# Patient Record
Sex: FEMALE | Race: WHITE | NOT HISPANIC OR LATINO
[De-identification: names, ages, dates, MRNs, and addresses within clinical notes are randomized per-mention and may not be internally consistent; named-entity substitution may affect disease eponyms.]

---

## 2017-06-02 ENCOUNTER — APPOINTMENT (OUTPATIENT)
Dept: GASTROENTEROLOGY | Facility: CLINIC | Age: 82
End: 2017-06-02

## 2017-06-02 VITALS
DIASTOLIC BLOOD PRESSURE: 70 MMHG | BODY MASS INDEX: 47.29 KG/M2 | HEIGHT: 62 IN | WEIGHT: 257 LBS | TEMPERATURE: 98.2 F | SYSTOLIC BLOOD PRESSURE: 140 MMHG

## 2017-06-02 DIAGNOSIS — Z87.39 PERSONAL HISTORY OF OTHER DISEASES OF THE MUSCULOSKELETAL SYSTEM AND CONNECTIVE TISSUE: ICD-10-CM

## 2017-06-02 DIAGNOSIS — Z86.39 PERSONAL HISTORY OF OTHER ENDOCRINE, NUTRITIONAL AND METABOLIC DISEASE: ICD-10-CM

## 2017-06-02 DIAGNOSIS — Z86.79 PERSONAL HISTORY OF OTHER DISEASES OF THE CIRCULATORY SYSTEM: ICD-10-CM

## 2017-06-02 DIAGNOSIS — M79.89 OTHER SPECIFIED SOFT TISSUE DISORDERS: ICD-10-CM

## 2017-06-02 DIAGNOSIS — Z87.09 PERSONAL HISTORY OF OTHER DISEASES OF THE RESPIRATORY SYSTEM: ICD-10-CM

## 2017-06-02 RX ORDER — ASPIRIN 325 MG
81 TABLET ORAL
Refills: 0 | Status: ACTIVE | COMMUNITY

## 2017-06-02 RX ORDER — CHOLECALCIFEROL (VITAMIN D3) 250 MCG
250 MCG CAPSULE ORAL
Refills: 0 | Status: ACTIVE | COMMUNITY

## 2017-08-02 ENCOUNTER — OUTPATIENT (OUTPATIENT)
Dept: OUTPATIENT SERVICES | Facility: HOSPITAL | Age: 82
LOS: 1 days | Discharge: HOME | End: 2017-08-02
Payer: MEDICARE

## 2017-08-02 ENCOUNTER — APPOINTMENT (OUTPATIENT)
Dept: GASTROENTEROLOGY | Facility: HOSPITAL | Age: 82
End: 2017-08-02
Payer: MEDICARE

## 2017-08-02 ENCOUNTER — RESULT REVIEW (OUTPATIENT)
Age: 82
End: 2017-08-02

## 2017-08-02 DIAGNOSIS — K21.9 GASTRO-ESOPHAGEAL REFLUX DISEASE WITHOUT ESOPHAGITIS: ICD-10-CM

## 2017-08-02 DIAGNOSIS — Z98.89 OTHER SPECIFIED POSTPROCEDURAL STATES: Chronic | ICD-10-CM

## 2017-08-02 PROCEDURE — 88305 TISSUE EXAM BY PATHOLOGIST: CPT | Mod: 26

## 2017-08-02 PROCEDURE — 88312 SPECIAL STAINS GROUP 1: CPT | Mod: 26

## 2017-08-02 PROCEDURE — 43239 EGD BIOPSY SINGLE/MULTIPLE: CPT

## 2017-08-03 LAB — SURGICAL PATHOLOGY STUDY: SIGNIFICANT CHANGE UP

## 2017-08-29 ENCOUNTER — APPOINTMENT (OUTPATIENT)
Dept: GASTROENTEROLOGY | Facility: CLINIC | Age: 82
End: 2017-08-29
Payer: MEDICARE

## 2017-08-29 VITALS
DIASTOLIC BLOOD PRESSURE: 70 MMHG | HEIGHT: 62 IN | BODY MASS INDEX: 47.84 KG/M2 | SYSTOLIC BLOOD PRESSURE: 140 MMHG | WEIGHT: 260 LBS

## 2017-08-29 DIAGNOSIS — M48.02 SPINAL STENOSIS, CERVICAL REGION: ICD-10-CM

## 2017-08-29 DIAGNOSIS — Z09 ENCOUNTER FOR FOLLOW-UP EXAMINATION AFTER COMPLETED TREATMENT FOR CONDITIONS OTHER THAN MALIGNANT NEOPLASM: ICD-10-CM

## 2017-08-29 DIAGNOSIS — Z85.3 PERSONAL HISTORY OF MALIGNANT NEOPLASM OF BREAST: ICD-10-CM

## 2017-08-29 PROCEDURE — 99214 OFFICE O/P EST MOD 30 MIN: CPT

## 2017-09-06 ENCOUNTER — EMERGENCY (EMERGENCY)
Facility: HOSPITAL | Age: 82
LOS: 1 days | Discharge: HOME | End: 2017-09-06
Attending: EMERGENCY MEDICINE | Admitting: EMERGENCY MEDICINE
Payer: MEDICARE

## 2017-09-06 VITALS
OXYGEN SATURATION: 93 % | DIASTOLIC BLOOD PRESSURE: 58 MMHG | SYSTOLIC BLOOD PRESSURE: 128 MMHG | RESPIRATION RATE: 18 BRPM | HEART RATE: 85 BPM | TEMPERATURE: 98 F

## 2017-09-06 VITALS
SYSTOLIC BLOOD PRESSURE: 142 MMHG | RESPIRATION RATE: 18 BRPM | HEART RATE: 81 BPM | OXYGEN SATURATION: 88 % | DIASTOLIC BLOOD PRESSURE: 42 MMHG

## 2017-09-06 DIAGNOSIS — Z98.89 OTHER SPECIFIED POSTPROCEDURAL STATES: Chronic | ICD-10-CM

## 2017-09-06 PROCEDURE — 72170 X-RAY EXAM OF PELVIS: CPT | Mod: 26

## 2017-09-06 PROCEDURE — 99284 EMERGENCY DEPT VISIT MOD MDM: CPT | Mod: GC

## 2017-09-06 NOTE — ED ADULT NURSE NOTE - TEMPLATE LIST FOR HEAD TO TOE ASSESSMENT
Chief complaint:   Chief Complaint   Patient presents with   • Follow-up     1st Degree AVB, Hx of AFL, s/p ablation        Vitals:  Visit Vitals   • /84   • Pulse 95   • Ht 6' 3\" (1.905 m)   • Wt (!) 154.2 kg   • BMI 42.5 kg/m2       HISTORY OF PRESENT ILLNESS     HPI     The patient walked into the exam room today stating he did not feel well and thought his BS was low.  He is scheduled for a ST after his visit today and was told he could not eat after midnight.  The patient is diabetic.  He did not check his BS this am.  His BS in the office today was 58.  The patient was given 2 juices and crackers and his BS came up to 89 and he was feeling better.  I called the Nuclear Medicine department and spoke with Sarah from scheduling.  Informed her the patient ate because his BS was low.  She said the pt did not have to NPO for today's test.  He only needed to be NPO for the second part of the test on Wednesday.  The patient was informed of this and verbalized understanding.      1st degree AVB:  On Metoprolol.  Tolerating medication  AFL:  Denies any palpitations.    CHF:  10/2016 hospitalized with LE edema and leg pain.  Pt has persistent LE edema.  Wears support stockings.  Follows in Lymphedema clinic.  Reports Fatigue and SOB walking 10-15 ft, slightly farther if he uses his walker. Activity limited by breathing and leg pain.  He is following with Dr. Griffin in the HF clinic now.   HTN:  Recently started on Amlodipine by Dr. Griffin for BP control. Reports medication compliance  FLEX:  Pt reports compliance with CPAP.  CP:  Described as \"sharp lightning bolt\"  Scheduled for NST today.     He was hospitalized in November for perianal pain due to a rectal abscess.  He underwent I & D and developed bleeding post procedure requiring transfusion.  He was readmitted a few weeks later for persistent bleeding from the wound requiring another transfusion.  He has a history of Von Willebrands Disease.  He was  discharged to a rehab facility and went home 1/5/17.          Other significant problems:  Patient Active Problem List    Diagnosis Date Noted   • Obesity due to excess calories 03/16/2017     Priority: Low   • Von Willebrand disease 11/21/2016     Priority: Low   • Neck injury 10/25/2016     Priority: Low   • Gout of multiple sites 09/08/2016     Priority: Low   • Lymphedema of both lower extremities 07/25/2016     Priority: Low   • Cough, persistent 04/15/2016     Priority: Low   • CAD (coronary artery disease) 03/24/2016     Priority: Low     s/p NSTEMI 5/2013 4/2013 Echo; mod increased LV wall thickness, EF 65%, no regional wall motion abnormalities   5/2013 Cardiac cath no significant CAD, EF 75%  12/27/2013 Stress Test: EF 60-65%, no ischemia, LVH  11/25/14 Echo - EF 71%, I/IV diastolic dysfunction, moderate LVH  8/17/16 NST - negative for ischemia, EF 75%     • First degree AV block 02/15/2016     Priority: Low     10/1/2015 EKG  ms  2/15/2016 Rhythm strip KY 360ms.  Metoprolol decr 100mg BID to 25mg BID  3/28/2016 KY 280ms improved after BB decr  1/20/2017 KY 208ms     • Paroxysmal atrial flutter, CHADS VASC 2 (IDDM, HTN)  02/09/2016     Priority: Low     1/31/2014 [facility] guided DCCV for new onset AFlutter at [facility]. started Warfarin, BB, Multaq 400 mg BID    3/10/14 Metoprolol decreased to 50 mg bid due to fatigue  7/10/2014 s/p AFL abation  7//25/2014 DVT right IJ, Xarelto and ASA on hold, started Lovenox 4-6 weeks  8/2014 on Pradaxa, lovenox stopped.   9/29/14  Pradaxa dc'd as pt maintained SR       • Chronic diastolic CHF (congestive heart failure) 02/09/2016     Priority: Low     11/25/14 Echo - I/IV diastolic dysfunction, EF 71%  7/25/16 - Echo - EF 60%, severely increased LV wall thickness, I/IV diastolic dysfunction     • Renal cyst 07/08/2015     Priority: Low   • Iron deficiency anemia 08/25/2014     Priority: Low   • DVT (deep venous thrombosis) right IJ 7/25/2014 08/06/2014     Priority:  Low     On Lovenox 4-6 weeks     • Acute right hip pain 08/01/2014     Priority: Low   • Left knee pain 05/10/2014     Priority: Low   • Chronic back pain 04/09/2014     Priority: Low   • Unspecified constipation 04/09/2014     Priority: Low   • Type II or unspecified type diabetes mellitus with neurological manifestations, not stated as uncontrolled(250.60) 08/12/2013     Priority: Low   • Dermatophytosis of nail 08/12/2013     Priority: Low   • Diabetic polyneuropathy 08/12/2013     Priority: Low   • Other and unspecified hyperlipidemia 11/27/2012     Priority: Low   • Obstructive sleep apnea (adult) (pediatric) 11/27/2012     Priority: Low   • Chronic kidney disease, stage III (moderate) 11/27/2012     Priority: Low   • Arthritis 06/21/2012     Priority: Low   • ASHD (arteriosclerotic heart disease) 06/21/2012     Priority: Low   • Benign Essential Hypertension 06/21/2012     Priority: Low     On , Losartan and Cardura  9/26/16 Per pt Dr. Alfredo discontinued the Amlodipine secondary to edema  3/14/2017 Started on Amlodipine 5mg by Dr Griffin     • BPH (benign prostatic hypertrophy) with urinary obstruction 06/21/2012     Priority: Low   • Impotence of organic origin 06/21/2012     Priority: Low   • Monoclonal gammopathy 06/21/2012     Priority: Low       PAST MEDICAL, FAMILY AND SOCIAL HISTORY     Medications:  Current Outpatient Prescriptions   Medication   • minoxidil (LONITEN) 10 MG tablet   • amLODIPine (NORVASC) 5 MG tablet   • tamsulosin (FLOMAX) 0.4 MG Cap   • gabapentin (NEURONTIN) 400 MG capsule   • pantoprazole (PROTONIX) 40 MG tablet   • MITIGARE 0.6 MG capsule   • NOVOLOG FLEXPEN 100 UNIT/ML pen-injector   • insulin glargine (LANTUS) 100 UNIT/ML injectable solution   • exenatide ER (BYDUREON) 2 MG pen-injector   • furosemide (LASIX) 40 MG tablet   • metolazone (ZAROXOLYN) 2.5 MG tablet   • metoPROLOL 75 MG Tab   • lidocaine (LIDODERM) 5 %   • bisacodyl (DULCOLAX) 10 MG suppository   • potassium chloride  (K-DUR, KLOR-CON) 10 MEQ CR tablet   • allopurinol (ZYLOPRIM) 300 MG tablet   • acetaminophen (TYLENOL) 325 MG tablet   • Cholecalciferol (VITAMIN D) 2000 UNITS Cap   • atorvastatin (LIPITOR) 20 MG tablet   • doxazosin (CARDURA) 8 MG tablet   • fluticasone (FLONASE) 50 MCG/ACT nasal spray   • Luliconazole 1 % Cream   • Efinaconazole 10 % Solution   • aspirin 81 MG chewable tablet   • ferrous sulfate 325 (65 FE) MG tablet   • Pyridoxine HCl (VITAMIN B-6) 50 MG tablet   • Meclizine HCl 25 MG tablet   • polyethylene glycol (GLYCOLAX, MIRALAX) packet     No current facility-administered medications for this visit.        Allergies:  ALLERGIES:  No Known Allergies    Past Medical  History/Surgeries:  Past Medical History:   Diagnosis Date   • Anemia    • Arthritis    • Blood clot associated with vein wall inflammation    • BPH (benign prostatic hypertrophy)    • Chronic diastolic CHF (congestive heart failure) 2/9/2016 11/25/14 Echo - I/IV diastolic dysfunction, EF 71% 7/25/16 - Echo - EF 60%, severely increased LV wall thickness, I/IV diastolic dysfunction    • Chronic pain    • CKD (chronic kidney disease)    • Congestive cardiac failure 7/2014   • Coronary artery disease    • Diabetes Mellitus 1994   • Diabetic neuropathy    • Difficulty initiating walking    • DVT (deep venous thrombosis) 7/25/2014    right IJ   • Erectile dysfunction    • Esophageal reflux    • Gout    • Hyperlipidemia    • Hypertension    • MI (myocardial infarction) 2006 and 2009    Pt has normal coronaries per cardiac cath   in 05/2013. NSTEMI 5/2013.   • Obesity    • Onychomycosis    • Otitis media    • Rash    • Sleep apnea     use CPAP   • Von Willebrand disease 1991       Past Surgical History:   Procedure Laterality Date   • CARDIOVERSION  1/31/2014    [facility] for AFlutter, new onset   • COLONOSCOPY DIAGNOSTIC  10/13/14    Affi 5yr recall, 2 polyps tubular adenoma    • CORONARY ANGIOGRAM - CV  5/2/2013    normal coronaries, EF  75%   • ECHO [facility]  1/31/2014    EF 55%, no shunts, no thrombus, marked LA enlargement   • ECHOCARDIOGRAM  1/30/2014    [facility], EF 72%    • EP ABLATION  7/10/2014    AFL ablation   • HAND/FINGER SURGERY UNLISTED      Unspecified Hand/Finger procedure right thumb   • PTCA     • SERVICE TO GASTROENTEROLOGY     • SHOULDER SURG PROC UNLISTED  1999    Unspecified Shoulder Procedure right, rotator cuff,    • US EXTREMITY VENOUS DOPPLER BILATERAL  1/30/2014    [facility], no DVT       Family History:  Family History   Problem Relation Age of Onset   • Diabetes Father    • Stroke Mother    • Cancer Sister    • Cancer Sister        Social History:  Social History   Substance Use Topics   • Smoking status: Former Smoker     Years: 14.00     Types: Pipe     Quit date: 9/11/1980   • Smokeless tobacco: Former User   • Alcohol use 0.0 oz/week     0 Standard drinks or equivalent per week      Comment: occasional beer rare       REVIEW OF SYSTEMS     Review of Systems   Constitutional: Positive for fatigue. Negative for appetite change.        Fatigue on exertion   HENT: Negative for nosebleeds.    Respiratory: Positive for shortness of breath. Negative for cough.    Cardiovascular: Positive for chest pain and leg swelling. Negative for palpitations.        CP described as \"sharp lightning bolt\"    Chronic LE edema, follows Lymphedema clinic   Gastrointestinal: Negative for blood in stool, constipation, diarrhea, nausea and vomiting.   Neurological: Positive for dizziness. Negative for syncope.        Dizziness in am with position change.  No syncope       PHYSICAL EXAM     Physical Exam   Constitutional: He is oriented to person, place, and time. He appears well-developed.   obese   Cardiovascular: Normal rate and regular rhythm.    Presenting Rhythm:  Sinus rhythm with intact conduction, 95 bpm, 1st degree AVB   Pulmonary/Chest: Effort normal.   Musculoskeletal: He exhibits edema.   Neurological: He is alert and  oriented to person, place, and time. He has normal reflexes.   Skin: Skin is warm and dry.   Psychiatric: He has a normal mood and affect. His behavior is normal.   Vitals reviewed.    Sodium (mmol/L)   Date Value   01/20/2017 135     Potassium (mmol/L)   Date Value   01/20/2017 4.1     Chloride (mmol/L)   Date Value   01/20/2017 99     Glucose (mg/dL)   Date Value   01/20/2017 439 (H)     CALCIUM (mg/dL)   Date Value   01/20/2017 8.6     Carbon Dioxide (mmol/L)   Date Value   01/20/2017 30     BUN (mg/dL)   Date Value   01/20/2017 62 (H)     Creatinine (mg/dL)   Date Value   01/20/2017 2.21 (H)         WBC (K/mcL)   Date Value   01/20/2017 7.8     RBC (mil/mcL)   Date Value   01/20/2017 3.76 (L)     HCT (%)   Date Value   01/20/2017 31.4 (L)     HGB (g/dL)   Date Value   01/20/2017 9.7 (L)     PLT (K/mcL)   Date Value   01/20/2017 332   12/27/2013 275       ASSESSMENT/PLAN     On 3/27/17, I Socorro Borja RN scribed the services personally performed by Dr. Neftaly Gama    Paroxysmal atrial flutter, CHADS VASC 2 (IDDM, HTN)   No symptoms suggestive of recurrence.  Off Anticoagulation    First degree AV block  CT interval 240 ms on today's rhythm.     Benign Essential Hypertension  /84 today.  Pt held medications this am for ST today.  Dr. Jhaveri managing.      CAD (coronary artery disease)  C/o chest pain.  Follows with Dr. Griffin.  The patient is scheduled for a NST today.      Chronic diastolic CHF (congestive heart failure)  Persistent LE edema and SOB walking 10-15 ft.  Activity limited by breathing and leg pain.   Wearing compression stockings.  Was recently restarted on Amlodipine for BP control by Dr. Griffin.  Last Echo 7/25/16, EF 60% with I/IV diastolic dysfunction.  Dr. Griffin managing CHF.  Pt also follows in the Lymphedema clinic.       Return in about 6 months (around 9/27/2017), or if symptoms worsen or fail to improve.     The documentation recorded by the scribe accurately and completely  reflects the service(s) I personally performed and the decisions made by me.       Fall

## 2017-09-06 NOTE — ED ADULT NURSE NOTE - OBJECTIVE STATEMENT
Received pt into spot #2. Pt A/o x 3 calm cooperative. No acute distress noted. Pt presents with ecchymosis to bilateral knees with +2 pitting edema to BLE. multiple small bruises noted to bilateral arms. Pt states only takes ASA. Denies pain. Pt eval by Dr. Landis. xrays ordered

## 2017-09-06 NOTE — ED ADULT TRIAGE NOTE - CHIEF COMPLAINT QUOTE
Pt states she was shopping in a wheelchair and the wheelchair stopped and "I fell out of the chair."  3 days ago.  Denies LOC.    Denies hitting head.  c/o LLE pain.  large ecchymotic areas noted to b/l knees.  Denies blood thinner use.   h/o COPD, left breast cancer '89,

## 2017-09-06 NOTE — ED PROVIDER NOTE - PMH
Anxiety disorder    Chronic bronchitis    COPD (Chronic Obstructive Pulmonary Disease)  on home O2 AT NIGHT  Essential Hypertension    Hip Fracture-Left    Hx of Breast Cancer  HAd Rt in 1989  Morbid obesity

## 2017-09-06 NOTE — ED PROVIDER NOTE - EXTREMITY EXAM
left lower extremity findings/No pain with internal/external rotation or axial loading./right lower extremity findings

## 2017-09-06 NOTE — ED PROVIDER NOTE - ATTENDING CONTRIBUTION TO CARE
nelly: pmh includes copd, sleep apnea and HTN. was in wheelchair (related to COPD0 and legs got caught in from of chair and she fell forward landing to both knees. happened 3 days ago. denies head trauma. continues to be ambulatory with a walker. is concerned about bruising and local swelling.  takes 81mg ASA daily.   exam: NAD. sat 91% (pt states baseline upper 80s/90). no neck or head/facial tenderness or bruising.  no chest wall tenderness. abd soft and nontender.   there is brusing to both forerams. with nontender and expected ROM to shoulders, elbows and wrists.  full rom both knees. ranges hips w/o diff. walks with examiner, gait typical according to family.  there is bruising to both knees with local swelling. no posterior calf tenderness or redness.   impression: soft tissue injury.   recc: will obtain pelvic xray, although level of suspicion low.   if no fx, dc to opt f/u. Pt already has a neurology appt tomorrow.

## 2017-09-06 NOTE — ED PROVIDER NOTE - PROGRESS NOTE DETAILS
nelly: pelvic xray: no acute fx noted.   By hx, pt has chronic tingling to lle with no new neuro deficits.   recc: dc to opt f/u

## 2017-09-06 NOTE — ED PROVIDER NOTE - PSH
Joint Fixation (Surgical)- L Hip    S/P Breast Lumpectomy- left    S/P     S/P Insertion of IVC (Inferior Vena Caval) Filter    Status Post Total Knee Replacement-bilateral

## 2017-12-10 ENCOUNTER — INPATIENT (INPATIENT)
Facility: HOSPITAL | Age: 82
LOS: 7 days | Discharge: HOME | End: 2017-12-18
Attending: HOSPITALIST | Admitting: HOSPITALIST
Payer: MEDICARE

## 2017-12-10 VITALS
RESPIRATION RATE: 36 BRPM | OXYGEN SATURATION: 87 % | TEMPERATURE: 98 F | DIASTOLIC BLOOD PRESSURE: 87 MMHG | SYSTOLIC BLOOD PRESSURE: 128 MMHG | HEART RATE: 115 BPM

## 2017-12-10 DIAGNOSIS — Z98.89 OTHER SPECIFIED POSTPROCEDURAL STATES: Chronic | ICD-10-CM

## 2017-12-10 LAB
ALBUMIN SERPL ELPH-MCNC: 4 G/DL — SIGNIFICANT CHANGE UP (ref 3.3–5)
ALP SERPL-CCNC: 108 U/L — SIGNIFICANT CHANGE UP (ref 40–120)
ALT FLD-CCNC: 17 U/L — SIGNIFICANT CHANGE UP (ref 4–33)
AST SERPL-CCNC: 26 U/L — SIGNIFICANT CHANGE UP (ref 4–32)
BASE EXCESS BLDV CALC-SCNC: 5 MMOL/L — SIGNIFICANT CHANGE UP
BASE EXCESS BLDV CALC-SCNC: 8.9 MMOL/L — SIGNIFICANT CHANGE UP
BASOPHILS # BLD AUTO: 0.1 K/UL — SIGNIFICANT CHANGE UP (ref 0–0.2)
BASOPHILS NFR BLD AUTO: 0.9 % — SIGNIFICANT CHANGE UP (ref 0–2)
BILIRUB SERPL-MCNC: 0.4 MG/DL — SIGNIFICANT CHANGE UP (ref 0.2–1.2)
BLOOD GAS VENOUS - CREATININE: 1.17 MG/DL — SIGNIFICANT CHANGE UP (ref 0.5–1.3)
BLOOD GAS VENOUS - CREATININE: 1.3 MG/DL — SIGNIFICANT CHANGE UP (ref 0.5–1.3)
BUN SERPL-MCNC: 22 MG/DL — SIGNIFICANT CHANGE UP (ref 7–23)
CALCIUM SERPL-MCNC: 9.3 MG/DL — SIGNIFICANT CHANGE UP (ref 8.4–10.5)
CHLORIDE BLDV-SCNC: 102 MMOL/L — SIGNIFICANT CHANGE UP (ref 96–108)
CHLORIDE BLDV-SCNC: 104 MMOL/L — SIGNIFICANT CHANGE UP (ref 96–108)
CHLORIDE SERPL-SCNC: 100 MMOL/L — SIGNIFICANT CHANGE UP (ref 98–107)
CK MB BLD-MCNC: 2.8 — HIGH (ref 0–2.5)
CK MB BLD-MCNC: 4.53 NG/ML — SIGNIFICANT CHANGE UP (ref 1–4.7)
CK SERPL-CCNC: 160 U/L — SIGNIFICANT CHANGE UP (ref 25–170)
CO2 SERPL-SCNC: 31 MMOL/L — SIGNIFICANT CHANGE UP (ref 22–31)
CREAT SERPL-MCNC: 1.3 MG/DL — SIGNIFICANT CHANGE UP (ref 0.5–1.3)
EOSINOPHIL # BLD AUTO: 0.29 K/UL — SIGNIFICANT CHANGE UP (ref 0–0.5)
EOSINOPHIL NFR BLD AUTO: 2.6 % — SIGNIFICANT CHANGE UP (ref 0–6)
GAS PNL BLDV: 138 MMOL/L — SIGNIFICANT CHANGE UP (ref 136–146)
GAS PNL BLDV: 141 MMOL/L — SIGNIFICANT CHANGE UP (ref 136–146)
GLUCOSE BLDV-MCNC: 136 — HIGH (ref 70–99)
GLUCOSE BLDV-MCNC: 243 — HIGH (ref 70–99)
GLUCOSE SERPL-MCNC: 124 MG/DL — HIGH (ref 70–99)
HCO3 BLDV-SCNC: 28 MMOL/L — HIGH (ref 20–27)
HCO3 BLDV-SCNC: 31 MMOL/L — HIGH (ref 20–27)
HCT VFR BLD CALC: 40.7 % — SIGNIFICANT CHANGE UP (ref 34.5–45)
HCT VFR BLDV CALC: 38 % — SIGNIFICANT CHANGE UP (ref 34.5–45)
HCT VFR BLDV CALC: 39.7 % — SIGNIFICANT CHANGE UP (ref 34.5–45)
HGB BLD-MCNC: 12.4 G/DL — SIGNIFICANT CHANGE UP (ref 11.5–15.5)
HGB BLDV-MCNC: 12.4 G/DL — SIGNIFICANT CHANGE UP (ref 11.5–15.5)
HGB BLDV-MCNC: 12.9 G/DL — SIGNIFICANT CHANGE UP (ref 11.5–15.5)
IMM GRANULOCYTES # BLD AUTO: 0.05 # — SIGNIFICANT CHANGE UP
IMM GRANULOCYTES NFR BLD AUTO: 0.4 % — SIGNIFICANT CHANGE UP (ref 0–1.5)
LACTATE BLDV-MCNC: 1.8 MMOL/L — SIGNIFICANT CHANGE UP (ref 0.5–2)
LACTATE BLDV-MCNC: 2.5 MMOL/L — HIGH (ref 0.5–2)
LYMPHOCYTES # BLD AUTO: 1.99 K/UL — SIGNIFICANT CHANGE UP (ref 1–3.3)
LYMPHOCYTES # BLD AUTO: 17.6 % — SIGNIFICANT CHANGE UP (ref 13–44)
MCHC RBC-ENTMCNC: 25.1 PG — LOW (ref 27–34)
MCHC RBC-ENTMCNC: 30.5 % — LOW (ref 32–36)
MCV RBC AUTO: 82.2 FL — SIGNIFICANT CHANGE UP (ref 80–100)
MONOCYTES # BLD AUTO: 0.71 K/UL — SIGNIFICANT CHANGE UP (ref 0–0.9)
MONOCYTES NFR BLD AUTO: 6.3 % — SIGNIFICANT CHANGE UP (ref 2–14)
NEUTROPHILS # BLD AUTO: 8.14 K/UL — HIGH (ref 1.8–7.4)
NEUTROPHILS NFR BLD AUTO: 72.2 % — SIGNIFICANT CHANGE UP (ref 43–77)
NRBC # FLD: 0 — SIGNIFICANT CHANGE UP
NT-PROBNP SERPL-SCNC: 429 PG/ML — SIGNIFICANT CHANGE UP
PCO2 BLDV: 46 MMHG — SIGNIFICANT CHANGE UP (ref 41–51)
PCO2 BLDV: 51 MMHG — SIGNIFICANT CHANGE UP (ref 41–51)
PH BLDV: 7.42 PH — SIGNIFICANT CHANGE UP (ref 7.32–7.43)
PH BLDV: 7.43 PH — SIGNIFICANT CHANGE UP (ref 7.32–7.43)
PLATELET # BLD AUTO: 351 K/UL — SIGNIFICANT CHANGE UP (ref 150–400)
PMV BLD: 10.1 FL — SIGNIFICANT CHANGE UP (ref 7–13)
PO2 BLDV: 27 MMHG — LOW (ref 35–40)
PO2 BLDV: 37 MMHG — SIGNIFICANT CHANGE UP (ref 35–40)
POTASSIUM BLDV-SCNC: 3.1 MMOL/L — LOW (ref 3.4–4.5)
POTASSIUM BLDV-SCNC: 3.2 MMOL/L — LOW (ref 3.4–4.5)
POTASSIUM SERPL-MCNC: 3.7 MMOL/L — SIGNIFICANT CHANGE UP (ref 3.5–5.3)
POTASSIUM SERPL-SCNC: 3.7 MMOL/L — SIGNIFICANT CHANGE UP (ref 3.5–5.3)
PROT SERPL-MCNC: 7.3 G/DL — SIGNIFICANT CHANGE UP (ref 6–8.3)
RBC # BLD: 4.95 M/UL — SIGNIFICANT CHANGE UP (ref 3.8–5.2)
RBC # FLD: 16.1 % — HIGH (ref 10.3–14.5)
SAO2 % BLDV: 42.5 % — LOW (ref 60–85)
SAO2 % BLDV: 64.3 % — SIGNIFICANT CHANGE UP (ref 60–85)
SODIUM SERPL-SCNC: 144 MMOL/L — SIGNIFICANT CHANGE UP (ref 135–145)
TROPONIN T SERPL-MCNC: < 0.06 NG/ML — SIGNIFICANT CHANGE UP (ref 0–0.06)
WBC # BLD: 11.28 K/UL — HIGH (ref 3.8–10.5)
WBC # FLD AUTO: 11.28 K/UL — HIGH (ref 3.8–10.5)

## 2017-12-10 PROCEDURE — 71275 CT ANGIOGRAPHY CHEST: CPT | Mod: 26

## 2017-12-10 PROCEDURE — 71010: CPT | Mod: 26

## 2017-12-10 RX ORDER — SODIUM CHLORIDE 9 MG/ML
500 INJECTION INTRAMUSCULAR; INTRAVENOUS; SUBCUTANEOUS ONCE
Qty: 0 | Refills: 0 | Status: COMPLETED | OUTPATIENT
Start: 2017-12-10 | End: 2017-12-10

## 2017-12-10 RX ORDER — IPRATROPIUM/ALBUTEROL SULFATE 18-103MCG
3 AEROSOL WITH ADAPTER (GRAM) INHALATION ONCE
Qty: 0 | Refills: 0 | Status: COMPLETED | OUTPATIENT
Start: 2017-12-10 | End: 2017-12-10

## 2017-12-10 RX ADMIN — Medication 3 MILLILITER(S): at 18:54

## 2017-12-10 RX ADMIN — Medication 60 MILLIGRAM(S): at 18:54

## 2017-12-10 RX ADMIN — SODIUM CHLORIDE 500 MILLILITER(S): 9 INJECTION INTRAMUSCULAR; INTRAVENOUS; SUBCUTANEOUS at 20:36

## 2017-12-10 NOTE — ED PROVIDER NOTE - OBJECTIVE STATEMENT
86F h/o COPD cpap at night, Breast Ca in remission, HTN p/w shortness of breath today not improved with nebulizer. no fevers, chills, cough, unilateral leg swelling.

## 2017-12-10 NOTE — ED PROVIDER NOTE - FAMILY HISTORY
Mother  Still living? Unknown  Family history of congestive heart failure, Age at diagnosis: Age Unknown     Father  Still living? Unknown  Family history of lymphoma, Age at diagnosis: Age Unknown     Sibling  Still living? Unknown  Family history of renal disease, Age at diagnosis: Age Unknown

## 2017-12-10 NOTE — ED PROVIDER NOTE - ATTENDING CONTRIBUTION TO CARE
Amara: 87 yo female with a h/o COPD- no prior ICU admissions, breast cancer in remission with IVC filter c/o acute onset SOB that woke her from sleep this morning. Pt normally on home bipap at night but does not normally require oxygen other than that. No associated chest or abdominal pain. No fevers or chills. + chronic LE edema for which she was starte don a diuretic approx 1 month ago. No worsening LE edema or pain. No recent travel but decreased mobility. Exam: + respiratory distress- purse lipped breathing and tachypnea, scattered wheezes at b/l bases, + tachycardia. b/l Le edema, no calf TTP. 2+ distal pulses x 4 extremities. Plan: cbc, cmp, cardiac enzymes, cxr, CTA, nebs, steroids, reassess

## 2017-12-10 NOTE — ED ADULT TRIAGE NOTE - CHIEF COMPLAINT QUOTE
Pt. c/o dyspnea since this AM progressively worsening this afternoon; denies chest pain. hx of COPD, arrives with increased WOB, tachypneic, and low O2 sat 87% RA. Charge RN aware.

## 2017-12-10 NOTE — ED PROVIDER NOTE - PROGRESS NOTE DETAILS
Melissa: improved with nebs, less tachypneic and less dyspneic Amara: Pt feeling much better, but still having tachypnea, refusing bipap, moving air better, will f/u CTA and continue to reassess Eddi SALAS- pt in no acute distress but was in low 90S and was given anotehr 50 mg solumedrol to complete the dose, started on azithro and ceftriaxone, and given albuterol 1 neb since it was more than 3 hrs last neb, pt improved to % and no acute distress, will admit to hospitalist, spoke to hospitalist

## 2017-12-10 NOTE — ED ADULT NURSE NOTE - OBJECTIVE STATEMENT
Pt received to  5 a&ox4 and ambulatory at baseline c/o dyspnea. Pt states she was at home cooking when she became out of breath. Pt states she took her O2 sat at home and it was in the 80s. Pt is sating at 92 in ED on 3L via NC. Pt took nebulizer at home with no relief. Pt given Solu-medrol and 3 duo nebs upon arrival. pt hx of COPD. 20g IV placed in rt ac, Labs sent. Will continue to monitor.

## 2017-12-11 DIAGNOSIS — Z29.9 ENCOUNTER FOR PROPHYLACTIC MEASURES, UNSPECIFIED: ICD-10-CM

## 2017-12-11 DIAGNOSIS — J44.1 CHRONIC OBSTRUCTIVE PULMONARY DISEASE WITH (ACUTE) EXACERBATION: ICD-10-CM

## 2017-12-11 DIAGNOSIS — I10 ESSENTIAL (PRIMARY) HYPERTENSION: ICD-10-CM

## 2017-12-11 DIAGNOSIS — J18.9 PNEUMONIA, UNSPECIFIED ORGANISM: ICD-10-CM

## 2017-12-11 DIAGNOSIS — G47.33 OBSTRUCTIVE SLEEP APNEA (ADULT) (PEDIATRIC): ICD-10-CM

## 2017-12-11 DIAGNOSIS — Z79.899 OTHER LONG TERM (CURRENT) DRUG THERAPY: ICD-10-CM

## 2017-12-11 LAB
B PERT DNA SPEC QL NAA+PROBE: SIGNIFICANT CHANGE UP
BASOPHILS # BLD AUTO: 0.01 K/UL — SIGNIFICANT CHANGE UP (ref 0–0.2)
BASOPHILS NFR BLD AUTO: 0.1 % — SIGNIFICANT CHANGE UP (ref 0–2)
BUN SERPL-MCNC: 23 MG/DL — SIGNIFICANT CHANGE UP (ref 7–23)
C PNEUM DNA SPEC QL NAA+PROBE: NOT DETECTED — SIGNIFICANT CHANGE UP
CALCIUM SERPL-MCNC: 8.6 MG/DL — SIGNIFICANT CHANGE UP (ref 8.4–10.5)
CHLORIDE SERPL-SCNC: 100 MMOL/L — SIGNIFICANT CHANGE UP (ref 98–107)
CK MB BLD-MCNC: 4.71 NG/ML — HIGH (ref 1–4.7)
CK SERPL-CCNC: 149 U/L — SIGNIFICANT CHANGE UP (ref 25–170)
CO2 SERPL-SCNC: 24 MMOL/L — SIGNIFICANT CHANGE UP (ref 22–31)
CREAT SERPL-MCNC: 1.14 MG/DL — SIGNIFICANT CHANGE UP (ref 0.5–1.3)
EOSINOPHIL # BLD AUTO: 0.01 K/UL — SIGNIFICANT CHANGE UP (ref 0–0.5)
EOSINOPHIL NFR BLD AUTO: 0.1 % — SIGNIFICANT CHANGE UP (ref 0–6)
FLUAV H1 2009 PAND RNA SPEC QL NAA+PROBE: NOT DETECTED — SIGNIFICANT CHANGE UP
FLUAV H1 RNA SPEC QL NAA+PROBE: NOT DETECTED — SIGNIFICANT CHANGE UP
FLUAV H3 RNA SPEC QL NAA+PROBE: NOT DETECTED — SIGNIFICANT CHANGE UP
FLUAV SUBTYP SPEC NAA+PROBE: SIGNIFICANT CHANGE UP
FLUBV RNA SPEC QL NAA+PROBE: NOT DETECTED — SIGNIFICANT CHANGE UP
GLUCOSE SERPL-MCNC: 236 MG/DL — HIGH (ref 70–99)
HADV DNA SPEC QL NAA+PROBE: NOT DETECTED — SIGNIFICANT CHANGE UP
HCOV 229E RNA SPEC QL NAA+PROBE: NOT DETECTED — SIGNIFICANT CHANGE UP
HCOV HKU1 RNA SPEC QL NAA+PROBE: NOT DETECTED — SIGNIFICANT CHANGE UP
HCOV NL63 RNA SPEC QL NAA+PROBE: NOT DETECTED — SIGNIFICANT CHANGE UP
HCOV OC43 RNA SPEC QL NAA+PROBE: NOT DETECTED — SIGNIFICANT CHANGE UP
HCT VFR BLD CALC: 40.7 % — SIGNIFICANT CHANGE UP (ref 34.5–45)
HGB BLD-MCNC: 12.2 G/DL — SIGNIFICANT CHANGE UP (ref 11.5–15.5)
HMPV RNA SPEC QL NAA+PROBE: NOT DETECTED — SIGNIFICANT CHANGE UP
HPIV1 RNA SPEC QL NAA+PROBE: NOT DETECTED — SIGNIFICANT CHANGE UP
HPIV2 RNA SPEC QL NAA+PROBE: NOT DETECTED — SIGNIFICANT CHANGE UP
HPIV3 RNA SPEC QL NAA+PROBE: NOT DETECTED — SIGNIFICANT CHANGE UP
HPIV4 RNA SPEC QL NAA+PROBE: NOT DETECTED — SIGNIFICANT CHANGE UP
IMM GRANULOCYTES # BLD AUTO: 0.04 # — SIGNIFICANT CHANGE UP
IMM GRANULOCYTES NFR BLD AUTO: 0.6 % — SIGNIFICANT CHANGE UP (ref 0–1.5)
LYMPHOCYTES # BLD AUTO: 0.46 K/UL — LOW (ref 1–3.3)
LYMPHOCYTES # BLD AUTO: 6.5 % — LOW (ref 13–44)
M PNEUMO DNA SPEC QL NAA+PROBE: NOT DETECTED — SIGNIFICANT CHANGE UP
MAGNESIUM SERPL-MCNC: 2 MG/DL — SIGNIFICANT CHANGE UP (ref 1.6–2.6)
MCHC RBC-ENTMCNC: 25 PG — LOW (ref 27–34)
MCHC RBC-ENTMCNC: 30 % — LOW (ref 32–36)
MCV RBC AUTO: 83.4 FL — SIGNIFICANT CHANGE UP (ref 80–100)
MONOCYTES # BLD AUTO: 0.03 K/UL — SIGNIFICANT CHANGE UP (ref 0–0.9)
MONOCYTES NFR BLD AUTO: 0.4 % — LOW (ref 2–14)
NEUTROPHILS # BLD AUTO: 6.51 K/UL — SIGNIFICANT CHANGE UP (ref 1.8–7.4)
NEUTROPHILS NFR BLD AUTO: 92.3 % — HIGH (ref 43–77)
NRBC # FLD: 0 — SIGNIFICANT CHANGE UP
PHOSPHATE SERPL-MCNC: 3.3 MG/DL — SIGNIFICANT CHANGE UP (ref 2.5–4.5)
PLATELET # BLD AUTO: 201 K/UL — SIGNIFICANT CHANGE UP (ref 150–400)
PMV BLD: 10.7 FL — SIGNIFICANT CHANGE UP (ref 7–13)
POTASSIUM SERPL-MCNC: 3.7 MMOL/L — SIGNIFICANT CHANGE UP (ref 3.5–5.3)
POTASSIUM SERPL-SCNC: 3.7 MMOL/L — SIGNIFICANT CHANGE UP (ref 3.5–5.3)
RBC # BLD: 4.88 M/UL — SIGNIFICANT CHANGE UP (ref 3.8–5.2)
RBC # FLD: 16.3 % — HIGH (ref 10.3–14.5)
REVIEW TO FOLLOW: YES — SIGNIFICANT CHANGE UP
RSV RNA SPEC QL NAA+PROBE: NOT DETECTED — SIGNIFICANT CHANGE UP
RV+EV RNA SPEC QL NAA+PROBE: NOT DETECTED — SIGNIFICANT CHANGE UP
SODIUM SERPL-SCNC: 143 MMOL/L — SIGNIFICANT CHANGE UP (ref 135–145)
TROPONIN T SERPL-MCNC: < 0.06 NG/ML — SIGNIFICANT CHANGE UP (ref 0–0.06)
WBC # BLD: 7.06 K/UL — SIGNIFICANT CHANGE UP (ref 3.8–10.5)
WBC # FLD AUTO: 7.06 K/UL — SIGNIFICANT CHANGE UP (ref 3.8–10.5)

## 2017-12-11 PROCEDURE — 12345: CPT | Mod: NC

## 2017-12-11 PROCEDURE — 99223 1ST HOSP IP/OBS HIGH 75: CPT | Mod: GC

## 2017-12-11 RX ORDER — GABAPENTIN 400 MG/1
600 CAPSULE ORAL
Qty: 0 | Refills: 0 | Status: DISCONTINUED | OUTPATIENT
Start: 2017-12-11 | End: 2017-12-18

## 2017-12-11 RX ORDER — CEFTRIAXONE 500 MG/1
1 INJECTION, POWDER, FOR SOLUTION INTRAMUSCULAR; INTRAVENOUS EVERY 24 HOURS
Qty: 0 | Refills: 0 | Status: DISCONTINUED | OUTPATIENT
Start: 2017-12-12 | End: 2017-12-17

## 2017-12-11 RX ORDER — AZITHROMYCIN 500 MG/1
500 TABLET, FILM COATED ORAL ONCE
Qty: 0 | Refills: 0 | Status: COMPLETED | OUTPATIENT
Start: 2017-12-11 | End: 2017-12-11

## 2017-12-11 RX ORDER — IPRATROPIUM/ALBUTEROL SULFATE 18-103MCG
3 AEROSOL WITH ADAPTER (GRAM) INHALATION EVERY 6 HOURS
Qty: 0 | Refills: 0 | Status: DISCONTINUED | OUTPATIENT
Start: 2017-12-11 | End: 2017-12-18

## 2017-12-11 RX ORDER — PANTOPRAZOLE SODIUM 20 MG/1
40 TABLET, DELAYED RELEASE ORAL
Qty: 0 | Refills: 0 | Status: DISCONTINUED | OUTPATIENT
Start: 2017-12-11 | End: 2017-12-18

## 2017-12-11 RX ORDER — CLONAZEPAM 1 MG
0.5 TABLET ORAL AT BEDTIME
Qty: 0 | Refills: 0 | Status: DISCONTINUED | OUTPATIENT
Start: 2017-12-11 | End: 2017-12-18

## 2017-12-11 RX ORDER — ALBUTEROL 90 UG/1
2.5 AEROSOL, METERED ORAL ONCE
Qty: 0 | Refills: 0 | Status: COMPLETED | OUTPATIENT
Start: 2017-12-11 | End: 2017-12-11

## 2017-12-11 RX ORDER — BENZOCAINE AND MENTHOL 5; 1 G/100ML; G/100ML
1 LIQUID ORAL ONCE
Qty: 0 | Refills: 0 | Status: COMPLETED | OUTPATIENT
Start: 2017-12-11 | End: 2017-12-11

## 2017-12-11 RX ORDER — CEFTRIAXONE 500 MG/1
1 INJECTION, POWDER, FOR SOLUTION INTRAMUSCULAR; INTRAVENOUS ONCE
Qty: 0 | Refills: 0 | Status: COMPLETED | OUTPATIENT
Start: 2017-12-11 | End: 2017-12-11

## 2017-12-11 RX ORDER — ASPIRIN/CALCIUM CARB/MAGNESIUM 324 MG
81 TABLET ORAL DAILY
Qty: 0 | Refills: 0 | Status: DISCONTINUED | OUTPATIENT
Start: 2017-12-11 | End: 2017-12-18

## 2017-12-11 RX ORDER — CHOLECALCIFEROL (VITAMIN D3) 125 MCG
1000 CAPSULE ORAL DAILY
Qty: 0 | Refills: 0 | Status: DISCONTINUED | OUTPATIENT
Start: 2017-12-11 | End: 2017-12-18

## 2017-12-11 RX ORDER — AMLODIPINE BESYLATE 2.5 MG/1
10 TABLET ORAL DAILY
Qty: 0 | Refills: 0 | Status: DISCONTINUED | OUTPATIENT
Start: 2017-12-11 | End: 2017-12-11

## 2017-12-11 RX ORDER — FUROSEMIDE 40 MG
40 TABLET ORAL DAILY
Qty: 0 | Refills: 0 | Status: DISCONTINUED | OUTPATIENT
Start: 2017-12-11 | End: 2017-12-14

## 2017-12-11 RX ORDER — AZITHROMYCIN 500 MG/1
500 TABLET, FILM COATED ORAL EVERY 24 HOURS
Qty: 0 | Refills: 0 | Status: DISCONTINUED | OUTPATIENT
Start: 2017-12-12 | End: 2017-12-16

## 2017-12-11 RX ORDER — HEPARIN SODIUM 5000 [USP'U]/ML
5000 INJECTION INTRAVENOUS; SUBCUTANEOUS EVERY 8 HOURS
Qty: 0 | Refills: 0 | Status: DISCONTINUED | OUTPATIENT
Start: 2017-12-11 | End: 2017-12-18

## 2017-12-11 RX ADMIN — Medication 3 MILLILITER(S): at 04:16

## 2017-12-11 RX ADMIN — ALBUTEROL 2.5 MILLIGRAM(S): 90 AEROSOL, METERED ORAL at 00:43

## 2017-12-11 RX ADMIN — GABAPENTIN 600 MILLIGRAM(S): 400 CAPSULE ORAL at 18:10

## 2017-12-11 RX ADMIN — CEFTRIAXONE 100 GRAM(S): 500 INJECTION, POWDER, FOR SOLUTION INTRAMUSCULAR; INTRAVENOUS at 00:15

## 2017-12-11 RX ADMIN — Medication 3 MILLILITER(S): at 11:04

## 2017-12-11 RX ADMIN — HEPARIN SODIUM 5000 UNIT(S): 5000 INJECTION INTRAVENOUS; SUBCUTANEOUS at 21:59

## 2017-12-11 RX ADMIN — Medication 1000 UNIT(S): at 18:09

## 2017-12-11 RX ADMIN — HEPARIN SODIUM 5000 UNIT(S): 5000 INJECTION INTRAVENOUS; SUBCUTANEOUS at 07:13

## 2017-12-11 RX ADMIN — Medication 2 DROP(S): at 18:10

## 2017-12-11 RX ADMIN — Medication 60 MILLIGRAM(S): at 00:43

## 2017-12-11 RX ADMIN — Medication 40 MILLIGRAM(S): at 07:13

## 2017-12-11 RX ADMIN — Medication 3 MILLILITER(S): at 16:57

## 2017-12-11 RX ADMIN — AZITHROMYCIN 250 MILLIGRAM(S): 500 TABLET, FILM COATED ORAL at 00:48

## 2017-12-11 RX ADMIN — BENZOCAINE AND MENTHOL 1 LOZENGE: 5; 1 LIQUID ORAL at 21:59

## 2017-12-11 RX ADMIN — PANTOPRAZOLE SODIUM 40 MILLIGRAM(S): 20 TABLET, DELAYED RELEASE ORAL at 07:13

## 2017-12-11 RX ADMIN — Medication 81 MILLIGRAM(S): at 18:09

## 2017-12-11 RX ADMIN — Medication 100 MILLIGRAM(S): at 21:59

## 2017-12-11 RX ADMIN — HEPARIN SODIUM 5000 UNIT(S): 5000 INJECTION INTRAVENOUS; SUBCUTANEOUS at 14:02

## 2017-12-11 NOTE — PROVIDER CONTACT NOTE (OTHER) - SITUATION
Patient refusing to be placed in a room on [address]. states it is too high and she cant breath well. PAtient also refusing any care at this time, (vitals, patient profile...)

## 2017-12-11 NOTE — H&P ADULT - HISTORY OF PRESENT ILLNESS
85yo Female with hx of COPD (on home CPAP), HTN, breast cancer s/p surgery and radiation, basal cell carcinoma s/p resection presenting with one day history of shortness of breath. This morning patient felt like she was having a cold. She has symptoms of an itchy, sore throat associated with "swelling of the top of my mouth." Patient had dry mouth and could not swallow any foods. Later in the afternoon, patient was cooking in a seated position when she has sudden onset shortness of breath. Her children brought her to the emergency room. At baseline, patient has a dry cough with occasional sputum production. Denies increased cough or sputum production. No fevers, chills, nausea, vomiting, rhinorrhea, headaches, chest pain, abdominal pain, diarrhea, dysuria, myalgias. No sick contacts. Of note, patient started developing bilateral lower extremity edema. Her cardiologist started her on furosemide and potassium one month ago.    In the ED, Tmax 98.2 -115 /87 RR 37 SpO2 87 RA. Patient was started on oxygen supplementation with nasal canula. Ceftriaxone and azithromycin was given. Bulmaroonebs, methylprednisolone 60x2, and a 500 bolus. 87yo Female with hx of COPD (on home CPAP), HTN, breast cancer s/p surgery and radiation, basal cell carcinoma s/p resection presenting with one day history of shortness of breath. This morning patient felt like she was having a cold. She has symptoms of an itchy, sore throat associated with "swelling of the top of my mouth." Patient had dry mouth and could not swallow any foods. Later in the afternoon, patient was cooking in a seated position when she has sudden onset shortness of breath. Her children brought her to the emergency room. At baseline, patient has a dry cough with occasional sputum production, currently said that her cough and her sputum production worsened. No fevers, chills, nausea, vomiting, rhinorrhea, headaches, chest pain, abdominal pain, diarrhea, dysuria, myalgias. No sick contacts. Of note, patient started developing bilateral lower extremity edema. Her cardiologist started her on furosemide and potassium one month ago.    In the ED, Tmax 98.2 -115 /87 RR 37 SpO2 87 RA. Patient was started on oxygen supplementation with nasal canula. Ceftriaxone and azithromycin was given. Duonebs, methylprednisolone 60x2, and a 500 bolus.

## 2017-12-11 NOTE — H&P ADULT - FAMILY HISTORY
Family history of lymphoma     Family history of congestive heart failure     Sibling  Still living? Unknown  Family history of renal disease, Age at diagnosis: Age Unknown

## 2017-12-11 NOTE — PROVIDER CONTACT NOTE (OTHER) - ACTION/TREATMENT ORDERED:
Dr Rome aware patient is refusing this floor and any intervention at this time. Patient in by nurses station on 4L NC, pending transfer

## 2017-12-11 NOTE — H&P ADULT - PROBLEM SELECTOR PLAN 5
Medication reconciliation needs to be completed in the morning as patient is not certain of all her home medications

## 2017-12-11 NOTE — H&P ADULT - PROBLEM SELECTOR PLAN 3
- Will switch to lasix 40 IV qd as patient is hypervolemic on exam and chest xray.   - BMP, Mg, Ph qd   - Echocardiogram pending - Will switch to lasix 40 IV qd as patient is hypervolemic on exam and chest xray.   - BMP, Mg, Ph qd   - Echocardiogram pending  - Holding amlodipine for worsening lower extremity edema

## 2017-12-11 NOTE — H&P ADULT - NSHPSOCIALHISTORY_GEN_ALL_CORE
Former smoker  No alcohol, tobacco, drug use   Lives with   No home health aid  Uses a walker, cane, and wheelchair

## 2017-12-11 NOTE — H&P ADULT - NSHPPHYSICALEXAM_GEN_ALL_CORE
PHYSICAL EXAM:  GENERAL: NAD, obese   HEAD:  Atraumatic, Normocephalic  EYES: EOMI, PERRLA, conjunctiva and sclera clear  ENMT: No tonsillar erythema, exudates, or enlargement; Moist mucous membranes, dentures in place   CHEST/LUNG: decreased air movement diffusely, occassional expiratory wheeze  HEART: Regular rate and rhythm; No murmurs, rubs, or gallops  ABDOMEN: Soft, Nontender, Nondistended; Bowel sounds present  EXTREMITIES:  3+ pitting edema bilaterally   SKIN: No rashes or lesions  NERVOUS SYSTEM:  Alert & Oriented X3, Good concentration

## 2017-12-11 NOTE — H&P ADULT - NSHPREVIEWOFSYSTEMS_GEN_ALL_CORE
REVIEW OF SYSTEMS:  CONSTITUTIONAL: No fever, weight loss, or fatigue  EYES: No eye pain, visual disturbances, or discharge  ENMT:   throat pain  RESPIRATORY: cough, shortness of breath  CARDIOVASCULAR: No chest pain. leg swelling  GASTROINTESTINAL: No abdominal or epigastric pain.  No diarrhea or constipation.   GENITOURINARY: No dysuria, frequency, hematuria, or incontinence  NEUROLOGICAL: No headaches  SKIN: No itching, burning, rashes, or lesions   MUSCULOSKELETAL: No myalgias

## 2017-12-11 NOTE — PHYSICAL THERAPY INITIAL EVALUATION ADULT - GENERAL OBSERVATIONS, REHAB EVAL
Received and left sitting up at the edge of the bed with nasal cannula in place and call bell in reach.

## 2017-12-11 NOTE — H&P ADULT - ASSESSMENT
85yo Female with hx of COPD (on home CPAP), HTN, breast cancer s/p surgery and radiation, basal cell carcinoma s/p resection presenting with COPD exacerbation likely secondary to RLL PNA.

## 2017-12-11 NOTE — H&P ADULT - ATTENDING COMMENTS
Patient seen and examined on 12/11/17, case discussed with Dr. Rome, agree with assessment and plan as above

## 2017-12-11 NOTE — PROVIDER CONTACT NOTE (OTHER) - ASSESSMENT
Patient refusing to be placed in a room on [address]. states it is too high and she cant breath well. Patient also refusing any care at this time, (vitals, patient profile, assessment..)  Pending transfer to lower floor

## 2017-12-11 NOTE — H&P ADULT - PROBLEM SELECTOR PLAN 1
most likely secondary to PNA  - Continue treatment with ceftriaxone and azithromycin   - s/p methyloprednisolone, will continue prednisone 40mg qd for four days  - Speech and swallow evaluation, aspiration precautions   - Continue oxygen supplementation with nasal cannula. Patient was recommended to start BiPAP for increased work of breathing but she reports she could not tolerate it and refused BiPAP. GOC discussion at bedside revealed patient would like to be DNR/DNI. most likely secondary to PNA  - Continue treatment with ceftriaxone and azithromycin   - Urine legionella pending  - s/p methylprednisolone, will continue prednisone 40mg qd for four days  - Speech and swallow evaluation, aspiration precautions   - Continue oxygen supplementation with nasal cannula. Patient was recommended to start BiPAP for increased work of breathing but she reports she could not tolerate it and refused BiPAP. GOC discussion at bedside revealed patient would like to be DNR/DNI.

## 2017-12-11 NOTE — PROVIDER CONTACT NOTE (OTHER) - BACKGROUND
patient diagnosed with COPD. hx of chronic bronchitis, anxiety, hypertension, breast cancer, L hip fracture, bl total knee replacements and c section

## 2017-12-12 LAB
BUN SERPL-MCNC: 22 MG/DL — SIGNIFICANT CHANGE UP (ref 7–23)
CALCIUM SERPL-MCNC: 8.4 MG/DL — SIGNIFICANT CHANGE UP (ref 8.4–10.5)
CHLORIDE SERPL-SCNC: 102 MMOL/L — SIGNIFICANT CHANGE UP (ref 98–107)
CO2 SERPL-SCNC: 30 MMOL/L — SIGNIFICANT CHANGE UP (ref 22–31)
CREAT SERPL-MCNC: 1.12 MG/DL — SIGNIFICANT CHANGE UP (ref 0.5–1.3)
GLUCOSE SERPL-MCNC: 137 MG/DL — HIGH (ref 70–99)
HCT VFR BLD CALC: 36.8 % — SIGNIFICANT CHANGE UP (ref 34.5–45)
HGB BLD-MCNC: 11.3 G/DL — LOW (ref 11.5–15.5)
MCHC RBC-ENTMCNC: 24.8 PG — LOW (ref 27–34)
MCHC RBC-ENTMCNC: 30.7 % — LOW (ref 32–36)
MCV RBC AUTO: 80.7 FL — SIGNIFICANT CHANGE UP (ref 80–100)
NRBC # FLD: 0 — SIGNIFICANT CHANGE UP
PLATELET # BLD AUTO: 351 K/UL — SIGNIFICANT CHANGE UP (ref 150–400)
PMV BLD: 10 FL — SIGNIFICANT CHANGE UP (ref 7–13)
POTASSIUM SERPL-MCNC: 3.5 MMOL/L — SIGNIFICANT CHANGE UP (ref 3.5–5.3)
POTASSIUM SERPL-SCNC: 3.5 MMOL/L — SIGNIFICANT CHANGE UP (ref 3.5–5.3)
RBC # BLD: 4.56 M/UL — SIGNIFICANT CHANGE UP (ref 3.8–5.2)
RBC # FLD: 16.3 % — HIGH (ref 10.3–14.5)
SODIUM SERPL-SCNC: 144 MMOL/L — SIGNIFICANT CHANGE UP (ref 135–145)
WBC # BLD: 9.27 K/UL — SIGNIFICANT CHANGE UP (ref 3.8–10.5)
WBC # FLD AUTO: 9.27 K/UL — SIGNIFICANT CHANGE UP (ref 3.8–10.5)

## 2017-12-12 PROCEDURE — 99233 SBSQ HOSP IP/OBS HIGH 50: CPT

## 2017-12-12 RX ORDER — SENNA PLUS 8.6 MG/1
2 TABLET ORAL AT BEDTIME
Qty: 0 | Refills: 0 | Status: DISCONTINUED | OUTPATIENT
Start: 2017-12-12 | End: 2017-12-18

## 2017-12-12 RX ORDER — BENZOCAINE AND MENTHOL 5; 1 G/100ML; G/100ML
1 LIQUID ORAL EVERY 4 HOURS
Qty: 0 | Refills: 0 | Status: DISCONTINUED | OUTPATIENT
Start: 2017-12-12 | End: 2017-12-18

## 2017-12-12 RX ORDER — DOCUSATE SODIUM 100 MG
100 CAPSULE ORAL THREE TIMES A DAY
Qty: 0 | Refills: 0 | Status: DISCONTINUED | OUTPATIENT
Start: 2017-12-12 | End: 2017-12-18

## 2017-12-12 RX ADMIN — PANTOPRAZOLE SODIUM 40 MILLIGRAM(S): 20 TABLET, DELAYED RELEASE ORAL at 05:28

## 2017-12-12 RX ADMIN — GABAPENTIN 600 MILLIGRAM(S): 400 CAPSULE ORAL at 05:28

## 2017-12-12 RX ADMIN — Medication 3 MILLILITER(S): at 00:18

## 2017-12-12 RX ADMIN — Medication 2 DROP(S): at 17:51

## 2017-12-12 RX ADMIN — Medication 3 MILLILITER(S): at 23:01

## 2017-12-12 RX ADMIN — Medication 100 MILLIGRAM(S): at 11:54

## 2017-12-12 RX ADMIN — Medication 40 MILLIGRAM(S): at 05:28

## 2017-12-12 RX ADMIN — Medication 2 DROP(S): at 05:28

## 2017-12-12 RX ADMIN — BENZOCAINE AND MENTHOL 1 LOZENGE: 5; 1 LIQUID ORAL at 22:52

## 2017-12-12 RX ADMIN — Medication 3 MILLILITER(S): at 09:19

## 2017-12-12 RX ADMIN — Medication 3 MILLILITER(S): at 16:03

## 2017-12-12 RX ADMIN — SENNA PLUS 2 TABLET(S): 8.6 TABLET ORAL at 21:17

## 2017-12-12 RX ADMIN — Medication 1000 UNIT(S): at 11:55

## 2017-12-12 RX ADMIN — Medication 100 MILLIGRAM(S): at 21:17

## 2017-12-12 RX ADMIN — HEPARIN SODIUM 5000 UNIT(S): 5000 INJECTION INTRAVENOUS; SUBCUTANEOUS at 21:17

## 2017-12-12 RX ADMIN — Medication 81 MILLIGRAM(S): at 11:57

## 2017-12-12 RX ADMIN — Medication 3 MILLILITER(S): at 04:47

## 2017-12-12 RX ADMIN — HEPARIN SODIUM 5000 UNIT(S): 5000 INJECTION INTRAVENOUS; SUBCUTANEOUS at 05:28

## 2017-12-12 RX ADMIN — GABAPENTIN 600 MILLIGRAM(S): 400 CAPSULE ORAL at 17:51

## 2017-12-12 RX ADMIN — Medication 100 MILLIGRAM(S): at 22:03

## 2017-12-12 RX ADMIN — HEPARIN SODIUM 5000 UNIT(S): 5000 INJECTION INTRAVENOUS; SUBCUTANEOUS at 14:21

## 2017-12-12 RX ADMIN — Medication 100 MILLIGRAM(S): at 05:28

## 2017-12-12 RX ADMIN — CEFTRIAXONE 100 GRAM(S): 500 INJECTION, POWDER, FOR SOLUTION INTRAMUSCULAR; INTRAVENOUS at 02:00

## 2017-12-12 RX ADMIN — AZITHROMYCIN 250 MILLIGRAM(S): 500 TABLET, FILM COATED ORAL at 00:51

## 2017-12-12 NOTE — SWALLOW BEDSIDE ASSESSMENT ADULT - SWALLOW EVAL: DIAGNOSIS
1. Functional oral management of puree and nectar thick liquids marked by adequate collection ,transfer and transport. 2. Mild oral dysphagia for solids marked by prolonged mastication and delayed a-p transport time. Mild lingual stasis noted subsequent to deglutition which reduced with nectar thick liquid wash. 3. Mild oral dysphagia for thin liquids marked by delayed oral transit time with suspected posterior loss. 4.Mild pharyngeal dysphagia for puree, solids and nectar thick liquids marked by reduced laryngeal elevation . No clinical evidence of airway penetration . 5.Moderate-severe pharyngeal dysphagia for thin liquids marked by delayed swallow trigger with reduced laryngeal elevation upon palpation and reduced coordination of breathing/swallowing. Immediate evidence of wet vocal quality subsequent to deglutition suggestive of airway penetration .

## 2017-12-12 NOTE — DIETITIAN INITIAL EVALUATION ADULT. - OTHER INFO
Nutrition consult received for BMI >40 kg/m^2. Pt admitted with Dx Chronic Obstructive Pulmonary Disease with Acute Exacerbation. Pt remains with fair po intakes and denies any nausea, vomiting, diarrhea, constipation. Noted SLP evaluation Pending---Pt states she does not have difficulty swallowing , but gets feeling of food getting stuck due to increased phlegm.

## 2017-12-12 NOTE — SWALLOW BEDSIDE ASSESSMENT ADULT - ASR SWALLOW ASPIRATION MONITOR
oral hygiene/gurgly voice/position upright (90Y)/pneumonia/throat clearing/upper respiratory infection/fever/change of breathing pattern/cough

## 2017-12-12 NOTE — DIETITIAN INITIAL EVALUATION ADULT. - PROBLEM SELECTOR PLAN 1
most likely secondary to PNA  - Continue treatment with ceftriaxone and azithromycin   - Urine legionella pending  - s/p methylprednisolone, will continue prednisone 40mg qd for four days  - Speech and swallow evaluation, aspiration precautions   - Continue oxygen supplementation with nasal cannula. Patient was recommended to start BiPAP for increased work of breathing but she reports she could not tolerate it and refused BiPAP. GOC discussion at bedside revealed patient would like to be DNR/DNI.

## 2017-12-12 NOTE — PROVIDER CONTACT NOTE (OTHER) - SITUATION
Patient is refusing CPAP at this time she states she feels like a hurricane is going through her nose.

## 2017-12-12 NOTE — SWALLOW BEDSIDE ASSESSMENT ADULT - COMMENTS
Patient seen at bedside at which time she was alert and cooperative with O2 via nasal canula. Upon quesitoning, patient reported that she had "choking " episode. As per chart review, patient with RLL pn. Patient seen at bedside at which time she was alert and cooperative with O2 via nasal canula. Upon questioning, patient reported that she has had"choking " episodes. Perceptually, patient demonstrated a  mild dysphonia at baseline.   As per chart review:  85yo Female with hx of COPD (on home CPAP), HTN, breast cancer s/p surgery and radiation, basal cell carcinoma s/p resection presenting with COPD exacerbation likely secondary to RLL PNA

## 2017-12-12 NOTE — DIETITIAN INITIAL EVALUATION ADULT. - PROBLEM SELECTOR PLAN 3
- Will switch to lasix 40 IV qd as patient is hypervolemic on exam and chest xray.   - BMP, Mg, Ph qd   - Echocardiogram pending  - Holding amlodipine for worsening lower extremity edema

## 2017-12-12 NOTE — SWALLOW BEDSIDE ASSESSMENT ADULT - SWALLOW EVAL: RECOMMENDED FEEDING/EATING TECHNIQUES
maintain upright posture during/after eating for 30 mins/oral hygiene/crush medication (when feasible)/no straws/allow for swallow between intakes/position upright (90 degrees)

## 2017-12-13 ENCOUNTER — TRANSCRIPTION ENCOUNTER (OUTPATIENT)
Age: 82
End: 2017-12-13

## 2017-12-13 DIAGNOSIS — E87.6 HYPOKALEMIA: ICD-10-CM

## 2017-12-13 LAB
BUN SERPL-MCNC: 24 MG/DL — HIGH (ref 7–23)
BUN SERPL-MCNC: 28 MG/DL — HIGH (ref 7–23)
CALCIUM SERPL-MCNC: 8.3 MG/DL — LOW (ref 8.4–10.5)
CALCIUM SERPL-MCNC: 9.2 MG/DL — SIGNIFICANT CHANGE UP (ref 8.4–10.5)
CHLORIDE SERPL-SCNC: 98 MMOL/L — SIGNIFICANT CHANGE UP (ref 98–107)
CHLORIDE SERPL-SCNC: 99 MMOL/L — SIGNIFICANT CHANGE UP (ref 98–107)
CO2 SERPL-SCNC: 27 MMOL/L — SIGNIFICANT CHANGE UP (ref 22–31)
CO2 SERPL-SCNC: 29 MMOL/L — SIGNIFICANT CHANGE UP (ref 22–31)
CREAT SERPL-MCNC: 1.18 MG/DL — SIGNIFICANT CHANGE UP (ref 0.5–1.3)
CREAT SERPL-MCNC: 1.23 MG/DL — SIGNIFICANT CHANGE UP (ref 0.5–1.3)
GLUCOSE SERPL-MCNC: 116 MG/DL — HIGH (ref 70–99)
GLUCOSE SERPL-MCNC: 147 MG/DL — HIGH (ref 70–99)
HCT VFR BLD CALC: 36 % — SIGNIFICANT CHANGE UP (ref 34.5–45)
HGB BLD-MCNC: 11.4 G/DL — LOW (ref 11.5–15.5)
L PNEUMO AG UR QL: NEGATIVE — SIGNIFICANT CHANGE UP
MCHC RBC-ENTMCNC: 25.9 PG — LOW (ref 27–34)
MCHC RBC-ENTMCNC: 31.7 % — LOW (ref 32–36)
MCV RBC AUTO: 81.6 FL — SIGNIFICANT CHANGE UP (ref 80–100)
NRBC # FLD: 0 — SIGNIFICANT CHANGE UP
PLATELET # BLD AUTO: 324 K/UL — SIGNIFICANT CHANGE UP (ref 150–400)
PMV BLD: 9.8 FL — SIGNIFICANT CHANGE UP (ref 7–13)
POTASSIUM SERPL-MCNC: 3.1 MMOL/L — LOW (ref 3.5–5.3)
POTASSIUM SERPL-MCNC: 3.9 MMOL/L — SIGNIFICANT CHANGE UP (ref 3.5–5.3)
POTASSIUM SERPL-SCNC: 3.1 MMOL/L — LOW (ref 3.5–5.3)
POTASSIUM SERPL-SCNC: 3.9 MMOL/L — SIGNIFICANT CHANGE UP (ref 3.5–5.3)
RBC # BLD: 4.41 M/UL — SIGNIFICANT CHANGE UP (ref 3.8–5.2)
RBC # FLD: 16 % — HIGH (ref 10.3–14.5)
SODIUM SERPL-SCNC: 141 MMOL/L — SIGNIFICANT CHANGE UP (ref 135–145)
SODIUM SERPL-SCNC: 141 MMOL/L — SIGNIFICANT CHANGE UP (ref 135–145)
WBC # BLD: 9.39 K/UL — SIGNIFICANT CHANGE UP (ref 3.8–10.5)
WBC # FLD AUTO: 9.39 K/UL — SIGNIFICANT CHANGE UP (ref 3.8–10.5)

## 2017-12-13 PROCEDURE — 99233 SBSQ HOSP IP/OBS HIGH 50: CPT

## 2017-12-13 PROCEDURE — 74230 X-RAY XM SWLNG FUNCJ C+: CPT | Mod: 26

## 2017-12-13 RX ORDER — POTASSIUM CHLORIDE 20 MEQ
40 PACKET (EA) ORAL EVERY 4 HOURS
Qty: 0 | Refills: 0 | Status: COMPLETED | OUTPATIENT
Start: 2017-12-13 | End: 2017-12-13

## 2017-12-13 RX ORDER — POTASSIUM CHLORIDE 20 MEQ
10 PACKET (EA) ORAL
Qty: 0 | Refills: 0 | Status: DISCONTINUED | OUTPATIENT
Start: 2017-12-13 | End: 2017-12-13

## 2017-12-13 RX ADMIN — CEFTRIAXONE 100 GRAM(S): 500 INJECTION, POWDER, FOR SOLUTION INTRAMUSCULAR; INTRAVENOUS at 01:52

## 2017-12-13 RX ADMIN — Medication 1000 UNIT(S): at 13:34

## 2017-12-13 RX ADMIN — HEPARIN SODIUM 5000 UNIT(S): 5000 INJECTION INTRAVENOUS; SUBCUTANEOUS at 22:17

## 2017-12-13 RX ADMIN — Medication 40 MILLIGRAM(S): at 06:29

## 2017-12-13 RX ADMIN — Medication 0.5 MILLIGRAM(S): at 22:16

## 2017-12-13 RX ADMIN — Medication 2 DROP(S): at 19:03

## 2017-12-13 RX ADMIN — AZITHROMYCIN 250 MILLIGRAM(S): 500 TABLET, FILM COATED ORAL at 00:30

## 2017-12-13 RX ADMIN — Medication 3 MILLILITER(S): at 04:38

## 2017-12-13 RX ADMIN — HEPARIN SODIUM 5000 UNIT(S): 5000 INJECTION INTRAVENOUS; SUBCUTANEOUS at 13:34

## 2017-12-13 RX ADMIN — Medication 81 MILLIGRAM(S): at 13:34

## 2017-12-13 RX ADMIN — Medication 2 DROP(S): at 06:30

## 2017-12-13 RX ADMIN — Medication 100 MILLIEQUIVALENT(S): at 07:46

## 2017-12-13 RX ADMIN — Medication 100 MILLIGRAM(S): at 06:29

## 2017-12-13 RX ADMIN — Medication 40 MILLIEQUIVALENT(S): at 13:34

## 2017-12-13 RX ADMIN — GABAPENTIN 600 MILLIGRAM(S): 400 CAPSULE ORAL at 06:29

## 2017-12-13 RX ADMIN — HEPARIN SODIUM 5000 UNIT(S): 5000 INJECTION INTRAVENOUS; SUBCUTANEOUS at 06:29

## 2017-12-13 RX ADMIN — Medication 3 MILLILITER(S): at 15:46

## 2017-12-13 RX ADMIN — Medication 3 MILLILITER(S): at 10:13

## 2017-12-13 RX ADMIN — GABAPENTIN 600 MILLIGRAM(S): 400 CAPSULE ORAL at 19:03

## 2017-12-13 RX ADMIN — Medication 40 MILLIEQUIVALENT(S): at 19:03

## 2017-12-13 RX ADMIN — Medication 3 MILLILITER(S): at 21:30

## 2017-12-13 RX ADMIN — PANTOPRAZOLE SODIUM 40 MILLIGRAM(S): 20 TABLET, DELAYED RELEASE ORAL at 06:29

## 2017-12-13 NOTE — DISCHARGE NOTE ADULT - MEDICATION SUMMARY - MEDICATIONS TO STOP TAKING
I will STOP taking the medications listed below when I get home from the hospital:    hydrochlorothiazide-triamterene 25 mg-37.5 mg oral capsule  -- 1 cap(s) by mouth once a day in am    furosemide 40 mg oral tablet  -- 1 tab(s) by mouth once a day    potassium chloride

## 2017-12-13 NOTE — DISCHARGE NOTE ADULT - SECONDARY DIAGNOSIS.
Acute congestive heart failure, unspecified congestive heart failure type Essential Hypertension DAVID on CPAP Pneumonia Rhinorrhea

## 2017-12-13 NOTE — DISCHARGE NOTE ADULT - HOME CARE AGENCY
[facility] at [phone], RN will call and make an appointment for initial appointment.

## 2017-12-13 NOTE — DISCHARGE NOTE ADULT - ADDITIONAL INSTRUCTIONS
Please repeat CT Chest in 3-6 month. Follow up with PCP and Pulm Dr Ferraro as outpatient for further management

## 2017-12-13 NOTE — DISCHARGE NOTE ADULT - HOSPITAL COURSE
87yo Female with hx of COPD (on home CPAP), HTN, breast cancer s/p surgery and radiation, basal cell carcinoma s/p resection presenting with COPD exacerbation likely secondary to RLL PNA  likely secondary to PNA  treatment with ceftriaxone and azithromycin   Urine legionella negative  RVP negative  Patient  s/p methylprednisolone and continued on prednisone 40mg qd for four days  Speech and swallow evaluation was completed  aspiration precautions   Recommendation for  Soft Solids with Nectar Thick Liquids.  Feeding/Swallowing Guidelines: Sit upright, small bites, chew soft solids well, single cup sip of nectar thick liquids.   Aspiration Precautions  Maintain Good Oral Hygiene Care  	  Pulmonary consulted rec Continue oxygen supplementation with nasal cannula. Patient was recommended to start BiPAP for increased work of breathing but she reports she could not tolerate it and refused BiPAP. GOC discussion at bedside revealed patient would like to be DNR/DNI.               R/O Acute congestive heart failure  - Will switch to lasix 40 IV qd as patient is hypervolemic on exam and chest xray.   -Echocardiogram pending  -Amlodipine for worsening lower extremity edema was HELD. -   DVT ppx: Heparin SQ  Diet: DASH  PT consulted and recommended Rehab placement    Patient is medically stable for discharge 86 F PMH COPD (on home CPAP), HTN, Breast CA s/p RT/Sx, BCC s/p resection p/w SOB X1 day. Reports itchy, sore throat associated with "swelling of the top of my mouth." Dry mouth w/ dysphagia. At baseline, patient has a dry cough with occasional sputum production, currently cough and sputum production worsened.  Her cardiologist started her on furosemide and potassium one month ago for bilateral lower extremity edema.    In the ED, Tmax 98.2 -115 /87 RR 37 SpO2 87 RA. Patient was started on oxygen supplementation with nasal canula. S/p Ceftriaxone, azithromycin, duonebs, methylprednisolone 60x2, and a IVNS 500 mL bolus.    Urine legionella negative, RVP negative    Patient was treated for COPD exacerbation 2/2 PNA w/ Ceftriaxone/azithro and prednisone 40 mg PO daily w/ duonebs and O2. Pt declined BiPAP. GOC discussed w/ patient, elected DNR/DNI.    Patient was also treated for CHF exacerbation and placed on Lasix 40 mg IV daily. Amlodipine was also held to improve LE edema. TTE was performed, revealed normal LV function. Lasix was discontinued.    Seen by pulm, Dr. Finnegan, rec CPAP, patient again declined.    Seen by S&S on 12/12, rec dysphagia 3 diet with nectar thick liquids and a MBS. MBS performed 12/13, rec soft solids w/ nectar thick liquids and aspiration precautions.    Seen by palliative care on 12/14, not a hospice candidate.    Seen by PT, rec rehab, however, patient elected to go home instead.    Patient is medically stable for discharge 86 F PMH COPD (on home CPAP), HTN, Breast CA s/p RT/Sx, BCC s/p resection p/w SOB X1 day. Reports itchy, sore throat associated with "swelling of the top of my mouth." Dry mouth w/ dysphagia. At baseline, patient has a dry cough with occasional sputum production, currently cough and sputum production worsened. Her cardiologist started her on furosemide and potassium one month ago for bilateral lower extremity edema.    In the ED, Tmax 98.2 -115 /87 RR 37 SpO2 87 RA. Patient was started on oxygen supplementation with nasal canula. S/p Ceftriaxone, azithromycin, duonebs, methylprednisolone 60x2, and a IVNS 500 mL bolus.    Urine legionella negative, RVP negative    Patient was treated for COPD exacerbation 2/2 PNA w/ Ceftriaxone/ Azithro and prednisone 40 mg PO daily w/ duonebs and O2. Pt declined BiPAP. GOC discussed w/ patient, elected DNR/DNI.    Patient was also treated for CHF exacerbation and placed on Lasix 40 mg IV daily. Amlodipine was also held to improve LE edema. TTE was performed, revealed normal LV function. Lasix was discontinued.    Seen by pulm, Dr. Finnegan, rec CPAP, patient again declined.    Seen by S&S on 12/12, rec dysphagia 3 diet with nectar thick liquids and a MBS. MBS performed 12/13, rec soft solids w/ nectar thick liquids and aspiration precautions.    Seen by palliative care on 12/14, not a hospice candidate.    Seen by PT recs Rehab, however, patient elected to go home instead.    Patient is medically stable for discharge 86 F PMH COPD (on home CPAP), HTN, Breast CA s/p RT/Sx, BCC s/p resection p/w SOB X1 day. Reports itchy, sore throat associated with "swelling of the top of my mouth." Dry mouth w/ dysphagia. At baseline, patient has a dry cough with occasional sputum production, currently cough and sputum production worsened. Her cardiologist started her on furosemide and potassium one month ago for bilateral lower extremity edema.    In the ED, Tmax 98.2 -115 /87 RR 37 SpO2 87 RA. Patient was started on oxygen supplementation with nasal canula. S/p Ceftriaxone, azithromycin, duonebs, methylprednisolone 60x2, and a IVNS 500 mL bolus.    Urine legionella negative, RVP negative    Patient was treated for COPD exacerbation 2/2 PNA treated w/ Ceftriaxone/ Azithro and tapering dose of  prednisone, duonebs and O2.. GOC discussed w/ patient, elected DNR/DNI.    Patient was also treated for CHF exacerbation and placed on Lasix 40 mg IV daily. Amlodipine was also held to improve LE edema. TTE was performed, revealed normal LV function. Lasix was discontinued.    Seen by pulm, Dr. Finnegan, rec CPAP, patient again declined.    Seen by S&S on 12/12, rec dysphagia 3 diet with nectar thick liquids and a MBS. MBS performed 12/13, rec soft solids w/ nectar thick liquids and aspiration precautions.    Seen by palliative care on 12/14, not a hospice candidate.    Seen by PT recs Rehab, however, patient elected to go home instead.    Patient is medically stable for discharge, advised to f/u as outpt.

## 2017-12-13 NOTE — DISCHARGE NOTE ADULT - PATIENT PORTAL LINK FT
“You can access the [facility] Patient Portal, offered by [facility], by registering with the following website: [email or web address]”

## 2017-12-13 NOTE — SWALLOW VFSS/MBS ASSESSMENT ADULT - ADDITIONAL RECOMMENDATIONS
Patient may benefit swallow therapy pending discharge plans (rehab facility vs home care vs [facility] [phone])

## 2017-12-13 NOTE — SWALLOW VFSS/MBS ASSESSMENT ADULT - RECOMMENDED CONSISTENCY
1.) Soft Solids with Nectar Thick Liquids.  2.) Feeding/Swallowing Guidelines: Sit upright, small bites, chew soft solids well, single cup sip of nectar thick liquids.   3.) Aspiration Precautions  4.) Maintain Good Oral Hygiene Care

## 2017-12-13 NOTE — DISCHARGE NOTE ADULT - MEDICATION SUMMARY - MEDICATIONS TO CHANGE
I will SWITCH the dose or number of times a day I take the medications listed below when I get home from the hospital:    amLODIPine 10 mg oral tablet  -- 1 tab(s) by mouth once a day in am

## 2017-12-13 NOTE — DISCHARGE NOTE ADULT - CARE PLAN
Principal Discharge DX:	COPD exacerbation  Secondary Diagnosis:	Acute congestive heart failure, unspecified congestive heart failure type  Secondary Diagnosis:	Essential Hypertension  Instructions for follow-up, activity and diet:	Continue amlodipine 10mg  Secondary Diagnosis:	DAVID on CPAP  Secondary Diagnosis:	Pneumonia Principal Discharge DX:	COPD exacerbation  Goal:	Prevent future exacerbations  Instructions for follow-up, activity and diet:	Follow up with your primary care provider within 1 week of hospital discharge.  Secondary Diagnosis:	Essential Hypertension  Goal:	Prevent complications  Secondary Diagnosis:	DAVID on CPAP  Instructions for follow-up, activity and diet:	Continue amlodipine 10mg Principal Discharge DX:	COPD exacerbation  Goal:	Prevent future exacerbations  Instructions for follow-up, activity and diet:	Follow up with your primary care provider within 1 week of hospital discharge.  Secondary Diagnosis:	Essential Hypertension  Goal:	Prevent complications  Instructions for follow-up, activity and diet:	c/w amlodipine  Secondary Diagnosis:	DAVID on CPAP Principal Discharge DX:	COPD exacerbation  Goal:	Prevent future exacerbations  Instructions for follow-up, activity and diet:	- Chest CT- increased atelectasis along RLL, infection not excluded, trace right pleural effusion, mild enlargement of right hilar and right paraesophageal lymph node compared to 2016 study. Need to repeat CT Chest in 3-6 month  - treated with Ceftriaxone and Azithromycin in the hospital, completed   - Urine legionella -negative, RVP negative  - started Prednisone 40mg with taper. Continue taking Prednisone 20 mg oral daily x 3 days starting 12/19/17 then Prednisone 10 mg oral daily x 3days starting om 12/22/17 then stop  - Cinesophagram: aspiration  - Speech and swallow evaluation--> Aspiration precautions.  - Diet- Dysphagia-soft nectar consistency + DASH +low carb.  - continue oxygen supplementation with nasal cannula at home.   - pt uses own CPAP. GOC discussion with Palliative. Pt DNR /DNI  - Follow up with your primary care provider and Pulm within 1 week of hospital discharge.  Secondary Diagnosis:	Essential Hypertension  Goal:	Prevent complications  Instructions for follow-up, activity and diet:	- Lasix discontinued  - 12/15 Norvasc 5 mg resumed  - Echo -normal LV function  Secondary Diagnosis:	DAVID on CPAP  Instructions for follow-up, activity and diet:	- continue with Oxygen and CPAP at home Principal Discharge DX:	COPD exacerbation  Goal:	Prevent future exacerbations  Instructions for follow-up, activity and diet:	- Chest CT- increased atelectasis along RLL, infection not excluded, trace right pleural effusion, mild enlargement of right hilar and right paraesophageal lymph node compared to 2016 study. Need to repeat CT Chest in 3-6 month  - treated with Ceftriaxone and Azithromycin in the hospital, completed   - Urine legionella -negative, RVP negative  - started Prednisone 40mg with taper. Continue taking Prednisone 20 mg oral daily x 3 days starting 12/19/17 then Prednisone 10 mg oral daily x 3days starting om 12/22/17 then stop  - Cinesophagram: aspiration  - Speech and swallow evaluation--> Aspiration precautions.  - Diet- Dysphagia-soft nectar consistency + DASH +low carb.  - continue oxygen supplementation with nasal cannula at home.   - pt uses own CPAP. GOC discussion with Palliative. Pt DNR /DNI  - Follow up with your primary care provider and Pulm within 1 week of hospital discharge.  Secondary Diagnosis:	Essential Hypertension  Goal:	Prevent complications  Instructions for follow-up, activity and diet:	- Lasix discontinued  - 12/15 Norvasc 5 mg resumed  - Echo -normal LV function  Secondary Diagnosis:	DAVID on CPAP  Instructions for follow-up, activity and diet:	- continue with Oxygen and CPAP at home  Secondary Diagnosis:	Pneumonia  Goal:	completed abx course  Secondary Diagnosis:	Rhinorrhea

## 2017-12-13 NOTE — DISCHARGE NOTE ADULT - CARE PROVIDER_API CALL
Tevin Finnegan), Critical Care Medicine; Internal Medicine; Pulmonary Disease; Sleep Medicine  [address] [address]  Phone: [phone]  Fax: [phone]

## 2017-12-13 NOTE — SWALLOW VFSS/MBS ASSESSMENT ADULT - DIAGNOSTIC IMPRESSIONS
Patient presents with Functional Oral and Mild to Moderate Pharyngeal Stage Dysphagia. The Oral Stage is characterized by adequate oral containment, adequate chewing for solid, adequate bolus manipulation, adequate tongue motion with adequate anterior to posterior transfer of the bolus with adequate oral clearance post swallow. The Pharyngeal Stage is characterized by delayed initiation of the pharyngeal swallow (bolus head is at the pyriforms for Thin Liquids), reduced laryngeal elevation with incomplete laryngeal vestibular closure, mildly reduced tongue base retraction resulting in trace vallecular/base of tongue residue post primary swallow and adequate pharyngeal constriction. There was No Aspiration observed before, during or after the swallow for puree/solids/nectar thick liquids.  There is trace pharyngeal clearance deficits located primarily in the vallecular post primary swallow for puree/solids.  There was Trace Laryngeal Penetration during the swallow for Thin Liquids without complete retrieval leaving residue in the laryngeal vestibule migrating to the level of the anterior vocal cords. Patient is not sensate to the Penetration given no reflexive cough response. Patient is verbally cued to cough to clear the Penetration.  Compensatory strategy of Chin Down posture did not benefit to eliminate the Laryngeal Penetration. Patient presents with Functional Oral and Mild to Moderate Pharyngeal Stage Dysphagia. The Oral Stage is characterized by adequate oral containment, adequate chewing for solid, adequate bolus manipulation, adequate tongue motion with adequate anterior to posterior transfer of the bolus with adequate oral clearance post swallow. The Pharyngeal Stage is characterized by delayed initiation of the pharyngeal swallow (bolus head is at the pyriforms for Thin Liquids), reduced laryngeal elevation with incomplete laryngeal vestibular closure, mildly reduced tongue base retraction resulting in trace vallecular/base of tongue residue post primary swallow and adequate pharyngeal constriction. There is trace pharyngeal clearance deficits located primarily in the vallecular post primary swallow for puree/solids. There was No Aspiration observed before, during or after the swallow for puree/solids/nectar thick liquids.   There was Trace Laryngeal Penetration during the swallow for Thin Liquids without complete retrieval leaving residue in the laryngeal vestibule migrating to the level of the anterior vocal cords given no reflexive cough response revealing inadequate laryngeal sensation. Patient is verbally cued to cough to clear the Penetration.  Compensatory strategy of Chin Down posture did not benefit to eliminate the Laryngeal Penetration.

## 2017-12-13 NOTE — DISCHARGE NOTE ADULT - PLAN OF CARE
Continue amlodipine 10mg Prevent future exacerbations Follow up with your primary care provider within 1 week of hospital discharge. Prevent complications c/w amlodipine - Chest CT- increased atelectasis along RLL, infection not excluded, trace right pleural effusion, mild enlargement of right hilar and right paraesophageal lymph node compared to 2016 study. Need to repeat CT Chest in 3-6 month  - treated with Ceftriaxone and Azithromycin in the hospital, completed   - Urine legionella -negative, RVP negative  - started Prednisone 40mg with taper. Continue taking Prednisone 20 mg oral daily x 3 days starting 12/19/17 then Prednisone 10 mg oral daily x 3days starting om 12/22/17 then stop  - Cinesophagram: aspiration  - Speech and swallow evaluation--> Aspiration precautions.  - Diet- Dysphagia-soft nectar consistency + DASH +low carb.  - continue oxygen supplementation with nasal cannula at home.   - pt uses own CPAP. GOC discussion with Palliative. Pt DNR /DNI  - Follow up with your primary care provider and Pulm within 1 week of hospital discharge. - Lasix discontinued  - 12/15 Norvasc 5 mg resumed  - Echo -normal LV function - continue with Oxygen and CPAP at home completed abx course

## 2017-12-13 NOTE — DISCHARGE NOTE ADULT - MEDICATION SUMMARY - MEDICATIONS TO TAKE
I will START or STAY ON the medications listed below when I get home from the hospital:    calcium  -- 500 milligram(s) by mouth once a day in am  -- Indication: For Need for prophylactic measure    predniSONE 10 mg oral tablet  -- 2 tab(s) by mouth once a day x3 days then 10 mg po qd x 3 days then stop  -- It is very important that you take or use this exactly as directed.  Do not skip doses or discontinue unless directed by your doctor.  Obtain medical advice before taking any non-prescription drugs as some may affect the action of this medication.  Take with food or milk.    -- Indication: For COPD exacerbation    aspirin 81 mg oral tablet  -- 1 tab(s) by mouth once a day in am  -- Indication: For Essential Hypertension    gabapentin 300 mg oral tablet  -- 1 tab(s) by mouth 3 times a day  -- Indication: For Neuropathy    Spiriva 18 mcg inhalation capsule  -- 1 cap(s) inhaled once a day in am  -- Indication: For COPD exacerbation    Symbicort 160 mcg-4.5 mcg/inh inhalation aerosol  -- 2 puff(s) inhaled 2 times a day  -- Indication: For COPD exacerbation    amLODIPine 5 mg oral tablet  -- 1 tab(s) by mouth once a day  -- Indication: For Essential Hypertension    sodium chloride 0.65% nasal spray  -- 1 spray(s) into nose 2 times a day  -- Indication: For Need for prophylactic measure    omeprazole 40 mg oral delayed release capsule  -- 1 cap(s) by mouth once a day in am  -- Indication: For GERD    Vitamin D3 1000 intl units oral tablet  -- 1 tab(s) by mouth once a day in am  -- Indication: For Need for prophylactic measure

## 2017-12-13 NOTE — SWALLOW VFSS/MBS ASSESSMENT ADULT - COMMENTS
85yo Female with hx of COPD (on home CPAP), HTN, breast cancer s/p surgery and radiation, basal cell carcinoma s/p resection presenting with COPD exacerbation likely secondary to RLL PNA.    Patient was seen for a clinical swallow evaluation on 12/12/2017 (See Consult for details).

## 2017-12-13 NOTE — SWALLOW VFSS/MBS ASSESSMENT ADULT - PHARYNGEAL PHASE COMMENTS
adequate initiation of the pharyngeal swallow, adequate laryngeal elevation, mildly reduced tongue base retraction, adequate pharyngeal constriction delayed initiation of the pharyngeal swallow, reduced laryngeal elevation, adequate pharyngeal constriction, adequate pharyngeal constriction adequate initiation of the pharyngeal swallow, adequate laryngeal elevation, adequate tongue base retraction, adequate pharyngeal constriction

## 2017-12-14 LAB
BUN SERPL-MCNC: 25 MG/DL — HIGH (ref 7–23)
CALCIUM SERPL-MCNC: 8.5 MG/DL — SIGNIFICANT CHANGE UP (ref 8.4–10.5)
CHLORIDE SERPL-SCNC: 103 MMOL/L — SIGNIFICANT CHANGE UP (ref 98–107)
CO2 SERPL-SCNC: 26 MMOL/L — SIGNIFICANT CHANGE UP (ref 22–31)
CREAT SERPL-MCNC: 1.04 MG/DL — SIGNIFICANT CHANGE UP (ref 0.5–1.3)
GLUCOSE SERPL-MCNC: 102 MG/DL — HIGH (ref 70–99)
HCT VFR BLD CALC: 38.1 % — SIGNIFICANT CHANGE UP (ref 34.5–45)
HGB BLD-MCNC: 11.6 G/DL — SIGNIFICANT CHANGE UP (ref 11.5–15.5)
MCHC RBC-ENTMCNC: 25.4 PG — LOW (ref 27–34)
MCHC RBC-ENTMCNC: 30.4 % — LOW (ref 32–36)
MCV RBC AUTO: 83.4 FL — SIGNIFICANT CHANGE UP (ref 80–100)
NRBC # FLD: 0 — SIGNIFICANT CHANGE UP
PLATELET # BLD AUTO: 329 K/UL — SIGNIFICANT CHANGE UP (ref 150–400)
PMV BLD: 9.9 FL — SIGNIFICANT CHANGE UP (ref 7–13)
POTASSIUM SERPL-MCNC: 4.4 MMOL/L — SIGNIFICANT CHANGE UP (ref 3.5–5.3)
POTASSIUM SERPL-SCNC: 4.4 MMOL/L — SIGNIFICANT CHANGE UP (ref 3.5–5.3)
RBC # BLD: 4.57 M/UL — SIGNIFICANT CHANGE UP (ref 3.8–5.2)
RBC # FLD: 16 % — HIGH (ref 10.3–14.5)
SODIUM SERPL-SCNC: 142 MMOL/L — SIGNIFICANT CHANGE UP (ref 135–145)
WBC # BLD: 8.98 K/UL — SIGNIFICANT CHANGE UP (ref 3.8–10.5)
WBC # FLD AUTO: 8.98 K/UL — SIGNIFICANT CHANGE UP (ref 3.8–10.5)

## 2017-12-14 PROCEDURE — 93306 TTE W/DOPPLER COMPLETE: CPT | Mod: 26

## 2017-12-14 PROCEDURE — 99223 1ST HOSP IP/OBS HIGH 75: CPT

## 2017-12-14 PROCEDURE — 99233 SBSQ HOSP IP/OBS HIGH 50: CPT

## 2017-12-14 RX ORDER — FUROSEMIDE 40 MG
40 TABLET ORAL DAILY
Qty: 0 | Refills: 0 | Status: DISCONTINUED | OUTPATIENT
Start: 2017-12-14 | End: 2017-12-15

## 2017-12-14 RX ADMIN — HEPARIN SODIUM 5000 UNIT(S): 5000 INJECTION INTRAVENOUS; SUBCUTANEOUS at 23:16

## 2017-12-14 RX ADMIN — Medication 2 DROP(S): at 05:52

## 2017-12-14 RX ADMIN — CEFTRIAXONE 100 GRAM(S): 500 INJECTION, POWDER, FOR SOLUTION INTRAMUSCULAR; INTRAVENOUS at 02:38

## 2017-12-14 RX ADMIN — Medication 3 MILLILITER(S): at 21:12

## 2017-12-14 RX ADMIN — AZITHROMYCIN 250 MILLIGRAM(S): 500 TABLET, FILM COATED ORAL at 01:14

## 2017-12-14 RX ADMIN — Medication 40 MILLIGRAM(S): at 05:53

## 2017-12-14 RX ADMIN — AZITHROMYCIN 250 MILLIGRAM(S): 500 TABLET, FILM COATED ORAL at 23:25

## 2017-12-14 RX ADMIN — Medication 81 MILLIGRAM(S): at 12:12

## 2017-12-14 RX ADMIN — Medication 1000 UNIT(S): at 12:13

## 2017-12-14 RX ADMIN — HEPARIN SODIUM 5000 UNIT(S): 5000 INJECTION INTRAVENOUS; SUBCUTANEOUS at 05:53

## 2017-12-14 RX ADMIN — Medication 3 MILLILITER(S): at 15:48

## 2017-12-14 RX ADMIN — Medication 2 DROP(S): at 17:35

## 2017-12-14 RX ADMIN — Medication 3 MILLILITER(S): at 04:10

## 2017-12-14 RX ADMIN — Medication 3 MILLILITER(S): at 10:01

## 2017-12-14 RX ADMIN — PANTOPRAZOLE SODIUM 40 MILLIGRAM(S): 20 TABLET, DELAYED RELEASE ORAL at 05:55

## 2017-12-14 RX ADMIN — GABAPENTIN 600 MILLIGRAM(S): 400 CAPSULE ORAL at 17:35

## 2017-12-14 RX ADMIN — GABAPENTIN 600 MILLIGRAM(S): 400 CAPSULE ORAL at 05:53

## 2017-12-14 RX ADMIN — Medication 40 MILLIGRAM(S): at 12:11

## 2017-12-14 RX ADMIN — Medication 100 MILLIGRAM(S): at 23:15

## 2017-12-14 NOTE — CONSULT NOTE ADULT - SUBJECTIVE AND OBJECTIVE BOX
HPI:  87yo Female with hx of COPD (on home CPAP), HTN, breast cancer s/p surgery and radiation, basal cell carcinoma s/p resection presenting with one day history of shortness of breath. This morning patient felt like she was having a cold. She has symptoms of an itchy, sore throat associated with "swelling of the top of my mouth." Patient had dry mouth and could not swallow any foods. Later in the afternoon, patient was cooking in a seated position when she has sudden onset shortness of breath. Her children brought her to the emergency room. At baseline, patient has a dry cough with occasional sputum production, currently said that her cough and her sputum production worsened. No fevers, chills, nausea, vomiting, rhinorrhea, headaches, chest pain, abdominal pain, diarrhea, dysuria, myalgias. No sick contacts. Of note, patient started developing bilateral lower extremity edema. Her cardiologist started her on furosemide and potassium one month ago.    In the ED, Tmax 98.2 -115 /87 RR 37 SpO2 87 RA. Patient was started on oxygen supplementation with nasal canula. Ceftriaxone and azithromycin was given. Duonebs, methylprednisolone 60x2, and a 500 bolus. (11 Dec 2017 03:16)      PERTINENT PMH REVIEWED:  [ ] YES [ ] NO           SOCIAL HISTORY:  Significant other/partner:  [ ] YES  [ ] NO            Children:  [ ] YES  [ ] NO                   Bahai/Spirituality:  Subtance hx:  [ ] YES   [ ] NO           Tobacco hx:  [ ] YES  [ ] NO             Alcohol hx: [ ] YES  [ ] NO        Home Opiod hx:  [ ] YES  [ ] NO   Living Situation: [ ] Home  [ ] Long term care  [ ] Rehab    REFERRALS:   [ ] Chaplaincy  [ ] Hospice  [ ] Child Life  [ ] Social Work  [ ] Case management [ ] Holistic Therapy     FAMILY HISTORY:  Family history of congestive heart failure  Family history of lymphoma  Family history of renal disease (Sibling)    [ ] Family history non contributory     BASELINE ADLs (prior to admission):  Independent [ ] moderately [ ] fully   Dependent   [ ] moderately [ ] fully    ADVANCE DIRECTIVES:  [ ] YES [ ] NO   DNR [ ] YES [ ] NO                      MOLST  [ ] YES [ ] NO    Living Will  [ ] YES [ ] NO    Health Care Proxy [ ] YES  [ ] NO      [ ] Surrogate  [ ] HCP  [ ] Guardian:                                                                  Phone#:    Allergies    No Known Allergies    Intolerances        MEDICATIONS  (STANDING):  ALBUTerol/ipratropium for Nebulization 3 milliLiter(s) Nebulizer every 6 hours  artificial tears (preservative free) Ophthalmic Solution 2 Drop(s) Both EYES two times a day  aspirin enteric coated 81 milliGRAM(s) Oral daily  azithromycin  IVPB 500 milliGRAM(s) IV Intermittent every 24 hours  cefTRIAXone   IVPB 1 Gram(s) IV Intermittent every 24 hours  cholecalciferol 1000 Unit(s) Oral daily  docusate sodium 100 milliGRAM(s) Oral three times a day  furosemide    Tablet 40 milliGRAM(s) Oral daily  gabapentin 600 milliGRAM(s) Oral two times a day  heparin  Injectable 5000 Unit(s) SubCutaneous every 8 hours  pantoprazole    Tablet 40 milliGRAM(s) Oral before breakfast  senna 2 Tablet(s) Oral at bedtime    MEDICATIONS  (PRN):  benzocaine 15 mG/menthol 3.6 mG Lozenge 1 Lozenge Oral every 4 hours PRN Sore Throat  clonazePAM Tablet 0.5 milliGRAM(s) Oral at bedtime PRN Anxiety  guaiFENesin    Syrup 100 milliGRAM(s) Oral every 6 hours PRN Cough      PRESENT SYMPTOMS:  Source: [ ] Patient   [ ] Family   [ ] Team     Pain: [ ] YES [ ] NO  OLDCARTS:     Dyspnea: [ ] YES [ ] NO   Anxiety: [ ] YES [ ] NO  Fatigue: [ ] YES [ ] NO   Nausea: [ ] YES [ ] NO  Loss of appetite: [ ] YES [ ] NO   Constipation: [ ] YES [ ] NO     Other Symptoms:  [ ] All other review of systems negative   [ ] Unable to obtain due to poor mentation     Karnofsky Performance Score/Palliative Performance Status Version 2:         %  Protein Calorie Malutrition:  [ ] Mild   [ ] Moderate   [ ] Severe     Vital Signs Last 24 Hrs  T(C): 36.4 (15 Dec 2017 05:44), Max: 36.8 (14 Dec 2017 21:13)  T(F): 97.6 (15 Dec 2017 05:44), Max: 98.2 (14 Dec 2017 21:13)  HR: 89 (15 Dec 2017 07:08) (78 - 101)  BP: 142/53 (15 Dec 2017 05:44) (140/61 - 143/88)  BP(mean): --  RR: 20 (15 Dec 2017 05:44) (20 - 20)  SpO2: 97% (15 Dec 2017 07:08) (94% - 99%)    Physical Exam:    General: [ ] Alert,  A&O x     [ ] lethargic   [ ] Agitated   [ ] Cachexia   HEENT: [ ] Normal   [ ] Dry mouth   [ ] ET Tube    [ ] Trach   Lungs: [ ] Clear [ ] Rhonchi  [ ] Crackles [ ] Wheezing [ ] Tachypnea  [ ] Audible excessive secretions   Cardiovascular:  [ ] Regular rate and rhythm  [ ] Irregular [ ] Tachycardia   [ ] Bradycardia   Abdomen: [ ] Soft  [ ] Distended  [ ]  [ ] +BS  [ ] Non tender [ ] Tender  [ ]PEG   [ ] NGT   Last BM:     Genitourinary: [ ] Normal [ ] Incontinent   [ ] Oliguria/Anuria   [ ] Peterson  Musculoskeletal:  [ ] Normal   [ ] Generalized weakness  [ ] Bedbound   Neurological: [ ] No focal deficits  [ ] Cognitive impairment     Skin: [ ] Normal   [ ] Pressure ulcers     LABS:                        11.6   8.98  )-----------( 329      ( 14 Dec 2017 06:00 )             38.1     12-15    144  |  102  |  28<H>  ----------------------------<  119<H>  3.8   |  30  |  1.05    Ca    8.8      15 Dec 2017 06:00  Phos  3.9     12-15  Mg     2.0     12-15          I&O's Summary      RADIOLOGY & ADDITIONAL STUDIES: HPI:  87yo Female with hx of COPD (on home CPAP), HTN, breast cancer s/p surgery and radiation, basal cell carcinoma s/p resection presenting with one day history of shortness of breath. This morning patient felt like she was having a cold. She has symptoms of an itchy, sore throat associated with "swelling of the top of my mouth." Patient had dry mouth and could not swallow any foods. Later in the afternoon, patient was cooking in a seated position when she has sudden onset shortness of breath. Her children brought her to the emergency room. At baseline, patient has a dry cough with occasional sputum production, currently said that her cough and her sputum production worsened. No fevers, chills, nausea, vomiting, rhinorrhea, headaches, chest pain, abdominal pain, diarrhea, dysuria, myalgias. No sick contacts. Of note, patient started developing bilateral lower extremity edema. Her cardiologist started her on furosemide and potassium one month ago.    In the ED, Tmax 98.2 -115 /87 RR 37 SpO2 87 RA. Patient was started on oxygen supplementation with nasal canula. Ceftriaxone and azithromycin was given. Duonebs, methylprednisolone 60x2, and a 500 bolus. (11 Dec 2017 03:16)      PERTINENT PMH REVIEWED:  [ x] YES [ ] NO           SOCIAL HISTORY:  Significant other/partner:  [x ] YES  [ ] NO            Children:  [x ] YES  [ ] NO                   Advent/Spirituality: Restorationism  Subtance hx:  [ ] YES   [x ] NO           Tobacco hx:  [ ] YES  [ ] NO             Alcohol hx: [ ] YES  [x ] NO        Home Opiod hx:  [ ] YES  [x ] NO   Living Situation: [ x] Home  [ ] Long term care  [ ] Rehab    REFERRALS:   [ x] Chaplaincy  [ ] Hospice  [ ] Child Life  [ ] Social Work  [ ] Case management [ ] Holistic Therapy     FAMILY HISTORY:  Family history of congestive heart failure  Family history of lymphoma  Family history of renal disease (Sibling)    [x ] Family history non contributory     BASELINE ADLs (prior to admission):  Independent [x ] moderately [ ] fully   Dependent   [ ] moderately [ ] fully    ADVANCE DIRECTIVES:  [x ] YES [ ] NO   DNR [x ] YES [ ] NO                      MOLST  [ ] YES [ x] NO    Living Will  [ ] YES [x ] NO    Health Care Proxy [ ] YES  [x ] NO      [x] Surrogate  [ ] HCP  [ ] Guardian:        Mckinley Felix (spouse)                      Phone#: [phone]    Allergies    No Known Allergies    Intolerances        MEDICATIONS  (STANDING):  ALBUTerol/ipratropium for Nebulization 3 milliLiter(s) Nebulizer every 6 hours  artificial tears (preservative free) Ophthalmic Solution 2 Drop(s) Both EYES two times a day  aspirin enteric coated 81 milliGRAM(s) Oral daily  azithromycin  IVPB 500 milliGRAM(s) IV Intermittent every 24 hours  cefTRIAXone   IVPB 1 Gram(s) IV Intermittent every 24 hours  cholecalciferol 1000 Unit(s) Oral daily  docusate sodium 100 milliGRAM(s) Oral three times a day  furosemide    Tablet 40 milliGRAM(s) Oral daily  gabapentin 600 milliGRAM(s) Oral two times a day  heparin  Injectable 5000 Unit(s) SubCutaneous every 8 hours  pantoprazole    Tablet 40 milliGRAM(s) Oral before breakfast  senna 2 Tablet(s) Oral at bedtime    MEDICATIONS  (PRN):  benzocaine 15 mG/menthol 3.6 mG Lozenge 1 Lozenge Oral every 4 hours PRN Sore Throat  clonazePAM Tablet 0.5 milliGRAM(s) Oral at bedtime PRN Anxiety  guaiFENesin    Syrup 100 milliGRAM(s) Oral every 6 hours PRN Cough      PRESENT SYMPTOMS:  Source: [ x] Patient   [ ] Family   [x ] Team     Pain: [ ] YES [x ] NO  OLDCARTS:     Dyspnea: [x ] YES [ ] NO   Anxiety: [x ] YES [ ] NO  Fatigue: [x ] YES [ ] NO   Nausea: [ ] YES [ x] NO  Loss of appetite: [ ] YES [x ] NO   Constipation: [ ] YES [ x] NO     Other Symptoms:  [x ] All other review of systems negative   [ ] Unable to obtain due to poor mentation     Karnofsky Performance Score/Palliative Performance Status Version 2:   50      %  Protein Calorie Malutrition:  [ ] Mild   [ ] Moderate   [ ] Severe     Vital Signs Last 24 Hrs  T(C): 36.4 (15 Dec 2017 05:44), Max: 36.8 (14 Dec 2017 21:13)  T(F): 97.6 (15 Dec 2017 05:44), Max: 98.2 (14 Dec 2017 21:13)  HR: 89 (15 Dec 2017 07:08) (78 - 101)  BP: 142/53 (15 Dec 2017 05:44) (140/61 - 143/88)  BP(mean): --  RR: 20 (15 Dec 2017 05:44) (20 - 20)  SpO2: 97% (15 Dec 2017 07:08) (94% - 99%)    Physical Exam:    General: [x] Alert,  A&O x     [ ] lethargic   [ ] Agitated   [ ] Cachexia   HEENT: [ ] Normal   [ x] Dry mouth   [ ] ET Tube    [ ] Trach   Lungs: [ ] Clear [ ] Rhonchi  [ ] Crackles [x ] Wheezing [ ] Tachypnea  [ ] Audible excessive secretions   Cardiovascular:  [x ] Regular rate and rhythm  [ ] Irregular [ ] Tachycardia   [ ] Bradycardia   Abdomen: [ x] Soft  [ ] Distended  [ ]  [ x] +BS  [x ] Non tender [ ] Tender  [ ]PEG   [ ] NGT   Last BM:     Genitourinary: [x ] Normal [ ] Incontinent   [ ] Oliguria/Anuria   [ ] Peterson  Musculoskeletal:  [ ] Normal   [x ] Generalized weakness  [ ] Bedbound   Neurological: [x ] No focal deficits  [ ] Cognitive impairment     Skin: [x ] Normal   [ ] Pressure ulcers     LABS:                        11.6   8.98  )-----------( 329      ( 14 Dec 2017 06:00 )             38.1     12-15    144  |  102  |  28<H>  ----------------------------<  119<H>  3.8   |  30  |  1.05    Ca    8.8      15 Dec 2017 06:00  Phos  3.9     12-15  Mg     2.0     12-15          I&O's Summary      RADIOLOGY & ADDITIONAL STUDIES:

## 2017-12-14 NOTE — CONSULT NOTE ADULT - PROBLEM SELECTOR RECOMMENDATION 9
Patient with diffuse emphysema on CT.   Currently improved with nebulizers and antibiotics.  Supportive care, O2, and CPAP.  Patient does not meet hospice criteria at this point.

## 2017-12-15 DIAGNOSIS — R53.81 OTHER MALAISE: ICD-10-CM

## 2017-12-15 DIAGNOSIS — Z51.5 ENCOUNTER FOR PALLIATIVE CARE: ICD-10-CM

## 2017-12-15 DIAGNOSIS — R06.02 SHORTNESS OF BREATH: ICD-10-CM

## 2017-12-15 LAB
BUN SERPL-MCNC: 28 MG/DL — HIGH (ref 7–23)
CALCIUM SERPL-MCNC: 8.8 MG/DL — SIGNIFICANT CHANGE UP (ref 8.4–10.5)
CHLORIDE SERPL-SCNC: 102 MMOL/L — SIGNIFICANT CHANGE UP (ref 98–107)
CO2 SERPL-SCNC: 30 MMOL/L — SIGNIFICANT CHANGE UP (ref 22–31)
CREAT SERPL-MCNC: 1.05 MG/DL — SIGNIFICANT CHANGE UP (ref 0.5–1.3)
GLUCOSE SERPL-MCNC: 119 MG/DL — HIGH (ref 70–99)
MAGNESIUM SERPL-MCNC: 2 MG/DL — SIGNIFICANT CHANGE UP (ref 1.6–2.6)
PHOSPHATE SERPL-MCNC: 3.9 MG/DL — SIGNIFICANT CHANGE UP (ref 2.5–4.5)
POTASSIUM SERPL-MCNC: 3.8 MMOL/L — SIGNIFICANT CHANGE UP (ref 3.5–5.3)
POTASSIUM SERPL-SCNC: 3.8 MMOL/L — SIGNIFICANT CHANGE UP (ref 3.5–5.3)
SODIUM SERPL-SCNC: 144 MMOL/L — SIGNIFICANT CHANGE UP (ref 135–145)

## 2017-12-15 PROCEDURE — 99233 SBSQ HOSP IP/OBS HIGH 50: CPT

## 2017-12-15 RX ORDER — AMLODIPINE BESYLATE 2.5 MG/1
5 TABLET ORAL DAILY
Qty: 0 | Refills: 0 | Status: DISCONTINUED | OUTPATIENT
Start: 2017-12-15 | End: 2017-12-18

## 2017-12-15 RX ADMIN — Medication 2 DROP(S): at 05:53

## 2017-12-15 RX ADMIN — Medication 81 MILLIGRAM(S): at 11:52

## 2017-12-15 RX ADMIN — HEPARIN SODIUM 5000 UNIT(S): 5000 INJECTION INTRAVENOUS; SUBCUTANEOUS at 06:01

## 2017-12-15 RX ADMIN — Medication 0.5 MILLIGRAM(S): at 22:48

## 2017-12-15 RX ADMIN — BENZOCAINE AND MENTHOL 1 LOZENGE: 5; 1 LIQUID ORAL at 03:52

## 2017-12-15 RX ADMIN — GABAPENTIN 600 MILLIGRAM(S): 400 CAPSULE ORAL at 17:22

## 2017-12-15 RX ADMIN — GABAPENTIN 600 MILLIGRAM(S): 400 CAPSULE ORAL at 05:53

## 2017-12-15 RX ADMIN — Medication 3 MILLILITER(S): at 22:53

## 2017-12-15 RX ADMIN — Medication 100 MILLIGRAM(S): at 22:38

## 2017-12-15 RX ADMIN — Medication 3 MILLILITER(S): at 04:16

## 2017-12-15 RX ADMIN — PANTOPRAZOLE SODIUM 40 MILLIGRAM(S): 20 TABLET, DELAYED RELEASE ORAL at 05:54

## 2017-12-15 RX ADMIN — Medication 100 MILLIGRAM(S): at 21:49

## 2017-12-15 RX ADMIN — Medication 40 MILLIGRAM(S): at 05:54

## 2017-12-15 RX ADMIN — Medication 40 MILLIGRAM(S): at 05:53

## 2017-12-15 RX ADMIN — Medication 100 MILLIGRAM(S): at 13:51

## 2017-12-15 RX ADMIN — Medication 3 MILLILITER(S): at 11:07

## 2017-12-15 RX ADMIN — SENNA PLUS 2 TABLET(S): 8.6 TABLET ORAL at 21:49

## 2017-12-15 RX ADMIN — HEPARIN SODIUM 5000 UNIT(S): 5000 INJECTION INTRAVENOUS; SUBCUTANEOUS at 21:49

## 2017-12-15 RX ADMIN — Medication 3 MILLILITER(S): at 15:43

## 2017-12-15 RX ADMIN — Medication 1000 UNIT(S): at 11:52

## 2017-12-15 RX ADMIN — HEPARIN SODIUM 5000 UNIT(S): 5000 INJECTION INTRAVENOUS; SUBCUTANEOUS at 13:51

## 2017-12-15 RX ADMIN — Medication 2 DROP(S): at 17:22

## 2017-12-15 RX ADMIN — CEFTRIAXONE 100 GRAM(S): 500 INJECTION, POWDER, FOR SOLUTION INTRAMUSCULAR; INTRAVENOUS at 01:14

## 2017-12-16 PROCEDURE — 99233 SBSQ HOSP IP/OBS HIGH 50: CPT

## 2017-12-16 RX ORDER — SODIUM CHLORIDE 0.65 %
1 AEROSOL, SPRAY (ML) NASAL
Qty: 0 | Refills: 0 | Status: DISCONTINUED | OUTPATIENT
Start: 2017-12-16 | End: 2017-12-18

## 2017-12-16 RX ADMIN — GABAPENTIN 600 MILLIGRAM(S): 400 CAPSULE ORAL at 06:53

## 2017-12-16 RX ADMIN — Medication 30 MILLIGRAM(S): at 07:06

## 2017-12-16 RX ADMIN — Medication 1 SPRAY(S): at 17:40

## 2017-12-16 RX ADMIN — Medication 1000 UNIT(S): at 12:48

## 2017-12-16 RX ADMIN — AZITHROMYCIN 250 MILLIGRAM(S): 500 TABLET, FILM COATED ORAL at 00:02

## 2017-12-16 RX ADMIN — Medication 3 MILLILITER(S): at 10:06

## 2017-12-16 RX ADMIN — HEPARIN SODIUM 5000 UNIT(S): 5000 INJECTION INTRAVENOUS; SUBCUTANEOUS at 06:52

## 2017-12-16 RX ADMIN — GABAPENTIN 600 MILLIGRAM(S): 400 CAPSULE ORAL at 17:39

## 2017-12-16 RX ADMIN — PANTOPRAZOLE SODIUM 40 MILLIGRAM(S): 20 TABLET, DELAYED RELEASE ORAL at 07:06

## 2017-12-16 RX ADMIN — Medication 2 DROP(S): at 17:39

## 2017-12-16 RX ADMIN — Medication 3 MILLILITER(S): at 16:24

## 2017-12-16 RX ADMIN — HEPARIN SODIUM 5000 UNIT(S): 5000 INJECTION INTRAVENOUS; SUBCUTANEOUS at 21:33

## 2017-12-16 RX ADMIN — Medication 2 DROP(S): at 07:07

## 2017-12-16 RX ADMIN — Medication 81 MILLIGRAM(S): at 12:48

## 2017-12-16 RX ADMIN — HEPARIN SODIUM 5000 UNIT(S): 5000 INJECTION INTRAVENOUS; SUBCUTANEOUS at 13:04

## 2017-12-16 RX ADMIN — Medication 3 MILLILITER(S): at 04:21

## 2017-12-16 RX ADMIN — Medication 100 MILLIGRAM(S): at 06:54

## 2017-12-16 RX ADMIN — CEFTRIAXONE 100 GRAM(S): 500 INJECTION, POWDER, FOR SOLUTION INTRAMUSCULAR; INTRAVENOUS at 01:04

## 2017-12-16 RX ADMIN — Medication 3 MILLILITER(S): at 22:44

## 2017-12-16 RX ADMIN — AMLODIPINE BESYLATE 5 MILLIGRAM(S): 2.5 TABLET ORAL at 07:06

## 2017-12-17 DIAGNOSIS — J34.89 OTHER SPECIFIED DISORDERS OF NOSE AND NASAL SINUSES: ICD-10-CM

## 2017-12-17 LAB
BUN SERPL-MCNC: 32 MG/DL — HIGH (ref 7–23)
CALCIUM SERPL-MCNC: 8.7 MG/DL — SIGNIFICANT CHANGE UP (ref 8.4–10.5)
CHLORIDE SERPL-SCNC: 100 MMOL/L — SIGNIFICANT CHANGE UP (ref 98–107)
CO2 SERPL-SCNC: 28 MMOL/L — SIGNIFICANT CHANGE UP (ref 22–31)
CREAT SERPL-MCNC: 1.06 MG/DL — SIGNIFICANT CHANGE UP (ref 0.5–1.3)
GLUCOSE SERPL-MCNC: 133 MG/DL — HIGH (ref 70–99)
HCT VFR BLD CALC: 41 % — SIGNIFICANT CHANGE UP (ref 34.5–45)
HGB BLD-MCNC: 12.4 G/DL — SIGNIFICANT CHANGE UP (ref 11.5–15.5)
MCHC RBC-ENTMCNC: 24.8 PG — LOW (ref 27–34)
MCHC RBC-ENTMCNC: 30.2 % — LOW (ref 32–36)
MCV RBC AUTO: 81.8 FL — SIGNIFICANT CHANGE UP (ref 80–100)
NRBC # FLD: 0 — SIGNIFICANT CHANGE UP
PLATELET # BLD AUTO: 342 K/UL — SIGNIFICANT CHANGE UP (ref 150–400)
PMV BLD: 9.8 FL — SIGNIFICANT CHANGE UP (ref 7–13)
POTASSIUM SERPL-MCNC: 3.5 MMOL/L — SIGNIFICANT CHANGE UP (ref 3.5–5.3)
POTASSIUM SERPL-SCNC: 3.5 MMOL/L — SIGNIFICANT CHANGE UP (ref 3.5–5.3)
RBC # BLD: 5.01 M/UL — SIGNIFICANT CHANGE UP (ref 3.8–5.2)
RBC # FLD: 16 % — HIGH (ref 10.3–14.5)
SODIUM SERPL-SCNC: 142 MMOL/L — SIGNIFICANT CHANGE UP (ref 135–145)
WBC # BLD: 11.89 K/UL — HIGH (ref 3.8–10.5)
WBC # FLD AUTO: 11.89 K/UL — HIGH (ref 3.8–10.5)

## 2017-12-17 PROCEDURE — 99233 SBSQ HOSP IP/OBS HIGH 50: CPT

## 2017-12-17 RX ADMIN — Medication 30 MILLIGRAM(S): at 06:40

## 2017-12-17 RX ADMIN — Medication 3 MILLILITER(S): at 16:16

## 2017-12-17 RX ADMIN — Medication 1 SPRAY(S): at 06:41

## 2017-12-17 RX ADMIN — Medication 100 MILLIGRAM(S): at 21:10

## 2017-12-17 RX ADMIN — PANTOPRAZOLE SODIUM 40 MILLIGRAM(S): 20 TABLET, DELAYED RELEASE ORAL at 06:40

## 2017-12-17 RX ADMIN — CEFTRIAXONE 100 GRAM(S): 500 INJECTION, POWDER, FOR SOLUTION INTRAMUSCULAR; INTRAVENOUS at 01:06

## 2017-12-17 RX ADMIN — GABAPENTIN 600 MILLIGRAM(S): 400 CAPSULE ORAL at 06:40

## 2017-12-17 RX ADMIN — Medication 1000 UNIT(S): at 14:37

## 2017-12-17 RX ADMIN — SENNA PLUS 2 TABLET(S): 8.6 TABLET ORAL at 21:10

## 2017-12-17 RX ADMIN — HEPARIN SODIUM 5000 UNIT(S): 5000 INJECTION INTRAVENOUS; SUBCUTANEOUS at 06:40

## 2017-12-17 RX ADMIN — Medication 2 DROP(S): at 06:41

## 2017-12-17 RX ADMIN — AMLODIPINE BESYLATE 5 MILLIGRAM(S): 2.5 TABLET ORAL at 06:40

## 2017-12-17 RX ADMIN — GABAPENTIN 600 MILLIGRAM(S): 400 CAPSULE ORAL at 17:50

## 2017-12-17 RX ADMIN — HEPARIN SODIUM 5000 UNIT(S): 5000 INJECTION INTRAVENOUS; SUBCUTANEOUS at 14:37

## 2017-12-17 RX ADMIN — Medication 81 MILLIGRAM(S): at 14:36

## 2017-12-17 RX ADMIN — HEPARIN SODIUM 5000 UNIT(S): 5000 INJECTION INTRAVENOUS; SUBCUTANEOUS at 21:10

## 2017-12-17 RX ADMIN — Medication 1 SPRAY(S): at 17:50

## 2017-12-17 RX ADMIN — Medication 3 MILLILITER(S): at 10:11

## 2017-12-17 RX ADMIN — Medication 2 DROP(S): at 17:51

## 2017-12-17 RX ADMIN — Medication 3 MILLILITER(S): at 04:11

## 2017-12-17 NOTE — PROGRESS NOTE ADULT - PROBLEM SELECTOR PLAN 6
DVT ppx: Heparin SQ  Diet: DASH  Dispo: PT consulted rec rehab but wants to go home when ready for d/c

## 2017-12-18 VITALS — HEART RATE: 79 BPM | OXYGEN SATURATION: 94 %

## 2017-12-18 LAB
BUN SERPL-MCNC: 24 MG/DL — HIGH (ref 7–23)
CALCIUM SERPL-MCNC: 8.3 MG/DL — LOW (ref 8.4–10.5)
CHLORIDE SERPL-SCNC: 102 MMOL/L — SIGNIFICANT CHANGE UP (ref 98–107)
CO2 SERPL-SCNC: 27 MMOL/L — SIGNIFICANT CHANGE UP (ref 22–31)
CREAT SERPL-MCNC: 0.96 MG/DL — SIGNIFICANT CHANGE UP (ref 0.5–1.3)
GLUCOSE SERPL-MCNC: 129 MG/DL — HIGH (ref 70–99)
HCT VFR BLD CALC: 37.9 % — SIGNIFICANT CHANGE UP (ref 34.5–45)
HGB BLD-MCNC: 11.9 G/DL — SIGNIFICANT CHANGE UP (ref 11.5–15.5)
MAGNESIUM SERPL-MCNC: 2 MG/DL — SIGNIFICANT CHANGE UP (ref 1.6–2.6)
MCHC RBC-ENTMCNC: 26 PG — LOW (ref 27–34)
MCHC RBC-ENTMCNC: 31.4 % — LOW (ref 32–36)
MCV RBC AUTO: 82.8 FL — SIGNIFICANT CHANGE UP (ref 80–100)
NRBC # FLD: 0 — SIGNIFICANT CHANGE UP
PHOSPHATE SERPL-MCNC: 3.1 MG/DL — SIGNIFICANT CHANGE UP (ref 2.5–4.5)
PLATELET # BLD AUTO: 273 K/UL — SIGNIFICANT CHANGE UP (ref 150–400)
PMV BLD: 9.6 FL — SIGNIFICANT CHANGE UP (ref 7–13)
POTASSIUM SERPL-MCNC: 3.6 MMOL/L — SIGNIFICANT CHANGE UP (ref 3.5–5.3)
POTASSIUM SERPL-SCNC: 3.6 MMOL/L — SIGNIFICANT CHANGE UP (ref 3.5–5.3)
RBC # BLD: 4.58 M/UL — SIGNIFICANT CHANGE UP (ref 3.8–5.2)
RBC # FLD: 15.8 % — HIGH (ref 10.3–14.5)
SODIUM SERPL-SCNC: 141 MMOL/L — SIGNIFICANT CHANGE UP (ref 135–145)
WBC # BLD: 11.21 K/UL — HIGH (ref 3.8–10.5)
WBC # FLD AUTO: 11.21 K/UL — HIGH (ref 3.8–10.5)

## 2017-12-18 PROCEDURE — 99239 HOSP IP/OBS DSCHRG MGMT >30: CPT

## 2017-12-18 RX ORDER — FUROSEMIDE 40 MG
1 TABLET ORAL
Qty: 0 | Refills: 0 | COMMUNITY

## 2017-12-18 RX ORDER — CLONAZEPAM 1 MG
0.5 TABLET ORAL AT BEDTIME
Qty: 0 | Refills: 0 | Status: DISCONTINUED | OUTPATIENT
Start: 2017-12-18 | End: 2017-12-18

## 2017-12-18 RX ORDER — TRIAMTERENE/HYDROCHLOROTHIAZID 75 MG-50MG
1 TABLET ORAL
Qty: 0 | Refills: 0 | COMMUNITY

## 2017-12-18 RX ORDER — POTASSIUM CHLORIDE 20 MEQ
20 PACKET (EA) ORAL
Qty: 0 | Refills: 0 | COMMUNITY

## 2017-12-18 RX ORDER — SODIUM CHLORIDE 0.65 %
1 AEROSOL, SPRAY (ML) NASAL
Qty: 0 | Refills: 0 | COMMUNITY
Start: 2017-12-18

## 2017-12-18 RX ORDER — POTASSIUM CHLORIDE 20 MEQ
0 PACKET (EA) ORAL
Qty: 0 | Refills: 0 | COMMUNITY

## 2017-12-18 RX ORDER — AMLODIPINE BESYLATE 2.5 MG/1
1 TABLET ORAL
Qty: 30 | Refills: 0 | OUTPATIENT
Start: 2017-12-18 | End: 2018-01-16

## 2017-12-18 RX ADMIN — Medication 1 SPRAY(S): at 05:43

## 2017-12-18 RX ADMIN — Medication 81 MILLIGRAM(S): at 13:08

## 2017-12-18 RX ADMIN — Medication 100 MILLIGRAM(S): at 05:43

## 2017-12-18 RX ADMIN — PANTOPRAZOLE SODIUM 40 MILLIGRAM(S): 20 TABLET, DELAYED RELEASE ORAL at 05:43

## 2017-12-18 RX ADMIN — GABAPENTIN 600 MILLIGRAM(S): 400 CAPSULE ORAL at 05:43

## 2017-12-18 RX ADMIN — HEPARIN SODIUM 5000 UNIT(S): 5000 INJECTION INTRAVENOUS; SUBCUTANEOUS at 05:43

## 2017-12-18 RX ADMIN — Medication 3 MILLILITER(S): at 16:14

## 2017-12-18 RX ADMIN — Medication 1000 UNIT(S): at 13:08

## 2017-12-18 RX ADMIN — Medication 100 MILLIGRAM(S): at 00:45

## 2017-12-18 RX ADMIN — Medication 3 MILLILITER(S): at 03:50

## 2017-12-18 RX ADMIN — Medication 3 MILLILITER(S): at 08:58

## 2017-12-18 RX ADMIN — Medication 30 MILLIGRAM(S): at 05:43

## 2017-12-18 RX ADMIN — Medication 2 DROP(S): at 05:43

## 2017-12-18 RX ADMIN — AMLODIPINE BESYLATE 5 MILLIGRAM(S): 2.5 TABLET ORAL at 05:43

## 2017-12-18 RX ADMIN — HEPARIN SODIUM 5000 UNIT(S): 5000 INJECTION INTRAVENOUS; SUBCUTANEOUS at 13:08

## 2017-12-18 NOTE — PROGRESS NOTE ADULT - PROBLEM SELECTOR PROBLEM 4
Essential Hypertension
R/O Acute congestive heart failure, unspecified congestive heart failure type

## 2017-12-18 NOTE — PROGRESS NOTE ADULT - PROBLEM SELECTOR PROBLEM 1
COPD exacerbation

## 2017-12-18 NOTE — PROGRESS NOTE ADULT - PROBLEM SELECTOR PLAN 4
Continue with 5mg norvasc, monitor bp closely
- Cont lasix  - Echocardiogram pending  - Holding amlodipine for worsening lower extremity edema
- Hold as patient normotensive
Continue with 5mg norvasc, monitor bp closely
will start on 5mg norvasc, monitor bp closely
will start on 5mg norvasc, monitor bp closely

## 2017-12-18 NOTE — PROGRESS NOTE ADULT - PROVIDER SPECIALTY LIST ADULT
Hospitalist
Pulmonology
Hospitalist

## 2017-12-18 NOTE — PROGRESS NOTE ADULT - PROBLEM SELECTOR PROBLEM 5
Rhinorrhea
Essential Hypertension
Medication management
Need for prophylactic measure
Rhinorrhea

## 2017-12-18 NOTE — PROGRESS NOTE ADULT - ASSESSMENT
87yo Female with hx of COPD (on home nocturnal CPAP), HTN, breast cancer s/p surgery and radiation, basal cell carcinoma s/p resection presenting with acute hypoxic respiratory failure 2/2 COPD exacerbation likely secondary to RLL PNA.
85yo Female with hx of COPD (on home CPAP), HTN, breast cancer s/p surgery and radiation, basal cell carcinoma s/p resection presenting with COPD exacerbation likely secondary to RLL PNA.
85yo Female with hx of COPD (on home CPAP), HTN, breast cancer s/p surgery and radiation, basal cell carcinoma s/p resection presenting with COPD exacerbation likely secondary to RLL PNA.
85yo Female with hx of COPD (on home nocturnal CPAP), HTN, breast cancer s/p surgery and radiation, basal cell carcinoma s/p resection presenting with acute hypoxic respiratory failure 2/2 COPD exacerbation likely secondary to RLL PNA.
87yo Female with hx of COPD (on home CPAP), HTN, breast cancer s/p surgery and radiation, basal cell carcinoma s/p resection presenting with COPD exacerbation likely secondary to RLL PNA.
87yo Female with hx of COPD (on home nocturnal CPAP), HTN, breast cancer s/p surgery and radiation, basal cell carcinoma s/p resection presenting with acute hypoxic respiratory failure 2/2 COPD exacerbation likely secondary to RLL PNA.
COPD exacerbation  r/o CHF in view of CT findings  DAVID

## 2017-12-18 NOTE — PROGRESS NOTE ADULT - PROBLEM SELECTOR PLAN 3
- Echocardiogram reveals nl lv fn, LE edema improving, d/c lasix
- Cont lasix  - Echocardiogram pending  - Holding amlodipine for worsening lower extremity edema
- Cont lasix  - Echocardiogram pending  - Holding amlodipine for worsening lower extremity edema
- Cont lasix  - Echocardiogram reveals nl lv fn, will change lasix to po  leg edema improving now  - Holding amlodipine for leg edema
- Echocardiogram reveals nl lv fn, LE edema improving, d/c lasix
Replete K

## 2017-12-18 NOTE — PROGRESS NOTE ADULT - PROBLEM SELECTOR PLAN 2
-As above, c/w Serafin, patient has completed full course of abx
-As above, c/w Duonebs, ceftriaxone, prednisone
-As above, c/w Serafin, will dc ceftriaxone today as patient has completed full course of abx
Order CPAP. As opt is on autopap with 02; can order fixed pressure cpap at night 8 cm with 02 and nasal pillow mask
See plan as above

## 2017-12-18 NOTE — PROGRESS NOTE ADULT - PROBLEM SELECTOR PROBLEM 3
R/O Acute congestive heart failure, unspecified congestive heart failure type
Hypokalemia
R/O Acute congestive heart failure, unspecified congestive heart failure type

## 2017-12-18 NOTE — PROGRESS NOTE ADULT - PROBLEM SELECTOR PLAN 1
most likely secondary to PNA although imaging not conclusive  - Completed abx course  - s/p methylprednisolone, will continue prednisone taper  - Aspiration precautions   - Continue oxygen supplementation with nasal cannula PRN, Pt on home O2 can continue O2  -Appreciate pulm f/u cont nebs  d/c planning time spent in coordination 35 mts, d/c today, advised to f/u as outpt
Order steroids and antibiotics  Maintain outpatient inhalers
most likely secondary to PNA although imaging not conclusive  - Continue treatment with ceftriaxone and azithromycin   - Urine legionella negative  - s/p methylprednisolone, will continue prednisone 40mg qd for four days  - Speech and swallow evaluation done, cine done, rec soft solids with nectar thick consistency fluid, aspiration precautions   - Continue oxygen supplementation with nasal cannula. Patient reports that she uses cpap at night, will continue. Pt is DNR/DNI.  Appreciate pulm f/u cont nebs
most likely secondary to PNA although imaging not conclusive  - Continue treatment with ceftriaxone and azithromycin   - Urine legionella negative  - s/p methylprednisolone, will continue prednisone 40mg qd for four days  - Speech and swallow evaluation done, cine done, rec soft solids with nectar thick consistency fluid, aspiration precautions   - Continue oxygen supplementation with nasal cannula. Patient reports that she uses cpap at night, will continue. Pt is DNR/DNI.  Appreciate pulm f/u cont nebs
most likely secondary to PNA although imaging not conclusive  - Continue treatment with ceftriaxone and azithromycin   - Urine legionella negative  - s/p methylprednisolone, will continue prednisone taper  - Speech and swallow evaluation done, cine done, rec soft solids with nectar thick consistency fluid, aspiration precautions   - Continue oxygen supplementation with nasal cannula. Patient reports that she uses cpap at night, will continue. Pt is DNR/DNI.  Appreciate pulm f/u cont nebs
most likely secondary to PNA although imaging not conclusive  - Continue treatment with ceftriaxone and azithromycin   - Urine legionella pending  - s/p methylprednisolone, will continue prednisone 40mg qd for four days  - Speech and swallow evaluation, aspiration precautions   - Continue oxygen supplementation with nasal cannula. Patient reports that she uses cpap at night, will continue. Pt is DNR/DNI.  Appreciate pulm consult, cont nebs
most likely secondary to PNA although imaging not conclusive  - Continue treatment with ceftriaxone and azithromycin   - Urine legionella pending  - s/p methylprednisolone, will continue prednisone 40mg qd for four days  - Speech and swallow evaluation, aspiration precautions   - Continue oxygen supplementation with nasal cannula. Patient reports that she uses cpap at night, will continue. Pt is DNR/DNI.  Appreciate pulm consult, cont nebs
most likely secondary to PNA although imaging not conclusive  - Continue treatment with ceftriaxone for now  - Urine legionella negative  - s/p methylprednisolone, will continue prednisone taper  - Speech and swallow evaluation done, cine done, rec soft solids with nectar thick consistency fluid, aspiration precautions   - Continue oxygen supplementation with nasal cannula. Patient reports that she uses cpap at night, will continue. Pt is DNR/DNI.  Appreciate pulm f/u cont nebs
most likely secondary to PNA although imaging not conclusive  - Currently day 7/7 of abx, will dc ceftriaxone at this time  - s/p methylprednisolone, will continue prednisone taper  - Aspiration precautions   - Continue oxygen supplementation with nasal cannula PRN, RA today improved to 89%  -Appreciate pulm f/u cont nebs

## 2017-12-18 NOTE — PROGRESS NOTE ADULT - SUBJECTIVE AND OBJECTIVE BOX
PULMONARY PROGRESS NOTE    VIDAL LOUIE  MRN-[id number]    Patient is a 86y old  Female who presents with a chief complaint of shortness of breath (11 Dec 2017 03:16)      HPI:  -Appears comfortable  -    ROS:   -c/o nausea, no vomitting    ACTIVE MEDICATION LIST:  MEDICATIONS  (STANDING):  ALBUTerol/ipratropium for Nebulization 3 milliLiter(s) Nebulizer every 6 hours  artificial tears (preservative free) Ophthalmic Solution 2 Drop(s) Both EYES two times a day  aspirin enteric coated 81 milliGRAM(s) Oral daily  azithromycin  IVPB 500 milliGRAM(s) IV Intermittent every 24 hours  cefTRIAXone   IVPB 1 Gram(s) IV Intermittent every 24 hours  cholecalciferol 1000 Unit(s) Oral daily  docusate sodium 100 milliGRAM(s) Oral three times a day  furosemide   Injectable 40 milliGRAM(s) IV Push daily  gabapentin 600 milliGRAM(s) Oral two times a day  heparin  Injectable 5000 Unit(s) SubCutaneous every 8 hours  pantoprazole    Tablet 40 milliGRAM(s) Oral before breakfast  potassium chloride   Powder 40 milliEquivalent(s) Oral every 4 hours  predniSONE   Tablet 40 milliGRAM(s) Oral daily  senna 2 Tablet(s) Oral at bedtime    MEDICATIONS  (PRN):  benzocaine 15 mG/menthol 3.6 mG Lozenge 1 Lozenge Oral every 4 hours PRN Sore Throat  clonazePAM Tablet 0.5 milliGRAM(s) Oral at bedtime PRN Anxiety  guaiFENesin    Syrup 100 milliGRAM(s) Oral every 6 hours PRN Cough      EXAM:  Vital Signs Last 24 Hrs  T(C): 36.6 (13 Dec 2017 06:24), Max: 36.6 (12 Dec 2017 21:08)  T(F): 97.8 (13 Dec 2017 06:24), Max: 97.8 (12 Dec 2017 21:08)  HR: 80 (13 Dec 2017 07:07) (76 - 99)  BP: 118/45 (13 Dec 2017 06:24) (118/45 - 145/61)  BP(mean): --  RR: 20 (13 Dec 2017 06:24) (20 - 30)  SpO2: 96% (13 Dec 2017 07:07) (93% - 100%)    GENERAL: The patient is awake and alert in no apparent distress.     LUNGS: Clear to auscultation without wheezing, rales or rhonchi; respirations unlabored    HEART: Regular rate and rhythm without murmur.    Labs:                        11.4   9.39  )-----------( 324      ( 13 Dec 2017 05:23 )             36.0   12-13    141  |  99  |  24<H>  ----------------------------<  116<H>  3.1<L>   |  29  |  1.23    Ca    8.3<L>      13 Dec 2017 05:25      PROBLEM LIST:  86y Female with HEALTH ISSUES - PROBLEM Dx:  DAVID on CPAP: DAVID on CPAP  Need for prophylactic measure: Need for prophylactic measure  Medication management: Medication management  Essential Hypertension: Essential Hypertension  Pneumonia: Pneumonia  COPD exacerbation: COPD exacerbation            RECS:  O2  Abx  Nebs  Prednisone    Olimpia Adler DO   [phone]
Patient is a 86y old  Female who presents with a chief complaint of shortness of breath (13 Dec 2017 12:05)      SUBJECTIVE / OVERNIGHT EVENTS: No overnight events, sob and cough much better, no other complaints.    MEDICATIONS  (STANDING):  ALBUTerol/ipratropium for Nebulization 3 milliLiter(s) Nebulizer every 6 hours  amLODIPine   Tablet 5 milliGRAM(s) Oral daily  artificial tears (preservative free) Ophthalmic Solution 2 Drop(s) Both EYES two times a day  aspirin enteric coated 81 milliGRAM(s) Oral daily  cholecalciferol 1000 Unit(s) Oral daily  docusate sodium 100 milliGRAM(s) Oral three times a day  gabapentin 600 milliGRAM(s) Oral two times a day  heparin  Injectable 5000 Unit(s) SubCutaneous every 8 hours  pantoprazole    Tablet 40 milliGRAM(s) Oral before breakfast  senna 2 Tablet(s) Oral at bedtime  sodium chloride 0.65% Nasal 1 Spray(s) Both Nostrils two times a day    MEDICATIONS  (PRN):  benzocaine 15 mG/menthol 3.6 mG Lozenge 1 Lozenge Oral every 4 hours PRN Sore Throat  clonazePAM Tablet 0.5 milliGRAM(s) Oral at bedtime PRN Anxiety  guaiFENesin    Syrup 100 milliGRAM(s) Oral every 6 hours PRN Cough      Vital Signs Last 24 Hrs  T(C): 36.7 (18 Dec 2017 05:00), Max: 36.7 (17 Dec 2017 17:45)  T(F): 98.1 (18 Dec 2017 05:00), Max: 98.1 (17 Dec 2017 17:45)  HR: 87 (18 Dec 2017 08:59) (74 - 98)  BP: 132/59 (18 Dec 2017 05:00) (132/59 - 143/76)  BP(mean): --  RR: 20 (18 Dec 2017 08:59) (20 - 20)  SpO2: 95% (18 Dec 2017 08:59) (91% - 96%)  CAPILLARY BLOOD GLUCOSE        I&O's Summary      PHYSICAL EXAM:  GENERAL: NAD, well-developed  CHEST/LUNG: Clear to auscultation bilaterally; No wheeze  HEART: Regular rate and rhythm  ABDOMEN: Soft, Nontender, Nondistended  EXTREMITIES:  trace LE edema  PSYCH: calm  NEUROLOGY: AAOx3  SKIN: No rashes or lesions    LABS:                        11.9   11.21 )-----------( 273      ( 18 Dec 2017 06:00 )             37.9     12-18    141  |  102  |  24<H>  ----------------------------<  129<H>  3.6   |  27  |  0.96    Ca    8.3<L>      18 Dec 2017 06:00  Phos  3.1     12-18  Mg     2.0     12-18                RADIOLOGY & ADDITIONAL TESTS:    Imaging Personally Reviewed:    Consultant(s) Notes Reviewed:      Care Discussed with Consultants/Other Providers:
Pulmonary Consult Note    VIDAL LOUIE  MRN-[id number]    Chief Complaint: Patient is a 86y old  Female who presents with a chief complaint of shortness of breath (13 Dec 2017 12:05)      HPI:  86yFemale  -SOB   Wheeze  -s/p speech swallow    ROS:  -component CHF    All other systems reviewed and negative    ACTIVE MEDICATION LIST:  MEDICATIONS  (STANDING):  ALBUTerol/ipratropium for Nebulization 3 milliLiter(s) Nebulizer every 6 hours  amLODIPine   Tablet 5 milliGRAM(s) Oral daily  artificial tears (preservative free) Ophthalmic Solution 2 Drop(s) Both EYES two times a day  aspirin enteric coated 81 milliGRAM(s) Oral daily  cefTRIAXone   IVPB 1 Gram(s) IV Intermittent every 24 hours  cholecalciferol 1000 Unit(s) Oral daily  docusate sodium 100 milliGRAM(s) Oral three times a day  gabapentin 600 milliGRAM(s) Oral two times a day  heparin  Injectable 5000 Unit(s) SubCutaneous every 8 hours  pantoprazole    Tablet 40 milliGRAM(s) Oral before breakfast  predniSONE   Tablet 30 milliGRAM(s) Oral daily  senna 2 Tablet(s) Oral at bedtime    MEDICATIONS  (PRN):  benzocaine 15 mG/menthol 3.6 mG Lozenge 1 Lozenge Oral every 4 hours PRN Sore Throat  clonazePAM Tablet 0.5 milliGRAM(s) Oral at bedtime PRN Anxiety  guaiFENesin    Syrup 100 milliGRAM(s) Oral every 6 hours PRN Cough      EXAM:  Vital Signs Last 24 Hrs  T(C): 36.9 (15 Dec 2017 21:28), Max: 36.9 (15 Dec 2017 15:22)  T(F): 98.4 (15 Dec 2017 21:28), Max: 98.4 (15 Dec 2017 15:22)  HR: 94 (16 Dec 2017 04:22) (75 - 100)  BP: 114/42 (15 Dec 2017 21:28) (107/87 - 115/45)  BP(mean): --  RR: 18 (15 Dec 2017 21:28) (18 - 20)  SpO2: 98% (16 Dec 2017 04:22) (94% - 99%)    GENERAL: No acute distress  NEURO: Alert and oriented x 3  LUNGS: coarse BS but  no wheeze  benigen abd   no edema  DATA  Leg Ag neg   ECHO nl RV fx       PROBLEM LIST:  86yFemale with HEALTH ISSUES - PROBLEM Dx:  Encounter for palliative care: Encounter for palliative care  Debility: Debility  Shortness of breath: Shortness of breath  Hypokalemia: Hypokalemia  DAVID on CPAP: DAVID on CPAP  Need for prophylactic measure: Need for prophylactic measure  Medication management: Medication management  Essential Hypertension: Essential Hypertension  Pneumonia: Pneumonia  COPD exacerbation: COPD exacerbation            RECS:  -taper steroids  complete antix  nocturnal CPAP  O2 but check RA O2 sats   Neb tx BD ICS  -speech therapy  noted recommendations    Thank you for this consultation, please feel free to call with any questions [phone]  [facility]
PULMONARY PROGRESS NOTE    VIDAL LOUIE  MRN-[id number]    Patient is a 86y old  Female who presents with a chief complaint of shortness of breath (13 Dec 2017 12:05)  Wheezing and cough continue. Feels a bit better. Failed swallow study.    HPI:-  -    ACTIVE MEDICATION LIST:  MEDICATIONS  (STANDING):  ALBUTerol/ipratropium for Nebulization 3 milliLiter(s) Nebulizer every 6 hours  artificial tears (preservative free) Ophthalmic Solution 2 Drop(s) Both EYES two times a day  aspirin enteric coated 81 milliGRAM(s) Oral daily  azithromycin  IVPB 500 milliGRAM(s) IV Intermittent every 24 hours  cefTRIAXone   IVPB 1 Gram(s) IV Intermittent every 24 hours  cholecalciferol 1000 Unit(s) Oral daily  docusate sodium 100 milliGRAM(s) Oral three times a day  furosemide   Injectable 40 milliGRAM(s) IV Push daily  gabapentin 600 milliGRAM(s) Oral two times a day  heparin  Injectable 5000 Unit(s) SubCutaneous every 8 hours  pantoprazole    Tablet 40 milliGRAM(s) Oral before breakfast  predniSONE   Tablet 40 milliGRAM(s) Oral daily  senna 2 Tablet(s) Oral at bedtime    MEDICATIONS  (PRN):  benzocaine 15 mG/menthol 3.6 mG Lozenge 1 Lozenge Oral every 4 hours PRN Sore Throat  clonazePAM Tablet 0.5 milliGRAM(s) Oral at bedtime PRN Anxiety  guaiFENesin    Syrup 100 milliGRAM(s) Oral every 6 hours PRN Cough      EXAM:  Vital Signs Last 24 Hrs  T(C): 36.7 (13 Dec 2017 21:09), Max: 36.7 (13 Dec 2017 21:09)  T(F): 98 (13 Dec 2017 21:09), Max: 98 (13 Dec 2017 21:09)  HR: 82 (14 Dec 2017 05:14) (81 - 94)  BP: 111/34 (14 Dec 2017 05:14) (111/34 - 144/62)  BP(mean): --  RR: 20 (14 Dec 2017 05:14) (20 - 26)  SpO2: 96% (14 Dec 2017 05:14) (95% - 99%)    GENERAL: The patient is awake and alert in no apparent distress.     SKIN: Warm, dry, no rashes    LUNGS: bilateral wheeze.    HEART: Regular rate and rhythm without murmur.    ABDOMEN: +BS, Soft, Nontender    EXTREMITIES: No clubbing, cyanosis, edema      < from: Xray Cinesophagram (12.13.17 @ 09:10) >  FINDINGS:  Aspiration.    IMPRESSION:     Aspiration.  For recommendations and further detail, refer to speech pathology's final   report.    Recommendations:   · Diagnostic Impressions  Patient presents with Functional Oral and Mild to Moderate Pharyngeal Stage Dysphagia. The Oral Stage is characterized by adequate oral containment, adequate chewing for solid, adequate bolus manipulation, adequate tongue motion with adequate anterior to posterior transfer of the bolus with adequate oral clearance post swallow. The Pharyngeal Stage is characterized by delayed initiation of the pharyngeal swallow (bolus head is at the pyriforms for Thin Liquids), reduced laryngeal elevation with incomplete laryngeal vestibular closure, mildly reduced tongue base retraction resulting in trace vallecular/base of tongue residue post primary swallow and adequate pharyngeal constriction. There is trace pharyngeal clearance deficits located primarily in the vallecular post primary swallow for puree/solids. There was No Aspiration observed before, during or after the swallow for puree/solids/nectar thick liquids.   There was Trace Laryngeal Penetration during the swallow for Thin Liquids without complete retrieval leaving residue in the laryngeal vestibule migrating to the level of the anterior vocal cords given no reflexive cough response revealing inadequate laryngeal sensation. Patient is verbally cued to cough to clear the Penetration.  Compensatory strategy of Chin Down posture did not benefit to eliminate the Laryngeal Penetration.          · Recommended Consistencies  1.) Soft Solids with Nectar Thick Liquids.  2.) Feeding/Swallowing Guidelines: Sit upright, small bites, chew soft solids well, single cup sip of nectar thick liquids.   3.) Aspiration Precautions  4.) Maintain Good Oral Hygiene Care    · Aspiration Precautions  yes    · Additional Recommendations  Patient may benefit swallow therapy pending discharge plans (rehab facility vs home care vs [facility] [phone])      < end of copied text >                                11.6   8.98  )-----------( 329      ( 14 Dec 2017 06:00 )             38.1       12-13    141  |  98  |  28<H>  ----------------------------<  147<H>  3.9   |  27  |  1.18    Ca    9.2      13 Dec 2017 19:30                    PROBLEM LIST:  86y Female with HEALTH ISSUES - PROBLEM Dx:  Hypokalemia: Hypokalemia  DAVID on CPAP: DAVID on CPAP  Need for prophylactic measure: Need for prophylactic measure  Medication management: Medication management  Essential Hypertension: Essential Hypertension  Pneumonia: Pneumonia  COPD exacerbation: COPD exacerbation  Aspiration    RECS: Swallow precautions and education  Continue Rx for COPD exac        Tevin Finnegan MD  [phone]
PULMONARY PROGRESS NOTE    VIDAL LOUIE  MRN-[id number]    Patient is a 86y old  Female who presents with a chief complaint of shortness of breath (13 Dec 2017 12:05)      HPI:  -Appears comfortable. NAD  -    ROS:   -    ACTIVE MEDICATION LIST:  MEDICATIONS  (STANDING):  ALBUTerol/ipratropium for Nebulization 3 milliLiter(s) Nebulizer every 6 hours  artificial tears (preservative free) Ophthalmic Solution 2 Drop(s) Both EYES two times a day  aspirin enteric coated 81 milliGRAM(s) Oral daily  azithromycin  IVPB 500 milliGRAM(s) IV Intermittent every 24 hours  cefTRIAXone   IVPB 1 Gram(s) IV Intermittent every 24 hours  cholecalciferol 1000 Unit(s) Oral daily  docusate sodium 100 milliGRAM(s) Oral three times a day  furosemide    Tablet 40 milliGRAM(s) Oral daily  gabapentin 600 milliGRAM(s) Oral two times a day  heparin  Injectable 5000 Unit(s) SubCutaneous every 8 hours  pantoprazole    Tablet 40 milliGRAM(s) Oral before breakfast  senna 2 Tablet(s) Oral at bedtime    MEDICATIONS  (PRN):  benzocaine 15 mG/menthol 3.6 mG Lozenge 1 Lozenge Oral every 4 hours PRN Sore Throat  clonazePAM Tablet 0.5 milliGRAM(s) Oral at bedtime PRN Anxiety  guaiFENesin    Syrup 100 milliGRAM(s) Oral every 6 hours PRN Cough      EXAM:  Vital Signs Last 24 Hrs  T(C): 36.4 (15 Dec 2017 05:44), Max: 36.8 (14 Dec 2017 21:13)  T(F): 97.6 (15 Dec 2017 05:44), Max: 98.2 (14 Dec 2017 21:13)  HR: 89 (15 Dec 2017 07:08) (78 - 101)  BP: 142/53 (15 Dec 2017 05:44) (140/61 - 143/88)  BP(mean): --  RR: 20 (15 Dec 2017 05:44) (20 - 20)  SpO2: 97% (15 Dec 2017 07:08) (94% - 99%)    GENERAL: The patient is awake and alert in no apparent distress.     LUNGS: Clear to auscultation without wheezing, rales or rhonchi; respirations unlabored    HEART: Regular rate and rhythm without murmur.    Labs:                        11.6   8.98  )-----------( 329      ( 14 Dec 2017 06:00 )             38.1   12-15    144  |  102  |  28<H>  ----------------------------<  119<H>  3.8   |  30  |  1.05    Ca    8.8      15 Dec 2017 06:00  Phos  3.9     12-15  Mg     2.0     12-15      PROBLEM LIST:  86y Female with HEALTH ISSUES - PROBLEM Dx:  Hypokalemia: Hypokalemia  DAVID on CPAP: DAVID on CPAP  Need for prophylactic measure: Need for prophylactic measure  Medication management: Medication management  Essential Hypertension: Essential Hypertension  Pneumonia: Pneumonia  COPD exacerbation: COPD exacerbation            RECS:  O2  Nebs  CPAP    Olimpia Adler DO   [phone]
PULMONARY PROGRESS NOTE    VIDAL LOUIE  MRN-[id number]    Patient is a 86y old  Female who presents with a chief complaint of shortness of breath (13 Dec 2017 12:05)    admitted for COPD exacerbation. Found to be aspirating. Doing better.     -    ACTIVE MEDICATION LIST:  MEDICATIONS  (STANDING):  ALBUTerol/ipratropium for Nebulization 3 milliLiter(s) Nebulizer every 6 hours  amLODIPine   Tablet 5 milliGRAM(s) Oral daily  artificial tears (preservative free) Ophthalmic Solution 2 Drop(s) Both EYES two times a day  aspirin enteric coated 81 milliGRAM(s) Oral daily  cholecalciferol 1000 Unit(s) Oral daily  docusate sodium 100 milliGRAM(s) Oral three times a day  gabapentin 600 milliGRAM(s) Oral two times a day  heparin  Injectable 5000 Unit(s) SubCutaneous every 8 hours  pantoprazole    Tablet 40 milliGRAM(s) Oral before breakfast  senna 2 Tablet(s) Oral at bedtime  sodium chloride 0.65% Nasal 1 Spray(s) Both Nostrils two times a day  prednisone on taper    MEDICATIONS  (PRN):  benzocaine 15 mG/menthol 3.6 mG Lozenge 1 Lozenge Oral every 4 hours PRN Sore Throat  clonazePAM Tablet 0.5 milliGRAM(s) Oral at bedtime PRN Anxiety  guaiFENesin    Syrup 100 milliGRAM(s) Oral every 6 hours PRN Cough      EXAM:  Vital Signs Last 24 Hrs  T(C): 36.7 (18 Dec 2017 05:00), Max: 36.7 (17 Dec 2017 17:45)  T(F): 98.1 (18 Dec 2017 05:00), Max: 98.1 (17 Dec 2017 17:45)  HR: 80 (18 Dec 2017 05:00) (60 - 98)  BP: 132/59 (18 Dec 2017 05:00) (132/59 - 154/53)  BP(mean): --  RR: 20 (18 Dec 2017 05:00) (20 - 20)  SpO2: 96% (18 Dec 2017 05:00) (89% - 96%)    GENERAL: The patient is awake and alert in no apparent distress.     SKIN: Warm, dry, no rashes    LUNGS: Clear to auscultation without wheezing, rales or rhonchi; respirations unlabored    HEART: Regular rate and rhythm without murmur.    ABDOMEN: +BS, Soft, Nontender    EXTREMITIES: No clubbing, cyanosis, edema                              11.9   11.21 )-----------( 273      ( 18 Dec 2017 06:00 )             37.9       12-18    141  |  102  |  24<H>  ----------------------------<  129<H>  3.6   |  27  |  0.96    Ca    8.3<L>      18 Dec 2017 06:00  Phos  3.1     12-18  Mg     2.0     12-18              PROBLEM LIST:  86y Female with HEALTH ISSUES - PROBLEM Dx:  Rhinorrhea: Rhinorrhea  Encounter for palliative care: Encounter for palliative care  Debility: Debility  Shortness of breath: Shortness of breath  Hypokalemia: Hypokalemia  DAVID on CPAP: DAVID on CPAP  Need for prophylactic measure: Need for prophylactic measure  Medication management: Medication management  Essential Hypertension: Essential Hypertension  Pneumonia: Pneumonia  COPD exacerbation: COPD exacerbation            RECS: Discharge planning-stable from pulmonary perspective.         Tevin Finnegan MD  [phone]
PULMONARY PROGRESS NOTE    VIDAL LOUIE  MRN-[id number]    Patient is a 86y old  Female who presents with a chief complaint of shortness of breath (11 Dec 2017 03:16)  Known to our office. Hx of COPD, DAVID on CPAP. Admitted for increased SOB; COPD exacerbation. No definitive pneumonia on CXR/CT      Increased SOB, cough . Denies sick contacts  -  -  -    ROS: all negative  -  -    ACTIVE MEDICATION LIST:  MEDICATIONS  (STANDING):  ALBUTerol/ipratropium for Nebulization 3 milliLiter(s) Nebulizer every 6 hours  furosemide   Injectable 40 milliGRAM(s) IV Push daily  heparin  Injectable 5000 Unit(s) SubCutaneous every 8 hours  pantoprazole    Tablet 40 milliGRAM(s) Oral before breakfast    MEDICATIONS  (PRN):      EXAM:  Vital Signs Last 24 Hrs  T(C): 36.3 (11 Dec 2017 07:08), Max: 36.8 (10 Dec 2017 18:14)  T(F): 97.4 (11 Dec 2017 07:08), Max: 98.2 (10 Dec 2017 18:14)  HR: 103 (11 Dec 2017 07:08) (98 - 115)  BP: 95/57 (11 Dec 2017 07:08) (95/57 - 142/50)  BP(mean): --  RR: 29 (11 Dec 2017 07:08) (17 - 36)  SpO2: 94% (11 Dec 2017 07:08) (87% - 98%)    GENERAL: The patient is awake and alert in no apparent distress.     SKIN: Warm, dry, no rashes    LUNGS: Clear to auscultation without wheezing, rales or rhonchi; respirations unlabored    HEART: Regular rate and rhythm without murmur.    ABDOMEN: +BS, Soft, Nontender    EXTREMITIES: No clubbing, cyanosis, edema                              12.2   7.06  )-----------( 201      ( 11 Dec 2017 07:05 )             40.7       12-10    144  |  100  |  22  ----------------------------<  124<H>  3.7   |  31  |  1.30    Ca    9.3      10 Dec 2017 18:50    TPro  7.3  /  Alb  4.0  /  TBili  0.4  /  DBili  x   /  AST  26  /  ALT  17  /  AlkPhos  108  12-10      LIVER FUNCTIONS - ( 10 Dec 2017 18:50 )  Alb: 4.0 g/dL / Pro: 7.3 g/dL / ALK PHOS: 108 u/L / ALT: 17 u/L / AST: 26 u/L / GGT: x           < from: CT Angio Chest w/ IV Cont (12.10.17 @ 22:41) >    IMPRESSION:     No definite pulmonary embolism given limitations described above.    Increased atelectasis along the right lower lobe, infection not excluded.   Trace right pleural effusion. Correlate with symptoms.    Mild enlargement of right hilar and right paraesophageal lymph nodes   compared to 2016. These are indeterminant in nature. Continued follow-up   recommended    < end of copied text >            PROBLEM LIST:  86y Female with HEALTH ISSUES - PROBLEM Dx:  Need for prophylactic measure: Need for prophylactic measure  Medication management: Medication management  Essential Hypertension: Essential Hypertension  Pneumonia: Pneumonia  COPD exacerbation: COPD exacerbation  DAVID            RECS: Order CPAP        Tevin Finnegan MD  [phone]
PULMONARY PROGRESS NOTE    VIDAL LOUIE  MRN-[id number]    Patient is a 86y old  Female who presents with a chief complaint of shortness of breath (11 Dec 2017 03:16)      HPI:  -Appears comfortable. NAD  -    ROS:   -    ACTIVE MEDICATION LIST:  MEDICATIONS  (STANDING):  ALBUTerol/ipratropium for Nebulization 3 milliLiter(s) Nebulizer every 6 hours  artificial tears (preservative free) Ophthalmic Solution 2 Drop(s) Both EYES two times a day  aspirin enteric coated 81 milliGRAM(s) Oral daily  azithromycin  IVPB 500 milliGRAM(s) IV Intermittent every 24 hours  cefTRIAXone   IVPB 1 Gram(s) IV Intermittent every 24 hours  cholecalciferol 1000 Unit(s) Oral daily  furosemide   Injectable 40 milliGRAM(s) IV Push daily  gabapentin 600 milliGRAM(s) Oral two times a day  heparin  Injectable 5000 Unit(s) SubCutaneous every 8 hours  pantoprazole    Tablet 40 milliGRAM(s) Oral before breakfast  predniSONE   Tablet 40 milliGRAM(s) Oral daily    MEDICATIONS  (PRN):  clonazePAM Tablet 0.5 milliGRAM(s) Oral at bedtime PRN Anxiety  guaiFENesin    Syrup 100 milliGRAM(s) Oral every 6 hours PRN Cough      EXAM:  Vital Signs Last 24 Hrs  T(C): 36.5 (12 Dec 2017 05:26), Max: 37 (11 Dec 2017 22:19)  T(F): 97.7 (12 Dec 2017 05:26), Max: 98.6 (11 Dec 2017 22:19)  HR: 96 (12 Dec 2017 07:15) (64 - 102)  BP: 126/72 (12 Dec 2017 05:26) (120/59 - 144/78)  BP(mean): --  RR: 28 (12 Dec 2017 05:26) (28 - 32)  SpO2: 94% (12 Dec 2017 07:15) (92% - 97%)    GENERAL: The patient is awake and alert in no apparent distress.     LUNGS: Clear to auscultation without wheezing, rales or rhonchi; respirations unlabored    HEART: Regular rate and rhythm without murmur.    Labs:                        11.3   9.27  )-----------( 351      ( 12 Dec 2017 06:00 )             36.8     PROBLEM LIST:  86y Female with HEALTH ISSUES - PROBLEM Dx:  DAVID on CPAP: DAVID on CPAP  Need for prophylactic measure: Need for prophylactic measure  Medication management: Medication management  Essential Hypertension: Essential Hypertension  Pneumonia: Pneumonia  COPD exacerbation: COPD exacerbation            RECS:  O2  Nebs  Prednisone taper  Abx  Pulm status is stable    Olimpia Adler DO   [phone]
Patient is a 86y old  Female who presents with a chief complaint of shortness of breath (11 Dec 2017 03:16)      SUBJECTIVE / OVERNIGHT EVENTS: Pt was seen and examined at 1030am, no overnight events, has some coughing was choking on eggs per pt, ( pt is speaking in full sentences not in respiratory distress) sob is better, no chest pain, no other complaints.    MEDICATIONS  (STANDING):  ALBUTerol/ipratropium for Nebulization 3 milliLiter(s) Nebulizer every 6 hours  furosemide   Injectable 40 milliGRAM(s) IV Push daily  heparin  Injectable 5000 Unit(s) SubCutaneous every 8 hours  pantoprazole    Tablet 40 milliGRAM(s) Oral before breakfast    MEDICATIONS  (PRN):      Vital Signs Last 24 Hrs  T(C): 36.6 (11 Dec 2017 10:38), Max: 36.8 (10 Dec 2017 18:14)  T(F): 97.9 (11 Dec 2017 10:38), Max: 98.2 (10 Dec 2017 18:14)  HR: 64 (11 Dec 2017 11:04) (64 - 115)  BP: 139/70 (11 Dec 2017 10:38) (95/57 - 142/50)  BP(mean): --  RR: 30 (11 Dec 2017 10:38) (17 - 36)  SpO2: 95% (11 Dec 2017 11:04) (87% - 98%)  CAPILLARY BLOOD GLUCOSE        I&O's Summary      PHYSICAL EXAM:  GENERAL: NAD, well-developed  CHEST/LUNG: Decreased bs b/l from mid to lower lungs  HEART: Regular rate and rhythm  ABDOMEN: Soft, Nontender, Nondistended  EXTREMITIES:  b/l LE edema 2+   PSYCH: calm  NEUROLOGY: AAOx3  SKIN: No rashes or lesions    LABS:                        12.2   7.06  )-----------( 201      ( 11 Dec 2017 07:05 )             40.7     12-11    143  |  100  |  23  ----------------------------<  236<H>  3.7   |  24  |  1.14    Ca    8.6      11 Dec 2017 07:05  Phos  3.3     12-11  Mg     2.0     12-11    TPro  7.3  /  Alb  4.0  /  TBili  0.4  /  DBili  x   /  AST  26  /  ALT  17  /  AlkPhos  108  12-10      CARDIAC MARKERS ( 11 Dec 2017 02:35 )  x     / < 0.06 ng/mL / 149 u/L / 4.71 ng/mL / x      CARDIAC MARKERS ( 10 Dec 2017 18:50 )  x     / < 0.06 ng/mL / 160 u/L / 4.53 ng/mL / x              RADIOLOGY & ADDITIONAL TESTS:    Imaging Personally Reviewed:    Consultant(s) Notes Reviewed:      Care Discussed with Consultants/Other Providers:
Patient is a 86y old  Female who presents with a chief complaint of shortness of breath (11 Dec 2017 03:16)      SUBJECTIVE / OVERNIGHT EVENTS: Pt was seen and examined at 1115am, overnight events noted, ( pt did not want to use bipap), per her sob and cough is improving, no chest pain, no other complaints.    MEDICATIONS  (STANDING):  ALBUTerol/ipratropium for Nebulization 3 milliLiter(s) Nebulizer every 6 hours  artificial tears (preservative free) Ophthalmic Solution 2 Drop(s) Both EYES two times a day  aspirin enteric coated 81 milliGRAM(s) Oral daily  azithromycin  IVPB 500 milliGRAM(s) IV Intermittent every 24 hours  cefTRIAXone   IVPB 1 Gram(s) IV Intermittent every 24 hours  cholecalciferol 1000 Unit(s) Oral daily  furosemide   Injectable 40 milliGRAM(s) IV Push daily  gabapentin 600 milliGRAM(s) Oral two times a day  heparin  Injectable 5000 Unit(s) SubCutaneous every 8 hours  pantoprazole    Tablet 40 milliGRAM(s) Oral before breakfast  predniSONE   Tablet 40 milliGRAM(s) Oral daily    MEDICATIONS  (PRN):  clonazePAM Tablet 0.5 milliGRAM(s) Oral at bedtime PRN Anxiety  guaiFENesin    Syrup 100 milliGRAM(s) Oral every 6 hours PRN Cough      Vital Signs Last 24 Hrs  T(C): 36.4 (12 Dec 2017 10:03), Max: 37 (11 Dec 2017 22:19)  T(F): 97.6 (12 Dec 2017 10:03), Max: 98.6 (11 Dec 2017 22:19)  HR: 85 (12 Dec 2017 12:45) (64 - 102)  BP: 145/61 (12 Dec 2017 12:45) (120/59 - 145/61)  BP(mean): --  RR: 28 (12 Dec 2017 12:45) (28 - 32)  SpO2: 96% (12 Dec 2017 12:45) (92% - 97%)  CAPILLARY BLOOD GLUCOSE        I&O's Summary    11 Dec 2017 07:01  -  12 Dec 2017 07:00  --------------------------------------------------------  IN: 0 mL / OUT: 2600 mL / NET: -2600 mL    12 Dec 2017 07:01  -  12 Dec 2017 13:08  --------------------------------------------------------  IN: 0 mL / OUT: 1125 mL / NET: -1125 mL        PHYSICAL EXAM:  GENERAL: NAD, well-developed  CHEST/LUNG: Decreased bs bilaterally; No wheezes  HEART: Regular rate and rhythm  ABDOMEN: Soft, Nontender, Nondistended  EXTREMITIES: b/l LE edema improving  PSYCH: calm  NEUROLOGY: AAOx3  SKIN: No rashes or lesions    LABS:                        11.3   9.27  )-----------( 351      ( 12 Dec 2017 06:00 )             36.8     12-12    144  |  102  |  22  ----------------------------<  137<H>  3.5   |  30  |  1.12    Ca    8.4      12 Dec 2017 06:00  Phos  3.3     12-11  Mg     2.0     12-11    TPro  7.3  /  Alb  4.0  /  TBili  0.4  /  DBili  x   /  AST  26  /  ALT  17  /  AlkPhos  108  12-10      CARDIAC MARKERS ( 11 Dec 2017 02:35 )  x     / < 0.06 ng/mL / 149 u/L / 4.71 ng/mL / x      CARDIAC MARKERS ( 10 Dec 2017 18:50 )  x     / < 0.06 ng/mL / 160 u/L / 4.53 ng/mL / x              RADIOLOGY & ADDITIONAL TESTS:    Imaging Personally Reviewed:    Consultant(s) Notes Reviewed:      Care Discussed with Consultants/Other Providers:
Patient is a 86y old  Female who presents with a chief complaint of shortness of breath (13 Dec 2017 12:05)      SUBJECTIVE / OVERNIGHT EVENTS: Patient reports she is feeling better today. Denies any CP, SOB, palpitations. States her rhinorrhea has improved with the nasal spray. Denies cough.    MEDICATIONS  (STANDING):  ALBUTerol/ipratropium for Nebulization 3 milliLiter(s) Nebulizer every 6 hours  amLODIPine   Tablet 5 milliGRAM(s) Oral daily  artificial tears (preservative free) Ophthalmic Solution 2 Drop(s) Both EYES two times a day  aspirin enteric coated 81 milliGRAM(s) Oral daily  cholecalciferol 1000 Unit(s) Oral daily  docusate sodium 100 milliGRAM(s) Oral three times a day  gabapentin 600 milliGRAM(s) Oral two times a day  heparin  Injectable 5000 Unit(s) SubCutaneous every 8 hours  pantoprazole    Tablet 40 milliGRAM(s) Oral before breakfast  predniSONE   Tablet 30 milliGRAM(s) Oral daily  senna 2 Tablet(s) Oral at bedtime  sodium chloride 0.65% Nasal 1 Spray(s) Both Nostrils two times a day    MEDICATIONS  (PRN):  benzocaine 15 mG/menthol 3.6 mG Lozenge 1 Lozenge Oral every 4 hours PRN Sore Throat  clonazePAM Tablet 0.5 milliGRAM(s) Oral at bedtime PRN Anxiety  guaiFENesin    Syrup 100 milliGRAM(s) Oral every 6 hours PRN Cough      Vital Signs Last 24 Hrs  T(C): 36.4 (12-17-17 @ 14:43)  T(F): 97.6 (12-17-17 @ 14:43), Max: 98.6 (12-17-17 @ 06:36)  HR: 98 (12-17-17 @ 14:43) (60 - 98)  BP: 143/76 (12-17-17 @ 14:43)  BP(mean): --  RR: 20 (12-17-17 @ 14:43) (18 - 20)  SpO2: 91% (12-17-17 @ 14:43) (89% - 98%)  Wt(kg): --    CAPILLARY BLOOD GLUCOSE        I&O's Summary      PHYSICAL EXAM:  GENERAL: NAD, well-developed  HEAD:  Atraumatic, Normocephalic  EYES: EOMI, PERRLA, conjunctiva and sclera clear  NECK: Supple, No JVD  CHEST/LUNG: Improved BS, crackle in RLL  HEART: Regular rate and rhythm; No murmurs, rubs, or gallops  ABDOMEN: Soft, Nontender, Nondistended; Bowel sounds present  EXTREMITIES:  2+ Peripheral Pulses, No clubbing, cyanosis, or edema  PSYCH: AAOx3, cooperative  NEUROLOGY: non-focal  SKIN: No rashes or lesions    LABS:                        12.4   11.89 )-----------( 342      ( 17 Dec 2017 06:55 )             41.0     12-17    142  |  100  |  32<H>  ----------------------------<  133<H>  3.5   |  28  |  1.06    Ca    8.7      17 Dec 2017 06:55      RADIOLOGY & ADDITIONAL TESTS:    Imaging Personally Reviewed:    Consultant(s) Notes Reviewed:  Pulmonary    Care Discussed with Consultants/Other Providers:    Assessment and Plan:
Patient is a 86y old  Female who presents with a chief complaint of shortness of breath (13 Dec 2017 12:05)      SUBJECTIVE / OVERNIGHT EVENTS: Patient reports she is not feeling much better. Denies any CP or SOB while on 2L home O2, but states she has been having significant rhinorrhea. Denies headaches, cough. Frustrated she is not feeling better.     MEDICATIONS  (STANDING):  ALBUTerol/ipratropium for Nebulization 3 milliLiter(s) Nebulizer every 6 hours  amLODIPine   Tablet 5 milliGRAM(s) Oral daily  artificial tears (preservative free) Ophthalmic Solution 2 Drop(s) Both EYES two times a day  aspirin enteric coated 81 milliGRAM(s) Oral daily  cefTRIAXone   IVPB 1 Gram(s) IV Intermittent every 24 hours  cholecalciferol 1000 Unit(s) Oral daily  docusate sodium 100 milliGRAM(s) Oral three times a day  gabapentin 600 milliGRAM(s) Oral two times a day  heparin  Injectable 5000 Unit(s) SubCutaneous every 8 hours  pantoprazole    Tablet 40 milliGRAM(s) Oral before breakfast  predniSONE   Tablet 30 milliGRAM(s) Oral daily  senna 2 Tablet(s) Oral at bedtime  sodium chloride 0.65% Nasal 1 Spray(s) Both Nostrils two times a day    MEDICATIONS  (PRN):  benzocaine 15 mG/menthol 3.6 mG Lozenge 1 Lozenge Oral every 4 hours PRN Sore Throat  clonazePAM Tablet 0.5 milliGRAM(s) Oral at bedtime PRN Anxiety  guaiFENesin    Syrup 100 milliGRAM(s) Oral every 6 hours PRN Cough      Vital Signs Last 24 Hrs  T(C): 37 (12-16-17 @ 14:05)  T(F): 98.6 (12-16-17 @ 14:05), Max: 98.7 (12-16-17 @ 06:48)  HR: 86 (12-16-17 @ 16:25) (84 - 100)  BP: 150/68 (12-16-17 @ 14:05)  BP(mean): --  RR: 20 (12-16-17 @ 15:42) (18 - 22)  SpO2: 95% (12-16-17 @ 16:25) (85% - 99%)  Wt(kg): --    12-15 @ 07:01  -  12-16 @ 07:00  --------------------------------------------------------  IN: 300 mL / OUT: 0 mL / NET: 300 mL      CAPILLARY BLOOD GLUCOSE        I&O's Summary    15 Dec 2017 07:01  -  16 Dec 2017 07:00  --------------------------------------------------------  IN: 300 mL / OUT: 0 mL / NET: 300 mL        PHYSICAL EXAM:  GENERAL: NAD, well-developed, on 2L O2 (does not use O2 at home)  HEAD:  Atraumatic, Normocephalic  EYES: EOMI, PERRLA, conjunctiva and sclera clear  NECK: Supple, No JVD  CHEST/LUNG: Decreased breath sounds in all lung fields  HEART: Regular rate and rhythm; No murmurs, rubs, or gallops  ABDOMEN: Soft, Nontender, Nondistended; Bowel sounds present  EXTREMITIES:  2+ Peripheral Pulses, No clubbing, cyanosis, or edema  PSYCH: AAOx3, cooperative  NEUROLOGY: non-focal  SKIN: No rashes or lesions    LABS:      12-15    144  |  102  |  28<H>  ----------------------------<  119<H>  3.8   |  30  |  1.05    Ca    8.8      15 Dec 2017 06:00  Phos  3.9     12-15  Mg     2.0     12-15        RADIOLOGY & ADDITIONAL TESTS:    Imaging Personally Reviewed:    Consultant(s) Notes Reviewed:  Pulmonary    Care Discussed with Consultants/Other Providers:    Assessment and Plan:
Patient is a 86y old  Female who presents with a chief complaint of shortness of breath (13 Dec 2017 12:05)      SUBJECTIVE / OVERNIGHT EVENTS: Pt was seen and examined at 1210pm, no overnight events, has some sob and wheezing, no chest pain, leg swelling is improving, no other complaints.    MEDICATIONS  (STANDING):  ALBUTerol/ipratropium for Nebulization 3 milliLiter(s) Nebulizer every 6 hours  artificial tears (preservative free) Ophthalmic Solution 2 Drop(s) Both EYES two times a day  aspirin enteric coated 81 milliGRAM(s) Oral daily  azithromycin  IVPB 500 milliGRAM(s) IV Intermittent every 24 hours  cefTRIAXone   IVPB 1 Gram(s) IV Intermittent every 24 hours  cholecalciferol 1000 Unit(s) Oral daily  docusate sodium 100 milliGRAM(s) Oral three times a day  furosemide    Tablet 40 milliGRAM(s) Oral daily  gabapentin 600 milliGRAM(s) Oral two times a day  heparin  Injectable 5000 Unit(s) SubCutaneous every 8 hours  pantoprazole    Tablet 40 milliGRAM(s) Oral before breakfast  predniSONE   Tablet 40 milliGRAM(s) Oral daily  senna 2 Tablet(s) Oral at bedtime    MEDICATIONS  (PRN):  benzocaine 15 mG/menthol 3.6 mG Lozenge 1 Lozenge Oral every 4 hours PRN Sore Throat  clonazePAM Tablet 0.5 milliGRAM(s) Oral at bedtime PRN Anxiety  guaiFENesin    Syrup 100 milliGRAM(s) Oral every 6 hours PRN Cough      Vital Signs Last 24 Hrs  T(C): 36.7 (13 Dec 2017 21:09), Max: 36.7 (13 Dec 2017 21:09)  T(F): 98 (13 Dec 2017 21:09), Max: 98 (13 Dec 2017 21:09)  HR: 87 (14 Dec 2017 15:48) (80 - 91)  BP: 140/61 (14 Dec 2017 12:22) (111/34 - 140/80)  BP(mean): --  RR: 20 (14 Dec 2017 12:22) (20 - 22)  SpO2: 97% (14 Dec 2017 15:48) (94% - 99%)  CAPILLARY BLOOD GLUCOSE        I&O's Summary    13 Dec 2017 07:01  -  14 Dec 2017 07:00  --------------------------------------------------------  IN: 0 mL / OUT: 800 mL / NET: -800 mL        PHYSICAL EXAM:  GENERAL: NAD, well-developed  CHEST/LUNG: B/l Rhonchi+  HEART: Regular rate and rhythm  ABDOMEN: Soft, Nontender, Nondistended  EXTREMITIES:  LE edema improving  PSYCH: calm  NEUROLOGY: AAOx3  SKIN: No rashes or lesions    LABS:                        11.6   8.98  )-----------( 329      ( 14 Dec 2017 06:00 )             38.1     12-14    142  |  103  |  25<H>  ----------------------------<  102<H>  4.4   |  26  |  1.04    Ca    8.5      14 Dec 2017 06:00                RADIOLOGY & ADDITIONAL TESTS:    Imaging Personally Reviewed:    Consultant(s) Notes Reviewed:      Care Discussed with Consultants/Other Providers:
Patient is a 86y old  Female who presents with a chief complaint of shortness of breath (13 Dec 2017 12:05)      SUBJECTIVE / OVERNIGHT EVENTS: Pt was seen and examined at 1pm, no overnight events, sob better, no chest pain, leg swelling improving, reports that she does not want to go to rehab, when ready she wants to go home.    MEDICATIONS  (STANDING):  ALBUTerol/ipratropium for Nebulization 3 milliLiter(s) Nebulizer every 6 hours  artificial tears (preservative free) Ophthalmic Solution 2 Drop(s) Both EYES two times a day  aspirin enteric coated 81 milliGRAM(s) Oral daily  azithromycin  IVPB 500 milliGRAM(s) IV Intermittent every 24 hours  cefTRIAXone   IVPB 1 Gram(s) IV Intermittent every 24 hours  cholecalciferol 1000 Unit(s) Oral daily  docusate sodium 100 milliGRAM(s) Oral three times a day  furosemide   Injectable 40 milliGRAM(s) IV Push daily  gabapentin 600 milliGRAM(s) Oral two times a day  heparin  Injectable 5000 Unit(s) SubCutaneous every 8 hours  pantoprazole    Tablet 40 milliGRAM(s) Oral before breakfast  potassium chloride   Powder 40 milliEquivalent(s) Oral every 4 hours  predniSONE   Tablet 40 milliGRAM(s) Oral daily  senna 2 Tablet(s) Oral at bedtime    MEDICATIONS  (PRN):  benzocaine 15 mG/menthol 3.6 mG Lozenge 1 Lozenge Oral every 4 hours PRN Sore Throat  clonazePAM Tablet 0.5 milliGRAM(s) Oral at bedtime PRN Anxiety  guaiFENesin    Syrup 100 milliGRAM(s) Oral every 6 hours PRN Cough      Vital Signs Last 24 Hrs  T(C): 36.2 (13 Dec 2017 11:18), Max: 36.6 (12 Dec 2017 21:08)  T(F): 97.2 (13 Dec 2017 11:18), Max: 97.8 (12 Dec 2017 21:08)  HR: 94 (13 Dec 2017 11:18) (76 - 94)  BP: 144/62 (13 Dec 2017 11:18) (118/45 - 144/66)  BP(mean): --  RR: 26 (13 Dec 2017 11:18) (20 - 28)  SpO2: 95% (13 Dec 2017 11:18) (95% - 100%)  CAPILLARY BLOOD GLUCOSE        I&O's Summary    12 Dec 2017 07:01  -  13 Dec 2017 07:00  --------------------------------------------------------  IN: 0 mL / OUT: 2675 mL / NET: -2675 mL    13 Dec 2017 07:01  -  13 Dec 2017 13:29  --------------------------------------------------------  IN: 0 mL / OUT: 800 mL / NET: -800 mL        PHYSICAL EXAM:  GENERAL: NAD, well-developed  CHEST/LUNG: Decreased bs b/l  No wheeze  HEART: Regular rate and rhythm  ABDOMEN: Soft, Nontender, Nondistended  EXTREMITIES:  b/l LE edema improving  PSYCH: calm  NEUROLOGY: AAOx3  SKIN: No rashes or lesions    LABS:                        11.4   9.39  )-----------( 324      ( 13 Dec 2017 05:23 )             36.0     12-13    141  |  99  |  24<H>  ----------------------------<  116<H>  3.1<L>   |  29  |  1.23    Ca    8.3<L>      13 Dec 2017 05:25                RADIOLOGY & ADDITIONAL TESTS:    Imaging Personally Reviewed:    Consultant(s) Notes Reviewed:      Care Discussed with Consultants/Other Providers:
Patient is a 86y old  Female who presents with a chief complaint of shortness of breath (13 Dec 2017 12:05)      SUBJECTIVE / OVERNIGHT EVENTS: Pt was seen and examined at bedside at 12N, no overnight events, still has cough, sob somewhat better, no chest pain, has some nasal congestion not ready to go home yet, no other complaints.    MEDICATIONS  (STANDING):  ALBUTerol/ipratropium for Nebulization 3 milliLiter(s) Nebulizer every 6 hours  amLODIPine   Tablet 5 milliGRAM(s) Oral daily  artificial tears (preservative free) Ophthalmic Solution 2 Drop(s) Both EYES two times a day  aspirin enteric coated 81 milliGRAM(s) Oral daily  azithromycin  IVPB 500 milliGRAM(s) IV Intermittent every 24 hours  cefTRIAXone   IVPB 1 Gram(s) IV Intermittent every 24 hours  cholecalciferol 1000 Unit(s) Oral daily  docusate sodium 100 milliGRAM(s) Oral three times a day  gabapentin 600 milliGRAM(s) Oral two times a day  heparin  Injectable 5000 Unit(s) SubCutaneous every 8 hours  pantoprazole    Tablet 40 milliGRAM(s) Oral before breakfast  senna 2 Tablet(s) Oral at bedtime    MEDICATIONS  (PRN):  benzocaine 15 mG/menthol 3.6 mG Lozenge 1 Lozenge Oral every 4 hours PRN Sore Throat  clonazePAM Tablet 0.5 milliGRAM(s) Oral at bedtime PRN Anxiety  guaiFENesin    Syrup 100 milliGRAM(s) Oral every 6 hours PRN Cough      Vital Signs Last 24 Hrs  T(C): 36.9 (15 Dec 2017 15:22), Max: 36.9 (15 Dec 2017 15:22)  T(F): 98.4 (15 Dec 2017 15:22), Max: 98.4 (15 Dec 2017 15:22)  HR: 89 (15 Dec 2017 15:46) (75 - 101)  BP: 115/45 (15 Dec 2017 15:22) (107/87 - 143/88)  BP(mean): --  RR: 19 (15 Dec 2017 15:22) (19 - 20)  SpO2: 96% (15 Dec 2017 15:46) (94% - 99%)  CAPILLARY BLOOD GLUCOSE        I&O's Summary      PHYSICAL EXAM:  GENERAL: NAD, well-developed  CHEST/LUNG: Decreased bs b/l, No wheeze  HEART: Regular rate and rhythm  ABDOMEN: Soft, Nontender, Nondistended  EXTREMITIES: LE edema improving  PSYCH: calm  NEUROLOGY: AAOx3  SKIN: No rashes or lesions    LABS:                        11.6   8.98  )-----------( 329      ( 14 Dec 2017 06:00 )             38.1     12-15    144  |  102  |  28<H>  ----------------------------<  119<H>  3.8   |  30  |  1.05    Ca    8.8      15 Dec 2017 06:00  Phos  3.9     12-15  Mg     2.0     12-15                RADIOLOGY & ADDITIONAL TESTS:    Imaging Personally Reviewed:    Consultant(s) Notes Reviewed:      Care Discussed with Consultants/Other Providers:
Pulmonary Consult Note    VIDAL LOUIE  MRN-[id number]    Chief Complaint: Patient is a 86y old  Female who presents with a chief complaint of shortness of breath (13 Dec 2017 12:05)      HPI:  86yFemale  -no overnite complaints   prior day feeling better  -    ROS:  -COPD CHF  DAVID N CPAP    All other systems reviewed   ACTIVE MEDICATION LIST:  MEDICATIONS  (STANDING):  ALBUTerol/ipratropium for Nebulization 3 milliLiter(s) Nebulizer every 6 hours  amLODIPine   Tablet 5 milliGRAM(s) Oral daily  artificial tears (preservative free) Ophthalmic Solution 2 Drop(s) Both EYES two times a day  aspirin enteric coated 81 milliGRAM(s) Oral daily  cefTRIAXone   IVPB 1 Gram(s) IV Intermittent every 24 hours  cholecalciferol 1000 Unit(s) Oral daily  docusate sodium 100 milliGRAM(s) Oral three times a day  gabapentin 600 milliGRAM(s) Oral two times a day  heparin  Injectable 5000 Unit(s) SubCutaneous every 8 hours  pantoprazole    Tablet 40 milliGRAM(s) Oral before breakfast  predniSONE   Tablet 30 milliGRAM(s) Oral daily  senna 2 Tablet(s) Oral at bedtime  sodium chloride 0.65% Nasal 1 Spray(s) Both Nostrils two times a day    MEDICATIONS  (PRN):  benzocaine 15 mG/menthol 3.6 mG Lozenge 1 Lozenge Oral every 4 hours PRN Sore Throat  clonazePAM Tablet 0.5 milliGRAM(s) Oral at bedtime PRN Anxiety  guaiFENesin    Syrup 100 milliGRAM(s) Oral every 6 hours PRN Cough      EXAM:  Vital Signs Last 24 Hrs  T(C): 36.8 (16 Dec 2017 21:08), Max: 37.1 (16 Dec 2017 06:48)  T(F): 98.3 (16 Dec 2017 21:08), Max: 98.7 (16 Dec 2017 06:48)  HR: 80 (17 Dec 2017 04:11) (77 - 96)  BP: 124/64 (16 Dec 2017 21:08) (124/64 - 150/68)  BP(mean): --  RR: 18 (16 Dec 2017 21:08) (18 - 22)  SpO2: 96% (17 Dec 2017 04:09) (85% - 98%)    GENERAL: No acute distress  NEURO: Alert and oriented x 3  LUNGS: Clear to auscultation bilaterally, no rales or wheezes  CV: S1/S2, no murmur  No edema  Basic Metabolic Panel w/Mg &amp; Inorg Phos (12.15.17 @ 06:00)    Calcium, Total Serum: 8.8 mg/dL    Phosphorus Level, Serum: 3.9 mg/dL    Complete Blood Count + Automated Diff (04.03.09 @ 14:28)    Neutrophil Count - CBC: 7.3 K/uL    Lymphocyte Count - CBC: 2.4 K/uL    Monocyte Count - CBC: 0.8 K/uL    Eosinophil Count - CBC: 0.4 K/uL    Basophil Count - CBC: 0.2 K/uL            creatinine levels. mL/min    Sodium, Serum: 144 mmol/L    Potassium, Serum: 3.8 mmol/L    Chloride, Serum: 102 mmol/L    Carbon Dioxide, Serum: 30 mmol/L    Blood Urea Nitrogen, Serum: 28 mg/dL    Creatinine, Serum: 1.05 mg/dL    Glucose, Serum: 119 mg/dL    Magnesium, Serum: 2.0 mg/dL    Complete Blood Count + Automated Diff (04.03.09 @ 14:28)    Neutrophil Count - CBC: 7.3 K/uL    Lymphocyte Count - CBC: 2.4 K/uL    Monocyte Count - CBC: 0.8 K/uL    Eosinophil Count - CBC: 0.4 K/uL    Basophil Count - CBC: 0.2 K/uL  < from: Xray Cinesophagram (12.13.17 @ 09:10) >  IMPRESSION:     Aspiration.  For recommendations and further detail, refer to speech pathology's final   report.          PROBLEM LIST:  86yFemale with HEALTH ISSUES - PROBLEM Dx:  < from: CT Angio Chest w/ IV Cont (12.10.17 @ 22:41) >    IMPRESSION:     No definite pulmonary embolism given limitations described above.    Increased atelectasis along the right lower lobe, infection not excluded.   Trace right pleural effusion. Correlate with symptoms.    Mild enlargement of right hilar and right paraesophageal lymph nodes   compared to 2016. These are indeterminant in nature. Continued follow-up   recommended    < end of copied text >  Encounter for palliative care: Encounter for palliative care  Debility: Debility  Shortness of breath: Shortness of breath  Hypokalemia: Hypokalemia  DAVID on CPAP: DAVID on CPAP  Need for prophylactic measure: Need for prophylactic measure  Medication management: Medication management  Essential Hypertension: Essential Hypertension  Pneumonia: Pneumonia  COPD exacerbation: COPD exacerbation            RECS:  -taper steroids   N CPAP   OOB chair   complete antibx  -    Thank you for this consultation, please feel free to call with any questions [phone]  Clyde Almanzar DO [facility] [place]

## 2017-12-18 NOTE — PROGRESS NOTE ADULT - PROBLEM SELECTOR PLAN 5
Improved
- Hold as patient normotensive
-Continue with nasal saline spray
DVT ppx: Heparin SQ  Diet: DASH  Dispo: PT consulted rec rehab
DVT ppx: Heparin SQ  Diet: DASH  Dispo: PT consulted rec rehab
DVT ppx: Heparin SQ  Diet: DASH  Dispo: PT consulted rec rehab but wants to go home when ready for d/c
DVT ppx: Heparin SQ  Diet: DASH  Dispo: PT consulted rec rehab but wants to go home when ready for d/c
will check with pharmacy about meds

## 2018-02-15 ENCOUNTER — INPATIENT (INPATIENT)
Facility: HOSPITAL | Age: 83
LOS: 2 days | Discharge: HOME HEALTH | End: 2018-02-18
Attending: HOSPITALIST | Admitting: HOSPITALIST
Payer: MEDICARE

## 2018-02-15 VITALS
DIASTOLIC BLOOD PRESSURE: 88 MMHG | TEMPERATURE: 99 F | HEART RATE: 83 BPM | RESPIRATION RATE: 18 BRPM | OXYGEN SATURATION: 93 % | SYSTOLIC BLOOD PRESSURE: 130 MMHG

## 2018-02-15 DIAGNOSIS — R06.02 SHORTNESS OF BREATH: ICD-10-CM

## 2018-02-15 DIAGNOSIS — Z98.89 OTHER SPECIFIED POSTPROCEDURAL STATES: Chronic | ICD-10-CM

## 2018-02-15 LAB
ALBUMIN SERPL ELPH-MCNC: 3.5 G/DL — SIGNIFICANT CHANGE UP (ref 3.3–5)
ALP SERPL-CCNC: 102 U/L — SIGNIFICANT CHANGE UP (ref 40–120)
ALT FLD-CCNC: 14 U/L — SIGNIFICANT CHANGE UP (ref 4–33)
AST SERPL-CCNC: 23 U/L — SIGNIFICANT CHANGE UP (ref 4–32)
BASE EXCESS BLDV CALC-SCNC: 3.3 MMOL/L — SIGNIFICANT CHANGE UP
BASOPHILS # BLD AUTO: 0.16 K/UL — SIGNIFICANT CHANGE UP (ref 0–0.2)
BASOPHILS NFR BLD AUTO: 1.6 % — SIGNIFICANT CHANGE UP (ref 0–2)
BILIRUB SERPL-MCNC: 0.3 MG/DL — SIGNIFICANT CHANGE UP (ref 0.2–1.2)
BLOOD GAS VENOUS - CREATININE: 1.1 MG/DL — SIGNIFICANT CHANGE UP (ref 0.5–1.3)
BUN SERPL-MCNC: 23 MG/DL — SIGNIFICANT CHANGE UP (ref 7–23)
CALCIUM SERPL-MCNC: 8.5 MG/DL — SIGNIFICANT CHANGE UP (ref 8.4–10.5)
CHLORIDE BLDV-SCNC: 104 MMOL/L — SIGNIFICANT CHANGE UP (ref 96–108)
CHLORIDE SERPL-SCNC: 103 MMOL/L — SIGNIFICANT CHANGE UP (ref 98–107)
CO2 SERPL-SCNC: 22 MMOL/L — SIGNIFICANT CHANGE UP (ref 22–31)
CREAT SERPL-MCNC: 1.02 MG/DL — SIGNIFICANT CHANGE UP (ref 0.5–1.3)
EOSINOPHIL # BLD AUTO: 0.45 K/UL — SIGNIFICANT CHANGE UP (ref 0–0.5)
EOSINOPHIL NFR BLD AUTO: 4.5 % — SIGNIFICANT CHANGE UP (ref 0–6)
GAS PNL BLDV: 139 MMOL/L — SIGNIFICANT CHANGE UP (ref 136–146)
GLUCOSE BLDV-MCNC: 261 — HIGH (ref 70–99)
GLUCOSE SERPL-MCNC: 87 MG/DL — SIGNIFICANT CHANGE UP (ref 70–99)
HCO3 BLDV-SCNC: 26 MMOL/L — SIGNIFICANT CHANGE UP (ref 20–27)
HCT VFR BLD CALC: 42.1 % — SIGNIFICANT CHANGE UP (ref 34.5–45)
HCT VFR BLDV CALC: 39.7 % — SIGNIFICANT CHANGE UP (ref 34.5–45)
HGB BLD-MCNC: 12.3 G/DL — SIGNIFICANT CHANGE UP (ref 11.5–15.5)
HGB BLDV-MCNC: 12.9 G/DL — SIGNIFICANT CHANGE UP (ref 11.5–15.5)
IMM GRANULOCYTES # BLD AUTO: 0.05 # — SIGNIFICANT CHANGE UP
IMM GRANULOCYTES NFR BLD AUTO: 0.5 % — SIGNIFICANT CHANGE UP (ref 0–1.5)
LACTATE BLDV-MCNC: 1.7 MMOL/L — SIGNIFICANT CHANGE UP (ref 0.5–2)
LYMPHOCYTES # BLD AUTO: 1.88 K/UL — SIGNIFICANT CHANGE UP (ref 1–3.3)
LYMPHOCYTES # BLD AUTO: 18.9 % — SIGNIFICANT CHANGE UP (ref 13–44)
MCHC RBC-ENTMCNC: 24.1 PG — LOW (ref 27–34)
MCHC RBC-ENTMCNC: 29.2 % — LOW (ref 32–36)
MCV RBC AUTO: 82.4 FL — SIGNIFICANT CHANGE UP (ref 80–100)
MONOCYTES # BLD AUTO: 0.66 K/UL — SIGNIFICANT CHANGE UP (ref 0–0.9)
MONOCYTES NFR BLD AUTO: 6.6 % — SIGNIFICANT CHANGE UP (ref 2–14)
NEUTROPHILS # BLD AUTO: 6.76 K/UL — SIGNIFICANT CHANGE UP (ref 1.8–7.4)
NEUTROPHILS NFR BLD AUTO: 67.9 % — SIGNIFICANT CHANGE UP (ref 43–77)
NRBC # FLD: 0.02 — SIGNIFICANT CHANGE UP
PCO2 BLDV: 45 MMHG — SIGNIFICANT CHANGE UP (ref 41–51)
PH BLDV: 7.41 PH — SIGNIFICANT CHANGE UP (ref 7.32–7.43)
PLATELET # BLD AUTO: 373 K/UL — SIGNIFICANT CHANGE UP (ref 150–400)
PMV BLD: 9.7 FL — SIGNIFICANT CHANGE UP (ref 7–13)
PO2 BLDV: 42 MMHG — HIGH (ref 35–40)
POTASSIUM BLDV-SCNC: 3.6 MMOL/L — SIGNIFICANT CHANGE UP (ref 3.4–4.5)
POTASSIUM SERPL-MCNC: 4.5 MMOL/L — SIGNIFICANT CHANGE UP (ref 3.5–5.3)
POTASSIUM SERPL-SCNC: 4.5 MMOL/L — SIGNIFICANT CHANGE UP (ref 3.5–5.3)
PROT SERPL-MCNC: 7 G/DL — SIGNIFICANT CHANGE UP (ref 6–8.3)
RBC # BLD: 5.11 M/UL — SIGNIFICANT CHANGE UP (ref 3.8–5.2)
RBC # FLD: 17 % — HIGH (ref 10.3–14.5)
SAO2 % BLDV: 71.4 % — SIGNIFICANT CHANGE UP (ref 60–85)
SODIUM SERPL-SCNC: 141 MMOL/L — SIGNIFICANT CHANGE UP (ref 135–145)
WBC # BLD: 9.96 K/UL — SIGNIFICANT CHANGE UP (ref 3.8–10.5)
WBC # FLD AUTO: 9.96 K/UL — SIGNIFICANT CHANGE UP (ref 3.8–10.5)

## 2018-02-15 PROCEDURE — 99223 1ST HOSP IP/OBS HIGH 75: CPT

## 2018-02-15 PROCEDURE — 71045 X-RAY EXAM CHEST 1 VIEW: CPT | Mod: 26

## 2018-02-15 RX ORDER — HEPARIN SODIUM 5000 [USP'U]/ML
5000 INJECTION INTRAVENOUS; SUBCUTANEOUS EVERY 8 HOURS
Qty: 0 | Refills: 0 | Status: DISCONTINUED | OUTPATIENT
Start: 2018-02-15 | End: 2018-02-18

## 2018-02-15 RX ORDER — PANTOPRAZOLE SODIUM 20 MG/1
40 TABLET, DELAYED RELEASE ORAL ONCE
Qty: 0 | Refills: 0 | Status: COMPLETED | OUTPATIENT
Start: 2018-02-15 | End: 2018-02-15

## 2018-02-15 RX ORDER — CALCIUM CARBONATE 500(1250)
3 TABLET ORAL ONCE
Qty: 0 | Refills: 0 | Status: COMPLETED | OUTPATIENT
Start: 2018-02-15 | End: 2018-02-15

## 2018-02-15 RX ORDER — ALBUTEROL 90 UG/1
2.5 AEROSOL, METERED ORAL ONCE
Qty: 0 | Refills: 0 | Status: COMPLETED | OUTPATIENT
Start: 2018-02-15 | End: 2018-02-15

## 2018-02-15 RX ORDER — BUDESONIDE AND FORMOTEROL FUMARATE DIHYDRATE 160; 4.5 UG/1; UG/1
2 AEROSOL RESPIRATORY (INHALATION)
Qty: 0 | Refills: 0 | Status: DISCONTINUED | OUTPATIENT
Start: 2018-02-15 | End: 2018-02-18

## 2018-02-15 RX ORDER — IPRATROPIUM/ALBUTEROL SULFATE 18-103MCG
3 AEROSOL WITH ADAPTER (GRAM) INHALATION EVERY 6 HOURS
Qty: 0 | Refills: 0 | Status: DISCONTINUED | OUTPATIENT
Start: 2018-02-15 | End: 2018-02-18

## 2018-02-15 RX ORDER — IPRATROPIUM/ALBUTEROL SULFATE 18-103MCG
3 AEROSOL WITH ADAPTER (GRAM) INHALATION ONCE
Qty: 0 | Refills: 0 | Status: COMPLETED | OUTPATIENT
Start: 2018-02-15 | End: 2018-02-15

## 2018-02-15 RX ORDER — PANTOPRAZOLE SODIUM 20 MG/1
40 TABLET, DELAYED RELEASE ORAL
Qty: 0 | Refills: 0 | Status: DISCONTINUED | OUTPATIENT
Start: 2018-02-15 | End: 2018-02-18

## 2018-02-15 RX ADMIN — Medication 3 MILLILITER(S): at 17:50

## 2018-02-15 RX ADMIN — Medication 3 MILLILITER(S): at 18:22

## 2018-02-15 RX ADMIN — ALBUTEROL 2.5 MILLIGRAM(S): 90 AEROSOL, METERED ORAL at 21:26

## 2018-02-15 RX ADMIN — Medication 40 MILLIGRAM(S): at 17:50

## 2018-02-15 NOTE — H&P ADULT - PROBLEM SELECTOR PLAN 6
DVT Prophylaxis: on SQ Heparin, PT, PTT, INR    UA, Fasting Lipid, Vit B12, Folate, Iron studies, Ferritin

## 2018-02-15 NOTE — H&P ADULT - PMH
Anxiety disorder    Chronic bronchitis    COPD (Chronic Obstructive Pulmonary Disease)  on home O2 AT NIGHT  Essential Hypertension    Hip Fracture-Left    Hx of Breast Cancer  HAd Rt in 1989  Morbid obesity Anxiety disorder    Chronic bronchitis    COPD (Chronic Obstructive Pulmonary Disease)  on home O2 AT NIGHT  Dysphagia    Essential Hypertension    Hip Fracture-Left    Hx of Breast Cancer  HAd Rt in 1989  Morbid obesity    DAVID on CPAP Anxiety disorder    Chronic bronchitis    COPD (Chronic Obstructive Pulmonary Disease)  on home O2 AT NIGHT  Dysphagia    Essential Hypertension    Hip Fracture-Left    Hx of Breast Cancer  HAd Rt in 1989  Morbid obesity    Neuropathy    DAVID on CPAP

## 2018-02-15 NOTE — ED ADULT NURSE REASSESSMENT NOTE - NS ED NURSE REASSESS COMMENT FT1
pt vitally stable, slightly labored breathing, but pt states she feels comfortable and this is normal for her. pt on cardiac monitor, and supplemental O2 via nasal cannula, will continue to monitor

## 2018-02-15 NOTE — ED ADULT TRIAGE NOTE - CHIEF COMPLAINT QUOTE
pt brought from home by EMS c/o SOB since this morning worsening w/ exertion, Hx COPD, baseline SPO2 88% O2 as needed @ home, presents w/ O2 and some relief, able to complete sentences, denies CP, took rescue meds @ home w/ no relief

## 2018-02-15 NOTE — ED PROVIDER NOTE - OBJECTIVE STATEMENT
86yo female h/o COPD on CPAP at night, h/o breast ca, p/w 1 day worsening SOB, used nebs, symbicort, wihtout improvement, + dry cough for last 2-3 days, no fevers, no chills, no chest pain. no leg swelling.  Pt last admitted in Dec for COPD exacerbation, neg CTA 86yo female h/o COPD on CPAP at night, h/o breast ca, p/w 1 day worsening SOB, used nebs, symbicort, without improvement, no fevers, no chills, no chest pain. no leg swelling.  Pt last admitted in Dec for COPD exacerbation, neg CTA and normal echo in Dec

## 2018-02-15 NOTE — H&P ADULT - HISTORY OF PRESENT ILLNESS
86 y/o female HX of  COPD, EX Smoker, DAVID on  CPAP at night, + Dysphagia, Obesity, on O2 NC 3 Lit,  h/o left breast ca, S/P surgery and RTX  in the past, S/P IVC Filter, No recent travel, no sick contact, FLU Shot up to date as per  pt,  p/w 1 day worsening SOB, used nebs, Symbicort,  without improvement, no fevers, no chills, no chest pain,  no leg swelling now, pt was started PO Lasix 20 mg recently by her cardiologist for legs edema,   Pt last admitted in Dec. 2017  for COPD exacerbation, neg CTA and Unremarkable  Echo in Dec. 2017, pt awake, A+O x 3, + Chronic dry cough , no CP, No sore throat, no rash, no dysuria, no diarrhea, no abdominal pain, NO HA, no Dizziness,  Pt S/P IV Solumedrol 40 mg X 1, Duoneb x 3, CTPA was ordered by ER to R/O PE,     Labs: Lactate 1.7, na 141, K+ 4.5, BUN 23, Creatinine 1.02, Glucose 87, LFT Normal, WBC 9.96, Hgb 12.3, Platelet 373    Vitals: Tem 98.2, , /53, RR 18, 93% on O2 NC 3 Lit., 86 y/o female HX of  COPD, EX Smoker, DAVID on  CPAP at night, + Dysphagia, Obesity, on O2 NC 3 Lit,  h/o left breast ca, S/P surgery and RTX  in the past, S/P IVC Filter, No recent travel, no sick contact, FLU Shot up to date as per  pt,  p/w 1 day worsening SOB, used nebs, Symbicort,  without improvement, no fevers, no chills, no chest pain,  no leg swelling now, pt was started PO Lasix 20 mg recently by her cardiologist for legs edema,   Pt last admitted in Dec. 2017  for COPD exacerbation, neg CTA and Unremarkable  Echo in Dec. 2017, pt awake, A+O x 3, + Chronic dry cough , no CP, No sore throat, no rash, no dysuria, no diarrhea, no abdominal pain, NO HA, no Dizziness,  Pt S/P IV Solumedrol 40 mg X 1, Duoneb x 3, CTPA was ordered by ER to R/O PE, pt uses walker or Cane to ambulate,     Labs: Lactate 1.7, na 141, K+ 4.5, BUN 23, Creatinine 1.02, Glucose 87, LFT Normal, WBC 9.96, Hgb 12.3, Platelet 373    Vitals: Tem 98.2, , /53, RR 18, 93% on O2 NC 3 Lit., 86 y/o female HX of  COPD, EX Smoker, HTN, Neuropathy, DAVID on  CPAP at night, + Dysphagia, Obesity, on O2 NC 3 Lit,  h/o left breast ca, S/P surgery and RTX  in the past, S/P IVC Filter, No recent travel, no sick contact, FLU Shot up to date as per  pt,  p/w 1 day worsening SOB, used nebs, Symbicort,  without improvement, no fevers, no chills, no chest pain,  no leg swelling now, pt was started PO Lasix 20 mg recently by her cardiologist for legs edema,   Pt last admitted in Dec. 2017  for COPD exacerbation, neg CTA and Unremarkable  Echo in Dec. 2017, pt awake, A+O x 3, + Chronic dry cough , no CP, No sore throat, no rash, no dysuria, no diarrhea, no abdominal pain, NO HA, no Dizziness,  Pt S/P IV Solumedrol 40 mg X 1, Duoneb x 3, CTPA was ordered by ER to R/O PE, pt uses walker or Cane to ambulate,     Labs: Lactate 1.7, na 141, K+ 4.5, BUN 23, Creatinine 1.02, Glucose 87, LFT Normal, WBC 9.96, Hgb 12.3, Platelet 373    Vitals: Tem 98.2, , /53, RR 18, 93% on O2 NC 3 Lit.,

## 2018-02-15 NOTE — H&P ADULT - ASSESSMENT
86 y/o female HX of  COPD, EX Smoker, DAVID on  CPAP at night, + Dysphagia, Obesity, on O2 NC 3 Lit,  h/o left breast ca, S/P surgery and RTX  in the past, S/P IVC Filter, No recent travel, no sick contact, FLU Shot up to date as per  pt,  p/w 1 day worsening SOB, used nebs, Symbicort,  without improvement, no fevers, no chills, no chest pain,  no leg swelling now, pt was started PO Lasix 20 mg recently by her cardiologist for legs edema,   Pt last admitted in Dec. 2017  for COPD exacerbation, neg CTA and Unremarkable  Echo in Dec. 2017, pt awake, A+O x 3, + Chronic dry cough , no CP, No sore throat, no rash, no dysuria, no diarrhea, no abdominal pain, NO HA, no Dizziness,  Pt S/P IV Solumedrol 40 mg X 1, Duoneb x 3, CTPA was ordered by ER to R/O PE, 86 y/o female HX of  COPD, EX Smoker, DAVID on  CPAP at night, + Dysphagia, Obesity, on O2 NC 3 Lit,  h/o left breast ca, S/P surgery and RTX  in the past, S/P IVC Filter, No recent travel, no sick contact, FLU Shot up to date as per  pt,  p/w 1 day worsening SOB, used nebs, Symbicort,  without improvement, no fevers, no chills, no chest pain,  no leg swelling now, pt was started PO Lasix 20 mg recently by her cardiologist for legs edema,   Pt last admitted in Dec. 2017  for COPD exacerbation, neg CTA and Unremarkable  Echo in Dec. 2017, pt awake, A+O x 3, + Chronic dry cough , no CP, No sore throat, no rash, no dysuria, no diarrhea, no abdominal pain, NO HA, no Dizziness,  Pt S/P IV Solumedrol 40 mg X 1, Duoneb x 3, CTPA was ordered by ER to R/O PE, pt uses walker or Cane to ambulate, 86 y/o female HX of  COPD, EX Smoker, HTN, Neuropathy, DAVID on  CPAP at night, + Dysphagia, Obesity, on O2 NC 3 Lit,  h/o left breast ca, S/P surgery and RTX  in the past, S/P IVC Filter, No recent travel, no sick contact, FLU Shot up to date as per  pt,  p/w 1 day worsening SOB, used nebs, Symbicort,  without improvement, no fevers, no chills, no chest pain,  no leg swelling now, pt was started PO Lasix 20 mg recently by her cardiologist for legs edema,   Pt last admitted in Dec. 2017  for COPD exacerbation, neg CTA and Unremarkable  Echo in Dec. 2017, pt awake, A+O x 3, + Chronic dry cough , no CP, No sore throat, no rash, no dysuria, no diarrhea, no abdominal pain, NO HA, no Dizziness,  Pt S/P IV Solumedrol 40 mg X 1, Duoneb x 3, CTPA was ordered by ER to R/O PE, pt uses walker or Cane to ambulate,

## 2018-02-15 NOTE — ED PROVIDER NOTE - ATTENDING CONTRIBUTION TO CARE
88yo female h/o COPD on CPAP presents with SOB for 1 day.  States used oxygen and home meds including spiriva/combivent without improvement.  Denies increase in leg swelling (is on lasix for peripheral edema) or chest pain.  Denies fever, cough, sick contacts.    PE: mildly uncomfortable but not in respiratory distress; BS clear no wheezing slightly decreased throughout; s1 s2 no m/r/g abd soft/NT/ND ext: no edema

## 2018-02-15 NOTE — H&P ADULT - PROBLEM SELECTOR PLAN 1
Pulmonary consult in AM, COPD Exacerbation, on Duoneb, Symbicort, PO Prednisone 40 mg QD, PO Protonix,   F/U CBC, CMP,   Fall/aspiration precaution, DAVID on CPAP   PT consult,

## 2018-02-15 NOTE — ED PROVIDER NOTE - PROGRESS NOTE DETAILS
Melissa: improving, still does not feel at baseline, will d/w pt pulmonologist and consider cdu vs admission pulmonologist paged again Melissa: pt walked to bathroom became more symptomatic, hypoxic, dyspneic and tachy, will admit for copd exacerbation Melissa: pt reassessed looks more tachypneic, lungs clear, ordered CTA and signed out to MAR who will follow up

## 2018-02-15 NOTE — ED PROVIDER NOTE - MEDICAL DECISION MAKING DETAILS
88yo female with shortness of breath today, feels like COPD, no chest pain, appears comfortable, not tachypneic or dyspneic, will give nebs, steroids, reassess

## 2018-02-15 NOTE — ED ADULT NURSE NOTE - OBJECTIVE STATEMENT
Patient received into room 22 BIBEMS AA&Ox3 c/o worsening SOB s/p using nebulizer and symbicort. Patient denies chest pain, fever, chills, at this time. VSS on 2L NC. 22g PIV in place to right wrist placed by EMS, labs drawn, medications administered, NAD noted - will continue to monitor.

## 2018-02-16 ENCOUNTER — TRANSCRIPTION ENCOUNTER (OUTPATIENT)
Age: 83
End: 2018-02-16

## 2018-02-16 DIAGNOSIS — J44.9 CHRONIC OBSTRUCTIVE PULMONARY DISEASE, UNSPECIFIED: ICD-10-CM

## 2018-02-16 DIAGNOSIS — Z29.9 ENCOUNTER FOR PROPHYLACTIC MEASURES, UNSPECIFIED: ICD-10-CM

## 2018-02-16 DIAGNOSIS — F41.9 ANXIETY DISORDER, UNSPECIFIED: ICD-10-CM

## 2018-02-16 DIAGNOSIS — R13.10 DYSPHAGIA, UNSPECIFIED: ICD-10-CM

## 2018-02-16 DIAGNOSIS — G62.9 POLYNEUROPATHY, UNSPECIFIED: ICD-10-CM

## 2018-02-16 DIAGNOSIS — I10 ESSENTIAL (PRIMARY) HYPERTENSION: ICD-10-CM

## 2018-02-16 DIAGNOSIS — R06.02 SHORTNESS OF BREATH: ICD-10-CM

## 2018-02-16 LAB
ALBUMIN SERPL ELPH-MCNC: 3.7 G/DL — SIGNIFICANT CHANGE UP (ref 3.3–5)
ALP SERPL-CCNC: 97 U/L — SIGNIFICANT CHANGE UP (ref 40–120)
ALT FLD-CCNC: 14 U/L — SIGNIFICANT CHANGE UP (ref 4–33)
APPEARANCE UR: CLEAR — SIGNIFICANT CHANGE UP
APTT BLD: 34.8 SEC — SIGNIFICANT CHANGE UP (ref 27.5–37.4)
AST SERPL-CCNC: 16 U/L — SIGNIFICANT CHANGE UP (ref 4–32)
B PERT DNA SPEC QL NAA+PROBE: SIGNIFICANT CHANGE UP
BASOPHILS # BLD AUTO: 0.02 K/UL — SIGNIFICANT CHANGE UP (ref 0–0.2)
BASOPHILS NFR BLD AUTO: 0.2 % — SIGNIFICANT CHANGE UP (ref 0–2)
BILIRUB SERPL-MCNC: 0.3 MG/DL — SIGNIFICANT CHANGE UP (ref 0.2–1.2)
BILIRUB UR-MCNC: NEGATIVE — SIGNIFICANT CHANGE UP
BLOOD UR QL VISUAL: NEGATIVE — SIGNIFICANT CHANGE UP
BUN SERPL-MCNC: 26 MG/DL — HIGH (ref 7–23)
C PNEUM DNA SPEC QL NAA+PROBE: NOT DETECTED — SIGNIFICANT CHANGE UP
CALCIUM SERPL-MCNC: 8.6 MG/DL — SIGNIFICANT CHANGE UP (ref 8.4–10.5)
CHLORIDE SERPL-SCNC: 100 MMOL/L — SIGNIFICANT CHANGE UP (ref 98–107)
CK MB BLD-MCNC: SIGNIFICANT CHANGE UP (ref 0–2.5)
CK MB BLD-MCNC: SIGNIFICANT CHANGE UP NG/ML (ref 1–4.7)
CK SERPL-CCNC: 138 U/L — SIGNIFICANT CHANGE UP (ref 25–170)
CK SERPL-CCNC: SIGNIFICANT CHANGE UP U/L (ref 25–170)
CO2 SERPL-SCNC: 27 MMOL/L — SIGNIFICANT CHANGE UP (ref 22–31)
COLOR SPEC: SIGNIFICANT CHANGE UP
CREAT SERPL-MCNC: 1.04 MG/DL — SIGNIFICANT CHANGE UP (ref 0.5–1.3)
EOSINOPHIL # BLD AUTO: 0 K/UL — SIGNIFICANT CHANGE UP (ref 0–0.5)
EOSINOPHIL NFR BLD AUTO: 0 % — SIGNIFICANT CHANGE UP (ref 0–6)
FERRITIN SERPL-MCNC: 26.46 NG/ML — SIGNIFICANT CHANGE UP (ref 15–150)
FLUAV H1 2009 PAND RNA SPEC QL NAA+PROBE: NOT DETECTED — SIGNIFICANT CHANGE UP
FLUAV H1 RNA SPEC QL NAA+PROBE: NOT DETECTED — SIGNIFICANT CHANGE UP
FLUAV H3 RNA SPEC QL NAA+PROBE: NOT DETECTED — SIGNIFICANT CHANGE UP
FLUAV SUBTYP SPEC NAA+PROBE: SIGNIFICANT CHANGE UP
FLUBV RNA SPEC QL NAA+PROBE: NOT DETECTED — SIGNIFICANT CHANGE UP
FOLATE SERPL-MCNC: 18.4 NG/ML — SIGNIFICANT CHANGE UP (ref 4.7–20)
GLUCOSE SERPL-MCNC: 160 MG/DL — HIGH (ref 70–99)
GLUCOSE UR-MCNC: NEGATIVE — SIGNIFICANT CHANGE UP
HADV DNA SPEC QL NAA+PROBE: NOT DETECTED — SIGNIFICANT CHANGE UP
HAV IGM SER-ACNC: NONREACTIVE — SIGNIFICANT CHANGE UP
HBV CORE IGM SER-ACNC: NONREACTIVE — SIGNIFICANT CHANGE UP
HBV SURFACE AG SER-ACNC: NONREACTIVE — SIGNIFICANT CHANGE UP
HCOV 229E RNA SPEC QL NAA+PROBE: NOT DETECTED — SIGNIFICANT CHANGE UP
HCOV HKU1 RNA SPEC QL NAA+PROBE: NOT DETECTED — SIGNIFICANT CHANGE UP
HCOV NL63 RNA SPEC QL NAA+PROBE: NOT DETECTED — SIGNIFICANT CHANGE UP
HCOV OC43 RNA SPEC QL NAA+PROBE: NOT DETECTED — SIGNIFICANT CHANGE UP
HCT VFR BLD CALC: 38.2 % — SIGNIFICANT CHANGE UP (ref 34.5–45)
HCV AB S/CO SERPL IA: 0.19 S/CO — SIGNIFICANT CHANGE UP
HCV AB SERPL-IMP: SIGNIFICANT CHANGE UP
HGB BLD-MCNC: 11.7 G/DL — SIGNIFICANT CHANGE UP (ref 11.5–15.5)
HMPV RNA SPEC QL NAA+PROBE: NOT DETECTED — SIGNIFICANT CHANGE UP
HPIV1 RNA SPEC QL NAA+PROBE: NOT DETECTED — SIGNIFICANT CHANGE UP
HPIV2 RNA SPEC QL NAA+PROBE: NOT DETECTED — SIGNIFICANT CHANGE UP
HPIV3 RNA SPEC QL NAA+PROBE: NOT DETECTED — SIGNIFICANT CHANGE UP
HPIV4 RNA SPEC QL NAA+PROBE: NOT DETECTED — SIGNIFICANT CHANGE UP
IMM GRANULOCYTES # BLD AUTO: 0.04 # — SIGNIFICANT CHANGE UP
IMM GRANULOCYTES NFR BLD AUTO: 0.4 % — SIGNIFICANT CHANGE UP (ref 0–1.5)
INR BLD: 1.07 — SIGNIFICANT CHANGE UP (ref 0.88–1.17)
IRON SATN MFR SERPL: 26 UG/DL — LOW (ref 30–160)
IRON SATN MFR SERPL: 344 UG/DL — SIGNIFICANT CHANGE UP (ref 140–530)
KETONES UR-MCNC: NEGATIVE — SIGNIFICANT CHANGE UP
LEUKOCYTE ESTERASE UR-ACNC: SIGNIFICANT CHANGE UP
LYMPHOCYTES # BLD AUTO: 0.93 K/UL — LOW (ref 1–3.3)
LYMPHOCYTES # BLD AUTO: 10.3 % — LOW (ref 13–44)
M PNEUMO DNA SPEC QL NAA+PROBE: NOT DETECTED — SIGNIFICANT CHANGE UP
MAGNESIUM SERPL-MCNC: 2.1 MG/DL — SIGNIFICANT CHANGE UP (ref 1.6–2.6)
MCHC RBC-ENTMCNC: 24.3 PG — LOW (ref 27–34)
MCHC RBC-ENTMCNC: 30.6 % — LOW (ref 32–36)
MCV RBC AUTO: 79.3 FL — LOW (ref 80–100)
MONOCYTES # BLD AUTO: 0.4 K/UL — SIGNIFICANT CHANGE UP (ref 0–0.9)
MONOCYTES NFR BLD AUTO: 4.4 % — SIGNIFICANT CHANGE UP (ref 2–14)
MUCOUS THREADS # UR AUTO: SIGNIFICANT CHANGE UP
NEUTROPHILS # BLD AUTO: 7.66 K/UL — HIGH (ref 1.8–7.4)
NEUTROPHILS NFR BLD AUTO: 84.7 % — HIGH (ref 43–77)
NITRITE UR-MCNC: NEGATIVE — SIGNIFICANT CHANGE UP
NON-SQ EPI CELLS # UR AUTO: <1 — SIGNIFICANT CHANGE UP
NRBC # FLD: 0 — SIGNIFICANT CHANGE UP
NT-PROBNP SERPL-SCNC: 375.6 PG/ML — SIGNIFICANT CHANGE UP
PH UR: 6.5 — SIGNIFICANT CHANGE UP (ref 4.6–8)
PHOSPHATE SERPL-MCNC: 3.1 MG/DL — SIGNIFICANT CHANGE UP (ref 2.5–4.5)
PLATELET # BLD AUTO: 375 K/UL — SIGNIFICANT CHANGE UP (ref 150–400)
PMV BLD: 9.3 FL — SIGNIFICANT CHANGE UP (ref 7–13)
POTASSIUM SERPL-MCNC: 4.3 MMOL/L — SIGNIFICANT CHANGE UP (ref 3.5–5.3)
POTASSIUM SERPL-SCNC: 4.3 MMOL/L — SIGNIFICANT CHANGE UP (ref 3.5–5.3)
PROT SERPL-MCNC: 6.3 G/DL — SIGNIFICANT CHANGE UP (ref 6–8.3)
PROT UR-MCNC: NEGATIVE MG/DL — SIGNIFICANT CHANGE UP
PROTHROM AB SERPL-ACNC: 11.9 SEC — SIGNIFICANT CHANGE UP (ref 9.8–13.1)
RBC # BLD: 4.82 M/UL — SIGNIFICANT CHANGE UP (ref 3.8–5.2)
RBC # FLD: 16.5 % — HIGH (ref 10.3–14.5)
RSV RNA SPEC QL NAA+PROBE: NOT DETECTED — SIGNIFICANT CHANGE UP
RV+EV RNA SPEC QL NAA+PROBE: NOT DETECTED — SIGNIFICANT CHANGE UP
SODIUM SERPL-SCNC: 140 MMOL/L — SIGNIFICANT CHANGE UP (ref 135–145)
SP GR SPEC: 1.02 — SIGNIFICANT CHANGE UP (ref 1–1.04)
SQUAMOUS # UR AUTO: SIGNIFICANT CHANGE UP
TROPONIN T SERPL-MCNC: < 0.06 NG/ML — SIGNIFICANT CHANGE UP (ref 0–0.06)
TROPONIN T SERPL-MCNC: SIGNIFICANT CHANGE UP NG/ML (ref 0–0.06)
TSH SERPL-MCNC: 0.19 UIU/ML — LOW (ref 0.27–4.2)
UIBC SERPL-MCNC: 318 UG/DL — SIGNIFICANT CHANGE UP (ref 110–370)
UROBILINOGEN FLD QL: NORMAL MG/DL — SIGNIFICANT CHANGE UP
VIT B12 SERPL-MCNC: 848 PG/ML — SIGNIFICANT CHANGE UP (ref 200–900)
WBC # BLD: 9.05 K/UL — SIGNIFICANT CHANGE UP (ref 3.8–10.5)
WBC # FLD AUTO: 9.05 K/UL — SIGNIFICANT CHANGE UP (ref 3.8–10.5)
WBC UR QL: SIGNIFICANT CHANGE UP (ref 0–?)

## 2018-02-16 PROCEDURE — 99233 SBSQ HOSP IP/OBS HIGH 50: CPT

## 2018-02-16 PROCEDURE — 71275 CT ANGIOGRAPHY CHEST: CPT | Mod: 26

## 2018-02-16 RX ORDER — AMLODIPINE BESYLATE 2.5 MG/1
5 TABLET ORAL DAILY
Qty: 0 | Refills: 0 | Status: DISCONTINUED | OUTPATIENT
Start: 2018-02-16 | End: 2018-02-18

## 2018-02-16 RX ORDER — FUROSEMIDE 40 MG
20 TABLET ORAL DAILY
Qty: 0 | Refills: 0 | Status: DISCONTINUED | OUTPATIENT
Start: 2018-02-16 | End: 2018-02-18

## 2018-02-16 RX ORDER — CLONAZEPAM 1 MG
1 TABLET ORAL
Qty: 0 | Refills: 0 | COMMUNITY

## 2018-02-16 RX ORDER — CHOLECALCIFEROL (VITAMIN D3) 125 MCG
1000 CAPSULE ORAL DAILY
Qty: 0 | Refills: 0 | Status: DISCONTINUED | OUTPATIENT
Start: 2018-02-16 | End: 2018-02-18

## 2018-02-16 RX ORDER — TIOTROPIUM BROMIDE 18 UG/1
1 CAPSULE ORAL; RESPIRATORY (INHALATION) DAILY
Qty: 0 | Refills: 0 | Status: DISCONTINUED | OUTPATIENT
Start: 2018-02-16 | End: 2018-02-18

## 2018-02-16 RX ORDER — GABAPENTIN 400 MG/1
300 CAPSULE ORAL THREE TIMES A DAY
Qty: 0 | Refills: 0 | Status: DISCONTINUED | OUTPATIENT
Start: 2018-02-16 | End: 2018-02-18

## 2018-02-16 RX ORDER — ASPIRIN/CALCIUM CARB/MAGNESIUM 324 MG
81 TABLET ORAL DAILY
Qty: 0 | Refills: 0 | Status: DISCONTINUED | OUTPATIENT
Start: 2018-02-16 | End: 2018-02-18

## 2018-02-16 RX ORDER — GABAPENTIN 400 MG/1
1 CAPSULE ORAL
Qty: 0 | Refills: 0 | COMMUNITY

## 2018-02-16 RX ORDER — CLONAZEPAM 1 MG
0.5 TABLET ORAL AT BEDTIME
Qty: 0 | Refills: 0 | Status: DISCONTINUED | OUTPATIENT
Start: 2018-02-16 | End: 2018-02-18

## 2018-02-16 RX ADMIN — GABAPENTIN 300 MILLIGRAM(S): 400 CAPSULE ORAL at 16:02

## 2018-02-16 RX ADMIN — HEPARIN SODIUM 5000 UNIT(S): 5000 INJECTION INTRAVENOUS; SUBCUTANEOUS at 21:47

## 2018-02-16 RX ADMIN — Medication 3 MILLILITER(S): at 15:50

## 2018-02-16 RX ADMIN — Medication 1 TABLET(S): at 12:27

## 2018-02-16 RX ADMIN — Medication 3 MILLILITER(S): at 05:17

## 2018-02-16 RX ADMIN — Medication 1000 UNIT(S): at 12:27

## 2018-02-16 RX ADMIN — GABAPENTIN 300 MILLIGRAM(S): 400 CAPSULE ORAL at 06:32

## 2018-02-16 RX ADMIN — Medication 3 MILLILITER(S): at 23:27

## 2018-02-16 RX ADMIN — PANTOPRAZOLE SODIUM 40 MILLIGRAM(S): 20 TABLET, DELAYED RELEASE ORAL at 00:48

## 2018-02-16 RX ADMIN — TIOTROPIUM BROMIDE 1 CAPSULE(S): 18 CAPSULE ORAL; RESPIRATORY (INHALATION) at 12:40

## 2018-02-16 RX ADMIN — AMLODIPINE BESYLATE 5 MILLIGRAM(S): 2.5 TABLET ORAL at 06:32

## 2018-02-16 RX ADMIN — PANTOPRAZOLE SODIUM 40 MILLIGRAM(S): 20 TABLET, DELAYED RELEASE ORAL at 06:32

## 2018-02-16 RX ADMIN — Medication 3 MILLILITER(S): at 09:51

## 2018-02-16 RX ADMIN — HEPARIN SODIUM 5000 UNIT(S): 5000 INJECTION INTRAVENOUS; SUBCUTANEOUS at 16:02

## 2018-02-16 RX ADMIN — HEPARIN SODIUM 5000 UNIT(S): 5000 INJECTION INTRAVENOUS; SUBCUTANEOUS at 06:32

## 2018-02-16 RX ADMIN — Medication 3 TABLET(S): at 00:58

## 2018-02-16 RX ADMIN — GABAPENTIN 300 MILLIGRAM(S): 400 CAPSULE ORAL at 21:45

## 2018-02-16 RX ADMIN — Medication 81 MILLIGRAM(S): at 12:27

## 2018-02-16 RX ADMIN — Medication 20 MILLIGRAM(S): at 06:32

## 2018-02-16 RX ADMIN — Medication 40 MILLIGRAM(S): at 06:32

## 2018-02-16 RX ADMIN — BUDESONIDE AND FORMOTEROL FUMARATE DIHYDRATE 2 PUFF(S): 160; 4.5 AEROSOL RESPIRATORY (INHALATION) at 21:44

## 2018-02-16 NOTE — DISCHARGE NOTE ADULT - PATIENT PORTAL LINK FT
You can access the [facility] Patient Portal, offered by [facility], by registering with the following website: [email or web address]

## 2018-02-16 NOTE — PROGRESS NOTE ADULT - PROBLEM SELECTOR PLAN 6
DVT Prophylaxis: on SQ Heparin, PT, PTT, INR    UA, Fasting Lipid, Vit B12, Folate, Iron studies, Ferritin DVT Prophylaxis: on SQ Heparin, PT, PTT, INR  n

## 2018-02-16 NOTE — DISCHARGE NOTE ADULT - PLAN OF CARE
improved Continue oxygen as needed, deep breathing exercises and cpap at night. Continue blood pressure medication regimen as directed. Monitor for any visual changes, headaches or dizziness.  Monitor blood pressure regularly.  Follow up with your PCP for further mangement for priya blood pressure. Continue oxygen therapy as needed. Follow up with your pulmonologist within 1 weeks of discharge.    Continue steroids until completed as directed. Continue CPAP at night as you were previously Continue blood pressure medication regimen as directed. Monitor for any visual changes, headaches or dizziness.  Monitor blood pressure regularly.  Follow up with your PCP for further management for high blood pressure. Continue dysphagia 3 with nectar thick liquids Continue oxygen as needed, deep breathing exercises and c-pap at night. symptoms control Continue oxygen therapy as needed. Follow up with your pulmonologist within 1 weeks of discharge.    Complete 2 more days of Prednisone. call to make a follow up lon with PCP/pulm in 1 week BP control aspiration

## 2018-02-16 NOTE — ED ADULT NURSE REASSESSMENT NOTE - NS ED NURSE REASSESS COMMENT FT1
pt returned from CTA- left unit at 330- pt awake, a/ox3, vitally stable in NAD, pt sent up on nasal cannula, half full, speaking in full sentences with no difficulty

## 2018-02-16 NOTE — DISCHARGE NOTE ADULT - HOSPITAL COURSE
88 y/o female HX of  COPD, EX Smoker, HTN, Neuropathy, DAVID on  CPAP at night, + Dysphagia, Obesity, on O2 NC 3 Lit,  h/o left breast ca, S/P surgery and RTX  in the past, S/P IVC Filter, No recent travel, no sick contact, FLU Shot up to date as per  pt,  p/w 1 day worsening SOB, used nebs, Symbicort,  without improvement, no fevers, no chills, no chest pain,  no leg swelling now, pt was started PO Lasix 20 mg recently by her cardiologist for legs edema,   Pt last admitted in Dec. 2017  for COPD exacerbation, neg CTA and Unremarkable  Echo in Dec. 2017, pt awake, A+O x 3, + Chronic dry cough , no CP, No sore throat, no rash, no dysuria, no diarrhea, no abdominal pain, NO HA, no Dizziness,  Pt S/P IV Solumedrol 40 mg X 1, Duoneb x 3, CTPA  negative for PE. RVP  negative. Evaluated by pulm, treated for COPD exacerbation, plan to complete total of 5 days of Steroid.     PT consulted rec home with home PT recommended- CM arranged home care , Pt is optimized for discharge with outpt follow up with PCP/pulm in 1 week

## 2018-02-16 NOTE — PROGRESS NOTE ADULT - SUBJECTIVE AND OBJECTIVE BOX
PULMONARY PROGRESS NOTE    VIDAL LOUIE  MRN-[id number]    Patient is a 87y old  Female who presents with a chief complaint of SOB, (15 Feb 2018 22:50)      HPI:  -Feeling a little better today, still somewhat winded.  Thinks she made herself anxious at home prompting call for ambulance.  no cough/sputum.    ROS:   -no N/V/D    ACTIVE MEDICATION LIST:  MEDICATIONS  (STANDING):  ALBUTerol/ipratropium for Nebulization 3 milliLiter(s) Nebulizer every 6 hours  amLODIPine   Tablet 5 milliGRAM(s) Oral daily  aspirin enteric coated 81 milliGRAM(s) Oral daily  buDESOnide 160 MICROgram(s)/formoterol 4.5 MICROgram(s) Inhaler 2 Puff(s) Inhalation two times a day  calcium carbonate 1250 mG + Vitamin D (OsCal 500 + D) 1 Tablet(s) Oral daily  cholecalciferol 1000 Unit(s) Oral daily  furosemide    Tablet 20 milliGRAM(s) Oral daily  gabapentin 300 milliGRAM(s) Oral three times a day  heparin  Injectable 5000 Unit(s) SubCutaneous every 8 hours  pantoprazole    Tablet 40 milliGRAM(s) Oral before breakfast  predniSONE   Tablet 40 milliGRAM(s) Oral daily  tiotropium 18 MICROgram(s) Capsule 1 Capsule(s) Inhalation daily    MEDICATIONS  (PRN):  clonazePAM Tablet 0.5 milliGRAM(s) Oral at bedtime PRN Anxiety      EXAM:  Vital Signs Last 24 Hrs  T(C): 36.7 (16 Feb 2018 10:37), Max: 37.2 (15 Feb 2018 16:26)  T(F): 98.1 (16 Feb 2018 10:37), Max: 98.9 (15 Feb 2018 16:26)  HR: 96 (16 Feb 2018 11:12) (83 - 111)  BP: 141/60 (16 Feb 2018 10:37) (102/58 - 151/53)  BP(mean): --  RR: 17 (16 Feb 2018 10:37) (17 - 20)  SpO2: 97% (16 Feb 2018 11:12) (90% - 97%)    GENERAL: The patient is awake and alert in no apparent distress.     SKIN: Warm, dry    LUNGS: faint exp wheezes    HEART: S1/S2    ABDOMEN: +BS, Soft, Nontender    EXTREMITIES: No clubbing, cyanosis    LABS/IMGAING: reviewed                        11.7   9.05  )-----------( 375      ( 16 Feb 2018 08:00 )             38.2     02-16    140  |  100  |  26<H>  ----------------------------<  160<H>  4.3   |  27  |  1.04    Ca    8.6      16 Feb 2018 08:00  Phos  3.1     02-16  Mg     2.1     02-16    TPro  6.3  /  Alb  3.7  /  TBili  0.3  /  DBili  x   /  AST  16  /  ALT  14  /  AlkPhos  97  02-16    < from: CT Angio Chest w/ IV Cont (02.16.18 @ 02:54) >    IMPRESSION:     1.  No pulmonary embolism as above.  2.  Emphysema. Bilateral subsegmental atelectasis in the lower lobes.    < end of copied text >      PROBLEM LIST:  87y Female with HEALTH ISSUES - PROBLEM Dx:  COPD exacerbation  Essential Hypertension  Anxiety   Dysphagia  Neuropathy    RECS:  check RVP   agree with prednisone 40mg po daily  symbicort, spiriva, duonebs  supplemental O2  OOB, incentive korey, PT      Adrianne Garcia MD  [phone]

## 2018-02-16 NOTE — PROGRESS NOTE ADULT - PROBLEM SELECTOR PLAN 2
R/O PE, CTPA    Continue Lasix for now, BNP, CPK, JOSE, likely secondary to COPD exacerbation  Pulmonary eval noted,   c/w  prednisone 40mg po daily  symbicort, spiriva, duonebs  supplemental O2  OOB, incentive korey,   PT      Continue Lasix for now, BNP, CPK, JOSE,

## 2018-02-16 NOTE — PROGRESS NOTE ADULT - ATTENDING COMMENTS
Pt was seen & examined by me, Dr. ANDREW Oglesby on 2/15/18. Pt jude noted, recommend home with home PT

## 2018-02-16 NOTE — DISCHARGE NOTE ADULT - HOME CARE AGENCY
[facility] .Nurse to visit the day after discharge. Nurse will call prior to first visit.

## 2018-02-16 NOTE — DISCHARGE NOTE ADULT - PROVIDER TOKENS
FREE:[LAST:[Pulmonologist],PHONE:[(   )    -],FAX:[(   )    -]],FREE:[LAST:[PCP],PHONE:[(   )    -],FAX:[(   )    -]]

## 2018-02-16 NOTE — DISCHARGE NOTE ADULT - CARE PLAN
Principal Discharge DX:	Shortness of breath  Goal:	improved  Assessment and plan of treatment:	Continue oxygen as needed, deep breathing exercises and cpap at night.  Secondary Diagnosis:	COPD (Chronic Obstructive Pulmonary Disease)  Secondary Diagnosis:	Anxiety disorder  Secondary Diagnosis:	Essential Hypertension  Assessment and plan of treatment:	Continue blood pressure medication regimen as directed. Monitor for any visual changes, headaches or dizziness.  Monitor blood pressure regularly.  Follow up with your PCP for further mangement for priya blood pressure.  Secondary Diagnosis:	Dysphagia Principal Discharge DX:	Shortness of breath  Goal:	improved  Assessment and plan of treatment:	Continue oxygen as needed, deep breathing exercises and cpap at night.  Secondary Diagnosis:	COPD (Chronic Obstructive Pulmonary Disease)  Assessment and plan of treatment:	Continue oxygen therapy as needed. Follow up with your pulmonologist within 1 weeks of discharge.    Continue steroids until completed as directed.  Secondary Diagnosis:	DAVID on CPAP  Assessment and plan of treatment:	Continue CPAP at night as you were previously  Secondary Diagnosis:	Essential Hypertension  Assessment and plan of treatment:	Continue blood pressure medication regimen as directed. Monitor for any visual changes, headaches or dizziness.  Monitor blood pressure regularly.  Follow up with your PCP for further management for high blood pressure.  Secondary Diagnosis:	Dysphagia  Assessment and plan of treatment:	Continue dysphagia 3 with nectar thick liquids Principal Discharge DX:	Shortness of breath  Goal:	improved  Assessment and plan of treatment:	Continue oxygen as needed, deep breathing exercises and c-pap at night.  Secondary Diagnosis:	COPD (Chronic Obstructive Pulmonary Disease)  Goal:	symptoms control  Assessment and plan of treatment:	Continue oxygen therapy as needed. Follow up with your pulmonologist within 1 weeks of discharge.    Complete 2 more days of Prednisone. call to make a follow up lon with PCP/pulm in 1 week  Secondary Diagnosis:	DAVID on CPAP  Goal:	symptoms control  Assessment and plan of treatment:	Continue CPAP at night as you were previously  Secondary Diagnosis:	Essential Hypertension  Goal:	BP control  Assessment and plan of treatment:	Continue blood pressure medication regimen as directed. Monitor for any visual changes, headaches or dizziness.  Monitor blood pressure regularly.  Follow up with your PCP for further management for high blood pressure.  Secondary Diagnosis:	Dysphagia  Goal:	aspiration  Assessment and plan of treatment:	Continue dysphagia 3 with nectar thick liquids

## 2018-02-16 NOTE — PROGRESS NOTE ADULT - SUBJECTIVE AND OBJECTIVE BOX
Patient is a 87y old  Female who presents with a chief complaint of SOB, (2018 14:36)      SUBJECTIVE / OVERNIGHT EVENTS:    MEDICATIONS  (STANDING):  ALBUTerol/ipratropium for Nebulization 3 milliLiter(s) Nebulizer every 6 hours  amLODIPine   Tablet 5 milliGRAM(s) Oral daily  aspirin enteric coated 81 milliGRAM(s) Oral daily  buDESOnide 160 MICROgram(s)/formoterol 4.5 MICROgram(s) Inhaler 2 Puff(s) Inhalation two times a day  calcium carbonate 1250 mG + Vitamin D (OsCal 500 + D) 1 Tablet(s) Oral daily  cholecalciferol 1000 Unit(s) Oral daily  furosemide    Tablet 20 milliGRAM(s) Oral daily  gabapentin 300 milliGRAM(s) Oral three times a day  heparin  Injectable 5000 Unit(s) SubCutaneous every 8 hours  pantoprazole    Tablet 40 milliGRAM(s) Oral before breakfast  predniSONE   Tablet 40 milliGRAM(s) Oral daily  tiotropium 18 MICROgram(s) Capsule 1 Capsule(s) Inhalation daily    MEDICATIONS  (PRN):  clonazePAM Tablet 0.5 milliGRAM(s) Oral at bedtime PRN Anxiety      Vital Signs Last 24 Hrs  T(C): 36.7 (2018 14:25), Max: 36.9 (15 Feb 2018 17:53)  T(F): 98 (2018 14:25), Max: 98.4 (15 Feb 2018 17:53)  HR: 94 (2018 15:51) (85 - 111)  BP: 135/70 (2018 14:25) (102/58 - 151/53)  BP(mean): --  RR: 18 (2018 14:25) (17 - 20)  SpO2: 96% (2018 15:51) (90% - 97%)  CAPILLARY BLOOD GLUCOSE        I&O's Summary      PHYSICAL EXAM:  GENERAL: NAD, well-developed  HEAD:  Atraumatic, Normocephalic  EYES: EOMI, PERRLA, conjunctiva and sclera clear  NECK: Supple, No JVD  CHEST/LUNG: Clear to auscultation bilaterally; No wheeze  HEART: Regular rate and rhythm; No murmurs, rubs, or gallops  ABDOMEN: Soft, Nontender, Nondistended; Bowel sounds present  EXTREMITIES:  2+ Peripheral Pulses, No clubbing, cyanosis, or edema  PSYCH: AAOx3  NEUROLOGY: non-focal  SKIN: No rashes or lesions    LABS:                        11.7   9.05  )-----------( 375      ( 2018 08:00 )             38.2     02-16    140  |  100  |  26<H>  ----------------------------<  160<H>  4.3   |  27  |  1.04    Ca    8.6      2018 08:00  Phos  3.1     02-16  Mg     2.1     02-16    TPro  6.3  /  Alb  3.7  /  TBili  0.3  /  DBili  x   /  AST  16  /  ALT  14  /  AlkPhos  97  02-16    PT/INR - ( 2018 08:00 )   PT: 11.9 SEC;   INR: 1.07          PTT - ( 2018 08:00 )  PTT:34.8 SEC  CARDIAC MARKERS ( 2018 08:00 )  x     / < 0.06 ng/mL / 138 u/L / x     / x      CARDIAC MARKERS ( 15 Feb 2018 17:30 )  x     / Test not performed ng/mL / Test not performed u/L / Test not performed ng/mL / x          Urinalysis Basic - ( 2018 13:41 )    Color: PLYEL / Appearance: CLEAR / S.022 / pH: 6.5  Gluc: NEGATIVE / Ketone: NEGATIVE  / Bili: NEGATIVE / Urobili: NORMAL mg/dL   Blood: NEGATIVE / Protein: NEGATIVE mg/dL / Nitrite: NEGATIVE   Leuk Esterase: TRACE / RBC: x / WBC 2-5   Sq Epi: OCC / Non Sq Epi: x / Bacteria: x        RADIOLOGY & ADDITIONAL TESTS:    Imaging Personally Reviewed:    Consultant(s) Notes Reviewed:      Care Discussed with Consultants/Other Providers: Patient is a 87y old  Female who presents with a chief complaint of SOB, (2018 14:36)      SUBJECTIVE / OVERNIGHT EVENTS: patient seen and examined by bedside at 11:05 Am, pt feeling slightly better but still not at baseline , still has Mild SOB      MEDICATIONS  (STANDING):  ALBUTerol/ipratropium for Nebulization 3 milliLiter(s) Nebulizer every 6 hours  amLODIPine   Tablet 5 milliGRAM(s) Oral daily  aspirin enteric coated 81 milliGRAM(s) Oral daily  buDESOnide 160 MICROgram(s)/formoterol 4.5 MICROgram(s) Inhaler 2 Puff(s) Inhalation two times a day  calcium carbonate 1250 mG + Vitamin D (OsCal 500 + D) 1 Tablet(s) Oral daily  cholecalciferol 1000 Unit(s) Oral daily  furosemide    Tablet 20 milliGRAM(s) Oral daily  gabapentin 300 milliGRAM(s) Oral three times a day  heparin  Injectable 5000 Unit(s) SubCutaneous every 8 hours  pantoprazole    Tablet 40 milliGRAM(s) Oral before breakfast  predniSONE   Tablet 40 milliGRAM(s) Oral daily  tiotropium 18 MICROgram(s) Capsule 1 Capsule(s) Inhalation daily    MEDICATIONS  (PRN):  clonazePAM Tablet 0.5 milliGRAM(s) Oral at bedtime PRN Anxiety      Vital Signs Last 24 Hrs  T(C): 36.7 (2018 14:25), Max: 36.9 (15 Feb 2018 17:53)  T(F): 98 (2018 14:25), Max: 98.4 (15 Feb 2018 17:53)  HR: 94 (2018 15:51) (85 - 111)  BP: 135/70 (2018 14:25) (102/58 - 151/53)  BP(mean): --  RR: 18 (2018 14:25) (17 - 20)  SpO2: 96% (2018 15:51) (90% - 97%)  CAPILLARY BLOOD GLUCOSE        I&O's Summary      PHYSICAL EXAM:  GENERAL: NAD, obese ,   HEAD:  Atraumatic, Normocephalic  EYES: EOMI, PERRLA, conjunctiva and sclera clear  NECK: Supple,  CHEST/LUNG: Clear to auscultation bilaterally; No wheeze, using accessory muscles of respiration   HEART: Regular rate and rhythm; No murmurs, rubs, or gallops  ABDOMEN: Soft, Nontender, Nondistended; Bowel sounds present  EXTREMITIES:  2+ Peripheral Pulses, No clubbing, cyanosis, or edema  PSYCH: AAOx3  NEUROLOGY: non-focal  SKIN: No rashes or lesions    LABS:                        11.7   9.05  )-----------( 375      ( 2018 08:00 )             38.2     02-16    140  |  100  |  26<H>  ----------------------------<  160<H>  4.3   |  27  |  1.04    Ca    8.6      2018 08:00  Phos  3.1     02-  Mg     2.1     -16    TPro  6.3  /  Alb  3.7  /  TBili  0.3  /  DBili  x   /  AST  16  /  ALT  14  /  AlkPhos  97  02-16    PT/INR - ( 2018 08:00 )   PT: 11.9 SEC;   INR: 1.07          PTT - ( 2018 08:00 )  PTT:34.8 SEC  CARDIAC MARKERS ( 2018 08:00 )  x     / < 0.06 ng/mL / 138 u/L / x     / x      CARDIAC MARKERS ( 15 Feb 2018 17:30 )  x     / Test not performed ng/mL / Test not performed u/L / Test not performed ng/mL / x          Urinalysis Basic - ( 2018 13:41 )    Color: PLYEL / Appearance: CLEAR / S.022 / pH: 6.5  Gluc: NEGATIVE / Ketone: NEGATIVE  / Bili: NEGATIVE / Urobili: NORMAL mg/dL   Blood: NEGATIVE / Protein: NEGATIVE mg/dL / Nitrite: NEGATIVE   Leuk Esterase: TRACE / RBC: x / WBC 2-5   Sq Epi: OCC / Non Sq Epi: x / Bacteria: x        RADIOLOGY & ADDITIONAL TESTS:< from: CT Angio Chest w/ IV Cont (18 @ 02:54) >    Lungs And Airways: Emphysema. Bilateral subsegmental atelectasis in the   lower lobes.    Pleura: No pneumothorax. No pleural effusions.    Mediastinum: There are lymph nodesin the AP window, paratracheal space,   right hilum, and the cardio phrenic space. The largest lymph node is in   the right hilar space measuring 1.2 cm. These lymph nodes are unchanged.   The visualized portion of the thyroid gland is unremarkable.     Heart and Vasculature: No pulmonary embolism in the main pulmonary   artery, right pulmonary artery, left pulmonary artery, lobar arteries, or   segmental arteries. The subsegmental pulmonary arteries are limited in   evaluation due to motion artifact. The heart is enlarged. No pericardial   effusion.    The main pulmonary artery is normal in caliber. Left-sided aortic arch   and left-sided descending thoracic aorta. No thoracic aortic aneurysm.   Atheromatous disease of the aorta. There arefocal areas of   atherosclerosis seen along the proximal aortic arch.    Upper Abdomen: Partially visualized IVC filter.    Bones And Soft Tissues: Degenerative changes. Old left rib fracture. The   soft tissues are unremarkable.      IMPRESSION:     1.  No pulmonary embolism as above.  2.  Emphysema. Bilateral subsegmental atelectasis in the lower lobes.        < end of copied text >      Imaging Personally Reviewed:    Consultant(s) Notes Reviewed:  pulmonary     Care Discussed with Consultants/Other Providers:

## 2018-02-16 NOTE — PROGRESS NOTE ADULT - ASSESSMENT
86 y/o female HX of  COPD, EX Smoker, HTN, Neuropathy, DAVID on  CPAP at night, + Dysphagia, Obesity, on O2 NC 3 Lit,  h/o left breast ca, S/P surgery and RTX  in the past, S/P IVC Filter, No recent travel, no sick contact, FLU Shot up to date as per  pt,  p/w 1 day worsening SOB, used nebs, Symbicort,  without improvement, no fevers, no chills, no chest pain,  no leg swelling now, pt was started PO Lasix 20 mg recently by her cardiologist for legs edema,   Pt last admitted in Dec. 2017  for COPD exacerbation, neg CTA and Unremarkable  Echo in Dec. 2017, pt awake, A+O x 3, + Chronic dry cough , no CP, No sore throat, no rash, no dysuria, no diarrhea, no abdominal pain, NO HA, no Dizziness,  Pt S/P IV Solumedrol 40 mg X 1, Duoneb x 3, CTPA was ordered by ER to R/O PE, pt uses walker or Cane to ambulate, 86 y/o female HX of  COPD, EX Smoker, HTN, Neuropathy, DAVID on  CPAP at night, + Dysphagia, Obesity, on O2 NC 3 Lit,  h/o left breast ca, S/P surgery and RTX  in the past, S/P IVC Filter, No recent travel, no sick contact, FLU Shot up to date as per  pt,  p/w 1 day worsening SOB, used nebs, Symbicort,  without improvement, no fevers, no chills, no chest pain,  no leg swelling now, pt was started PO Lasix 20 mg recently by her cardiologist for legs edema,   Pt last admitted in Dec. 2017  for COPD exacerbation, neg CTA and Unremarkable  Echo in Dec. 2017, pt awake, A+O x 3, + Chronic dry cough , no CP, No sore throat, no rash, no dysuria, no diarrhea, no abdominal pain, NO HA, no Dizziness,  Pt S/P IV Solumedrol 40 mg X 1, Duoneb x 3, CTPA was ordered by ER to R/O PE, which was negative for PE   , pt uses walker or Cane to ambulate,

## 2018-02-16 NOTE — DISCHARGE NOTE ADULT - MEDICATION SUMMARY - MEDICATIONS TO TAKE
I will START or STAY ON the medications listed below when I get home from the hospital:    calcium  -- 500 milligram(s) by mouth once a day in am  -- Indication: For Preventive measure    predniSONE 20 mg oral tablet  -- 2 tab(s) by mouth once a day, last roge 2/20/18  -- Indication: For COPD (Chronic Obstructive Pulmonary Disease)    aspirin 81 mg oral tablet  -- 1 tab(s) by mouth once a day in am  -- Indication: For Preventive measure    gabapentin 300 mg oral capsule  -- 1 cap(s) by mouth 3 times a day  -- Indication: For Neuropathy    KlonoPIN 0.5 mg oral tablet  -- 1 tab(s) by mouth once a day (at bedtime), As Needed  -- Indication: For Anxiety disorder    Spiriva 18 mcg inhalation capsule  -- 1 cap(s) inhaled once a day in am  -- Indication: For COPD (Chronic Obstructive Pulmonary Disease)    Symbicort 160 mcg-4.5 mcg/inh inhalation aerosol  -- 2 puff(s) inhaled 2 times a day  -- Indication: For COPD (Chronic Obstructive Pulmonary Disease)    amLODIPine 5 mg oral tablet  -- 1 tab(s) by mouth once a day  -- Indication: For Essential Hypertension    Lasix 20 mg oral tablet  -- 1 tab(s) by mouth once a day  -- Indication: For Essential Hypertension    omeprazole 40 mg oral delayed release capsule  -- 1 cap(s) by mouth once a day in am  -- Indication: For Preventive measure    Vitamin D3 1000 intl units oral tablet  -- 1 tab(s) by mouth once a day in am  -- Indication: For Preventive measure

## 2018-02-16 NOTE — PHYSICAL THERAPY INITIAL EVALUATION ADULT - PERTINENT HX OF CURRENT PROBLEM, REHAB EVAL
86 y/o female HX of  COPD, EX Smoker, HTN, Neuropathy, DAVID on  CPAP at night, + Dysphagia, Obesity, on O2 NC 3 Lit,  h/o left breast ca, S/P surgery and RTX  in the past, S/P IVC Filter, No recent travel, no sick contact, FLU Shot up to date as per  pt,  p/w 1 day worsening SOB

## 2018-02-16 NOTE — PROGRESS NOTE ADULT - PROBLEM SELECTOR PLAN 1
Pulmonary consult in AM, COPD Exacerbation, on Duoneb, Symbicort, PO Prednisone 40 mg QD, PO Protonix,   F/U CBC, CMP,   Fall/aspiration precaution, DAVID on CPAP   PT consult, , COPD Exacerbation, on Duoneb, Symbicort, s/p IV Solumedrol 40 mg X 1, on  now PO Prednisone 40 mg QD, PO Protonix,   Pulmonary eval noted, , will check RVP   Fall/aspiration precaution, DAVID on CPAP   PT consult noted, home with home PT recommended  Pt with DAVID , c/w CPAP at night

## 2018-02-16 NOTE — PROGRESS NOTE ADULT - PROBLEM SELECTOR PLAN 7
on Norvasc, UA, TSH, CPK, JOSE, Fasting Lipid, HgbA1c on Norvasc,   UA, TSH, CPK, JOSE noted    Fasting Lipid, HgbA1c in am

## 2018-02-16 NOTE — DISCHARGE NOTE ADULT - SECONDARY DIAGNOSIS.
COPD (Chronic Obstructive Pulmonary Disease) Anxiety disorder Essential Hypertension Dysphagia DAVID on CPAP

## 2018-02-17 LAB
ALBUMIN SERPL ELPH-MCNC: 3.6 G/DL — SIGNIFICANT CHANGE UP (ref 3.3–5)
ALP SERPL-CCNC: 89 U/L — SIGNIFICANT CHANGE UP (ref 40–120)
ALT FLD-CCNC: 14 U/L — SIGNIFICANT CHANGE UP (ref 4–33)
AST SERPL-CCNC: 16 U/L — SIGNIFICANT CHANGE UP (ref 4–32)
BASOPHILS # BLD AUTO: 0.09 K/UL — SIGNIFICANT CHANGE UP (ref 0–0.2)
BASOPHILS NFR BLD AUTO: 0.9 % — SIGNIFICANT CHANGE UP (ref 0–2)
BILIRUB SERPL-MCNC: 0.4 MG/DL — SIGNIFICANT CHANGE UP (ref 0.2–1.2)
BUN SERPL-MCNC: 29 MG/DL — HIGH (ref 7–23)
CALCIUM SERPL-MCNC: 8.9 MG/DL — SIGNIFICANT CHANGE UP (ref 8.4–10.5)
CHLORIDE SERPL-SCNC: 101 MMOL/L — SIGNIFICANT CHANGE UP (ref 98–107)
CHOLEST SERPL-MCNC: 136 MG/DL — SIGNIFICANT CHANGE UP (ref 120–199)
CO2 SERPL-SCNC: 26 MMOL/L — SIGNIFICANT CHANGE UP (ref 22–31)
CREAT SERPL-MCNC: 1.22 MG/DL — SIGNIFICANT CHANGE UP (ref 0.5–1.3)
EOSINOPHIL # BLD AUTO: 0.13 K/UL — SIGNIFICANT CHANGE UP (ref 0–0.5)
EOSINOPHIL NFR BLD AUTO: 1.3 % — SIGNIFICANT CHANGE UP (ref 0–6)
GLUCOSE SERPL-MCNC: 118 MG/DL — HIGH (ref 70–99)
HBA1C BLD-MCNC: 6.4 % — HIGH (ref 4–5.6)
HCT VFR BLD CALC: 39.9 % — SIGNIFICANT CHANGE UP (ref 34.5–45)
HDLC SERPL-MCNC: 42 MG/DL — LOW (ref 45–65)
HGB BLD-MCNC: 12.4 G/DL — SIGNIFICANT CHANGE UP (ref 11.5–15.5)
IMM GRANULOCYTES # BLD AUTO: 0.05 # — SIGNIFICANT CHANGE UP
IMM GRANULOCYTES NFR BLD AUTO: 0.5 % — SIGNIFICANT CHANGE UP (ref 0–1.5)
LIPID PNL WITH DIRECT LDL SERPL: 79 MG/DL — SIGNIFICANT CHANGE UP
LYMPHOCYTES # BLD AUTO: 2.36 K/UL — SIGNIFICANT CHANGE UP (ref 1–3.3)
LYMPHOCYTES # BLD AUTO: 24.1 % — SIGNIFICANT CHANGE UP (ref 13–44)
MAGNESIUM SERPL-MCNC: 2.2 MG/DL — SIGNIFICANT CHANGE UP (ref 1.6–2.6)
MCHC RBC-ENTMCNC: 25.1 PG — LOW (ref 27–34)
MCHC RBC-ENTMCNC: 31.1 % — LOW (ref 32–36)
MCV RBC AUTO: 80.6 FL — SIGNIFICANT CHANGE UP (ref 80–100)
MONOCYTES # BLD AUTO: 0.77 K/UL — SIGNIFICANT CHANGE UP (ref 0–0.9)
MONOCYTES NFR BLD AUTO: 7.9 % — SIGNIFICANT CHANGE UP (ref 2–14)
NEUTROPHILS # BLD AUTO: 6.38 K/UL — SIGNIFICANT CHANGE UP (ref 1.8–7.4)
NEUTROPHILS NFR BLD AUTO: 65.3 % — SIGNIFICANT CHANGE UP (ref 43–77)
NRBC # FLD: 0 — SIGNIFICANT CHANGE UP
PHOSPHATE SERPL-MCNC: 3.4 MG/DL — SIGNIFICANT CHANGE UP (ref 2.5–4.5)
PLATELET # BLD AUTO: 362 K/UL — SIGNIFICANT CHANGE UP (ref 150–400)
PMV BLD: 9.5 FL — SIGNIFICANT CHANGE UP (ref 7–13)
POTASSIUM SERPL-MCNC: 4.2 MMOL/L — SIGNIFICANT CHANGE UP (ref 3.5–5.3)
POTASSIUM SERPL-SCNC: 4.2 MMOL/L — SIGNIFICANT CHANGE UP (ref 3.5–5.3)
PROT SERPL-MCNC: 6.7 G/DL — SIGNIFICANT CHANGE UP (ref 6–8.3)
RBC # BLD: 4.95 M/UL — SIGNIFICANT CHANGE UP (ref 3.8–5.2)
RBC # FLD: 16.9 % — HIGH (ref 10.3–14.5)
SODIUM SERPL-SCNC: 141 MMOL/L — SIGNIFICANT CHANGE UP (ref 135–145)
T4 FREE SERPL-MCNC: 1.57 NG/DL — SIGNIFICANT CHANGE UP (ref 0.9–1.8)
TRIGL SERPL-MCNC: 109 MG/DL — SIGNIFICANT CHANGE UP (ref 10–149)
TSH SERPL-MCNC: 0.2 UIU/ML — LOW (ref 0.27–4.2)
WBC # BLD: 9.78 K/UL — SIGNIFICANT CHANGE UP (ref 3.8–10.5)
WBC # FLD AUTO: 9.78 K/UL — SIGNIFICANT CHANGE UP (ref 3.8–10.5)

## 2018-02-17 PROCEDURE — 99232 SBSQ HOSP IP/OBS MODERATE 35: CPT

## 2018-02-17 RX ORDER — ACETAMINOPHEN 500 MG
650 TABLET ORAL EVERY 6 HOURS
Qty: 0 | Refills: 0 | Status: DISCONTINUED | OUTPATIENT
Start: 2018-02-17 | End: 2018-02-18

## 2018-02-17 RX ADMIN — BUDESONIDE AND FORMOTEROL FUMARATE DIHYDRATE 2 PUFF(S): 160; 4.5 AEROSOL RESPIRATORY (INHALATION) at 08:34

## 2018-02-17 RX ADMIN — TIOTROPIUM BROMIDE 1 CAPSULE(S): 18 CAPSULE ORAL; RESPIRATORY (INHALATION) at 09:39

## 2018-02-17 RX ADMIN — Medication 3 MILLILITER(S): at 10:19

## 2018-02-17 RX ADMIN — HEPARIN SODIUM 5000 UNIT(S): 5000 INJECTION INTRAVENOUS; SUBCUTANEOUS at 21:41

## 2018-02-17 RX ADMIN — GABAPENTIN 300 MILLIGRAM(S): 400 CAPSULE ORAL at 05:49

## 2018-02-17 RX ADMIN — PANTOPRAZOLE SODIUM 40 MILLIGRAM(S): 20 TABLET, DELAYED RELEASE ORAL at 05:49

## 2018-02-17 RX ADMIN — HEPARIN SODIUM 5000 UNIT(S): 5000 INJECTION INTRAVENOUS; SUBCUTANEOUS at 13:33

## 2018-02-17 RX ADMIN — Medication 20 MILLIGRAM(S): at 05:49

## 2018-02-17 RX ADMIN — Medication 1000 UNIT(S): at 12:17

## 2018-02-17 RX ADMIN — HEPARIN SODIUM 5000 UNIT(S): 5000 INJECTION INTRAVENOUS; SUBCUTANEOUS at 05:49

## 2018-02-17 RX ADMIN — AMLODIPINE BESYLATE 5 MILLIGRAM(S): 2.5 TABLET ORAL at 05:49

## 2018-02-17 RX ADMIN — GABAPENTIN 300 MILLIGRAM(S): 400 CAPSULE ORAL at 13:33

## 2018-02-17 RX ADMIN — GABAPENTIN 300 MILLIGRAM(S): 400 CAPSULE ORAL at 21:41

## 2018-02-17 RX ADMIN — Medication 500 MILLIGRAM(S): at 04:34

## 2018-02-17 RX ADMIN — Medication 3 MILLILITER(S): at 22:07

## 2018-02-17 RX ADMIN — Medication 500 MILLIGRAM(S): at 04:04

## 2018-02-17 RX ADMIN — Medication 3 MILLILITER(S): at 16:05

## 2018-02-17 RX ADMIN — BUDESONIDE AND FORMOTEROL FUMARATE DIHYDRATE 2 PUFF(S): 160; 4.5 AEROSOL RESPIRATORY (INHALATION) at 21:41

## 2018-02-17 RX ADMIN — Medication 40 MILLIGRAM(S): at 05:49

## 2018-02-17 RX ADMIN — Medication 81 MILLIGRAM(S): at 12:17

## 2018-02-17 RX ADMIN — Medication 1 TABLET(S): at 12:17

## 2018-02-17 NOTE — PROGRESS NOTE ADULT - SUBJECTIVE AND OBJECTIVE BOX
Patient is a 87y old  Female who presents with a chief complaint of SOB, (2018 14:36)      SUBJECTIVE / OVERNIGHT EVENTS:    MEDICATIONS  (STANDING):  ALBUTerol/ipratropium for Nebulization 3 milliLiter(s) Nebulizer every 6 hours  amLODIPine   Tablet 5 milliGRAM(s) Oral daily  aspirin enteric coated 81 milliGRAM(s) Oral daily  buDESOnide 160 MICROgram(s)/formoterol 4.5 MICROgram(s) Inhaler 2 Puff(s) Inhalation two times a day  calcium carbonate 1250 mG + Vitamin D (OsCal 500 + D) 1 Tablet(s) Oral daily  cholecalciferol 1000 Unit(s) Oral daily  furosemide    Tablet 20 milliGRAM(s) Oral daily  gabapentin 300 milliGRAM(s) Oral three times a day  heparin  Injectable 5000 Unit(s) SubCutaneous every 8 hours  pantoprazole    Tablet 40 milliGRAM(s) Oral before breakfast  predniSONE   Tablet 40 milliGRAM(s) Oral daily  tiotropium 18 MICROgram(s) Capsule 1 Capsule(s) Inhalation daily    MEDICATIONS  (PRN):  acetaminophen   Tablet. 650 milliGRAM(s) Oral every 6 hours PRN mild, moderate and severe pain  clonazePAM Tablet 0.5 milliGRAM(s) Oral at bedtime PRN Anxiety      Vital Signs Last 24 Hrs  T(C): 37.1 (2018 14:29), Max: 37.1 (2018 14:29)  T(F): 98.7 (2018 14:29), Max: 98.7 (2018 14:29)  HR: 85 (2018 16:05) (74 - 99)  BP: 143/52 (2018 14:29) (129/61 - 156/73)  BP(mean): --  RR: 20 (2018 14:29) (18 - 20)  SpO2: 94% (2018 16:00) (92% - 98%)  CAPILLARY BLOOD GLUCOSE        I&O's Summary      PHYSICAL EXAM:  GENERAL: NAD, obese ,   HEAD:  Atraumatic, Normocephalic  EYES: EOMI, PERRLA, conjunctiva and sclera clear  NECK: Supple,  CHEST/LUNG: Clear to auscultation bilaterally; No wheeze, using accessory muscles of respiration   HEART: Regular rate and rhythm; No murmurs, rubs, or gallops  ABDOMEN: Soft, Nontender, Nondistended; Bowel sounds present  EXTREMITIES:  2+ Peripheral Pulses, No clubbing, cyanosis, or edema  PSYCH: AAOx3  NEUROLOGY: non-focal  SKIN: No rashes or lesions      LABS:                        12.4   9.78  )-----------( 362      ( 2018 05:44 )             39.9     02-17    141  |  101  |  29<H>  ----------------------------<  118<H>  4.2   |  26  |  1.22    Ca    8.9      2018 05:44  Phos  3.4     -  Mg     2.2         TPro  6.7  /  Alb  3.6  /  TBili  0.4  /  DBili  x   /  AST  16  /  ALT  14  /  AlkPhos  89  02-17    PT/INR - ( 2018 08:00 )   PT: 11.9 SEC;   INR: 1.07          PTT - ( 2018 08:00 )  PTT:34.8 SEC  CARDIAC MARKERS ( 2018 08:00 )  x     / < 0.06 ng/mL / 138 u/L / x     / x      CARDIAC MARKERS ( 15 Feb 2018 17:30 )  x     / Test not performed ng/mL / Test not performed u/L / Test not performed ng/mL / x          Urinalysis Basic - ( 2018 13:41 )    Color: PLYEL / Appearance: CLEAR / S.022 / pH: 6.5  Gluc: NEGATIVE / Ketone: NEGATIVE  / Bili: NEGATIVE / Urobili: NORMAL mg/dL   Blood: NEGATIVE / Protein: NEGATIVE mg/dL / Nitrite: NEGATIVE   Leuk Esterase: TRACE / RBC: x / WBC 2-5   Sq Epi: OCC / Non Sq Epi: x / Bacteria: x        RADIOLOGY & ADDITIONAL TESTS:    Imaging Personally Reviewed:    Consultant(s) Notes Reviewed:      Care Discussed with Consultants/Other Providers: Patient is a 87y old  Female who presents with a chief complaint of SOB, (2018 14:36)      SUBJECTIVE / OVERNIGHT EVENTS: patient seen and examined by bedside at 12 20 Am, pt reports improved SOB, but still not at baseline , c/o cough       MEDICATIONS  (STANDING):  ALBUTerol/ipratropium for Nebulization 3 milliLiter(s) Nebulizer every 6 hours  amLODIPine   Tablet 5 milliGRAM(s) Oral daily  aspirin enteric coated 81 milliGRAM(s) Oral daily  buDESOnide 160 MICROgram(s)/formoterol 4.5 MICROgram(s) Inhaler 2 Puff(s) Inhalation two times a day  calcium carbonate 1250 mG + Vitamin D (OsCal 500 + D) 1 Tablet(s) Oral daily  cholecalciferol 1000 Unit(s) Oral daily  furosemide    Tablet 20 milliGRAM(s) Oral daily  gabapentin 300 milliGRAM(s) Oral three times a day  heparin  Injectable 5000 Unit(s) SubCutaneous every 8 hours  pantoprazole    Tablet 40 milliGRAM(s) Oral before breakfast  predniSONE   Tablet 40 milliGRAM(s) Oral daily  tiotropium 18 MICROgram(s) Capsule 1 Capsule(s) Inhalation daily    MEDICATIONS  (PRN):  acetaminophen   Tablet. 650 milliGRAM(s) Oral every 6 hours PRN mild, moderate and severe pain  clonazePAM Tablet 0.5 milliGRAM(s) Oral at bedtime PRN Anxiety      Vital Signs Last 24 Hrs  T(C): 37.1 (2018 14:29), Max: 37.1 (2018 14:29)  T(F): 98.7 (2018 14:29), Max: 98.7 (2018 14:29)  HR: 85 (2018 16:05) (74 - 99)  BP: 143/52 (2018 14:29) (129/61 - 156/73)  BP(mean): --  RR: 20 (2018 14:29) (18 - 20)  SpO2: 94% (2018 16:00) (92% - 98%)  CAPILLARY BLOOD GLUCOSE        I&O's Summary      PHYSICAL EXAM:  GENERAL: NAD, obese ,   HEAD:  Atraumatic, Normocephalic  EYES: EOMI, PERRLA, conjunctiva and sclera clear  NECK: Supple,  CHEST/LUNG: Clear to auscultation bilaterally; No wheeze, mild use of accessory muscles of respiration   HEART: Regular rate and rhythm;  ABDOMEN: Soft, Nontender, Nondistended; Bowel sounds present  EXTREMITIES:  2+ Peripheral Pulses, No clubbing, cyanosis, or edema  PSYCH: AAOx3  NEUROLOGY: non-focal  SKIN: No rashes or lesions      LABS:                        12.4   9.78  )-----------( 362      ( 2018 05:44 )             39.9     02-17    141  |  101  |  29<H>  ----------------------------<  118<H>  4.2   |  26  |  1.22    Ca    8.9      2018 05:44  Phos  3.4       Mg     2.2         TPro  6.7  /  Alb  3.6  /  TBili  0.4  /  DBili  x   /  AST  16  /  ALT  14  /  AlkPhos  89  02-17    PT/INR - ( 2018 08:00 )   PT: 11.9 SEC;   INR: 1.07          PTT - ( 2018 08:00 )  PTT:34.8 SEC  CARDIAC MARKERS ( 2018 08:00 )  x     / < 0.06 ng/mL / 138 u/L / x     / x      CARDIAC MARKERS ( 15 Feb 2018 17:30 )  x     / Test not performed ng/mL / Test not performed u/L / Test not performed ng/mL / x          Urinalysis Basic - ( 2018 13:41 )    Color: PLYEL / Appearance: CLEAR / S.022 / pH: 6.5  Gluc: NEGATIVE / Ketone: NEGATIVE  / Bili: NEGATIVE / Urobili: NORMAL mg/dL   Blood: NEGATIVE / Protein: NEGATIVE mg/dL / Nitrite: NEGATIVE   Leuk Esterase: TRACE / RBC: x / WBC 2-5   Sq Epi: OCC / Non Sq Epi: x / Bacteria: x        RADIOLOGY & ADDITIONAL TESTS:    Imaging Personally Reviewed:    Consultant(s) Notes Reviewed:  pulmonary     Care Discussed with Consultants/Other Providers:

## 2018-02-17 NOTE — PROGRESS NOTE ADULT - ASSESSMENT
86 y/o female HX of  COPD, EX Smoker, HTN, Neuropathy, DAVID on  CPAP at night, + Dysphagia, Obesity, on O2 NC 3 Lit,  h/o left breast ca, S/P surgery and RTX  in the past, S/P IVC Filter, No recent travel, no sick contact, FLU Shot up to date as per  pt,  p/w 1 day worsening SOB, used nebs, Symbicort,  without improvement, no fevers, no chills, no chest pain,  no leg swelling now, pt was started PO Lasix 20 mg recently by her cardiologist for legs edema,   Pt last admitted in Dec. 2017  for COPD exacerbation, neg CTA and Unremarkable  Echo in Dec. 2017, pt awake, A+O x 3, + Chronic dry cough , no CP, No sore throat, no rash, no dysuria, no diarrhea, no abdominal pain, NO HA, no Dizziness,  Pt S/P IV Solumedrol 40 mg X 1, Duoneb x 3, CTPA was ordered by ER to R/O PE, which was negative for PE   , pt uses walker or Cane to ambulate,

## 2018-02-17 NOTE — PROGRESS NOTE ADULT - ATTENDING COMMENTS
Pt jude noted, recommend home with home PT Pt eval noted, recommend home with home PT  DC planning in am if stable

## 2018-02-17 NOTE — PROGRESS NOTE ADULT - PROBLEM SELECTOR PLAN 5
on Klonopin PRN,   TSH, noted, low  will repeat with FT4 on Klonopin PRN,   TSH, noted, low  Subclinical hypothyroidism, TSH low, Ft4 wnl, will recommend repeating TFTs in 6 weeks

## 2018-02-17 NOTE — PROGRESS NOTE ADULT - PROBLEM SELECTOR PLAN 2
likely secondary to COPD exacerbation  Pulmonary eval noted,   c/w  prednisone 40mg po daily  symbicort, spiriva, duonebs  supplemental O2  OOB, incentive korey,   PT      Continue Lasix for now, BNP, CPK, JOSE,

## 2018-02-17 NOTE — PROGRESS NOTE ADULT - SUBJECTIVE AND OBJECTIVE BOX
PULMONARY PROGRESS NOTE    VIDAL LOUIE  MRN-[id number]    Patient is a 87y old  Female who presents with a chief complaint of SOB, (15 Feb 2018 22:50)      HPI:  -breathing feels comfortable, dry cough still, no fever/chills.    ROS:   -no N/V/D    MEDICATIONS  (STANDING):  ALBUTerol/ipratropium for Nebulization 3 milliLiter(s) Nebulizer every 6 hours  amLODIPine   Tablet 5 milliGRAM(s) Oral daily  aspirin enteric coated 81 milliGRAM(s) Oral daily  buDESOnide 160 MICROgram(s)/formoterol 4.5 MICROgram(s) Inhaler 2 Puff(s) Inhalation two times a day  calcium carbonate 1250 mG + Vitamin D (OsCal 500 + D) 1 Tablet(s) Oral daily  cholecalciferol 1000 Unit(s) Oral daily  furosemide    Tablet 20 milliGRAM(s) Oral daily  gabapentin 300 milliGRAM(s) Oral three times a day  heparin  Injectable 5000 Unit(s) SubCutaneous every 8 hours  pantoprazole    Tablet 40 milliGRAM(s) Oral before breakfast  predniSONE   Tablet 40 milliGRAM(s) Oral daily  tiotropium 18 MICROgram(s) Capsule 1 Capsule(s) Inhalation daily    MEDICATIONS  (PRN):  acetaminophen   Tablet. 650 milliGRAM(s) Oral every 6 hours PRN mild, moderate and severe pain  clonazePAM Tablet 0.5 milliGRAM(s) Oral at bedtime PRN Anxiety    EXAM:  Vital Signs Last 24 Hrs  T(C): 36.5 (17 Feb 2018 05:29), Max: 36.8 (16 Feb 2018 18:35)  T(F): 97.7 (17 Feb 2018 05:29), Max: 98.2 (16 Feb 2018 18:35)  HR: 87 (17 Feb 2018 11:13) (74 - 98)  BP: 129/61 (17 Feb 2018 05:29) (129/61 - 156/73)  BP(mean): --  RR: 20 (17 Feb 2018 05:29) (18 - 20)  SpO2: 94% (17 Feb 2018 11:13) (92% - 98%)    GENERAL: The patient is awake and alert in no apparent distress.     SKIN: Warm, dry    LUNGS: clear bilat    HEART: S1/S2    ABDOMEN: +BS, Soft, Nontender        LABS/IMGAING: reviewed                                   12.4   9.78  )-----------( 362      ( 17 Feb 2018 05:44 )             39.9     02-17    141  |  101  |  29<H>  ----------------------------<  118<H>  4.2   |  26  |  1.22    Ca    8.9      17 Feb 2018 05:44  Phos  3.4     02-17  Mg     2.2     02-17    TPro  6.7  /  Alb  3.6  /  TBili  0.4  /  DBili  x   /  AST  16  /  ALT  14  /  AlkPhos  89  02-17  16    < from: CT Angio Chest w/ IV Cont (02.16.18 @ 02:54) >    IMPRESSION:     1.  No pulmonary embolism as above.  2.  Emphysema. Bilateral subsegmental atelectasis in the lower lobes.    < end of copied text >      PROBLEM LIST:  87y Female with HEALTH ISSUES - PROBLEM Dx:  COPD exacerbation  Essential Hypertension  Anxiety   Dysphagia  Neuropathy    RECS:  agree with prednisone 40mg po daily x5 days  symbicort, spiriva, duonebs  supplemental O2  OOB, incentive korey, PT    Adrianne Garcia MD  [phone]

## 2018-02-17 NOTE — PROGRESS NOTE ADULT - PROBLEM SELECTOR PLAN 1
, COPD Exacerbation, on Duoneb, Symbicort, s/p IV Solumedrol 40 mg X 1, on  now PO Prednisone 40 mg QD, PO Protonix,   Pulmonary eval noted, , will check RVP   Fall/aspiration precaution, DAVID on CPAP   PT consult noted, home with home PT recommended  Pt with DAVID , c/w CPAP at night , COPD Exacerbation, on Duoneb, Symbicort, s/p IV Solumedrol 40 mg X 1, on  now PO Prednisone 40 mg QD, PO Protonix,   Pulmonary eval noted, ,  RVP  negative  Fall/aspiration precaution, DAVID on CPAP   PT consult noted, home with home PT recommended  Pt with DAVID , c/w CPAP at night

## 2018-02-17 NOTE — PROGRESS NOTE ADULT - PROBLEM SELECTOR PLAN 7
on Norvasc,   UA, TSH, CPK, JOSE noted    Fasting Lipid, HgbA1c in am on Norvasc,   UA, TSH, CPK, JOSE noted    Fasting Lipid, HgbA1c  noted

## 2018-02-18 VITALS
HEART RATE: 98 BPM | RESPIRATION RATE: 18 BRPM | OXYGEN SATURATION: 96 % | DIASTOLIC BLOOD PRESSURE: 55 MMHG | TEMPERATURE: 98 F | SYSTOLIC BLOOD PRESSURE: 121 MMHG

## 2018-02-18 LAB
ALBUMIN SERPL ELPH-MCNC: 3.7 G/DL — SIGNIFICANT CHANGE UP (ref 3.3–5)
ALP SERPL-CCNC: 96 U/L — SIGNIFICANT CHANGE UP (ref 40–120)
ALT FLD-CCNC: 14 U/L — SIGNIFICANT CHANGE UP (ref 4–33)
AST SERPL-CCNC: 15 U/L — SIGNIFICANT CHANGE UP (ref 4–32)
BASOPHILS # BLD AUTO: 0.14 K/UL — SIGNIFICANT CHANGE UP (ref 0–0.2)
BASOPHILS NFR BLD AUTO: 1.3 % — SIGNIFICANT CHANGE UP (ref 0–2)
BILIRUB SERPL-MCNC: 0.4 MG/DL — SIGNIFICANT CHANGE UP (ref 0.2–1.2)
BUN SERPL-MCNC: 40 MG/DL — HIGH (ref 7–23)
CALCIUM SERPL-MCNC: 8.8 MG/DL — SIGNIFICANT CHANGE UP (ref 8.4–10.5)
CHLORIDE SERPL-SCNC: 96 MMOL/L — LOW (ref 98–107)
CO2 SERPL-SCNC: 25 MMOL/L — SIGNIFICANT CHANGE UP (ref 22–31)
CREAT SERPL-MCNC: 1.24 MG/DL — SIGNIFICANT CHANGE UP (ref 0.5–1.3)
EOSINOPHIL # BLD AUTO: 0.15 K/UL — SIGNIFICANT CHANGE UP (ref 0–0.5)
EOSINOPHIL NFR BLD AUTO: 1.3 % — SIGNIFICANT CHANGE UP (ref 0–6)
GLUCOSE SERPL-MCNC: 123 MG/DL — HIGH (ref 70–99)
HCT VFR BLD CALC: 41.6 % — SIGNIFICANT CHANGE UP (ref 34.5–45)
HGB BLD-MCNC: 12.5 G/DL — SIGNIFICANT CHANGE UP (ref 11.5–15.5)
IMM GRANULOCYTES # BLD AUTO: 0.04 # — SIGNIFICANT CHANGE UP
IMM GRANULOCYTES NFR BLD AUTO: 0.4 % — SIGNIFICANT CHANGE UP (ref 0–1.5)
LYMPHOCYTES # BLD AUTO: 28.3 % — SIGNIFICANT CHANGE UP (ref 13–44)
LYMPHOCYTES # BLD AUTO: 3.15 K/UL — SIGNIFICANT CHANGE UP (ref 1–3.3)
MAGNESIUM SERPL-MCNC: 2.1 MG/DL — SIGNIFICANT CHANGE UP (ref 1.6–2.6)
MCHC RBC-ENTMCNC: 24.3 PG — LOW (ref 27–34)
MCHC RBC-ENTMCNC: 30 % — LOW (ref 32–36)
MCV RBC AUTO: 80.9 FL — SIGNIFICANT CHANGE UP (ref 80–100)
MONOCYTES # BLD AUTO: 0.82 K/UL — SIGNIFICANT CHANGE UP (ref 0–0.9)
MONOCYTES NFR BLD AUTO: 7.4 % — SIGNIFICANT CHANGE UP (ref 2–14)
NEUTROPHILS # BLD AUTO: 6.84 K/UL — SIGNIFICANT CHANGE UP (ref 1.8–7.4)
NEUTROPHILS NFR BLD AUTO: 61.3 % — SIGNIFICANT CHANGE UP (ref 43–77)
NRBC # FLD: 0 — SIGNIFICANT CHANGE UP
PHOSPHATE SERPL-MCNC: 3.2 MG/DL — SIGNIFICANT CHANGE UP (ref 2.5–4.5)
PLATELET # BLD AUTO: 362 K/UL — SIGNIFICANT CHANGE UP (ref 150–400)
PMV BLD: 9.3 FL — SIGNIFICANT CHANGE UP (ref 7–13)
POTASSIUM SERPL-MCNC: 3.6 MMOL/L — SIGNIFICANT CHANGE UP (ref 3.5–5.3)
POTASSIUM SERPL-SCNC: 3.6 MMOL/L — SIGNIFICANT CHANGE UP (ref 3.5–5.3)
PROT SERPL-MCNC: 7 G/DL — SIGNIFICANT CHANGE UP (ref 6–8.3)
RBC # BLD: 5.14 M/UL — SIGNIFICANT CHANGE UP (ref 3.8–5.2)
RBC # FLD: 16.9 % — HIGH (ref 10.3–14.5)
SODIUM SERPL-SCNC: 137 MMOL/L — SIGNIFICANT CHANGE UP (ref 135–145)
WBC # BLD: 11.14 K/UL — HIGH (ref 3.8–10.5)
WBC # FLD AUTO: 11.14 K/UL — HIGH (ref 3.8–10.5)

## 2018-02-18 PROCEDURE — 99239 HOSP IP/OBS DSCHRG MGMT >30: CPT

## 2018-02-18 RX ORDER — GABAPENTIN 400 MG/1
4 CAPSULE ORAL
Qty: 0 | Refills: 0 | DISCHARGE
Start: 2018-02-18

## 2018-02-18 RX ORDER — GABAPENTIN 400 MG/1
1 CAPSULE ORAL
Qty: 0 | Refills: 0 | COMMUNITY
Start: 2018-02-18

## 2018-02-18 RX ORDER — GABAPENTIN 400 MG/1
1 CAPSULE ORAL
Qty: 0 | Refills: 0 | DISCHARGE
Start: 2018-02-18

## 2018-02-18 RX ADMIN — AMLODIPINE BESYLATE 5 MILLIGRAM(S): 2.5 TABLET ORAL at 05:25

## 2018-02-18 RX ADMIN — PANTOPRAZOLE SODIUM 40 MILLIGRAM(S): 20 TABLET, DELAYED RELEASE ORAL at 05:26

## 2018-02-18 RX ADMIN — GABAPENTIN 300 MILLIGRAM(S): 400 CAPSULE ORAL at 05:26

## 2018-02-18 RX ADMIN — BUDESONIDE AND FORMOTEROL FUMARATE DIHYDRATE 2 PUFF(S): 160; 4.5 AEROSOL RESPIRATORY (INHALATION) at 09:39

## 2018-02-18 RX ADMIN — Medication 3 MILLILITER(S): at 04:41

## 2018-02-18 RX ADMIN — TIOTROPIUM BROMIDE 1 CAPSULE(S): 18 CAPSULE ORAL; RESPIRATORY (INHALATION) at 09:39

## 2018-02-18 RX ADMIN — Medication 40 MILLIGRAM(S): at 05:26

## 2018-02-18 RX ADMIN — Medication 3 MILLILITER(S): at 10:10

## 2018-02-18 RX ADMIN — Medication 81 MILLIGRAM(S): at 12:50

## 2018-02-18 RX ADMIN — Medication 1000 UNIT(S): at 12:50

## 2018-02-18 RX ADMIN — Medication 500 MILLIGRAM(S): at 01:09

## 2018-02-18 RX ADMIN — Medication 500 MILLIGRAM(S): at 00:39

## 2018-02-18 RX ADMIN — Medication 20 MILLIGRAM(S): at 05:26

## 2018-02-18 RX ADMIN — Medication 1 TABLET(S): at 12:50

## 2018-02-18 RX ADMIN — HEPARIN SODIUM 5000 UNIT(S): 5000 INJECTION INTRAVENOUS; SUBCUTANEOUS at 05:25

## 2018-02-18 NOTE — PROGRESS NOTE ADULT - PROBLEM SELECTOR PLAN 1
, COPD Exacerbation, on Duoneb, Symbicort, s/p IV Solumedrol 40 mg X 1, on  now PO Prednisone 40 mg QD, PO Protonix,   Pulmonary f/u  noted, ,  RVP  negative  Fall/aspiration precaution, DAVID on CPAP   PT consult noted, home with home PT recommended  Pt with DAVID , c/w CPAP at night

## 2018-02-18 NOTE — PROGRESS NOTE ADULT - SUBJECTIVE AND OBJECTIVE BOX
Patient is a 87y old  Female who presents with a chief complaint of SOB, (16 Feb 2018 14:36)      SUBJECTIVE / OVERNIGHT EVENTS: patient seen and examined by bedside at 11 Am, feeling better, breathing at baseline       MEDICATIONS  (STANDING):  ALBUTerol/ipratropium for Nebulization 3 milliLiter(s) Nebulizer every 6 hours  amLODIPine   Tablet 5 milliGRAM(s) Oral daily  aspirin enteric coated 81 milliGRAM(s) Oral daily  buDESOnide 160 MICROgram(s)/formoterol 4.5 MICROgram(s) Inhaler 2 Puff(s) Inhalation two times a day  calcium carbonate 1250 mG + Vitamin D (OsCal 500 + D) 1 Tablet(s) Oral daily  cholecalciferol 1000 Unit(s) Oral daily  furosemide    Tablet 20 milliGRAM(s) Oral daily  gabapentin 300 milliGRAM(s) Oral three times a day  heparin  Injectable 5000 Unit(s) SubCutaneous every 8 hours  pantoprazole    Tablet 40 milliGRAM(s) Oral before breakfast  predniSONE   Tablet 40 milliGRAM(s) Oral daily  tiotropium 18 MICROgram(s) Capsule 1 Capsule(s) Inhalation daily    MEDICATIONS  (PRN):  acetaminophen   Tablet. 650 milliGRAM(s) Oral every 6 hours PRN mild, moderate and severe pain  clonazePAM Tablet 0.5 milliGRAM(s) Oral at bedtime PRN Anxiety      Vital Signs Last 24 Hrs  T(C): 36.7 (18 Feb 2018 14:20), Max: 36.7 (18 Feb 2018 14:20)  T(F): 98.1 (18 Feb 2018 14:20), Max: 98.1 (18 Feb 2018 14:20)  HR: 98 (18 Feb 2018 14:20) (64 - 98)  BP: 121/55 (18 Feb 2018 14:20) (121/55 - 144/73)  BP(mean): --  RR: 18 (18 Feb 2018 14:20) (18 - 20)  SpO2: 96% (18 Feb 2018 14:20) (92% - 98%)  CAPILLARY BLOOD GLUCOSE        I&O's Summary    PHYSICAL EXAM:  GENERAL: NAD, obese ,   HEAD:  Atraumatic, Normocephalic  EYES: EOMI, PERRLA, conjunctiva and sclera clear  NECK: Supple,  CHEST/LUNG: Clear to auscultation bilaterally; No wheeze,   HEART: Regular rate and rhythm;  ABDOMEN: Soft, Nontender, Nondistended; Bowel sounds present  EXTREMITIES:  2+ Peripheral Pulses, No clubbing, cyanosis, or edema  PSYCH: AAOx3  NEUROLOGY: non-focal  SKIN: No rashes or lesions      LABS:                        12.5   11.14 )-----------( 362      ( 18 Feb 2018 06:42 )             41.6     02-18    137  |  96<L>  |  40<H>  ----------------------------<  123<H>  3.6   |  25  |  1.24    Ca    8.8      18 Feb 2018 06:42  Phos  3.2     02-18  Mg     2.1     02-18    TPro  7.0  /  Alb  3.7  /  TBili  0.4  /  DBili  x   /  AST  15  /  ALT  14  /  AlkPhos  96  02-18              RADIOLOGY & ADDITIONAL TESTS:    Imaging Personally Reviewed:    Consultant(s) Notes Reviewed:  pulmonary     Care Discussed with Consultants/Other Providers:

## 2018-02-18 NOTE — PROGRESS NOTE ADULT - PROBLEM SELECTOR PLAN 5
on Klonopin PRN,   TSH, noted, low  Subclinical hyperthyroidism, TSH low, Ft4 wnl, will recommend repeating TFTs in 6 weeks

## 2018-02-18 NOTE — PROGRESS NOTE ADULT - ASSESSMENT
88 y/o female HX of  COPD, EX Smoker, HTN, Neuropathy, DAVID on  CPAP at night, + Dysphagia, Obesity, on O2 NC 3 Lit,  h/o left breast ca, S/P surgery and RTX  in the past, S/P IVC Filter, No recent travel, no sick contact, FLU Shot up to date as per  pt,  p/w 1 day worsening SOB, used nebs, Symbicort,  without improvement, no fevers, no chills, no chest pain,  no leg swelling now, pt was started PO Lasix 20 mg recently by her cardiologist for legs edema,   Pt last admitted in Dec. 2017  for COPD exacerbation, neg CTA and Unremarkable  Echo in Dec. 2017, pt awake, A+O x 3, + Chronic dry cough , no CP, No sore throat, no rash, no dysuria, no diarrhea, no abdominal pain, NO HA, no Dizziness,  Pt S/P IV Solumedrol 40 mg X 1, Duoneb x 3, CTPA was ordered by ER to R/O PE, which was negative for PE   , pt uses walker or Cane to ambulate,

## 2018-02-18 NOTE — PROGRESS NOTE ADULT - SUBJECTIVE AND OBJECTIVE BOX
PULMONARY PROGRESS NOTE    VIDAL LOUIE  MRN-[id number]    Patient is a 87y old  Female who presents with a chief complaint of SOB, (16 Feb 2018 14:36)  Admitted for COPD exac; feels ready for disc  -  -    ACTIVE MEDICATION LIST:  MEDICATIONS  (STANDING):  ALBUTerol/ipratropium for Nebulization 3 milliLiter(s) Nebulizer every 6 hours  amLODIPine   Tablet 5 milliGRAM(s) Oral daily  aspirin enteric coated 81 milliGRAM(s) Oral daily  buDESOnide 160 MICROgram(s)/formoterol 4.5 MICROgram(s) Inhaler 2 Puff(s) Inhalation two times a day  calcium carbonate 1250 mG + Vitamin D (OsCal 500 + D) 1 Tablet(s) Oral daily  cholecalciferol 1000 Unit(s) Oral daily  furosemide    Tablet 20 milliGRAM(s) Oral daily  gabapentin 300 milliGRAM(s) Oral three times a day  heparin  Injectable 5000 Unit(s) SubCutaneous every 8 hours  pantoprazole    Tablet 40 milliGRAM(s) Oral before breakfast  predniSONE   Tablet 40 milliGRAM(s) Oral daily  tiotropium 18 MICROgram(s) Capsule 1 Capsule(s) Inhalation daily    MEDICATIONS  (PRN):  acetaminophen   Tablet. 650 milliGRAM(s) Oral every 6 hours PRN mild, moderate and severe pain  clonazePAM Tablet 0.5 milliGRAM(s) Oral at bedtime PRN Anxiety      EXAM:  Vital Signs Last 24 Hrs  T(C): 36.4 (18 Feb 2018 05:24), Max: 37.1 (17 Feb 2018 14:29)  T(F): 97.6 (18 Feb 2018 05:24), Max: 98.7 (17 Feb 2018 14:29)  HR: 67 (18 Feb 2018 10:10) (65 - 99)  BP: 142/65 (18 Feb 2018 05:24) (142/65 - 144/73)  BP(mean): --  RR: 20 (18 Feb 2018 05:24) (19 - 20)  SpO2: 97% (18 Feb 2018 10:10) (92% - 98%)    GENERAL: The patient is awake and alert in no apparent distress.     SKIN: Warm, dry, no rashes    LUNGS: Clear to auscultation without wheezing, rales or rhonchi; respirations unlabored    HEART: Regular rate and rhythm without murmur.    ABDOMEN: +BS, Soft, Nontender    EXTREMITIES: No clubbing, cyanosis, edema                              12.5   11.14 )-----------( 362      ( 18 Feb 2018 06:42 )             41.6       02-18    137  |  96<L>  |  40<H>  ----------------------------<  123<H>  3.6   |  25  |  1.24    Ca    8.8      18 Feb 2018 06:42  Phos  3.2     02-18  Mg     2.1     02-18    TPro  7.0  /  Alb  3.7  /  TBili  0.4  /  DBili  x   /  AST  15  /  ALT  14  /  AlkPhos  96  02-18      LIVER FUNCTIONS - ( 18 Feb 2018 06:42 )  Alb: 3.7 g/dL / Pro: 7.0 g/dL / ALK PHOS: 96 u/L / ALT: 14 u/L / AST: 15 u/L / GGT: x                     PROBLEM LIST:  87y Female with HEALTH ISSUES - PROBLEM Dx:  Essential Hypertension: Essential Hypertension  Preventive measure: Preventive measure  Anxiety disorder: Anxiety disorder  Dysphagia: Dysphagia  Neuropathy: Neuropathy  Shortness of breath: Shortness of breath  COPD (Chronic Obstructive Pulmonary Disease): COPD (Chronic Obstructive Pulmonary Disease)            RECS: Discharge; follow up with Dr Sudeep Finnegan MD  [phone]

## 2018-02-18 NOTE — PROGRESS NOTE ADULT - PROBLEM SELECTOR PROBLEM 1
COPD (Chronic Obstructive Pulmonary Disease)

## 2018-02-18 NOTE — PROGRESS NOTE ADULT - PROBLEM SELECTOR PLAN 2
likely secondary to COPD exacerbation  Pulmonary eval noted,   c/w  prednisone 40mg po daily for 5 days   symbicort, spiriva, duonebs  supplemental O2  OOB, incentive korey,   PT  Continue Lasix for now, BNP, CPK, JOSE,

## 2018-02-18 NOTE — PROGRESS NOTE ADULT - ATTENDING COMMENTS
Pt jude noted, recommend home with home PT  DC home today  Patient hemodynamically stable for discharge home

## 2018-02-28 ENCOUNTER — INPATIENT (INPATIENT)
Facility: HOSPITAL | Age: 83
LOS: 8 days | Discharge: HOME HEALTH | End: 2018-03-09
Attending: HOSPITALIST | Admitting: HOSPITALIST
Payer: MEDICARE

## 2018-02-28 VITALS
OXYGEN SATURATION: 92 % | TEMPERATURE: 97 F | RESPIRATION RATE: 22 BRPM | SYSTOLIC BLOOD PRESSURE: 136 MMHG | DIASTOLIC BLOOD PRESSURE: 75 MMHG | HEART RATE: 95 BPM

## 2018-02-28 DIAGNOSIS — Z98.89 OTHER SPECIFIED POSTPROCEDURAL STATES: Chronic | ICD-10-CM

## 2018-02-28 DIAGNOSIS — J44.1 CHRONIC OBSTRUCTIVE PULMONARY DISEASE WITH (ACUTE) EXACERBATION: ICD-10-CM

## 2018-02-28 LAB
ALBUMIN SERPL ELPH-MCNC: 3.8 G/DL — SIGNIFICANT CHANGE UP (ref 3.3–5)
ALP SERPL-CCNC: 95 U/L — SIGNIFICANT CHANGE UP (ref 40–120)
ALT FLD-CCNC: 24 U/L — SIGNIFICANT CHANGE UP (ref 4–33)
AST SERPL-CCNC: 25 U/L — SIGNIFICANT CHANGE UP (ref 4–32)
BILIRUB SERPL-MCNC: 0.3 MG/DL — SIGNIFICANT CHANGE UP (ref 0.2–1.2)
BUN SERPL-MCNC: 49 MG/DL — HIGH (ref 7–23)
CALCIUM SERPL-MCNC: 9 MG/DL — SIGNIFICANT CHANGE UP (ref 8.4–10.5)
CHLORIDE SERPL-SCNC: 94 MMOL/L — LOW (ref 98–107)
CO2 SERPL-SCNC: 27 MMOL/L — SIGNIFICANT CHANGE UP (ref 22–31)
CREAT SERPL-MCNC: 1.34 MG/DL — HIGH (ref 0.5–1.3)
GLUCOSE SERPL-MCNC: 223 MG/DL — HIGH (ref 70–99)
HCT VFR BLD CALC: 45 % — SIGNIFICANT CHANGE UP (ref 34.5–45)
HGB BLD-MCNC: 13.4 G/DL — SIGNIFICANT CHANGE UP (ref 11.5–15.5)
MCHC RBC-ENTMCNC: 23.5 PG — LOW (ref 27–34)
MCHC RBC-ENTMCNC: 29.8 % — LOW (ref 32–36)
MCV RBC AUTO: 78.9 FL — LOW (ref 80–100)
NRBC # FLD: 0 — SIGNIFICANT CHANGE UP
NT-PROBNP SERPL-SCNC: 408.3 PG/ML — SIGNIFICANT CHANGE UP
PLATELET # BLD AUTO: 458 K/UL — HIGH (ref 150–400)
PMV BLD: 9.5 FL — SIGNIFICANT CHANGE UP (ref 7–13)
POTASSIUM SERPL-MCNC: 3.5 MMOL/L — SIGNIFICANT CHANGE UP (ref 3.5–5.3)
POTASSIUM SERPL-SCNC: 3.5 MMOL/L — SIGNIFICANT CHANGE UP (ref 3.5–5.3)
PROT SERPL-MCNC: 7.5 G/DL — SIGNIFICANT CHANGE UP (ref 6–8.3)
RBC # BLD: 5.7 M/UL — HIGH (ref 3.8–5.2)
RBC # FLD: 17.2 % — HIGH (ref 10.3–14.5)
SODIUM SERPL-SCNC: 139 MMOL/L — SIGNIFICANT CHANGE UP (ref 135–145)
WBC # BLD: 10.35 K/UL — SIGNIFICANT CHANGE UP (ref 3.8–10.5)
WBC # FLD AUTO: 10.35 K/UL — SIGNIFICANT CHANGE UP (ref 3.8–10.5)

## 2018-02-28 PROCEDURE — 71046 X-RAY EXAM CHEST 2 VIEWS: CPT | Mod: 26

## 2018-02-28 RX ORDER — IPRATROPIUM/ALBUTEROL SULFATE 18-103MCG
3 AEROSOL WITH ADAPTER (GRAM) INHALATION ONCE
Qty: 0 | Refills: 0 | Status: COMPLETED | OUTPATIENT
Start: 2018-02-28 | End: 2018-02-28

## 2018-02-28 RX ADMIN — Medication 3 MILLILITER(S): at 19:22

## 2018-02-28 NOTE — ED ADULT NURSE NOTE - PMH
Anxiety disorder    Chronic bronchitis    COPD (Chronic Obstructive Pulmonary Disease)  on home O2 AT NIGHT  Dysphagia    Essential Hypertension    Hip Fracture-Left    Hx of Breast Cancer  HAd Rt in 1989  Morbid obesity    Neuropathy    DAVID on CPAP

## 2018-02-28 NOTE — ED ADULT NURSE NOTE - CHIEF COMPLAINT QUOTE
pt states that she was discharged from [facility] and came straight to ED for c/o SOB states that she was admitted for COPD exacerbation and does not feel better.  PMH COPD DAVID, HTN breast CA with left mastectomy

## 2018-02-28 NOTE — ED ADULT TRIAGE NOTE - CHIEF COMPLAINT QUOTE
pt states that she was discharged from [facility] and came straight to ED for c/o SOB states that she was admitted for COPD exacerbation and does not feel better.  PMH COPD DAVID, HTN breast CA with left mastectomy

## 2018-02-28 NOTE — ED PROVIDER NOTE - PROGRESS NOTE DETAILS
Spoke with Dr Olimpia Adler pulgricelda covering Dr Sheets, admit to hospitalist spoke to Dr Elizondo

## 2018-02-28 NOTE — ED PROVIDER NOTE - OBJECTIVE STATEMENT
87F hx of DAVID on CPAP, COPD on home O2 prn, HTN p/w shortness of breath for 4d. Pt had acute exacerbation 4d prior and was taken by EMS to [facility]. Pt states they d/c'd her but did not feel safe to be home still feel too sob.  and presented to ED. Pt states this episode is similar to prior COPD exacerbations w/ associated lower extremity edema.  Reports normal bowel / bladder function. Denies any chills, n/v/d, CP, abd pain. Denies current cough.

## 2018-02-28 NOTE — ED PROVIDER NOTE - MEDICAL DECISION MAKING DETAILS
87F hx DAVID on CPAP, COPD p/w SoB likely COPD exacerbation in mild distress without evidence of acute respiratory failure. Already received steroids and antibiotics at [facility].  Will treat with Duoneb and check CMP, CBC EKG CXR and reassess pt. Will likely need admission. Known to Dr Sheets (Pulm)

## 2018-02-28 NOTE — ED ADULT NURSE NOTE - OBJECTIVE STATEMENT
Pt coming into ED for possible COPD exacerbation pt has audible wheezing provided duoneb treatment. Pt breathing even and mildly labored. Pt denies cp/discomfort, denies headache/dizziness. Abdomen soft, non-tender, non-distended. Skin is cool dry and intact. IVL placed 20g to Rt AC. Labs sent. Will continue to monitor.

## 2018-03-01 DIAGNOSIS — D47.3 ESSENTIAL (HEMORRHAGIC) THROMBOCYTHEMIA: ICD-10-CM

## 2018-03-01 DIAGNOSIS — Z29.9 ENCOUNTER FOR PROPHYLACTIC MEASURES, UNSPECIFIED: ICD-10-CM

## 2018-03-01 DIAGNOSIS — N18.3 CHRONIC KIDNEY DISEASE, STAGE 3 (MODERATE): ICD-10-CM

## 2018-03-01 DIAGNOSIS — B37.0 CANDIDAL STOMATITIS: ICD-10-CM

## 2018-03-01 DIAGNOSIS — G47.33 OBSTRUCTIVE SLEEP APNEA (ADULT) (PEDIATRIC): ICD-10-CM

## 2018-03-01 DIAGNOSIS — I10 ESSENTIAL (PRIMARY) HYPERTENSION: ICD-10-CM

## 2018-03-01 DIAGNOSIS — J44.1 CHRONIC OBSTRUCTIVE PULMONARY DISEASE WITH (ACUTE) EXACERBATION: ICD-10-CM

## 2018-03-01 DIAGNOSIS — G62.9 POLYNEUROPATHY, UNSPECIFIED: ICD-10-CM

## 2018-03-01 DIAGNOSIS — F41.9 ANXIETY DISORDER, UNSPECIFIED: ICD-10-CM

## 2018-03-01 DIAGNOSIS — R13.10 DYSPHAGIA, UNSPECIFIED: ICD-10-CM

## 2018-03-01 LAB
APPEARANCE UR: CLEAR — SIGNIFICANT CHANGE UP
BASE EXCESS BLDA CALC-SCNC: 7 MMOL/L — SIGNIFICANT CHANGE UP
BILIRUB UR-MCNC: NEGATIVE — SIGNIFICANT CHANGE UP
BLOOD UR QL VISUAL: NEGATIVE — SIGNIFICANT CHANGE UP
BUN SERPL-MCNC: 50 MG/DL — HIGH (ref 7–23)
CALCIUM SERPL-MCNC: 8.6 MG/DL — SIGNIFICANT CHANGE UP (ref 8.4–10.5)
CHLORIDE BLDA-SCNC: 99 MMOL/L — SIGNIFICANT CHANGE UP (ref 96–108)
CHLORIDE SERPL-SCNC: 99 MMOL/L — SIGNIFICANT CHANGE UP (ref 98–107)
CO2 SERPL-SCNC: 29 MMOL/L — SIGNIFICANT CHANGE UP (ref 22–31)
COLOR SPEC: YELLOW — SIGNIFICANT CHANGE UP
CREAT ?TM UR-MCNC: 79.99 MG/DL — SIGNIFICANT CHANGE UP
CREAT SERPL-MCNC: 1.18 MG/DL — SIGNIFICANT CHANGE UP (ref 0.5–1.3)
GLUCOSE BLDA-MCNC: 230 MG/DL — HIGH (ref 70–99)
GLUCOSE SERPL-MCNC: 149 MG/DL — HIGH (ref 70–99)
GLUCOSE UR-MCNC: NEGATIVE — SIGNIFICANT CHANGE UP
HCO3 BLDA-SCNC: 30 MMOL/L — HIGH (ref 22–26)
HCT VFR BLD CALC: 43.9 % — SIGNIFICANT CHANGE UP (ref 34.5–45)
HCT VFR BLDA CALC: 42.8 % — SIGNIFICANT CHANGE UP (ref 34.5–46.5)
HGB BLD-MCNC: 12.9 G/DL — SIGNIFICANT CHANGE UP (ref 11.5–15.5)
HGB BLDA-MCNC: 13.9 G/DL — SIGNIFICANT CHANGE UP (ref 11.5–15.5)
KETONES UR-MCNC: NEGATIVE — SIGNIFICANT CHANGE UP
LACTATE BLDA-SCNC: 3.7 MMOL/L — HIGH (ref 0.5–2)
LEUKOCYTE ESTERASE UR-ACNC: NEGATIVE — SIGNIFICANT CHANGE UP
MCHC RBC-ENTMCNC: 24 PG — LOW (ref 27–34)
MCHC RBC-ENTMCNC: 29.4 % — LOW (ref 32–36)
MCV RBC AUTO: 81.8 FL — SIGNIFICANT CHANGE UP (ref 80–100)
MUCOUS THREADS # UR AUTO: SIGNIFICANT CHANGE UP
NITRITE UR-MCNC: NEGATIVE — SIGNIFICANT CHANGE UP
NRBC # FLD: 0 — SIGNIFICANT CHANGE UP
PCO2 BLDA: 43 MMHG — SIGNIFICANT CHANGE UP (ref 32–48)
PH BLDA: 7.47 PH — HIGH (ref 7.35–7.45)
PH UR: 6 — SIGNIFICANT CHANGE UP (ref 4.6–8)
PLATELET # BLD AUTO: 349 K/UL — SIGNIFICANT CHANGE UP (ref 150–400)
PMV BLD: 9.5 FL — SIGNIFICANT CHANGE UP (ref 7–13)
PO2 BLDA: 69 MMHG — LOW (ref 83–108)
POTASSIUM BLDA-SCNC: 3.5 MMOL/L — SIGNIFICANT CHANGE UP (ref 3.4–4.5)
POTASSIUM SERPL-MCNC: 3.6 MMOL/L — SIGNIFICANT CHANGE UP (ref 3.5–5.3)
POTASSIUM SERPL-SCNC: 3.6 MMOL/L — SIGNIFICANT CHANGE UP (ref 3.5–5.3)
PROT UR-MCNC: 20 MG/DL — SIGNIFICANT CHANGE UP
RBC # BLD: 5.37 M/UL — HIGH (ref 3.8–5.2)
RBC # FLD: 17 % — HIGH (ref 10.3–14.5)
RBC CASTS # UR COMP ASSIST: SIGNIFICANT CHANGE UP (ref 0–?)
SAO2 % BLDA: 92.8 % — LOW (ref 95–99)
SODIUM BLDA-SCNC: 140 MMOL/L — SIGNIFICANT CHANGE UP (ref 136–146)
SODIUM SERPL-SCNC: 142 MMOL/L — SIGNIFICANT CHANGE UP (ref 135–145)
SP GR SPEC: 1.02 — SIGNIFICANT CHANGE UP (ref 1–1.04)
SQUAMOUS # UR AUTO: SIGNIFICANT CHANGE UP
UROBILINOGEN FLD QL: NORMAL MG/DL — SIGNIFICANT CHANGE UP
UUN UR-MCNC: 1073 MG/DL — SIGNIFICANT CHANGE UP
WBC # BLD: 11.26 K/UL — HIGH (ref 3.8–10.5)
WBC # FLD AUTO: 11.26 K/UL — HIGH (ref 3.8–10.5)
WBC UR QL: SIGNIFICANT CHANGE UP (ref 0–?)

## 2018-03-01 PROCEDURE — 12345: CPT | Mod: NC

## 2018-03-01 PROCEDURE — 99223 1ST HOSP IP/OBS HIGH 75: CPT

## 2018-03-01 RX ORDER — DOCUSATE SODIUM 100 MG
100 CAPSULE ORAL THREE TIMES A DAY
Qty: 0 | Refills: 0 | Status: DISCONTINUED | OUTPATIENT
Start: 2018-03-01 | End: 2018-03-09

## 2018-03-01 RX ORDER — IPRATROPIUM/ALBUTEROL SULFATE 18-103MCG
3 AEROSOL WITH ADAPTER (GRAM) INHALATION EVERY 6 HOURS
Qty: 0 | Refills: 0 | Status: DISCONTINUED | OUTPATIENT
Start: 2018-03-01 | End: 2018-03-09

## 2018-03-01 RX ORDER — CHOLECALCIFEROL (VITAMIN D3) 125 MCG
1000 CAPSULE ORAL DAILY
Qty: 0 | Refills: 0 | Status: DISCONTINUED | OUTPATIENT
Start: 2018-03-01 | End: 2018-03-09

## 2018-03-01 RX ORDER — AMLODIPINE BESYLATE 2.5 MG/1
5 TABLET ORAL DAILY
Qty: 0 | Refills: 0 | Status: DISCONTINUED | OUTPATIENT
Start: 2018-03-01 | End: 2018-03-09

## 2018-03-01 RX ORDER — ACETAMINOPHEN 500 MG
650 TABLET ORAL EVERY 6 HOURS
Qty: 0 | Refills: 0 | Status: DISCONTINUED | OUTPATIENT
Start: 2018-03-01 | End: 2018-03-09

## 2018-03-01 RX ORDER — TIOTROPIUM BROMIDE 18 UG/1
1 CAPSULE ORAL; RESPIRATORY (INHALATION) DAILY
Qty: 0 | Refills: 0 | Status: DISCONTINUED | OUTPATIENT
Start: 2018-03-01 | End: 2018-03-09

## 2018-03-01 RX ORDER — GABAPENTIN 400 MG/1
300 CAPSULE ORAL THREE TIMES A DAY
Qty: 0 | Refills: 0 | Status: DISCONTINUED | OUTPATIENT
Start: 2018-03-01 | End: 2018-03-09

## 2018-03-01 RX ORDER — SENNA PLUS 8.6 MG/1
2 TABLET ORAL AT BEDTIME
Qty: 0 | Refills: 0 | Status: DISCONTINUED | OUTPATIENT
Start: 2018-03-01 | End: 2018-03-09

## 2018-03-01 RX ORDER — ASPIRIN/CALCIUM CARB/MAGNESIUM 324 MG
81 TABLET ORAL DAILY
Qty: 0 | Refills: 0 | Status: DISCONTINUED | OUTPATIENT
Start: 2018-03-01 | End: 2018-03-09

## 2018-03-01 RX ORDER — FUROSEMIDE 40 MG
20 TABLET ORAL DAILY
Qty: 0 | Refills: 0 | Status: DISCONTINUED | OUTPATIENT
Start: 2018-03-01 | End: 2018-03-09

## 2018-03-01 RX ORDER — HEPARIN SODIUM 5000 [USP'U]/ML
5000 INJECTION INTRAVENOUS; SUBCUTANEOUS EVERY 8 HOURS
Qty: 0 | Refills: 0 | Status: DISCONTINUED | OUTPATIENT
Start: 2018-03-01 | End: 2018-03-09

## 2018-03-01 RX ORDER — MINERAL OIL
133 OIL (ML) MISCELLANEOUS ONCE
Qty: 0 | Refills: 0 | Status: COMPLETED | OUTPATIENT
Start: 2018-03-01 | End: 2018-03-01

## 2018-03-01 RX ORDER — CLONAZEPAM 1 MG
0.5 TABLET ORAL AT BEDTIME
Qty: 0 | Refills: 0 | Status: DISCONTINUED | OUTPATIENT
Start: 2018-03-01 | End: 2018-03-07

## 2018-03-01 RX ORDER — NYSTATIN 500MM UNIT
500000 POWDER (EA) MISCELLANEOUS
Qty: 0 | Refills: 0 | Status: DISCONTINUED | OUTPATIENT
Start: 2018-03-01 | End: 2018-03-09

## 2018-03-01 RX ORDER — BUDESONIDE AND FORMOTEROL FUMARATE DIHYDRATE 160; 4.5 UG/1; UG/1
2 AEROSOL RESPIRATORY (INHALATION)
Qty: 0 | Refills: 0 | Status: DISCONTINUED | OUTPATIENT
Start: 2018-03-01 | End: 2018-03-09

## 2018-03-01 RX ADMIN — Medication 133 MILLILITER(S): at 19:39

## 2018-03-01 RX ADMIN — HEPARIN SODIUM 5000 UNIT(S): 5000 INJECTION INTRAVENOUS; SUBCUTANEOUS at 14:11

## 2018-03-01 RX ADMIN — BUDESONIDE AND FORMOTEROL FUMARATE DIHYDRATE 2 PUFF(S): 160; 4.5 AEROSOL RESPIRATORY (INHALATION) at 12:57

## 2018-03-01 RX ADMIN — GABAPENTIN 300 MILLIGRAM(S): 400 CAPSULE ORAL at 14:13

## 2018-03-01 RX ADMIN — TIOTROPIUM BROMIDE 1 CAPSULE(S): 18 CAPSULE ORAL; RESPIRATORY (INHALATION) at 14:00

## 2018-03-01 RX ADMIN — Medication 20 MILLIGRAM(S): at 07:14

## 2018-03-01 RX ADMIN — GABAPENTIN 300 MILLIGRAM(S): 400 CAPSULE ORAL at 07:14

## 2018-03-01 RX ADMIN — HEPARIN SODIUM 5000 UNIT(S): 5000 INJECTION INTRAVENOUS; SUBCUTANEOUS at 21:16

## 2018-03-01 RX ADMIN — Medication 0.5 MILLIGRAM(S): at 21:15

## 2018-03-01 RX ADMIN — Medication 500000 UNIT(S): at 12:57

## 2018-03-01 RX ADMIN — Medication 20 MILLIGRAM(S): at 15:00

## 2018-03-01 RX ADMIN — Medication 100 MILLIGRAM(S): at 14:11

## 2018-03-01 RX ADMIN — Medication 20 MILLIGRAM(S): at 21:16

## 2018-03-01 RX ADMIN — Medication 100 MILLIGRAM(S): at 07:14

## 2018-03-01 RX ADMIN — Medication 1000 UNIT(S): at 12:56

## 2018-03-01 RX ADMIN — Medication 650 MILLIGRAM(S): at 19:15

## 2018-03-01 RX ADMIN — AMLODIPINE BESYLATE 5 MILLIGRAM(S): 2.5 TABLET ORAL at 07:14

## 2018-03-01 RX ADMIN — Medication 500000 UNIT(S): at 23:04

## 2018-03-01 RX ADMIN — Medication 500000 UNIT(S): at 18:02

## 2018-03-01 RX ADMIN — HEPARIN SODIUM 5000 UNIT(S): 5000 INJECTION INTRAVENOUS; SUBCUTANEOUS at 07:14

## 2018-03-01 RX ADMIN — Medication 81 MILLIGRAM(S): at 12:56

## 2018-03-01 RX ADMIN — GABAPENTIN 300 MILLIGRAM(S): 400 CAPSULE ORAL at 21:15

## 2018-03-01 RX ADMIN — BUDESONIDE AND FORMOTEROL FUMARATE DIHYDRATE 2 PUFF(S): 160; 4.5 AEROSOL RESPIRATORY (INHALATION) at 21:16

## 2018-03-01 RX ADMIN — Medication 3 MILLILITER(S): at 10:12

## 2018-03-01 RX ADMIN — Medication 100 MILLIGRAM(S): at 21:15

## 2018-03-01 RX ADMIN — Medication 500000 UNIT(S): at 07:14

## 2018-03-01 RX ADMIN — Medication 3 MILLILITER(S): at 21:16

## 2018-03-01 RX ADMIN — Medication 650 MILLIGRAM(S): at 18:21

## 2018-03-01 RX ADMIN — Medication 3 MILLILITER(S): at 16:20

## 2018-03-01 RX ADMIN — Medication 3 MILLILITER(S): at 00:05

## 2018-03-01 NOTE — PROGRESS NOTE ADULT - SUBJECTIVE AND OBJECTIVE BOX
Patient is a 87y old  Female who presents with a chief complaint of shortness of breath (01 Mar 2018 03:47)      SUBJECTIVE / OVERNIGHT EVENTS: No acute events overnight. Still reports some SOB today but improved from prior. Able to walk with rolling walker with seat few steps without SOB at baseline. No chest pain, N/V/D. Has a chronic cough for years which has not changed from previous.     MEDICATIONS  (STANDING):  ALBUTerol/ipratropium for Nebulization 3 milliLiter(s) Nebulizer every 6 hours  amLODIPine   Tablet 5 milliGRAM(s) Oral daily  aspirin enteric coated 81 milliGRAM(s) Oral daily  buDESOnide 160 MICROgram(s)/formoterol 4.5 MICROgram(s) Inhaler 2 Puff(s) Inhalation two times a day  cholecalciferol 1000 Unit(s) Oral daily  docusate sodium 100 milliGRAM(s) Oral three times a day  furosemide    Tablet 20 milliGRAM(s) Oral daily  gabapentin 300 milliGRAM(s) Oral three times a day  heparin  Injectable 5000 Unit(s) SubCutaneous every 8 hours  nystatin    Suspension 477762 Unit(s) Oral four times a day  tiotropium 18 MICROgram(s) Capsule 1 Capsule(s) Inhalation daily    MEDICATIONS  (PRN):  acetaminophen   Tablet. 650 milliGRAM(s) Oral every 6 hours PRN Mild, Moderate or Severe pain  clonazePAM Tablet 0.5 milliGRAM(s) Oral at bedtime PRN anxiety  senna 2 Tablet(s) Oral at bedtime PRN Constipation      T(C): 36.7 (18 @ 07:07), Max: 37.4 (18 @ 19:53)  HR: 84 (18 @ 10:12) (77 - 95)  BP: 151/54 (18 @ 07:07) (130/70 - 151/54)  RR: 18 (18 @ 07:07) (16 - 22)  SpO2: 93% (18 @ 10:12) (92% - 98%)  CAPILLARY BLOOD GLUCOSE        I&O's Summary      PHYSICAL EXAM:  GENERAL: obese female seated on commode next to bed on 3 liters NC  EYES: sclera clear b/l  CHEST/LUNG: slightly decreased breath sounds at bases bilaterally; No wheezing or crackles appreciated, noted dry but congested sounding cough  HEART: s1/s2; No murmurs, rubs, or gallops  ABDOMEN: Soft, Nontender, Nondistended; Bowel sounds present  EXTREMITIES:  2+ Peripheral Pulses, No clubbing, cyanosis, or edema  NEUROLOGY: awake, alert, responds to Qs appropriately, no gross focal deficits    LABS:                        12.9   11.26 )-----------( 349      ( 01 Mar 2018 06:45 )             43.9     03-    142  |  99  |  50<H>  ----------------------------<  149<H>  3.6   |  29  |  1.18    Ca    8.6      01 Mar 2018 06:45    TPro  7.5  /  Alb  3.8  /  TBili  0.3  /  DBili  x   /  AST  25  /  ALT  24  /  AlkPhos  95  02-28          Urinalysis Basic - ( 01 Mar 2018 07:30 )    Color: YELLOW / Appearance: CLEAR / S.019 / pH: 6.0  Gluc: NEGATIVE / Ketone: NEGATIVE  / Bili: NEGATIVE / Urobili: NORMAL mg/dL   Blood: NEGATIVE / Protein: 20 mg/dL / Nitrite: NEGATIVE   Leuk Esterase: NEGATIVE / RBC: 0-2 / WBC 2-5   Sq Epi: OCC / Non Sq Epi: x / Bacteria: x        RADIOLOGY & ADDITIONAL TESTS:

## 2018-03-01 NOTE — H&P ADULT - PROBLEM SELECTOR PLAN 7
slowly increasing BUN/Cr, however overall renal function stable  renally dose meds, avoid nephrotoxins, trend Cr  (pt reports she was on furosemide 40mg at [place], will decrease back to home dose of 20mg)  check UA, check urine urea, Cr

## 2018-03-01 NOTE — H&P ADULT - PROBLEM SELECTOR PLAN 1
ATC nebs for now  c/w tiotropium and budesonide/formoterol   pulm consult placed by ED, f/u recs  hold off on further steroids for now, no wheezing on exam  No S/Sx of infection, no need for abx  f/u official read of CXR  CPAP QHS, O2 PRN  no URI sx other than sore throat (which I suspect is related to thrush), will not repeat RVP at this time  may be beneficial to obtain information regarding recent medical treatment at [place]

## 2018-03-01 NOTE — H&P ADULT - NSHPREVIEWOFSYSTEMS_GEN_ALL_CORE
REVIEW OF SYSTEMS:    CONSTITUTIONAL: No weakness, fevers or chills  EYES/ENT: No visual changes;  (+) dysphagia  NECK: No pain or stiffness  RESPIRATORY:  see HPI  CARDIOVASCULAR: No chest pain or palpitations; Chronic lower extremity edema  GASTROINTESTINAL: No abdominal or epigastric pain. No nausea, vomiting; No diarrhea or constipation.   GENITOURINARY: No dysuria, frequency or hematuria  NEUROLOGICAL: No numbness or weakness; ambulates with cane or walker, no recent falls  SKIN: No itching, burning, rashes, or lesions   All other review of systems is negative unless indicated above.

## 2018-03-01 NOTE — H&P ADULT - NSHPPHYSICALEXAM_GEN_ALL_CORE
PHYSICAL EXAM:  GENERAL: NAD, well-developed, well-nourished  HEAD:  Atraumatic, Normocephalic  EYES: EOMI, PERRLA, conjunctiva and sclera clear  ENT: dry mucous membranes, posterior pharyngeal erythema with small white papules noted  NECK: Supple, No JVD  CHEST/LUNG: CPAP in place, Occasional dry crackles auscultated on expiration, otherwise clear lungs  HEART: Regular rate and rhythm; No murmurs, rubs, or gallops, (+)S1, S2  ABDOMEN: Obese abdomen, Soft, Nontender, Nondistended; Normal Bowel sounds   EXTREMITIES:  2+ Peripheral Pulses, No clubbing, cyanosis; 3+ pitting edema b/l LE  PSYCH: normal mood and anxious affect  NEUROLOGY: A&O, no focal neurological deficits  SKIN: No rashes or lesions

## 2018-03-01 NOTE — CHART NOTE - NSCHARTNOTEFT_GEN_A_CORE
spoke with medicine attending Dr. Garcia - as discussed, follow up with pulmonary consult (called); patient is known to Dr. Sheets's group.

## 2018-03-01 NOTE — PROGRESS NOTE ADULT - SUBJECTIVE AND OBJECTIVE BOX
PULMONARY PROGRESS NOTE    VIDAL LOUIE  MRN-[id number]    Patient is a 87y old  Female who presents with a chief complaint of shortness of breath (01 Mar 2018 03:47)      HPI:  -Patient was in [facility] last week, discharged, felt well until sunday when she called the ambulance and was taken to [facility].  They told her she couldn't stay because insurance wouldn't pay so was discharged home.  when she got home was still very sob so came back to [facility].  complains of dry cough, worsening lower extremity swelling.  no fever/chills.  Has been using her CPAP at home as ordered.  finished prednisone from previous admission.    ROS:   -no N/V/D    ACTIVE MEDICATION LIST:  MEDICATIONS  (STANDING):  ALBUTerol/ipratropium for Nebulization 3 milliLiter(s) Nebulizer every 6 hours  amLODIPine   Tablet 5 milliGRAM(s) Oral daily  aspirin enteric coated 81 milliGRAM(s) Oral daily  buDESOnide 160 MICROgram(s)/formoterol 4.5 MICROgram(s) Inhaler 2 Puff(s) Inhalation two times a day  cholecalciferol 1000 Unit(s) Oral daily  docusate sodium 100 milliGRAM(s) Oral three times a day  furosemide    Tablet 20 milliGRAM(s) Oral daily  gabapentin 300 milliGRAM(s) Oral three times a day  heparin  Injectable 5000 Unit(s) SubCutaneous every 8 hours  nystatin    Suspension 007652 Unit(s) Oral four times a day  tiotropium 18 MICROgram(s) Capsule 1 Capsule(s) Inhalation daily    MEDICATIONS  (PRN):  acetaminophen   Tablet. 650 milliGRAM(s) Oral every 6 hours PRN Mild, Moderate or Severe pain  clonazePAM Tablet 0.5 milliGRAM(s) Oral at bedtime PRN anxiety  senna 2 Tablet(s) Oral at bedtime PRN Constipation      EXAM:  Vital Signs Last 24 Hrs  T(C): 36.7 (01 Mar 2018 07:07), Max: 37.4 (28 Feb 2018 19:53)  T(F): 98 (01 Mar 2018 07:07), Max: 99.3 (28 Feb 2018 19:53)  HR: 84 (01 Mar 2018 10:12) (77 - 95)  BP: 151/54 (01 Mar 2018 07:07) (130/70 - 151/54)  BP(mean): --  RR: 18 (01 Mar 2018 07:07) (16 - 22)  SpO2: 93% (01 Mar 2018 10:12) (92% - 98%)    GENERAL: The patient is awake and alert in no apparent distress.     SKIN: Warm, dry    LUNGS: faint exp squeeks/wheezes    HEART: distant    ABDOMEN: +BS, Soft, Nontender    EXTREMITIES: No clubbing, +++ edema    LABS/IMGAING: reviewed                        12.9   11.26 )-----------( 349      ( 01 Mar 2018 06:45 )             43.9     03-01    142  |  99  |  50<H>  ----------------------------<  149<H>  3.6   |  29  |  1.18    Ca    8.6      01 Mar 2018 06:45    TPro  7.5  /  Alb  3.8  /  TBili  0.3  /  DBili  x   /  AST  25  /  ALT  24  /  AlkPhos  95  02-28    < from: Xray Chest 2 Views PA/Lat (02.28.18 @ 19:40) >  IMPRESSION:   Clear lungs.    < end of copied text >      < from: CT Angio Chest w/ IV Cont (02.16.18 @ 02:54) >  IMPRESSION:     1.  No pulmonary embolism as above.  2.  Emphysema. Bilateral subsegmental atelectasis in the lower lobes.    < end of copied text >  PROBLEM LIST:  87y Female with HEALTH ISSUES - PROBLEM Dx:  COPD exacerbation  DAVID/OHV  CKD (chronic kidney disease) stage 3, GFR 30-59 ml/min  HTN  DAVID on CPAP  Anxiety disorder    RECS:  -COPD: add solumedrol 20mg IV Q8, check ABG, continue symbicort, spiriva, duonebs  -DAVID/OHV: continue bipap during sleep, will find out patients settings from home vent.  -Diuresis  -supplemental O2  -OOB, Incentive spirometry, PT eval--rehab? pulm rehab?    Adrianne Garcia MD  [phone] PULMONARY PROGRESS NOTE    VIDAL LOUIE  MRN-[id number]    Patient is a 87y old  Female who presents with a chief complaint of shortness of breath (01 Mar 2018 03:47)      HPI:  -Patient was in [facility] last week, discharged, felt well until sunday when she called the ambulance and was taken to [facility].  They told her she couldn't stay because insurance wouldn't pay so was discharged home.  when she got home was still very sob so came back to [facility].  complains of dry cough, worsening lower extremity swelling.  no fever/chills.  Has been using her CPAP at home as ordered.  finished prednisone from previous admission.    ROS:   -no N/V/D    ACTIVE MEDICATION LIST:  MEDICATIONS  (STANDING):  ALBUTerol/ipratropium for Nebulization 3 milliLiter(s) Nebulizer every 6 hours  amLODIPine   Tablet 5 milliGRAM(s) Oral daily  aspirin enteric coated 81 milliGRAM(s) Oral daily  buDESOnide 160 MICROgram(s)/formoterol 4.5 MICROgram(s) Inhaler 2 Puff(s) Inhalation two times a day  cholecalciferol 1000 Unit(s) Oral daily  docusate sodium 100 milliGRAM(s) Oral three times a day  furosemide    Tablet 20 milliGRAM(s) Oral daily  gabapentin 300 milliGRAM(s) Oral three times a day  heparin  Injectable 5000 Unit(s) SubCutaneous every 8 hours  nystatin    Suspension 864310 Unit(s) Oral four times a day  tiotropium 18 MICROgram(s) Capsule 1 Capsule(s) Inhalation daily    MEDICATIONS  (PRN):  acetaminophen   Tablet. 650 milliGRAM(s) Oral every 6 hours PRN Mild, Moderate or Severe pain  clonazePAM Tablet 0.5 milliGRAM(s) Oral at bedtime PRN anxiety  senna 2 Tablet(s) Oral at bedtime PRN Constipation      EXAM:  Vital Signs Last 24 Hrs  T(C): 36.7 (01 Mar 2018 07:07), Max: 37.4 (28 Feb 2018 19:53)  T(F): 98 (01 Mar 2018 07:07), Max: 99.3 (28 Feb 2018 19:53)  HR: 84 (01 Mar 2018 10:12) (77 - 95)  BP: 151/54 (01 Mar 2018 07:07) (130/70 - 151/54)  BP(mean): --  RR: 18 (01 Mar 2018 07:07) (16 - 22)  SpO2: 93% (01 Mar 2018 10:12) (92% - 98%)    GENERAL: The patient is awake and alert in no apparent distress.     SKIN: Warm, dry    LUNGS: faint exp squeeks/wheezes    HEART: distant    ABDOMEN: +BS, Soft, Nontender    EXTREMITIES: No clubbing, +++ edema    LABS/IMGAING: reviewed                        12.9   11.26 )-----------( 349      ( 01 Mar 2018 06:45 )             43.9     03-01    142  |  99  |  50<H>  ----------------------------<  149<H>  3.6   |  29  |  1.18    Ca    8.6      01 Mar 2018 06:45    TPro  7.5  /  Alb  3.8  /  TBili  0.3  /  DBili  x   /  AST  25  /  ALT  24  /  AlkPhos  95  02-28    < from: Xray Chest 2 Views PA/Lat (02.28.18 @ 19:40) >  IMPRESSION:   Clear lungs.    < end of copied text >      < from: CT Angio Chest w/ IV Cont (02.16.18 @ 02:54) >  IMPRESSION:     1.  No pulmonary embolism as above.  2.  Emphysema. Bilateral subsegmental atelectasis in the lower lobes.    < end of copied text >  PROBLEM LIST:  87y Female with HEALTH ISSUES - PROBLEM Dx:  COPD exacerbation  DAVID/OHV  CKD (chronic kidney disease) stage 3, GFR 30-59 ml/min  HTN  DAVID on CPAP  Anxiety disorder    RECS:  -COPD: add solumedrol 20mg IV Q8, check ABG, continue symbicort, spiriva, duonebs  -DAVID/OHV: continue CPAP during sleep, would increase setting to 10 for now,  will find out patients settings from home vent.  If retaining CO2 on ABG change to bipap 15/10.  -Diuresis  -supplemental O2  -OOB, Incentive spirometry, PT eval--rehab? pulm rehab?    Adrianne Garcia MD  [phone]

## 2018-03-01 NOTE — H&P ADULT - HISTORY OF PRESENT ILLNESS
87-year-old female former smoker with COPD on 3L NC, HTN, neuropathy, DAVID on CPAP QHS, dysphagia, obesity, s/p IVC filter, presenting from home with     Patient recently hospitalized earlier this month (discharged on 2/18/18) for COPD exacerbation     In the ED VS:  99.3  86-95  130-148/59-75  16-22  92-94%, received albuterol/ipratropium nebs x2 87-year-old female former smoker with COPD on 3L NC, HTN, neuropathy, DAVID on CPAP QHS, dysphagia, obesity, s/p IVC filter, presenting from [facility] with shortness of breath.  Patient was recently hospitalized earlier this month (discharged on 2/18/18) for COPD exacerbation, and after discharge went to [facility] 4 days ago for return of symptoms.  Reports chronic dry cough, no hemoptysis, no aspiration events, on nectar liquids at home.  Patient reports that [facility] had told her that her insurance would no longer cover her stay and that she needed to be discharged or pay out of pocket for further in-patient days.  Patient states instead of going home, she came straight to [facility] for further care.  She has not seen her pulmonologist since discharge from [facility].  She denies fevers, chills, chest pain, palpitations, nausea, vomiting, abdominal pain, diarrhea, dysuria, hematuria, weakness. Denies sick contacts, rhinorrhea, sinus pressure, endorses some throat pain that started earlier on day of presentation.     In the ED VS:  99.3  86-95  130-148/59-75  16-22  92-94%, received albuterol/ipratropium nebs x2

## 2018-03-01 NOTE — H&P ADULT - NSHPLABSRESULTS_GEN_ALL_CORE
02-28    139  |  94<L>  |  49<H>  ----------------------------<  223<H>  3.5   |  27  |  1.34<H>    Ca    9.0      28 Feb 2018 19:15    TPro  7.5  /  Alb  3.8  /  TBili  0.3  /  DBili  x   /  AST  25  /  ALT  24  /  AlkPhos  95  02-28                        13.4   10.35 )-----------( 458      ( 28 Feb 2018 19:15 )             45.0     LIVER FUNCTIONS - ( 28 Feb 2018 19:15 )  Alb: 3.8 g/dL / Pro: 7.5 g/dL / ALK PHOS: 95 u/L / ALT: 24 u/L / AST: 25 u/L / GGT: x           CXR personally reviewed - clear lungs, hilar fullness bilaterally 02-28    139  |  94<L>  |  49<H>  ----------------------------<  223<H>  3.5   |  27  |  1.34<H>    Ca    9.0      28 Feb 2018 19:15    TPro  7.5  /  Alb  3.8  /  TBili  0.3  /  DBili  x   /  AST  25  /  ALT  24  /  AlkPhos  95  02-28                        13.4   10.35 )-----------( 458      ( 28 Feb 2018 19:15 )             45.0     LIVER FUNCTIONS - ( 28 Feb 2018 19:15 )  Alb: 3.8 g/dL / Pro: 7.5 g/dL / ALK PHOS: 95 u/L / ALT: 24 u/L / AST: 25 u/L / GGT: x           CXR personally reviewed - clear lungs, hilar fullness bilaterally    EKG personally reviewed - 98bpm NSR, QTc 472ms, flat T in V2-V3, TWI in V1, unchanged EKG

## 2018-03-01 NOTE — H&P ADULT - NSHPSOCIALHISTORY_GEN_ALL_CORE
Former smoker  Denies alcohol or illicit drug use   Former smoker  Denies alcohol or illicit drug use  , lives with

## 2018-03-01 NOTE — H&P ADULT - ASSESSMENT
87-year-old female former smoker with COPD on 3L NC, HTN, neuropathy, DAVID on CPAP QHS, dysphagia, obesity, s/p IVC filter, presenting from [facility] with shortness of breath, concern for persisting COPD exacerbation

## 2018-03-02 DIAGNOSIS — K59.01 SLOW TRANSIT CONSTIPATION: ICD-10-CM

## 2018-03-02 DIAGNOSIS — N17.9 ACUTE KIDNEY FAILURE, UNSPECIFIED: ICD-10-CM

## 2018-03-02 LAB
ALBUMIN SERPL ELPH-MCNC: 3.4 G/DL — SIGNIFICANT CHANGE UP (ref 3.3–5)
ALP SERPL-CCNC: 82 U/L — SIGNIFICANT CHANGE UP (ref 40–120)
ALT FLD-CCNC: 23 U/L — SIGNIFICANT CHANGE UP (ref 4–33)
AST SERPL-CCNC: 22 U/L — SIGNIFICANT CHANGE UP (ref 4–32)
B PERT DNA SPEC QL NAA+PROBE: SIGNIFICANT CHANGE UP
BASOPHILS # BLD AUTO: 0.02 K/UL — SIGNIFICANT CHANGE UP (ref 0–0.2)
BASOPHILS NFR BLD AUTO: 0.2 % — SIGNIFICANT CHANGE UP (ref 0–2)
BILIRUB SERPL-MCNC: 0.5 MG/DL — SIGNIFICANT CHANGE UP (ref 0.2–1.2)
BUN SERPL-MCNC: 40 MG/DL — HIGH (ref 7–23)
C PNEUM DNA SPEC QL NAA+PROBE: NOT DETECTED — SIGNIFICANT CHANGE UP
CALCIUM SERPL-MCNC: 8.5 MG/DL — SIGNIFICANT CHANGE UP (ref 8.4–10.5)
CHLORIDE SERPL-SCNC: 97 MMOL/L — LOW (ref 98–107)
CO2 SERPL-SCNC: 29 MMOL/L — SIGNIFICANT CHANGE UP (ref 22–31)
CREAT SERPL-MCNC: 1.03 MG/DL — SIGNIFICANT CHANGE UP (ref 0.5–1.3)
EOSINOPHIL # BLD AUTO: 0 K/UL — SIGNIFICANT CHANGE UP (ref 0–0.5)
EOSINOPHIL NFR BLD AUTO: 0 % — SIGNIFICANT CHANGE UP (ref 0–6)
FLUAV H1 2009 PAND RNA SPEC QL NAA+PROBE: NOT DETECTED — SIGNIFICANT CHANGE UP
FLUAV H1 RNA SPEC QL NAA+PROBE: NOT DETECTED — SIGNIFICANT CHANGE UP
FLUAV H3 RNA SPEC QL NAA+PROBE: NOT DETECTED — SIGNIFICANT CHANGE UP
FLUAV SUBTYP SPEC NAA+PROBE: SIGNIFICANT CHANGE UP
FLUBV RNA SPEC QL NAA+PROBE: NOT DETECTED — SIGNIFICANT CHANGE UP
GLUCOSE SERPL-MCNC: 218 MG/DL — HIGH (ref 70–99)
HADV DNA SPEC QL NAA+PROBE: NOT DETECTED — SIGNIFICANT CHANGE UP
HCOV 229E RNA SPEC QL NAA+PROBE: NOT DETECTED — SIGNIFICANT CHANGE UP
HCOV HKU1 RNA SPEC QL NAA+PROBE: NOT DETECTED — SIGNIFICANT CHANGE UP
HCOV NL63 RNA SPEC QL NAA+PROBE: NOT DETECTED — SIGNIFICANT CHANGE UP
HCOV OC43 RNA SPEC QL NAA+PROBE: NOT DETECTED — SIGNIFICANT CHANGE UP
HCT VFR BLD CALC: 43.4 % — SIGNIFICANT CHANGE UP (ref 34.5–45)
HGB BLD-MCNC: 13.3 G/DL — SIGNIFICANT CHANGE UP (ref 11.5–15.5)
HMPV RNA SPEC QL NAA+PROBE: NOT DETECTED — SIGNIFICANT CHANGE UP
HPIV1 RNA SPEC QL NAA+PROBE: NOT DETECTED — SIGNIFICANT CHANGE UP
HPIV2 RNA SPEC QL NAA+PROBE: NOT DETECTED — SIGNIFICANT CHANGE UP
HPIV3 RNA SPEC QL NAA+PROBE: NOT DETECTED — SIGNIFICANT CHANGE UP
HPIV4 RNA SPEC QL NAA+PROBE: NOT DETECTED — SIGNIFICANT CHANGE UP
IMM GRANULOCYTES # BLD AUTO: 0.07 # — SIGNIFICANT CHANGE UP
IMM GRANULOCYTES NFR BLD AUTO: 0.8 % — SIGNIFICANT CHANGE UP (ref 0–1.5)
LYMPHOCYTES # BLD AUTO: 1.19 K/UL — SIGNIFICANT CHANGE UP (ref 1–3.3)
LYMPHOCYTES # BLD AUTO: 13.9 % — SIGNIFICANT CHANGE UP (ref 13–44)
M PNEUMO DNA SPEC QL NAA+PROBE: NOT DETECTED — SIGNIFICANT CHANGE UP
MCHC RBC-ENTMCNC: 24.1 PG — LOW (ref 27–34)
MCHC RBC-ENTMCNC: 30.6 % — LOW (ref 32–36)
MCV RBC AUTO: 78.6 FL — LOW (ref 80–100)
MONOCYTES # BLD AUTO: 0.27 K/UL — SIGNIFICANT CHANGE UP (ref 0–0.9)
MONOCYTES NFR BLD AUTO: 3.2 % — SIGNIFICANT CHANGE UP (ref 2–14)
NEUTROPHILS # BLD AUTO: 6.99 K/UL — SIGNIFICANT CHANGE UP (ref 1.8–7.4)
NEUTROPHILS NFR BLD AUTO: 81.9 % — HIGH (ref 43–77)
NRBC # FLD: 0 — SIGNIFICANT CHANGE UP
PLATELET # BLD AUTO: 336 K/UL — SIGNIFICANT CHANGE UP (ref 150–400)
PMV BLD: 9.3 FL — SIGNIFICANT CHANGE UP (ref 7–13)
POTASSIUM SERPL-MCNC: 4 MMOL/L — SIGNIFICANT CHANGE UP (ref 3.5–5.3)
POTASSIUM SERPL-SCNC: 4 MMOL/L — SIGNIFICANT CHANGE UP (ref 3.5–5.3)
PROT SERPL-MCNC: 6.7 G/DL — SIGNIFICANT CHANGE UP (ref 6–8.3)
RBC # BLD: 5.52 M/UL — HIGH (ref 3.8–5.2)
RBC # FLD: 16.7 % — HIGH (ref 10.3–14.5)
RSV RNA SPEC QL NAA+PROBE: POSITIVE — HIGH
RV+EV RNA SPEC QL NAA+PROBE: NOT DETECTED — SIGNIFICANT CHANGE UP
SODIUM SERPL-SCNC: 140 MMOL/L — SIGNIFICANT CHANGE UP (ref 135–145)
WBC # BLD: 8.54 K/UL — SIGNIFICANT CHANGE UP (ref 3.8–10.5)
WBC # FLD AUTO: 8.54 K/UL — SIGNIFICANT CHANGE UP (ref 3.8–10.5)

## 2018-03-02 PROCEDURE — 99233 SBSQ HOSP IP/OBS HIGH 50: CPT

## 2018-03-02 RX ORDER — FLUTICASONE PROPIONATE 50 MCG
1 SPRAY, SUSPENSION NASAL
Qty: 0 | Refills: 0 | Status: COMPLETED | OUTPATIENT
Start: 2018-03-02 | End: 2018-03-05

## 2018-03-02 RX ORDER — POLYETHYLENE GLYCOL 3350 17 G/17G
17 POWDER, FOR SOLUTION ORAL ONCE
Qty: 0 | Refills: 0 | Status: COMPLETED | OUTPATIENT
Start: 2018-03-02 | End: 2018-03-02

## 2018-03-02 RX ORDER — LACTULOSE 10 G/15ML
15 SOLUTION ORAL ONCE
Qty: 0 | Refills: 0 | Status: COMPLETED | OUTPATIENT
Start: 2018-03-02 | End: 2018-03-03

## 2018-03-02 RX ADMIN — BUDESONIDE AND FORMOTEROL FUMARATE DIHYDRATE 2 PUFF(S): 160; 4.5 AEROSOL RESPIRATORY (INHALATION) at 21:30

## 2018-03-02 RX ADMIN — Medication 81 MILLIGRAM(S): at 13:09

## 2018-03-02 RX ADMIN — Medication 1000 UNIT(S): at 13:08

## 2018-03-02 RX ADMIN — POLYETHYLENE GLYCOL 3350 17 GRAM(S): 17 POWDER, FOR SOLUTION ORAL at 13:06

## 2018-03-02 RX ADMIN — GABAPENTIN 300 MILLIGRAM(S): 400 CAPSULE ORAL at 13:09

## 2018-03-02 RX ADMIN — GABAPENTIN 300 MILLIGRAM(S): 400 CAPSULE ORAL at 23:56

## 2018-03-02 RX ADMIN — GABAPENTIN 300 MILLIGRAM(S): 400 CAPSULE ORAL at 06:29

## 2018-03-02 RX ADMIN — HEPARIN SODIUM 5000 UNIT(S): 5000 INJECTION INTRAVENOUS; SUBCUTANEOUS at 06:29

## 2018-03-02 RX ADMIN — Medication 500000 UNIT(S): at 17:18

## 2018-03-02 RX ADMIN — Medication 3 MILLILITER(S): at 22:56

## 2018-03-02 RX ADMIN — Medication 500000 UNIT(S): at 06:29

## 2018-03-02 RX ADMIN — Medication 100 MILLIGRAM(S): at 23:56

## 2018-03-02 RX ADMIN — Medication 20 MILLIGRAM(S): at 23:55

## 2018-03-02 RX ADMIN — Medication 3 MILLILITER(S): at 10:04

## 2018-03-02 RX ADMIN — Medication 20 MILLIGRAM(S): at 13:06

## 2018-03-02 RX ADMIN — Medication 3 MILLILITER(S): at 16:40

## 2018-03-02 RX ADMIN — HEPARIN SODIUM 5000 UNIT(S): 5000 INJECTION INTRAVENOUS; SUBCUTANEOUS at 13:09

## 2018-03-02 RX ADMIN — Medication 1 SPRAY(S): at 17:18

## 2018-03-02 RX ADMIN — AMLODIPINE BESYLATE 5 MILLIGRAM(S): 2.5 TABLET ORAL at 06:29

## 2018-03-02 RX ADMIN — BUDESONIDE AND FORMOTEROL FUMARATE DIHYDRATE 2 PUFF(S): 160; 4.5 AEROSOL RESPIRATORY (INHALATION) at 10:03

## 2018-03-02 RX ADMIN — TIOTROPIUM BROMIDE 1 CAPSULE(S): 18 CAPSULE ORAL; RESPIRATORY (INHALATION) at 10:03

## 2018-03-02 RX ADMIN — Medication 20 MILLIGRAM(S): at 06:29

## 2018-03-02 RX ADMIN — Medication 100 MILLIGRAM(S): at 13:08

## 2018-03-02 RX ADMIN — Medication 3 MILLILITER(S): at 04:53

## 2018-03-02 RX ADMIN — HEPARIN SODIUM 5000 UNIT(S): 5000 INJECTION INTRAVENOUS; SUBCUTANEOUS at 23:56

## 2018-03-02 RX ADMIN — Medication 100 MILLIGRAM(S): at 06:29

## 2018-03-02 NOTE — PHYSICAL THERAPY INITIAL EVALUATION ADULT - PERTINENT HX OF CURRENT PROBLEM, REHAB EVAL
87-year-old female former smoker with COPD on 3L NC, HTN, neuropathy, DAVID on CPAP QHS, dysphagia, obesity, s/p IVC filter, presenting from [facility] with shortness of breath, concern for persisting COPD exacerbation

## 2018-03-02 NOTE — PROGRESS NOTE ADULT - SUBJECTIVE AND OBJECTIVE BOX
Patient is a 87y old  Female who presents with a chief complaint of shortness of breath (01 Mar 2018 03:47)      SUBJECTIVE / OVERNIGHT EVENTS: No acute events overnight. Reports breathing is the same and that she now has runny and stuffy nose. No chest pain, N/V/D. Still constipated.    MEDICATIONS  (STANDING):  ALBUTerol/ipratropium for Nebulization 3 milliLiter(s) Nebulizer every 6 hours  amLODIPine   Tablet 5 milliGRAM(s) Oral daily  aspirin enteric coated 81 milliGRAM(s) Oral daily  buDESOnide 160 MICROgram(s)/formoterol 4.5 MICROgram(s) Inhaler 2 Puff(s) Inhalation two times a day  cholecalciferol 1000 Unit(s) Oral daily  docusate sodium 100 milliGRAM(s) Oral three times a day  fluticasone propionate 50 MICROgram(s)/spray Nasal Spray 1 Spray(s) Both Nostrils two times a day  furosemide    Tablet 20 milliGRAM(s) Oral daily  gabapentin 300 milliGRAM(s) Oral three times a day  heparin  Injectable 5000 Unit(s) SubCutaneous every 8 hours  methylPREDNISolone sodium succinate Injectable 20 milliGRAM(s) IV Push every 8 hours  nystatin    Suspension 217594 Unit(s) Oral four times a day  polyethylene glycol 3350 17 Gram(s) Oral once  tiotropium 18 MICROgram(s) Capsule 1 Capsule(s) Inhalation daily    MEDICATIONS  (PRN):  acetaminophen   Tablet. 650 milliGRAM(s) Oral every 6 hours PRN Mild, Moderate or Severe pain  clonazePAM Tablet 0.5 milliGRAM(s) Oral at bedtime PRN anxiety  senna 2 Tablet(s) Oral at bedtime PRN Constipation      T(C): 36.6 (18 @ 05:37), Max: 36.6 (18 @ 20:52)  HR: 81 (18 @ 10:03) (72 - 89)  BP: 154/68 (18 @ 05:37) (130/68 - 154/68)  RR: 18 (18 @ 05:37) (16 - 18)  SpO2: 94% (18 @ 10:03) (91% - 98%)  CAPILLARY BLOOD GLUCOSE    I&O's Summary    PHYSICAL EXAM:  GENERAL: obese female seated up in bed on NC  EYES: sclera clear b/l  CHEST/LUNG: mild expiratory wheezing bilaterally with some rhonchi; congested sounding cough  HEART: s1/s2; No murmurs, rubs, or gallops  ABDOMEN: Soft, Nontender, Nondistended; Bowel sounds present  EXTREMITIES:  2+ Peripheral Pulses, No clubbing, cyanosis, or edema  NEUROLOGY: awake, alert, responds to Qs appropriately, no gross focal deficits    LABS:                        13.3   8.54  )-----------( 336      ( 02 Mar 2018 06:30 )             43.4     03-02    140  |  97<L>  |  40<H>  ----------------------------<  218<H>  4.0   |  29  |  1.03    Ca    8.5      02 Mar 2018 06:30    TPro  6.7  /  Alb  3.4  /  TBili  0.5  /  DBili  x   /  AST  22  /  ALT  23  /  AlkPhos  82  03-02          Urinalysis Basic - ( 01 Mar 2018 07:30 )    Color: YELLOW / Appearance: CLEAR / S.019 / pH: 6.0  Gluc: NEGATIVE / Ketone: NEGATIVE  / Bili: NEGATIVE / Urobili: NORMAL mg/dL   Blood: NEGATIVE / Protein: 20 mg/dL / Nitrite: NEGATIVE   Leuk Esterase: NEGATIVE / RBC: 0-2 / WBC 2-5   Sq Epi: OCC / Non Sq Epi: x / Bacteria: x        RADIOLOGY & ADDITIONAL TESTS:

## 2018-03-02 NOTE — PHYSICAL THERAPY INITIAL EVALUATION ADULT - GENERAL OBSERVATIONS, REHAB EVAL
Pt seen sitting on side of bed, c/o not feeling well, c/o constipation. Appears anxious Pt seen sitting on side of bed, c/o not feeling well, c/o constipation. Appears anxious. LE edema noted

## 2018-03-02 NOTE — PROGRESS NOTE ADULT - SUBJECTIVE AND OBJECTIVE BOX
PULMONARY PROGRESS NOTE    VIDAL LOUIE  MRN-[id number]    Patient is a 87y old  Female who presents with a chief complaint of shortness of breath (01 Mar 2018 03:47)      HPI:  -distressed that she is constipated, making breathing more difficult.   Used cpap last night.      ROS:   -no N/V/D    MEDICATIONS  (STANDING):  ALBUTerol/ipratropium for Nebulization 3 milliLiter(s) Nebulizer every 6 hours  amLODIPine   Tablet 5 milliGRAM(s) Oral daily  aspirin enteric coated 81 milliGRAM(s) Oral daily  buDESOnide 160 MICROgram(s)/formoterol 4.5 MICROgram(s) Inhaler 2 Puff(s) Inhalation two times a day  cholecalciferol 1000 Unit(s) Oral daily  docusate sodium 100 milliGRAM(s) Oral three times a day  fluticasone propionate 50 MICROgram(s)/spray Nasal Spray 1 Spray(s) Both Nostrils two times a day  furosemide    Tablet 20 milliGRAM(s) Oral daily  gabapentin 300 milliGRAM(s) Oral three times a day  heparin  Injectable 5000 Unit(s) SubCutaneous every 8 hours  methylPREDNISolone sodium succinate Injectable 20 milliGRAM(s) IV Push every 8 hours  nystatin    Suspension 677566 Unit(s) Oral four times a day  polyethylene glycol 3350 17 Gram(s) Oral once  tiotropium 18 MICROgram(s) Capsule 1 Capsule(s) Inhalation daily    MEDICATIONS  (PRN):  acetaminophen   Tablet. 650 milliGRAM(s) Oral every 6 hours PRN Mild, Moderate or Severe pain  clonazePAM Tablet 0.5 milliGRAM(s) Oral at bedtime PRN anxiety  senna 2 Tablet(s) Oral at bedtime PRN Constipation        EXAM:  Vital Signs Last 24 Hrs  T(C): 36.6 (02 Mar 2018 05:37), Max: 36.6 (01 Mar 2018 20:52)  T(F): 97.8 (02 Mar 2018 05:37), Max: 97.8 (01 Mar 2018 20:52)  HR: 81 (02 Mar 2018 10:03) (72 - 89)  BP: 154/68 (02 Mar 2018 05:37) (130/68 - 154/68)  BP(mean): --  RR: 18 (02 Mar 2018 05:37) (16 - 18)  SpO2: 94% (02 Mar 2018 10:03) (91% - 98%)    GENERAL: The patient is awake and alert, upset    SKIN: Warm, dry    LUNGS: clear     HEART: distant    ABDOMEN: +BS, Soft, Nontender    EXTREMITIES: No clubbing, +++ edema    LABS/IMGAING: reviewed                        13.3   8.54  )-----------( 336      ( 02 Mar 2018 06:30 )             43.4   03-02    140  |  97<L>  |  40<H>  ----------------------------<  218<H>  4.0   |  29  |  1.03    Ca    8.5      02 Mar 2018 06:30    TPro  6.7  /  Alb  3.4  /  TBili  0.5  /  DBili  x   /  AST  22  /  ALT  23  /  AlkPhos  82  03-02    ABG - ( 01 Mar 2018 13:45 )  pH: 7.47  /  pCO2: 43    /  pO2: 69    / HCO3: 30    / Base Excess: 7.0   /  SaO2: 92.8                < from: Xray Chest 2 Views PA/Lat (02.28.18 @ 19:40) >  IMPRESSION:   Clear lungs.    < end of copied text >      < from: CT Angio Chest w/ IV Cont (02.16.18 @ 02:54) >  IMPRESSION:     1.  No pulmonary embolism as above.  2.  Emphysema. Bilateral subsegmental atelectasis in the lower lobes.    < end of copied text >    PROBLEM LIST:  87y Female with HEALTH ISSUES - PROBLEM Dx:  COPD exacerbation  DAVID/OHV  CKD (chronic kidney disease) stage 3, GFR 30-59 ml/min  HTN  DAVID on CPAP  Anxiety disorder    RECS:  -COPD: continue solumedrol 20mg IV Q8, can change to prednisone 40mg daily tomorrow if continues to do ok  -continue symbicort, spiriva, duonebs  -DAVID/OHV: continue CPAP during sleep, [id number], patient on auto cpap at home  -Diuresis  -supplemental O2  -OOB, Incentive spirometry, PT eval--rehab? pulm rehab?  -bowel regimen    Adrianne Garcia MD  [phone]

## 2018-03-03 LAB
ALBUMIN SERPL ELPH-MCNC: 3.5 G/DL — SIGNIFICANT CHANGE UP (ref 3.3–5)
ALP SERPL-CCNC: 76 U/L — SIGNIFICANT CHANGE UP (ref 40–120)
ALT FLD-CCNC: 21 U/L — SIGNIFICANT CHANGE UP (ref 4–33)
AST SERPL-CCNC: 18 U/L — SIGNIFICANT CHANGE UP (ref 4–32)
BASOPHILS # BLD AUTO: 0.02 K/UL — SIGNIFICANT CHANGE UP (ref 0–0.2)
BASOPHILS NFR BLD AUTO: 0.2 % — SIGNIFICANT CHANGE UP (ref 0–2)
BILIRUB SERPL-MCNC: 0.6 MG/DL — SIGNIFICANT CHANGE UP (ref 0.2–1.2)
BUN SERPL-MCNC: 37 MG/DL — HIGH (ref 7–23)
CALCIUM SERPL-MCNC: 8.4 MG/DL — SIGNIFICANT CHANGE UP (ref 8.4–10.5)
CHLORIDE SERPL-SCNC: 98 MMOL/L — SIGNIFICANT CHANGE UP (ref 98–107)
CO2 SERPL-SCNC: 31 MMOL/L — SIGNIFICANT CHANGE UP (ref 22–31)
CREAT SERPL-MCNC: 0.96 MG/DL — SIGNIFICANT CHANGE UP (ref 0.5–1.3)
EOSINOPHIL # BLD AUTO: 0 K/UL — SIGNIFICANT CHANGE UP (ref 0–0.5)
EOSINOPHIL NFR BLD AUTO: 0 % — SIGNIFICANT CHANGE UP (ref 0–6)
GLUCOSE SERPL-MCNC: 185 MG/DL — HIGH (ref 70–99)
HCT VFR BLD CALC: 42.1 % — SIGNIFICANT CHANGE UP (ref 34.5–45)
HGB BLD-MCNC: 12.8 G/DL — SIGNIFICANT CHANGE UP (ref 11.5–15.5)
IMM GRANULOCYTES # BLD AUTO: 0.09 # — SIGNIFICANT CHANGE UP
IMM GRANULOCYTES NFR BLD AUTO: 0.8 % — SIGNIFICANT CHANGE UP (ref 0–1.5)
LYMPHOCYTES # BLD AUTO: 1.89 K/UL — SIGNIFICANT CHANGE UP (ref 1–3.3)
LYMPHOCYTES # BLD AUTO: 17.7 % — SIGNIFICANT CHANGE UP (ref 13–44)
MCHC RBC-ENTMCNC: 24 PG — LOW (ref 27–34)
MCHC RBC-ENTMCNC: 30.4 % — LOW (ref 32–36)
MCV RBC AUTO: 78.8 FL — LOW (ref 80–100)
MONOCYTES # BLD AUTO: 0.5 K/UL — SIGNIFICANT CHANGE UP (ref 0–0.9)
MONOCYTES NFR BLD AUTO: 4.7 % — SIGNIFICANT CHANGE UP (ref 2–14)
NEUTROPHILS # BLD AUTO: 8.19 K/UL — HIGH (ref 1.8–7.4)
NEUTROPHILS NFR BLD AUTO: 76.6 % — SIGNIFICANT CHANGE UP (ref 43–77)
NRBC # FLD: 0 — SIGNIFICANT CHANGE UP
PLATELET # BLD AUTO: 369 K/UL — SIGNIFICANT CHANGE UP (ref 150–400)
PMV BLD: 9.4 FL — SIGNIFICANT CHANGE UP (ref 7–13)
POTASSIUM SERPL-MCNC: 3.9 MMOL/L — SIGNIFICANT CHANGE UP (ref 3.5–5.3)
POTASSIUM SERPL-SCNC: 3.9 MMOL/L — SIGNIFICANT CHANGE UP (ref 3.5–5.3)
PROT SERPL-MCNC: 6.5 G/DL — SIGNIFICANT CHANGE UP (ref 6–8.3)
RBC # BLD: 5.34 M/UL — HIGH (ref 3.8–5.2)
RBC # FLD: 16.7 % — HIGH (ref 10.3–14.5)
SODIUM SERPL-SCNC: 141 MMOL/L — SIGNIFICANT CHANGE UP (ref 135–145)
WBC # BLD: 10.69 K/UL — HIGH (ref 3.8–10.5)
WBC # FLD AUTO: 10.69 K/UL — HIGH (ref 3.8–10.5)

## 2018-03-03 PROCEDURE — 99233 SBSQ HOSP IP/OBS HIGH 50: CPT

## 2018-03-03 RX ORDER — POLYETHYLENE GLYCOL 3350 17 G/17G
17 POWDER, FOR SOLUTION ORAL DAILY
Qty: 0 | Refills: 0 | Status: DISCONTINUED | OUTPATIENT
Start: 2018-03-03 | End: 2018-03-09

## 2018-03-03 RX ADMIN — Medication 500000 UNIT(S): at 06:26

## 2018-03-03 RX ADMIN — HEPARIN SODIUM 5000 UNIT(S): 5000 INJECTION INTRAVENOUS; SUBCUTANEOUS at 06:26

## 2018-03-03 RX ADMIN — BUDESONIDE AND FORMOTEROL FUMARATE DIHYDRATE 2 PUFF(S): 160; 4.5 AEROSOL RESPIRATORY (INHALATION) at 10:59

## 2018-03-03 RX ADMIN — Medication 20 MILLIGRAM(S): at 06:26

## 2018-03-03 RX ADMIN — Medication 20 MILLIGRAM(S): at 21:50

## 2018-03-03 RX ADMIN — GABAPENTIN 300 MILLIGRAM(S): 400 CAPSULE ORAL at 13:05

## 2018-03-03 RX ADMIN — Medication 3 MILLILITER(S): at 04:12

## 2018-03-03 RX ADMIN — Medication 100 MILLIGRAM(S): at 06:26

## 2018-03-03 RX ADMIN — Medication 3 MILLILITER(S): at 16:18

## 2018-03-03 RX ADMIN — POLYETHYLENE GLYCOL 3350 17 GRAM(S): 17 POWDER, FOR SOLUTION ORAL at 13:05

## 2018-03-03 RX ADMIN — HEPARIN SODIUM 5000 UNIT(S): 5000 INJECTION INTRAVENOUS; SUBCUTANEOUS at 21:49

## 2018-03-03 RX ADMIN — GABAPENTIN 300 MILLIGRAM(S): 400 CAPSULE ORAL at 06:26

## 2018-03-03 RX ADMIN — TIOTROPIUM BROMIDE 1 CAPSULE(S): 18 CAPSULE ORAL; RESPIRATORY (INHALATION) at 10:59

## 2018-03-03 RX ADMIN — Medication 3 MILLILITER(S): at 09:45

## 2018-03-03 RX ADMIN — Medication 500000 UNIT(S): at 18:10

## 2018-03-03 RX ADMIN — Medication 20 MILLIGRAM(S): at 13:05

## 2018-03-03 RX ADMIN — HEPARIN SODIUM 5000 UNIT(S): 5000 INJECTION INTRAVENOUS; SUBCUTANEOUS at 13:05

## 2018-03-03 RX ADMIN — Medication 100 MILLIGRAM(S): at 21:49

## 2018-03-03 RX ADMIN — Medication 81 MILLIGRAM(S): at 13:05

## 2018-03-03 RX ADMIN — Medication 1 SPRAY(S): at 06:26

## 2018-03-03 RX ADMIN — GABAPENTIN 300 MILLIGRAM(S): 400 CAPSULE ORAL at 21:50

## 2018-03-03 RX ADMIN — Medication 500000 UNIT(S): at 13:04

## 2018-03-03 RX ADMIN — Medication 100 MILLIGRAM(S): at 13:05

## 2018-03-03 RX ADMIN — Medication 1000 UNIT(S): at 13:05

## 2018-03-03 RX ADMIN — AMLODIPINE BESYLATE 5 MILLIGRAM(S): 2.5 TABLET ORAL at 06:26

## 2018-03-03 RX ADMIN — BUDESONIDE AND FORMOTEROL FUMARATE DIHYDRATE 2 PUFF(S): 160; 4.5 AEROSOL RESPIRATORY (INHALATION) at 21:49

## 2018-03-03 RX ADMIN — Medication 3 MILLILITER(S): at 21:34

## 2018-03-03 RX ADMIN — Medication 1 SPRAY(S): at 18:10

## 2018-03-03 RX ADMIN — Medication 500000 UNIT(S): at 00:01

## 2018-03-03 NOTE — PROGRESS NOTE ADULT - SUBJECTIVE AND OBJECTIVE BOX
Patient is a 87y old  Female who presents with a chief complaint of shortness of breath (01 Mar 2018 03:47)        SUBJECTIVE / OVERNIGHT EVENTS: Denies SOB, but still requiring oxygen intermittently throughout the day.       MEDICATIONS  (STANDING):  ALBUTerol/ipratropium for Nebulization 3 milliLiter(s) Nebulizer every 6 hours  amLODIPine   Tablet 5 milliGRAM(s) Oral daily  aspirin enteric coated 81 milliGRAM(s) Oral daily  buDESOnide 160 MICROgram(s)/formoterol 4.5 MICROgram(s) Inhaler 2 Puff(s) Inhalation two times a day  cholecalciferol 1000 Unit(s) Oral daily  docusate sodium 100 milliGRAM(s) Oral three times a day  fluticasone propionate 50 MICROgram(s)/spray Nasal Spray 1 Spray(s) Both Nostrils two times a day  furosemide    Tablet 20 milliGRAM(s) Oral daily  gabapentin 300 milliGRAM(s) Oral three times a day  heparin  Injectable 5000 Unit(s) SubCutaneous every 8 hours  methylPREDNISolone sodium succinate Injectable 20 milliGRAM(s) IV Push every 8 hours  nystatin    Suspension 965766 Unit(s) Oral four times a day  polyethylene glycol 3350 17 Gram(s) Oral daily  tiotropium 18 MICROgram(s) Capsule 1 Capsule(s) Inhalation daily    MEDICATIONS  (PRN):  acetaminophen   Tablet. 650 milliGRAM(s) Oral every 6 hours PRN Mild, Moderate or Severe pain  clonazePAM Tablet 0.5 milliGRAM(s) Oral at bedtime PRN anxiety  senna 2 Tablet(s) Oral at bedtime PRN Constipation      Vital Signs Last 24 Hrs  T(C): 36.5 (03 Mar 2018 06:23), Max: 36.7 (02 Mar 2018 21:19)  T(F): 97.7 (03 Mar 2018 06:23), Max: 98 (02 Mar 2018 21:19)  HR: 88 (03 Mar 2018 09:45) (70 - 99)  BP: 118/62 (03 Mar 2018 06:23) (118/62 - 139/76)  BP(mean): --  RR: 17 (03 Mar 2018 06:23) (17 - 19)  SpO2: 97% (03 Mar 2018 09:45) (92% - 97%)  CAPILLARY BLOOD GLUCOSE        I&O's Summary        PHYSICAL EXAM  GENERAL: NAD, well-developed  HEAD:  Atraumatic, Normocephalic  EYES: EOMI, PERRLA, conjunctiva and sclera clear  NECK: Supple, No JVD  CHEST/LUNG: Clear to auscultation bilaterally; No wheeze  HEART: Regular rate and rhythm; No murmurs, rubs, or gallops  ABDOMEN: Soft, Nontender, Nondistended; Bowel sounds present  EXTREMITIES:  2+ Peripheral Pulses, No clubbing, cyanosis, or edema  PSYCH: AAOx3  SKIN: No rashes or lesions    LABS:                        12.8   10.69 )-----------( 369      ( 03 Mar 2018 06:55 )             42.1     03-03    141  |  98  |  37<H>  ----------------------------<  185<H>  3.9   |  31  |  0.96    Ca    8.4      03 Mar 2018 06:55    TPro  6.5  /  Alb  3.5  /  TBili  0.6  /  DBili  x   /  AST  18  /  ALT  21  /  AlkPhos  76  03-03                RADIOLOGY & ADDITIONAL TESTS:    Imaging Personally Reviewed:  Consultant(s) Notes Reviewed:    Care Discussed with Consultants/Other Providers:

## 2018-03-03 NOTE — PROGRESS NOTE ADULT - SUBJECTIVE AND OBJECTIVE BOX
Pulmonary Consult Note    VIDAL LOUIE  MRN-[id number]    Chief Complaint: Patient is a 87y old  Female who presents with a chief complaint of shortness of breath (01 Mar 2018 03:47)      HPI:  87yFemale  -COPD  excerbation  RSV  wheeze  -    ROS:  -constpation    All other systems reviewed and negative    ACTIVE MEDICATION LIST:  MEDICATIONS  (STANDING):  ALBUTerol/ipratropium for Nebulization 3 milliLiter(s) Nebulizer every 6 hours  amLODIPine   Tablet 5 milliGRAM(s) Oral daily  aspirin enteric coated 81 milliGRAM(s) Oral daily  buDESOnide 160 MICROgram(s)/formoterol 4.5 MICROgram(s) Inhaler 2 Puff(s) Inhalation two times a day  cholecalciferol 1000 Unit(s) Oral daily  docusate sodium 100 milliGRAM(s) Oral three times a day  fluticasone propionate 50 MICROgram(s)/spray Nasal Spray 1 Spray(s) Both Nostrils two times a day  furosemide    Tablet 20 milliGRAM(s) Oral daily  gabapentin 300 milliGRAM(s) Oral three times a day  heparin  Injectable 5000 Unit(s) SubCutaneous every 8 hours  methylPREDNISolone sodium succinate Injectable 20 milliGRAM(s) IV Push every 8 hours  nystatin    Suspension 589800 Unit(s) Oral four times a day  tiotropium 18 MICROgram(s) Capsule 1 Capsule(s) Inhalation daily    MEDICATIONS  (PRN):  acetaminophen   Tablet. 650 milliGRAM(s) Oral every 6 hours PRN Mild, Moderate or Severe pain  clonazePAM Tablet 0.5 milliGRAM(s) Oral at bedtime PRN anxiety  senna 2 Tablet(s) Oral at bedtime PRN Constipation      EXAM:  Vital Signs Last 24 Hrs  T(C): 36.7 (02 Mar 2018 21:19), Max: 36.7 (02 Mar 2018 21:19)  T(F): 98 (02 Mar 2018 21:19), Max: 98 (02 Mar 2018 21:19)  HR: 87 (03 Mar 2018 04:12) (70 - 99)  BP: 136/78 (02 Mar 2018 21:19) (134/74 - 139/76)  BP(mean): --  RR: 17 (02 Mar 2018 21:19) (17 - 19)  SpO2: 97% (03 Mar 2018 04:12) (92% - 97%)    GENERAL: No acute distress  NEURO: Alert and oriented x 3  LUNGS: r wheezes fine at end expiration  CV: S1/S2, no murmur  ABD soft benign with BS   neg edema   Complete Blood Count + Automated Diff (04.03.09 @ 14:28)    Neutrophil Count - CBC: 7.3 K/uL    Lymphocyte Count - CBC: 2.4 K/uL    Monocyte Count - CBC: 0.8 K/uL    Eosinophil Count - CBC: 0.4 K/uL    Basophil Count - CBC: 0.2 K/uL  < from: Xray Chest 2 Views PA/Lat (02.28.18 @ 19:40) >  FINDINGS:   Cardiomegaly.  The lungs are clear. There are no pleural effusions or pneumothorax.  Spinal degenerative changes.        IMPRESSION:   Clear lungs.    < end of copied text >      PROBLEM LIST:  87yFemale with HEALTH ISSUES - PROBLEM Dx:  < from: CT Angio Chest w/ IV Cont (02.16.18 @ 02:54) >    IMPRESSION:     1.  No pulmonary embolism as above.  2.  Emphysema. Bilateral subsegmental atelectasis in the lower lobes.    < end of copied text >  Slow transit constipation: Slow transit constipation  Acute kidney injury superimposed on CKD: Acute kidney injury superimposed on CKD  Need for prophylactic measure: Need for prophylactic measure  Thrush: Thrush  Thrombocytosis: Thrombocytosis  CKD (chronic kidney disease) stage 3, GFR 30-59 ml/min: CKD (chronic kidney disease) stage 3, GFR 30-59 ml/min  Essential Hypertension: Essential Hypertension  DAVID on CPAP: DAVID on CPAP  Anxiety disorder: Anxiety disorder  Dysphagia: Dysphagia  Neuropathy: Neuropathy  COPD exacerbation: COPD exacerbation            RECS:  -IV steroids although still with wheeze can transition prednisone 40  daily next 24 hrs  trail miralax pm and cont stool softners   watch abd exam  hydration   OOB chair  O2 supplement  consider protocol zithromax 250 mg daily x 1 year for recurrent COPD excerbations   NEB tx as ordered  cont DVT protection  CPAP nocturnal  -    Thank you for this consultation, please feel free to call with any questions [phone]  Clyde Almanzar DO [facility]

## 2018-03-04 LAB
ALBUMIN SERPL ELPH-MCNC: 3.7 G/DL — SIGNIFICANT CHANGE UP (ref 3.3–5)
ALP SERPL-CCNC: 77 U/L — SIGNIFICANT CHANGE UP (ref 40–120)
ALT FLD-CCNC: 21 U/L — SIGNIFICANT CHANGE UP (ref 4–33)
AST SERPL-CCNC: 12 U/L — SIGNIFICANT CHANGE UP (ref 4–32)
BASOPHILS # BLD AUTO: 0.02 K/UL — SIGNIFICANT CHANGE UP (ref 0–0.2)
BASOPHILS NFR BLD AUTO: 0.1 % — SIGNIFICANT CHANGE UP (ref 0–2)
BILIRUB SERPL-MCNC: 0.7 MG/DL — SIGNIFICANT CHANGE UP (ref 0.2–1.2)
BUN SERPL-MCNC: 33 MG/DL — HIGH (ref 7–23)
CALCIUM SERPL-MCNC: 8.7 MG/DL — SIGNIFICANT CHANGE UP (ref 8.4–10.5)
CHLORIDE SERPL-SCNC: 98 MMOL/L — SIGNIFICANT CHANGE UP (ref 98–107)
CO2 SERPL-SCNC: 32 MMOL/L — HIGH (ref 22–31)
CREAT SERPL-MCNC: 1.02 MG/DL — SIGNIFICANT CHANGE UP (ref 0.5–1.3)
EOSINOPHIL # BLD AUTO: 0.01 K/UL — SIGNIFICANT CHANGE UP (ref 0–0.5)
EOSINOPHIL NFR BLD AUTO: 0.1 % — SIGNIFICANT CHANGE UP (ref 0–6)
GLUCOSE SERPL-MCNC: 202 MG/DL — HIGH (ref 70–99)
HCT VFR BLD CALC: 43.5 % — SIGNIFICANT CHANGE UP (ref 34.5–45)
HGB BLD-MCNC: 13 G/DL — SIGNIFICANT CHANGE UP (ref 11.5–15.5)
IMM GRANULOCYTES # BLD AUTO: 0.1 # — SIGNIFICANT CHANGE UP
IMM GRANULOCYTES NFR BLD AUTO: 0.7 % — SIGNIFICANT CHANGE UP (ref 0–1.5)
LYMPHOCYTES # BLD AUTO: 1.17 K/UL — SIGNIFICANT CHANGE UP (ref 1–3.3)
LYMPHOCYTES # BLD AUTO: 7.9 % — LOW (ref 13–44)
MCHC RBC-ENTMCNC: 23.8 PG — LOW (ref 27–34)
MCHC RBC-ENTMCNC: 29.9 % — LOW (ref 32–36)
MCV RBC AUTO: 79.7 FL — LOW (ref 80–100)
MONOCYTES # BLD AUTO: 0.44 K/UL — SIGNIFICANT CHANGE UP (ref 0–0.9)
MONOCYTES NFR BLD AUTO: 3 % — SIGNIFICANT CHANGE UP (ref 2–14)
NEUTROPHILS # BLD AUTO: 13.01 K/UL — HIGH (ref 1.8–7.4)
NEUTROPHILS NFR BLD AUTO: 88.2 % — HIGH (ref 43–77)
NRBC # FLD: 0 — SIGNIFICANT CHANGE UP
PLATELET # BLD AUTO: 347 K/UL — SIGNIFICANT CHANGE UP (ref 150–400)
PMV BLD: 9.6 FL — SIGNIFICANT CHANGE UP (ref 7–13)
POTASSIUM SERPL-MCNC: 4.1 MMOL/L — SIGNIFICANT CHANGE UP (ref 3.5–5.3)
POTASSIUM SERPL-SCNC: 4.1 MMOL/L — SIGNIFICANT CHANGE UP (ref 3.5–5.3)
PROT SERPL-MCNC: 6.8 G/DL — SIGNIFICANT CHANGE UP (ref 6–8.3)
RBC # BLD: 5.46 M/UL — HIGH (ref 3.8–5.2)
RBC # FLD: 16.8 % — HIGH (ref 10.3–14.5)
SODIUM SERPL-SCNC: 142 MMOL/L — SIGNIFICANT CHANGE UP (ref 135–145)
WBC # BLD: 14.75 K/UL — HIGH (ref 3.8–10.5)
WBC # FLD AUTO: 14.75 K/UL — HIGH (ref 3.8–10.5)

## 2018-03-04 PROCEDURE — 99233 SBSQ HOSP IP/OBS HIGH 50: CPT

## 2018-03-04 RX ORDER — LACTULOSE 10 G/15ML
20 SOLUTION ORAL THREE TIMES A DAY
Qty: 0 | Refills: 0 | Status: DISCONTINUED | OUTPATIENT
Start: 2018-03-04 | End: 2018-03-05

## 2018-03-04 RX ADMIN — BUDESONIDE AND FORMOTEROL FUMARATE DIHYDRATE 2 PUFF(S): 160; 4.5 AEROSOL RESPIRATORY (INHALATION) at 09:42

## 2018-03-04 RX ADMIN — Medication 100 MILLIGRAM(S): at 22:16

## 2018-03-04 RX ADMIN — Medication 3 MILLILITER(S): at 21:19

## 2018-03-04 RX ADMIN — BUDESONIDE AND FORMOTEROL FUMARATE DIHYDRATE 2 PUFF(S): 160; 4.5 AEROSOL RESPIRATORY (INHALATION) at 22:16

## 2018-03-04 RX ADMIN — Medication 40 MILLIGRAM(S): at 05:15

## 2018-03-04 RX ADMIN — GABAPENTIN 300 MILLIGRAM(S): 400 CAPSULE ORAL at 22:16

## 2018-03-04 RX ADMIN — Medication 500000 UNIT(S): at 18:13

## 2018-03-04 RX ADMIN — GABAPENTIN 300 MILLIGRAM(S): 400 CAPSULE ORAL at 05:15

## 2018-03-04 RX ADMIN — Medication 1000 UNIT(S): at 13:44

## 2018-03-04 RX ADMIN — AMLODIPINE BESYLATE 5 MILLIGRAM(S): 2.5 TABLET ORAL at 05:15

## 2018-03-04 RX ADMIN — HEPARIN SODIUM 5000 UNIT(S): 5000 INJECTION INTRAVENOUS; SUBCUTANEOUS at 13:44

## 2018-03-04 RX ADMIN — Medication 500000 UNIT(S): at 01:17

## 2018-03-04 RX ADMIN — Medication 1 SPRAY(S): at 05:16

## 2018-03-04 RX ADMIN — Medication 500000 UNIT(S): at 13:44

## 2018-03-04 RX ADMIN — Medication 100 MILLIGRAM(S): at 13:44

## 2018-03-04 RX ADMIN — Medication 20 MILLIGRAM(S): at 05:15

## 2018-03-04 RX ADMIN — Medication 100 MILLIGRAM(S): at 05:16

## 2018-03-04 RX ADMIN — Medication 3 MILLILITER(S): at 03:45

## 2018-03-04 RX ADMIN — Medication 3 MILLILITER(S): at 17:19

## 2018-03-04 RX ADMIN — HEPARIN SODIUM 5000 UNIT(S): 5000 INJECTION INTRAVENOUS; SUBCUTANEOUS at 05:15

## 2018-03-04 RX ADMIN — TIOTROPIUM BROMIDE 1 CAPSULE(S): 18 CAPSULE ORAL; RESPIRATORY (INHALATION) at 09:42

## 2018-03-04 RX ADMIN — Medication 3 MILLILITER(S): at 12:10

## 2018-03-04 RX ADMIN — GABAPENTIN 300 MILLIGRAM(S): 400 CAPSULE ORAL at 13:44

## 2018-03-04 RX ADMIN — Medication 500000 UNIT(S): at 05:16

## 2018-03-04 RX ADMIN — Medication 500000 UNIT(S): at 23:46

## 2018-03-04 RX ADMIN — Medication 1 SPRAY(S): at 18:12

## 2018-03-04 RX ADMIN — LACTULOSE 20 GRAM(S): 10 SOLUTION ORAL at 09:48

## 2018-03-04 RX ADMIN — Medication 81 MILLIGRAM(S): at 13:44

## 2018-03-04 RX ADMIN — HEPARIN SODIUM 5000 UNIT(S): 5000 INJECTION INTRAVENOUS; SUBCUTANEOUS at 22:16

## 2018-03-04 NOTE — PROGRESS NOTE ADULT - SUBJECTIVE AND OBJECTIVE BOX
Patient is a 87y old  Female who presents with a chief complaint of shortness of breath (01 Mar 2018 03:47)        SUBJECTIVE / OVERNIGHT EVENTS: Still feeling "winded" when getting up to bedside commode. Also noting continued constipation.      MEDICATIONS  (STANDING):  ALBUTerol/ipratropium for Nebulization 3 milliLiter(s) Nebulizer every 6 hours  amLODIPine   Tablet 5 milliGRAM(s) Oral daily  aspirin enteric coated 81 milliGRAM(s) Oral daily  buDESOnide 160 MICROgram(s)/formoterol 4.5 MICROgram(s) Inhaler 2 Puff(s) Inhalation two times a day  cholecalciferol 1000 Unit(s) Oral daily  docusate sodium 100 milliGRAM(s) Oral three times a day  fluticasone propionate 50 MICROgram(s)/spray Nasal Spray 1 Spray(s) Both Nostrils two times a day  furosemide    Tablet 20 milliGRAM(s) Oral daily  gabapentin 300 milliGRAM(s) Oral three times a day  heparin  Injectable 5000 Unit(s) SubCutaneous every 8 hours  nystatin    Suspension 941161 Unit(s) Oral four times a day  polyethylene glycol 3350 17 Gram(s) Oral daily  predniSONE   Tablet 40 milliGRAM(s) Oral daily  tiotropium 18 MICROgram(s) Capsule 1 Capsule(s) Inhalation daily    MEDICATIONS  (PRN):  acetaminophen   Tablet. 650 milliGRAM(s) Oral every 6 hours PRN Mild, Moderate or Severe pain  clonazePAM Tablet 0.5 milliGRAM(s) Oral at bedtime PRN anxiety  lactulose Syrup 20 Gram(s) Oral three times a day PRN constipation  senna 2 Tablet(s) Oral at bedtime PRN Constipation      Vital Signs Last 24 Hrs  T(C): 36.8 (04 Mar 2018 05:13), Max: 36.9 (03 Mar 2018 22:27)  T(F): 98.3 (04 Mar 2018 05:13), Max: 98.4 (03 Mar 2018 22:27)  HR: 79 (04 Mar 2018 10:59) (73 - 86)  BP: 130/56 (04 Mar 2018 05:13) (125/65 - 157/94)  BP(mean): --  RR: 19 (04 Mar 2018 05:13) (17 - 19)  SpO2: 95% (04 Mar 2018 10:59) (92% - 98%)  CAPILLARY BLOOD GLUCOSE        I&O's Summary        PHYSICAL EXAM  GENERAL: NAD, well-developed  HEAD:  Atraumatic, Normocephalic  EYES: EOMI, PERRLA, conjunctiva and sclera clear  NECK: Supple, No JVD  CHEST/LUNG: Clear to auscultation bilaterally; No wheeze  HEART: Regular rate and rhythm; No murmurs, rubs, or gallops  ABDOMEN: Soft, Nontender, Nondistended; Bowel sounds present  EXTREMITIES:  2+ Peripheral Pulses, No clubbing, cyanosis, or edema  PSYCH: AAOx3  SKIN: No rashes or lesions    LABS:                        13.0   14.75 )-----------( 347      ( 04 Mar 2018 06:55 )             43.5     03-04    142  |  98  |  33<H>  ----------------------------<  202<H>  4.1   |  32<H>  |  1.02    Ca    8.7      04 Mar 2018 06:55    TPro  6.8  /  Alb  3.7  /  TBili  0.7  /  DBili  x   /  AST  12  /  ALT  21  /  AlkPhos  77  03-04                RADIOLOGY & ADDITIONAL TESTS:    Imaging Personally Reviewed:  Consultant(s) Notes Reviewed:    Care Discussed with Consultants/Other Providers:

## 2018-03-04 NOTE — PROGRESS NOTE ADULT - SUBJECTIVE AND OBJECTIVE BOX
PULMONARY PROGRESS NOTE    VIDAL LOUIE  MRN-[id number]    Patient is a 87y old  Female who presents with a chief complaint of shortness of breath (01 Mar 2018 03:47)      HPI:  -COPD excerbation   s/p RSV  DAVID -CPAP   hx IVC filter  less SOB   no purlent sputum  -    ROS:   -    ACTIVE MEDICATION LIST:  MEDICATIONS  (STANDING):  ALBUTerol/ipratropium for Nebulization 3 milliLiter(s) Nebulizer every 6 hours  amLODIPine   Tablet 5 milliGRAM(s) Oral daily  aspirin enteric coated 81 milliGRAM(s) Oral daily  buDESOnide 160 MICROgram(s)/formoterol 4.5 MICROgram(s) Inhaler 2 Puff(s) Inhalation two times a day  cholecalciferol 1000 Unit(s) Oral daily  docusate sodium 100 milliGRAM(s) Oral three times a day  fluticasone propionate 50 MICROgram(s)/spray Nasal Spray 1 Spray(s) Both Nostrils two times a day  furosemide    Tablet 20 milliGRAM(s) Oral daily  gabapentin 300 milliGRAM(s) Oral three times a day  heparin  Injectable 5000 Unit(s) SubCutaneous every 8 hours  nystatin    Suspension 742633 Unit(s) Oral four times a day  polyethylene glycol 3350 17 Gram(s) Oral daily  predniSONE   Tablet 40 milliGRAM(s) Oral daily  tiotropium 18 MICROgram(s) Capsule 1 Capsule(s) Inhalation daily    MEDICATIONS  (PRN):  acetaminophen   Tablet. 650 milliGRAM(s) Oral every 6 hours PRN Mild, Moderate or Severe pain  clonazePAM Tablet 0.5 milliGRAM(s) Oral at bedtime PRN anxiety  senna 2 Tablet(s) Oral at bedtime PRN Constipation      EXAM:  Vital Signs Last 24 Hrs  T(C): 36.8 (04 Mar 2018 05:13), Max: 36.9 (03 Mar 2018 22:27)  T(F): 98.3 (04 Mar 2018 05:13), Max: 98.4 (03 Mar 2018 22:27)  HR: 79 (04 Mar 2018 05:13) (73 - 90)  BP: 130/56 (04 Mar 2018 05:13) (118/62 - 157/94)  BP(mean): --  RR: 19 (04 Mar 2018 05:13) (17 - 19)  SpO2: 92% (04 Mar 2018 05:13) (92% - 98%)    GENERAL: The patient is awake and alert in no apparent distress.     LUNGS: interval  improvementt of fine wheeze    HEART: Regular rate and rhythm without murmur.  ABD non tender                          12.8   10.69 )-----------( 369      ( 03 Mar 2018 06:55 )             42.1   03-03    141  |  98  |  37<H>  ----------------------------<  185<H>  3.9   |  31  |  0.96    Ca    8.4      03 Mar 2018 06:55    TPro  6.5  /  Alb  3.5  /  TBili  0.6  /  DBili  x   /  AST  18  /  ALT  21  /  AlkPhos  76  03-03        PROBLEM LIST:  87y Female with HEALTH ISSUES - PROBLEM Dx:  Slow transit constipation: Slow transit constipation  Acute kidney injury superimposed on CKD: Acute kidney injury superimposed on CKD  Need for prophylactic measure: Need for prophylactic measure  Thrush: Thrush  Thrombocytosis: Thrombocytosis  CKD (chronic kidney disease) stage 3, GFR 30-59 ml/min: CKD (chronic kidney disease) stage 3, GFR 30-59 ml/min  Essential Hypertension: Essential Hypertension  DAVID on CPAP: DAVID on CPAP  Anxiety disorder: Anxiety disorder  Dysphagia: Dysphagia  Neuropathy: Neuropathy  COPD exacerbation: COPD exacerbation            RECS:  prednisone 40 mg and will taper next 24 hrs if stable   O2 inc ambulation and check O2 sats   Symbicort 160/4.5 mcg BID  with spiriva  DVT protection SC Heparin noted    Clyde Almanzar DO [facility]  [phone]

## 2018-03-05 DIAGNOSIS — N17.9 ACUTE KIDNEY FAILURE, UNSPECIFIED: ICD-10-CM

## 2018-03-05 DIAGNOSIS — K62.89 OTHER SPECIFIED DISEASES OF ANUS AND RECTUM: ICD-10-CM

## 2018-03-05 DIAGNOSIS — B37.0 CANDIDAL STOMATITIS: ICD-10-CM

## 2018-03-05 LAB
ALBUMIN SERPL ELPH-MCNC: 3.5 G/DL — SIGNIFICANT CHANGE UP (ref 3.3–5)
ALP SERPL-CCNC: 73 U/L — SIGNIFICANT CHANGE UP (ref 40–120)
ALT FLD-CCNC: 20 U/L — SIGNIFICANT CHANGE UP (ref 4–33)
APPEARANCE UR: CLEAR — SIGNIFICANT CHANGE UP
AST SERPL-CCNC: 16 U/L — SIGNIFICANT CHANGE UP (ref 4–32)
BASOPHILS # BLD AUTO: 0.02 K/UL — SIGNIFICANT CHANGE UP (ref 0–0.2)
BASOPHILS NFR BLD AUTO: 0.1 % — SIGNIFICANT CHANGE UP (ref 0–2)
BILIRUB SERPL-MCNC: 1 MG/DL — SIGNIFICANT CHANGE UP (ref 0.2–1.2)
BILIRUB UR-MCNC: NEGATIVE — SIGNIFICANT CHANGE UP
BLOOD UR QL VISUAL: NEGATIVE — SIGNIFICANT CHANGE UP
BUN SERPL-MCNC: 36 MG/DL — HIGH (ref 7–23)
CALCIUM SERPL-MCNC: 8.7 MG/DL — SIGNIFICANT CHANGE UP (ref 8.4–10.5)
CHLORIDE SERPL-SCNC: 93 MMOL/L — LOW (ref 98–107)
CO2 SERPL-SCNC: 30 MMOL/L — SIGNIFICANT CHANGE UP (ref 22–31)
COLOR SPEC: YELLOW — SIGNIFICANT CHANGE UP
CREAT SERPL-MCNC: 1.1 MG/DL — SIGNIFICANT CHANGE UP (ref 0.5–1.3)
EOSINOPHIL # BLD AUTO: 0.08 K/UL — SIGNIFICANT CHANGE UP (ref 0–0.5)
EOSINOPHIL NFR BLD AUTO: 0.5 % — SIGNIFICANT CHANGE UP (ref 0–6)
GLUCOSE SERPL-MCNC: 153 MG/DL — HIGH (ref 70–99)
GLUCOSE UR-MCNC: 300 — HIGH
HCT VFR BLD CALC: 44.2 % — SIGNIFICANT CHANGE UP (ref 34.5–45)
HGB BLD-MCNC: 13.9 G/DL — SIGNIFICANT CHANGE UP (ref 11.5–15.5)
HYALINE CASTS # UR AUTO: SIGNIFICANT CHANGE UP (ref 0–?)
IMM GRANULOCYTES # BLD AUTO: 0.17 # — SIGNIFICANT CHANGE UP
IMM GRANULOCYTES NFR BLD AUTO: 1.1 % — SIGNIFICANT CHANGE UP (ref 0–1.5)
KETONES UR-MCNC: NEGATIVE — SIGNIFICANT CHANGE UP
LEUKOCYTE ESTERASE UR-ACNC: HIGH
LYMPHOCYTES # BLD AUTO: 16 % — SIGNIFICANT CHANGE UP (ref 13–44)
LYMPHOCYTES # BLD AUTO: 2.51 K/UL — SIGNIFICANT CHANGE UP (ref 1–3.3)
MCHC RBC-ENTMCNC: 25.3 PG — LOW (ref 27–34)
MCHC RBC-ENTMCNC: 31.4 % — LOW (ref 32–36)
MCV RBC AUTO: 80.5 FL — SIGNIFICANT CHANGE UP (ref 80–100)
MONOCYTES # BLD AUTO: 0.88 K/UL — SIGNIFICANT CHANGE UP (ref 0–0.9)
MONOCYTES NFR BLD AUTO: 5.6 % — SIGNIFICANT CHANGE UP (ref 2–14)
MUCOUS THREADS # UR AUTO: SIGNIFICANT CHANGE UP
NEUTROPHILS # BLD AUTO: 11.99 K/UL — HIGH (ref 1.8–7.4)
NEUTROPHILS NFR BLD AUTO: 76.7 % — SIGNIFICANT CHANGE UP (ref 43–77)
NITRITE UR-MCNC: NEGATIVE — SIGNIFICANT CHANGE UP
NRBC # FLD: 0 — SIGNIFICANT CHANGE UP
PH UR: 6.5 — SIGNIFICANT CHANGE UP (ref 4.6–8)
PLATELET # BLD AUTO: 265 K/UL — SIGNIFICANT CHANGE UP (ref 150–400)
PMV BLD: 9.7 FL — SIGNIFICANT CHANGE UP (ref 7–13)
POTASSIUM SERPL-MCNC: 3.8 MMOL/L — SIGNIFICANT CHANGE UP (ref 3.5–5.3)
POTASSIUM SERPL-SCNC: 3.8 MMOL/L — SIGNIFICANT CHANGE UP (ref 3.5–5.3)
PROT SERPL-MCNC: 6.6 G/DL — SIGNIFICANT CHANGE UP (ref 6–8.3)
PROT UR-MCNC: 10 MG/DL — SIGNIFICANT CHANGE UP
RBC # BLD: 5.49 M/UL — HIGH (ref 3.8–5.2)
RBC # FLD: 17.3 % — HIGH (ref 10.3–14.5)
RBC CASTS # UR COMP ASSIST: SIGNIFICANT CHANGE UP (ref 0–?)
SODIUM SERPL-SCNC: 139 MMOL/L — SIGNIFICANT CHANGE UP (ref 135–145)
SP GR SPEC: 1.01 — SIGNIFICANT CHANGE UP (ref 1–1.04)
SQUAMOUS # UR AUTO: SIGNIFICANT CHANGE UP
UROBILINOGEN FLD QL: NORMAL MG/DL — SIGNIFICANT CHANGE UP
WBC # BLD: 15.65 K/UL — HIGH (ref 3.8–10.5)
WBC # FLD AUTO: 15.65 K/UL — HIGH (ref 3.8–10.5)
WBC UR QL: SIGNIFICANT CHANGE UP (ref 0–?)

## 2018-03-05 PROCEDURE — 74018 RADEX ABDOMEN 1 VIEW: CPT | Mod: 26

## 2018-03-05 PROCEDURE — 99233 SBSQ HOSP IP/OBS HIGH 50: CPT

## 2018-03-05 RX ORDER — LACTULOSE 10 G/15ML
20 SOLUTION ORAL
Qty: 0 | Refills: 0 | Status: DISCONTINUED | OUTPATIENT
Start: 2018-03-05 | End: 2018-03-09

## 2018-03-05 RX ADMIN — BUDESONIDE AND FORMOTEROL FUMARATE DIHYDRATE 2 PUFF(S): 160; 4.5 AEROSOL RESPIRATORY (INHALATION) at 11:05

## 2018-03-05 RX ADMIN — Medication 500000 UNIT(S): at 06:08

## 2018-03-05 RX ADMIN — Medication 500000 UNIT(S): at 13:26

## 2018-03-05 RX ADMIN — HEPARIN SODIUM 5000 UNIT(S): 5000 INJECTION INTRAVENOUS; SUBCUTANEOUS at 13:28

## 2018-03-05 RX ADMIN — BUDESONIDE AND FORMOTEROL FUMARATE DIHYDRATE 2 PUFF(S): 160; 4.5 AEROSOL RESPIRATORY (INHALATION) at 22:11

## 2018-03-05 RX ADMIN — HEPARIN SODIUM 5000 UNIT(S): 5000 INJECTION INTRAVENOUS; SUBCUTANEOUS at 22:11

## 2018-03-05 RX ADMIN — Medication 100 MILLIGRAM(S): at 22:11

## 2018-03-05 RX ADMIN — Medication 100 MILLIGRAM(S): at 13:27

## 2018-03-05 RX ADMIN — HEPARIN SODIUM 5000 UNIT(S): 5000 INJECTION INTRAVENOUS; SUBCUTANEOUS at 06:08

## 2018-03-05 RX ADMIN — AMLODIPINE BESYLATE 5 MILLIGRAM(S): 2.5 TABLET ORAL at 06:07

## 2018-03-05 RX ADMIN — Medication 1 SPRAY(S): at 06:08

## 2018-03-05 RX ADMIN — Medication 3 MILLILITER(S): at 03:53

## 2018-03-05 RX ADMIN — GABAPENTIN 300 MILLIGRAM(S): 400 CAPSULE ORAL at 06:07

## 2018-03-05 RX ADMIN — TIOTROPIUM BROMIDE 1 CAPSULE(S): 18 CAPSULE ORAL; RESPIRATORY (INHALATION) at 11:05

## 2018-03-05 RX ADMIN — Medication 40 MILLIGRAM(S): at 06:08

## 2018-03-05 RX ADMIN — Medication 1000 UNIT(S): at 13:26

## 2018-03-05 RX ADMIN — GABAPENTIN 300 MILLIGRAM(S): 400 CAPSULE ORAL at 13:28

## 2018-03-05 RX ADMIN — POLYETHYLENE GLYCOL 3350 17 GRAM(S): 17 POWDER, FOR SOLUTION ORAL at 13:27

## 2018-03-05 RX ADMIN — GABAPENTIN 300 MILLIGRAM(S): 400 CAPSULE ORAL at 22:11

## 2018-03-05 RX ADMIN — Medication 500000 UNIT(S): at 17:00

## 2018-03-05 RX ADMIN — Medication 3 MILLILITER(S): at 22:01

## 2018-03-05 RX ADMIN — Medication 3 MILLILITER(S): at 09:03

## 2018-03-05 RX ADMIN — Medication 81 MILLIGRAM(S): at 13:26

## 2018-03-05 RX ADMIN — Medication 20 MILLIGRAM(S): at 06:07

## 2018-03-05 RX ADMIN — Medication 100 MILLIGRAM(S): at 06:07

## 2018-03-05 NOTE — PROGRESS NOTE ADULT - PROBLEM SELECTOR PLAN 10
HSQ for DVT ppx  PT eval
HSQ for DVT ppx  PT eval
HSQ for DVT ppx
HSQ for DVT ppx  PT eval
HSQ for DVT ppx  PT eval: anticipate home with home PT

## 2018-03-05 NOTE — CHART NOTE - NSCHARTNOTEFT_GEN_A_CORE
Notified by RN that pt c/o abd pain. Pt seen and examined at bedside. Pt states that she has diffuse abd pain that moves to the back. Pt denies nausea, vomiting, chest pain, SOB, HA, dizziness. Pt admits to belching but denies flatulence. On exam, obese woman, abd tender to palpation diffusely, bowel sounds present x 4 quadrants, soft, mildly distended.

## 2018-03-05 NOTE — PROGRESS NOTE ADULT - SUBJECTIVE AND OBJECTIVE BOX
Patient is a 87y old  Female who presents with a chief complaint of shortness of breath (01 Mar 2018 03:47)      SUBJECTIVE / OVERNIGHT EVENTS: Pt's SOB improving, but still very dyspneic with exertion.  Pt's main complaint today is severe rectal pain, associated with constipation.  Last solid BM was over 1 week ago.  Abd pain that pt had this morning has now resolved.        MEDICATIONS  (STANDING):  ALBUTerol/ipratropium for Nebulization 3 milliLiter(s) Nebulizer every 6 hours  amLODIPine   Tablet 5 milliGRAM(s) Oral daily  aspirin enteric coated 81 milliGRAM(s) Oral daily  buDESOnide 160 MICROgram(s)/formoterol 4.5 MICROgram(s) Inhaler 2 Puff(s) Inhalation two times a day  cholecalciferol 1000 Unit(s) Oral daily  docusate sodium 100 milliGRAM(s) Oral three times a day  furosemide    Tablet 20 milliGRAM(s) Oral daily  gabapentin 300 milliGRAM(s) Oral three times a day  heparin  Injectable 5000 Unit(s) SubCutaneous every 8 hours  nystatin    Suspension 737950 Unit(s) Oral four times a day  polyethylene glycol 3350 17 Gram(s) Oral daily  predniSONE   Tablet 40 milliGRAM(s) Oral daily  sorbitol 70%/mineral oil/magnesium hydroxide/glycerin Enema 120 milliLiter(s) Rectal once  tiotropium 18 MICROgram(s) Capsule 1 Capsule(s) Inhalation daily    MEDICATIONS  (PRN):  acetaminophen   Tablet. 650 milliGRAM(s) Oral every 6 hours PRN Mild, Moderate or Severe pain  clonazePAM Tablet 0.5 milliGRAM(s) Oral at bedtime PRN anxiety  lactulose Syrup 20 Gram(s) Oral two times a day PRN constipation  senna 2 Tablet(s) Oral at bedtime PRN Constipation      Vital Signs Last 24 Hrs  T(C): 36.8 (05 Mar 2018 09:50), Max: 36.9 (05 Mar 2018 04:47)  T(F): 98.2 (05 Mar 2018 09:50), Max: 98.5 (05 Mar 2018 04:47)  HR: 86 (05 Mar 2018 09:50) (82 - 95)  BP: 140/72 (05 Mar 2018 09:50) (128/69 - 143/82)  BP(mean): --  RR: 18 (05 Mar 2018 09:50) (18 - 19)  SpO2: 92% (05 Mar 2018 09:50) (92% - 96%)  CAPILLARY BLOOD GLUCOSE        PHYSICAL EXAM  GENERAL: NAD, well-developed, morbidly obese  HEAD:  Atraumatic, Normocephalic  EYES: EOMI, PERRLA, conjunctiva and sclera clear  NECK: Supple, No JVD  CHEST/LUNG: Clear to auscultation bilaterally; No wheezes, decreased breath suonds in b/l bases  HEART: Regular rate and rhythm; No murmurs, rubs, or gallops  ABDOMEN: Soft, Nontender, Nondistended; Bowel sounds present  PSYCH: AAOx3      LABS:                        13.9   15.65 )-----------( 265      ( 05 Mar 2018 05:46 )             44.2     03-05    139  |  93<L>  |  36<H>  ----------------------------<  153<H>  3.8   |  30  |  1.10    Ca    8.7      05 Mar 2018 05:46    TPro  6.6  /  Alb  3.5  /  TBili  1.0  /  DBili  x   /  AST  16  /  ALT  20  /  AlkPhos  73  03-05        RADIOLOGY & ADDITIONAL TESTS:    Imaging Personally Reviewed:  < from: Xray Abdomen 1 View PORTABLE -Urgent (03.05.18 @ 06:18) >  IMPRESSION:   Nonobstructive bowel gas pattern without evidence of free air.    < end of copied text >    < from: Xray Chest 2 Views PA/Lat (02.28.18 @ 19:40) >  IMPRESSION:   Clear lungs.    < end of copied text >    Consultant(s) Notes Reviewed:  Pulm

## 2018-03-06 LAB
BUN SERPL-MCNC: 36 MG/DL — HIGH (ref 7–23)
CALCIUM SERPL-MCNC: 8.4 MG/DL — SIGNIFICANT CHANGE UP (ref 8.4–10.5)
CHLORIDE SERPL-SCNC: 95 MMOL/L — LOW (ref 98–107)
CO2 SERPL-SCNC: 30 MMOL/L — SIGNIFICANT CHANGE UP (ref 22–31)
CREAT SERPL-MCNC: 1.04 MG/DL — SIGNIFICANT CHANGE UP (ref 0.5–1.3)
GLUCOSE SERPL-MCNC: 202 MG/DL — HIGH (ref 70–99)
HCT VFR BLD CALC: 43.7 % — SIGNIFICANT CHANGE UP (ref 34.5–45)
HGB BLD-MCNC: 12.8 G/DL — SIGNIFICANT CHANGE UP (ref 11.5–15.5)
MAGNESIUM SERPL-MCNC: 2 MG/DL — SIGNIFICANT CHANGE UP (ref 1.6–2.6)
MCHC RBC-ENTMCNC: 23.4 PG — LOW (ref 27–34)
MCHC RBC-ENTMCNC: 29.3 % — LOW (ref 32–36)
MCV RBC AUTO: 79.7 FL — LOW (ref 80–100)
NRBC # FLD: 0 — SIGNIFICANT CHANGE UP
PHOSPHATE SERPL-MCNC: 2.9 MG/DL — SIGNIFICANT CHANGE UP (ref 2.5–4.5)
PLATELET # BLD AUTO: 259 K/UL — SIGNIFICANT CHANGE UP (ref 150–400)
PMV BLD: 9.8 FL — SIGNIFICANT CHANGE UP (ref 7–13)
POTASSIUM SERPL-MCNC: 3.5 MMOL/L — SIGNIFICANT CHANGE UP (ref 3.5–5.3)
POTASSIUM SERPL-SCNC: 3.5 MMOL/L — SIGNIFICANT CHANGE UP (ref 3.5–5.3)
RBC # BLD: 5.48 M/UL — HIGH (ref 3.8–5.2)
RBC # FLD: 17 % — HIGH (ref 10.3–14.5)
SODIUM SERPL-SCNC: 139 MMOL/L — SIGNIFICANT CHANGE UP (ref 135–145)
WBC # BLD: 16.13 K/UL — HIGH (ref 3.8–10.5)
WBC # FLD AUTO: 16.13 K/UL — HIGH (ref 3.8–10.5)

## 2018-03-06 PROCEDURE — 99233 SBSQ HOSP IP/OBS HIGH 50: CPT

## 2018-03-06 RX ORDER — CALCIUM CARBONATE 500(1250)
1 TABLET ORAL THREE TIMES A DAY
Qty: 0 | Refills: 0 | Status: DISCONTINUED | OUTPATIENT
Start: 2018-03-06 | End: 2018-03-07

## 2018-03-06 RX ADMIN — Medication 40 MILLIGRAM(S): at 05:04

## 2018-03-06 RX ADMIN — Medication 650 MILLIGRAM(S): at 06:11

## 2018-03-06 RX ADMIN — Medication 1000 UNIT(S): at 13:48

## 2018-03-06 RX ADMIN — Medication 500000 UNIT(S): at 13:48

## 2018-03-06 RX ADMIN — Medication 100 MILLIGRAM(S): at 05:10

## 2018-03-06 RX ADMIN — GABAPENTIN 300 MILLIGRAM(S): 400 CAPSULE ORAL at 05:10

## 2018-03-06 RX ADMIN — HEPARIN SODIUM 5000 UNIT(S): 5000 INJECTION INTRAVENOUS; SUBCUTANEOUS at 05:04

## 2018-03-06 RX ADMIN — Medication 500000 UNIT(S): at 01:00

## 2018-03-06 RX ADMIN — Medication 1 ENEMA: at 14:32

## 2018-03-06 RX ADMIN — Medication 3 MILLILITER(S): at 11:08

## 2018-03-06 RX ADMIN — TIOTROPIUM BROMIDE 1 CAPSULE(S): 18 CAPSULE ORAL; RESPIRATORY (INHALATION) at 10:03

## 2018-03-06 RX ADMIN — AMLODIPINE BESYLATE 5 MILLIGRAM(S): 2.5 TABLET ORAL at 05:04

## 2018-03-06 RX ADMIN — Medication 500000 UNIT(S): at 05:04

## 2018-03-06 RX ADMIN — Medication 3 MILLILITER(S): at 17:27

## 2018-03-06 RX ADMIN — GABAPENTIN 300 MILLIGRAM(S): 400 CAPSULE ORAL at 13:48

## 2018-03-06 RX ADMIN — Medication 81 MILLIGRAM(S): at 13:48

## 2018-03-06 RX ADMIN — Medication 100 MILLIGRAM(S): at 13:48

## 2018-03-06 RX ADMIN — Medication 1 TABLET(S): at 22:26

## 2018-03-06 RX ADMIN — Medication 650 MILLIGRAM(S): at 05:04

## 2018-03-06 RX ADMIN — BUDESONIDE AND FORMOTEROL FUMARATE DIHYDRATE 2 PUFF(S): 160; 4.5 AEROSOL RESPIRATORY (INHALATION) at 10:03

## 2018-03-06 RX ADMIN — Medication 3 MILLILITER(S): at 04:04

## 2018-03-06 RX ADMIN — HEPARIN SODIUM 5000 UNIT(S): 5000 INJECTION INTRAVENOUS; SUBCUTANEOUS at 21:05

## 2018-03-06 RX ADMIN — Medication 100 MILLIGRAM(S): at 21:05

## 2018-03-06 RX ADMIN — POLYETHYLENE GLYCOL 3350 17 GRAM(S): 17 POWDER, FOR SOLUTION ORAL at 13:48

## 2018-03-06 RX ADMIN — Medication 500000 UNIT(S): at 17:11

## 2018-03-06 RX ADMIN — BUDESONIDE AND FORMOTEROL FUMARATE DIHYDRATE 2 PUFF(S): 160; 4.5 AEROSOL RESPIRATORY (INHALATION) at 20:20

## 2018-03-06 RX ADMIN — Medication 20 MILLIGRAM(S): at 05:04

## 2018-03-06 RX ADMIN — GABAPENTIN 300 MILLIGRAM(S): 400 CAPSULE ORAL at 21:04

## 2018-03-06 RX ADMIN — HEPARIN SODIUM 5000 UNIT(S): 5000 INJECTION INTRAVENOUS; SUBCUTANEOUS at 13:48

## 2018-03-06 NOTE — PROGRESS NOTE ADULT - SUBJECTIVE AND OBJECTIVE BOX
Patient is a 87y old  Female who presents with a chief complaint of shortness of breath (01 Mar 2018 03:47)      SUBJECTIVE / OVERNIGHT EVENTS: Reports no relief after SMOG enema, still with overflow diarrhea and severe rectal pain.  Has intermittent lower abdominal pain that suddenly comes on.  Denies dysuria, no urinary frequency.       MEDICATIONS  (STANDING):  ALBUTerol/ipratropium for Nebulization 3 milliLiter(s) Nebulizer every 6 hours  amLODIPine   Tablet 5 milliGRAM(s) Oral daily  aspirin enteric coated 81 milliGRAM(s) Oral daily  buDESOnide 160 MICROgram(s)/formoterol 4.5 MICROgram(s) Inhaler 2 Puff(s) Inhalation two times a day  cholecalciferol 1000 Unit(s) Oral daily  docusate sodium 100 milliGRAM(s) Oral three times a day  furosemide    Tablet 20 milliGRAM(s) Oral daily  gabapentin 300 milliGRAM(s) Oral three times a day  heparin  Injectable 5000 Unit(s) SubCutaneous every 8 hours  nystatin    Suspension 190101 Unit(s) Oral four times a day  polyethylene glycol 3350 17 Gram(s) Oral daily  predniSONE   Tablet 40 milliGRAM(s) Oral daily  tiotropium 18 MICROgram(s) Capsule 1 Capsule(s) Inhalation daily    MEDICATIONS  (PRN):  acetaminophen   Tablet. 650 milliGRAM(s) Oral every 6 hours PRN Mild, Moderate or Severe pain  clonazePAM Tablet 0.5 milliGRAM(s) Oral at bedtime PRN anxiety  lactulose Syrup 20 Gram(s) Oral two times a day PRN constipation  senna 2 Tablet(s) Oral at bedtime PRN Constipation      Vital Signs Last 24 Hrs  T(C): 36.6 (06 Mar 2018 14:33), Max: 36.8 (05 Mar 2018 21:21)  T(F): 97.8 (06 Mar 2018 14:33), Max: 98.2 (05 Mar 2018 21:21)  HR: 86 (06 Mar 2018 14:33) (78 - 93)  BP: 126/87 (06 Mar 2018 14:33) (126/87 - 135/64)  BP(mean): --  RR: 18 (06 Mar 2018 14:33) (18 - 20)  SpO2: 93% (06 Mar 2018 14:33) (93% - 100%)  CAPILLARY BLOOD GLUCOSE      PHYSICAL EXAM  GENERAL: NAD, well-developed, morbidly obese  HEAD:  Atraumatic, Normocephalic  EYES: EOMI, PERRLA, conjunctiva and sclera clear  NECK: Supple, No JVD  CHEST/LUNG: Decreased breath sounds in b/l bases, no wheezes  HEART: Regular rate and rhythm; No murmurs, rubs, or gallops  ABDOMEN: Soft, +TTP in b/l lower quadrants, rectal exam: external hemorrhoids.  Pt fully impacted with stool   EXTREMITIES:  2+ Peripheral Pulses, No clubbing, cyanosis, or edema  PSYCH: AAOx3  SKIN: No rashes or lesions    LABS:                        12.8   16.13 )-----------( 259      ( 06 Mar 2018 05:25 )             43.7     03-06    139  |  95<L>  |  36<H>  ----------------------------<  202<H>  3.5   |  30  |  1.04    Ca    8.4      06 Mar 2018 05:25  Phos  2.9     03-06  Mg     2.0     03-06    TPro  6.6  /  Alb  3.5  /  TBili  1.0  /  DBili  x   /  AST  16  /  ALT  20  /  AlkPhos  73  03-05          Urinalysis Basic - ( 05 Mar 2018 14:58 )    Color: YELLOW / Appearance: CLEAR / S.015 / pH: 6.5  Gluc: 300 / Ketone: NEGATIVE  / Bili: NEGATIVE / Urobili: NORMAL mg/dL   Blood: NEGATIVE / Protein: 10 mg/dL / Nitrite: NEGATIVE   Leuk Esterase: LARGE / RBC: 2-5 / WBC 2-5   Sq Epi: MODERATE / Non Sq Epi: x / Bacteria: x        Consultant(s) Notes Reviewed:  Pulm

## 2018-03-06 NOTE — PROGRESS NOTE ADULT - SUBJECTIVE AND OBJECTIVE BOX
PULMONARY PROGRESS NOTE    VIDAL LOUIE  MRN-[id number]    Patient is a 87y old  Female who presents with a chief complaint of shortness of breath (01 Mar 2018 03:47)      HPI:  -reports still not having had a satisfying BM, breathing is "fine", occasional cough.    ROS:   -no N/V/D    MEDICATIONS  (STANDING):  ALBUTerol/ipratropium for Nebulization 3 milliLiter(s) Nebulizer every 6 hours  amLODIPine   Tablet 5 milliGRAM(s) Oral daily  aspirin enteric coated 81 milliGRAM(s) Oral daily  buDESOnide 160 MICROgram(s)/formoterol 4.5 MICROgram(s) Inhaler 2 Puff(s) Inhalation two times a day  cholecalciferol 1000 Unit(s) Oral daily  docusate sodium 100 milliGRAM(s) Oral three times a day  furosemide    Tablet 20 milliGRAM(s) Oral daily  gabapentin 300 milliGRAM(s) Oral three times a day  heparin  Injectable 5000 Unit(s) SubCutaneous every 8 hours  nystatin    Suspension 348364 Unit(s) Oral four times a day  polyethylene glycol 3350 17 Gram(s) Oral daily  predniSONE   Tablet 40 milliGRAM(s) Oral daily  tiotropium 18 MICROgram(s) Capsule 1 Capsule(s) Inhalation daily    MEDICATIONS  (PRN):  acetaminophen   Tablet. 650 milliGRAM(s) Oral every 6 hours PRN Mild, Moderate or Severe pain  clonazePAM Tablet 0.5 milliGRAM(s) Oral at bedtime PRN anxiety  lactulose Syrup 20 Gram(s) Oral two times a day PRN constipation  senna 2 Tablet(s) Oral at bedtime PRN Constipation          EXAM:  Vital Signs Last 24 Hrs  T(C): 36.5 (06 Mar 2018 05:02), Max: 36.8 (05 Mar 2018 14:46)  T(F): 97.7 (06 Mar 2018 05:02), Max: 98.3 (05 Mar 2018 14:46)  HR: 90 (06 Mar 2018 07:57) (78 - 93)  BP: 135/64 (06 Mar 2018 05:02) (123/53 - 135/64)  BP(mean): --  RR: 18 (06 Mar 2018 05:02) (18 - 20)  SpO2: 97% (06 Mar 2018 07:57) (96% - 100%)    GENERAL: The patient is awake and alert    SKIN: Warm, dry    LUNGS: decreased bs    HEART: distant    ABDOMEN: +BS, Soft, Nontender    EXTREMITIES: No clubbing, +++ edema    LABS/IMGAING: reviewed                                   12.8   16.13 )-----------( 259      ( 06 Mar 2018 05:25 )             43.7   03-06    139  |  95<L>  |  36<H>  ----------------------------<  202<H>  3.5   |  30  |  1.04    Ca    8.4      06 Mar 2018 05:25  Phos  2.9     03-06  Mg     2.0     03-06    TPro  6.6  /  Alb  3.5  /  TBili  1.0  /  DBili  x   /  AST  16  /  ALT  20  /  AlkPhos  73  03-05    < from: Xray Chest 2 Views PA/Lat (02.28.18 @ 19:40) >  IMPRESSION:   Clear lungs.    < end of copied text >          < from: Xray Chest 2 Views PA/Lat (02.28.18 @ 19:40) >  IMPRESSION:   Clear lungs.    < end of copied text >      < from: CT Angio Chest w/ IV Cont (02.16.18 @ 02:54) >  IMPRESSION:     1.  No pulmonary embolism as above.  2.  Emphysema. Bilateral subsegmental atelectasis in the lower lobes.    < end of copied text >    PROBLEM LIST:  87y Female with HEALTH ISSUES - PROBLEM Dx:  COPD exacerbation  DAVID/OHV  CKD (chronic kidney disease) stage 3, GFR 30-59 ml/min  HTN  DAVID on CPAP  Anxiety disorder    RECS:  -COPD: would decrease prednisone to 30mg PO daily tomorrow.  -continue symbicort, spiriva, duonebs  -DAVID/OHV: continue CPAP during sleep, [id number], patient on auto cpap at home  -supplemental O2  -OOB, Incentive spirometry, PT eval--rehab? pulm rehab?  -bowel regimen    Adrianne Garcia MD  [phone]

## 2018-03-07 ENCOUNTER — TRANSCRIPTION ENCOUNTER (OUTPATIENT)
Age: 83
End: 2018-03-07

## 2018-03-07 LAB
BUN SERPL-MCNC: 32 MG/DL — HIGH (ref 7–23)
CALCIUM SERPL-MCNC: 8.8 MG/DL — SIGNIFICANT CHANGE UP (ref 8.4–10.5)
CHLORIDE SERPL-SCNC: 95 MMOL/L — LOW (ref 98–107)
CO2 SERPL-SCNC: 28 MMOL/L — SIGNIFICANT CHANGE UP (ref 22–31)
CREAT SERPL-MCNC: 0.97 MG/DL — SIGNIFICANT CHANGE UP (ref 0.5–1.3)
GLUCOSE SERPL-MCNC: 189 MG/DL — HIGH (ref 70–99)
HCT VFR BLD CALC: 41.9 % — SIGNIFICANT CHANGE UP (ref 34.5–45)
HGB BLD-MCNC: 13.1 G/DL — SIGNIFICANT CHANGE UP (ref 11.5–15.5)
MCHC RBC-ENTMCNC: 25 PG — LOW (ref 27–34)
MCHC RBC-ENTMCNC: 31.3 % — LOW (ref 32–36)
MCV RBC AUTO: 80.1 FL — SIGNIFICANT CHANGE UP (ref 80–100)
NRBC # FLD: 0 — SIGNIFICANT CHANGE UP
PLATELET # BLD AUTO: 243 K/UL — SIGNIFICANT CHANGE UP (ref 150–400)
PMV BLD: 9.7 FL — SIGNIFICANT CHANGE UP (ref 7–13)
POTASSIUM SERPL-MCNC: 3.6 MMOL/L — SIGNIFICANT CHANGE UP (ref 3.5–5.3)
POTASSIUM SERPL-SCNC: 3.6 MMOL/L — SIGNIFICANT CHANGE UP (ref 3.5–5.3)
RBC # BLD: 5.23 M/UL — HIGH (ref 3.8–5.2)
RBC # FLD: 17.2 % — HIGH (ref 10.3–14.5)
SODIUM SERPL-SCNC: 140 MMOL/L — SIGNIFICANT CHANGE UP (ref 135–145)
WBC # BLD: 15 K/UL — HIGH (ref 3.8–10.5)
WBC # FLD AUTO: 15 K/UL — HIGH (ref 3.8–10.5)

## 2018-03-07 PROCEDURE — 99233 SBSQ HOSP IP/OBS HIGH 50: CPT

## 2018-03-07 RX ORDER — SOD SULF/SODIUM/NAHCO3/KCL/PEG
2000 SOLUTION, RECONSTITUTED, ORAL ORAL ONCE
Qty: 0 | Refills: 0 | Status: COMPLETED | OUTPATIENT
Start: 2018-03-07 | End: 2018-03-07

## 2018-03-07 RX ORDER — LIDOCAINE 4 G/100G
1 CREAM TOPICAL
Qty: 0 | Refills: 0 | Status: DISCONTINUED | OUTPATIENT
Start: 2018-03-07 | End: 2018-03-09

## 2018-03-07 RX ORDER — PANTOPRAZOLE SODIUM 20 MG/1
40 TABLET, DELAYED RELEASE ORAL DAILY
Qty: 0 | Refills: 0 | Status: DISCONTINUED | OUTPATIENT
Start: 2018-03-07 | End: 2018-03-09

## 2018-03-07 RX ORDER — CLONAZEPAM 1 MG
0.5 TABLET ORAL AT BEDTIME
Qty: 0 | Refills: 0 | Status: DISCONTINUED | OUTPATIENT
Start: 2018-03-07 | End: 2018-03-09

## 2018-03-07 RX ADMIN — HEPARIN SODIUM 5000 UNIT(S): 5000 INJECTION INTRAVENOUS; SUBCUTANEOUS at 21:41

## 2018-03-07 RX ADMIN — AMLODIPINE BESYLATE 5 MILLIGRAM(S): 2.5 TABLET ORAL at 06:27

## 2018-03-07 RX ADMIN — Medication 81 MILLIGRAM(S): at 11:24

## 2018-03-07 RX ADMIN — Medication 500000 UNIT(S): at 11:24

## 2018-03-07 RX ADMIN — Medication 650 MILLIGRAM(S): at 21:40

## 2018-03-07 RX ADMIN — Medication 500000 UNIT(S): at 00:37

## 2018-03-07 RX ADMIN — HEPARIN SODIUM 5000 UNIT(S): 5000 INJECTION INTRAVENOUS; SUBCUTANEOUS at 06:27

## 2018-03-07 RX ADMIN — GABAPENTIN 300 MILLIGRAM(S): 400 CAPSULE ORAL at 21:40

## 2018-03-07 RX ADMIN — TIOTROPIUM BROMIDE 1 CAPSULE(S): 18 CAPSULE ORAL; RESPIRATORY (INHALATION) at 11:23

## 2018-03-07 RX ADMIN — GABAPENTIN 300 MILLIGRAM(S): 400 CAPSULE ORAL at 06:27

## 2018-03-07 RX ADMIN — Medication 100 MILLIGRAM(S): at 06:27

## 2018-03-07 RX ADMIN — Medication 500000 UNIT(S): at 17:54

## 2018-03-07 RX ADMIN — Medication 100 MILLIGRAM(S): at 15:20

## 2018-03-07 RX ADMIN — PANTOPRAZOLE SODIUM 40 MILLIGRAM(S): 20 TABLET, DELAYED RELEASE ORAL at 11:24

## 2018-03-07 RX ADMIN — LIDOCAINE 1 APPLICATION(S): 4 CREAM TOPICAL at 17:54

## 2018-03-07 RX ADMIN — Medication 3 MILLILITER(S): at 04:14

## 2018-03-07 RX ADMIN — Medication 3 MILLILITER(S): at 10:36

## 2018-03-07 RX ADMIN — Medication 1000 UNIT(S): at 11:24

## 2018-03-07 RX ADMIN — HEPARIN SODIUM 5000 UNIT(S): 5000 INJECTION INTRAVENOUS; SUBCUTANEOUS at 15:19

## 2018-03-07 RX ADMIN — Medication 30 MILLIGRAM(S): at 07:00

## 2018-03-07 RX ADMIN — BUDESONIDE AND FORMOTEROL FUMARATE DIHYDRATE 2 PUFF(S): 160; 4.5 AEROSOL RESPIRATORY (INHALATION) at 21:40

## 2018-03-07 RX ADMIN — Medication 500000 UNIT(S): at 06:27

## 2018-03-07 RX ADMIN — Medication 650 MILLIGRAM(S): at 22:40

## 2018-03-07 RX ADMIN — Medication 3 MILLILITER(S): at 00:32

## 2018-03-07 RX ADMIN — Medication 20 MILLIGRAM(S): at 06:27

## 2018-03-07 RX ADMIN — Medication 500000 UNIT(S): at 23:22

## 2018-03-07 RX ADMIN — Medication 2000 MILLILITER(S): at 15:18

## 2018-03-07 RX ADMIN — Medication 3 MILLILITER(S): at 15:50

## 2018-03-07 RX ADMIN — GABAPENTIN 300 MILLIGRAM(S): 400 CAPSULE ORAL at 15:20

## 2018-03-07 RX ADMIN — POLYETHYLENE GLYCOL 3350 17 GRAM(S): 17 POWDER, FOR SOLUTION ORAL at 11:24

## 2018-03-07 NOTE — DISCHARGE NOTE ADULT - CARE PROVIDER_API CALL
pcp,   follow up with your PCP  Phone: (   )    -  Fax: (   )    - pcp,   follow up with your PCP  Phone: (   )    -  Fax: (   )    -    Mike Sheets), Internal Medicine; Pulmonary Disease  [address] [address]  Phone: [phone]  Fax: [phone]

## 2018-03-07 NOTE — DISCHARGE NOTE ADULT - SECONDARY DIAGNOSIS.
Neuropathy CKD (chronic kidney disease) stage 3, GFR 30-59 ml/min Acute kidney injury superimposed on CKD Dysphagia Essential Hypertension Anxiety disorder

## 2018-03-07 NOTE — PROGRESS NOTE ADULT - SUBJECTIVE AND OBJECTIVE BOX
PULMONARY PROGRESS NOTE    VIDAL LOUIE  MRN-[id number]    Patient is a 87y old  Female who presents with a chief complaint of shortness of breath (01 Mar 2018 03:47)      HPI:  -reports still not having had a satisfying BM, breathing is ok, using cpap when sleeping.  has many body aches    ROS:   -no N/V    MEDICATIONS  (STANDING):  ALBUTerol/ipratropium for Nebulization 3 milliLiter(s) Nebulizer every 6 hours  amLODIPine   Tablet 5 milliGRAM(s) Oral daily  aspirin enteric coated 81 milliGRAM(s) Oral daily  buDESOnide 160 MICROgram(s)/formoterol 4.5 MICROgram(s) Inhaler 2 Puff(s) Inhalation two times a day  cholecalciferol 1000 Unit(s) Oral daily  docusate sodium 100 milliGRAM(s) Oral three times a day  furosemide    Tablet 20 milliGRAM(s) Oral daily  gabapentin 300 milliGRAM(s) Oral three times a day  heparin  Injectable 5000 Unit(s) SubCutaneous every 8 hours  nystatin    Suspension 235596 Unit(s) Oral four times a day  polyethylene glycol 3350 17 Gram(s) Oral daily  predniSONE   Tablet 30 milliGRAM(s) Oral daily  tiotropium 18 MICROgram(s) Capsule 1 Capsule(s) Inhalation daily    MEDICATIONS  (PRN):  acetaminophen   Tablet. 650 milliGRAM(s) Oral every 6 hours PRN Mild, Moderate or Severe pain  calcium carbonate 500 mG (Tums) Chewable 1 Tablet(s) Chew three times a day PRN Indigestion  clonazePAM Tablet 0.5 milliGRAM(s) Oral at bedtime PRN anxiety  lactulose Syrup 20 Gram(s) Oral two times a day PRN constipation  senna 2 Tablet(s) Oral at bedtime PRN Constipation        EXAM:  Vital Signs Last 24 Hrs  T(C): 36.8 (07 Mar 2018 06:26), Max: 37 (06 Mar 2018 21:19)  T(F): 98.3 (07 Mar 2018 06:26), Max: 98.6 (06 Mar 2018 21:19)  HR: 90 (07 Mar 2018 08:06) (82 - 95)  BP: 132/63 (07 Mar 2018 06:26) (122/97 - 132/63)  BP(mean): --  RR: 17 (07 Mar 2018 06:26) (17 - 18)  SpO2: 94% (07 Mar 2018 08:06) (93% - 99%)    GENERAL: The patient is awake and alert    SKIN: Warm, dry    LUNGS: decreased bs    HEART: distant    ABDOMEN: +BS, Soft, Nontender        LABS/IMGAING: reviewed                                   13.1   15.00 )-----------( 243      ( 07 Mar 2018 06:10 )             41.9   03-07    140  |  95<L>  |  32<H>  ----------------------------<  189<H>  3.6   |  28  |  0.97    Ca    8.8      07 Mar 2018 06:10  Phos  2.9     03-06  Mg     2.0     03-06      < from: Xray Chest 2 Views PA/Lat (02.28.18 @ 19:40) >  IMPRESSION:   Clear lungs.    < end of copied text >          < from: Xray Chest 2 Views PA/Lat (02.28.18 @ 19:40) >  IMPRESSION:   Clear lungs.    < end of copied text >      < from: CT Angio Chest w/ IV Cont (02.16.18 @ 02:54) >  IMPRESSION:     1.  No pulmonary embolism as above.  2.  Emphysema. Bilateral subsegmental atelectasis in the lower lobes.    < end of copied text >    PROBLEM LIST:  87y Female with HEALTH ISSUES - PROBLEM Dx:  COPD exacerbation  DAVID/OHV  CKD (chronic kidney disease) stage 3, GFR 30-59 ml/min  HTN  DAVID on CPAP  Anxiety disorder    RECS:  -COPD: would decrease prednisone to 20mg PO daily tomorrow x 3 more days  -add PPI for gi upset in the setting of having been on high dose steroids  -continue symbicort, spiriva, duonebs  -DAVID/OHV: continue CPAP during sleep, [id number], patient on auto cpap at home  -supplemental O2  -OOB, Incentive spirometry, PT eval--rehab? pulm rehab?  -bowel regimen    Adrianne Garcia MD  [phone]

## 2018-03-07 NOTE — PROGRESS NOTE ADULT - SUBJECTIVE AND OBJECTIVE BOX
Patient is a 87y old  Female who presents with a chief complaint of shortness of breath (01 Mar 2018 03:47)      SUBJECTIVE / OVERNIGHT EVENTS: Pt still with no BM despite repeat enema, laxatives and stool softeners.  Pt's pain in rectum is so severe that it's limiting her mobility.        MEDICATIONS  (STANDING):  ALBUTerol/ipratropium for Nebulization 3 milliLiter(s) Nebulizer every 6 hours  amLODIPine   Tablet 5 milliGRAM(s) Oral daily  aspirin enteric coated 81 milliGRAM(s) Oral daily  buDESOnide 160 MICROgram(s)/formoterol 4.5 MICROgram(s) Inhaler 2 Puff(s) Inhalation two times a day  cholecalciferol 1000 Unit(s) Oral daily  docusate sodium 100 milliGRAM(s) Oral three times a day  furosemide    Tablet 20 milliGRAM(s) Oral daily  gabapentin 300 milliGRAM(s) Oral three times a day  heparin  Injectable 5000 Unit(s) SubCutaneous every 8 hours  nystatin    Suspension 951174 Unit(s) Oral four times a day  pantoprazole    Tablet 40 milliGRAM(s) Oral daily  polyethylene glycol 3350 17 Gram(s) Oral daily  polyethylene glycol/electrolyte Solution 2000 milliLiter(s) Oral once  tiotropium 18 MICROgram(s) Capsule 1 Capsule(s) Inhalation daily    MEDICATIONS  (PRN):  acetaminophen   Tablet. 650 milliGRAM(s) Oral every 6 hours PRN Mild, Moderate or Severe pain  clonazePAM Tablet 0.5 milliGRAM(s) Oral at bedtime PRN anxiety  lactulose Syrup 20 Gram(s) Oral two times a day PRN constipation  senna 2 Tablet(s) Oral at bedtime PRN Constipation      Vital Signs Last 24 Hrs  T(C): 36.8 (07 Mar 2018 06:26), Max: 37 (06 Mar 2018 21:19)  T(F): 98.3 (07 Mar 2018 06:26), Max: 98.6 (06 Mar 2018 21:19)  HR: 90 (07 Mar 2018 10:36) (86 - 95)  BP: 132/63 (07 Mar 2018 06:26) (122/97 - 132/63)  BP(mean): --  RR: 18 (07 Mar 2018 10:35) (17 - 18)  SpO2: 94% (07 Mar 2018 10:36) (93% - 99%)  CAPILLARY BLOOD GLUCOSE      PHYSICAL EXAM  GENERAL: NAD, well-developed, morbidly obese   HEAD:  Atraumatic, Normocephalic  EYES: EOMI, PERRLA, conjunctiva and sclera clear  NECK: Supple, No JVD  CHEST/LUNG: Decreased breath sounds b/l bases,  No wheeze  HEART: Regular rate and rhythm; No murmurs, rubs, or gallops  ABDOMEN: Soft, Nontender, Nondistended; Bowel sounds present  EXTREMITIES:  2+ Peripheral Pulses, No clubbing, cyanosis, or edema  PSYCH: AAOx3  SKIN: No rashes or lesions    LABS:                        13.1   15.00 )-----------( 243      ( 07 Mar 2018 06:10 )             41.9     03-07    140  |  95<L>  |  32<H>  ----------------------------<  189<H>  3.6   |  28  |  0.97    Ca    8.8      07 Mar 2018 06:10  Phos  2.9     03-06  Mg     2.0     03-06            Urinalysis Basic - ( 05 Mar 2018 14:58 )    Color: YELLOW / Appearance: CLEAR / S.015 / pH: 6.5  Gluc: 300 / Ketone: NEGATIVE  / Bili: NEGATIVE / Urobili: NORMAL mg/dL   Blood: NEGATIVE / Protein: 10 mg/dL / Nitrite: NEGATIVE   Leuk Esterase: LARGE / RBC: 2-5 / WBC 2-5   Sq Epi: MODERATE / Non Sq Epi: x / Bacteria: x        Consultant(s) Notes Reviewed:  Pulm

## 2018-03-07 NOTE — DISCHARGE NOTE ADULT - MEDICATION SUMMARY - MEDICATIONS TO CHANGE
I will SWITCH the dose or number of times a day I take the medications listed below when I get home from the hospital:    predniSONE 20 mg oral tablet  -- 2 tab(s) by mouth once a day, last roge 2/20/18

## 2018-03-07 NOTE — DISCHARGE NOTE ADULT - CARE PLAN
Principal Discharge DX:	COPD exacerbation  Goal:	to improve a patient's functional status and quality of life by preserving optimal lung function, improving symptoms, and preventing the recurrence of exacerbations  Assessment and plan of treatment:	Continue respiratory inhalers - spiriva 18mg inhalation 1 cap inhailed daily in AM  Continue symbicort 160mcg/4.5 mcg/inh aerosol two puffs two times a day  Secondary Diagnosis:	CKD (chronic kidney disease) stage 3, GFR 30-59 ml/min  Goal:	to lower blood pressure, reduce the risk of CVD, and slow progression of CKD.  Assessment and plan of treatment:	Furesomide (lasix) 20mg oral daily  Secondary Diagnosis:	Acute kidney injury superimposed on CKD  Goal:	Maintenance of volume homeostasis and correction of biochemical abnormalities remain the primary goals of ISRAEL treatment  Assessment and plan of treatment:	Continue with lasix as above  Secondary Diagnosis:	Dysphagia  Goal:	to maintain adequate nutritional intake for the patient and to maximize airway protection. ...  Assessment and plan of treatment:	Continue with diet dysphagia 3 soft nectar consistency fluid. DASH/TLC (sodium and cholesterol restricted).  Secondary Diagnosis:	Essential Hypertension  Goal:	The goals of antihypertensive therapy in CKD are to lower blood pressure, reduce the risk of CVD, and slow progression of CKD.  Assessment and plan of treatment:	Continue with amlodopine 5mg oral daily  Secondary Diagnosis:	Anxiety disorder  Goal:	To decrease anxiety and depression  Assessment and plan of treatment:	Continue klonopin 0.5 mg at bedtime for anxiety as needed  Secondary Diagnosis:	Neuropathy  Goal:	restoring function and improving pain control.  Assessment and plan of treatment:	Continue gabapentin 300mg three times a day Principal Discharge DX:	COPD exacerbation  Goal:	to improve a patient's functional status and quality of life by preserving optimal lung function, improving symptoms, and preventing the recurrence of exacerbations  Assessment and plan of treatment:	Continue respiratory inhalers - spiriva 18mg inhalation 1 cap inhailed daily in AM  Continue symbicort 160mcg/4.5 mcg/inh aerosol two puffs two times a day  Secondary Diagnosis:	CKD (chronic kidney disease) stage 3, GFR 30-59 ml/min  Goal:	to lower blood pressure, reduce the risk of CVD, and slow progression of CKD.  Assessment and plan of treatment:	Furesomide (lasix) 20mg oral daily  Secondary Diagnosis:	Acute kidney injury superimposed on CKD  Goal:	Maintenance of volume homeostasis and correction of biochemical abnormalities remain the primary goals of ISRAEL treatment  Assessment and plan of treatment:	Continue with lasix as above  Secondary Diagnosis:	Dysphagia  Goal:	to maintain adequate nutritional intake for the patient and to maximize airway protection. ...  Assessment and plan of treatment:	Continue with diet dysphagia 3 soft nectar consistency fluid. DASH/TLC (sodium and cholesterol restricted).  Secondary Diagnosis:	Essential Hypertension  Goal:	The goals of antihypertensive therapy in CKD are to lower blood pressure, reduce the risk of CVD, and slow progression of CKD.  Assessment and plan of treatment:	Continue with amlodopine 5mg oral daily and lasix  Secondary Diagnosis:	Anxiety disorder  Goal:	To decrease anxiety and depression  Assessment and plan of treatment:	Continue klonopin 0.5 mg at bedtime for anxiety as needed  Secondary Diagnosis:	Neuropathy  Goal:	restoring function and improving pain control.  Assessment and plan of treatment:	Continue gabapentin 300mg three times a day Principal Discharge DX:	COPD exacerbation  Goal:	to improve a patient's functional status and quality of life by preserving optimal lung function, improving symptoms, and preventing the recurrence of exacerbations  Assessment and plan of treatment:	Continue respiratory inhalers - spiriva 18mg inhalation 1 cap inhailed daily in AM  Continue symbicort 160mcg/4.5 mcg/inh aerosol two puffs two times a day  Continue medications. Follow up with your PCP for further evaluation and management. Please call to make an appointment within 1-2 weeks of discharge.  Secondary Diagnosis:	CKD (chronic kidney disease) stage 3, GFR 30-59 ml/min  Goal:	to lower blood pressure, reduce the risk of CVD, and slow progression of CKD.  Assessment and plan of treatment:	Furosemide (lasix) 20mg oral daily. Continue medications. Follow up with your PCP for further evaluation and management. Please call to make an appointment within 1-2 weeks of discharge.  Secondary Diagnosis:	Acute kidney injury superimposed on CKD  Goal:	Maintenance of volume homeostasis and correction of biochemical abnormalities remain the primary goals of ISRAEL treatment  Assessment and plan of treatment:	Continue with lasix as above  Secondary Diagnosis:	Dysphagia  Goal:	to maintain adequate nutritional intake for the patient and to maximize airway protection. ...  Assessment and plan of treatment:	Continue with diet dysphagia 3 soft nectar consistency fluid. DASH/TLC (sodium and cholesterol restricted).  Secondary Diagnosis:	Essential Hypertension  Goal:	The goals of antihypertensive therapy in CKD are to lower blood pressure, reduce the risk of CVD, and slow progression of CKD.  Assessment and plan of treatment:	Continue with amlodopine 5mg oral daily and lasix  Secondary Diagnosis:	Anxiety disorder  Goal:	To decrease anxiety and depression  Assessment and plan of treatment:	Continue klonopin 0.5 mg at bedtime for anxiety as needed  Secondary Diagnosis:	Neuropathy  Goal:	restoring function and improving pain control.  Assessment and plan of treatment:	Continue gabapentin 300mg three times a day Principal Discharge DX:	COPD exacerbation  Goal:	to improve a patient's functional status and quality of life by preserving optimal lung function, improving symptoms, and preventing the recurrence of exacerbations  Assessment and plan of treatment:	Continue respiratory inhalers - spiriva 18mg inhalation 1 cap inhailed daily in AM  Continue symbicort 160mcg/4.5 mcg/inh aerosol two puffs two times a day  Continue prednisone taper as prescribed . Follow up with your PCP for further evaluation and management. Please call to make an appointment within 1-2 weeks of discharge.  Secondary Diagnosis:	CKD (chronic kidney disease) stage 3, GFR 30-59 ml/min  Goal:	to lower blood pressure, reduce the risk of CVD, and slow progression of CKD.  Assessment and plan of treatment:	Furosemide (lasix) 20mg oral daily. Continue medications. Follow up with your PCP for further evaluation and management. Please call to make an appointment within 1-2 weeks of discharge.  Secondary Diagnosis:	Acute kidney injury superimposed on CKD  Goal:	Maintenance of volume homeostasis and correction of biochemical abnormalities remain the primary goals of ISRAEL treatment  Assessment and plan of treatment:	Continue with lasix as above  Resolved: Creatinine within normal limits  Secondary Diagnosis:	Dysphagia  Goal:	to maintain adequate nutritional intake for the patient and to maximize airway protection. ...  Assessment and plan of treatment:	Please follow up with your ENT.  You are on a regular diet here in the hospital with no signs of aspiration  Secondary Diagnosis:	Essential Hypertension  Goal:	The goals of antihypertensive therapy in CKD are to lower blood pressure, reduce the risk of CVD, and slow progression of CKD.  Assessment and plan of treatment:	Continue with amlodopine 5mg oral daily and lasix  Secondary Diagnosis:	Anxiety disorder  Goal:	To decrease anxiety and depression  Assessment and plan of treatment:	Continue klonopin 0.5 mg at bedtime for anxiety as needed  Secondary Diagnosis:	Neuropathy  Goal:	restoring function and improving pain control.  Assessment and plan of treatment:	Continue gabapentin 300mg three times a day

## 2018-03-07 NOTE — DISCHARGE NOTE ADULT - PLAN OF CARE
restoring function and improving pain control. Continue gabapentin 300mg three times a day to improve a patient's functional status and quality of life by preserving optimal lung function, improving symptoms, and preventing the recurrence of exacerbations Continue respiratory inhalers - spiriva 18mg inhalation 1 cap inhailed daily in AM  Continue symbicort 160mcg/4.5 mcg/inh aerosol two puffs two times a day to lower blood pressure, reduce the risk of CVD, and slow progression of CKD. Furesomide (lasix) 20mg oral daily Maintenance of volume homeostasis and correction of biochemical abnormalities remain the primary goals of ISRAEL treatment Continue with lasix as above to maintain adequate nutritional intake for the patient and to maximize airway protection. ... Continue with diet dysphagia 3 soft nectar consistency fluid. DASH/TLC (sodium and cholesterol restricted). The goals of antihypertensive therapy in CKD are to lower blood pressure, reduce the risk of CVD, and slow progression of CKD. Continue with amlodopine 5mg oral daily To decrease anxiety and depression Continue klonopin 0.5 mg at bedtime for anxiety as needed Continue with amlodopine 5mg oral daily and lasix Continue respiratory inhalers - spiriva 18mg inhalation 1 cap inhailed daily in AM  Continue symbicort 160mcg/4.5 mcg/inh aerosol two puffs two times a day  Continue medications. Follow up with your PCP for further evaluation and management. Please call to make an appointment within 1-2 weeks of discharge. Furosemide (lasix) 20mg oral daily. Continue medications. Follow up with your PCP for further evaluation and management. Please call to make an appointment within 1-2 weeks of discharge. Continue respiratory inhalers - spiriva 18mg inhalation 1 cap inhailed daily in AM  Continue symbicort 160mcg/4.5 mcg/inh aerosol two puffs two times a day  Continue prednisone taper as prescribed . Follow up with your PCP for further evaluation and management. Please call to make an appointment within 1-2 weeks of discharge. Continue with lasix as above  Resolved: Creatinine within normal limits Please follow up with your ENT.  You are on a regular diet here in the hospital with no signs of aspiration

## 2018-03-07 NOTE — DISCHARGE NOTE ADULT - PROVIDER TOKENS
FREE:[LAST:[pcp],PHONE:[(   )    -],FAX:[(   )    -],ADDRESS:[follow up with your PCP]] FREE:[LAST:[pcp],PHONE:[(   )    -],FAX:[(   )    -],ADDRESS:[follow up with your PCP]],TOKEN:'[id number]'

## 2018-03-07 NOTE — DISCHARGE NOTE ADULT - MEDICATION SUMMARY - MEDICATIONS TO TAKE
I will START or STAY ON the medications listed below when I get home from the hospital:    calcium  -- 500 milligram(s) by mouth once a day in am  -- Indication: For Supplement     predniSONE 20 mg oral tablet  -- 1 tab(s) by mouth once a day  -- Indication: For COPD exacerbation    acetaminophen 325 mg oral tablet  -- 2 tab(s) by mouth every 6 hours, As needed, Mild, Moderate or Severe pain  -- Indication: For pain    aspirin 81 mg oral tablet  -- 1 tab(s) by mouth once a day in am  -- Indication: For prophylaxis    KlonoPIN 0.5 mg oral tablet  -- 1 tab(s) by mouth once a day (at bedtime), As Needed  -- Indication: For Anxiety disorder    gabapentin 300 mg oral capsule  -- 1 cap(s) by mouth 3 times a day  -- Indication: For pain    Spiriva 18 mcg inhalation capsule  -- 1 cap(s) inhaled once a day in am  -- Indication: For COPD exacerbation    Symbicort 160 mcg-4.5 mcg/inh inhalation aerosol  -- 2 puff(s) inhaled 2 times a day  -- Indication: For COPD exacerbation    albuterol 90 mcg/inh inhalation powder  -- 2 puff(s) inhaled every 6 hours, As Needed   -- For inhalation only.  It is very important that you take or use this exactly as directed.  Do not skip doses or discontinue unless directed by your doctor.  Obtain medical advice before taking any non-prescription drugs as some may affect the action of this medication.    -- Indication: For Chronic obstructive pulmonary disease with acute exacerbation    amLODIPine 5 mg oral tablet  -- 1 tab(s) by mouth once a day  -- Indication: For htn    lidocaine 2% topical gel with applicator  -- 1 application on skin 2 times a day  -- Indication: For pain    Lasix 20 mg oral tablet  -- 1 tab(s) by mouth once a day  -- Indication: For Essential Hypertension    senna oral tablet  -- 2 tab(s) by mouth once a day (at bedtime), As needed, Constipation  -- Indication: For bowel regimen     docusate sodium 100 mg oral capsule  -- 1 cap(s) by mouth 3 times a day  -- Indication: For bowel regimen     polyethylene glycol 3350 oral powder for reconstitution  -- 17 gram(s) by mouth once a day  -- Indication: For bowel regimen     sodium chloride 0.65% nasal spray  -- 1 spray(s) into nose 3 times a day, As Needed   -- Indication: For Nasal congestion     omeprazole 40 mg oral delayed release capsule  -- 1 cap(s) by mouth once a day in am  -- Indication: For gerd    Vitamin D3 1000 intl units oral tablet  -- 1 tab(s) by mouth once a day in am  -- Indication: For Supplement

## 2018-03-07 NOTE — DISCHARGE NOTE ADULT - PATIENT PORTAL LINK FT
You can access the [facility], offered by [facility], by registering with the following website: [email or web address]

## 2018-03-07 NOTE — DISCHARGE NOTE ADULT - HOSPITAL COURSE
87-year-old female former smoker with COPD on 3L NC, HTN, neuropathy, obstructive sleep apnea on CPAP at bedtime, dysphagia, obesity, status post IVC filter, presented from [facility] with shortness of breath, concern for persisting COPD exacerbation    COPD exacerbation  - in setting of acute RSV   - Pt with chronic hypoxemia requiring home oxygen secondary to longstanding COPD   - ATC nebs for now, c/w tiotropium and budesonide/formoterol   - Pulmonary team consulted   - started Prednisone 40 mg titrated down to 30mg daily, PPI  - decreased Prednisone to 20mg PO daily on3/8 x 3 more days  - continued incentive spirometry, chest PT  - continued Symbicort, Spiriva, duonebs  - continued CPAP during sleep, [id number], patient on auto cpap at home  - Recent CT angio chest from 2/16/18 with emphysematous changes and b/l subsegmental atelectasis, no PE.     Rectal pain  - Suspect that this is related to slow transit constipation  - Pt treated with Senna/Colace/Miralax  - Started on PRN Lactulose today  3/5: Abd pain, f/u abd xray:Nonobstructive bowel gas pattern without evidence of free a  added bowel regimen   - Will give SMOG enema x1 today, if no relief, will consider manual disimpaction tomorrow.   -3/6 status post disimpaction: status post fleet enema     Acute kidney injury superimposed on chronic kidney disease  - ISRAEL on CKD stage 3  - Creatinine improved  - renally dose meds, avoided nephrotoxins, trended Cr  - continued lasix 20mg.     Dysphagia  - continued with a dysphagia diet, aspiration precautions.     Oral thrush  - Thrush likely related to recent steroid use  nystatin swish and spit.     DAVID on CPAP  -continued with CPAP QHS.    Essential Hypertension  -c/w amlodipine, furosemide with hold parameters.     Neuropathy  -treated with gabapentin.     Anxiety disorder  -treated with clonazepam PRN.     DVT ppx  -Heparin Subcutaneous given    PT eval: anticipate home with home PT. 87-year-old female former smoker with COPD on 3L NC, HTN, neuropathy, obstructive sleep apnea on CPAP at bedtime, dysphagia, obesity, status post IVC filter, presented from [facility] with shortness of breath, concern for persisting COPD exacerbation    COPD exacerbation  - in setting of acute RSV   - Pt with chronic hypoxemia requiring home oxygen secondary to longstanding COPD   - ATC nebs for now, c/w tiotropium and budesonide/formoterol   - Pulmonary team consulted   - PPI  - decreased Prednisone to 20mg PO daily on3/8 x 3 more days  - continued incentive spirometry, chest PT  - continued Symbicort, Spiriva, duonebs  - continued CPAP during sleep, [id number], patient on auto cpap at home  - Recent CT angio chest from 2/16/18 with emphysematous changes and b/l subsegmental atelectasis, no PE.     Rectal pain  - Suspect that this is related to slow transit constipation  - Pt treated with Senna/Colace/Miralax  - Started on PRN Lactulose today  3/5: Abd pain, f/u abd xray:Nonobstructive bowel gas pattern without evidence of free a  added bowel regimen   - Will give SMOG enema x1 today, if no relief, will consider manual disimpaction tomorrow.   -3/6 status post disimpaction: status post fleet enema and moviprep  -continue with bowel regimen     Acute kidney injury superimposed on chronic kidney disease  - ISRAEL on CKD stage 3  - Creatinine improved  - renally dose meds, avoided nephrotoxins, trended Cr  - continued lasix 20mg daily     Dysphagia  - pt tolerated regular diet with no signs of aspiration  -continue with regular diet     Oral thrush  - Thrush likely related to recent steroid use  nystatin swish and spit.   -no need to continue upon discharged  -resolved   DAVID on CPAP  -continued with CPAP QHS.    Essential Hypertension  -c/w amlodipine, furosemide with hold parameters.     Neuropathy  -treated with gabapentin.     Anxiety disorder  -treated with clonazepam PRN.     DVT ppx  -Heparin Subcutaneous given    PT eval: anticipate home with home PT with home oxygen 87-year-old female former smoker with COPD on 3L NC, HTN, neuropathy, obstructive sleep apnea on CPAP at bedtime, dysphagia, obesity, status post IVC filter, presented from [facility] with shortness of breath, concern for persisting COPD exacerbation. The patient was treated wtih prolonged course of PO prednisone, under the care of her outpt pulmonary group (Dr. Sheets).  Pt's COPD exacerbation ultimately resolved and she was also found to be RSV+.   Pt's course was c/b severe lower abd pain and rectal pain and she was found to be severely impacted with stool; despite daily use of stool softeners and Laxatives, she had no relief.  Manual disimpaction was performed with minimal relief and ultimately pt took Moviprep with success.  Pt was medically stable for d/c home with home PT and home oxygen.

## 2018-03-08 LAB
BACTERIA UR CULT: SIGNIFICANT CHANGE UP
SPECIMEN SOURCE: SIGNIFICANT CHANGE UP

## 2018-03-08 PROCEDURE — 99233 SBSQ HOSP IP/OBS HIGH 50: CPT

## 2018-03-08 RX ADMIN — Medication 3 MILLILITER(S): at 03:23

## 2018-03-08 RX ADMIN — Medication 100 MILLIGRAM(S): at 21:43

## 2018-03-08 RX ADMIN — BUDESONIDE AND FORMOTEROL FUMARATE DIHYDRATE 2 PUFF(S): 160; 4.5 AEROSOL RESPIRATORY (INHALATION) at 11:15

## 2018-03-08 RX ADMIN — GABAPENTIN 300 MILLIGRAM(S): 400 CAPSULE ORAL at 06:19

## 2018-03-08 RX ADMIN — Medication 20 MILLIGRAM(S): at 06:19

## 2018-03-08 RX ADMIN — Medication 100 MILLIGRAM(S): at 13:10

## 2018-03-08 RX ADMIN — AMLODIPINE BESYLATE 5 MILLIGRAM(S): 2.5 TABLET ORAL at 06:19

## 2018-03-08 RX ADMIN — Medication 100 MILLIGRAM(S): at 06:19

## 2018-03-08 RX ADMIN — TIOTROPIUM BROMIDE 1 CAPSULE(S): 18 CAPSULE ORAL; RESPIRATORY (INHALATION) at 11:15

## 2018-03-08 RX ADMIN — Medication 500000 UNIT(S): at 13:10

## 2018-03-08 RX ADMIN — HEPARIN SODIUM 5000 UNIT(S): 5000 INJECTION INTRAVENOUS; SUBCUTANEOUS at 13:10

## 2018-03-08 RX ADMIN — BUDESONIDE AND FORMOTEROL FUMARATE DIHYDRATE 2 PUFF(S): 160; 4.5 AEROSOL RESPIRATORY (INHALATION) at 21:47

## 2018-03-08 RX ADMIN — HEPARIN SODIUM 5000 UNIT(S): 5000 INJECTION INTRAVENOUS; SUBCUTANEOUS at 21:42

## 2018-03-08 RX ADMIN — Medication 20 MILLIGRAM(S): at 06:24

## 2018-03-08 RX ADMIN — Medication 500000 UNIT(S): at 06:19

## 2018-03-08 RX ADMIN — GABAPENTIN 300 MILLIGRAM(S): 400 CAPSULE ORAL at 21:43

## 2018-03-08 RX ADMIN — Medication 81 MILLIGRAM(S): at 13:10

## 2018-03-08 RX ADMIN — LIDOCAINE 1 APPLICATION(S): 4 CREAM TOPICAL at 06:20

## 2018-03-08 RX ADMIN — GABAPENTIN 300 MILLIGRAM(S): 400 CAPSULE ORAL at 13:10

## 2018-03-08 RX ADMIN — Medication 1000 UNIT(S): at 13:11

## 2018-03-08 RX ADMIN — Medication 3 MILLILITER(S): at 10:14

## 2018-03-08 RX ADMIN — Medication 500000 UNIT(S): at 17:26

## 2018-03-08 RX ADMIN — HEPARIN SODIUM 5000 UNIT(S): 5000 INJECTION INTRAVENOUS; SUBCUTANEOUS at 06:19

## 2018-03-08 RX ADMIN — Medication 3 MILLILITER(S): at 22:08

## 2018-03-08 NOTE — DIETITIAN INITIAL EVALUATION ADULT. - PROBLEM SELECTOR PLAN 1
ATC nebs for now  c/w tiotropium and budesonide/formoterol   pulm consult placed by ED, f/u recs  hold off on further steroids for now, no wheezing on exam  No S/Sx of infection, no need for abx  f/u official read of CXR  CPAP QHS, O2 PRN  no URI sx other than sore throat (which I suspect is related to thrush), will not repeat RVP at this time  may be beneficial to obtain information regarding recent medical treatment at [place]

## 2018-03-08 NOTE — DIETITIAN INITIAL EVALUATION ADULT. - ENERGY NEEDS
Pt's stated height: ~64" not consistent with documented height (~59" - 3/1/18); Pt's stated body weight: ~249# not consistent with documented body weight: 191.1# (3/1). ??Question about accuracy of recorded/documented heights/weights??

## 2018-03-08 NOTE — PROGRESS NOTE ADULT - SUBJECTIVE AND OBJECTIVE BOX
Patient is a 87y old  Female who presents with a chief complaint of shortness of breath (07 Mar 2018 21:08)      SUBJECTIVE / OVERNIGHT EVENTS: Pt feels much better, took moviprep yesterday and had multiple BMs.  Rectal pain and abd pain improved.  Breathing comfortably.       MEDICATIONS  (STANDING):  ALBUTerol/ipratropium for Nebulization 3 milliLiter(s) Nebulizer every 6 hours  amLODIPine   Tablet 5 milliGRAM(s) Oral daily  aspirin enteric coated 81 milliGRAM(s) Oral daily  buDESOnide 160 MICROgram(s)/formoterol 4.5 MICROgram(s) Inhaler 2 Puff(s) Inhalation two times a day  cholecalciferol 1000 Unit(s) Oral daily  docusate sodium 100 milliGRAM(s) Oral three times a day  furosemide    Tablet 20 milliGRAM(s) Oral daily  gabapentin 300 milliGRAM(s) Oral three times a day  heparin  Injectable 5000 Unit(s) SubCutaneous every 8 hours  lidocaine 2% Gel 1 Application(s) Topical two times a day  nystatin    Suspension 310982 Unit(s) Oral four times a day  pantoprazole    Tablet 40 milliGRAM(s) Oral daily  polyethylene glycol 3350 17 Gram(s) Oral daily  predniSONE   Tablet 20 milliGRAM(s) Oral daily  tiotropium 18 MICROgram(s) Capsule 1 Capsule(s) Inhalation daily    MEDICATIONS  (PRN):  acetaminophen   Tablet. 650 milliGRAM(s) Oral every 6 hours PRN Mild, Moderate or Severe pain  clonazePAM Tablet 0.5 milliGRAM(s) Oral at bedtime PRN anxiety  lactulose Syrup 20 Gram(s) Oral two times a day PRN constipation  senna 2 Tablet(s) Oral at bedtime PRN Constipation      Vital Signs Last 24 Hrs  T(C): 36.6 (08 Mar 2018 14:30), Max: 36.8 (07 Mar 2018 21:17)  T(F): 97.9 (08 Mar 2018 14:30), Max: 98.3 (07 Mar 2018 21:17)  HR: 95 (08 Mar 2018 14:30) (72 - 95)  BP: 129/55 (08 Mar 2018 14:30) (129/55 - 146/87)  BP(mean): --  RR: 17 (08 Mar 2018 14:30) (17 - 18)  SpO2: 95% (08 Mar 2018 14:30) (94% - 95%)  CAPILLARY BLOOD GLUCOSE      PHYSICAL EXAM  GENERAL: NAD, well-developed, morbidly obese   HEAD:  Atraumatic, Normocephalic  EYES: EOMI, PERRLA, conjunctiva and sclera clear  NECK: Supple, No JVD  CHEST/LUNG: Decreased breath sounds in bases   HEART: Regular rate and rhythm; No murmurs, rubs, or gallops  ABDOMEN: Soft, Nontender, Nondistended; Bowel sounds present  EXTREMITIES:  2+ Peripheral Pulses, No clubbing, cyanosis, or edema  PSYCH: AAOx3  SKIN: No rashes or lesions    LABS:                        13.1   15.00 )-----------( 243      ( 07 Mar 2018 06:10 )             41.9     03-07    140  |  95<L>  |  32<H>  ----------------------------<  189<H>  3.6   |  28  |  0.97    Ca    8.8      07 Mar 2018 06:10        Consultant(s) Notes Reviewed:  Pulm

## 2018-03-08 NOTE — DIETITIAN INITIAL EVALUATION ADULT. - OTHER INFO
Pt seen for Length of stay. Pt 86 yo female appears alert, oriented. Per Pt her appetite usually fine but does not like the hospital food reported. Food preferences discussed with Pt. No chew/swallow problem voiced; no nausea/vomiting/diarrhea reported @ present. Per Pt her body weight: ~249#; Pt also stated she lost weight, but unable to quantify. Better food choices discussed. RDN remains available, Pt made aware. Pt seen for Length of stay. Pt 86 yo female appears alert, oriented. Per Pt her appetite usually fine but does not like the hospital food reported. Food preferences discussed with Pt. Of note Pt on Dysphagia 3 Soft-Nectar Consistency Fluid. No chew/swallow problem voiced; no nausea/vomiting/diarrhea reported @ present. Per Pt her body weight: ~249#; Pt also stated she lost weight, but unable to quantify. Better food choices discussed. RDN remains available, Pt made aware.

## 2018-03-08 NOTE — DIETITIAN INITIAL EVALUATION ADULT. - PROBLEM SELECTOR PLAN 7
slowly increasing BUN/Cr, however overall renal function stable  renally dose meds, avoid nephrotoxins, trend Cr  (pt reports she was on furosemide 40mg at [place], will decrease back to home dose of 20mg)  check UA, check urine urea, Cr

## 2018-03-08 NOTE — DIETITIAN INITIAL EVALUATION ADULT. - NS AS NUTRI INTERV MEALS SNACK
Other (specify)/Diets modified for specific foods and ingredients/1. Suggest: Continue current PO diet order: Dysphagia 3 Soft Nectar Consistency Fluid; DASH/TLC (cholesterol and sodium restricted);           2. Encourage & assist Pt with meals; Monitor PO diet tolerance; Honor food preferences;               3. Monitor labs, weights, hydration status;

## 2018-03-09 VITALS — OXYGEN SATURATION: 97 %

## 2018-03-09 PROCEDURE — 99239 HOSP IP/OBS DSCHRG MGMT >30: CPT

## 2018-03-09 RX ORDER — DOCUSATE SODIUM 100 MG
1 CAPSULE ORAL
Qty: 90 | Refills: 0 | OUTPATIENT
Start: 2018-03-09 | End: 2018-04-07

## 2018-03-09 RX ORDER — SODIUM CHLORIDE 0.65 %
1 AEROSOL, SPRAY (ML) NASAL
Qty: 1 | Refills: 0 | OUTPATIENT
Start: 2018-03-09 | End: 2018-04-07

## 2018-03-09 RX ORDER — ACETAMINOPHEN 500 MG
2 TABLET ORAL
Qty: 0 | Refills: 0 | COMMUNITY
Start: 2018-03-09

## 2018-03-09 RX ORDER — ALBUTEROL 90 UG/1
2 AEROSOL, METERED ORAL
Qty: 30 | Refills: 0
Start: 2018-03-09 | End: 2018-04-07

## 2018-03-09 RX ORDER — LIDOCAINE 4 G/100G
1 CREAM TOPICAL
Qty: 1 | Refills: 0 | OUTPATIENT
Start: 2018-03-09 | End: 2018-04-07

## 2018-03-09 RX ORDER — SODIUM CHLORIDE 0.65 %
1 AEROSOL, SPRAY (ML) NASAL THREE TIMES A DAY
Qty: 0 | Refills: 0 | Status: DISCONTINUED | OUTPATIENT
Start: 2018-03-09 | End: 2018-03-09

## 2018-03-09 RX ORDER — SENNA PLUS 8.6 MG/1
2 TABLET ORAL
Qty: 60 | Refills: 0 | OUTPATIENT
Start: 2018-03-09 | End: 2018-04-07

## 2018-03-09 RX ORDER — POLYETHYLENE GLYCOL 3350 17 G/17G
17 POWDER, FOR SOLUTION ORAL
Qty: 0 | Refills: 0 | COMMUNITY
Start: 2018-03-09

## 2018-03-09 RX ADMIN — TIOTROPIUM BROMIDE 1 CAPSULE(S): 18 CAPSULE ORAL; RESPIRATORY (INHALATION) at 11:37

## 2018-03-09 RX ADMIN — Medication 100 MILLIGRAM(S): at 06:20

## 2018-03-09 RX ADMIN — Medication 1000 UNIT(S): at 11:38

## 2018-03-09 RX ADMIN — AMLODIPINE BESYLATE 5 MILLIGRAM(S): 2.5 TABLET ORAL at 06:19

## 2018-03-09 RX ADMIN — POLYETHYLENE GLYCOL 3350 17 GRAM(S): 17 POWDER, FOR SOLUTION ORAL at 11:38

## 2018-03-09 RX ADMIN — PANTOPRAZOLE SODIUM 40 MILLIGRAM(S): 20 TABLET, DELAYED RELEASE ORAL at 11:37

## 2018-03-09 RX ADMIN — Medication 3 MILLILITER(S): at 15:07

## 2018-03-09 RX ADMIN — Medication 500000 UNIT(S): at 00:23

## 2018-03-09 RX ADMIN — BUDESONIDE AND FORMOTEROL FUMARATE DIHYDRATE 2 PUFF(S): 160; 4.5 AEROSOL RESPIRATORY (INHALATION) at 11:36

## 2018-03-09 RX ADMIN — HEPARIN SODIUM 5000 UNIT(S): 5000 INJECTION INTRAVENOUS; SUBCUTANEOUS at 06:20

## 2018-03-09 RX ADMIN — Medication 500000 UNIT(S): at 11:38

## 2018-03-09 RX ADMIN — Medication 81 MILLIGRAM(S): at 11:38

## 2018-03-09 RX ADMIN — Medication 20 MILLIGRAM(S): at 06:20

## 2018-03-09 RX ADMIN — GABAPENTIN 300 MILLIGRAM(S): 400 CAPSULE ORAL at 06:19

## 2018-03-09 RX ADMIN — LIDOCAINE 1 APPLICATION(S): 4 CREAM TOPICAL at 06:21

## 2018-03-09 RX ADMIN — Medication 500000 UNIT(S): at 06:20

## 2018-03-09 RX ADMIN — Medication 20 MILLIGRAM(S): at 06:19

## 2018-03-09 RX ADMIN — Medication 3 MILLILITER(S): at 04:03

## 2018-03-09 RX ADMIN — Medication 1 SPRAY(S): at 06:21

## 2018-03-09 RX ADMIN — Medication 3 MILLILITER(S): at 09:33

## 2018-03-09 NOTE — PROGRESS NOTE ADULT - PROBLEM SELECTOR PLAN 6
c/w amlodipine, furosemide with hold parameters
c/w CPAP QHS
c/w amlodipine, furosemide with hold parameters

## 2018-03-09 NOTE — PROGRESS NOTE ADULT - PROBLEM SELECTOR PROBLEM 8
Anxiety disorder
Anxiety disorder
Neuropathy
Neuropathy
Anxiety disorder
Anxiety disorder
Neuropathy
Neuropathy
Thrombocytosis

## 2018-03-09 NOTE — PROGRESS NOTE ADULT - PROBLEM SELECTOR PLAN 9
HSQ for DVT ppx  PT eval: anticipate home with home PT by Friday
HSQ for DVT ppx  PT eval: anticipate home with home PT by Friday
c/w clonazepam PRN
c/w clonazepam PRN
HSQ for DVT ppx
HSQ for DVT ppx  PT eval: anticipate home with home PT by Thursday/Friday
c/w clonazepam PRN
c/w clonazepam PRN
likely related to recent steroid use  nystatin swish and spit

## 2018-03-09 NOTE — PROGRESS NOTE ADULT - PROBLEM SELECTOR PLAN 4
likely related to recent steroid use  nystatin swish and spit
c/w clonazepam PRN
c/w dysphagia diet, aspiration precautions
likely related to recent steroid use  nystatin swish and spit

## 2018-03-09 NOTE — PROGRESS NOTE ADULT - PROBLEM SELECTOR PROBLEM 2
Acute kidney injury superimposed on CKD
Acute kidney injury superimposed on CKD
COPD exacerbation
COPD exacerbation
Neuropathy
Rectal pain
COPD exacerbation
COPD exacerbation
Acute kidney injury superimposed on CKD

## 2018-03-09 NOTE — PROGRESS NOTE ADULT - PROBLEM SELECTOR PROBLEM 1
COPD exacerbation
Rectal pain
COPD exacerbation
COPD exacerbation

## 2018-03-09 NOTE — PROGRESS NOTE ADULT - PROBLEM SELECTOR PROBLEM 6
Essential Hypertension
DAVID on CPAP
Essential Hypertension

## 2018-03-09 NOTE — PROGRESS NOTE ADULT - PROBLEM SELECTOR PLAN 1
- COPD exacerbation in setting of acute RSV   - Pt with chronic hypoxemia requiring home oxygen 2/2 longstanding COPD   - ATC nebs for now, c/w tiotropium and budesonide/formoterol   - Pulm recs appreciated. Transitioned to Prednisone 40mg daily, no wheezing heard on exam today. Leukocytosis likely 2/2 steroid use.   - continue incentive spirometry, chest PT  - CPAP QHS, O2 PRN (has home O2 3L which pt wears mainly at night)  - Recent CT angio chest from 2/16/18 with emphysematous changes and b/l subsegmental atelectasis, no PE
- Suspect that this is related to slow transit constipation and stool impaction; I performed rectal exam with disimpaction.  Pt could not tolerate full disimpaction 2/2 pain.  Fleet enema x1 today  - Pt on Senna/Colace/Miralax  - Started on PRN Lactulose
- s/p Moviprep, with multiple BMs and resolution of rectal/abd pain.  - Pt on Senna/Colace/Miralax  - PRN Lactulose  - Topical lidocaine jelly for perirectal pain
suspect a component of anxiety as well, continue klonopin prn  ATC nebs for now, c/w tiotropium and budesonide/formoterol   Pulm recs appreciated. Continue solumedrol q8  has chronic bronchitis, continue incentive spirometry, chest PT  CPAP QHS, O2 PRN (has home O2 which pt wears mainly at night)  Recent CT angio chest from 2/16/18 with emphysematous changes and b/l subsegmental atelectasis, no PE  check RVP, add flonase
suspect a component of anxiety as well, continue klonopin prn  ATC nebs for now, c/w tiotropium and budesonide/formoterol   Sees Dr. Sheets from pulm. Pulm recs appreciated. Obtain ABG today  CXR personally reviewed: clear lungs, no changes to chronic cough, has chronic bronchitis, incentive spirometry, chest PT  CPAP QHS, O2 PRN (has home O2 which pt wears mainly at night)  Recent CT angio chest from 2/16/18 with emphysematous changes and b/l subsegmental atelectasis, no PE
- s/p Moviprep, with multiple BMs and resolution of rectal/abd pain.  - Pt on Senna/Colace/Miralax  - PRN Lactulose  - Topical lidocaine jelly for perirectal pain
- Suspect that this is related to slow transit constipation and stool impaction; I performed rectal exam with disimpaction.  Pt could not tolerate full disimpaction 2/2 pain.  Failed multiple enemas (fleet x1, SMOG x1).  Will give Moviprep today  - Pt on Senna/Colace/Miralax  - Started on PRN Lactulose  - Topical lidocaine jelly for perirectal pain
suspect a component of anxiety as well, continue klonopin prn  ATC nebs for now, c/w tiotropium and budesonide/formoterol   Pulm recs appreciated. Continue solumedrol q8  has chronic bronchitis, continue incentive spirometry, chest PT  CPAP QHS, O2 PRN (has home O2 which pt wears mainly at night)  Recent CT angio chest from 2/16/18 with emphysematous changes and b/l subsegmental atelectasis, no PE  check RVP, add flonase
suspect a component of anxiety as well, continue klonopin prn  ATC nebs for now, c/w tiotropium and budesonide/formoterol   Pulm recs appreciated. Continue solumedrol q8  has chronic bronchitis, continue incentive spirometry, chest PT  CPAP QHS, O2 PRN (has home O2 which pt wears mainly at night)  Recent CT angio chest from 2/16/18 with emphysematous changes and b/l subsegmental atelectasis, no PE  check RVP, add flonase

## 2018-03-09 NOTE — PROGRESS NOTE ADULT - ASSESSMENT
87-year-old female former smoker with COPD on 3L NC, HTN, neuropathy, DAVID on CPAP QHS, dysphagia, obesity, s/p IVC filter, presenting from [facility] with shortness of breath, concern for persisting COPD exacerbation
87-year-old female former smoker with COPD on 3L NC, HTN, neuropathy, DAVID on CPAP QHS, dysphagia, obesity, s/p IVC filter, presenting from [facility] with shortness of breath, concern for persisting COPD exacerbation
87-year-old female former smoker with COPD on 3L NC, HTN, neuropathy, DAVID on CPAP QHS, dysphagia, obesity, s/p IVC filter, presenting from [facility] with shortness of breath, concern for persisting COPD exacerbation
87-year-old female former smoker with COPD on 3L NC, HTN, neuropathy, DAVID on CPAP QHS, dysphagia, obesity, s/p IVC filter, presenting from [facility] with shortness of breath, concern for persisting COPD exacerbation
87-year-old female former smoker with COPD on 3L NC, HTN, neuropathy, DAVID on CPAP QHS, dysphagia, obesity, s/p IVC filter, presenting from [facility] with shortness of breath, concern for persisting COPD exacerbation, now resolved.  Course now c/b fecal impaction and rectal pain now resolved.
87-year-old female former smoker with COPD on 3L NC, HTN, neuropathy, DAVID on CPAP QHS, dysphagia, obesity, s/p IVC filter, presenting from [facility] with shortness of breath, concern for persisting COPD exacerbation, now resolved.  Course now c/b fecal impaction and rectal pain now resolved.
87-year-old female former smoker with COPD on 3L NC, HTN, neuropathy, DAVID on CPAP QHS, dysphagia, obesity, s/p IVC filter, presenting from [facility] with shortness of breath, concern for persisting COPD exacerbation, now resolved.  Course now c/b fecal impaction and rectal pain.
87-year-old female former smoker with COPD on 3L NC, HTN, neuropathy, DAVID on CPAP QHS, dysphagia, obesity, s/p IVC filter, presenting from [facility] with shortness of breath, concern for persisting COPD exacerbation
87-year-old female former smoker with COPD on 3L NC, HTN, neuropathy, DAVID on CPAP QHS, dysphagia, obesity, s/p IVC filter, presenting from [facility] with shortness of breath, concern for persisting COPD exacerbation

## 2018-03-09 NOTE — PROGRESS NOTE ADULT - PROBLEM SELECTOR PROBLEM 7
Neuropathy
Neuropathy
Slow transit constipation
Slow transit constipation
CKD (chronic kidney disease) stage 3, GFR 30-59 ml/min
Essential Hypertension
Neuropathy
Neuropathy
Slow transit constipation

## 2018-03-09 NOTE — PROGRESS NOTE ADULT - PROBLEM SELECTOR PROBLEM 9
Need for prophylactic measure
Need for prophylactic measure
Anxiety disorder
Need for prophylactic measure
Need for prophylactic measure
Thrush

## 2018-03-09 NOTE — PROGRESS NOTE ADULT - SUBJECTIVE AND OBJECTIVE BOX
Patient is a 87y old  Female who presents with a chief complaint of shortness of breath (07 Mar 2018 21:08)      SUBJECTIVE / OVERNIGHT EVENTS: Pt's main complaint today is that her phone  is missing.  She denies any other complaints.        MEDICATIONS  (STANDING):  ALBUTerol/ipratropium for Nebulization 3 milliLiter(s) Nebulizer every 6 hours  amLODIPine   Tablet 5 milliGRAM(s) Oral daily  aspirin enteric coated 81 milliGRAM(s) Oral daily  buDESOnide 160 MICROgram(s)/formoterol 4.5 MICROgram(s) Inhaler 2 Puff(s) Inhalation two times a day  cholecalciferol 1000 Unit(s) Oral daily  docusate sodium 100 milliGRAM(s) Oral three times a day  furosemide    Tablet 20 milliGRAM(s) Oral daily  gabapentin 300 milliGRAM(s) Oral three times a day  heparin  Injectable 5000 Unit(s) SubCutaneous every 8 hours  lidocaine 2% Gel 1 Application(s) Topical two times a day  nystatin    Suspension 485225 Unit(s) Oral four times a day  pantoprazole    Tablet 40 milliGRAM(s) Oral daily  polyethylene glycol 3350 17 Gram(s) Oral daily  predniSONE   Tablet 20 milliGRAM(s) Oral daily  tiotropium 18 MICROgram(s) Capsule 1 Capsule(s) Inhalation daily    MEDICATIONS  (PRN):  acetaminophen   Tablet. 650 milliGRAM(s) Oral every 6 hours PRN Mild, Moderate or Severe pain  clonazePAM Tablet 0.5 milliGRAM(s) Oral at bedtime PRN anxiety  lactulose Syrup 20 Gram(s) Oral two times a day PRN constipation  senna 2 Tablet(s) Oral at bedtime PRN Constipation  sodium chloride 0.65% Nasal 1 Spray(s) Both Nostrils three times a day PRN Nasal Congestion      Vital Signs Last 24 Hrs  T(C): 37.1 (09 Mar 2018 12:35), Max: 37.1 (09 Mar 2018 12:35)  T(F): 98.7 (09 Mar 2018 12:35), Max: 98.7 (09 Mar 2018 12:35)  HR: 90 (09 Mar 2018 12:35) (65 - 95)  BP: 140/57 (09 Mar 2018 12:35) (108/53 - 140/57)  BP(mean): --  RR: 17 (09 Mar 2018 12:35) (17 - 17)  SpO2: 95% (09 Mar 2018 12:35) (94% - 99%)  CAPILLARY BLOOD GLUCOSE      PHYSICAL EXAM  GENERAL: NAD, well-developed, morbidly obese  HEAD:  Atraumatic, Normocephalic  EYES: EOMI, PERRLA, conjunctiva and sclera clear  NECK: Supple, No JVD  CHEST/LUNG: Decreased breath sounds in bases, no wheezes  HEART: Regular rate and rhythm; No murmurs, rubs, or gallops  ABDOMEN: Soft, Nontender, Nondistended; Bowel sounds present  EXTREMITIES:  2+ Peripheral Pulses, No clubbing, cyanosis, or edema  PSYCH: AAOx3  SKIN: No rashes or lesions          Consultant(s) Notes Reviewed:   Pulm

## 2018-03-09 NOTE — PROGRESS NOTE ADULT - SUBJECTIVE AND OBJECTIVE BOX
PULMONARY PROGRESS NOTE    VIDAL LOUIE  MRN-[id number]    Patient is a 87y old  Female who presents with a chief complaint of shortness of breath (01 Mar 2018 03:47)      HPI:  had a BM, breathing is "ok".  upset that some personal items were lost in the hospital.      ROS:   -no N/V    MEDICATIONS  (STANDING):  ALBUTerol/ipratropium for Nebulization 3 milliLiter(s) Nebulizer every 6 hours  amLODIPine   Tablet 5 milliGRAM(s) Oral daily  aspirin enteric coated 81 milliGRAM(s) Oral daily  buDESOnide 160 MICROgram(s)/formoterol 4.5 MICROgram(s) Inhaler 2 Puff(s) Inhalation two times a day  cholecalciferol 1000 Unit(s) Oral daily  docusate sodium 100 milliGRAM(s) Oral three times a day  furosemide    Tablet 20 milliGRAM(s) Oral daily  gabapentin 300 milliGRAM(s) Oral three times a day  heparin  Injectable 5000 Unit(s) SubCutaneous every 8 hours  lidocaine 2% Gel 1 Application(s) Topical two times a day  nystatin    Suspension 059734 Unit(s) Oral four times a day  pantoprazole    Tablet 40 milliGRAM(s) Oral daily  polyethylene glycol 3350 17 Gram(s) Oral daily  predniSONE   Tablet 20 milliGRAM(s) Oral daily  tiotropium 18 MICROgram(s) Capsule 1 Capsule(s) Inhalation daily    MEDICATIONS  (PRN):  acetaminophen   Tablet. 650 milliGRAM(s) Oral every 6 hours PRN Mild, Moderate or Severe pain  clonazePAM Tablet 0.5 milliGRAM(s) Oral at bedtime PRN anxiety  lactulose Syrup 20 Gram(s) Oral two times a day PRN constipation  senna 2 Tablet(s) Oral at bedtime PRN Constipation  sodium chloride 0.65% Nasal 1 Spray(s) Both Nostrils three times a day PRN Nasal Congestion          EXAM:  Vital Signs Last 24 Hrs  T(C): 37.1 (09 Mar 2018 12:35), Max: 37.1 (09 Mar 2018 12:35)  T(F): 98.7 (09 Mar 2018 12:35), Max: 98.7 (09 Mar 2018 12:35)  HR: 90 (09 Mar 2018 12:35) (65 - 95)  BP: 140/57 (09 Mar 2018 12:35) (108/53 - 140/57)  BP(mean): --  RR: 17 (09 Mar 2018 12:35) (17 - 17)  SpO2: 95% (09 Mar 2018 12:35) (94% - 99%)    GENERAL: The patient is awake and alert    SKIN: Warm, dry    LUNGS: scattered exp. squeeks    HEART: distant    ABDOMEN: +BS, Soft, Nontender        LABS/IMGAING: reviewed    < from: Xray Chest 2 Views PA/Lat (02.28.18 @ 19:40) >  IMPRESSION:   Clear lungs.    < end of copied text >          < from: Xray Chest 2 Views PA/Lat (02.28.18 @ 19:40) >  IMPRESSION:   Clear lungs.    < end of copied text >      < from: CT Angio Chest w/ IV Cont (02.16.18 @ 02:54) >  IMPRESSION:     1.  No pulmonary embolism as above.  2.  Emphysema. Bilateral subsegmental atelectasis in the lower lobes.    < end of copied text >    PROBLEM LIST:  87y Female with HEALTH ISSUES - PROBLEM Dx:  COPD exacerbation  DAVID/OHV  CKD (chronic kidney disease) stage 3, GFR 30-59 ml/min  HTN  DAVID on CPAP  Anxiety disorder    RECS:  -COPD: prednisone 20mg PO daily x 3 days total  - PPI for gi upset in the setting of having been on high dose steroids  -continue symbicort, spiriva, duonebs  -DAVID/OHV: continue CPAP during sleep, [id number], patient on auto cpap at home  -supplemental O2  -OOB, Incentive spirometry, PT eval--rehab? pulm rehab?  -bowel regimen  -Patient instructed to follow up with  next week.    Adrianne Garcia MD  [phone]

## 2018-03-09 NOTE — PROGRESS NOTE ADULT - PROBLEM SELECTOR PLAN 3
c/w dysphagia diet, aspiration precautions
- ISRAEL on CKD stage 3  - Creatinine improved  - renally dose meds, avoid nephrotoxins, trend Cr  - continue lasix 20mg
- ISRAEL on CKD stage 3  - Creatinine improved  - renally dose meds, avoid nephrotoxins, trend Cr  - continue lasix 20mg
- ISRAEL on CKD stage 3.  Pt's baseline Cr ~1.0   - Creatinine now improved  - renally dose meds, avoid nephrotoxins, trend Cr  - continue lasix 20mg
c/w dysphagia diet, aspiration precautions
c/w dysphagia diet, aspiration precautions
- ISRAEL on CKD stage 3.  Pt's baseline Cr ~1.0   - Creatinine now improved  - renally dose meds, avoid nephrotoxins, trend Cr  - continue lasix 20mg
- ISRAEL on CKD stage 3.  Pt's baseline Cr ~1.0   - Creatinine now improved  - renally dose meds, avoid nephrotoxins, trend Cr  - continue lasix 20mg
c/w dysphagia diet, aspiration precautions

## 2018-03-09 NOTE — PROGRESS NOTE ADULT - ATTENDING COMMENTS
Dispo: pending PT, respiratory improvement
Medically stable for d/c home today.  34 minutes were spent coordinating discharge.

## 2018-03-09 NOTE — PROGRESS NOTE ADULT - PROBLEM SELECTOR PLAN 5
c/w CPAP QHS
likely related to recent steroid use  nystatin swish and spit

## 2018-03-09 NOTE — PROGRESS NOTE ADULT - PROBLEM SELECTOR PLAN 2
Creatinine improved  renally dose meds, avoid nephrotoxins, trend Cr  continue lasix 20mg
- COPD exacerbation in setting of acute RSV, clinically improved  - Pt with chronic hypoxemia requiring home oxygen 2/2 longstanding COPD   - ATC nebs for now, c/w tiotropium and budesonide/formoterol   - Pulm recs appreciated. Transitioned to Prednisone 40mg daily, will decrease to 30mg tomorrow. No wheezing heard on exam today. Leukocytosis likely 2/2 steroid use.   - continue incentive spirometry, chest PT  - CPAP QHS, O2 PRN (has home O2 3L which pt wears mainly at night)  - Recent CT angio chest from 2/16/18 with emphysematous changes and b/l subsegmental atelectasis, no PE
- COPD exacerbation in setting of acute RSV, resolved  - Pt with chronic hypoxemia requiring home oxygen 2/2 longstanding COPD   - c/w tiotropium and budesonide/formoterol   - Pulm recs appreciated. Transitioned to Prednisone 20mg today (2 more days). No wheezing heard on exam today. Leukocytosis likely 2/2 steroid use.   - continue incentive spirometry, chest PT  - CPAP QHS, O2 PRN (has home O2 3L which pt wears mainly at night)  - Recent CT angio chest from 2/16/18 with emphysematous changes and b/l subsegmental atelectasis, no PE
- Suspect that this is related to slow transit constipation  - Pt on Senna/Colace/Miralax  - Started on PRN Lactulose today  - Will give SMOG enema x1 today, if no relief, will consider manual disimpaction tomorrow
Creatinine improved  renally dose meds, avoid nephrotoxins, trend Cr  continue lasix 20mg
c/w gabapentin
- COPD exacerbation in setting of acute RSV, clinically improved  - Pt with chronic hypoxemia requiring home oxygen 2/2 longstanding COPD   - ATC nebs for now, c/w tiotropium and budesonide/formoterol   - Pulm recs appreciated. Transitioned to Prednisone 20mg today. No wheezing heard on exam today. Leukocytosis likely 2/2 steroid use.   - continue incentive spirometry, chest PT  - CPAP QHS, O2 PRN (has home O2 3L which pt wears mainly at night)  - Recent CT angio chest from 2/16/18 with emphysematous changes and b/l subsegmental atelectasis, no PE
- COPD exacerbation in setting of acute RSV, clinically improved  - Pt with chronic hypoxemia requiring home oxygen 2/2 longstanding COPD   - ATC nebs for now, c/w tiotropium and budesonide/formoterol   - Pulm recs appreciated. Transitioned to Prednisone 30mg today. No wheezing heard on exam today. Leukocytosis likely 2/2 steroid use.   - continue incentive spirometry, chest PT  - CPAP QHS, O2 PRN (has home O2 3L which pt wears mainly at night)  - Recent CT angio chest from 2/16/18 with emphysematous changes and b/l subsegmental atelectasis, no PE
Creatinine improved  renally dose meds, avoid nephrotoxins, trend Cr  continue lasix 20mg

## 2018-03-09 NOTE — PROGRESS NOTE ADULT - PROVIDER SPECIALTY LIST ADULT
Hospitalist
Pulmonology
Hospitalist

## 2018-03-09 NOTE — PROGRESS NOTE ADULT - PROBLEM SELECTOR PLAN 8
c/w clonazepam PRN
c/w clonazepam PRN
c/w gabapentin
c/w gabapentin
c/w clonazepam PRN
c/w clonazepam PRN
c/w gabapentin
c/w gabapentin
likely reactive in setting of COPD exacerbation, improved

## 2018-03-09 NOTE — PROGRESS NOTE ADULT - PROBLEM SELECTOR PLAN 7
c/w gabapentin
c/w gabapentin
continue colace, senna  add Miralax, can add lactulose or try enema PRN
continue colace, senna  add Miralax, can add lactulose or try enema PRN
Creatinine improved today  renally dose meds, avoid nephrotoxins, trend Cr  continue lasix 20mg
c/w amlodipine, furosemide with hold parameters
c/w gabapentin
c/w gabapentin
continue colace, senna  add Miralax, can add lactulose or try enema PRN

## 2018-03-09 NOTE — PROGRESS NOTE ADULT - PROBLEM SELECTOR PROBLEM 3
Dysphagia
Acute kidney injury superimposed on chronic kidney disease
Dysphagia
Dysphagia
Acute kidney injury superimposed on chronic kidney disease
Acute kidney injury superimposed on chronic kidney disease
Dysphagia

## 2018-03-30 ENCOUNTER — INPATIENT (INPATIENT)
Facility: HOSPITAL | Age: 83
LOS: 4 days | Discharge: HOME HEALTH | End: 2018-04-04
Attending: HOSPITALIST | Admitting: HOSPITALIST
Payer: MEDICARE

## 2018-03-30 VITALS
DIASTOLIC BLOOD PRESSURE: 73 MMHG | RESPIRATION RATE: 18 BRPM | OXYGEN SATURATION: 96 % | HEART RATE: 105 BPM | TEMPERATURE: 98 F | SYSTOLIC BLOOD PRESSURE: 126 MMHG

## 2018-03-30 DIAGNOSIS — J18.9 PNEUMONIA, UNSPECIFIED ORGANISM: ICD-10-CM

## 2018-03-30 DIAGNOSIS — F41.9 ANXIETY DISORDER, UNSPECIFIED: ICD-10-CM

## 2018-03-30 DIAGNOSIS — J44.1 CHRONIC OBSTRUCTIVE PULMONARY DISEASE WITH (ACUTE) EXACERBATION: ICD-10-CM

## 2018-03-30 DIAGNOSIS — Z29.9 ENCOUNTER FOR PROPHYLACTIC MEASURES, UNSPECIFIED: ICD-10-CM

## 2018-03-30 DIAGNOSIS — G47.33 OBSTRUCTIVE SLEEP APNEA (ADULT) (PEDIATRIC): ICD-10-CM

## 2018-03-30 DIAGNOSIS — R73.9 HYPERGLYCEMIA, UNSPECIFIED: ICD-10-CM

## 2018-03-30 DIAGNOSIS — Z85.3 PERSONAL HISTORY OF MALIGNANT NEOPLASM OF BREAST: ICD-10-CM

## 2018-03-30 DIAGNOSIS — I10 ESSENTIAL (PRIMARY) HYPERTENSION: ICD-10-CM

## 2018-03-30 DIAGNOSIS — Z98.89 OTHER SPECIFIED POSTPROCEDURAL STATES: Chronic | ICD-10-CM

## 2018-03-30 DIAGNOSIS — I27.20 PULMONARY HYPERTENSION, UNSPECIFIED: ICD-10-CM

## 2018-03-30 DIAGNOSIS — R00.0 TACHYCARDIA, UNSPECIFIED: ICD-10-CM

## 2018-03-30 LAB
ALBUMIN SERPL ELPH-MCNC: 3.8 G/DL — SIGNIFICANT CHANGE UP (ref 3.3–5)
ALP SERPL-CCNC: 94 U/L — SIGNIFICANT CHANGE UP (ref 40–120)
ALT FLD-CCNC: 16 U/L — SIGNIFICANT CHANGE UP (ref 4–33)
AST SERPL-CCNC: 15 U/L — SIGNIFICANT CHANGE UP (ref 4–32)
B PERT DNA SPEC QL NAA+PROBE: SIGNIFICANT CHANGE UP
BASE EXCESS BLDV CALC-SCNC: 4.2 MMOL/L — SIGNIFICANT CHANGE UP
BASOPHILS # BLD AUTO: 0.07 K/UL — SIGNIFICANT CHANGE UP (ref 0–0.2)
BASOPHILS NFR BLD AUTO: 0.8 % — SIGNIFICANT CHANGE UP (ref 0–2)
BILIRUB SERPL-MCNC: 0.8 MG/DL — SIGNIFICANT CHANGE UP (ref 0.2–1.2)
BLOOD GAS VENOUS - CREATININE: 0.99 MG/DL — SIGNIFICANT CHANGE UP (ref 0.5–1.3)
BUN SERPL-MCNC: 20 MG/DL — SIGNIFICANT CHANGE UP (ref 7–23)
C PNEUM DNA SPEC QL NAA+PROBE: NOT DETECTED — SIGNIFICANT CHANGE UP
CALCIUM SERPL-MCNC: 8.9 MG/DL — SIGNIFICANT CHANGE UP (ref 8.4–10.5)
CHLORIDE BLDV-SCNC: 102 MMOL/L — SIGNIFICANT CHANGE UP (ref 96–108)
CHLORIDE SERPL-SCNC: 99 MMOL/L — SIGNIFICANT CHANGE UP (ref 98–107)
CK MB BLD-MCNC: 1.72 NG/ML — SIGNIFICANT CHANGE UP (ref 1–4.7)
CK MB BLD-MCNC: SIGNIFICANT CHANGE UP (ref 0–2.5)
CK SERPL-CCNC: 50 U/L — SIGNIFICANT CHANGE UP (ref 25–170)
CO2 SERPL-SCNC: 25 MMOL/L — SIGNIFICANT CHANGE UP (ref 22–31)
CREAT SERPL-MCNC: 1 MG/DL — SIGNIFICANT CHANGE UP (ref 0.5–1.3)
EOSINOPHIL # BLD AUTO: 0.15 K/UL — SIGNIFICANT CHANGE UP (ref 0–0.5)
EOSINOPHIL NFR BLD AUTO: 1.7 % — SIGNIFICANT CHANGE UP (ref 0–6)
FLUAV H1 2009 PAND RNA SPEC QL NAA+PROBE: NOT DETECTED — SIGNIFICANT CHANGE UP
FLUAV H1 RNA SPEC QL NAA+PROBE: NOT DETECTED — SIGNIFICANT CHANGE UP
FLUAV H3 RNA SPEC QL NAA+PROBE: NOT DETECTED — SIGNIFICANT CHANGE UP
FLUAV SUBTYP SPEC NAA+PROBE: SIGNIFICANT CHANGE UP
FLUBV RNA SPEC QL NAA+PROBE: NOT DETECTED — SIGNIFICANT CHANGE UP
GAS PNL BLDV: 138 MMOL/L — SIGNIFICANT CHANGE UP (ref 136–146)
GLUCOSE BLDV-MCNC: 213 — HIGH (ref 70–99)
GLUCOSE SERPL-MCNC: 203 MG/DL — HIGH (ref 70–99)
HADV DNA SPEC QL NAA+PROBE: NOT DETECTED — SIGNIFICANT CHANGE UP
HCO3 BLDV-SCNC: 28 MMOL/L — HIGH (ref 20–27)
HCOV 229E RNA SPEC QL NAA+PROBE: NOT DETECTED — SIGNIFICANT CHANGE UP
HCOV HKU1 RNA SPEC QL NAA+PROBE: NOT DETECTED — SIGNIFICANT CHANGE UP
HCOV NL63 RNA SPEC QL NAA+PROBE: NOT DETECTED — SIGNIFICANT CHANGE UP
HCOV OC43 RNA SPEC QL NAA+PROBE: NOT DETECTED — SIGNIFICANT CHANGE UP
HCT VFR BLD CALC: 41.1 % — SIGNIFICANT CHANGE UP (ref 34.5–45)
HCT VFR BLDV CALC: 40 % — SIGNIFICANT CHANGE UP (ref 34.5–45)
HGB BLD-MCNC: 12.7 G/DL — SIGNIFICANT CHANGE UP (ref 11.5–15.5)
HGB BLDV-MCNC: 13 G/DL — SIGNIFICANT CHANGE UP (ref 11.5–15.5)
HMPV RNA SPEC QL NAA+PROBE: NOT DETECTED — SIGNIFICANT CHANGE UP
HPIV1 RNA SPEC QL NAA+PROBE: NOT DETECTED — SIGNIFICANT CHANGE UP
HPIV2 RNA SPEC QL NAA+PROBE: NOT DETECTED — SIGNIFICANT CHANGE UP
HPIV3 RNA SPEC QL NAA+PROBE: NOT DETECTED — SIGNIFICANT CHANGE UP
HPIV4 RNA SPEC QL NAA+PROBE: NOT DETECTED — SIGNIFICANT CHANGE UP
IMM GRANULOCYTES # BLD AUTO: 0.09 # — SIGNIFICANT CHANGE UP
IMM GRANULOCYTES NFR BLD AUTO: 1 % — SIGNIFICANT CHANGE UP (ref 0–1.5)
LACTATE BLDV-MCNC: 2.1 MMOL/L — HIGH (ref 0.5–2)
LYMPHOCYTES # BLD AUTO: 1.63 K/UL — SIGNIFICANT CHANGE UP (ref 1–3.3)
LYMPHOCYTES # BLD AUTO: 18.5 % — SIGNIFICANT CHANGE UP (ref 13–44)
M PNEUMO DNA SPEC QL NAA+PROBE: NOT DETECTED — SIGNIFICANT CHANGE UP
MCHC RBC-ENTMCNC: 25.7 PG — LOW (ref 27–34)
MCHC RBC-ENTMCNC: 30.9 % — LOW (ref 32–36)
MCV RBC AUTO: 83 FL — SIGNIFICANT CHANGE UP (ref 80–100)
MONOCYTES # BLD AUTO: 0.38 K/UL — SIGNIFICANT CHANGE UP (ref 0–0.9)
MONOCYTES NFR BLD AUTO: 4.3 % — SIGNIFICANT CHANGE UP (ref 2–14)
NEUTROPHILS # BLD AUTO: 6.47 K/UL — SIGNIFICANT CHANGE UP (ref 1.8–7.4)
NEUTROPHILS NFR BLD AUTO: 73.7 % — SIGNIFICANT CHANGE UP (ref 43–77)
NRBC # FLD: 0 — SIGNIFICANT CHANGE UP
NT-PROBNP SERPL-SCNC: 484.3 PG/ML — SIGNIFICANT CHANGE UP
PCO2 BLDV: 45 MMHG — SIGNIFICANT CHANGE UP (ref 41–51)
PH BLDV: 7.42 PH — SIGNIFICANT CHANGE UP (ref 7.32–7.43)
PLATELET # BLD AUTO: 237 K/UL — SIGNIFICANT CHANGE UP (ref 150–400)
PMV BLD: 9.6 FL — SIGNIFICANT CHANGE UP (ref 7–13)
PO2 BLDV: 84 MMHG — HIGH (ref 35–40)
POTASSIUM BLDV-SCNC: 3 MMOL/L — LOW (ref 3.4–4.5)
POTASSIUM SERPL-MCNC: 3.4 MMOL/L — LOW (ref 3.5–5.3)
POTASSIUM SERPL-SCNC: 3.4 MMOL/L — LOW (ref 3.5–5.3)
PROT SERPL-MCNC: 6.7 G/DL — SIGNIFICANT CHANGE UP (ref 6–8.3)
RBC # BLD: 4.95 M/UL — SIGNIFICANT CHANGE UP (ref 3.8–5.2)
RBC # FLD: 19.9 % — HIGH (ref 10.3–14.5)
RSV RNA SPEC QL NAA+PROBE: NOT DETECTED — SIGNIFICANT CHANGE UP
RV+EV RNA SPEC QL NAA+PROBE: NOT DETECTED — SIGNIFICANT CHANGE UP
SAO2 % BLDV: 95.5 % — HIGH (ref 60–85)
SODIUM SERPL-SCNC: 141 MMOL/L — SIGNIFICANT CHANGE UP (ref 135–145)
TROPONIN T SERPL-MCNC: < 0.06 NG/ML — SIGNIFICANT CHANGE UP (ref 0–0.06)
WBC # BLD: 8.79 K/UL — SIGNIFICANT CHANGE UP (ref 3.8–10.5)
WBC # FLD AUTO: 8.79 K/UL — SIGNIFICANT CHANGE UP (ref 3.8–10.5)

## 2018-03-30 PROCEDURE — 99223 1ST HOSP IP/OBS HIGH 75: CPT | Mod: GC

## 2018-03-30 PROCEDURE — 71045 X-RAY EXAM CHEST 1 VIEW: CPT | Mod: 26

## 2018-03-30 RX ORDER — GLUCAGON INJECTION, SOLUTION 0.5 MG/.1ML
1 INJECTION, SOLUTION SUBCUTANEOUS ONCE
Qty: 0 | Refills: 0 | Status: DISCONTINUED | OUTPATIENT
Start: 2018-03-30 | End: 2018-04-04

## 2018-03-30 RX ORDER — DEXTROSE 50 % IN WATER 50 %
25 SYRINGE (ML) INTRAVENOUS ONCE
Qty: 0 | Refills: 0 | Status: DISCONTINUED | OUTPATIENT
Start: 2018-03-30 | End: 2018-04-04

## 2018-03-30 RX ORDER — GABAPENTIN 400 MG/1
300 CAPSULE ORAL THREE TIMES A DAY
Qty: 0 | Refills: 0 | Status: DISCONTINUED | OUTPATIENT
Start: 2018-03-30 | End: 2018-04-04

## 2018-03-30 RX ORDER — IPRATROPIUM/ALBUTEROL SULFATE 18-103MCG
3 AEROSOL WITH ADAPTER (GRAM) INHALATION EVERY 6 HOURS
Qty: 0 | Refills: 0 | Status: DISCONTINUED | OUTPATIENT
Start: 2018-03-30 | End: 2018-04-04

## 2018-03-30 RX ORDER — TIOTROPIUM BROMIDE 18 UG/1
1 CAPSULE ORAL; RESPIRATORY (INHALATION) DAILY
Qty: 0 | Refills: 0 | Status: DISCONTINUED | OUTPATIENT
Start: 2018-03-30 | End: 2018-04-04

## 2018-03-30 RX ORDER — CHOLECALCIFEROL (VITAMIN D3) 125 MCG
1000 CAPSULE ORAL DAILY
Qty: 0 | Refills: 0 | Status: DISCONTINUED | OUTPATIENT
Start: 2018-03-30 | End: 2018-04-04

## 2018-03-30 RX ORDER — VANCOMYCIN HCL 1 G
1000 VIAL (EA) INTRAVENOUS ONCE
Qty: 0 | Refills: 0 | Status: COMPLETED | OUTPATIENT
Start: 2018-03-30 | End: 2018-03-30

## 2018-03-30 RX ORDER — DEXTROSE 50 % IN WATER 50 %
12.5 SYRINGE (ML) INTRAVENOUS ONCE
Qty: 0 | Refills: 0 | Status: DISCONTINUED | OUTPATIENT
Start: 2018-03-30 | End: 2018-04-04

## 2018-03-30 RX ORDER — HEPARIN SODIUM 5000 [USP'U]/ML
5000 INJECTION INTRAVENOUS; SUBCUTANEOUS EVERY 8 HOURS
Qty: 0 | Refills: 0 | Status: DISCONTINUED | OUTPATIENT
Start: 2018-03-30 | End: 2018-04-04

## 2018-03-30 RX ORDER — CEFEPIME 1 G/1
1000 INJECTION, POWDER, FOR SOLUTION INTRAMUSCULAR; INTRAVENOUS ONCE
Qty: 0 | Refills: 0 | Status: COMPLETED | OUTPATIENT
Start: 2018-03-30 | End: 2018-03-30

## 2018-03-30 RX ORDER — INSULIN LISPRO 100/ML
VIAL (ML) SUBCUTANEOUS
Qty: 0 | Refills: 0 | Status: DISCONTINUED | OUTPATIENT
Start: 2018-03-30 | End: 2018-04-04

## 2018-03-30 RX ORDER — POTASSIUM CHLORIDE 20 MEQ
40 PACKET (EA) ORAL ONCE
Qty: 0 | Refills: 0 | Status: COMPLETED | OUTPATIENT
Start: 2018-03-30 | End: 2018-03-30

## 2018-03-30 RX ORDER — CIPROFLOXACIN LACTATE 400MG/40ML
400 VIAL (ML) INTRAVENOUS ONCE
Qty: 0 | Refills: 0 | Status: DISCONTINUED | OUTPATIENT
Start: 2018-03-30 | End: 2018-03-30

## 2018-03-30 RX ORDER — INSULIN LISPRO 100/ML
VIAL (ML) SUBCUTANEOUS AT BEDTIME
Qty: 0 | Refills: 0 | Status: DISCONTINUED | OUTPATIENT
Start: 2018-03-30 | End: 2018-04-04

## 2018-03-30 RX ORDER — PANTOPRAZOLE SODIUM 20 MG/1
40 TABLET, DELAYED RELEASE ORAL
Qty: 0 | Refills: 0 | Status: DISCONTINUED | OUTPATIENT
Start: 2018-03-30 | End: 2018-04-04

## 2018-03-30 RX ORDER — SODIUM CHLORIDE 9 MG/ML
1000 INJECTION, SOLUTION INTRAVENOUS
Qty: 0 | Refills: 0 | Status: DISCONTINUED | OUTPATIENT
Start: 2018-03-30 | End: 2018-04-04

## 2018-03-30 RX ORDER — IPRATROPIUM/ALBUTEROL SULFATE 18-103MCG
3 AEROSOL WITH ADAPTER (GRAM) INHALATION ONCE
Qty: 0 | Refills: 0 | Status: COMPLETED | OUTPATIENT
Start: 2018-03-30 | End: 2018-03-30

## 2018-03-30 RX ORDER — ALBUTEROL 90 UG/1
2 AEROSOL, METERED ORAL EVERY 6 HOURS
Qty: 0 | Refills: 0 | Status: DISCONTINUED | OUTPATIENT
Start: 2018-03-30 | End: 2018-04-04

## 2018-03-30 RX ORDER — DEXTROSE 50 % IN WATER 50 %
1 SYRINGE (ML) INTRAVENOUS ONCE
Qty: 0 | Refills: 0 | Status: DISCONTINUED | OUTPATIENT
Start: 2018-03-30 | End: 2018-04-04

## 2018-03-30 RX ORDER — ASPIRIN/CALCIUM CARB/MAGNESIUM 324 MG
81 TABLET ORAL DAILY
Qty: 0 | Refills: 0 | Status: DISCONTINUED | OUTPATIENT
Start: 2018-03-30 | End: 2018-04-04

## 2018-03-30 RX ORDER — BUDESONIDE AND FORMOTEROL FUMARATE DIHYDRATE 160; 4.5 UG/1; UG/1
2 AEROSOL RESPIRATORY (INHALATION)
Qty: 0 | Refills: 0 | Status: DISCONTINUED | OUTPATIENT
Start: 2018-03-30 | End: 2018-04-04

## 2018-03-30 RX ORDER — SODIUM CHLORIDE 9 MG/ML
1000 INJECTION INTRAMUSCULAR; INTRAVENOUS; SUBCUTANEOUS ONCE
Qty: 0 | Refills: 0 | Status: COMPLETED | OUTPATIENT
Start: 2018-03-30 | End: 2018-03-30

## 2018-03-30 RX ADMIN — Medication 40 MILLIEQUIVALENT(S): at 20:13

## 2018-03-30 RX ADMIN — CEFEPIME 100 MILLIGRAM(S): 1 INJECTION, POWDER, FOR SOLUTION INTRAMUSCULAR; INTRAVENOUS at 20:42

## 2018-03-30 RX ADMIN — Medication 40 MILLIGRAM(S): at 21:03

## 2018-03-30 RX ADMIN — Medication 3 MILLILITER(S): at 20:13

## 2018-03-30 RX ADMIN — Medication 250 MILLIGRAM(S): at 21:34

## 2018-03-30 RX ADMIN — Medication 3 MILLILITER(S): at 21:34

## 2018-03-30 RX ADMIN — Medication 3: at 23:39

## 2018-03-30 RX ADMIN — SODIUM CHLORIDE 250 MILLILITER(S): 9 INJECTION INTRAMUSCULAR; INTRAVENOUS; SUBCUTANEOUS at 21:03

## 2018-03-30 NOTE — ED PROVIDER NOTE - CONSTITUTIONAL, MLM
normal... Well appearing, well nourished, awake, alert, oriented to person, place, time/situation and in no apparent distress. mild tachypnea

## 2018-03-30 NOTE — H&P ADULT - PROBLEM SELECTOR PLAN 10
S/P L lumpectomy and radiation in 1989, has annual mammogram without recurrence. Heparin sq dvt ppx -Heparin sq dvt ppx  -Restart bowel regimen to prevent stool impaction as it occurred during prior admission (see HPI above)

## 2018-03-30 NOTE — ED PROVIDER NOTE - OBJECTIVE STATEMENT
87F h.o COPD on home 02 3L, also DAVID on CPAP at night, HTN, anxiety, neuropathy, L lumpectomy, c/o cough and increased SOB this afternoon. Tried extra albuterol and 02 but did not get better so called EMS. They stated they found her tachypneic say 94 on 4L, bilat wheezes and gave her 2x duo nebs and solumedrol 125mg IV with some improvement. States she still has some dyspnea and cough. Denies fever, chills, sputum, chest pain, leg pain or swelling or other acute symptoms.  PCP/ outpatient pulmonologist: Dr Mike Sheets 87F h.o COPD on home 02 3L, also DAVID on CPAP at night, HTN, anxiety, neuropathy, L lumpectomy, c/o cough and increased SOB this afternoon. Tried extra albuterol and 02 but did not get better so called EMS. They stated they found her tachypneic sat 94% on 4L, bilat wheezes and gave her 2x duo nebs and solumedrol 125mg IV with some improvement. States she still has some dyspnea and cough. Denies fever, chills, sputum, chest pain, leg pain or swelling or other acute symptoms.  PCP/ outpatient pulmonologist: Dr Mike Sehets

## 2018-03-30 NOTE — H&P ADULT - PROBLEM SELECTOR PLAN 1
Possibly in setting of pneumonia. Pt tachypneic with increased O2 demand but no fever or leukocytosis. Still tachypneic with increased O2 demand, improvement with nebs, not toxic appearing. The exacerbation may potentially be in setting of pneumonia but the RLL haziness on CXR is only slightly increased from last admission 2/2018 for COPD exacerbation from RSV, not treated with antibiotics, after which she improved to baseline with steroid taper.  - S/P vancomycin and cefepime in ED, with continue with levofloxacin 750 mg IV daily unless decompensates   - Prednisone 40 mg daily for 5 days and reassess   - Duonebs q6h Still tachypneic with increased O2 demand, improvement with nebs, not toxic appearing. The exacerbation may potentially be in setting of pneumonia with chronic RLL haziness on CXR slightly increased from last admission 2/2018 for COPD exacerbation from RSV, not treated with antibiotics. Chronic haziness may be masking pneumonia as well.   - S/P vancomycin and cefepime in ED, with continue with levofloxacin 750 mg IV q48h based on Cr clearance  - Prednisone 40 mg daily for 5 days and reassess   - Duonebs q6h  - Check set of cardiac enzymes given 0.5 mm ST depressions in V4-V6 Still tachypneic with increased O2 demand, improvement with nebs, not toxic appearing. The exacerbation may potentially be in setting of pneumonia with chronic RLL haziness on CXR slightly increased from last admission 2/2018 for COPD exacerbation from RSV, not treated with antibiotics. Chronic haziness may be masking pneumonia as well.   - S/P vancomycin and cefepime in ED, with continue with levofloxacin 750 mg IV q48h based on Cr clearance  - Prednisone 40 mg daily for 5 days and reassess   - Duonebs q6h  - Check set of cardiac enzymes given 0.5 mm ST depressions in V4-V6  - Home symbicort, spiriva, albuterol Still tachypneic with increased O2 demand, improvement with nebs, not toxic appearing. The exacerbation may potentially be in setting of pneumonia with chronic RLL haziness on CXR slightly increased from last admission 2/2018 for COPD exacerbation from RSV, not treated with antibiotics. Chronic haziness may be masking pneumonia as well.   - S/P vancomycin and cefepime in ED, with continue with levofloxacin 750 mg IV q48h based on Cr clearance  - Prednisone 40 mg daily for 5 days and reassess   - Duonebs q6h  - Consult pt's private pulmonologist in the AM  - Check set of cardiac enzymes given 0.5 mm ST depressions in V4-V6  - Home symbicort, spiriva, albuterol Still tachypneic with increased O2 demand, improvement with nebs, not toxic appearing. The exacerbation may potentially be in setting of pneumonia with chronic RLL haziness on CXR slightly increased from last admission 2/2018 for COPD exacerbation from RSV, not treated with antibiotics. Chronic haziness may be masking pneumonia as well. CTA chest with no PE or concerning lung lesions 2/2018.  - S/P vancomycin and cefepime in ED, with continue with levofloxacin 750 mg IV q48h based on Cr clearance  - Prednisone 40 mg daily for 5 days and reassess   - Duonebs q6h  - Consult pt's private pulmonologist in the AM  - Check set of cardiac enzymes given 0.5 mm ST depressions in V4-V6  - Home symbicort, spiriva, albuterol Still tachypneic with now on 4L just above home 3L, improvement with nebs, not toxic appearing. The exacerbation may potentially be in setting of pneumonia with chronic RLL haziness on CXR slightly increased from last admission 2/2018 for COPD exacerbation from RSV, not treated with antibiotics. Chronic haziness may be masking pneumonia as well. CTA chest with no PE or concerning lung lesions 2/2018.  - S/P vancomycin and cefepime in ED, with continue with levofloxacin 750 mg IV q48h based on Cr clearance  - Prednisone 40 mg daily for 5 days and reassess   - Duonebs q6h  - Consult pt's private pulmonologist in the AM  - Check set of cardiac enzymes given 0.5 mm ST depressions in V4-V6  - Home symbicort, spiriva, albuterol Still tachypneic with now on 4L ATC  compared to home 3L at night at home, improvement with nebs, not toxic appearing. The exacerbation may potentially be in setting of pneumonia - RLL haziness on CXR slightly increased from last admission 2/2018 for COPD exacerbation from RSV, not treated with antibiotics. Chronic haziness in the RLL area may be masking pneumonia as well..  CTA chest with no PE or concerning lung lesions 2/2018.  - S/P vancomycin and cefepime in ED  - Start levofloxacin 750 mg IV q48h based on Cr clearance (HCAP coverage)  - Prednisone 40 mg daily for 5 days and reassess   - Duonebs q6h  - Consult pt's private pulmonologist in the AM (Dr. Sheets Mkie)  - Screening set of cardiac enzymes and ProBNP - negative  - Home symbicort, spiriva, albuterol

## 2018-03-30 NOTE — H&P ADULT - PROBLEM SELECTOR PLAN 6
Continue amlodipine 5 mg daily Holding clonazepam 0.5mg which she takes PRN for anxiety at bedtime, restart as 0.25 mg q12h PRN if necessary Chronic, per patient. Possibly in setting of albuterol use 4X daily and current nebulizers. Per pt had IVC filter placed in 2009 in [facility] in the setting of a femur fracture but was never placed on anticoagulation. CTA chest 2/2018 no PE. No LE tenderness or erythema. Based on TTE in 2015 with RV systolic pressure of 50 mmhg, indicating moderate pulmonary HTN. Also with mild pedal edema, no history of HF. Normal EF;  - Holding lasix 20 mg for 24-48h in setting of pneumonia and COPD, restart as needed  - No further IV fluids for now

## 2018-03-30 NOTE — H&P ADULT - ASSESSMENT
87F with COPD on 3L O2, DAVID on CPAP, moderate pulmonary HTN on TTE in 2015, anxiety, HTN, breast Ca s/p lumpectomy and radiation (1989), recent admissions 12/2017 and 3/1-3/8/17 for COPD exacerbation presents with progressive dyspnea over the 3 last days found to be tachypenic and hypoxic with worsened RLL haziness, admitted for COPD exacerbation possibly due to pneumonia 87F with COPD on 3L O2, DAVID on CPAP, moderate pulmonary HTN on TTE in 2015, anxiety, HTN, breast Ca s/p lumpectomy and radiation (1989), recent admissions 12/2017 and 3/1-3/8/17 for COPD exacerbation presents with progressive dyspnea over the 3 last days found to be tachypenic and hypoxic with slightly worsened RLL haziness, admitted for COPD exacerbation with possible pneumonia. 88yo Female, ambulatory with a waker, COPD on 3L O2 at night, DAVID on CPAP, moderate pulmonary HTN on TTE in 2015, anxiety, HTN, breast Ca s/p lumpectomy and radiation (1989), recent admissions 12/2017 and 3/1-3/8/17 for COPD exacerbation p/w progressive dyspnea over the 3 last days found to be tachypenic and hypoxemic with slightly worsened RLL haziness, admitted for COPD exacerbation with a possible RLL pneumonia c/b acute hypoxia with elevated lactate;

## 2018-03-30 NOTE — H&P ADULT - NSHPPHYSICALEXAM_GEN_ALL_CORE
VS: T 98.1, , /63, RR 22, 94% on 6L   General: NAD, obese, tachypneic but speaking full sentences   Skin: Warm and dry  Neuro: AAOx3, CN III-XII intact, strength intact 5/5 in all extremities  HEENT: PERRL, EOMI, no oral lesions  Neck: Full range of motion, no JVD  Lungs: Clear to ascultation bilaterally, no wheezes, rales, or rhonchi, tachypenic but no accessory muscle use  Heart: Tachycardic, no murmurs  Abdomen: Normoactive bowl sounds, soft, nontender, nondistended  Extremities: 1+ pedal edema at the ankles b/l, no tenderness or erythema   Vascular: 2+ radial and pedal pulses  Lymph: no cervical lymphadenopathy  Psych: appears mildly anxious

## 2018-03-30 NOTE — ED PROVIDER NOTE - MEDICAL DECISION MAKING DETAILS
COPD excasserbation, Plan: CXR labs nebs steroids antibiotics EKG d/w Pulm likely admission COPD excasserbation, Plan: CXR labs nebs steroids antibiotics EKG  likely admission based on severity of presentation

## 2018-03-30 NOTE — ED ADULT NURSE REASSESSMENT NOTE - CONDITION
rec'd pt in tr c... o2 incraed to 6l via nc.  o2 sat 93%...adm md aware. pt reports feeling better.  no cp. pt aa&ox4. walk with walker at home.  left leg chronically more swollen..hx of left ghip fx. report given to katie. for tfr to [facility]

## 2018-03-30 NOTE — H&P ADULT - PROBLEM SELECTOR PLAN 7
S/P L lumpectomy and radiation in 1989, has annual mammogram without recurrence. Continue amlodipine 5 mg daily Holding amlodipine 5 mg daily, restart in AM if BP stable Based on TTE in 2015 with RV systolic pressure of 50 mmhg, indicating moderate pulmonary pressures. Not confirmed on RHC. Also with mild pedal edema, no history of HF.  - Holding lasix 20 mg for 24-48h in setting of pneumonia then restart  - No further fluids for now, tachycardia likely due to dyspnea Holding clonazepam 0.5mg which she takes PRN for anxiety at bedtime, restart as 0.25 mg q12h PRN if necessary;  Concerned for delirium given steroids regimen and infection;

## 2018-03-30 NOTE — ED PROVIDER NOTE - PROGRESS NOTE DETAILS
Patient is in [facility] program: Clinical Call Center [phone] notified Still some dyspnea. Sat 93% on 5L, RR 22. Patient comfortable. Of note CXR shows RLL infiltrate. BC x 2 ordered and patient started on HCAP antibiotics as she was recently admitted  earlier this month. Dr Sheets's group admits to hospitalist- notified.

## 2018-03-30 NOTE — H&P ADULT - NSHPSOCIALHISTORY_GEN_ALL_CORE
Former smoker, 45 py history (0.5-1 ppd x 60 years), quit 27 years ago  No alcohol or illicit drugs  Lives with , who is 89 and functional

## 2018-03-30 NOTE — ED ADULT NURSE NOTE - OBJECTIVE STATEMENT
Patient received into [facility] AA&Ox3 BIBEMS for worsening SOB since this afternoon - found to be tachypneic on 4L with an oxygen saturation of 94%. Patient was given 2 duo neb treatments and 125 methylprednisolone IV with some improvement noted. Patient presents to ED with dyspnea and (+) wheezes - pt. on home O2. VSS on 4L NC. Patient denies chest pain, N/V, fever, chills, dizziness at this time. 20g PIV in place to right AC placed by EMS, labs drawn via butterfly needle. Bed in lowest position, call bell within reach, family at bedside, side rail up - will continue to monitor.

## 2018-03-30 NOTE — H&P ADULT - PROBLEM SELECTOR PLAN 4
Based on TTE in 2015 with RV systolic pressure of 50 mmhg, indicating moderate pulmonary pressures. Not confirmed on RHC. Also with mild pedal edema, no history of HF.  - Continue furosemide 20 mg daily Glucose in 200s, likely has T2DM with HgbA1c just under threshold of 6.4 in 2/2018. Risk for steroid induced hyperglycemia.   - FS with meals and at bedtime  - ISS  - HgbA1c in AM Glucose in 300s, r/o Type 2 DM, HgbA1c just under threshold of 6.4 in 2/2018. Risk for steroid induced hyperglycemia.   - FS with meals and at bedtime  - ISS  - HgbA1c in AM

## 2018-03-30 NOTE — H&P ADULT - HISTORY OF PRESENT ILLNESS
87F with COPD on 3L O2, DAVID on CPAP, moderate pulmonary HTN on TTE in 2015, anxiety, HTN, breast Ca s/p lumpectomy and radiation (1989), recent admissions 12/2017 and 3/1-3/8/17 for COPD exacerbation presents with progressive dyspnea over the 3 last days. Pt reports completing a steroid taper 2 week ago and was at her baseline until 3 days ago when she behan experiencing worsening dyspnea on exertion which progressed to dyspnea at rest. At baseline the maximum distance she is able to walk on her home O2 is from her car just outside the house up 8 stairs to her bedroom, and by this point she is short of breath. Pt uses her oxygen at all times. Takes symicort, spirva, uses albuterol inhaler 4x daily, which didn't help today. Pt also has worsening dry cough. No other URI symptoms. She had chills 3 days ago but no fever. No orthopnea but takes lasix 20 mg daily for pedal edema, TTE 12/2017 normal LV and RV function without diastolic dysfunction but pulmonary HTN seen on TTE in 2015. No recent syncope or falls.    In the ED, pt was afebrile, normotensive, tachycardic to 110s, tachypneic to the 20s, requiring 6L O2 to saturate at 93-94%. No leukocytosis, Cr at baseline around 1, pH 7.42 and PCO2 45 on VBG. 86yo Female, ambulatory with a waker, COPD on 3L O2 at night, DAVID on CPAP, moderate pulmonary HTN on TTE in 2015, anxiety, HTN, breast Ca s/p lumpectomy and radiation (1989), recent admissions 12/2017 and 3/1-3/8/17 for COPD exacerbation presents with progressive dyspnea over the 3 last days. Pt reports completing a steroid taper 2 week ago and was at her baseline until 3 days ago when she behan experiencing worsening dyspnea on exertion which progressed to dyspnea at rest. At baseline the maximum distance she is able to walk on her home O2 is from her car just outside the house up 8 stairs to her bedroom, and by this point she is short of breath. Pt uses her oxygen at all times. Takes symicort, spirva, uses albuterol inhaler 4x daily, which didn't help today. Pt also has worsening dry cough. No other URI symptoms. She had chills 3 days ago but no fever. No orthopnea but takes lasix 20 mg daily for pedal edema, TTE 12/2017 normal LV and RV function without diastolic dysfunction but pulmonary HTN seen on TTE in 2015. No recent syncope or falls.    In the ED, pt was afebrile, normotensive, tachycardic to 110s, tachypneic to the 20s, requiring 6L O2 to saturate at 93-94%. No leukocytosis, Cr at baseline around 1, pH 7.42 and PCO2 45 on VBG. 88yo Female, ambulatory with a waker, COPD on 3L O2 at night, DAVID on CPAP, moderate pulmonary HTN on TTE in 2015, anxiety, HTN, breast Ca s/p lumpectomy and radiation (1989), recent admissions 12/2017 and 3/1-3/8/17 for COPD exacerbation presents with progressive dyspnea over the 3 last days. Pt reports completing a steroid taper 2 week ago and was at her baseline until 3 days ago when she behan experiencing worsening dyspnea on exertion which progressed to dyspnea at rest. At baseline the maximum distance she is able to walk on her home O2 is from her car just outside the house up 8 stairs to her bedroom, and by this point she is short of breath. Pt uses her oxygen at all times. Takes symicort, spirva, uses albuterol inhaler 4x daily, which didn't help today. Pt also has worsening dry cough. No other URI symptoms. She had chills 3 days ago but no fever. No orthopnea but takes lasix 20 mg daily for pedal edema, TTE 12/2017 normal LV and RV function without diastolic dysfunction but pulmonary HTN seen on TTE in 2015. No recent syncope or falls.  Of note, prior hospitalization was c/b impacted stool, manual disimpaction was needed and Moviprep administration;     In the ED, pt was afebrile, normotensive, tachycardic to 110s, tachypneic to the 20s, requiring 6L O2 to saturate at 93-94%. No leukocytosis, Cr at baseline around 1, pH 7.42 and PCO2 45 on VBG.

## 2018-03-30 NOTE — H&P ADULT - PROBLEM SELECTOR PLAN 2
See plan for COPD Exacerbation See plan for COPD Exacerbation. Chronic RLL haziness slightly increased but no fever or leukocytosis, tachypneic but nontoxic appearing.   - Levofloxacin 750 mg stat and q48 based on Cr clearance  - S/p 1L, hold off on further fluids for now given history of pulmonary HTN  - Vitals q4h  - Aspiration precautions See plan for COPD Exacerbation. Chronic RLL haziness slightly increased but no fever or leukocytosis, tachypneic but nontoxic appearing.   - Levofloxacin 750 mg stat and q48 based on Cr clearance  - S/p 1L, hold off on further fluids for now given history of pulmonary HTN  - Vitals q4h  - CTA chest with no PE or concerning lung lesions 2/2018  - Aspiration precautions See plan for COPD Exacerbation. RLL haziness seen on prior CXR 2/28/18 now somewhat increased in size, no fever or leukocytosis, tachypneic but nontoxic appearing.   - Levofloxacin 750 mg stat and q48 based on Cr clearance - HCAP coverage   - S/p 1L, hold off on further fluids for now given history of pulmonary HTN  - Vitals q4h  - Aspiration precautions  - Hold Lasix for now

## 2018-03-30 NOTE — H&P ADULT - PROBLEM SELECTOR PLAN 9
Heparin sc Holding amlodipine 5 mg daily, restart in AM if BP stable S/P L lumpectomy and radiation in 1989, has annual mammogram without recurrence.

## 2018-03-30 NOTE — ED PROVIDER NOTE - MUSCULOSKELETAL, MLM
Spine appears normal, range of motion is not limited, no muscle or joint tenderness No leg swelling or tenderness

## 2018-03-30 NOTE — ED ADULT TRIAGE NOTE - CHIEF COMPLAINT QUOTE
Patient was found at home in her wheelchair c/o shortness of breath. Has COPD. O2 6L at home. She stated that she took Symbicort with no relief sinc e 3pm. 20 gauge in right AC. 2 combi treatments and Solumedrol 125mg given.

## 2018-03-30 NOTE — H&P ADULT - PROBLEM SELECTOR PLAN 8
Heparin sc S/P L lumpectomy and radiation in 1989, has annual mammogram without recurrence. Holding clonazepam 0.5mg which she takes PRN for anxiety at bedtime, restart as 0.25 mg q12h PRN if necessary Holding Amlodipine for now

## 2018-03-30 NOTE — H&P ADULT - NSHPREVIEWOFSYSTEMS_GEN_ALL_CORE
CONSTITUTIONAL: +Chills, +malaise, no fever   NEUROLOGICAL: +Chronic tingling in UE, no weakness  SKIN: No rash or pruritus  EYES: No blurred vision or diplopia  ENT: No nasal congestion, rhinorrhea, sore throat, or dysphagia   NECK: No neck pain or stiffness  RESPIRATORY: +Dyspnea, +cough  CARDIOVASCULAR: No chest pain or palpitations  GASTROINTESTINAL: No abdominal pain, nausea, vomiting, diarrhea, melena, or hematochezia   GENITOURINARY: No dysuria, hematuria, flank pain, or incontinence   MUSCULOSKELATAL: No joint or back pain   HEMATOLOGIC: No easy bleeding or bruising   All other review of systems is negative unless indicated above

## 2018-03-30 NOTE — H&P ADULT - NSHPLABSRESULTS_GEN_ALL_CORE
No leukocytosis, Cr at baseline around 1, pH 7.42 and PCO2 45 on VBG.     CXR: Hazy opacity in RLL, slightly worse than last CXR 2/2018    EKG: sinus tachycardia, 0.5 mm ST depressions in V4-V6 No leukocytosis, Cr at baseline around 1, pH 7.42 and PCO2 45 on VBG.     CXR: Hazy opacity in RLL, slightly increasing in size compared to CXR from 2/28/2018 - my reading     EKG: sinus tachycardia, 112bpm, qtc 466, no acute Tw or ST changes, occasional PACs - my reading

## 2018-03-31 LAB
ALBUMIN SERPL ELPH-MCNC: 3.3 G/DL — SIGNIFICANT CHANGE UP (ref 3.3–5)
ALP SERPL-CCNC: 78 U/L — SIGNIFICANT CHANGE UP (ref 40–120)
ALT FLD-CCNC: 14 U/L — SIGNIFICANT CHANGE UP (ref 4–33)
APTT BLD: 31.9 SEC — SIGNIFICANT CHANGE UP (ref 27.5–37.4)
AST SERPL-CCNC: 12 U/L — SIGNIFICANT CHANGE UP (ref 4–32)
BASOPHILS # BLD AUTO: 0.01 K/UL — SIGNIFICANT CHANGE UP (ref 0–0.2)
BASOPHILS NFR BLD AUTO: 0.2 % — SIGNIFICANT CHANGE UP (ref 0–2)
BILIRUB SERPL-MCNC: 0.5 MG/DL — SIGNIFICANT CHANGE UP (ref 0.2–1.2)
BUN SERPL-MCNC: 23 MG/DL — SIGNIFICANT CHANGE UP (ref 7–23)
CALCIUM SERPL-MCNC: 8.4 MG/DL — SIGNIFICANT CHANGE UP (ref 8.4–10.5)
CHLORIDE SERPL-SCNC: 100 MMOL/L — SIGNIFICANT CHANGE UP (ref 98–107)
CO2 SERPL-SCNC: 26 MMOL/L — SIGNIFICANT CHANGE UP (ref 22–31)
CREAT SERPL-MCNC: 0.9 MG/DL — SIGNIFICANT CHANGE UP (ref 0.5–1.3)
EOSINOPHIL # BLD AUTO: 0 K/UL — SIGNIFICANT CHANGE UP (ref 0–0.5)
EOSINOPHIL NFR BLD AUTO: 0 % — SIGNIFICANT CHANGE UP (ref 0–6)
GLUCOSE SERPL-MCNC: 318 MG/DL — HIGH (ref 70–99)
HCT VFR BLD CALC: 37.1 % — SIGNIFICANT CHANGE UP (ref 34.5–45)
HGB BLD-MCNC: 11.5 G/DL — SIGNIFICANT CHANGE UP (ref 11.5–15.5)
IMM GRANULOCYTES # BLD AUTO: 0.04 # — SIGNIFICANT CHANGE UP
IMM GRANULOCYTES NFR BLD AUTO: 0.9 % — SIGNIFICANT CHANGE UP (ref 0–1.5)
INR BLD: 1.11 — SIGNIFICANT CHANGE UP (ref 0.88–1.17)
LYMPHOCYTES # BLD AUTO: 0.5 K/UL — LOW (ref 1–3.3)
LYMPHOCYTES # BLD AUTO: 11.2 % — LOW (ref 13–44)
MAGNESIUM SERPL-MCNC: 1.8 MG/DL — SIGNIFICANT CHANGE UP (ref 1.6–2.6)
MCHC RBC-ENTMCNC: 25.6 PG — LOW (ref 27–34)
MCHC RBC-ENTMCNC: 31 % — LOW (ref 32–36)
MCV RBC AUTO: 82.4 FL — SIGNIFICANT CHANGE UP (ref 80–100)
MONOCYTES # BLD AUTO: 0.16 K/UL — SIGNIFICANT CHANGE UP (ref 0–0.9)
MONOCYTES NFR BLD AUTO: 3.6 % — SIGNIFICANT CHANGE UP (ref 2–14)
NEUTROPHILS # BLD AUTO: 3.75 K/UL — SIGNIFICANT CHANGE UP (ref 1.8–7.4)
NEUTROPHILS NFR BLD AUTO: 84.1 % — HIGH (ref 43–77)
NRBC # FLD: 0 — SIGNIFICANT CHANGE UP
PHOSPHATE SERPL-MCNC: 2.3 MG/DL — LOW (ref 2.5–4.5)
PLATELET # BLD AUTO: 225 K/UL — SIGNIFICANT CHANGE UP (ref 150–400)
PMV BLD: 9.6 FL — SIGNIFICANT CHANGE UP (ref 7–13)
POTASSIUM SERPL-MCNC: 3.6 MMOL/L — SIGNIFICANT CHANGE UP (ref 3.5–5.3)
POTASSIUM SERPL-SCNC: 3.6 MMOL/L — SIGNIFICANT CHANGE UP (ref 3.5–5.3)
PROT SERPL-MCNC: 6.6 G/DL — SIGNIFICANT CHANGE UP (ref 6–8.3)
PROTHROM AB SERPL-ACNC: 12.4 SEC — SIGNIFICANT CHANGE UP (ref 9.8–13.1)
RBC # BLD: 4.5 M/UL — SIGNIFICANT CHANGE UP (ref 3.8–5.2)
RBC # FLD: 19.6 % — HIGH (ref 10.3–14.5)
SODIUM SERPL-SCNC: 139 MMOL/L — SIGNIFICANT CHANGE UP (ref 135–145)
SPECIMEN SOURCE: SIGNIFICANT CHANGE UP
SPECIMEN SOURCE: SIGNIFICANT CHANGE UP
WBC # BLD: 4.46 K/UL — SIGNIFICANT CHANGE UP (ref 3.8–10.5)
WBC # FLD AUTO: 4.46 K/UL — SIGNIFICANT CHANGE UP (ref 3.8–10.5)

## 2018-03-31 PROCEDURE — 99233 SBSQ HOSP IP/OBS HIGH 50: CPT

## 2018-03-31 RX ORDER — INSULIN GLARGINE 100 [IU]/ML
10 INJECTION, SOLUTION SUBCUTANEOUS AT BEDTIME
Qty: 0 | Refills: 0 | Status: DISCONTINUED | OUTPATIENT
Start: 2018-03-31 | End: 2018-04-04

## 2018-03-31 RX ORDER — INSULIN LISPRO 100/ML
3 VIAL (ML) SUBCUTANEOUS
Qty: 0 | Refills: 0 | Status: DISCONTINUED | OUTPATIENT
Start: 2018-03-31 | End: 2018-04-04

## 2018-03-31 RX ORDER — INSULIN LISPRO 100/ML
6 VIAL (ML) SUBCUTANEOUS ONCE
Qty: 0 | Refills: 0 | Status: COMPLETED | OUTPATIENT
Start: 2018-03-31 | End: 2018-03-31

## 2018-03-31 RX ORDER — POLYETHYLENE GLYCOL 3350 17 G/17G
17 POWDER, FOR SOLUTION ORAL DAILY
Qty: 0 | Refills: 0 | Status: DISCONTINUED | OUTPATIENT
Start: 2018-03-31 | End: 2018-04-04

## 2018-03-31 RX ORDER — DOCUSATE SODIUM 100 MG
100 CAPSULE ORAL THREE TIMES A DAY
Qty: 0 | Refills: 0 | Status: DISCONTINUED | OUTPATIENT
Start: 2018-03-31 | End: 2018-04-04

## 2018-03-31 RX ORDER — INFLUENZA VIRUS VACCINE 15; 15; 15; 15 UG/.5ML; UG/.5ML; UG/.5ML; UG/.5ML
0.5 SUSPENSION INTRAMUSCULAR ONCE
Qty: 0 | Refills: 0 | Status: DISCONTINUED | OUTPATIENT
Start: 2018-03-31 | End: 2018-04-04

## 2018-03-31 RX ORDER — SENNA PLUS 8.6 MG/1
2 TABLET ORAL AT BEDTIME
Qty: 0 | Refills: 0 | Status: DISCONTINUED | OUTPATIENT
Start: 2018-03-31 | End: 2018-04-04

## 2018-03-31 RX ADMIN — BUDESONIDE AND FORMOTEROL FUMARATE DIHYDRATE 2 PUFF(S): 160; 4.5 AEROSOL RESPIRATORY (INHALATION) at 09:54

## 2018-03-31 RX ADMIN — GABAPENTIN 300 MILLIGRAM(S): 400 CAPSULE ORAL at 22:32

## 2018-03-31 RX ADMIN — HEPARIN SODIUM 5000 UNIT(S): 5000 INJECTION INTRAVENOUS; SUBCUTANEOUS at 22:32

## 2018-03-31 RX ADMIN — Medication 3 MILLILITER(S): at 15:10

## 2018-03-31 RX ADMIN — HEPARIN SODIUM 5000 UNIT(S): 5000 INJECTION INTRAVENOUS; SUBCUTANEOUS at 14:27

## 2018-03-31 RX ADMIN — Medication 3 UNIT(S): at 17:23

## 2018-03-31 RX ADMIN — TIOTROPIUM BROMIDE 1 CAPSULE(S): 18 CAPSULE ORAL; RESPIRATORY (INHALATION) at 09:54

## 2018-03-31 RX ADMIN — Medication 6: at 08:25

## 2018-03-31 RX ADMIN — Medication 3 MILLILITER(S): at 03:42

## 2018-03-31 RX ADMIN — Medication 6: at 17:24

## 2018-03-31 RX ADMIN — GABAPENTIN 300 MILLIGRAM(S): 400 CAPSULE ORAL at 05:41

## 2018-03-31 RX ADMIN — Medication 1000 UNIT(S): at 12:04

## 2018-03-31 RX ADMIN — Medication 3 MILLILITER(S): at 09:51

## 2018-03-31 RX ADMIN — Medication 100 MILLIGRAM(S): at 05:41

## 2018-03-31 RX ADMIN — Medication 8: at 12:04

## 2018-03-31 RX ADMIN — BUDESONIDE AND FORMOTEROL FUMARATE DIHYDRATE 2 PUFF(S): 160; 4.5 AEROSOL RESPIRATORY (INHALATION) at 20:39

## 2018-03-31 RX ADMIN — Medication 3 MILLILITER(S): at 21:46

## 2018-03-31 RX ADMIN — GABAPENTIN 300 MILLIGRAM(S): 400 CAPSULE ORAL at 14:27

## 2018-03-31 RX ADMIN — PANTOPRAZOLE SODIUM 40 MILLIGRAM(S): 20 TABLET, DELAYED RELEASE ORAL at 05:41

## 2018-03-31 RX ADMIN — Medication 62.5 MILLIMOLE(S): at 08:25

## 2018-03-31 RX ADMIN — Medication 30 MILLILITER(S): at 20:39

## 2018-03-31 RX ADMIN — Medication 100 MILLIGRAM(S): at 22:32

## 2018-03-31 RX ADMIN — INSULIN GLARGINE 10 UNIT(S): 100 INJECTION, SOLUTION SUBCUTANEOUS at 22:32

## 2018-03-31 RX ADMIN — Medication 6 UNIT(S): at 02:25

## 2018-03-31 RX ADMIN — Medication 81 MILLIGRAM(S): at 12:04

## 2018-03-31 RX ADMIN — HEPARIN SODIUM 5000 UNIT(S): 5000 INJECTION INTRAVENOUS; SUBCUTANEOUS at 05:42

## 2018-03-31 NOTE — PROGRESS NOTE ADULT - PROBLEM SELECTOR PLAN 4
HgbA1c just under threshold of 6.4 in 2/2018. Risk for steroid induced hyperglycemia.   c/w lantus / premeal humalog   monitor FS

## 2018-03-31 NOTE — CHART NOTE - NSCHARTNOTEFT_GEN_A_CORE
Pt is followed by pulmonologist Dr. Sheets as outpatient. Called answering service requesting a consult. Awaiting call back.

## 2018-03-31 NOTE — PROGRESS NOTE ADULT - PROBLEM SELECTOR PLAN 5
Chronic according to pt, always 100-110s on pulse oximetry at home. Likely in setting of albuterol use as well as hypoxemia as deconditioning. Pt had an IVC filter placed in 2009 in setting of femur fracture at [facility] but was never on AC.  CTA chest 2/2018 no pulmonary embolism. No LE tenderness or erythema.

## 2018-03-31 NOTE — PROGRESS NOTE ADULT - PROBLEM SELECTOR PLAN 6
Based on TTE in 2015 with RV systolic pressure of 50 mmhg, indicating moderate pulmonary HTN. Also with mild pedal edema, no history of HF. Normal EF;  - Holding lasix 20 mg for 24-48h in setting of pneumonia and COPD, restart as needed

## 2018-03-31 NOTE — PROGRESS NOTE ADULT - PROBLEM SELECTOR PLAN 1
not toxic appearing. The exacerbation may potentially be in setting of pneumonia  CTA chest with no PE or concerning lung lesions 2/2018.  c/w levofloxacin 750 mg IV q48h based on Cr clearance  Prednisone 40 mg daily for 5 days and reassess   Duonebs q6h  Consult pt's private pulmonologist Mike Carmichael  CE x 1 and ProBNP - negative  c/w symbicort, spiriva, albuterol

## 2018-03-31 NOTE — PROGRESS NOTE ADULT - SUBJECTIVE AND OBJECTIVE BOX
Patient is a 87y old  Female who presents with a chief complaint of Complain of SOB  COPD exacerbation (31 Mar 2018 00:10)      SUBJECTIVE / OVERNIGHT EVENTS:  Pt reports breathing better, no cp or fever    MEDICATIONS  (STANDING):  ALBUTerol/ipratropium for Nebulization 3 milliLiter(s) Nebulizer every 6 hours  aspirin  chewable 81 milliGRAM(s) Oral daily  buDESOnide 160 MICROgram(s)/formoterol 4.5 MICROgram(s) Inhaler 2 Puff(s) Inhalation two times a day  cholecalciferol 1000 Unit(s) Oral daily  dextrose 5%. 1000 milliLiter(s) (50 mL/Hr) IV Continuous <Continuous>  dextrose 50% Injectable 12.5 Gram(s) IV Push once  dextrose 50% Injectable 25 Gram(s) IV Push once  dextrose 50% Injectable 25 Gram(s) IV Push once  docusate sodium 100 milliGRAM(s) Oral three times a day  gabapentin 300 milliGRAM(s) Oral three times a day  heparin  Injectable 5000 Unit(s) SubCutaneous every 8 hours  influenza   Vaccine 0.5 milliLiter(s) IntraMuscular once  insulin glargine Injectable (LANTUS) 10 Unit(s) SubCutaneous at bedtime  insulin lispro (HumaLOG) corrective regimen sliding scale   SubCutaneous three times a day before meals  insulin lispro (HumaLOG) corrective regimen sliding scale   SubCutaneous at bedtime  insulin lispro Injectable (HumaLOG) 3 Unit(s) SubCutaneous three times a day before meals  levoFLOXacin IVPB      pantoprazole    Tablet 40 milliGRAM(s) Oral before breakfast  polyethylene glycol 3350 17 Gram(s) Oral daily  predniSONE   Tablet 40 milliGRAM(s) Oral daily  senna 2 Tablet(s) Oral at bedtime  tiotropium 18 MICROgram(s) Capsule 1 Capsule(s) Inhalation daily    MEDICATIONS  (PRN):  ALBUTerol    90 MICROgram(s) HFA Inhaler 2 Puff(s) Inhalation every 6 hours PRN Shortness of Breath and/or Wheezing  dextrose Gel 1 Dose(s) Oral once PRN Blood Glucose LESS THAN 70 milliGRAM(s)/deciliter  glucagon  Injectable 1 milliGRAM(s) IntraMuscular once PRN Glucose LESS THAN 70 milligrams/deciliter      T(C): 36.7 (03-31-18 @ 14:26), Max: 36.9 (03-30-18 @ 17:56)  HR: 92 (03-31-18 @ 15:10) (91 - 119)  BP: 128/69 (03-31-18 @ 14:26) (118/52 - 145/83)  RR: 18 (03-31-18 @ 14:26) (17 - 26)  SpO2: 93% (03-31-18 @ 14:26) (92% - 98%)  CAPILLARY BLOOD GLUCOSE      POCT Blood Glucose.: 309 mg/dL (31 Mar 2018 12:01)  POCT Blood Glucose.: 296 mg/dL (31 Mar 2018 08:21)  POCT Blood Glucose.: 393 mg/dL (31 Mar 2018 01:35)  POCT Blood Glucose.: 365 mg/dL (30 Mar 2018 23:04)    I&O's Summary      PHYSICAL EXAM:  GENERAL: NAD, obese   HEAD:  Atraumatic, Normocephalic  EYES: EOMI, PERRLA, conjunctiva and sclera clear  NECK: Supple, No JVD  CHEST/LUNG: non-labored; decreased BS  HEART: s1 s2, regular rhythm and rate   ABDOMEN: Soft, Nontender, Nondistended; Bowel sounds present  EXTREMITIES:  2+ Peripheral Pulses, No clubbing, cyanosis, or edema  PSYCH: AAOx3, calm   NEUROLOGY: non-focal  SKIN: No rashes or lesions    LABS:                        11.5   4.46  )-----------( 225      ( 31 Mar 2018 06:00 )             37.1     03-31    139  |  100  |  23  ----------------------------<  318<H>  3.6   |  26  |  0.90    Ca    8.4      31 Mar 2018 06:00  Phos  2.3     03-31  Mg     1.8     03-31    TPro  6.6  /  Alb  3.3  /  TBili  0.5  /  DBili  x   /  AST  12  /  ALT  14  /  AlkPhos  78  03-31    PT/INR - ( 31 Mar 2018 06:00 )   PT: 12.4 SEC;   INR: 1.11          PTT - ( 31 Mar 2018 06:00 )  PTT:31.9 SEC  CARDIAC MARKERS ( 30 Mar 2018 19:40 )  x     / < 0.06 ng/mL / 50 u/L / 1.72 ng/mL / x              RADIOLOGY & ADDITIONAL TESTS:    Imaging Personally Reviewed: < from: Xray Chest 1 View- PORTABLE-Urgent (03.30.18 @ 19:17) >    IMPRESSION:    Hazy, patchy opacities right lower lung compatible with pneumonia.    < end of copied text >       Consultant(s) Notes Reviewed:      Care Discussed with Consultants/Other Providers:

## 2018-04-01 LAB
BUN SERPL-MCNC: 24 MG/DL — HIGH (ref 7–23)
CALCIUM SERPL-MCNC: 8.6 MG/DL — SIGNIFICANT CHANGE UP (ref 8.4–10.5)
CHLORIDE SERPL-SCNC: 103 MMOL/L — SIGNIFICANT CHANGE UP (ref 98–107)
CO2 SERPL-SCNC: 28 MMOL/L — SIGNIFICANT CHANGE UP (ref 22–31)
CREAT SERPL-MCNC: 0.93 MG/DL — SIGNIFICANT CHANGE UP (ref 0.5–1.3)
GLUCOSE SERPL-MCNC: 156 MG/DL — HIGH (ref 70–99)
HBA1C BLD-MCNC: 7.4 % — HIGH (ref 4–5.6)
HCT VFR BLD CALC: 35.5 % — SIGNIFICANT CHANGE UP (ref 34.5–45)
HGB BLD-MCNC: 11.1 G/DL — LOW (ref 11.5–15.5)
MAGNESIUM SERPL-MCNC: 2 MG/DL — SIGNIFICANT CHANGE UP (ref 1.6–2.6)
MCHC RBC-ENTMCNC: 25.6 PG — LOW (ref 27–34)
MCHC RBC-ENTMCNC: 31.3 % — LOW (ref 32–36)
MCV RBC AUTO: 81.8 FL — SIGNIFICANT CHANGE UP (ref 80–100)
NRBC # FLD: 0 — SIGNIFICANT CHANGE UP
PHOSPHATE SERPL-MCNC: 2.5 MG/DL — SIGNIFICANT CHANGE UP (ref 2.5–4.5)
PLATELET # BLD AUTO: 241 K/UL — SIGNIFICANT CHANGE UP (ref 150–400)
PMV BLD: 9.6 FL — SIGNIFICANT CHANGE UP (ref 7–13)
POTASSIUM SERPL-MCNC: 3.5 MMOL/L — SIGNIFICANT CHANGE UP (ref 3.5–5.3)
POTASSIUM SERPL-SCNC: 3.5 MMOL/L — SIGNIFICANT CHANGE UP (ref 3.5–5.3)
RBC # BLD: 4.34 M/UL — SIGNIFICANT CHANGE UP (ref 3.8–5.2)
RBC # FLD: 19.6 % — HIGH (ref 10.3–14.5)
SODIUM SERPL-SCNC: 141 MMOL/L — SIGNIFICANT CHANGE UP (ref 135–145)
WBC # BLD: 7 K/UL — SIGNIFICANT CHANGE UP (ref 3.8–10.5)
WBC # FLD AUTO: 7 K/UL — SIGNIFICANT CHANGE UP (ref 3.8–10.5)

## 2018-04-01 PROCEDURE — 71250 CT THORAX DX C-: CPT | Mod: 26

## 2018-04-01 PROCEDURE — 99233 SBSQ HOSP IP/OBS HIGH 50: CPT

## 2018-04-01 RX ADMIN — Medication 3 MILLILITER(S): at 09:43

## 2018-04-01 RX ADMIN — Medication 3 MILLILITER(S): at 15:31

## 2018-04-01 RX ADMIN — HEPARIN SODIUM 5000 UNIT(S): 5000 INJECTION INTRAVENOUS; SUBCUTANEOUS at 05:33

## 2018-04-01 RX ADMIN — Medication 6: at 12:06

## 2018-04-01 RX ADMIN — GABAPENTIN 300 MILLIGRAM(S): 400 CAPSULE ORAL at 15:04

## 2018-04-01 RX ADMIN — Medication 1000 UNIT(S): at 12:07

## 2018-04-01 RX ADMIN — Medication 3 UNIT(S): at 08:25

## 2018-04-01 RX ADMIN — Medication 2: at 08:24

## 2018-04-01 RX ADMIN — BUDESONIDE AND FORMOTEROL FUMARATE DIHYDRATE 2 PUFF(S): 160; 4.5 AEROSOL RESPIRATORY (INHALATION) at 21:44

## 2018-04-01 RX ADMIN — GABAPENTIN 300 MILLIGRAM(S): 400 CAPSULE ORAL at 21:44

## 2018-04-01 RX ADMIN — Medication 4: at 17:00

## 2018-04-01 RX ADMIN — GABAPENTIN 300 MILLIGRAM(S): 400 CAPSULE ORAL at 05:33

## 2018-04-01 RX ADMIN — TIOTROPIUM BROMIDE 1 CAPSULE(S): 18 CAPSULE ORAL; RESPIRATORY (INHALATION) at 08:31

## 2018-04-01 RX ADMIN — INSULIN GLARGINE 10 UNIT(S): 100 INJECTION, SOLUTION SUBCUTANEOUS at 21:45

## 2018-04-01 RX ADMIN — HEPARIN SODIUM 5000 UNIT(S): 5000 INJECTION INTRAVENOUS; SUBCUTANEOUS at 15:04

## 2018-04-01 RX ADMIN — Medication 81 MILLIGRAM(S): at 12:07

## 2018-04-01 RX ADMIN — Medication 3 UNIT(S): at 12:06

## 2018-04-01 RX ADMIN — Medication 3 MILLILITER(S): at 03:51

## 2018-04-01 RX ADMIN — BUDESONIDE AND FORMOTEROL FUMARATE DIHYDRATE 2 PUFF(S): 160; 4.5 AEROSOL RESPIRATORY (INHALATION) at 08:23

## 2018-04-01 RX ADMIN — Medication 3 UNIT(S): at 17:00

## 2018-04-01 RX ADMIN — Medication 3 MILLILITER(S): at 21:57

## 2018-04-01 RX ADMIN — Medication 100 MILLIGRAM(S): at 05:33

## 2018-04-01 RX ADMIN — Medication 40 MILLIGRAM(S): at 05:34

## 2018-04-01 RX ADMIN — PANTOPRAZOLE SODIUM 40 MILLIGRAM(S): 20 TABLET, DELAYED RELEASE ORAL at 05:34

## 2018-04-01 RX ADMIN — HEPARIN SODIUM 5000 UNIT(S): 5000 INJECTION INTRAVENOUS; SUBCUTANEOUS at 22:02

## 2018-04-01 NOTE — PROGRESS NOTE ADULT - SUBJECTIVE AND OBJECTIVE BOX
Patient is a 87y old  Female who presents with a chief complaint of Complain of SOB  COPD exacerbation (31 Mar 2018 00:10)      SUBJECTIVE / OVERNIGHT EVENTS:  Pt reports feeling slightly better    MEDICATIONS  (STANDING):  ALBUTerol/ipratropium for Nebulization 3 milliLiter(s) Nebulizer every 6 hours  aspirin  chewable 81 milliGRAM(s) Oral daily  buDESOnide 160 MICROgram(s)/formoterol 4.5 MICROgram(s) Inhaler 2 Puff(s) Inhalation two times a day  cholecalciferol 1000 Unit(s) Oral daily  dextrose 5%. 1000 milliLiter(s) (50 mL/Hr) IV Continuous <Continuous>  dextrose 50% Injectable 12.5 Gram(s) IV Push once  dextrose 50% Injectable 25 Gram(s) IV Push once  dextrose 50% Injectable 25 Gram(s) IV Push once  docusate sodium 100 milliGRAM(s) Oral three times a day  gabapentin 300 milliGRAM(s) Oral three times a day  heparin  Injectable 5000 Unit(s) SubCutaneous every 8 hours  influenza   Vaccine 0.5 milliLiter(s) IntraMuscular once  insulin glargine Injectable (LANTUS) 10 Unit(s) SubCutaneous at bedtime  insulin lispro (HumaLOG) corrective regimen sliding scale   SubCutaneous three times a day before meals  insulin lispro (HumaLOG) corrective regimen sliding scale   SubCutaneous at bedtime  insulin lispro Injectable (HumaLOG) 3 Unit(s) SubCutaneous three times a day before meals  levoFLOXacin IVPB      levoFLOXacin IVPB 750 milliGRAM(s) IV Intermittent every 48 hours  pantoprazole    Tablet 40 milliGRAM(s) Oral before breakfast  polyethylene glycol 3350 17 Gram(s) Oral daily  predniSONE   Tablet 40 milliGRAM(s) Oral daily  senna 2 Tablet(s) Oral at bedtime  tiotropium 18 MICROgram(s) Capsule 1 Capsule(s) Inhalation daily    MEDICATIONS  (PRN):  ALBUTerol    90 MICROgram(s) HFA Inhaler 2 Puff(s) Inhalation every 6 hours PRN Shortness of Breath and/or Wheezing  dextrose Gel 1 Dose(s) Oral once PRN Blood Glucose LESS THAN 70 milliGRAM(s)/deciliter  glucagon  Injectable 1 milliGRAM(s) IntraMuscular once PRN Glucose LESS THAN 70 milligrams/deciliter      T(C): 36.3 (04-01-18 @ 05:13), Max: 36.8 (03-31-18 @ 22:30)  HR: 84 (04-01-18 @ 09:43) (77 - 95)  BP: 132/98 (04-01-18 @ 05:13) (128/69 - 146/76)  RR: 15 (04-01-18 @ 05:13) (15 - 18)  SpO2: 98% (04-01-18 @ 09:43) (93% - 100%)  CAPILLARY BLOOD GLUCOSE      POCT Blood Glucose.: 271 mg/dL (01 Apr 2018 11:51)  POCT Blood Glucose.: 151 mg/dL (01 Apr 2018 08:21)  POCT Blood Glucose.: 201 mg/dL (31 Mar 2018 22:26)  POCT Blood Glucose.: 283 mg/dL (31 Mar 2018 17:02)    I&O's Summary      PHYSICAL EXAM:  GENERAL: NAD, well-developed  HEAD:  Atraumatic, Normocephalic  EYES: EOMI, PERRLA, conjunctiva and sclera clear  NECK: Supple, No JVD  CHEST/LUNG: Clear to auscultation bilaterally; No wheeze  HEART: s1 s2, regular rhythm and rate   ABDOMEN: Soft, Nontender, Nondistended; Bowel sounds present  EXTREMITIES:  2+ Peripheral Pulses, No clubbing, cyanosis, or edema  PSYCH: AAOx3, calm   NEUROLOGY: non-focal  SKIN: No rashes or lesions    LABS:                        11.1   7.00  )-----------( 241      ( 01 Apr 2018 05:00 )             35.5     04-01    141  |  103  |  24<H>  ----------------------------<  156<H>  3.5   |  28  |  0.93    Ca    8.6      01 Apr 2018 05:00  Phos  2.5     04-01  Mg     2.0     04-01    TPro  6.6  /  Alb  3.3  /  TBili  0.5  /  DBili  x   /  AST  12  /  ALT  14  /  AlkPhos  78  03-31    PT/INR - ( 31 Mar 2018 06:00 )   PT: 12.4 SEC;   INR: 1.11          PTT - ( 31 Mar 2018 06:00 )  PTT:31.9 SEC  CARDIAC MARKERS ( 30 Mar 2018 19:40 )  x     / < 0.06 ng/mL / 50 u/L / 1.72 ng/mL / x              RADIOLOGY & ADDITIONAL TESTS:    Imaging Personally Reviewed:    Consultant(s) Notes Reviewed:      Care Discussed with Consultants/Other Providers: Patient is a 87y old  Female who presents with a chief complaint of Complain of SOB  COPD exacerbation (31 Mar 2018 00:10)      SUBJECTIVE / OVERNIGHT EVENTS:  Pt reports feeling slightly better    MEDICATIONS  (STANDING):  ALBUTerol/ipratropium for Nebulization 3 milliLiter(s) Nebulizer every 6 hours  aspirin  chewable 81 milliGRAM(s) Oral daily  buDESOnide 160 MICROgram(s)/formoterol 4.5 MICROgram(s) Inhaler 2 Puff(s) Inhalation two times a day  cholecalciferol 1000 Unit(s) Oral daily  dextrose 5%. 1000 milliLiter(s) (50 mL/Hr) IV Continuous <Continuous>  dextrose 50% Injectable 12.5 Gram(s) IV Push once  dextrose 50% Injectable 25 Gram(s) IV Push once  dextrose 50% Injectable 25 Gram(s) IV Push once  docusate sodium 100 milliGRAM(s) Oral three times a day  gabapentin 300 milliGRAM(s) Oral three times a day  heparin  Injectable 5000 Unit(s) SubCutaneous every 8 hours  influenza   Vaccine 0.5 milliLiter(s) IntraMuscular once  insulin glargine Injectable (LANTUS) 10 Unit(s) SubCutaneous at bedtime  insulin lispro (HumaLOG) corrective regimen sliding scale   SubCutaneous three times a day before meals  insulin lispro (HumaLOG) corrective regimen sliding scale   SubCutaneous at bedtime  insulin lispro Injectable (HumaLOG) 3 Unit(s) SubCutaneous three times a day before meals  levoFLOXacin IVPB      levoFLOXacin IVPB 750 milliGRAM(s) IV Intermittent every 48 hours  pantoprazole    Tablet 40 milliGRAM(s) Oral before breakfast  polyethylene glycol 3350 17 Gram(s) Oral daily  predniSONE   Tablet 40 milliGRAM(s) Oral daily  senna 2 Tablet(s) Oral at bedtime  tiotropium 18 MICROgram(s) Capsule 1 Capsule(s) Inhalation daily    MEDICATIONS  (PRN):  ALBUTerol    90 MICROgram(s) HFA Inhaler 2 Puff(s) Inhalation every 6 hours PRN Shortness of Breath and/or Wheezing  dextrose Gel 1 Dose(s) Oral once PRN Blood Glucose LESS THAN 70 milliGRAM(s)/deciliter  glucagon  Injectable 1 milliGRAM(s) IntraMuscular once PRN Glucose LESS THAN 70 milligrams/deciliter      T(C): 36.3 (04-01-18 @ 05:13), Max: 36.8 (03-31-18 @ 22:30)  HR: 84 (04-01-18 @ 09:43) (77 - 95)  BP: 132/98 (04-01-18 @ 05:13) (128/69 - 146/76)  RR: 15 (04-01-18 @ 05:13) (15 - 18)  SpO2: 98% (04-01-18 @ 09:43) (93% - 100%)  CAPILLARY BLOOD GLUCOSE      POCT Blood Glucose.: 271 mg/dL (01 Apr 2018 11:51)  POCT Blood Glucose.: 151 mg/dL (01 Apr 2018 08:21)  POCT Blood Glucose.: 201 mg/dL (31 Mar 2018 22:26)  POCT Blood Glucose.: 283 mg/dL (31 Mar 2018 17:02)    I&O's Summary      PHYSICAL EXAM:  GENERAL: NAD, obese   HEAD:  Atraumatic, Normocephalic  EYES: EOMI, PERRLA, conjunctiva and sclera clear  NECK: Supple, No JVD  CHEST/LUNG: non-labored; decreased BS  HEART: s1 s2, regular rhythm and rate   ABDOMEN: Soft, Nontender, Nondistended; Bowel sounds present  EXTREMITIES:  2+ Peripheral Pulses, No clubbing, cyanosis, or edema  PSYCH: AAOx3, calm   NEUROLOGY: non-focal  SKIN: No rashes or lesions    LABS:                        11.1   7.00  )-----------( 241      ( 01 Apr 2018 05:00 )             35.5     04-01    141  |  103  |  24<H>  ----------------------------<  156<H>  3.5   |  28  |  0.93    Ca    8.6      01 Apr 2018 05:00  Phos  2.5     04-01  Mg     2.0     04-01    TPro  6.6  /  Alb  3.3  /  TBili  0.5  /  DBili  x   /  AST  12  /  ALT  14  /  AlkPhos  78  03-31    PT/INR - ( 31 Mar 2018 06:00 )   PT: 12.4 SEC;   INR: 1.11          PTT - ( 31 Mar 2018 06:00 )  PTT:31.9 SEC  CARDIAC MARKERS ( 30 Mar 2018 19:40 )  x     / < 0.06 ng/mL / 50 u/L / 1.72 ng/mL / x              RADIOLOGY & ADDITIONAL TESTS:    Imaging Personally Reviewed:    Consultant(s) Notes Reviewed:      Care Discussed with Consultants/Other Providers:

## 2018-04-01 NOTE — PROGRESS NOTE ADULT - PROBLEM SELECTOR PLAN 1
not toxic appearing. The exacerbation may potentially be in setting of pneumonia  CTA chest with no PE or concerning lung lesions 2/2018.  c/w levofloxacin 750 mg IV q48h based on Cr clearance  Prednisone 40 mg daily for 5 days and reassess   Duonebs q6h  Consult pt's private pulmonologist Mike Carmichael  CE x 1 and ProBNP - negative  c/w symbicort, spiriva, albuterol not toxic appearing. The exacerbation may potentially be in setting of pneumonia  CTA chest with no PE or concerning lung lesions 2/2018.  c/w levofloxacin 750 mg IV q48h based on Cr clearance  Prednisone 40 mg daily for 5 days and reassess   Duonebs q6h  f/u pulmonologist Mike Carmichael  CE x 1 and ProBNP - negative  c/w symbicort, spiriva, albuterol

## 2018-04-01 NOTE — PROGRESS NOTE ADULT - PROBLEM SELECTOR PLAN 6
Based on TTE in 2015 with RV systolic pressure of 50 mmhg, indicating moderate pulmonary HTN. Also with mild pedal edema, no history of HF. Normal EF;  - Holding lasix 20 mg for 24-48h in setting of pneumonia and COPD, restart as needed Based on TTE in 2015 with RV systolic pressure of 50 mmhg, indicating moderate pulmonary HTN. Also with mild pedal edema, no history of HF. Normal EF;  - Holding lasix 20 mg in setting of pneumonia and COPD, restart as needed  check TTE, f/u Card

## 2018-04-01 NOTE — CONSULT NOTE ADULT - SUBJECTIVE AND OBJECTIVE BOX
Pulmonary Consult Note    VIDAL LOUIE  MRN-[id number]    Chief Complaint: Patient is a 87y old  Female who presents with a chief complaint of Complain of SOB  COPD exacerbation (31 Mar 2018 00:10)      HPI:  87yFemale well known to md  Pt presented with increased SOB/ became anxious. Minimal cough. Denies fever/prior illness. Denies chest pain.     ROS:  Anxious  -  All other systems reviewed and negative    PAST MEDICAL HISTORY: HEALTH ISSUES - PROBLEM Dx:  Tachycardia: Tachycardia  Hyperglycemia: Hyperglycemia  Need for prophylactic measure: Need for prophylactic measure  History of breast cancer: History of breast cancer  Hypertension: Hypertension  Anxiety disorder: Anxiety disorder  Pulmonary hypertension: Pulmonary hypertension  DAVID on CPAP: DAVID on CPAP  Pneumonia: Pneumonia  COPD exacerbation: COPD exacerbation          SOCIAL HISTORY:     ACTIVE MEDICATION LIST:  MEDICATIONS  (STANDING):  ALBUTerol/ipratropium for Nebulization 3 milliLiter(s) Nebulizer every 6 hours  aspirin  chewable 81 milliGRAM(s) Oral daily  buDESOnide 160 MICROgram(s)/formoterol 4.5 MICROgram(s) Inhaler 2 Puff(s) Inhalation two times a day  cholecalciferol 1000 Unit(s) Oral daily  dextrose 5%. 1000 milliLiter(s) (50 mL/Hr) IV Continuous <Continuous>  dextrose 50% Injectable 12.5 Gram(s) IV Push once  dextrose 50% Injectable 25 Gram(s) IV Push once  dextrose 50% Injectable 25 Gram(s) IV Push once  docusate sodium 100 milliGRAM(s) Oral three times a day  gabapentin 300 milliGRAM(s) Oral three times a day  heparin  Injectable 5000 Unit(s) SubCutaneous every 8 hours  influenza   Vaccine 0.5 milliLiter(s) IntraMuscular once  insulin glargine Injectable (LANTUS) 10 Unit(s) SubCutaneous at bedtime  insulin lispro (HumaLOG) corrective regimen sliding scale   SubCutaneous three times a day before meals  insulin lispro (HumaLOG) corrective regimen sliding scale   SubCutaneous at bedtime  insulin lispro Injectable (HumaLOG) 3 Unit(s) SubCutaneous three times a day before meals  levoFLOXacin IVPB      levoFLOXacin IVPB 750 milliGRAM(s) IV Intermittent every 48 hours  pantoprazole    Tablet 40 milliGRAM(s) Oral before breakfast  polyethylene glycol 3350 17 Gram(s) Oral daily  predniSONE   Tablet 40 milliGRAM(s) Oral daily  senna 2 Tablet(s) Oral at bedtime  tiotropium 18 MICROgram(s) Capsule 1 Capsule(s) Inhalation daily    MEDICATIONS  (PRN):  ALBUTerol    90 MICROgram(s) HFA Inhaler 2 Puff(s) Inhalation every 6 hours PRN Shortness of Breath and/or Wheezing  dextrose Gel 1 Dose(s) Oral once PRN Blood Glucose LESS THAN 70 milliGRAM(s)/deciliter  glucagon  Injectable 1 milliGRAM(s) IntraMuscular once PRN Glucose LESS THAN 70 milligrams/deciliter      EXAM:  Vital Signs Last 24 Hrs  T(C): 36.3 (01 Apr 2018 05:13), Max: 36.8 (31 Mar 2018 22:30)  T(F): 97.3 (01 Apr 2018 05:13), Max: 98.2 (31 Mar 2018 22:30)  HR: 90 (01 Apr 2018 07:40) (77 - 97)  BP: 132/98 (01 Apr 2018 05:13) (128/69 - 146/76)  BP(mean): --  RR: 15 (01 Apr 2018 05:13) (15 - 18)  SpO2: 98% (01 Apr 2018 07:40) (93% - 100%)  GENERAL: No acute distress  NEURO: Alert and oriented x 3  LUNGS: Intermittant rhonchi and wheezing at bases.   CV: S1/S2, no murmur  ABDOMEN: +BS, nontender  EXTREMITIES: no clubbing, cyanosis, edema    LABS/IMAGING: reviewed    PROBLEM LIST:  87yFemale with HEALTH ISSUES - PROBLEM Dx:  Tachycardia: Tachycardia  Hyperglycemia: Hyperglycemia  Need for prophylactic measure: Need for prophylactic measure  History of breast cancer: History of breast cancer  Hypertension: Hypertension  Anxiety disorder: Anxiety disorder  Pulmonary hypertension: Pulmonary hypertension  DAVID on CPAP: DAVID on CPAP  Pneumonia: Pneumonia  COPD exacerbation: COPD exacerbation    Impression:   Probable COPD exacerbation with superimposed anxiety. Doubt pneumonia based on history/radiographic findings/clinical exam.  R/O componant cardiac disease/diastolic dysfuction.        RECS:  Continue prednisone/Antibiotics/bronchodilators  Cardiac evaluation  PA/Lat chest  OOB  Cont cpap  Consider psych eval while here  Echo if none recent.    Thank you for this consultation, please feel free to call with any questions [phone]  Marcos Sheets MD
CHIEF COMPLAINT:Patient is a 87y old  Female who presents with a chief complaint of Complain of SOB  COPD exacerbation (31 Mar 2018 00:10)      HISTORY OF PRESENT ILLNESS:  This is a pleasant woman with history as below presented with sob getting progressive worse now slightly improving   she is admitted for copd exacerbation and ? PNA  cardiology is called for cardiac eval rule out chf   no cp  no dizziness  no syncope     PAST MEDICAL & SURGICAL HISTORY:  Neuropathy  Dysphagia  DAVID on CPAP  Morbid obesity  Anxiety disorder  Chronic bronchitis  Hx of Breast Cancer: HAd Rt in   Hip Fracture-Left  COPD (Chronic Obstructive Pulmonary Disease): on home O2 AT NIGHT  Essential Hypertension  S/P   Status Post Total Knee Replacement-bilateral  S/P Breast Lumpectomy- left  Joint Fixation (Surgical)- L Hip  S/P Insertion of IVC (Inferior Vena Caval) Filter          MEDICATIONS:  aspirin  chewable 81 milliGRAM(s) Oral daily  heparin  Injectable 5000 Unit(s) SubCutaneous every 8 hours    levoFLOXacin IVPB      levoFLOXacin IVPB 750 milliGRAM(s) IV Intermittent every 48 hours    ALBUTerol    90 MICROgram(s) HFA Inhaler 2 Puff(s) Inhalation every 6 hours PRN  ALBUTerol/ipratropium for Nebulization 3 milliLiter(s) Nebulizer every 6 hours  buDESOnide 160 MICROgram(s)/formoterol 4.5 MICROgram(s) Inhaler 2 Puff(s) Inhalation two times a day  tiotropium 18 MICROgram(s) Capsule 1 Capsule(s) Inhalation daily    gabapentin 300 milliGRAM(s) Oral three times a day    docusate sodium 100 milliGRAM(s) Oral three times a day  pantoprazole    Tablet 40 milliGRAM(s) Oral before breakfast  polyethylene glycol 3350 17 Gram(s) Oral daily  senna 2 Tablet(s) Oral at bedtime    dextrose 50% Injectable 12.5 Gram(s) IV Push once  dextrose 50% Injectable 25 Gram(s) IV Push once  dextrose 50% Injectable 25 Gram(s) IV Push once  dextrose Gel 1 Dose(s) Oral once PRN  glucagon  Injectable 1 milliGRAM(s) IntraMuscular once PRN  insulin glargine Injectable (LANTUS) 10 Unit(s) SubCutaneous at bedtime  insulin lispro (HumaLOG) corrective regimen sliding scale   SubCutaneous three times a day before meals  insulin lispro (HumaLOG) corrective regimen sliding scale   SubCutaneous at bedtime  insulin lispro Injectable (HumaLOG) 3 Unit(s) SubCutaneous three times a day before meals  predniSONE   Tablet 40 milliGRAM(s) Oral daily    cholecalciferol 1000 Unit(s) Oral daily  dextrose 5%. 1000 milliLiter(s) IV Continuous <Continuous>  influenza   Vaccine 0.5 milliLiter(s) IntraMuscular once      FAMILY HISTORY:  Family history of congestive heart failure  Family history of lymphoma  Family history of renal disease (Sibling)      Non-contributory    SOCIAL HISTORY:    No tobacco, drugs or etoh    Allergies    No Known Allergies    Intolerances    	    REVIEW OF SYSTEMS:  as above  The rest of the 14 points ROS reviewed and except above they are unremarkable.        PHYSICAL EXAM:  T(C): 36.3 (18 @ 05:13), Max: 36.8 (18 @ 22:30)  HR: 84 (18 @ 09:43) (77 - 95)  BP: 132/98 (18 @ 05:13) (128/69 - 146/76)  RR: 15 (18 @ 05:13) (15 - 18)  SpO2: 98% (18 @ 09:43) (93% - 100%)  Wt(kg): --  I&O's Summary      JVP: Normal  Neck: supple  Lung: clear   CV: S1 S2 , Murmur:  Abd: soft  Ext: No edema  neuro: Awake / alert  Psych: flat affect  Skin: normal     LABS/DATA:    TELEMETRY: 	    ECG:  	sinus tach , lateral st depression cw ischemia    	  CARDIAC MARKERS:  Troponin T, Serum: < 0.06 ng/mL ( @ 19:40)      CKMB: 1.72 ng/mL ( @ 19:40)    CKMB Relative Index: Test not performed ( @ 19:40)                            11.1   7.00  )-----------( 241      ( 2018 05:00 )             35.5     04-    141  |  103  |  24<H>  ----------------------------<  156<H>  3.5   |  28  |  0.93    Ca    8.6      2018 05:00  Phos  2.5     04-  Mg     2.0     04-    TPro  6.6  /  Alb  3.3  /  TBili  0.5  /  DBili  x   /  AST  12  /  ALT  14  /  AlkPhos  78  03-31    proBNP: Serum Pro-Brain Natriuretic Peptide: 484.3 pg/mL ( @ 19:40)    Lipid Profile:   HgA1c: Hemoglobin A1C, Whole Blood: 7.4 % ( @ 05:00)    TSH:     < from: TTE with Doppler (w/Cont) (12.14.17 @ 12:38) >  CONCLUSIONS:  1. Mitral annular calcification, otherwise normal mitral  valve.  2. Aortic valve not well visualized.  3. Normal left ventricular internal dimensions and wall  thicknesses.  4. Endocardial visualization enhanced with intravenous  injection of echo contrast (Definity).  Endocardium not  well visualized; grossly normal left ventricular systolic  function.  5. Normal right ventricular size and function.    < end of copied text >

## 2018-04-01 NOTE — CONSULT NOTE ADULT - ASSESSMENT
SOB  likely due to COPD exac and PNA  proBNP is not insignificantly elevated  pt does not appear volume overloaded   consider ct of chest     copd  fu with pulm  on nebs    PNA  on anbx    abnormal EKG  ST depression minimal   likely stress induced in setting of tachycardia and copd exacerbation  recommend stress test as outpt     DM  Monitor finger stick. Insulin coverage. Diabetic education and Diabetic diet. Consider nutrition consultation.

## 2018-04-01 NOTE — PROGRESS NOTE ADULT - PROBLEM SELECTOR PLAN 5
Chronic according to pt, always 100-110s on pulse oximetry at home. Likely in setting of albuterol use as well as hypoxemia as deconditioning. Pt had an IVC filter placed in 2009 in setting of femur fracture at [facility] but was never on AC.  CTA chest 2/2018 no pulmonary embolism. No LE tenderness or erythema. Chronic according to pt, always 100-110s on pulse oximetry at home. Likely in setting of albuterol use as well as hypoxemia as deconditioning. Pt had an IVC filter placed in 2009 in setting of femur fracture at [facility] but was never on AC.  CTA chest 2/2018 no pulmonary embolism. No LE tenderness or erythema.  f/u card

## 2018-04-01 NOTE — PROGRESS NOTE ADULT - PROBLEM SELECTOR PLAN 4
HgbA1c just under threshold of 6.4 in 2/2018. Risk for steroid induced hyperglycemia.   c/w lantus / premeal humalog   monitor FS HgbA1c just under threshold of 6.4 in 2/2018. Risk for steroid induced hyperglycemia.   c/w lantus / premeal humalog   monitor FS, improved

## 2018-04-02 ENCOUNTER — TRANSCRIPTION ENCOUNTER (OUTPATIENT)
Age: 83
End: 2018-04-02

## 2018-04-02 LAB
B PERT DNA SPEC QL NAA+PROBE: SIGNIFICANT CHANGE UP
BUN SERPL-MCNC: 25 MG/DL — HIGH (ref 7–23)
C PNEUM DNA SPEC QL NAA+PROBE: NOT DETECTED — SIGNIFICANT CHANGE UP
CALCIUM SERPL-MCNC: 8.4 MG/DL — SIGNIFICANT CHANGE UP (ref 8.4–10.5)
CHLORIDE SERPL-SCNC: 97 MMOL/L — LOW (ref 98–107)
CO2 SERPL-SCNC: 30 MMOL/L — SIGNIFICANT CHANGE UP (ref 22–31)
CREAT SERPL-MCNC: 1.05 MG/DL — SIGNIFICANT CHANGE UP (ref 0.5–1.3)
FLUAV H1 2009 PAND RNA SPEC QL NAA+PROBE: NOT DETECTED — SIGNIFICANT CHANGE UP
FLUAV H1 RNA SPEC QL NAA+PROBE: NOT DETECTED — SIGNIFICANT CHANGE UP
FLUAV H3 RNA SPEC QL NAA+PROBE: NOT DETECTED — SIGNIFICANT CHANGE UP
FLUAV SUBTYP SPEC NAA+PROBE: SIGNIFICANT CHANGE UP
FLUBV RNA SPEC QL NAA+PROBE: NOT DETECTED — SIGNIFICANT CHANGE UP
GLUCOSE BLDC GLUCOMTR-MCNC: 153 MG/DL — HIGH (ref 70–99)
GLUCOSE BLDC GLUCOMTR-MCNC: 235 MG/DL — HIGH (ref 70–99)
GLUCOSE BLDC GLUCOMTR-MCNC: 372 MG/DL — HIGH (ref 70–99)
GLUCOSE SERPL-MCNC: 138 MG/DL — HIGH (ref 70–99)
HADV DNA SPEC QL NAA+PROBE: NOT DETECTED — SIGNIFICANT CHANGE UP
HCOV 229E RNA SPEC QL NAA+PROBE: NOT DETECTED — SIGNIFICANT CHANGE UP
HCOV HKU1 RNA SPEC QL NAA+PROBE: NOT DETECTED — SIGNIFICANT CHANGE UP
HCOV NL63 RNA SPEC QL NAA+PROBE: NOT DETECTED — SIGNIFICANT CHANGE UP
HCOV OC43 RNA SPEC QL NAA+PROBE: NOT DETECTED — SIGNIFICANT CHANGE UP
HCT VFR BLD CALC: 36.5 % — SIGNIFICANT CHANGE UP (ref 34.5–45)
HGB BLD-MCNC: 11.3 G/DL — LOW (ref 11.5–15.5)
HMPV RNA SPEC QL NAA+PROBE: NOT DETECTED — SIGNIFICANT CHANGE UP
HPIV1 RNA SPEC QL NAA+PROBE: NOT DETECTED — SIGNIFICANT CHANGE UP
HPIV2 RNA SPEC QL NAA+PROBE: NOT DETECTED — SIGNIFICANT CHANGE UP
HPIV3 RNA SPEC QL NAA+PROBE: NOT DETECTED — SIGNIFICANT CHANGE UP
HPIV4 RNA SPEC QL NAA+PROBE: NOT DETECTED — SIGNIFICANT CHANGE UP
M PNEUMO DNA SPEC QL NAA+PROBE: NOT DETECTED — SIGNIFICANT CHANGE UP
MAGNESIUM SERPL-MCNC: 2 MG/DL — SIGNIFICANT CHANGE UP (ref 1.6–2.6)
MCHC RBC-ENTMCNC: 25.5 PG — LOW (ref 27–34)
MCHC RBC-ENTMCNC: 31 % — LOW (ref 32–36)
MCV RBC AUTO: 82.4 FL — SIGNIFICANT CHANGE UP (ref 80–100)
NRBC # FLD: 0 — SIGNIFICANT CHANGE UP
PHOSPHATE SERPL-MCNC: 2.5 MG/DL — SIGNIFICANT CHANGE UP (ref 2.5–4.5)
PLATELET # BLD AUTO: 236 K/UL — SIGNIFICANT CHANGE UP (ref 150–400)
PMV BLD: 9.3 FL — SIGNIFICANT CHANGE UP (ref 7–13)
POTASSIUM SERPL-MCNC: 3.5 MMOL/L — SIGNIFICANT CHANGE UP (ref 3.5–5.3)
POTASSIUM SERPL-SCNC: 3.5 MMOL/L — SIGNIFICANT CHANGE UP (ref 3.5–5.3)
RBC # BLD: 4.43 M/UL — SIGNIFICANT CHANGE UP (ref 3.8–5.2)
RBC # FLD: 19.8 % — HIGH (ref 10.3–14.5)
RSV RNA SPEC QL NAA+PROBE: NOT DETECTED — SIGNIFICANT CHANGE UP
RV+EV RNA SPEC QL NAA+PROBE: NOT DETECTED — SIGNIFICANT CHANGE UP
SODIUM SERPL-SCNC: 138 MMOL/L — SIGNIFICANT CHANGE UP (ref 135–145)
WBC # BLD: 6.91 K/UL — SIGNIFICANT CHANGE UP (ref 3.8–10.5)
WBC # FLD AUTO: 6.91 K/UL — SIGNIFICANT CHANGE UP (ref 3.8–10.5)

## 2018-04-02 PROCEDURE — 99233 SBSQ HOSP IP/OBS HIGH 50: CPT

## 2018-04-02 RX ORDER — LANOLIN ALCOHOL/MO/W.PET/CERES
3 CREAM (GRAM) TOPICAL ONCE
Qty: 0 | Refills: 0 | Status: COMPLETED | OUTPATIENT
Start: 2018-04-02 | End: 2018-04-02

## 2018-04-02 RX ADMIN — Medication 3 MILLILITER(S): at 16:10

## 2018-04-02 RX ADMIN — Medication 1000 UNIT(S): at 12:19

## 2018-04-02 RX ADMIN — Medication 40 MILLIGRAM(S): at 05:29

## 2018-04-02 RX ADMIN — Medication 3 UNIT(S): at 17:23

## 2018-04-02 RX ADMIN — Medication 3 MILLILITER(S): at 21:32

## 2018-04-02 RX ADMIN — Medication 200 MILLIGRAM(S): at 20:19

## 2018-04-02 RX ADMIN — Medication 100 MILLIGRAM(S): at 15:32

## 2018-04-02 RX ADMIN — PANTOPRAZOLE SODIUM 40 MILLIGRAM(S): 20 TABLET, DELAYED RELEASE ORAL at 05:29

## 2018-04-02 RX ADMIN — Medication 3 UNIT(S): at 08:36

## 2018-04-02 RX ADMIN — HEPARIN SODIUM 5000 UNIT(S): 5000 INJECTION INTRAVENOUS; SUBCUTANEOUS at 21:48

## 2018-04-02 RX ADMIN — TIOTROPIUM BROMIDE 1 CAPSULE(S): 18 CAPSULE ORAL; RESPIRATORY (INHALATION) at 08:37

## 2018-04-02 RX ADMIN — GABAPENTIN 300 MILLIGRAM(S): 400 CAPSULE ORAL at 05:29

## 2018-04-02 RX ADMIN — Medication 3 MILLILITER(S): at 03:50

## 2018-04-02 RX ADMIN — Medication 10: at 12:18

## 2018-04-02 RX ADMIN — GABAPENTIN 300 MILLIGRAM(S): 400 CAPSULE ORAL at 15:32

## 2018-04-02 RX ADMIN — Medication 3 MILLILITER(S): at 09:41

## 2018-04-02 RX ADMIN — Medication 3 MILLIGRAM(S): at 00:32

## 2018-04-02 RX ADMIN — Medication 6: at 17:23

## 2018-04-02 RX ADMIN — BUDESONIDE AND FORMOTEROL FUMARATE DIHYDRATE 2 PUFF(S): 160; 4.5 AEROSOL RESPIRATORY (INHALATION) at 20:19

## 2018-04-02 RX ADMIN — HEPARIN SODIUM 5000 UNIT(S): 5000 INJECTION INTRAVENOUS; SUBCUTANEOUS at 15:32

## 2018-04-02 RX ADMIN — BUDESONIDE AND FORMOTEROL FUMARATE DIHYDRATE 2 PUFF(S): 160; 4.5 AEROSOL RESPIRATORY (INHALATION) at 08:36

## 2018-04-02 RX ADMIN — Medication 3 UNIT(S): at 12:18

## 2018-04-02 RX ADMIN — HEPARIN SODIUM 5000 UNIT(S): 5000 INJECTION INTRAVENOUS; SUBCUTANEOUS at 05:29

## 2018-04-02 RX ADMIN — Medication 81 MILLIGRAM(S): at 12:19

## 2018-04-02 RX ADMIN — Medication 2: at 08:36

## 2018-04-02 RX ADMIN — INSULIN GLARGINE 10 UNIT(S): 100 INJECTION, SOLUTION SUBCUTANEOUS at 21:49

## 2018-04-02 RX ADMIN — Medication 100 MILLIGRAM(S): at 05:29

## 2018-04-02 NOTE — DISCHARGE NOTE ADULT - HOSPITAL COURSE
87 F (ambulatory with a waker), COPD (3L O2 at night) DAVID on CPAP, moderate pulmonary HTN, anxiety, HTN, breast Ca S/P lumpectomy and radiation (1989). Recent admissions 12/2017 and 3/1-3/8/17 for COPD exacerbation p/w progressive dyspnea over the 3 last days found to be tachypneic and hypoxemic with slightly worsened RLL haziness, admitted for COPD exacerbation with a possible RLL pneumonia c/b acute hypoxia with elevated lactate.     COPD exacerbation.    -3L home O2, now requiring 4L   -Exacerbation may be potentially from PNA   -S/P Vanco/ Cefepime in ED, continue Levaquin   -Prednisone x 5 days   -CE negative, pro-BNP negative  -Symbicort, Spiriva, albuterol, DuoNebs  -Cardio Cx - EKG shows mininal ST depression, likely stress induced   -RVP --------------------------------------------------------  -F/U outpatient stress test     Pneumonia.    -CXR - RLL haziness increased in size, may be masking PNA  -No fever, leukocytosis, tachypneic but nontoxic appearing.   -CT chest - RLL pneumonia. Moderate centrilobular emphysema.    DAVID on CPAP.    -CPAP 30 FiO2, EPAP of 5     Hyperglycemia.    -HgbA1c 6.4 in 2/18, HgbA1c 7.4  -BG 300s  -DDX includes T2DM vs steroid induced hyperglycemia   -Basal/bolus, ISS    Tachycardia.    -100-110s at home, chronic   -Likely in setting of albuterol use as well as hypoxemia.   -S/P IVC filter 2009 in setting femur fracture at [facility], never on A/C  -CTA 2/2018 negative for PE  -PE showed no LE tenderness/erythema    Pulmonary hypertension.   -TTE (2015) - RV systolic pressure of 50 mmhg, indicating moderate pulmonary HTN.   -PE shows mild pedal edema   -No Hx CHF, normal EF  -HOLD LASIX IN SETTING OF PNA/COPD    Anxiety disorder.   -HOLD Clonazepam given concern of delirium    Hypertension.    -HOLD AMLODIPINE    History of breast cancer.    -S/P L lumpectomy and radiation in 1989, has annual mammogram without recurrence. 87 F (ambulatory with a waker), COPD (3L O2 at night) DAVID on CPAP, moderate pulmonary HTN, anxiety, HTN, breast Ca S/P lumpectomy and radiation (1989). Recent admissions 12/2017 and 3/1-3/8/17 for COPD exacerbation p/w progressive dyspnea over the 3 last days found to be tachypneic and hypoxemic with slightly worsened RLL haziness, admitted for COPD exacerbation with a possible RLL pneumonia c/b acute hypoxia with elevated lactate.     COPD exacerbation.    -3L home O2, now requiring 4L   -Exacerbation may be potentially from PNA   -S/P Vanco/ Cefepime in ED, continue Levaquin   -Prednisone x 5 days   -CE negative, pro-BNP negative  -Symbicort, Spiriva, albuterol, DuoNebs  -Cardio Cx - EKG shows mininal ST depression, likely stress induced   -RVP -ntd  -F/U outpatient stress test dr sprague     Pneumonia.    -CXR - RLL haziness increased in size, may be masking PNA  -No fever, leukocytosis, tachypneic but nontoxic appearing.   -CT chest - RLL pneumonia. Moderate centrilobular emphysema.    DAVID on CPAP.    -CPAP 30 FiO2, EPAP of 5     Hyperglycemia.    -HgbA1c 6.4 in 2/18, HgbA1c 7.4  -BG 300s  -DDX includes T2DM vs steroid induced hyperglycemia   -Basal/bolus, ISS    Tachycardia.    -100-110s at home, chronic   -Likely in setting of albuterol use as well as hypoxemia.   -S/P IVC filter 2009 in setting femur fracture at [facility], never on A/C  -CTA 2/2018 negative for PE  -PE showed no LE tenderness/erythema    Pulmonary hypertension.   -TTE (2015) - RV systolic pressure of 50 mmhg, indicating moderate pulmonary HTN.   -PE shows mild pedal edema   -No Hx CHF, normal EF  -HOLD LASIX IN SETTING OF PNA/COPD    Anxiety disorder.   -HOLD Clonazepam given concern of delirium    Hypertension.    -HOLD AMLODIPINE    History of breast cancer.      dispo: home   -S/P L lumpectomy and radiation in 1989, has annual mammogram without recurrence. 87 F (ambulatory with a waker), COPD (3L O2 at night) DAVID on CPAP, moderate pulmonary HTN, anxiety, HTN, breast Ca S/P lumpectomy and radiation (1989). Recent admissions 12/2017 and 3/1-3/8/17 for COPD exacerbation p/w progressive dyspnea over the 3 last days found to be tachypneic and hypoxemic with slightly worsened RLL haziness, admitted for COPD exacerbation with a possible RLL pneumonia c/b acute hypoxia with elevated lactate.     COPD exacerbation.    -3L home O2, now requiring 4L   -Exacerbation may be potentially from PNA   -S/P Vanco/ Cefepime in ED, continue Levaquin   -Prednisone x 5 days   -CE negative, pro-BNP negative  -Symbicort, Spiriva, albuterol, DuoNebs  -Cardio Cx - EKG shows mininal ST depression, likely stress induced   -RVP -ntd  -F/U outpatient stress test dr sprague     Pneumonia.    -CXR - RLL haziness increased in size, may be masking PNA  -No fever, leukocytosis, tachypneic but nontoxic appearing.   -CT chest - RLL pneumonia. Moderate centrilobular emphysema.    DAVID on CPAP.    -CPAP 30 FiO2, EPAP of 5     Hyperglycemia.    -HgbA1c 6.4 in 2/18, HgbA1c 7.4  -BG 300s  -DDX includes T2DM vs steroid induced hyperglycemia   -Basal/bolus, ISS    Tachycardia.    -100-110s at home, chronic   -Likely in setting of albuterol use as well as hypoxemia.   -S/P IVC filter 2009 in setting femur fracture at [facility], never on A/C  -CTA 2/2018 negative for PE  -PE showed no LE tenderness/erythema    Pulmonary hypertension.   -TTE (2015) - RV systolic pressure of 50 mmhg, indicating moderate pulmonary HTN.   -PE shows mild pedal edema   -No Hx CHF, normal EF  -HOLD LASIX IN SETTING OF PNA/COPD    Anxiety disorder.   -HOLD Clonazepam given concern of delirium    Hypertension.    -HOLD AMLODIPINE    History of breast cancer.    S/P L lumpectomy and radiation in 1989, has annual mammogram without recurrence.     dispo: home  with home PT  Patient hemodynamically stable for discharge home  plan of care was discussed with patient and son     -

## 2018-04-02 NOTE — PROGRESS NOTE ADULT - PROBLEM SELECTOR PLAN 5
Chronic according to pt, always 100-110s on pulse oximetry at home. Likely in setting of albuterol use as well as hypoxemia as deconditioning. Pt had an IVC filter placed in 2009 in setting of femur fracture at [facility] but was never on AC.  CTA chest 2/2018 no pulmonary embolism. No LE tenderness or erythema.  f/u card

## 2018-04-02 NOTE — PROGRESS NOTE ADULT - SUBJECTIVE AND OBJECTIVE BOX
Subjective: Patient seen and examined. No new events except as noted.     SUBJECTIVE/ROS:  feels better       MEDICATIONS:  MEDICATIONS  (STANDING):  ALBUTerol/ipratropium for Nebulization 3 milliLiter(s) Nebulizer every 6 hours  aspirin  chewable 81 milliGRAM(s) Oral daily  buDESOnide 160 MICROgram(s)/formoterol 4.5 MICROgram(s) Inhaler 2 Puff(s) Inhalation two times a day  cholecalciferol 1000 Unit(s) Oral daily  dextrose 5%. 1000 milliLiter(s) (50 mL/Hr) IV Continuous <Continuous>  dextrose 50% Injectable 12.5 Gram(s) IV Push once  dextrose 50% Injectable 25 Gram(s) IV Push once  dextrose 50% Injectable 25 Gram(s) IV Push once  docusate sodium 100 milliGRAM(s) Oral three times a day  gabapentin 300 milliGRAM(s) Oral three times a day  heparin  Injectable 5000 Unit(s) SubCutaneous every 8 hours  influenza   Vaccine 0.5 milliLiter(s) IntraMuscular once  insulin glargine Injectable (LANTUS) 10 Unit(s) SubCutaneous at bedtime  insulin lispro (HumaLOG) corrective regimen sliding scale   SubCutaneous three times a day before meals  insulin lispro (HumaLOG) corrective regimen sliding scale   SubCutaneous at bedtime  insulin lispro Injectable (HumaLOG) 3 Unit(s) SubCutaneous three times a day before meals  levoFLOXacin IVPB      levoFLOXacin IVPB 750 milliGRAM(s) IV Intermittent every 48 hours  pantoprazole    Tablet 40 milliGRAM(s) Oral before breakfast  polyethylene glycol 3350 17 Gram(s) Oral daily  predniSONE   Tablet 40 milliGRAM(s) Oral daily  senna 2 Tablet(s) Oral at bedtime  tiotropium 18 MICROgram(s) Capsule 1 Capsule(s) Inhalation daily      PHYSICAL EXAM:  T(C): 36.9 (04-02-18 @ 05:27), Max: 36.9 (04-02-18 @ 05:27)  HR: 81 (04-02-18 @ 09:41) (79 - 90)  BP: 129/58 (04-02-18 @ 05:27) (119/44 - 151/78)  RR: 18 (04-02-18 @ 05:27) (17 - 20)  SpO2: 97% (04-02-18 @ 09:41) (91% - 98%)  Wt(kg): --  I&O's Summary      JVP: Normal  Neck: supple  Lung: clear   CV: S1 S2 , Murmur:  Abd: soft  Ext: No edema  neuro: Awake / alert  Psych: flat affect  Skin: normal       LABS/DATA:    CARDIAC MARKERS:  CARDIAC MARKERS ( 30 Mar 2018 19:40 )  x     / < 0.06 ng/mL / 50 u/L / 1.72 ng/mL / x                                    11.3   6.91  )-----------( 236      ( 02 Apr 2018 06:38 )             36.5     04-02    138  |  97<L>  |  25<H>  ----------------------------<  138<H>  3.5   |  30  |  1.05    Ca    8.4      02 Apr 2018 06:38  Phos  2.5     04-02  Mg     2.0     04-02      proBNP:   Lipid Profile:   HgA1c:   TSH:     TELE:  EKG:

## 2018-04-02 NOTE — PROGRESS NOTE ADULT - SUBJECTIVE AND OBJECTIVE BOX
PULMONARY PROGRESS NOTE    VIDAL LOUIE  MRN-[id number]    Patient is a 87y old  Female who presents with a chief complaint of Complain of SOB  COPD exacerbation (31 Mar 2018 00:10)      HPI:  -patient short of breath, denies cough or sputum production.  did not sleep well last night, did not use cpap bc wasn't feeling well.    ROS:   -no N/V/D    ACTIVE MEDICATION LIST:  MEDICATIONS  (STANDING):  ALBUTerol/ipratropium for Nebulization 3 milliLiter(s) Nebulizer every 6 hours  aspirin  chewable 81 milliGRAM(s) Oral daily  buDESOnide 160 MICROgram(s)/formoterol 4.5 MICROgram(s) Inhaler 2 Puff(s) Inhalation two times a day  cholecalciferol 1000 Unit(s) Oral daily  dextrose 5%. 1000 milliLiter(s) (50 mL/Hr) IV Continuous <Continuous>  dextrose 50% Injectable 12.5 Gram(s) IV Push once  dextrose 50% Injectable 25 Gram(s) IV Push once  dextrose 50% Injectable 25 Gram(s) IV Push once  docusate sodium 100 milliGRAM(s) Oral three times a day  gabapentin 300 milliGRAM(s) Oral three times a day  heparin  Injectable 5000 Unit(s) SubCutaneous every 8 hours  influenza   Vaccine 0.5 milliLiter(s) IntraMuscular once  insulin glargine Injectable (LANTUS) 10 Unit(s) SubCutaneous at bedtime  insulin lispro (HumaLOG) corrective regimen sliding scale   SubCutaneous three times a day before meals  insulin lispro (HumaLOG) corrective regimen sliding scale   SubCutaneous at bedtime  insulin lispro Injectable (HumaLOG) 3 Unit(s) SubCutaneous three times a day before meals  levoFLOXacin IVPB      levoFLOXacin IVPB 750 milliGRAM(s) IV Intermittent every 48 hours  pantoprazole    Tablet 40 milliGRAM(s) Oral before breakfast  polyethylene glycol 3350 17 Gram(s) Oral daily  predniSONE   Tablet 40 milliGRAM(s) Oral daily  senna 2 Tablet(s) Oral at bedtime  tiotropium 18 MICROgram(s) Capsule 1 Capsule(s) Inhalation daily    MEDICATIONS  (PRN):  ALBUTerol    90 MICROgram(s) HFA Inhaler 2 Puff(s) Inhalation every 6 hours PRN Shortness of Breath and/or Wheezing  dextrose Gel 1 Dose(s) Oral once PRN Blood Glucose LESS THAN 70 milliGRAM(s)/deciliter  glucagon  Injectable 1 milliGRAM(s) IntraMuscular once PRN Glucose LESS THAN 70 milligrams/deciliter      EXAM:  Vital Signs Last 24 Hrs  T(C): 36.9 (02 Apr 2018 05:27), Max: 36.9 (02 Apr 2018 05:27)  T(F): 98.4 (02 Apr 2018 05:27), Max: 98.4 (02 Apr 2018 05:27)  HR: 81 (02 Apr 2018 09:41) (79 - 90)  BP: 129/58 (02 Apr 2018 05:27) (119/44 - 151/78)  BP(mean): --  RR: 18 (02 Apr 2018 05:27) (17 - 20)  SpO2: 97% (02 Apr 2018 09:41) (91% - 98%)    GENERAL: The patient is awake and alert in no apparent distress.     SKIN: Warm, dry, no rashes    LUNGS: Clear to auscultation without wheezing, rales or rhonchi; respirations unlabored    HEART: Regular rate and rhythm without murmur.    ABDOMEN: +BS, Soft, Nontender      LABS/IMGAING: reviewed                        11.3   6.91  )-----------( 236      ( 02 Apr 2018 06:38 )             36.5   04-02    138  |  97<L>  |  25<H>  ----------------------------<  138<H>  3.5   |  30  |  1.05    Ca    8.4      02 Apr 2018 06:38  Phos  2.5     04-02  Mg     2.0     04-02    < from: CT Chest No Cont (04.01.18 @ 13:56) >  IMPRESSION:    Right lower lobe pneumonia. Moderate centrilobular emphysema.        < end of copied text >    PROBLEM LIST:  87y Female with HEALTH ISSUES - PROBLEM Dx:  COPD exacerbation  pneumonia  DAVID  History of breast cancer  Hypertension  Anxiety disorder    RECS:  -Dyspnea likely related to combination of obesity/deconditioning/pna/copd  -Continue abx x 7 days  -Continue symbicort, spiriva, duoneb, prednisone x 5 days  -Needs PT, pulm rehab?  -Supplemental O2  -incentive korey  -cardiology follow up appreciated.    Adrianne Garcia MD  [phone]

## 2018-04-02 NOTE — DISCHARGE NOTE ADULT - HOME CARE AGENCY
[facility], Phone [phone].  Initial visit by nurse/therapist will be made day after discharge.  If you have any problems, please contact the Homecare agency directly.

## 2018-04-02 NOTE — DISCHARGE NOTE ADULT - SECONDARY DIAGNOSIS.
Anxiety disorder Essential hypertension DAVID on CPAP Chronic bronchitis Hyperglycemia Pulmonary hypertension

## 2018-04-02 NOTE — DISCHARGE NOTE ADULT - PATIENT PORTAL LINK FT
You can access the [facility], offered by [facility], by registering with the following website: [email or web address]

## 2018-04-02 NOTE — DISCHARGE NOTE ADULT - PLAN OF CARE
Resolution of infection You presented with shortness of breath. CT chest showed pneumonia. You were treated with antibiotics with improvement. Continue antibiotics and inhalers as prescribed. Follow up your pulmonologist in 1-2 weeks for further management. Monitor for fevers, chills, worsening shortness of breath, chest pain. Continue blood pressure medication regimen as directed. Monitor for any visual changes, headaches or dizziness.  Monitor blood pressure regularly.  Follow up with your PCP for further management for high blood pressure. Stable. Continue with CPAP machine as recommended. Follow up your pulmonologist in 1-2 weeks for further management. Monitor for fevers, chills, worsening shortness of breath, chest pain. Stable. Continue with inhalers as recommended. Follow up your pulmonologist in 1-2 weeks for further management. Monitor for fevers, chills, worsening shortness of breath, chest pain. You were seen by Cardiology (Dr. Jett) in-hospital. You are recommended to follow up with Cardiology (Dr. Jett) or your PCP in 1-2 weeks for further management and cardiac stress testing. You presented with shortness of breath. CT chest showed pneumonia. You were treated with antibiotics  and steroids with improvement. Continue antibiotics and inhalers as prescribed. Follow up your pulmonologist in 1-2 weeks for further management. Monitor for fevers, chills, worsening shortness of breath, chest pain.    Follow up with Dr. Sheets within 1-2 weeks for further evaluation. Continue home Klonopin as needed. HgA1c 7.4- please follow up with PCP in 1-2 weeks for further assessment. Lasix restarted. You were seen by Cardiology (Dr. Jett) in-hospital. You are recommended to follow up with Cardiology (Dr. Jett) or your PCP in 1-2 weeks for further management and cardiac stress testing.

## 2018-04-02 NOTE — PROGRESS NOTE ADULT - PROBLEM SELECTOR PLAN 1
not toxic appearing. The exacerbation may potentially be in setting of pneumonia  CTA chest with no PE or concerning lung lesions 2/2018.  c/w levofloxacin 750 mg IV q48h based on Cr clearance  Prednisone 40 mg daily for 5 days and reassess   Duonebs q6h  pulmonary f/u appreciated   CE x 1 and ProBNP - negative  c/w symbicort, spiriva, albuterol

## 2018-04-02 NOTE — DISCHARGE NOTE ADULT - CARE PROVIDER_API CALL
Mike Sheets), Internal Medicine; Pulmonary Disease  [address] [facility]  [address]  [place] [address]  Phone: [phone]  Fax: [phone]    Emmanuel Jett), Cardiovascular Disease; Internal Medicine  [address] [address]  Phone: [phone]  Fax: [phone] Mike Sheets), Internal Medicine; Pulmonary Disease  [address] [facility]  [address]  [place] [address]  Phone: [phone]  Fax: [phone]    Emmanuel Jett), Cardiovascular Disease; Internal Medicine  [address] [address]  Phone: [phone]  Fax: [phone]

## 2018-04-02 NOTE — DISCHARGE NOTE ADULT - MEDICATION SUMMARY - MEDICATIONS TO STOP TAKING
I will STOP taking the medications listed below when I get home from the hospital:    predniSONE 20 mg oral tablet  -- 1 tab(s) by mouth once a day    lidocaine 2% topical gel with applicator  -- 1 application on skin 2 times a day

## 2018-04-02 NOTE — PROGRESS NOTE ADULT - PROBLEM SELECTOR PLAN 4
HgbA1c just under threshold of 6.4 in 2/2018. Risk for steroid induced hyperglycemia.   c/w lantus / premeal humalog   monitor FS, improved

## 2018-04-02 NOTE — PROGRESS NOTE ADULT - PROBLEM SELECTOR PLAN 6
Based on TTE in 2015 with RV systolic pressure of 50 mm hg, indicating moderate pulmonary HTN. Also with mild pedal edema, no history of HF. Normal EF;  - Holding lasix 20 mg in setting of pneumonia and COPD, restart as needed  check TTE, f/u Card

## 2018-04-02 NOTE — DISCHARGE NOTE ADULT - MEDICATION SUMMARY - MEDICATIONS TO TAKE
I will START or STAY ON the medications listed below when I get home from the hospital:    aspirin 81 mg oral tablet  -- 1 tab(s) by mouth once a day in am  -- Indication: For Need for prophylactic measure    KlonoPIN 0.5 mg oral tablet  -- 1 tab(s) by mouth once a day (at bedtime), As Needed  -- Indication: For Anxiety disorder    gabapentin 300 mg oral capsule  -- 1 cap(s) by mouth 3 times a day  -- Indication: For Pain managment     albuterol 90 mcg/inh inhalation powder  -- 2 puff(s) inhaled every 6 hours, As Needed   -- For inhalation only.  It is very important that you take or use this exactly as directed.  Do not skip doses or discontinue unless directed by your doctor.  Obtain medical advice before taking any non-prescription drugs as some may affect the action of this medication.    -- Indication: For COPD exacerbation    Spiriva 18 mcg inhalation capsule  -- 1 cap(s) inhaled once a day in am  -- Indication: For COPD exacerbation    Symbicort 160 mcg-4.5 mcg/inh inhalation aerosol  -- 2 puff(s) inhaled 2 times a day  -- Indication: For COPD exacerbation    amLODIPine 5 mg oral tablet  -- 1 tab(s) by mouth once a day  -- Indication: For Essential hypertension    Lasix 20 mg oral tablet  -- 1 tab(s) by mouth once a day  -- Indication: For Essential hypertension    omeprazole 40 mg oral delayed release capsule  -- 1 cap(s) by mouth once a day in am  -- Indication: For gi protection     Levaquin 500 mg oral tablet  -- 1 tab(s) by mouth once a day   -- Avoid prolonged or excessive exposure to direct and/or artificial sunlight while taking this medication.  Do not take dairy products, antacids, or iron preparations within one hour of this medication.  Finish all this medication unless otherwise directed by prescriber.  May cause drowsiness or dizziness.  Medication should be taken with plenty of water.    -- Indication: For Pneumonia    Vitamin D3 1000 intl units oral tablet  -- 1 tab(s) by mouth once a day in am  -- Indication: For supplement

## 2018-04-02 NOTE — PROGRESS NOTE ADULT - SUBJECTIVE AND OBJECTIVE BOX
Patient is a 87y old  Female who presents with a chief complaint of Complain of SOB  COPD exacerbation (31 Mar 2018 00:10)      SUBJECTIVE / OVERNIGHT EVENTS: patient seen and examined by bedside at 11 Am, pt c/o not feeling well, c/o SOB and cough , denies cp       MEDICATIONS  (STANDING):  ALBUTerol/ipratropium for Nebulization 3 milliLiter(s) Nebulizer every 6 hours  aspirin  chewable 81 milliGRAM(s) Oral daily  buDESOnide 160 MICROgram(s)/formoterol 4.5 MICROgram(s) Inhaler 2 Puff(s) Inhalation two times a day  cholecalciferol 1000 Unit(s) Oral daily  dextrose 5%. 1000 milliLiter(s) (50 mL/Hr) IV Continuous <Continuous>  dextrose 50% Injectable 12.5 Gram(s) IV Push once  dextrose 50% Injectable 25 Gram(s) IV Push once  dextrose 50% Injectable 25 Gram(s) IV Push once  docusate sodium 100 milliGRAM(s) Oral three times a day  gabapentin 300 milliGRAM(s) Oral three times a day  heparin  Injectable 5000 Unit(s) SubCutaneous every 8 hours  influenza   Vaccine 0.5 milliLiter(s) IntraMuscular once  insulin glargine Injectable (LANTUS) 10 Unit(s) SubCutaneous at bedtime  insulin lispro (HumaLOG) corrective regimen sliding scale   SubCutaneous three times a day before meals  insulin lispro (HumaLOG) corrective regimen sliding scale   SubCutaneous at bedtime  insulin lispro Injectable (HumaLOG) 3 Unit(s) SubCutaneous three times a day before meals  levoFLOXacin IVPB      levoFLOXacin IVPB 750 milliGRAM(s) IV Intermittent every 48 hours  pantoprazole    Tablet 40 milliGRAM(s) Oral before breakfast  polyethylene glycol 3350 17 Gram(s) Oral daily  predniSONE   Tablet 40 milliGRAM(s) Oral daily  senna 2 Tablet(s) Oral at bedtime  tiotropium 18 MICROgram(s) Capsule 1 Capsule(s) Inhalation daily    MEDICATIONS  (PRN):  ALBUTerol    90 MICROgram(s) HFA Inhaler 2 Puff(s) Inhalation every 6 hours PRN Shortness of Breath and/or Wheezing  dextrose Gel 1 Dose(s) Oral once PRN Blood Glucose LESS THAN 70 milliGRAM(s)/deciliter  glucagon  Injectable 1 milliGRAM(s) IntraMuscular once PRN Glucose LESS THAN 70 milligrams/deciliter      Vital Signs Last 24 Hrs  T(C): 36.9 (02 Apr 2018 05:27), Max: 36.9 (02 Apr 2018 05:27)  T(F): 98.4 (02 Apr 2018 05:27), Max: 98.4 (02 Apr 2018 05:27)  HR: 86 (02 Apr 2018 11:03) (79 - 90)  BP: 129/58 (02 Apr 2018 05:27) (119/44 - 151/78)  BP(mean): --  RR: 18 (02 Apr 2018 05:27) (17 - 20)  SpO2: 96% (02 Apr 2018 11:03) (91% - 98%)  CAPILLARY BLOOD GLUCOSE      POCT Blood Glucose.: 372 mg/dL (02 Apr 2018 12:00)  POCT Blood Glucose.: 190 mg/dL (02 Apr 2018 08:12)  POCT Blood Glucose.: 128 mg/dL (01 Apr 2018 21:35)  POCT Blood Glucose.: 210 mg/dL (01 Apr 2018 16:38)    I&O's Summary      PHYSICAL EXAM:  GENERAL: NAD, obese   HEAD:  Atraumatic, Normocephalic  EYES: EOMI, PERRLA, conjunctiva and sclera clear  NECK: Supple,   CHEST/LUNG: non-labored; decreased BS, no wheezing   HEART: s1 s2, regular rhythm and rate   ABDOMEN: Soft, Nontender, Nondistended; Bowel sounds present  EXTREMITIES:  2+ Peripheral Pulses, no edema   PSYCH: AAOx3, calm   NEUROLOGY: non-focal  SKIN: No rashes or lesions      LABS:                        11.3   6.91  )-----------( 236      ( 02 Apr 2018 06:38 )             36.5     04-02    138  |  97<L>  |  25<H>  ----------------------------<  138<H>  3.5   |  30  |  1.05    Ca    8.4      02 Apr 2018 06:38  Phos  2.5     04-02  Mg     2.0     04-02                RADIOLOGY & ADDITIONAL TESTS:    Imaging Personally Reviewed:    Consultant(s) Notes Reviewed:  cardiology, pulmonary     Care Discussed with Consultants/Other Providers:

## 2018-04-02 NOTE — DISCHARGE NOTE ADULT - CARE PLAN
Principal Discharge DX:	Pneumonia  Goal:	Resolution of infection  Assessment and plan of treatment:	You presented with shortness of breath. CT chest showed pneumonia. You were treated with antibiotics with improvement. Continue antibiotics and inhalers as prescribed. Follow up your pulmonologist in 1-2 weeks for further management. Monitor for fevers, chills, worsening shortness of breath, chest pain.  Secondary Diagnosis:	Anxiety disorder  Secondary Diagnosis:	Essential hypertension  Assessment and plan of treatment:	Continue blood pressure medication regimen as directed. Monitor for any visual changes, headaches or dizziness.  Monitor blood pressure regularly.  Follow up with your PCP for further management for high blood pressure.  Secondary Diagnosis:	DAVID on CPAP  Assessment and plan of treatment:	Stable. Continue with CPAP machine as recommended. Follow up your pulmonologist in 1-2 weeks for further management. Monitor for fevers, chills, worsening shortness of breath, chest pain.  Secondary Diagnosis:	Chronic bronchitis  Assessment and plan of treatment:	Stable. Continue with inhalers as recommended. Follow up your pulmonologist in 1-2 weeks for further management. Monitor for fevers, chills, worsening shortness of breath, chest pain.  Assessment and plan of treatment:	You were seen by Cardiology (Dr. Jett) in-hospital. You are recommended to follow up with Cardiology (Dr. Jett) or your PCP in 1-2 weeks for further management and cardiac stress testing. Principal Discharge DX:	Pneumonia  Goal:	Resolution of infection  Assessment and plan of treatment:	You presented with shortness of breath. CT chest showed pneumonia. You were treated with antibiotics  and steroids with improvement. Continue antibiotics and inhalers as prescribed. Follow up your pulmonologist in 1-2 weeks for further management. Monitor for fevers, chills, worsening shortness of breath, chest pain.    Follow up with Dr. Sheets within 1-2 weeks for further evaluation.  Secondary Diagnosis:	Anxiety disorder  Assessment and plan of treatment:	Continue home Klonopin as needed.  Secondary Diagnosis:	Essential hypertension  Assessment and plan of treatment:	Continue blood pressure medication regimen as directed. Monitor for any visual changes, headaches or dizziness.  Monitor blood pressure regularly.  Follow up with your PCP for further management for high blood pressure.  Secondary Diagnosis:	DAVID on CPAP  Assessment and plan of treatment:	Stable. Continue with CPAP machine as recommended. Follow up your pulmonologist in 1-2 weeks for further management. Monitor for fevers, chills, worsening shortness of breath, chest pain.  Secondary Diagnosis:	Chronic bronchitis  Assessment and plan of treatment:	Stable. Continue with inhalers as recommended. Follow up your pulmonologist in 1-2 weeks for further management. Monitor for fevers, chills, worsening shortness of breath, chest pain.  Secondary Diagnosis:	Hyperglycemia  Assessment and plan of treatment:	HgA1c 7.4- please follow up with PCP in 1-2 weeks for further assessment.  Secondary Diagnosis:	Pulmonary hypertension  Assessment and plan of treatment:	Lasix restarted. You were seen by Cardiology (Dr. Jett) in-hospital. You are recommended to follow up with Cardiology (Dr. Jett) or your PCP in 1-2 weeks for further management and cardiac stress testing.

## 2018-04-03 LAB
GLUCOSE BLDC GLUCOMTR-MCNC: 131 MG/DL — HIGH (ref 70–99)
GLUCOSE BLDC GLUCOMTR-MCNC: 192 MG/DL — HIGH (ref 70–99)
GLUCOSE BLDC GLUCOMTR-MCNC: 215 MG/DL — HIGH (ref 70–99)
GLUCOSE BLDC GLUCOMTR-MCNC: 249 MG/DL — HIGH (ref 70–99)

## 2018-04-03 PROCEDURE — 99232 SBSQ HOSP IP/OBS MODERATE 35: CPT

## 2018-04-03 RX ORDER — CLONAZEPAM 1 MG
0.5 TABLET ORAL AT BEDTIME
Qty: 0 | Refills: 0 | Status: DISCONTINUED | OUTPATIENT
Start: 2018-04-03 | End: 2018-04-04

## 2018-04-03 RX ORDER — BENZOCAINE AND MENTHOL 5; 1 G/100ML; G/100ML
1 LIQUID ORAL ONCE
Qty: 0 | Refills: 0 | Status: COMPLETED | OUTPATIENT
Start: 2018-04-03 | End: 2018-04-03

## 2018-04-03 RX ORDER — AMLODIPINE BESYLATE 2.5 MG/1
5 TABLET ORAL DAILY
Qty: 0 | Refills: 0 | Status: DISCONTINUED | OUTPATIENT
Start: 2018-04-03 | End: 2018-04-04

## 2018-04-03 RX ORDER — SODIUM CHLORIDE 0.65 %
1 AEROSOL, SPRAY (ML) NASAL THREE TIMES A DAY
Qty: 0 | Refills: 0 | Status: DISCONTINUED | OUTPATIENT
Start: 2018-04-03 | End: 2018-04-04

## 2018-04-03 RX ADMIN — Medication 2: at 17:27

## 2018-04-03 RX ADMIN — Medication 3 UNIT(S): at 08:30

## 2018-04-03 RX ADMIN — Medication 3 MILLILITER(S): at 10:22

## 2018-04-03 RX ADMIN — Medication 3 UNIT(S): at 12:16

## 2018-04-03 RX ADMIN — Medication 3 UNIT(S): at 17:27

## 2018-04-03 RX ADMIN — GABAPENTIN 300 MILLIGRAM(S): 400 CAPSULE ORAL at 13:29

## 2018-04-03 RX ADMIN — GABAPENTIN 300 MILLIGRAM(S): 400 CAPSULE ORAL at 05:20

## 2018-04-03 RX ADMIN — GABAPENTIN 300 MILLIGRAM(S): 400 CAPSULE ORAL at 21:12

## 2018-04-03 RX ADMIN — Medication 4: at 12:16

## 2018-04-03 RX ADMIN — BUDESONIDE AND FORMOTEROL FUMARATE DIHYDRATE 2 PUFF(S): 160; 4.5 AEROSOL RESPIRATORY (INHALATION) at 21:12

## 2018-04-03 RX ADMIN — Medication 40 MILLIGRAM(S): at 05:21

## 2018-04-03 RX ADMIN — Medication 3 MILLILITER(S): at 16:01

## 2018-04-03 RX ADMIN — Medication 3 MILLILITER(S): at 22:13

## 2018-04-03 RX ADMIN — HEPARIN SODIUM 5000 UNIT(S): 5000 INJECTION INTRAVENOUS; SUBCUTANEOUS at 05:20

## 2018-04-03 RX ADMIN — INSULIN GLARGINE 10 UNIT(S): 100 INJECTION, SOLUTION SUBCUTANEOUS at 22:05

## 2018-04-03 RX ADMIN — Medication 1 SPRAY(S): at 21:12

## 2018-04-03 RX ADMIN — Medication 4: at 08:31

## 2018-04-03 RX ADMIN — TIOTROPIUM BROMIDE 1 CAPSULE(S): 18 CAPSULE ORAL; RESPIRATORY (INHALATION) at 11:13

## 2018-04-03 RX ADMIN — PANTOPRAZOLE SODIUM 40 MILLIGRAM(S): 20 TABLET, DELAYED RELEASE ORAL at 05:21

## 2018-04-03 RX ADMIN — Medication 3 MILLILITER(S): at 03:37

## 2018-04-03 RX ADMIN — HEPARIN SODIUM 5000 UNIT(S): 5000 INJECTION INTRAVENOUS; SUBCUTANEOUS at 21:12

## 2018-04-03 RX ADMIN — BUDESONIDE AND FORMOTEROL FUMARATE DIHYDRATE 2 PUFF(S): 160; 4.5 AEROSOL RESPIRATORY (INHALATION) at 09:23

## 2018-04-03 RX ADMIN — Medication 100 MILLIGRAM(S): at 05:20

## 2018-04-03 RX ADMIN — BENZOCAINE AND MENTHOL 1 LOZENGE: 5; 1 LIQUID ORAL at 01:46

## 2018-04-03 RX ADMIN — Medication 81 MILLIGRAM(S): at 11:13

## 2018-04-03 RX ADMIN — Medication 1000 UNIT(S): at 11:13

## 2018-04-03 RX ADMIN — HEPARIN SODIUM 5000 UNIT(S): 5000 INJECTION INTRAVENOUS; SUBCUTANEOUS at 13:29

## 2018-04-03 NOTE — PHYSICAL THERAPY INITIAL EVALUATION ADULT - ADDITIONAL COMMENTS
Pt reports that she lives in a private house with  with ~4STE; no handrails; bedroom/bathroom on first floor. Prior to hospital admission pt was completely independent and used rolling walker with ambulation. Pt uses 3L of O2 @ night and denies any recent falls. In addition pt was receiving a home RN and home PT 2x a week.    Pt left comfortable in chair, NAD, all lines intact, all precautions maintained, with call bell in reach, and RN aware of PT evaluation.

## 2018-04-03 NOTE — PROGRESS NOTE ADULT - SUBJECTIVE AND OBJECTIVE BOX
Subjective: Patient seen and examined. No new events except as noted.     SUBJECTIVE/ROS:  feels better       MEDICATIONS:  MEDICATIONS  (STANDING):  ALBUTerol/ipratropium for Nebulization 3 milliLiter(s) Nebulizer every 6 hours  amLODIPine   Tablet 5 milliGRAM(s) Oral daily  aspirin  chewable 81 milliGRAM(s) Oral daily  buDESOnide 160 MICROgram(s)/formoterol 4.5 MICROgram(s) Inhaler 2 Puff(s) Inhalation two times a day  cholecalciferol 1000 Unit(s) Oral daily  dextrose 5%. 1000 milliLiter(s) (50 mL/Hr) IV Continuous <Continuous>  dextrose 50% Injectable 12.5 Gram(s) IV Push once  dextrose 50% Injectable 25 Gram(s) IV Push once  dextrose 50% Injectable 25 Gram(s) IV Push once  docusate sodium 100 milliGRAM(s) Oral three times a day  gabapentin 300 milliGRAM(s) Oral three times a day  heparin  Injectable 5000 Unit(s) SubCutaneous every 8 hours  influenza   Vaccine 0.5 milliLiter(s) IntraMuscular once  insulin glargine Injectable (LANTUS) 10 Unit(s) SubCutaneous at bedtime  insulin lispro (HumaLOG) corrective regimen sliding scale   SubCutaneous three times a day before meals  insulin lispro (HumaLOG) corrective regimen sliding scale   SubCutaneous at bedtime  insulin lispro Injectable (HumaLOG) 3 Unit(s) SubCutaneous three times a day before meals  levoFLOXacin IVPB      levoFLOXacin IVPB 750 milliGRAM(s) IV Intermittent every 48 hours  pantoprazole    Tablet 40 milliGRAM(s) Oral before breakfast  polyethylene glycol 3350 17 Gram(s) Oral daily  predniSONE   Tablet 40 milliGRAM(s) Oral daily  senna 2 Tablet(s) Oral at bedtime  sodium chloride 0.65% Nasal 1 Spray(s) Both Nostrils three times a day  tiotropium 18 MICROgram(s) Capsule 1 Capsule(s) Inhalation daily      PHYSICAL EXAM:  T(C): 36.6 (04-03-18 @ 13:41), Max: 36.9 (04-02-18 @ 19:50)  HR: 81 (04-03-18 @ 16:03) (73 - 95)  BP: 137/64 (04-03-18 @ 13:41) (137/64 - 159/76)  RR: 16 (04-03-18 @ 13:41) (15 - 18)  SpO2: 95% (04-03-18 @ 16:03) (94% - 100%)  Wt(kg): --  I&O's Summary        Appearance: Normal	  HEENT:   Normal oral mucosa, PERRL, EOMI	  Cardiovascular: Normal S1 S2,    Murmur:   Neck: JVP normal  Respiratory: Lungs clear to auscultation  Gastrointestinal:  Soft, Non-tender, + BS	  Skin: normal   Neuro: No gross deficits.   Psychiatry:  Mood & affect appropriate  Ext: No edema      LABS/DATA:    CARDIAC MARKERS:                                11.3   6.91  )-----------( 236      ( 02 Apr 2018 06:38 )             36.5     04-02    138  |  97<L>  |  25<H>  ----------------------------<  138<H>  3.5   |  30  |  1.05    Ca    8.4      02 Apr 2018 06:38  Phos  2.5     04-02  Mg     2.0     04-02      proBNP:   Lipid Profile:   HgA1c:   TSH:     TELE:  EKG:

## 2018-04-03 NOTE — PROGRESS NOTE ADULT - SUBJECTIVE AND OBJECTIVE BOX
Patient is a 87y old  Female who presents with a chief complaint of Complain of SOB, COPD exacerbation (02 Apr 2018 16:29)      SUBJECTIVE / OVERNIGHT EVENTS: patient seen and examined by bedside at 9:30 Am, pt still c/o not feeling well. Pt says that she has a bad cold and  cough   and feels SOB      MEDICATIONS  (STANDING):  ALBUTerol/ipratropium for Nebulization 3 milliLiter(s) Nebulizer every 6 hours  aspirin  chewable 81 milliGRAM(s) Oral daily  buDESOnide 160 MICROgram(s)/formoterol 4.5 MICROgram(s) Inhaler 2 Puff(s) Inhalation two times a day  cholecalciferol 1000 Unit(s) Oral daily  dextrose 5%. 1000 milliLiter(s) (50 mL/Hr) IV Continuous <Continuous>  dextrose 50% Injectable 12.5 Gram(s) IV Push once  dextrose 50% Injectable 25 Gram(s) IV Push once  dextrose 50% Injectable 25 Gram(s) IV Push once  docusate sodium 100 milliGRAM(s) Oral three times a day  gabapentin 300 milliGRAM(s) Oral three times a day  heparin  Injectable 5000 Unit(s) SubCutaneous every 8 hours  influenza   Vaccine 0.5 milliLiter(s) IntraMuscular once  insulin glargine Injectable (LANTUS) 10 Unit(s) SubCutaneous at bedtime  insulin lispro (HumaLOG) corrective regimen sliding scale   SubCutaneous three times a day before meals  insulin lispro (HumaLOG) corrective regimen sliding scale   SubCutaneous at bedtime  insulin lispro Injectable (HumaLOG) 3 Unit(s) SubCutaneous three times a day before meals  levoFLOXacin IVPB      levoFLOXacin IVPB 750 milliGRAM(s) IV Intermittent every 48 hours  pantoprazole    Tablet 40 milliGRAM(s) Oral before breakfast  polyethylene glycol 3350 17 Gram(s) Oral daily  predniSONE   Tablet 40 milliGRAM(s) Oral daily  senna 2 Tablet(s) Oral at bedtime  tiotropium 18 MICROgram(s) Capsule 1 Capsule(s) Inhalation daily    MEDICATIONS  (PRN):  ALBUTerol    90 MICROgram(s) HFA Inhaler 2 Puff(s) Inhalation every 6 hours PRN Shortness of Breath and/or Wheezing  dextrose Gel 1 Dose(s) Oral once PRN Blood Glucose LESS THAN 70 milliGRAM(s)/deciliter  glucagon  Injectable 1 milliGRAM(s) IntraMuscular once PRN Glucose LESS THAN 70 milligrams/deciliter  guaiFENesin   Syrup  (Sugar-Free) 200 milliGRAM(s) Oral every 6 hours PRN Cough      Vital Signs Last 24 Hrs  T(C): 36.6 (03 Apr 2018 05:22), Max: 36.9 (02 Apr 2018 19:50)  T(F): 97.8 (03 Apr 2018 05:22), Max: 98.5 (02 Apr 2018 19:50)  HR: 90 (03 Apr 2018 11:19) (73 - 95)  BP: 141/46 (03 Apr 2018 05:22) (126/46 - 159/76)  BP(mean): --  RR: 15 (03 Apr 2018 05:22) (15 - 30)  SpO2: 96% (03 Apr 2018 11:19) (85% - 100%)  CAPILLARY BLOOD GLUCOSE      POCT Blood Glucose.: 249 mg/dL (03 Apr 2018 12:05)  POCT Blood Glucose.: 215 mg/dL (03 Apr 2018 08:19)  POCT Blood Glucose.: 153 mg/dL (02 Apr 2018 21:32)  POCT Blood Glucose.: 235 mg/dL (02 Apr 2018 17:17)    I&O's Summary    PHYSICAL EXAM:  GENERAL: NAD, obese   HEAD:  Atraumatic, Normocephalic  EYES: EOMI, PERRLA, conjunctiva and sclera clear  NECK: Supple,   CHEST/LUNG: non-labored; decreased BS in bases , no wheezing   HEART: s1 s2, regular rhythm and rate   ABDOMEN: Soft, Nontender, Nondistended; Bowel sounds present  EXTREMITIES:  2+ Peripheral Pulses, no edema   PSYCH: AAOx3, calm   NEUROLOGY: non-focal  SKIN: No rashes or lesions    LABS:                        11.3   6.91  )-----------( 236      ( 02 Apr 2018 06:38 )             36.5     04-02    138  |  97<L>  |  25<H>  ----------------------------<  138<H>  3.5   |  30  |  1.05    Ca    8.4      02 Apr 2018 06:38  Phos  2.5     04-02  Mg     2.0     04-02                RADIOLOGY & ADDITIONAL TESTS:    Imaging Personally Reviewed:    Consultant(s) Notes Reviewed:  pulmonary     Care Discussed with Consultants/Other Providers: pulmonary

## 2018-04-03 NOTE — PROGRESS NOTE ADULT - PROBLEM SELECTOR PLAN 6
Based on TTE in 2015 with RV systolic pressure of 50 mm hg, indicating moderate pulmonary HTN. Also with mild pedal edema, no history of HF. Normal EF;  - Holding lasix 20 mg in setting of pneumonia and COPD, restart as needed  - Cardiology f/u noted, recommend stress test as outpt

## 2018-04-03 NOTE — PROGRESS NOTE ADULT - PROBLEM SELECTOR PLAN 5
Chronic according to pt, always 100-110s on pulse oximetry at home.   Likely in setting of albuterol use as well as hypoxemia as deconditioning.   Pt had an IVC filter placed in 2009 in setting of femur fracture at [facility] but was never on AC.  CTA chest 2/2018 no pulmonary embolism. No LE tenderness or erythema.  HR better controlled

## 2018-04-03 NOTE — PROGRESS NOTE ADULT - PROBLEM SELECTOR PLAN 1
not toxic appearing. The exacerbation may potentially be in setting of pneumonia  CTA chest with no PE or concerning lung lesions 2/2018.  c/w levofloxacin 750 mg IV q48h based on Cr clearance  Prednisone 40 mg daily for 5 days and reassess   Duonebs q6h  pulmonary f/u appreciated   CE x 1 and ProBNP - negative  c/w symbicort, spiriva, albuterol  RVP negative   case d/w pulmonary, pt has bad emphysema and has morbid obesity , which is the likely cause of her SOB   will add Klonipin 0.5 mg po qhs PRN for anxiety   nasal saline sprays TID for stuffy nose

## 2018-04-03 NOTE — PHYSICAL THERAPY INITIAL EVALUATION ADULT - ACTIVE RANGE OF MOTION EXAMINATION, REHAB EVAL
tim. upper extremity Active ROM was WNL (within normal limits)/bilateral lower extremity Active ROM was WNL (within normal limits)

## 2018-04-03 NOTE — PHYSICAL THERAPY INITIAL EVALUATION ADULT - PERTINENT HX OF CURRENT PROBLEM, REHAB EVAL
88yo Female, ambulatory with a waker, COPD on 3L O2 at night, DAVID on CPAP, moderate pulmonary HTN on TTE in 2015, anxiety, HTN, breast Ca s/p lumpectomy and radiation (1989), recent admissions 12/2017 and 3/1-3/8/17 for COPD exacerbation p/w progressive dyspnea over the 3 last days found to be tachypenic and hypoxemic with slightly worsened RLL haziness, admitted for COPD exacerbation with a possible RLL pneumonia c/b acute hypoxia with elevated lactate;

## 2018-04-03 NOTE — PROGRESS NOTE ADULT - SUBJECTIVE AND OBJECTIVE BOX
PULMONARY PROGRESS NOTE    VIDAL LOUIE  MRN-[id number]    Patient is a 87y old  Female who presents with a chief complaint of Complain of SOB  COPD exacerbation (31 Mar 2018 00:10)      HPI:  -cough with yellow sputum this AM, still very sob, unable to use CPAP due to anxiety/sob/dry nose.    ROS:   -no N/V/D    MEDICATIONS  (STANDING):  ALBUTerol/ipratropium for Nebulization 3 milliLiter(s) Nebulizer every 6 hours  aspirin  chewable 81 milliGRAM(s) Oral daily  buDESOnide 160 MICROgram(s)/formoterol 4.5 MICROgram(s) Inhaler 2 Puff(s) Inhalation two times a day  cholecalciferol 1000 Unit(s) Oral daily  dextrose 5%. 1000 milliLiter(s) (50 mL/Hr) IV Continuous <Continuous>  dextrose 50% Injectable 12.5 Gram(s) IV Push once  dextrose 50% Injectable 25 Gram(s) IV Push once  dextrose 50% Injectable 25 Gram(s) IV Push once  docusate sodium 100 milliGRAM(s) Oral three times a day  gabapentin 300 milliGRAM(s) Oral three times a day  heparin  Injectable 5000 Unit(s) SubCutaneous every 8 hours  influenza   Vaccine 0.5 milliLiter(s) IntraMuscular once  insulin glargine Injectable (LANTUS) 10 Unit(s) SubCutaneous at bedtime  insulin lispro (HumaLOG) corrective regimen sliding scale   SubCutaneous three times a day before meals  insulin lispro (HumaLOG) corrective regimen sliding scale   SubCutaneous at bedtime  insulin lispro Injectable (HumaLOG) 3 Unit(s) SubCutaneous three times a day before meals  levoFLOXacin IVPB      levoFLOXacin IVPB 750 milliGRAM(s) IV Intermittent every 48 hours  pantoprazole    Tablet 40 milliGRAM(s) Oral before breakfast  polyethylene glycol 3350 17 Gram(s) Oral daily  predniSONE   Tablet 40 milliGRAM(s) Oral daily  senna 2 Tablet(s) Oral at bedtime  tiotropium 18 MICROgram(s) Capsule 1 Capsule(s) Inhalation daily    MEDICATIONS  (PRN):  ALBUTerol    90 MICROgram(s) HFA Inhaler 2 Puff(s) Inhalation every 6 hours PRN Shortness of Breath and/or Wheezing  dextrose Gel 1 Dose(s) Oral once PRN Blood Glucose LESS THAN 70 milliGRAM(s)/deciliter  glucagon  Injectable 1 milliGRAM(s) IntraMuscular once PRN Glucose LESS THAN 70 milligrams/deciliter  guaiFENesin   Syrup  (Sugar-Free) 200 milliGRAM(s) Oral every 6 hours PRN Cough      EXAM:  Vital Signs Last 24 Hrs  T(C): 36.6 (03 Apr 2018 05:22), Max: 36.9 (02 Apr 2018 19:50)  T(F): 97.8 (03 Apr 2018 05:22), Max: 98.5 (02 Apr 2018 19:50)  HR: 90 (03 Apr 2018 11:19) (73 - 95)  BP: 141/46 (03 Apr 2018 05:22) (126/46 - 159/76)  BP(mean): --  RR: 15 (03 Apr 2018 05:22) (15 - 30)  SpO2: 96% (03 Apr 2018 11:19) (85% - 100%)  GENERAL: The patient is awake and alert in no apparent distress.     SKIN: Warm, dry, no rashes    LUNGS: decreased bs bilaterally    HEART: Regular rate and rhythm without murmur.    ABDOMEN: +BS, Soft, Nontender      LABS/IMGAING: reviewed                                   11.3   6.91  )-----------( 236      ( 02 Apr 2018 06:38 )             36.5   04-02    138  |  97<L>  |  25<H>  ----------------------------<  138<H>  3.5   |  30  |  1.05    Ca    8.4      02 Apr 2018 06:38  Phos  2.5     04-02  Mg     2.0     04-02      < from: CT Chest No Cont (04.01.18 @ 13:56) >  IMPRESSION:    Right lower lobe pneumonia. Moderate centrilobular emphysema.        < end of copied text >    PROBLEM LIST:  87y Female with HEALTH ISSUES - PROBLEM Dx:  COPD exacerbation  pneumonia  DAVID  History of breast cancer  Hypertension  Anxiety disorder    RECS:  -Dyspnea likely related to combination of obesity/deconditioning/pna/copd  -Patient takes klonopin at home PRN, would continue while in house given anxiety especially at night  -try ocean nasal spray for dry nose, humidified O2.  -Continue abx x 7 days  -Continue symbicort, spiriva, duoneb, prednisone x 5 days  -Needs PT, pulm rehab?  -Supplemental O2  -incentive korey      Adrianne Garcia MD  [phone]

## 2018-04-03 NOTE — PHYSICAL THERAPY INITIAL EVALUATION ADULT - DIAGNOSIS, PT EVAL
Pt admitted for PNA/COPD exacerbation; pt presents with decreased strength and decreased aerobic capacity/endurance.

## 2018-04-04 VITALS — HEART RATE: 90 BPM | OXYGEN SATURATION: 93 %

## 2018-04-04 LAB
BACTERIA BLD CULT: SIGNIFICANT CHANGE UP
BACTERIA BLD CULT: SIGNIFICANT CHANGE UP
GLUCOSE BLDC GLUCOMTR-MCNC: 168 MG/DL — HIGH (ref 70–99)
GLUCOSE BLDC GLUCOMTR-MCNC: 181 MG/DL — HIGH (ref 70–99)
GLUCOSE BLDC GLUCOMTR-MCNC: 314 MG/DL — HIGH (ref 70–99)

## 2018-04-04 PROCEDURE — 99239 HOSP IP/OBS DSCHRG MGMT >30: CPT

## 2018-04-04 RX ORDER — CIPROFLOXACIN LACTATE 400MG/40ML
1 VIAL (ML) INTRAVENOUS
Qty: 2 | Refills: 0 | OUTPATIENT
Start: 2018-04-04 | End: 2018-04-05

## 2018-04-04 RX ADMIN — GABAPENTIN 300 MILLIGRAM(S): 400 CAPSULE ORAL at 14:45

## 2018-04-04 RX ADMIN — PANTOPRAZOLE SODIUM 40 MILLIGRAM(S): 20 TABLET, DELAYED RELEASE ORAL at 06:18

## 2018-04-04 RX ADMIN — Medication 1000 UNIT(S): at 11:19

## 2018-04-04 RX ADMIN — Medication 8: at 12:11

## 2018-04-04 RX ADMIN — Medication 200 MILLIGRAM(S): at 11:20

## 2018-04-04 RX ADMIN — Medication 100 MILLIGRAM(S): at 06:18

## 2018-04-04 RX ADMIN — Medication 3 UNIT(S): at 08:48

## 2018-04-04 RX ADMIN — AMLODIPINE BESYLATE 5 MILLIGRAM(S): 2.5 TABLET ORAL at 06:18

## 2018-04-04 RX ADMIN — Medication 40 MILLIGRAM(S): at 06:18

## 2018-04-04 RX ADMIN — Medication 3 MILLILITER(S): at 16:26

## 2018-04-04 RX ADMIN — GABAPENTIN 300 MILLIGRAM(S): 400 CAPSULE ORAL at 06:17

## 2018-04-04 RX ADMIN — BUDESONIDE AND FORMOTEROL FUMARATE DIHYDRATE 2 PUFF(S): 160; 4.5 AEROSOL RESPIRATORY (INHALATION) at 08:48

## 2018-04-04 RX ADMIN — TIOTROPIUM BROMIDE 1 CAPSULE(S): 18 CAPSULE ORAL; RESPIRATORY (INHALATION) at 11:18

## 2018-04-04 RX ADMIN — Medication 3 UNIT(S): at 12:12

## 2018-04-04 RX ADMIN — Medication 3 MILLILITER(S): at 09:18

## 2018-04-04 RX ADMIN — Medication 3 MILLILITER(S): at 03:47

## 2018-04-04 RX ADMIN — HEPARIN SODIUM 5000 UNIT(S): 5000 INJECTION INTRAVENOUS; SUBCUTANEOUS at 06:18

## 2018-04-04 RX ADMIN — ALBUTEROL 2 PUFF(S): 90 AEROSOL, METERED ORAL at 08:57

## 2018-04-04 RX ADMIN — POLYETHYLENE GLYCOL 3350 17 GRAM(S): 17 POWDER, FOR SOLUTION ORAL at 11:19

## 2018-04-04 RX ADMIN — Medication 81 MILLIGRAM(S): at 11:19

## 2018-04-04 RX ADMIN — Medication 2: at 08:48

## 2018-04-04 NOTE — PROGRESS NOTE ADULT - PROVIDER SPECIALTY LIST ADULT
Cardiology
Cardiology
Hospitalist
Hospitalist
Pulmonology
Hospitalist

## 2018-04-04 NOTE — PROGRESS NOTE ADULT - PROBLEM SELECTOR PLAN 7
pt calm now
will restart home Klonopin PRN qhs
pt calm now
pt calm now
will restart home Klonopin PRN qhs

## 2018-04-04 NOTE — PROGRESS NOTE ADULT - PROBLEM SELECTOR PROBLEM 6
Pulmonary hypertension

## 2018-04-04 NOTE — PROGRESS NOTE ADULT - PROBLEM SELECTOR PLAN 9
S/P L lumpectomy and radiation in 1989, has annual mammogram without recurrence.

## 2018-04-04 NOTE — PROGRESS NOTE ADULT - ATTENDING COMMENTS
PT jude noted,  recommend home PT   DC planning  to home today  Pt reassured that her breathing is stable and at baseline, will have occasional SOB like this morning which should respond to nebulizer treatment   Pt is hemodynamically stable for discharge home with home PT   Time spent in discharge process is 40 min  Message left for spouse at  and son at  PT jude noted,  recommend home PT   DC planning  to home today  Pt reassured that her breathing is stable and at baseline, will have occasional SOB like this morning which should respond to nebulizer treatment   Pt is hemodynamically stable for discharge home with home PT   Time spent in discharge process is 40 min  Message left for spouse at  and son at     2:15 Pm Addendum: son called back, case discussed, all questions answered

## 2018-04-04 NOTE — PROGRESS NOTE ADULT - SUBJECTIVE AND OBJECTIVE BOX
PULMONARY PROGRESS NOTE    VIDAL LOUIE  MRN-[id number]    Patient is a 87y old  Female who presents with a chief complaint of Complain of SOB  COPD exacerbation (31 Mar 2018 00:10)      HPI:  -sob on exertion unchanged, not much coughing, being discharged    ROS:   -no N/V/D    MEDICATIONS  (STANDING):  ALBUTerol/ipratropium for Nebulization 3 milliLiter(s) Nebulizer every 6 hours  amLODIPine   Tablet 5 milliGRAM(s) Oral daily  aspirin  chewable 81 milliGRAM(s) Oral daily  buDESOnide 160 MICROgram(s)/formoterol 4.5 MICROgram(s) Inhaler 2 Puff(s) Inhalation two times a day  cholecalciferol 1000 Unit(s) Oral daily  dextrose 5%. 1000 milliLiter(s) (50 mL/Hr) IV Continuous <Continuous>  dextrose 50% Injectable 12.5 Gram(s) IV Push once  dextrose 50% Injectable 25 Gram(s) IV Push once  dextrose 50% Injectable 25 Gram(s) IV Push once  docusate sodium 100 milliGRAM(s) Oral three times a day  gabapentin 300 milliGRAM(s) Oral three times a day  heparin  Injectable 5000 Unit(s) SubCutaneous every 8 hours  influenza   Vaccine 0.5 milliLiter(s) IntraMuscular once  insulin glargine Injectable (LANTUS) 10 Unit(s) SubCutaneous at bedtime  insulin lispro (HumaLOG) corrective regimen sliding scale   SubCutaneous three times a day before meals  insulin lispro (HumaLOG) corrective regimen sliding scale   SubCutaneous at bedtime  insulin lispro Injectable (HumaLOG) 3 Unit(s) SubCutaneous three times a day before meals  levoFLOXacin IVPB      levoFLOXacin IVPB 750 milliGRAM(s) IV Intermittent every 48 hours  pantoprazole    Tablet 40 milliGRAM(s) Oral before breakfast  polyethylene glycol 3350 17 Gram(s) Oral daily  senna 2 Tablet(s) Oral at bedtime  sodium chloride 0.65% Nasal 1 Spray(s) Both Nostrils three times a day  tiotropium 18 MICROgram(s) Capsule 1 Capsule(s) Inhalation daily    MEDICATIONS  (PRN):  ALBUTerol    90 MICROgram(s) HFA Inhaler 2 Puff(s) Inhalation every 6 hours PRN Shortness of Breath and/or Wheezing  clonazePAM Tablet 0.5 milliGRAM(s) Oral at bedtime PRN anxiety  dextrose Gel 1 Dose(s) Oral once PRN Blood Glucose LESS THAN 70 milliGRAM(s)/deciliter  glucagon  Injectable 1 milliGRAM(s) IntraMuscular once PRN Glucose LESS THAN 70 milligrams/deciliter  guaiFENesin   Syrup  (Sugar-Free) 200 milliGRAM(s) Oral every 6 hours PRN Cough    EXAM:  Vital Signs Last 24 Hrs  T(C): 36.9 (04 Apr 2018 13:51), Max: 36.9 (04 Apr 2018 13:51)  T(F): 98.5 (04 Apr 2018 13:51), Max: 98.5 (04 Apr 2018 13:51)  HR: 90 (04 Apr 2018 13:51) (79 - 93)  BP: 128/69 (04 Apr 2018 06:16) (115/41 - 128/69)  BP(mean): --  RR: 19 (04 Apr 2018 13:51) (17 - 19)  SpO2: 93% (04 Apr 2018 13:51) (90% - 99%)    GENERAL: The patient is awake and alert in no apparent distress.     SKIN: Warm, dry, no rashes    LUNGS: decreased bs bilaterally    HEART: S1/S2    ABDOMEN: +BS, Soft, Nontender      LABS/IMGAING: reviewed                 < from: CT Chest No Cont (04.01.18 @ 13:56) >  IMPRESSION:    Right lower lobe pneumonia. Moderate centrilobular emphysema.        < end of copied text >    PROBLEM LIST:  87y Female with HEALTH ISSUES - PROBLEM Dx:  COPD exacerbation  pneumonia  DAVID  History of breast cancer  Hypertension  Anxiety disorder    RECS:  -Dyspnea likely related to combination of obesity/deconditioning/pna/copd.  discussed with patient that she would benefit from pulmonary rehab as an outpatient.  -symbicort, spiriva, duoneb, prednisone x 5 days  -follow up with  in the office after discharge      Adrianne Garcia MD  [phone]

## 2018-04-04 NOTE — PROGRESS NOTE ADULT - PROBLEM SELECTOR PLAN 2
c/w Levaquin for a 7 day course  nebulizers   O2 PRN
c/w Levaquin for a 7 day course
c/w Levaquin
c/w Levaquin
c/w Levaquin for a 7 day course  nebulizers   O2 PRN

## 2018-04-04 NOTE — PROGRESS NOTE ADULT - PROBLEM SELECTOR PLAN 8
monitor BP  resume amlodipine as indicated
monitor BP   will resume amlodipine
monitor BP  resume amlodipine as indicated
monitor BP   will resume amlodipine
monitor BP  resume amlodipine as indicated

## 2018-04-04 NOTE — PROGRESS NOTE ADULT - ASSESSMENT
88yo Female, ambulatory with a waker, COPD on 3L O2 at night, DAVID on CPAP, moderate pulmonary HTN on TTE in 2015, anxiety, HTN, breast Ca s/p lumpectomy and radiation (1989), recent admissions 12/2017 and 3/1-3/8/17 for COPD exacerbation p/w progressive dyspnea over the 3 last days found to be tachypenic and hypoxemic with slightly worsened RLL haziness, admitted for COPD exacerbation with a possible RLL pneumonia c/b acute hypoxia with elevated lactate;
SOB  likely due to COPD exac and PNA  proBNP is not insignificantly elevated  pt does not appear volume overloaded   Pos PNA on ct of chest     copd  fu with pulm  on nebs    PNA  on anbx    abnormal EKG  ST depression minimal   likely stress induced in setting of tachycardia and copd exacerbation and PNA  recommend stress test as outpt     DM  Monitor finger stick. Insulin coverage. Diabetic education and Diabetic diet. Consider nutrition consultation.
SOB  likely due to COPD exac and PNA  proBNP is not insignificantly elevated  pt does not appear volume overloaded   Pos PNA on ct of chest     copd  fu with pulm  on nebs    PNA  on anbx    abnormal EKG  ST depression minimal   likely stress induced in setting of tachycardia and copd exacerbation and PNA  recommend stress test as outpt     DM  Monitor finger stick. Insulin coverage. Diabetic education and Diabetic diet. Consider nutrition consultation.
88yo Female, ambulatory with a waker, COPD on 3L O2 at night, DAVID on CPAP, moderate pulmonary HTN on TTE in 2015, anxiety, HTN, breast Ca s/p lumpectomy and radiation (1989), recent admissions 12/2017 and 3/1-3/8/17 for COPD exacerbation p/w progressive dyspnea over the 3 last days found to be tachypenic and hypoxemic with slightly worsened RLL haziness, admitted for COPD exacerbation with a possible RLL pneumonia c/b acute hypoxia with elevated lactate;
86yo Female, ambulatory with a waker, COPD on 3L O2 at night, DAVID on CPAP, moderate pulmonary HTN on TTE in 2015, anxiety, HTN, breast Ca s/p lumpectomy and radiation (1989), recent admissions 12/2017 and 3/1-3/8/17 for COPD exacerbation p/w progressive dyspnea over the 3 last days found to be tachypenic and hypoxemic with slightly worsened RLL haziness, admitted for COPD exacerbation with a possible RLL pneumonia c/b acute hypoxia with elevated lactate;

## 2018-04-04 NOTE — PROGRESS NOTE ADULT - SUBJECTIVE AND OBJECTIVE BOX
Patient is a 87y old  Female who presents with a chief complaint of Complain of SOB, COPD exacerbation (02 Apr 2018 16:29)      SUBJECTIVE / OVERNIGHT EVENTS: patient seen and examined by bedside at 11 AM, pt said she was SOB in the morning which improved with nebulizer treatment , denies SOB       MEDICATIONS  (STANDING):  ALBUTerol/ipratropium for Nebulization 3 milliLiter(s) Nebulizer every 6 hours  amLODIPine   Tablet 5 milliGRAM(s) Oral daily  aspirin  chewable 81 milliGRAM(s) Oral daily  buDESOnide 160 MICROgram(s)/formoterol 4.5 MICROgram(s) Inhaler 2 Puff(s) Inhalation two times a day  cholecalciferol 1000 Unit(s) Oral daily  dextrose 5%. 1000 milliLiter(s) (50 mL/Hr) IV Continuous <Continuous>  dextrose 50% Injectable 12.5 Gram(s) IV Push once  dextrose 50% Injectable 25 Gram(s) IV Push once  dextrose 50% Injectable 25 Gram(s) IV Push once  docusate sodium 100 milliGRAM(s) Oral three times a day  gabapentin 300 milliGRAM(s) Oral three times a day  heparin  Injectable 5000 Unit(s) SubCutaneous every 8 hours  influenza   Vaccine 0.5 milliLiter(s) IntraMuscular once  insulin glargine Injectable (LANTUS) 10 Unit(s) SubCutaneous at bedtime  insulin lispro (HumaLOG) corrective regimen sliding scale   SubCutaneous three times a day before meals  insulin lispro (HumaLOG) corrective regimen sliding scale   SubCutaneous at bedtime  insulin lispro Injectable (HumaLOG) 3 Unit(s) SubCutaneous three times a day before meals  levoFLOXacin IVPB      levoFLOXacin IVPB 750 milliGRAM(s) IV Intermittent every 48 hours  pantoprazole    Tablet 40 milliGRAM(s) Oral before breakfast  polyethylene glycol 3350 17 Gram(s) Oral daily  senna 2 Tablet(s) Oral at bedtime  sodium chloride 0.65% Nasal 1 Spray(s) Both Nostrils three times a day  tiotropium 18 MICROgram(s) Capsule 1 Capsule(s) Inhalation daily    MEDICATIONS  (PRN):  ALBUTerol    90 MICROgram(s) HFA Inhaler 2 Puff(s) Inhalation every 6 hours PRN Shortness of Breath and/or Wheezing  clonazePAM Tablet 0.5 milliGRAM(s) Oral at bedtime PRN anxiety  dextrose Gel 1 Dose(s) Oral once PRN Blood Glucose LESS THAN 70 milliGRAM(s)/deciliter  glucagon  Injectable 1 milliGRAM(s) IntraMuscular once PRN Glucose LESS THAN 70 milligrams/deciliter  guaiFENesin   Syrup  (Sugar-Free) 200 milliGRAM(s) Oral every 6 hours PRN Cough      Vital Signs Last 24 Hrs  T(C): 36.8 (04 Apr 2018 06:16), Max: 36.8 (03 Apr 2018 21:37)  T(F): 98.2 (04 Apr 2018 06:16), Max: 98.2 (03 Apr 2018 21:37)  HR: 93 (04 Apr 2018 11:20) (79 - 93)  BP: 128/69 (04 Apr 2018 06:16) (115/41 - 128/69)  BP(mean): --  RR: 19 (04 Apr 2018 08:55) (17 - 19)  SpO2: 94% (04 Apr 2018 11:20) (90% - 99%)  CAPILLARY BLOOD GLUCOSE      POCT Blood Glucose.: 314 mg/dL (04 Apr 2018 11:54)  POCT Blood Glucose.: 181 mg/dL (04 Apr 2018 08:27)  POCT Blood Glucose.: 131 mg/dL (03 Apr 2018 21:40)  POCT Blood Glucose.: 192 mg/dL (03 Apr 2018 16:52)    I&O's Summary  PHYSICAL EXAM:  GENERAL: NAD, obese   HEAD:  Atraumatic, Normocephalic  EYES: EOMI, PERRLA, conjunctiva and sclera clear  NECK: Supple,   CHEST/LUNG: non-labored; decreased BS in bases , no wheezing   HEART: s1 s2, regular rhythm and rate   ABDOMEN: Soft, Nontender, Nondistended; Bowel sounds present  EXTREMITIES:  2+ Peripheral Pulses, no edema   PSYCH: AAOx3, calm   NEUROLOGY: non-focal  SKIN: No rashes or lesions        LABS:      no new labs               RADIOLOGY & ADDITIONAL TESTS:    Imaging Personally Reviewed:    Consultant(s) Notes Reviewed:      Care Discussed with Consultants/Other Providers:

## 2018-04-04 NOTE — PROGRESS NOTE ADULT - PROBLEM SELECTOR PLAN 3
Continue CPAP 30 fiO2, EPAP of 5 as per home regimen.

## 2018-04-04 NOTE — PROGRESS NOTE ADULT - PROBLEM SELECTOR PLAN 1
not toxic appearing. The exacerbation may potentially be in setting of pneumonia  CTA chest with no PE or concerning lung lesions 2/2018.  c/w levofloxacin 750 mg IV q48h based on Cr clearance  s/p Prednisone 40 mg daily for 5 days   Duonebs q6h  pulmonary f/u appreciated   CE x 1 and ProBNP - negative  c/w symbicort, spiriva, albuterol  RVP negative   case d/w pulmonary, pt has bad emphysema and has morbid obesity , which is the likely cause of her SOB    Klonipin 0.5 mg po qhs PRN for anxiety  added   nasal saline sprays TID for stuffy nose

## 2018-04-04 NOTE — PROGRESS NOTE ADULT - PROBLEM SELECTOR PLAN 5
Chronic according to pt, always 100-110s on pulse oximetry at home.   Likely in setting of albuterol use as well as hypoxemia as deconditioning.   Pt had an IVC filter placed in 2009 in setting of femur fracture at [facility] but was never on AC.  CTA chest 2/2018 no pulmonary embolism. No LE tenderness or erythema.  HR better controlled

## 2018-05-29 ENCOUNTER — APPOINTMENT (OUTPATIENT)
Dept: SPEECH THERAPY | Facility: CLINIC | Age: 83
End: 2018-05-29

## 2018-05-29 ENCOUNTER — OUTPATIENT (OUTPATIENT)
Dept: OUTPATIENT SERVICES | Facility: HOSPITAL | Age: 83
LOS: 1 days | Discharge: HOME | End: 2018-05-29

## 2018-05-29 DIAGNOSIS — R13.12 DYSPHAGIA, OROPHARYNGEAL PHASE: ICD-10-CM

## 2018-05-29 DIAGNOSIS — Z98.89 OTHER SPECIFIED POSTPROCEDURAL STATES: Chronic | ICD-10-CM

## 2018-07-18 PROBLEM — G47.33 OBSTRUCTIVE SLEEP APNEA (ADULT) (PEDIATRIC): Chronic | Status: ACTIVE | Noted: 2018-02-16

## 2018-07-18 PROBLEM — G62.9 POLYNEUROPATHY, UNSPECIFIED: Chronic | Status: ACTIVE | Noted: 2018-02-16

## 2018-07-18 PROBLEM — R13.10 DYSPHAGIA, UNSPECIFIED: Chronic | Status: ACTIVE | Noted: 2018-02-16

## 2018-07-30 ENCOUNTER — LABORATORY RESULT (OUTPATIENT)
Age: 83
End: 2018-07-30

## 2018-07-30 ENCOUNTER — APPOINTMENT (OUTPATIENT)
Dept: PULMONOLOGY | Facility: CLINIC | Age: 83
End: 2018-07-30
Payer: MEDICARE

## 2018-07-30 VITALS
SYSTOLIC BLOOD PRESSURE: 130 MMHG | WEIGHT: 237 LBS | HEART RATE: 96 BPM | RESPIRATION RATE: 12 BRPM | DIASTOLIC BLOOD PRESSURE: 76 MMHG | HEIGHT: 64 IN | OXYGEN SATURATION: 88 % | BODY MASS INDEX: 40.46 KG/M2

## 2018-07-30 PROCEDURE — 94060 EVALUATION OF WHEEZING: CPT

## 2018-07-30 PROCEDURE — 71046 X-RAY EXAM CHEST 2 VIEWS: CPT

## 2018-07-30 PROCEDURE — 99214 OFFICE O/P EST MOD 30 MIN: CPT | Mod: 25

## 2018-07-31 LAB
ALBUMIN SERPL ELPH-MCNC: 4 G/DL
ALP BLD-CCNC: 110 U/L
ALT SERPL-CCNC: 12 U/L
ANION GAP SERPL CALC-SCNC: 17 MMOL/L
AST SERPL-CCNC: 14 U/L
BASOPHILS # BLD AUTO: 0.08 K/UL
BASOPHILS NFR BLD AUTO: 0.8 %
BILIRUB SERPL-MCNC: 0.3 MG/DL
BUN SERPL-MCNC: 28 MG/DL
CALCIUM SERPL-MCNC: 9.3 MG/DL
CHLORIDE SERPL-SCNC: 101 MMOL/L
CO2 SERPL-SCNC: 25 MMOL/L
CREAT SERPL-MCNC: 1.25 MG/DL
EOSINOPHIL # BLD AUTO: 0.19 K/UL
EOSINOPHIL NFR BLD AUTO: 1.9 %
GLUCOSE SERPL-MCNC: 114 MG/DL
HCT VFR BLD CALC: 39.3 %
HGB BLD-MCNC: 11.7 G/DL
IMM GRANULOCYTES NFR BLD AUTO: 0.3 %
LYMPHOCYTES # BLD AUTO: 1.68 K/UL
LYMPHOCYTES NFR BLD AUTO: 16.8 %
MAN DIFF?: NORMAL
MCHC RBC-ENTMCNC: 23.8 PG
MCHC RBC-ENTMCNC: 29.8 GM/DL
MCV RBC AUTO: 79.9 FL
MONOCYTES # BLD AUTO: 0.54 K/UL
MONOCYTES NFR BLD AUTO: 5.4 %
NEUTROPHILS # BLD AUTO: 7.49 K/UL
NEUTROPHILS NFR BLD AUTO: 74.8 %
NT-PROBNP SERPL-MCNC: 268 PG/ML
PLATELET # BLD AUTO: 408 K/UL
POTASSIUM SERPL-SCNC: 4.6 MMOL/L
PROT SERPL-MCNC: 6.3 G/DL
RBC # BLD: 4.92 M/UL
RBC # FLD: 22.3 %
SODIUM SERPL-SCNC: 143 MMOL/L
WBC # FLD AUTO: 10.01 K/UL

## 2018-08-21 ENCOUNTER — APPOINTMENT (OUTPATIENT)
Dept: PULMONOLOGY | Facility: CLINIC | Age: 83
End: 2018-08-21
Payer: MEDICARE

## 2018-08-21 VITALS
DIASTOLIC BLOOD PRESSURE: 64 MMHG | HEART RATE: 97 BPM | BODY MASS INDEX: 40.46 KG/M2 | SYSTOLIC BLOOD PRESSURE: 110 MMHG | OXYGEN SATURATION: 91 % | RESPIRATION RATE: 15 BRPM | WEIGHT: 237 LBS | HEIGHT: 64 IN

## 2018-08-21 PROCEDURE — 99213 OFFICE O/P EST LOW 20 MIN: CPT | Mod: 25

## 2018-08-21 PROCEDURE — 94010 BREATHING CAPACITY TEST: CPT

## 2018-08-21 RX ORDER — TRIAMTERENE AND HYDROCHLOROTHIAZIDE 25; 37.5 MG/1; MG/1
37.5-25 TABLET ORAL
Refills: 0 | Status: DISCONTINUED | COMMUNITY
End: 2018-08-21

## 2018-08-24 ENCOUNTER — MEDICATION RENEWAL (OUTPATIENT)
Age: 83
End: 2018-08-24

## 2018-09-14 ENCOUNTER — APPOINTMENT (OUTPATIENT)
Dept: PULMONOLOGY | Facility: CLINIC | Age: 83
End: 2018-09-14
Payer: MEDICARE

## 2018-09-14 VITALS
TEMPERATURE: 98.1 F | OXYGEN SATURATION: 90 % | HEART RATE: 76 BPM | DIASTOLIC BLOOD PRESSURE: 65 MMHG | SYSTOLIC BLOOD PRESSURE: 114 MMHG | RESPIRATION RATE: 16 BRPM

## 2018-09-14 DIAGNOSIS — Z99.89 OBSTRUCTIVE SLEEP APNEA (ADULT) (PEDIATRIC): ICD-10-CM

## 2018-09-14 DIAGNOSIS — G47.33 OBSTRUCTIVE SLEEP APNEA (ADULT) (PEDIATRIC): ICD-10-CM

## 2018-09-14 LAB
ANION GAP SERPL CALC-SCNC: 12 MMOL/L
BASOPHILS # BLD AUTO: 0.09 K/UL
BASOPHILS NFR BLD AUTO: 1 %
BUN SERPL-MCNC: 23 MG/DL
CALCIUM SERPL-MCNC: 9.1 MG/DL
CHLORIDE SERPL-SCNC: 101 MMOL/L
CO2 SERPL-SCNC: 30 MMOL/L
CREAT SERPL-MCNC: 1.1 MG/DL
EOSINOPHIL # BLD AUTO: 0.2 K/UL
EOSINOPHIL NFR BLD AUTO: 2.2 %
GLUCOSE SERPL-MCNC: 119 MG/DL
HCT VFR BLD CALC: 39.6 %
HGB BLD-MCNC: 11.8 G/DL
IMM GRANULOCYTES NFR BLD AUTO: 0.3 %
LYMPHOCYTES # BLD AUTO: 1.31 K/UL
LYMPHOCYTES NFR BLD AUTO: 14.4 %
MAN DIFF?: NORMAL
MCHC RBC-ENTMCNC: 24 PG
MCHC RBC-ENTMCNC: 29.8 GM/DL
MCV RBC AUTO: 80.5 FL
MONOCYTES # BLD AUTO: 0.61 K/UL
MONOCYTES NFR BLD AUTO: 6.7 %
NEUTROPHILS # BLD AUTO: 6.84 K/UL
NEUTROPHILS NFR BLD AUTO: 75.4 %
NT-PROBNP SERPL-MCNC: 209 PG/ML
PLATELET # BLD AUTO: 354 K/UL
POTASSIUM SERPL-SCNC: 4.6 MMOL/L
RBC # BLD: 4.92 M/UL
RBC # FLD: 17.9 %
SODIUM SERPL-SCNC: 143 MMOL/L
WBC # FLD AUTO: 9.08 K/UL

## 2018-09-14 PROCEDURE — 36415 COLL VENOUS BLD VENIPUNCTURE: CPT

## 2018-09-14 PROCEDURE — 71046 X-RAY EXAM CHEST 2 VIEWS: CPT

## 2018-09-14 PROCEDURE — 99213 OFFICE O/P EST LOW 20 MIN: CPT | Mod: 25

## 2018-09-14 PROCEDURE — 94010 BREATHING CAPACITY TEST: CPT

## 2018-09-15 ENCOUNTER — RECORD ABSTRACTING (OUTPATIENT)
Age: 83
End: 2018-09-15

## 2018-09-15 DIAGNOSIS — A49.02 METHICILLIN RESISTANT STAPHYLOCOCCUS AUREUS INFECTION, UNSPECIFIED SITE: ICD-10-CM

## 2018-09-15 DIAGNOSIS — H91.90 UNSPECIFIED HEARING LOSS, UNSPECIFIED EAR: ICD-10-CM

## 2018-09-28 ENCOUNTER — APPOINTMENT (OUTPATIENT)
Dept: PULMONOLOGY | Facility: CLINIC | Age: 83
End: 2018-09-28
Payer: MEDICARE

## 2018-09-28 VITALS
OXYGEN SATURATION: 94 % | SYSTOLIC BLOOD PRESSURE: 110 MMHG | HEART RATE: 76 BPM | DIASTOLIC BLOOD PRESSURE: 65 MMHG | RESPIRATION RATE: 16 BRPM

## 2018-09-28 PROCEDURE — 99213 OFFICE O/P EST LOW 20 MIN: CPT | Mod: 25

## 2018-09-28 PROCEDURE — 94060 EVALUATION OF WHEEZING: CPT

## 2018-09-28 PROCEDURE — G0008: CPT

## 2018-09-28 PROCEDURE — 90662 IIV NO PRSV INCREASED AG IM: CPT

## 2018-10-08 ENCOUNTER — MEDICATION RENEWAL (OUTPATIENT)
Age: 83
End: 2018-10-08

## 2018-10-10 ENCOUNTER — INPATIENT (INPATIENT)
Facility: HOSPITAL | Age: 83
LOS: 1 days | Discharge: HOME HEALTH | End: 2018-10-12
Attending: HOSPITALIST | Admitting: HOSPITALIST
Payer: MEDICARE

## 2018-10-10 ENCOUNTER — APPOINTMENT (OUTPATIENT)
Dept: PULMONOLOGY | Facility: CLINIC | Age: 83
End: 2018-10-10
Payer: MEDICARE

## 2018-10-10 VITALS
TEMPERATURE: 99 F | RESPIRATION RATE: 18 BRPM | DIASTOLIC BLOOD PRESSURE: 79 MMHG | SYSTOLIC BLOOD PRESSURE: 119 MMHG | HEART RATE: 88 BPM | OXYGEN SATURATION: 90 %

## 2018-10-10 VITALS
HEART RATE: 88 BPM | DIASTOLIC BLOOD PRESSURE: 67 MMHG | SYSTOLIC BLOOD PRESSURE: 119 MMHG | OXYGEN SATURATION: 88 % | RESPIRATION RATE: 16 BRPM

## 2018-10-10 DIAGNOSIS — J96.01 ACUTE RESPIRATORY FAILURE WITH HYPOXIA: ICD-10-CM

## 2018-10-10 DIAGNOSIS — J18.9 PNEUMONIA, UNSPECIFIED ORGANISM: ICD-10-CM

## 2018-10-10 DIAGNOSIS — J44.1 CHRONIC OBSTRUCTIVE PULMONARY DISEASE WITH (ACUTE) EXACERBATION: ICD-10-CM

## 2018-10-10 DIAGNOSIS — E11.8 TYPE 2 DIABETES MELLITUS WITH UNSPECIFIED COMPLICATIONS: ICD-10-CM

## 2018-10-10 DIAGNOSIS — J81.0 ACUTE PULMONARY EDEMA: ICD-10-CM

## 2018-10-10 DIAGNOSIS — I10 ESSENTIAL (PRIMARY) HYPERTENSION: ICD-10-CM

## 2018-10-10 DIAGNOSIS — Z29.9 ENCOUNTER FOR PROPHYLACTIC MEASURES, UNSPECIFIED: ICD-10-CM

## 2018-10-10 DIAGNOSIS — J96.21 ACUTE AND CHRONIC RESPIRATORY FAILURE WITH HYPOXIA: ICD-10-CM

## 2018-10-10 DIAGNOSIS — G47.33 OBSTRUCTIVE SLEEP APNEA (ADULT) (PEDIATRIC): ICD-10-CM

## 2018-10-10 DIAGNOSIS — Z98.89 OTHER SPECIFIED POSTPROCEDURAL STATES: Chronic | ICD-10-CM

## 2018-10-10 LAB
ALBUMIN SERPL ELPH-MCNC: 3.6 G/DL — SIGNIFICANT CHANGE UP (ref 3.3–5)
ALP SERPL-CCNC: 146 U/L — HIGH (ref 40–120)
ALT FLD-CCNC: 22 U/L — SIGNIFICANT CHANGE UP (ref 4–33)
AST SERPL-CCNC: 39 U/L — HIGH (ref 4–32)
B PERT DNA SPEC QL NAA+PROBE: SIGNIFICANT CHANGE UP
BASE EXCESS BLDV CALC-SCNC: 3.1 MMOL/L — SIGNIFICANT CHANGE UP
BASE EXCESS BLDV CALC-SCNC: 3.3 MMOL/L — SIGNIFICANT CHANGE UP
BASOPHILS # BLD AUTO: 0.05 K/UL — SIGNIFICANT CHANGE UP (ref 0–0.2)
BASOPHILS NFR BLD AUTO: 0.4 % — SIGNIFICANT CHANGE UP (ref 0–2)
BILIRUB SERPL-MCNC: 0.6 MG/DL — SIGNIFICANT CHANGE UP (ref 0.2–1.2)
BLOOD GAS VENOUS - CREATININE: 0.8 MG/DL — SIGNIFICANT CHANGE UP (ref 0.5–1.3)
BLOOD GAS VENOUS - CREATININE: 1.15 MG/DL — SIGNIFICANT CHANGE UP (ref 0.5–1.3)
BUN SERPL-MCNC: 19 MG/DL — SIGNIFICANT CHANGE UP (ref 7–23)
C PNEUM DNA SPEC QL NAA+PROBE: NOT DETECTED — SIGNIFICANT CHANGE UP
CALCIUM SERPL-MCNC: 9 MG/DL — SIGNIFICANT CHANGE UP (ref 8.4–10.5)
CHLORIDE BLDV-SCNC: 105 MMOL/L — SIGNIFICANT CHANGE UP (ref 96–108)
CHLORIDE BLDV-SCNC: 107 MMOL/L — SIGNIFICANT CHANGE UP (ref 96–108)
CHLORIDE SERPL-SCNC: 100 MMOL/L — SIGNIFICANT CHANGE UP (ref 98–107)
CO2 SERPL-SCNC: 24 MMOL/L — SIGNIFICANT CHANGE UP (ref 22–31)
CREAT SERPL-MCNC: 0.95 MG/DL — SIGNIFICANT CHANGE UP (ref 0.5–1.3)
EOSINOPHIL # BLD AUTO: 0.17 K/UL — SIGNIFICANT CHANGE UP (ref 0–0.5)
EOSINOPHIL NFR BLD AUTO: 1.5 % — SIGNIFICANT CHANGE UP (ref 0–6)
FLUAV H1 2009 PAND RNA SPEC QL NAA+PROBE: NOT DETECTED — SIGNIFICANT CHANGE UP
FLUAV H1 RNA SPEC QL NAA+PROBE: NOT DETECTED — SIGNIFICANT CHANGE UP
FLUAV H3 RNA SPEC QL NAA+PROBE: NOT DETECTED — SIGNIFICANT CHANGE UP
FLUAV SUBTYP SPEC NAA+PROBE: SIGNIFICANT CHANGE UP
FLUBV RNA SPEC QL NAA+PROBE: NOT DETECTED — SIGNIFICANT CHANGE UP
GAS PNL BLDV: 135 MMOL/L — LOW (ref 136–146)
GAS PNL BLDV: 136 MMOL/L — SIGNIFICANT CHANGE UP (ref 136–146)
GLUCOSE BLDV-MCNC: 122 — HIGH (ref 70–99)
GLUCOSE BLDV-MCNC: 144 — HIGH (ref 70–99)
GLUCOSE SERPL-MCNC: 115 MG/DL — HIGH (ref 70–99)
HADV DNA SPEC QL NAA+PROBE: NOT DETECTED — SIGNIFICANT CHANGE UP
HCO3 BLDV-SCNC: 27 MMOL/L — SIGNIFICANT CHANGE UP (ref 20–27)
HCO3 BLDV-SCNC: 27 MMOL/L — SIGNIFICANT CHANGE UP (ref 20–27)
HCOV 229E RNA SPEC QL NAA+PROBE: NOT DETECTED — SIGNIFICANT CHANGE UP
HCOV HKU1 RNA SPEC QL NAA+PROBE: NOT DETECTED — SIGNIFICANT CHANGE UP
HCOV NL63 RNA SPEC QL NAA+PROBE: NOT DETECTED — SIGNIFICANT CHANGE UP
HCOV OC43 RNA SPEC QL NAA+PROBE: NOT DETECTED — SIGNIFICANT CHANGE UP
HCT VFR BLD CALC: 37.9 % — SIGNIFICANT CHANGE UP (ref 34.5–45)
HCT VFR BLDV CALC: 34.5 % — SIGNIFICANT CHANGE UP (ref 34.5–45)
HCT VFR BLDV CALC: 36.7 % — SIGNIFICANT CHANGE UP (ref 34.5–45)
HGB BLD-MCNC: 11.1 G/DL — LOW (ref 11.5–15.5)
HGB BLDV-MCNC: 11.2 G/DL — LOW (ref 11.5–15.5)
HGB BLDV-MCNC: 11.9 G/DL — SIGNIFICANT CHANGE UP (ref 11.5–15.5)
HMPV RNA SPEC QL NAA+PROBE: NOT DETECTED — SIGNIFICANT CHANGE UP
HPIV1 RNA SPEC QL NAA+PROBE: NOT DETECTED — SIGNIFICANT CHANGE UP
HPIV2 RNA SPEC QL NAA+PROBE: NOT DETECTED — SIGNIFICANT CHANGE UP
HPIV3 RNA SPEC QL NAA+PROBE: NOT DETECTED — SIGNIFICANT CHANGE UP
HPIV4 RNA SPEC QL NAA+PROBE: NOT DETECTED — SIGNIFICANT CHANGE UP
IMM GRANULOCYTES # BLD AUTO: 0.05 # — SIGNIFICANT CHANGE UP
IMM GRANULOCYTES NFR BLD AUTO: 0.4 % — SIGNIFICANT CHANGE UP (ref 0–1.5)
LACTATE BLDV-MCNC: 2 MMOL/L — SIGNIFICANT CHANGE UP (ref 0.5–2)
LACTATE BLDV-MCNC: 2.1 MMOL/L — HIGH (ref 0.5–2)
LYMPHOCYTES # BLD AUTO: 1.27 K/UL — SIGNIFICANT CHANGE UP (ref 1–3.3)
LYMPHOCYTES # BLD AUTO: 11.3 % — LOW (ref 13–44)
M PNEUMO DNA SPEC QL NAA+PROBE: NOT DETECTED — SIGNIFICANT CHANGE UP
MCHC RBC-ENTMCNC: 23.1 PG — LOW (ref 27–34)
MCHC RBC-ENTMCNC: 29.3 % — LOW (ref 32–36)
MCV RBC AUTO: 78.8 FL — LOW (ref 80–100)
MONOCYTES # BLD AUTO: 0.89 K/UL — SIGNIFICANT CHANGE UP (ref 0–0.9)
MONOCYTES NFR BLD AUTO: 7.9 % — SIGNIFICANT CHANGE UP (ref 2–14)
NEUTROPHILS # BLD AUTO: 8.78 K/UL — HIGH (ref 1.8–7.4)
NEUTROPHILS NFR BLD AUTO: 78.5 % — HIGH (ref 43–77)
NRBC # FLD: 0 — SIGNIFICANT CHANGE UP
NT-PROBNP SERPL-SCNC: 393.8 PG/ML — SIGNIFICANT CHANGE UP
PCO2 BLDV: 40 MMHG — LOW (ref 41–51)
PCO2 BLDV: 44 MMHG — SIGNIFICANT CHANGE UP (ref 41–51)
PH BLDV: 7.41 PH — SIGNIFICANT CHANGE UP (ref 7.32–7.43)
PH BLDV: 7.44 PH — HIGH (ref 7.32–7.43)
PLATELET # BLD AUTO: 355 K/UL — SIGNIFICANT CHANGE UP (ref 150–400)
PMV BLD: 10 FL — SIGNIFICANT CHANGE UP (ref 7–13)
PO2 BLDV: 47 MMHG — HIGH (ref 35–40)
PO2 BLDV: 53 MMHG — HIGH (ref 35–40)
POTASSIUM BLDV-SCNC: 3.1 MMOL/L — LOW (ref 3.4–4.5)
POTASSIUM BLDV-SCNC: 5.8 MMOL/L — HIGH (ref 3.4–4.5)
POTASSIUM SERPL-MCNC: 3.9 MMOL/L — SIGNIFICANT CHANGE UP (ref 3.5–5.3)
POTASSIUM SERPL-SCNC: 3.9 MMOL/L — SIGNIFICANT CHANGE UP (ref 3.5–5.3)
PROT SERPL-MCNC: 7.5 G/DL — SIGNIFICANT CHANGE UP (ref 6–8.3)
RBC # BLD: 4.81 M/UL — SIGNIFICANT CHANGE UP (ref 3.8–5.2)
RBC # FLD: 16.4 % — HIGH (ref 10.3–14.5)
RSV RNA SPEC QL NAA+PROBE: NOT DETECTED — SIGNIFICANT CHANGE UP
RV+EV RNA SPEC QL NAA+PROBE: NOT DETECTED — SIGNIFICANT CHANGE UP
SAO2 % BLDV: 80.7 % — SIGNIFICANT CHANGE UP (ref 60–85)
SAO2 % BLDV: 83.8 % — SIGNIFICANT CHANGE UP (ref 60–85)
SODIUM SERPL-SCNC: 140 MMOL/L — SIGNIFICANT CHANGE UP (ref 135–145)
WBC # BLD: 11.21 K/UL — HIGH (ref 3.8–10.5)
WBC # FLD AUTO: 11.21 K/UL — HIGH (ref 3.8–10.5)

## 2018-10-10 PROCEDURE — 71046 X-RAY EXAM CHEST 2 VIEWS: CPT

## 2018-10-10 PROCEDURE — 36415 COLL VENOUS BLD VENIPUNCTURE: CPT

## 2018-10-10 PROCEDURE — 71045 X-RAY EXAM CHEST 1 VIEW: CPT | Mod: 26

## 2018-10-10 PROCEDURE — 71250 CT THORAX DX C-: CPT | Mod: 26

## 2018-10-10 PROCEDURE — 99214 OFFICE O/P EST MOD 30 MIN: CPT | Mod: 25

## 2018-10-10 PROCEDURE — 99223 1ST HOSP IP/OBS HIGH 75: CPT | Mod: GC

## 2018-10-10 RX ORDER — PANTOPRAZOLE SODIUM 20 MG/1
40 TABLET, DELAYED RELEASE ORAL
Qty: 0 | Refills: 0 | Status: DISCONTINUED | OUTPATIENT
Start: 2018-10-10 | End: 2018-10-12

## 2018-10-10 RX ORDER — ASPIRIN/CALCIUM CARB/MAGNESIUM 324 MG
81 TABLET ORAL DAILY
Qty: 0 | Refills: 0 | Status: DISCONTINUED | OUTPATIENT
Start: 2018-10-10 | End: 2018-10-12

## 2018-10-10 RX ORDER — GLUCAGON INJECTION, SOLUTION 0.5 MG/.1ML
1 INJECTION, SOLUTION SUBCUTANEOUS ONCE
Qty: 0 | Refills: 0 | Status: DISCONTINUED | OUTPATIENT
Start: 2018-10-10 | End: 2018-10-12

## 2018-10-10 RX ORDER — DEXTROSE 50 % IN WATER 50 %
25 SYRINGE (ML) INTRAVENOUS ONCE
Qty: 0 | Refills: 0 | Status: DISCONTINUED | OUTPATIENT
Start: 2018-10-10 | End: 2018-10-12

## 2018-10-10 RX ORDER — FUROSEMIDE 40 MG
40 TABLET ORAL
Qty: 0 | Refills: 0 | Status: DISCONTINUED | OUTPATIENT
Start: 2018-10-10 | End: 2018-10-10

## 2018-10-10 RX ORDER — METHYLPREDNISOLONE 4 MG/1
4 TABLET ORAL
Qty: 21 | Refills: 0 | Status: DISCONTINUED | COMMUNITY
Start: 2018-08-01

## 2018-10-10 RX ORDER — DEXTROSE 50 % IN WATER 50 %
12.5 SYRINGE (ML) INTRAVENOUS ONCE
Qty: 0 | Refills: 0 | Status: DISCONTINUED | OUTPATIENT
Start: 2018-10-10 | End: 2018-10-12

## 2018-10-10 RX ORDER — CEFTRIAXONE 500 MG/1
1 INJECTION, POWDER, FOR SOLUTION INTRAMUSCULAR; INTRAVENOUS ONCE
Qty: 0 | Refills: 0 | Status: COMPLETED | OUTPATIENT
Start: 2018-10-10 | End: 2018-10-10

## 2018-10-10 RX ORDER — CLONAZEPAM 1 MG
0.5 TABLET ORAL THREE TIMES A DAY
Qty: 0 | Refills: 0 | Status: DISCONTINUED | OUTPATIENT
Start: 2018-10-10 | End: 2018-10-12

## 2018-10-10 RX ORDER — AMOXICILLIN AND CLAVULANATE POTASSIUM 875; 125 MG/1; MG/1
875-125 TABLET, COATED ORAL
Qty: 20 | Refills: 0 | Status: DISCONTINUED | COMMUNITY
Start: 2018-09-14 | End: 2018-10-10

## 2018-10-10 RX ORDER — DEXTROSE 50 % IN WATER 50 %
15 SYRINGE (ML) INTRAVENOUS ONCE
Qty: 0 | Refills: 0 | Status: DISCONTINUED | OUTPATIENT
Start: 2018-10-10 | End: 2018-10-12

## 2018-10-10 RX ORDER — AMLODIPINE BESYLATE 2.5 MG/1
10 TABLET ORAL DAILY
Qty: 0 | Refills: 0 | Status: DISCONTINUED | OUTPATIENT
Start: 2018-10-11 | End: 2018-10-12

## 2018-10-10 RX ORDER — SODIUM CHLORIDE 9 MG/ML
500 INJECTION INTRAMUSCULAR; INTRAVENOUS; SUBCUTANEOUS ONCE
Qty: 0 | Refills: 0 | Status: COMPLETED | OUTPATIENT
Start: 2018-10-10 | End: 2018-10-10

## 2018-10-10 RX ORDER — CLONAZEPAM 1 MG
1 TABLET ORAL
Qty: 0 | Refills: 0 | COMMUNITY

## 2018-10-10 RX ORDER — IPRATROPIUM/ALBUTEROL SULFATE 18-103MCG
3 AEROSOL WITH ADAPTER (GRAM) INHALATION ONCE
Qty: 0 | Refills: 0 | Status: COMPLETED | OUTPATIENT
Start: 2018-10-10 | End: 2018-10-10

## 2018-10-10 RX ORDER — INSULIN LISPRO 100/ML
VIAL (ML) SUBCUTANEOUS AT BEDTIME
Qty: 0 | Refills: 0 | Status: DISCONTINUED | OUTPATIENT
Start: 2018-10-10 | End: 2018-10-12

## 2018-10-10 RX ORDER — FUROSEMIDE 40 MG
1 TABLET ORAL
Qty: 0 | Refills: 0 | COMMUNITY

## 2018-10-10 RX ORDER — AZITHROMYCIN 500 MG/1
500 TABLET, FILM COATED ORAL ONCE
Qty: 0 | Refills: 0 | Status: COMPLETED | OUTPATIENT
Start: 2018-10-10 | End: 2018-10-10

## 2018-10-10 RX ORDER — FUROSEMIDE 40 MG
20 TABLET ORAL ONCE
Qty: 0 | Refills: 0 | Status: COMPLETED | OUTPATIENT
Start: 2018-10-10 | End: 2018-10-10

## 2018-10-10 RX ORDER — IPRATROPIUM/ALBUTEROL SULFATE 18-103MCG
3 AEROSOL WITH ADAPTER (GRAM) INHALATION EVERY 6 HOURS
Qty: 0 | Refills: 0 | Status: DISCONTINUED | OUTPATIENT
Start: 2018-10-10 | End: 2018-10-12

## 2018-10-10 RX ORDER — POTASSIUM CHLORIDE 20 MEQ
20 PACKET (EA) ORAL DAILY
Qty: 0 | Refills: 0 | Status: DISCONTINUED | OUTPATIENT
Start: 2018-10-10 | End: 2018-10-12

## 2018-10-10 RX ORDER — SODIUM CHLORIDE 9 MG/ML
1000 INJECTION, SOLUTION INTRAVENOUS
Qty: 0 | Refills: 0 | Status: DISCONTINUED | OUTPATIENT
Start: 2018-10-10 | End: 2018-10-12

## 2018-10-10 RX ORDER — INSULIN LISPRO 100/ML
VIAL (ML) SUBCUTANEOUS
Qty: 0 | Refills: 0 | Status: DISCONTINUED | OUTPATIENT
Start: 2018-10-10 | End: 2018-10-12

## 2018-10-10 RX ADMIN — Medication 20 MILLIGRAM(S): at 23:54

## 2018-10-10 RX ADMIN — Medication 3 MILLILITER(S): at 22:36

## 2018-10-10 RX ADMIN — CEFTRIAXONE 100 GRAM(S): 500 INJECTION, POWDER, FOR SOLUTION INTRAMUSCULAR; INTRAVENOUS at 15:27

## 2018-10-10 RX ADMIN — SODIUM CHLORIDE 500 MILLILITER(S): 9 INJECTION INTRAMUSCULAR; INTRAVENOUS; SUBCUTANEOUS at 16:27

## 2018-10-10 RX ADMIN — CEFTRIAXONE 1 GRAM(S): 500 INJECTION, POWDER, FOR SOLUTION INTRAMUSCULAR; INTRAVENOUS at 16:57

## 2018-10-10 RX ADMIN — Medication 3 MILLILITER(S): at 17:10

## 2018-10-10 RX ADMIN — SODIUM CHLORIDE 500 MILLILITER(S): 9 INJECTION INTRAMUSCULAR; INTRAVENOUS; SUBCUTANEOUS at 15:27

## 2018-10-10 RX ADMIN — AZITHROMYCIN 250 MILLIGRAM(S): 500 TABLET, FILM COATED ORAL at 16:33

## 2018-10-10 RX ADMIN — Medication 125 MILLIGRAM(S): at 15:31

## 2018-10-10 RX ADMIN — Medication 0.5 MILLIGRAM(S): at 22:36

## 2018-10-10 NOTE — H&P ADULT - PROBLEM SELECTOR PLAN 8
DVT PPX: ambulate as tolerated  Diet: NPO while on BIPAP, then soft diet  PT consult  Dispo: pending No hx of DM Type 2, not on medications c/b hyperglycemia   FS on past admission elevated likely 2/2 to steroid induced  A1c, April 2018, 7.4  Will order ISS, FS given still be on steroids  FS in the past as high as >300 while on steroids

## 2018-10-10 NOTE — ED PROVIDER NOTE - MEDICAL DECISION MAKING DETAILS
86 yo female presenting with sob and productive cough; rule out pna vs uri; will obtain labs ekg cxr abx admit 88 yo female presenting with sob and productive cough; rule out pna vs urI vs copd exacerbation; will obtain labs ekg cxr abx admit

## 2018-10-10 NOTE — H&P ADULT - PROBLEM SELECTOR PLAN 5
Hx of HTN   Continue home medications with holding parameters  BP currently at goal  Patietn on diuretics, unclear if hx of heart failure, TTE in 2017 no evidence of pulmonary HTN, diastolic/systolic dysfunction Hx of HTN   Continue home medications with holding parameters  BP currently at goal  Patietn on diuretics, unclear if hx of heart failure, TTE in 2017 no evidence of pulmonary HTN, diastolic/systolic dysfunction  Will order TTE HX of DAVID/COPD on CPAP at night  Continue BIPAP qhs   Goal O2 88-92% Patient on diuretics, unclear if hx of heart failure, TTE in 2017 no evidence of pulmonary HTN, diastolic/systolic dysfunction  Will order CT chest noncontrast   Hypervolemic one exam, CXRY evidence of questionable pulmonary edema vs multifocal PNA  Will give Lasix 20 IV x 1  Home dosage Lasix 40 PO BID can be restarted tmrw depending on fluid status  Strict I+Os  Depending on CT chest findings, can consider TTE  Pro-BNP wnl after age correction

## 2018-10-10 NOTE — H&P ADULT - PROBLEM SELECTOR PLAN 7
DVT PPX: ambulate as tolerated  Diet: NPO while on BIPAP, then soft diet  PT consult  Dispo: pending No hx of DM2, not on medications  FS on past admission elevated likely 2/2 to steroid induced  Likely prediabetic, f/u A1c  Will order ISS, FS given still be on steroids  FS in the past as high as >300 while on steroids No hx of DM Type 2, not on medications c/b hyperglycemia   FS on past admission elevated likely 2/2 to steroid induced  A1c, April 2018, 7.4  Will order ISS, FS given still be on steroids  FS in the past as high as >300 while on steroids Hx of HTN   Continue home medications with holding parameters  BP currently at goal

## 2018-10-10 NOTE — H&P ADULT - ASSESSMENT
87F with COPD 2/2 emphysema (3L home O2), DAVID on CPAP, moderate pulmonary HTN, HTN, breast ca s/p lumpectomy and RT (>20 years ago) presenting with shortness of breath x 1 day with chest xray evidence of pulmonary edema vs. multifocal PNA admitted for likely COPD exacerbation with concomitant PNA, less likely pulmonary edema given no hx of cardiac dysfunction 87F with COPD 2/2 emphysema (3L home O2), DAVID on CPAP, moderate pulmonary HTN, HTN, breast ca s/p lumpectomy and RT (>20 years ago) presenting with shortness of breath x 1 day admitted for COPD exacerbation with concomitant likely PNA, less likely pulmonary edema given no hx of cardiac dysfunction and normal BNP; Found to have prolonged QT;

## 2018-10-10 NOTE — H&P ADULT - PROBLEM SELECTOR PROBLEM 6
Preventive measure Type 2 diabetes mellitus with complication, without long-term current use of insulin Essential Hypertension DAVID on CPAP

## 2018-10-10 NOTE — ED ADULT NURSE REASSESSMENT NOTE - NS ED NURSE REASSESS COMMENT FT1
break coverage: pt became anxious and tachypneic when told was going to 9th floor. medicated as ordered for anxiety. respiratory called to bedside, placed back on bipap prior to transport to ct scan. to be brought to floor after ct scan, ct tech/respiratory/transport aware.

## 2018-10-10 NOTE — H&P ADULT - PROBLEM SELECTOR PLAN 4
HX of DAVID/COPD on CPAP at night  Continue BIPAP qhs   Goal O2 88-92% Patient on diuretics, unclear if hx of heart failure, TTE in 2017 no evidence of pulmonary HTN, diastolic/systolic dysfunction  Will order TTE  Hypervolemic one exam, CXRY evidence of possible pulmonary edema   Will give Lasix 40 IV x 1  Home dosage Lasix 40 PO BID can be restarted tmrw depending on fluid status  Strict I+Os Patient on diuretics, unclear if hx of heart failure, TTE in 2017 no evidence of pulmonary HTN, diastolic/systolic dysfunction  Will order CT chest noncontrast   Hypervolemic one exam, CXRY evidence of possible pulmonary edema   Will give Lasix 20 IV x 1  Home dosage Lasix 40 PO BID can be restarted tmrw depending on fluid status  Strict I+Os  Depending on CT chest findings, can consider TTE  Add on pro-BNP Patient on diuretics, unclear if hx of heart failure, TTE in 2017 no evidence of pulmonary HTN, diastolic/systolic dysfunction  Will order CT chest noncontrast   Hypervolemic one exam, CXRY evidence of questionable pulmonary edema vs multifocal PNA  Will give Lasix 20 IV x 1  Home dosage Lasix 40 PO BID can be restarted tmrw depending on fluid status  Strict I+Os  Depending on CT chest findings, can consider TTE  Pro-BNP wnl after age correction Screening EKG, QTc 500;  - s/p Azithromycin IV  - Avoid QT prolonging agents   - Recheck EKG in am

## 2018-10-10 NOTE — H&P ADULT - PROBLEM SELECTOR PLAN 3
CXRY evidence of pulmonary edema vs. multifocal PNA   Given no cardiac dysfunction hx, would favor multifocal PNA vs. emphysema   No recent hospitalization within 3 months  Will treat for CAP, however if not improving will obtain CT chest  F/u sputum cx CXRY evidence of pulmonary edema vs. multifocal PNA   Given no cardiac dysfunction hx, would favor multifocal PNA vs. emphysema   No recent hospitalization within 3 months  Will treat for PNA, however will order CT chest given xray findings nonspecific with likely emphysema onx ray   F/u sputum cx CXR evidence of multifocal PNA vs ?pulmonary edema   Given no cardiac dysfunction hx, would favor multifocal PNA vs. emphysema   No recent hospitalization within 3 months  Will treat for PNA  CT chest given xray findings nonspecific with likely emphysema onx ray r/o edema  F/u sputum cx

## 2018-10-10 NOTE — H&P ADULT - PROBLEM SELECTOR PLAN 9
DVT PPX: ambulate as tolerated  Diet: NPO while on BIPAP, then soft diet  PT consult  Dispo: pending

## 2018-10-10 NOTE — H&P ADULT - PROBLEM SELECTOR PLAN 2
Patient with hx of COPD 2/2 emphysema, on baseline 3L home O 2  S/p Ceftriaxone, Azithromycin, Solumedrol 125 mg IV x 1  F/u RVP  VBG no evidence of acidosis   Will continue to treat with COPD exacerbation   Continue prednisone 40 mg x 5 days  F/u sputum cx Patient with hx of COPD 2/2 emphysema, on baseline 3L home O 2  S/p Ceftriaxone, Azithromycin, Solumedrol 125 mg IV x 1  F/u RVP  VBG no evidence of acidosis   Will continue to treat with COPD exacerbation with Levofloxacin given severity of COPD   Continue prednisone 40 mg x 5 days  F/u sputum cx Patient with hx of COPD 2/2 emphysema, on baseline 3L home O 2  S/p Ceftriaxone (see below), Azithromycin, Solumedrol 125 mg IV x 1  F/u RVP  VBG no evidence of acidosis   Will continue to treat with COPD exacerbation with Levofloxacin given severity of COPD   Continue prednisone 40 mg x 5 days  F/u sputum cx

## 2018-10-10 NOTE — ED PROVIDER NOTE - NS ED ROS FT
Constitutional: no fever  Eyes: no conjunctivitis  Ears: no ear pain   Nose: no nasal congestion, Mouth/Throat: no throat pain, Neck: no stiffness  Cardiovascular: no chest pain +sob  Chest: + cough  Gastrointestinal: no abdominal pain, no vomiting and diarrhea  MSK: no joint pain  : no dysuria  Skin: no rash  Neuro: no LOC

## 2018-10-10 NOTE — H&P ADULT - PROBLEM SELECTOR PLAN 1
Patient with acute hypoxic respiratory failure <88% on baseline supplemental O2 3L  Continue BIPAP 10/5 (home settings CPAP 5)  VBG no evidence of acidosis, PCO2 40, no need to trend  Lactate wnl   Goal O2 88-92% Patient with acute hypoxic respiratory failure <88% on baseline supplemental O2 3L  Continue BIPAP 10/5 (home settings CPAP 5) for increased WOB and hypoxemia   VBG no evidence of acidosis, PCO2 40, no need to trend  Lactate wnl   Goal O2 88-92% Patient with acute hypoxemic respiratory failure <88% on baseline supplemental O2 3L  Continue BIPAP 10/5 (home settings CPAP 5) for increased WOB and hypoxemia   VBG no evidence of acidosis, PCO2 40, no need to trend  Lactate wnl   Goal O2 88-92%

## 2018-10-10 NOTE — H&P ADULT - PROBLEM SELECTOR PLAN 6
DVT PPX: ambulate as tolerated  Diet: NPO while on BIPAP, then soft diet  PT consult  Dispo: pending No hx of DM2, not on medications  FS on past admission elevated likely 2/2 to steroid induced  Likely prediabetic, f/u A1c  Will order ISS, FS given still be on steroids  FS in the past as high as >300 while on steroids Hx of HTN   Continue home medications with holding parameters  BP currently at goal HX of DAVID/COPD on CPAP at night  Continue BIPAP qhs   Goal O2 88-92%

## 2018-10-10 NOTE — ED PROVIDER NOTE - ATTENDING CONTRIBUTION TO CARE
88 yo female with COPD had onset of increased SOB than at baseline (pt O2 dependent) presents form PMD office with CXR abnormality, told she had pneumonia. reports increased cough, decreased appetite, cough productive of yellow sputum. PE sat 90 on 3 l RR 16 afebrile VSS chest decreased BS bilaterally, crackles right base cor RR ext without edema CXR pending. Imp: COPD exacerbation, pneumonia Plan prednisone, B2 agonist antibiotics O2 sat at 90-92 percent O2 as needed

## 2018-10-10 NOTE — ED PROVIDER NOTE - PHYSICAL EXAMINATION
gen: tachypnic   Mentation: AAO x 3  psych: mood appropriate  ENT: airway patent  Eyes: conjunctivae clear bilaterally  Cardio: RRR, no m/r/g  Resp: decreased breath sounds bilaterally   GI: s/nt/nd   Neuro: AAO x 3, sensation and motor function intact  MSK: normal movement of all extremities

## 2018-10-10 NOTE — H&P ADULT - NSHPPHYSICALEXAM_GEN_ALL_CORE
· Constitutional        elderly female, sitting up on nasal BIPAP, 10/5 TV >400, PEEP5  · HEENT	               PERRL; EOMI; conjunctiva clear  · Neck	               Supple; no JVD  · Respiratory            adequate air movement, no expiratory wheezing, RLL crackles   · Cardiovascular       S1 & S2 with RRR; no murmurs, rales or JVD  · Gastrointestinal     soft; no distention; bowel sounds normal; no rigidity  · Extremities             2+ pitting edema to mid calf   · Vascular	               Radial Pulse: right normal; left normal  · Neurological           alert and oriented x 3; responds to verbal commands; sensation intact; cranial nerves intact; strength normal  · Skin	               warm and dry  · Psychiatric              normal mood and affect · Constitutional        elderly female, sitting up, nasal BIPAP on, 10/5 TV >400, PEEP5  · HEENT	               PERRL; EOMI; conjunctiva clear  · Neck	               Supple; no JVD  · Respiratory            adequate air movement, no expiratory wheezing, RLL crackles   · Cardiovascular       S1 & S2 with RRR; no murmurs, rales or JVD  · Gastrointestinal     soft; no distention; bowel sounds normal; no rigidity  · Extremities             1+ pitting edema to mid calf   · Neurological           alert and oriented x 3; responds to verbal commands; sensation intact; cranial nerves intact; strength normal  · Skin	               warm and dry  · Psychiatric              normal mood and affect

## 2018-10-10 NOTE — H&P ADULT - PROBLEM SELECTOR PROBLEM 7
Preventive measure Type 2 diabetes mellitus with complication, without long-term current use of insulin Essential Hypertension

## 2018-10-10 NOTE — H&P ADULT - LYMPHATIC
anterior cervical R/posterior cervical R/posterior cervical L/anterior cervical L/supraclavicular R/supraclavicular L

## 2018-10-10 NOTE — ED ADULT TRIAGE NOTE - CHIEF COMPLAINT QUOTE
Pt c/o generalized weakness/productive cough x 1 day, sent in by MD for pneumonia. Pt denies Chest pain. Pt w low o2 87% on room air, states shes normally on o2 for COPD. Placed on 3L NC as per usual home dose. Sat up to 90%

## 2018-10-10 NOTE — H&P ADULT - NSHPLABSRESULTS_GEN_ALL_CORE
Vital Signs Last 24 Hrs  T(C): 37.1 (10 Oct 2018 14:14), Max: 37.1 (10 Oct 2018 14:14)  T(F): 98.7 (10 Oct 2018 14:14), Max: 98.7 (10 Oct 2018 14:14)  HR: 84 (10 Oct 2018 19:05) (84 - 88)  BP: 125/45 (10 Oct 2018 18:39) (100/41 - 125/45)  BP(mean): --  RR: 24 (10 Oct 2018 18:39) (18 - 24)  SpO2: 95% (10 Oct 2018 19:05) (90% - 96%)                        11.1   11.21 )-----------( 355      ( 10 Oct 2018 15:14 )             37.9       10-10    140  |  100  |  19  ----------------------------<  115<H>  3.9   |  24  |  0.95    Ca    9.0      10 Oct 2018 15:14    TPro  7.5  /  Alb  3.6  /  TBili  0.6  /  DBili  x   /  AST  39<H>  /  ALT  22  /  AlkPhos  146<H>  10-10    Rapid Respiratory Viral Panel (10.10.18 @ 17:25)    Adenovirus (RapRVP): NOT DETECTED    Influenza A (RapRVP): NOT DETECTED (any subtype)    Influenza AH1 2009 (RapRVP): NOT DETECTED    Influenza AH1 (RapRVP): NOT DETECTED    Influenza AH3 (RapRVP): NOT DETECTED    Influenza B (RapRVP): NOT DETECTED    Parainfluenza 1 (RapRVP): NOT DETECTED    Parainfluenza 2 (RapRVP): NOT DETECTED    Parainfluenza 3 (RapRVP): NOT DETECTED    Parainfluenza 4 (RapRVP): NOT DETECTED    Resp Syncytial Virus (RapRVP): NOT DETECTED    Bordetella pertussis (RapRVP): NOT DETECTED    Chlamydia pneumoniae (RapRVP): NOT DETECTED    Mycoplasma pneumoniae (RapRVP): NOT DETECTED This nucleic acid amplification assay was performed using  the [employer]. The following pathogens are tested  for: Adenovirus, Coronavirus 229E, Coronavirus HKU1,  Coronavirus NL63, Coronavirus OC43, Human Metapneumovirus  (HMPV), Rhinovirus/Enterovirus, Influenza A H1, Influenza A  H1 2009 (Pandemic H1 2009), Influenza A H3, Influenza A (Flu  A) subtype not identified, Influenza B, Parainfluenza Virus  (types 1, 2, 3, 4), Respiratory Syncytial Virus (RSV),  Bordetella pertussis, Chlamydophila pneumoniae, and  Mycoplasma pneumoniae. A negative FilmArray result does not  always exclude the possibility of viral or bacterial  infection. Laboratory results should always be interpreted  in the context of clinical findings.    Entero/Rhinovirus (RapRVP): NOT DETECTED    HKU1 Coronavirus (RapRVP): NOT DETECTED    NL63 Coronavirus (RapRVP): NOT DETECTED    229E Coronavirus (RapRVP): NOT DETECTED    OC43 Coronavirus (RapRVP): NOT DETECTED    hMPV (RapRVP): NOT DETECTED      < from: Xray Chest 1 View- PORTABLE-Urgent (10.10.18 @ 15:36) >      ******PRELIMINARY REPORT******    ******PRELIMINARY REPORT******            EXAM:  XR CHEST PORTABLE URGENT 1V        PROCEDURE DATE:  Oct 10 2018     ******PRELIMINARY REPORT******    ******PRELIMINARY REPORT******            INTERPRETATION:  Bilateral hazy opacities, R > L  Small bilatreal pleural effusions.  Multifocal infection vs. pulmonary edema            ******PRELIMINARY REPORT******    ******PRELIMINARY REPORT******          ROSEMARIE CURIEL M.D., RADIOLOGY RESIDENT    < end of copied text > EKG, 10/10, nsr, 89bpm, qtc 501, no acute Tw or ST changes - my reading           XR CHEST PORTABLE URGENT 1V    PROCEDURE DATE:  Oct 10 2018   ******PRELIMINARY REPORT******        INTERPRETATION:  Bilateral hazy opacities, R > L, Small bilatreal pleural effusions. Multifocal infection vs. pulmonary edema

## 2018-10-10 NOTE — H&P ADULT - HISTORY OF PRESENT ILLNESS
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 Patient reports she woke up feeling short of breath, she starting walking to the bathroom and felt extremely HERNANDEZ. She reports productive cough x 4 days, yellow in the AM and becomes clear as the day progresses She saw her pulmonologist, Dr. Sheets today, he took her O2 88% on her baseline 3L and sent her to the ED. During encounter, patient became very teary. Reports "I have been in the hospital for half year". Last hospitalization in 7/2018 for COPD exacerbation. Reports 5-6 hospitalizations int he past year for either PNA or COPD exacerbation. Patient reports adherence with medications including Spiriva and Symbicort, not on daily prednisone Patient reports uses nebulizer solution at night or whenever "feeling SOB". Does not endorse using it more often in the recent days. Patient denies fevers, shakes, chills, URI symptoms, chest pain, abdominal pain, dysuria. Denies sick contacts. At baseline, patient ambulates with a walker. Of note, patient uses CPAP at night, settings 5. Former smoker 1PPD x 40 years.     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 Patient reports she woke up feeling short of breath, she starting walking to the bathroom and felt extremely HERNANDEZ. She reports productive cough x 4 days, yellow in the AM and becomes clear as the day progresses She saw her pulmonologist, Dr. Sheets today, he took her O2 88% on her baseline 3L and sent her to the ED. D-dimer resulted from outpatient 10/10 negative. During encounter, patient became very teary. Reports "I have been in the hospital for half year". Last hospitalization in 7/2018 for COPD exacerbation. Reports 5-6 hospitalizations int he past year for either PNA or COPD exacerbation. Patient reports adherence with medications including Spiriva and Symbicort, not on daily prednisone Patient reports uses nebulizer solution at night or whenever "feeling SOB". Does not endorse using it more often in the recent days. Patient denies fevers, shakes, chills, URI symptoms, chest pain, abdominal pain, dysuria. Denies sick contacts. At baseline, patient ambulates with a walker. Of note, patient uses CPAP at night, settings 5. Former smoker 1PPD x 40 years.     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 Patient reports she woke up feeling short of breath, she starting walking to the bathroom and felt extremely HERNANDEZ. She reports productive cough x 4 days, yellow in the AM and becomes clear as the day progresses She saw her pulmonologist, Dr. Sheets today, he took her O2 88% on her baseline 3L and sent her to the ED. D-dimer resulted from outpatient 10/10 negative. During encounter, patient became very teary. Reports "I have been in the hospital for half year". Last hospitalization in 7/2018 for COPD exacerbation. Reports 5-6 hospitalizations int he past year for either PNA or COPD exacerbation. Patient reports adherence with medications including Spiriva and Symbicort, not on daily prednisone Patient reports uses nebulizer solution at night or whenever "feeling SOB". Does not endorse using it more often in the recent days. Patient denies fevers, shakes, chills, URI symptoms, chest pain, abdominal pain, dysuria. Denies sick contacts. At baseline, patient ambulates with a walker. Of note, patient uses CPAP at night, settings 5. Former smoker 1PPD x 40 years.     In ED: /78 HR 88 RR18 T98.7 90% on baseline supplemental 3L O2   Patient received Azithromycin 500 mg IV x 1, Ceftriaxone 1 g IV x 1,  cc x 1, duonebs x 1, solumedrol 125 mg IV x 1

## 2018-10-11 DIAGNOSIS — R94.31 ABNORMAL ELECTROCARDIOGRAM [ECG] [EKG]: ICD-10-CM

## 2018-10-11 LAB
ALBUMIN SERPL ELPH-MCNC: 3.8 G/DL — SIGNIFICANT CHANGE UP (ref 3.3–5)
ALP SERPL-CCNC: 137 U/L — HIGH (ref 40–120)
ALT FLD-CCNC: 20 U/L — SIGNIFICANT CHANGE UP (ref 4–33)
AST SERPL-CCNC: 17 U/L — SIGNIFICANT CHANGE UP (ref 4–32)
BASE EXCESS BLDA CALC-SCNC: 3.3 MMOL/L — SIGNIFICANT CHANGE UP
BILIRUB SERPL-MCNC: 0.4 MG/DL — SIGNIFICANT CHANGE UP (ref 0.2–1.2)
BUN SERPL-MCNC: 21 MG/DL — SIGNIFICANT CHANGE UP (ref 7–23)
CALCIUM SERPL-MCNC: 8.6 MG/DL — SIGNIFICANT CHANGE UP (ref 8.4–10.5)
CHLORIDE BLDA-SCNC: 106 MMOL/L — SIGNIFICANT CHANGE UP (ref 96–108)
CHLORIDE SERPL-SCNC: 98 MMOL/L — SIGNIFICANT CHANGE UP (ref 98–107)
CO2 SERPL-SCNC: 24 MMOL/L — SIGNIFICANT CHANGE UP (ref 22–31)
CREAT BLDA-MCNC: 0.88 MG/DL — SIGNIFICANT CHANGE UP (ref 0.5–1.3)
CREAT SERPL-MCNC: 0.93 MG/DL — SIGNIFICANT CHANGE UP (ref 0.5–1.3)
GLUCOSE BLDA-MCNC: 238 MG/DL — HIGH (ref 70–99)
GLUCOSE SERPL-MCNC: 233 MG/DL — HIGH (ref 70–99)
HBA1C BLD-MCNC: 6.3 % — HIGH (ref 4–5.6)
HCO3 BLDA-SCNC: 28 MMOL/L — HIGH (ref 22–26)
HCT VFR BLD CALC: 38.5 % — SIGNIFICANT CHANGE UP (ref 34.5–45)
HCT VFR BLDA CALC: 37.3 % — SIGNIFICANT CHANGE UP (ref 34.5–46.5)
HGB BLD-MCNC: 11.6 G/DL — SIGNIFICANT CHANGE UP (ref 11.5–15.5)
HGB BLDA-MCNC: 12.1 G/DL — SIGNIFICANT CHANGE UP (ref 11.5–15.5)
LACTATE BLDA-SCNC: 1.4 MMOL/L — SIGNIFICANT CHANGE UP (ref 0.5–2)
MAGNESIUM SERPL-MCNC: 2.3 MG/DL — SIGNIFICANT CHANGE UP (ref 1.6–2.6)
MCHC RBC-ENTMCNC: 23.3 PG — LOW (ref 27–34)
MCHC RBC-ENTMCNC: 30.1 % — LOW (ref 32–36)
MCV RBC AUTO: 77.5 FL — LOW (ref 80–100)
NRBC # FLD: 0 — SIGNIFICANT CHANGE UP
PCO2 BLDA: 36 MMHG — SIGNIFICANT CHANGE UP (ref 32–48)
PH BLDA: 7.48 PH — HIGH (ref 7.35–7.45)
PHOSPHATE SERPL-MCNC: 3.5 MG/DL — SIGNIFICANT CHANGE UP (ref 2.5–4.5)
PLATELET # BLD AUTO: 392 K/UL — SIGNIFICANT CHANGE UP (ref 150–400)
PMV BLD: 9.7 FL — SIGNIFICANT CHANGE UP (ref 7–13)
PO2 BLDA: 96 MMHG — SIGNIFICANT CHANGE UP (ref 83–108)
POTASSIUM BLDA-SCNC: 3.4 MMOL/L — SIGNIFICANT CHANGE UP (ref 3.4–4.5)
POTASSIUM SERPL-MCNC: 3.5 MMOL/L — SIGNIFICANT CHANGE UP (ref 3.5–5.3)
POTASSIUM SERPL-SCNC: 3.5 MMOL/L — SIGNIFICANT CHANGE UP (ref 3.5–5.3)
PROT SERPL-MCNC: 7.4 G/DL — SIGNIFICANT CHANGE UP (ref 6–8.3)
RBC # BLD: 4.97 M/UL — SIGNIFICANT CHANGE UP (ref 3.8–5.2)
RBC # FLD: 16.1 % — HIGH (ref 10.3–14.5)
SAO2 % BLDA: 96.8 % — SIGNIFICANT CHANGE UP (ref 95–99)
SODIUM BLDA-SCNC: 136 MMOL/L — SIGNIFICANT CHANGE UP (ref 136–146)
SODIUM SERPL-SCNC: 139 MMOL/L — SIGNIFICANT CHANGE UP (ref 135–145)
SPECIMEN SOURCE: SIGNIFICANT CHANGE UP
SPECIMEN SOURCE: SIGNIFICANT CHANGE UP
WBC # BLD: 6.68 K/UL — SIGNIFICANT CHANGE UP (ref 3.8–10.5)
WBC # FLD AUTO: 6.68 K/UL — SIGNIFICANT CHANGE UP (ref 3.8–10.5)

## 2018-10-11 PROCEDURE — 99233 SBSQ HOSP IP/OBS HIGH 50: CPT | Mod: GC

## 2018-10-11 PROCEDURE — 93010 ELECTROCARDIOGRAM REPORT: CPT

## 2018-10-11 PROCEDURE — 99223 1ST HOSP IP/OBS HIGH 75: CPT

## 2018-10-11 RX ORDER — TIOTROPIUM BROMIDE 18 UG/1
1 CAPSULE ORAL; RESPIRATORY (INHALATION) DAILY
Qty: 0 | Refills: 0 | Status: DISCONTINUED | OUTPATIENT
Start: 2018-10-11 | End: 2018-10-12

## 2018-10-11 RX ORDER — CEFTRIAXONE 500 MG/1
1 INJECTION, POWDER, FOR SOLUTION INTRAMUSCULAR; INTRAVENOUS EVERY 24 HOURS
Qty: 0 | Refills: 0 | Status: DISCONTINUED | OUTPATIENT
Start: 2018-10-11 | End: 2018-10-12

## 2018-10-11 RX ORDER — BUDESONIDE AND FORMOTEROL FUMARATE DIHYDRATE 160; 4.5 UG/1; UG/1
2 AEROSOL RESPIRATORY (INHALATION)
Qty: 0 | Refills: 0 | Status: DISCONTINUED | OUTPATIENT
Start: 2018-10-11 | End: 2018-10-12

## 2018-10-11 RX ORDER — IPRATROPIUM/ALBUTEROL SULFATE 18-103MCG
3 AEROSOL WITH ADAPTER (GRAM) INHALATION ONCE
Qty: 0 | Refills: 0 | Status: COMPLETED | OUTPATIENT
Start: 2018-10-11 | End: 2018-10-11

## 2018-10-11 RX ORDER — AZITHROMYCIN 500 MG/1
TABLET, FILM COATED ORAL
Qty: 0 | Refills: 0 | Status: DISCONTINUED | OUTPATIENT
Start: 2018-10-11 | End: 2018-10-12

## 2018-10-11 RX ORDER — MAGNESIUM SULFATE 500 MG/ML
2 VIAL (ML) INJECTION ONCE
Qty: 0 | Refills: 0 | Status: COMPLETED | OUTPATIENT
Start: 2018-10-11 | End: 2018-10-11

## 2018-10-11 RX ORDER — AZITHROMYCIN 500 MG/1
500 TABLET, FILM COATED ORAL EVERY 24 HOURS
Qty: 0 | Refills: 0 | Status: DISCONTINUED | OUTPATIENT
Start: 2018-10-12 | End: 2018-10-12

## 2018-10-11 RX ORDER — FUROSEMIDE 40 MG
40 TABLET ORAL
Qty: 0 | Refills: 0 | Status: DISCONTINUED | OUTPATIENT
Start: 2018-10-11 | End: 2018-10-12

## 2018-10-11 RX ORDER — AZITHROMYCIN 500 MG/1
500 TABLET, FILM COATED ORAL ONCE
Qty: 0 | Refills: 0 | Status: COMPLETED | OUTPATIENT
Start: 2018-10-11 | End: 2018-10-11

## 2018-10-11 RX ADMIN — BUDESONIDE AND FORMOTEROL FUMARATE DIHYDRATE 2 PUFF(S): 160; 4.5 AEROSOL RESPIRATORY (INHALATION) at 22:03

## 2018-10-11 RX ADMIN — Medication 3 MILLILITER(S): at 15:00

## 2018-10-11 RX ADMIN — Medication 50 GRAM(S): at 02:37

## 2018-10-11 RX ADMIN — Medication 3 MILLILITER(S): at 02:28

## 2018-10-11 RX ADMIN — BUDESONIDE AND FORMOTEROL FUMARATE DIHYDRATE 2 PUFF(S): 160; 4.5 AEROSOL RESPIRATORY (INHALATION) at 11:49

## 2018-10-11 RX ADMIN — Medication 3 MILLILITER(S): at 09:37

## 2018-10-11 RX ADMIN — Medication 60 MILLIGRAM(S): at 02:37

## 2018-10-11 RX ADMIN — Medication 0.5 MILLIGRAM(S): at 03:23

## 2018-10-11 RX ADMIN — AMLODIPINE BESYLATE 10 MILLIGRAM(S): 2.5 TABLET ORAL at 06:41

## 2018-10-11 RX ADMIN — Medication 2: at 18:52

## 2018-10-11 RX ADMIN — Medication 3 MILLILITER(S): at 23:56

## 2018-10-11 RX ADMIN — AZITHROMYCIN 250 MILLIGRAM(S): 500 TABLET, FILM COATED ORAL at 19:49

## 2018-10-11 RX ADMIN — Medication 40 MILLIGRAM(S): at 18:51

## 2018-10-11 RX ADMIN — Medication 4: at 13:10

## 2018-10-11 RX ADMIN — Medication 81 MILLIGRAM(S): at 13:09

## 2018-10-11 RX ADMIN — Medication 20 MILLIEQUIVALENT(S): at 13:09

## 2018-10-11 RX ADMIN — Medication 4: at 09:45

## 2018-10-11 RX ADMIN — Medication 40 MILLIGRAM(S): at 06:41

## 2018-10-11 RX ADMIN — PANTOPRAZOLE SODIUM 40 MILLIGRAM(S): 20 TABLET, DELAYED RELEASE ORAL at 06:41

## 2018-10-11 RX ADMIN — CEFTRIAXONE 100 GRAM(S): 500 INJECTION, POWDER, FOR SOLUTION INTRAMUSCULAR; INTRAVENOUS at 15:10

## 2018-10-11 RX ADMIN — Medication 3 MILLILITER(S): at 20:33

## 2018-10-11 NOTE — PROGRESS NOTE ADULT - PROBLEM SELECTOR PLAN 3
Pt with increasing cough, sputum production and SOB with CXR evidence of multifocal PNA and CT chest with new right upper lobe consolidation. No recent hospitalization within 3 months. Will treat for CAP.  -Plan as above

## 2018-10-11 NOTE — PROGRESS NOTE ADULT - ASSESSMENT
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

## 2018-10-11 NOTE — PROGRESS NOTE ADULT - SUBJECTIVE AND OBJECTIVE BOX
Saida Velasquez, PGY1  Pager [phone]    Patient is a 87y old  female who presents with a chief complaint of shortness of breath (10 Oct 2018 20:01)    INTERVAL HPI/OVERNIGHT EVENTS: Pt with respiratory distress overnight, tachypneic to 40s, with wheezing on exam. Treated with mag, duonebs, solumedrol, and Bipap settings increased, with good response. This AM, breathing comfortably. No increase in cough or sputum production. States sputum is yellow in am, and clear in afternoon. Improved with mucinex at home. Endorsing some increased LE swelling. Denies CP, abdominal pain.     Vital Signs Last 24 Hrs  T(C): 37.1 (11 Oct 2018 06:27), Max: 37.1 (10 Oct 2018 14:14)  T(F): 98.8 (11 Oct 2018 06:27), Max: 98.8 (11 Oct 2018 06:27)  HR: 86 (11 Oct 2018 09:39) (82 - 99)  BP: 133/58 (11 Oct 2018 06:27) (100/41 - 150/71)  BP(mean): --  RR: 29 (11 Oct 2018 06:27) (17 - 29)  SpO2: 93% (11 Oct 2018 07:03) (90% - 97%)    PHYSICAL EXAM:  GENERAL: Obese woman, laying in bed, NAD  HEAD:  Atraumatic, Normocephalic  EYES: PERRLA, conjunctiva and sclera clear  ENMT: No tonsillar erythema, exudates, or enlargement; moist mucous membranes  NECK: Supple, No JVD  NERVOUS SYSTEM:  awake, alert, grossly nonfocal exam  CHEST/LUNG: Good inspiratory effort, scattered rhonchi, no crackles or wheezes.  HEART: Regular rate and rhythm; No murmurs, rubs, or gallops  ABDOMEN: Obese, soft, Nontender, Nondistended; Bowel sounds present  EXTREMITIES: trace edema, no cyanosis  SKIN: No rashes or lesions    LABS:                        11.6   6.68  )-----------( 392      ( 11 Oct 2018 02:27 )             38.5     10-11    139  |  98  |  21  ----------------------------<  233<H>  3.5   |  24  |  0.93    Ca    8.6      11 Oct 2018 02:27  Phos  3.5     10-11  Mg     2.3     10-11    TPro  7.4  /  Alb  3.8  /  TBili  0.4  /  DBili  x   /  AST  17  /  ALT  20  /  AlkPhos  137<H>  10-11    CAPILLARY BLOOD GLUCOSE    POCT Blood Glucose.: 227 mg/dL (11 Oct 2018 09:08)  POCT Blood Glucose.: 225 mg/dL (10 Oct 2018 23:25)    RADIOLOGY & ADDITIONAL TESTS:    < from: CT Chest No Cont (10.10.18 @ 22:54) >  IMPRESSION:    Severe centrilobular emphysema.    New consolidation within the right upper lobe for which a repeat chest CT   is recommended in 1 month.    The previously noted right lower lobe consolidation has resolved. There   is focal rounded atelectasis with new small right pleural effusion.    Stable 6 mm left lower lobe nodule.    < end of copied text >    MEDICATIONS  (STANDING):  ALBUTerol/ipratropium for Nebulization 3 milliLiter(s) Nebulizer every 6 hours  amLODIPine   Tablet 10 milliGRAM(s) Oral daily  aspirin enteric coated 81 milliGRAM(s) Oral daily  buDESOnide 160 MICROgram(s)/formoterol 4.5 MICROgram(s) Inhaler 2 Puff(s) Inhalation two times a day  cefTRIAXone   IVPB 1 Gram(s) IV Intermittent every 24 hours  dextrose 5%. 1000 milliLiter(s) (50 mL/Hr) IV Continuous <Continuous>  dextrose 50% Injectable 12.5 Gram(s) IV Push once  dextrose 50% Injectable 25 Gram(s) IV Push once  dextrose 50% Injectable 25 Gram(s) IV Push once  furosemide    Tablet 40 milliGRAM(s) Oral two times a day  insulin lispro (HumaLOG) corrective regimen sliding scale   SubCutaneous three times a day before meals  insulin lispro (HumaLOG) corrective regimen sliding scale   SubCutaneous at bedtime  pantoprazole    Tablet 40 milliGRAM(s) Oral before breakfast  potassium chloride    Tablet ER 20 milliEquivalent(s) Oral daily  predniSONE   Tablet 40 milliGRAM(s) Oral daily    MEDICATIONS  (PRN):  clonazePAM Tablet 0.5 milliGRAM(s) Oral three times a day PRN anxiety  dextrose 40% Gel 15 Gram(s) Oral once PRN Blood Glucose LESS THAN 70 milliGRAM(s)/deciliter  glucagon  Injectable 1 milliGRAM(s) IntraMuscular once PRN Glucose LESS THAN 70 milligrams/deciliter      Allergies    No Known Allergies    Intolerances Saida Velasquez, PGY1  Pager [phone]    Patient is a 87y old  female who presents with a chief complaint of shortness of breath (10 Oct 2018 20:01)    INTERVAL HPI/OVERNIGHT EVENTS: Pt with respiratory distress overnight, tachypneic to 40s, with wheezing on exam. Treated with mag, duonebs, solumedrol, and Bipap settings increased, with good response. This AM, breathing comfortably. No increase in cough or sputum production. States sputum is yellow in am, and clear in afternoon. Improved with mucinex at home. Endorsing some increased LE swelling. Denies CP, abdominal pain.     Vital Signs Last 24 Hrs  T(C): 37.1 (11 Oct 2018 06:27), Max: 37.1 (10 Oct 2018 14:14)  T(F): 98.8 (11 Oct 2018 06:27), Max: 98.8 (11 Oct 2018 06:27)  HR: 86 (11 Oct 2018 09:39) (82 - 99)  BP: 133/58 (11 Oct 2018 06:27) (100/41 - 150/71)  BP(mean): --  RR: 29 (11 Oct 2018 06:27) (17 - 29)  SpO2: 93% (11 Oct 2018 07:03) (90% - 97%)    PHYSICAL EXAM:  GENERAL: Obese woman, laying in bed, NAD  EYES: PERRLA, conjunctiva and sclera clear  ENMT: No tonsillar erythema, exudates, or enlargement; moist mucous membranes  NECK: Supple, No JVD  NERVOUS SYSTEM:  awake, alert, grossly nonfocal exam  CHEST/LUNG: Good inspiratory effort, scattered rhonchi, no crackles or wheezes.  HEART: Regular rate and rhythm; No murmurs, rubs, or gallops  ABDOMEN: Obese, soft, Nontender, Nondistended; Bowel sounds present  EXTREMITIES: trace edema, no cyanosis  SKIN: No rashes or lesions    LABS:                        11.6   6.68  )-----------( 392      ( 11 Oct 2018 02:27 )             38.5     10-11    139  |  98  |  21  ----------------------------<  233<H>  3.5   |  24  |  0.93    Ca    8.6      11 Oct 2018 02:27  Phos  3.5     10-11  Mg     2.3     10-11    TPro  7.4  /  Alb  3.8  /  TBili  0.4  /  DBili  x   /  AST  17  /  ALT  20  /  AlkPhos  137<H>  10-11    CAPILLARY BLOOD GLUCOSE    POCT Blood Glucose.: 227 mg/dL (11 Oct 2018 09:08)  POCT Blood Glucose.: 225 mg/dL (10 Oct 2018 23:25)    RADIOLOGY & ADDITIONAL TESTS:    < from: CT Chest No Cont (10.10.18 @ 22:54) >  IMPRESSION:    Severe centrilobular emphysema.    New consolidation within the right upper lobe for which a repeat chest CT   is recommended in 1 month.    The previously noted right lower lobe consolidation has resolved. There   is focal rounded atelectasis with new small right pleural effusion.    Stable 6 mm left lower lobe nodule.    < end of copied text >    MEDICATIONS  (STANDING):  ALBUTerol/ipratropium for Nebulization 3 milliLiter(s) Nebulizer every 6 hours  amLODIPine   Tablet 10 milliGRAM(s) Oral daily  aspirin enteric coated 81 milliGRAM(s) Oral daily  buDESOnide 160 MICROgram(s)/formoterol 4.5 MICROgram(s) Inhaler 2 Puff(s) Inhalation two times a day  cefTRIAXone   IVPB 1 Gram(s) IV Intermittent every 24 hours  dextrose 5%. 1000 milliLiter(s) (50 mL/Hr) IV Continuous <Continuous>  dextrose 50% Injectable 12.5 Gram(s) IV Push once  dextrose 50% Injectable 25 Gram(s) IV Push once  dextrose 50% Injectable 25 Gram(s) IV Push once  furosemide    Tablet 40 milliGRAM(s) Oral two times a day  insulin lispro (HumaLOG) corrective regimen sliding scale   SubCutaneous three times a day before meals  insulin lispro (HumaLOG) corrective regimen sliding scale   SubCutaneous at bedtime  pantoprazole    Tablet 40 milliGRAM(s) Oral before breakfast  potassium chloride    Tablet ER 20 milliEquivalent(s) Oral daily  predniSONE   Tablet 40 milliGRAM(s) Oral daily    MEDICATIONS  (PRN):  clonazePAM Tablet 0.5 milliGRAM(s) Oral three times a day PRN anxiety  dextrose 40% Gel 15 Gram(s) Oral once PRN Blood Glucose LESS THAN 70 milliGRAM(s)/deciliter  glucagon  Injectable 1 milliGRAM(s) IntraMuscular once PRN Glucose LESS THAN 70 milligrams/deciliter      Allergies    No Known Allergies    Intolerances

## 2018-10-11 NOTE — PHYSICAL THERAPY INITIAL EVALUATION ADULT - ADDITIONAL COMMENTS
as per Pt. lives in a private house with her spouse. Pt. reports she has steps at home to negotiate with Handrail x1. Pt. was previously ambulating household distances with a rolling walker independently and community negotiation with a wheelchair with assistance. Pt. also reports she previously independent with ADLs. Pt. returned supine in bed with all tubes/lines intact, call bell in reach and in NAD.

## 2018-10-11 NOTE — PROGRESS NOTE ADULT - PROBLEM SELECTOR PLAN 8
No hx of DM Type 2, not on medications, FS on past admission elevated likely 2/2 to steroid induced. FS in the past as high as >300 while on steroids  A1c this AM 6.2, was 7.4 in April 2018  Will order ISS, FS as patient on steroids

## 2018-10-11 NOTE — PROGRESS NOTE ADULT - PROBLEM SELECTOR PLAN 9
DVT PPX: ambulate as tolerated  Diet: NPO while on BIPAP, then soft diet  PT consult  Dispo: pending improvement in respiratory status

## 2018-10-11 NOTE — PROGRESS NOTE ADULT - SUBJECTIVE AND OBJECTIVE BOX
PULMONARY PROGRESS NOTE    VIDAL LOUIE  MRN-[id number]    Patient is a 87y old  Female who presents with a chief complaint of shortness of breath (10 Oct 2018 20:01)      HPI:  -short of breath and cough that is mostly dry, feels tired.    ROS:   -no N/V    ACTIVE MEDICATION LIST:  MEDICATIONS  (STANDING):  ALBUTerol/ipratropium for Nebulization 3 milliLiter(s) Nebulizer every 6 hours  amLODIPine   Tablet 10 milliGRAM(s) Oral daily  aspirin enteric coated 81 milliGRAM(s) Oral daily  buDESOnide 160 MICROgram(s)/formoterol 4.5 MICROgram(s) Inhaler 2 Puff(s) Inhalation two times a day  cefTRIAXone   IVPB 1 Gram(s) IV Intermittent every 24 hours  dextrose 5%. 1000 milliLiter(s) (50 mL/Hr) IV Continuous <Continuous>  dextrose 50% Injectable 12.5 Gram(s) IV Push once  dextrose 50% Injectable 25 Gram(s) IV Push once  dextrose 50% Injectable 25 Gram(s) IV Push once  furosemide    Tablet 40 milliGRAM(s) Oral two times a day  insulin lispro (HumaLOG) corrective regimen sliding scale   SubCutaneous three times a day before meals  insulin lispro (HumaLOG) corrective regimen sliding scale   SubCutaneous at bedtime  pantoprazole    Tablet 40 milliGRAM(s) Oral before breakfast  potassium chloride    Tablet ER 20 milliEquivalent(s) Oral daily  predniSONE   Tablet 40 milliGRAM(s) Oral daily    MEDICATIONS  (PRN):  clonazePAM Tablet 0.5 milliGRAM(s) Oral three times a day PRN anxiety  dextrose 40% Gel 15 Gram(s) Oral once PRN Blood Glucose LESS THAN 70 milliGRAM(s)/deciliter  glucagon  Injectable 1 milliGRAM(s) IntraMuscular once PRN Glucose LESS THAN 70 milligrams/deciliter      EXAM:  Vital Signs Last 24 Hrs  T(C): 37.1 (11 Oct 2018 06:27), Max: 37.1 (10 Oct 2018 14:14)  T(F): 98.8 (11 Oct 2018 06:27), Max: 98.8 (11 Oct 2018 06:27)  HR: 86 (11 Oct 2018 09:39) (82 - 99)  BP: 133/58 (11 Oct 2018 06:27) (100/41 - 150/71)  BP(mean): --  RR: 29 (11 Oct 2018 06:27) (17 - 29)  SpO2: 93% (11 Oct 2018 07:03) (90% - 97%)    GENERAL: The patient is awake and alert in no apparent distress.     LUNGS: faint exp wheeze    HEART: S1/S2    LABS/IMAGING: reviewed                        11.6   6.68  )-----------( 392      ( 11 Oct 2018 02:27 )             38.5   10-11    139  |  98  |  21  ----------------------------<  233<H>  3.5   |  24  |  0.93    Ca    8.6      11 Oct 2018 02:27  Phos  3.5     10-11  Mg     2.3     10-11    TPro  7.4  /  Alb  3.8  /  TBili  0.4  /  DBili  x   /  AST  17  /  ALT  20  /  AlkPhos  137<H>  10-11    < from: CT Chest No Cont (10.10.18 @ 22:54) >  IMPRESSION:    Severe centrilobular emphysema.    New consolidation within the right upper lobe for which a repeat chest CT   is recommended in 1 month.    The previously noted right lower lobe consolidation has resolved. There   is focal rounded atelectasis with new small right pleural effusion.    Stable 6 mm left lower lobe nodule.    < end of copied text >      PROBLEM LIST:  87y Female with HEALTH ISSUES - PROBLEM Dx:  Pneumonia  COPD  DAVID  Type 2 diabetes mellitus with complication  Essential Hypertension      RECS:  -abx x 7 days for pneumonia  -duonebs, symbicort  -prednisone 40mg daily x 5 days  -will need repeat chest ct in 3 months        Adrianne Garcia MD   [phone]

## 2018-10-11 NOTE — CHART NOTE - NSCHARTNOTEFT_GEN_A_CORE
8:35pm called to bedside by RN for increased WOB  Pt seen and examined. Pt has NC on at 4L O2, is tachypneic to ~30s and in tripod position. Pt states nasal cannula has been bothering her (nasal irritation) so she has been taking it off intermittently. Pt appears much more comfortable after ~2 minutes wearing nasal cannula. Offered BiPAP (at bedside) for comfort but pt adamantly refusing it 2/2 discomfort with the mask. Pulse ox taken at bedside with SpO2 95-96%, RR and respiratory effort improved with nasal cannula. Lung exam with bilateral wheezes. Duonebs x2 ordered. Respiratory therapist called for face tent for comfort, to maintain SpO2 88-92%. Respiratory therapist at bedside, will administer tx. Will re-assess after duonebs.     Patsy Montes, PGY-1  Night Float  [id number] 8:35pm called to bedside by RN for increased WOB  Pt seen and examined. Pt has NC on at 4L O2, is tachypneic to ~30s and in tripod position. Pt states nasal cannula has been bothering her (nasal irritation) so she has been taking it off intermittently. Pt appears much more comfortable after ~2 minutes wearing nasal cannula. Offered BiPAP (at bedside) for comfort but pt adamantly refusing it 2/2 discomfort with the mask. Pulse ox taken at bedside with SpO2 95-96%, RR and respiratory effort improved with nasal cannula. Lung exam with bilateral wheezes. Duonebs x2 ordered. Respiratory therapist called for face tent for comfort, to maintain SpO2 88-92%. Respiratory therapist at bedside, will administer tx. Will re-assess after duonebs.     Patsy Montes, PGY-1  Night Float  [id number]      11:10pm update  Pt re-examined several times, respiratory effort greatly improved with duonebs. Pt is currently sleeping comfortably, on 3L NC

## 2018-10-11 NOTE — PHYSICAL THERAPY INITIAL EVALUATION ADULT - PLANNED THERAPY INTERVENTIONS, PT EVAL
bed mobility training/balance training/transfer training/gait training/strengthening/stair negotiation

## 2018-10-11 NOTE — PROGRESS NOTE ADULT - PROBLEM SELECTOR PLAN 2
Currently resolved, satting at goal on baseline supplemental O2 3L. Episode of increased WOB and agitation last night, with no hypoxemia, requiring increased Bipap settings and additional steroid, duonebs and mag, as well as ativan for agitation.   Continue BIPAP 10/5 (home settings CPAP 5) at night  VBG with no evidence of acidosis, PCO2 40, no need to trend  Goal O2 88-92%

## 2018-10-11 NOTE — PHYSICAL THERAPY INITIAL EVALUATION ADULT - PATIENT PROFILE REVIEW, REHAB EVAL
Pt. profile reviewed, consulted with SOURAV PARMAR prior to initial PT evaluation and tx, as per RN, Pt. is OK to participate in skilled therapy session./yes

## 2018-10-11 NOTE — PROGRESS NOTE ADULT - PROBLEM SELECTOR PLAN 4
Screening EKG, QTc 500  s/p Azithromycin IV  Avoid QT prolonging agents, includes macrolides and fluoroquinolones  recheck EKG today

## 2018-10-11 NOTE — PHYSICAL THERAPY INITIAL EVALUATION ADULT - GAIT DISTANCE, PT EVAL
during ambulation pt. Spo2 decreased to 84% while on supplemental O2 via NC. Pt. returned to supine in bed in which Spo2 increased to above 92% after 1 minute rest break. Pt. reports asymptomatic throughout session. Pt. returned supine in bed with all tubes/lines intact, call bell in reach and in NAD. SOURAV HENRIQUEZ made aware/10 feet

## 2018-10-11 NOTE — PROGRESS NOTE ADULT - PROBLEM SELECTOR PLAN 1
Patient with hx of COPD 2/2 emphysema, on baseline 3L home O2 with worsening cough, sputum production and SOB, likely precipitated by pneumonia given CXR evidence of multifocal PNA and CT chest with new right upper lobe consolidation. No recent hospitalization within 3 months. Will treat for CAP.  RVP neg, lactate wnl  WBC elevated to 11.2, this AM 6.68  VBG no evidence of acidosis   F/u sputum cx  Antibiotics x7 days, currently on ceftriaxone  Avoid azithromycin and levofloxacin given prolonged QT  Continue prednisone 40 mg x 5 days  Symbicort, spiriva, erica  Chest PT

## 2018-10-11 NOTE — CHART NOTE - NSCHARTNOTEFT_GEN_A_CORE
Called by RN overnight for increased WOB while on BIPAP. Assessed patient at bedside. BIPAP setting 10/5 PEEP 5, FIO2 40% TV ~200, BP stable, O2 98%. On exam: poor inspiratory effort, expiratory wheezing noted, patient agitated, RR40s. Magnesium 2 mg IV x 1, duonebs x 1, solumedrol 60 mg IV x 1, increased BIPAP 12/5, TV >400. Patient still with increased WOB and agitation. Gave Ativan 0.5 mg IV x 1. Labs reviewed, not acidotic PCO2 40, lactate wnl. EKG  ms. D/lou Levaquin. Reordered Ceftriaxone, EKG in AM to continue Azithromycin. Saw bedside multiple times throughout the night, now patient resting comfortably on BIPAP.     Barbie Kim, PGY2.

## 2018-10-11 NOTE — PHYSICAL THERAPY INITIAL EVALUATION ADULT - PERTINENT HX OF CURRENT PROBLEM, REHAB EVAL
Pt. is a 87 year old female admitted [facility] secondary to pneumonia. PMH: Neuropathy, COPD, HTN.

## 2018-10-11 NOTE — PROGRESS NOTE ADULT - PROBLEM SELECTOR PLAN 5
Patient on diuretics, unclear if hx of heart failure, TTE in 2017 no evidence of pulmonary HTN, diastolic/systolic dysfunction. Slightly hypervolemic on exam, CXRY evidence of questionable pulmonary edema vs multifocal PNA. CT Chest with new right pulmonary edema with surrounding atelectasis However, pro-BNP wnl after age correction and unchanged from baseline.   s/p Lasix 20 IV x 1  Will restart home dose Lasix 40 PO BID   Strict I+Os

## 2018-10-12 ENCOUNTER — TRANSCRIPTION ENCOUNTER (OUTPATIENT)
Age: 83
End: 2018-10-12

## 2018-10-12 VITALS
OXYGEN SATURATION: 99 % | DIASTOLIC BLOOD PRESSURE: 59 MMHG | HEART RATE: 77 BPM | TEMPERATURE: 98 F | RESPIRATION RATE: 22 BRPM | SYSTOLIC BLOOD PRESSURE: 117 MMHG

## 2018-10-12 DIAGNOSIS — J96.20 ACUTE AND CHRONIC RESPIRATORY FAILURE, UNSPECIFIED WHETHER WITH HYPOXIA OR HYPERCAPNIA: ICD-10-CM

## 2018-10-12 LAB
ANION GAP SERPL CALC-SCNC: 13 MMOL/L
BASOPHILS # BLD AUTO: 0.05 K/UL
BASOPHILS NFR BLD AUTO: 0.5 %
BUN SERPL-MCNC: 20 MG/DL
CALCIUM SERPL-MCNC: 8.9 MG/DL
CHLORIDE SERPL-SCNC: 100 MMOL/L
CO2 SERPL-SCNC: 29 MMOL/L
CREAT SERPL-MCNC: 1.04 MG/DL
DEPRECATED D DIMER PPP IA-ACNC: 483 NG/ML DDU
EOSINOPHIL # BLD AUTO: 0.14 K/UL
EOSINOPHIL NFR BLD AUTO: 1.4 %
GLUCOSE SERPL-MCNC: 136 MG/DL
HCT VFR BLD CALC: 37.6 %
HGB BLD-MCNC: 11.2 G/DL
IMM GRANULOCYTES NFR BLD AUTO: 0.3 %
LYMPHOCYTES # BLD AUTO: 0.83 K/UL
LYMPHOCYTES NFR BLD AUTO: 8.6 %
MAN DIFF?: NORMAL
MCHC RBC-ENTMCNC: 24.1 PG
MCHC RBC-ENTMCNC: 29.8 GM/DL
MCV RBC AUTO: 81 FL
MONOCYTES # BLD AUTO: 0.77 K/UL
MONOCYTES NFR BLD AUTO: 7.9 %
NEUTROPHILS # BLD AUTO: 7.88 K/UL
NEUTROPHILS NFR BLD AUTO: 81.3 %
NT-PROBNP SERPL-MCNC: 393 PG/ML
PLATELET # BLD AUTO: 332 K/UL
POTASSIUM SERPL-SCNC: 3.7 MMOL/L
RBC # BLD: 4.64 M/UL
RBC # FLD: 17.3 %
SODIUM SERPL-SCNC: 142 MMOL/L
WBC # FLD AUTO: 9.7 K/UL

## 2018-10-12 PROCEDURE — 99232 SBSQ HOSP IP/OBS MODERATE 35: CPT

## 2018-10-12 PROCEDURE — 99239 HOSP IP/OBS DSCHRG MGMT >30: CPT

## 2018-10-12 RX ORDER — SODIUM CHLORIDE 0.65 %
1 AEROSOL, SPRAY (ML) NASAL
Qty: 0 | Refills: 0 | Status: DISCONTINUED | OUTPATIENT
Start: 2018-10-12 | End: 2018-10-12

## 2018-10-12 RX ADMIN — Medication 40 MILLIGRAM(S): at 06:26

## 2018-10-12 RX ADMIN — Medication 1 TABLET(S): at 14:57

## 2018-10-12 RX ADMIN — PANTOPRAZOLE SODIUM 40 MILLIGRAM(S): 20 TABLET, DELAYED RELEASE ORAL at 06:26

## 2018-10-12 RX ADMIN — Medication 3 MILLILITER(S): at 15:02

## 2018-10-12 RX ADMIN — BUDESONIDE AND FORMOTEROL FUMARATE DIHYDRATE 2 PUFF(S): 160; 4.5 AEROSOL RESPIRATORY (INHALATION) at 09:24

## 2018-10-12 RX ADMIN — Medication 81 MILLIGRAM(S): at 13:25

## 2018-10-12 RX ADMIN — Medication 2: at 09:24

## 2018-10-12 RX ADMIN — Medication 3 MILLILITER(S): at 10:38

## 2018-10-12 RX ADMIN — Medication 40 MILLIGRAM(S): at 06:25

## 2018-10-12 RX ADMIN — Medication 1 SPRAY(S): at 13:24

## 2018-10-12 RX ADMIN — TIOTROPIUM BROMIDE 1 CAPSULE(S): 18 CAPSULE ORAL; RESPIRATORY (INHALATION) at 10:14

## 2018-10-12 RX ADMIN — Medication 20 MILLIEQUIVALENT(S): at 13:23

## 2018-10-12 RX ADMIN — AMLODIPINE BESYLATE 10 MILLIGRAM(S): 2.5 TABLET ORAL at 06:25

## 2018-10-12 RX ADMIN — Medication 4: at 13:24

## 2018-10-12 NOTE — DISCHARGE NOTE ADULT - CARE PROVIDER_API CALL
Mike Sheets), Internal Medicine; Pulmonary Disease  [address] [facility]  [address] [place] [address]  Phone: [phone]  Fax: [phone]

## 2018-10-12 NOTE — PROGRESS NOTE ADULT - PROBLEM SELECTOR PLAN 1
Patient with hx of COPD 2/2 emphysema, on baseline 3L home O2 with worsening cough, sputum production and SOB, likely precipitated by pneumonia given CXR evidence of multifocal PNA and CT chest with new right upper lobe consolidation. No recent hospitalization within 3 months. Afebrile, well appearing. Will treat for CAP.  RVP neg, lactate wnl  WBC initially elevated to 11.2, normalized   VBG no evidence of acidosis   BCx NGTD  Antibiotics x7 days, currently on ceftriaxone and azithromycin (repeat EKG showed normal QTc so azithromycin restarted last night)  Will transition to PO augmentin to complete 7 day course on 10/17  Continue prednisone 40 mg x 5 days  Symbicort, spiriva, duonebs  Chest PT

## 2018-10-12 NOTE — DISCHARGE NOTE ADULT - MEDICATION SUMMARY - MEDICATIONS TO TAKE
I will START or STAY ON the medications listed below when I get home from the hospital:    Calcium  -- 1 tab(s) by mouth once a day  -- Indication: For Preventive measure    predniSONE 20 mg oral tablet  -- 2 tab(s) by mouth once a day.   Stop after 2 days.  -- Indication: For COPD exacerbation    aspirin 81 mg oral tablet  -- 1 tab(s) by mouth once a day in am  -- Indication: For Preventive measure    gabapentin 400 mg oral capsule  -- 4 cap(s) by mouth 2 times a day  -- Indication: For Neuropathy    KlonoPIN 0.5 mg oral tablet  -- 1 tab(s) by mouth 3 times a day, As Needed  -- Indication: For Anxiety    albuterol 90 mcg/inh inhalation powder  -- 2 puff(s) inhaled every 6 hours, As Needed   -- For inhalation only.  It is very important that you take or use this exactly as directed.  Do not skip doses or discontinue unless directed by your doctor.  Obtain medical advice before taking any non-prescription drugs as some may affect the action of this medication.    -- Indication: For COPD     Spiriva 18 mcg inhalation capsule  -- 1 cap(s) inhaled once a day in am  -- Indication: For COPD    Symbicort 160 mcg-4.5 mcg/inh inhalation aerosol  -- 2 puff(s) inhaled 2 times a day  -- Indication: For COPD     amLODIPine 10 mg oral tablet  -- 1 tab(s) by mouth once a day  -- Indication: For Hypertension    Lasix 40 mg oral tablet  -- 1 tab(s) by mouth 2 times a day  -- Indication: For Heart     potassium chloride 20 mEq oral tablet, extended release  -- 1 tab(s) by mouth once a day  -- Indication: For Preventive measure    amoxicillin-clavulanate 500 mg-125 mg oral tablet  -- 1 tab(s) by mouth every 8 hours  Stop taking on 10/17  -- Indication: For Pneumonia    omeprazole 40 mg oral delayed release capsule  -- 1 cap(s) by mouth once a day in am  -- Indication: For GERD    Vitamin D3 1000 intl units oral tablet  -- 1 tab(s) by mouth once a day in am  -- Indication: For Preventive measure

## 2018-10-12 NOTE — PROGRESS NOTE ADULT - PROBLEM SELECTOR PLAN 3
Resolved.  Screening EKG on 10/10, QTc 500, s/p azithromycin IV  Repeat EKG yesterday with normal QTC

## 2018-10-12 NOTE — DISCHARGE NOTE ADULT - PATIENT PORTAL LINK FT
You can access the [facility] Patient Portal, offered by [facility], by registering with the following website: [email or web address]

## 2018-10-12 NOTE — DISCHARGE NOTE ADULT - ADDITIONAL INSTRUCTIONS
Please complete the course of prednisone (steroids) and augmentin (antibiotics). Take all medication as prescribed.    Please follow up with Dr Sheets on Oct 26th. Please repeat CT imaging and measurement of HgbA1C (blood test for diabetes) in 1-3 months.

## 2018-10-12 NOTE — PROGRESS NOTE ADULT - SUBJECTIVE AND OBJECTIVE BOX
PULMONARY PROGRESS NOTE    VIDAL LOUIE  MRN-[id number]    Patient is a 87y old  Female who presents with a chief complaint of shortness of breath (10 Oct 2018 20:01)      HPI:  feels better than yesterday, dry mouth, rare cough    ROS:   -no N/V    MEDICATIONS  (STANDING):  ALBUTerol/ipratropium for Nebulization 3 milliLiter(s) Nebulizer every 6 hours  amLODIPine   Tablet 10 milliGRAM(s) Oral daily  amoxicillin  500 milliGRAM(s)/clavulanate 1 Tablet(s) Oral every 8 hours  aspirin enteric coated 81 milliGRAM(s) Oral daily  buDESOnide 160 MICROgram(s)/formoterol 4.5 MICROgram(s) Inhaler 2 Puff(s) Inhalation two times a day  dextrose 5%. 1000 milliLiter(s) (50 mL/Hr) IV Continuous <Continuous>  dextrose 50% Injectable 12.5 Gram(s) IV Push once  dextrose 50% Injectable 25 Gram(s) IV Push once  dextrose 50% Injectable 25 Gram(s) IV Push once  furosemide    Tablet 40 milliGRAM(s) Oral two times a day  insulin lispro (HumaLOG) corrective regimen sliding scale   SubCutaneous three times a day before meals  insulin lispro (HumaLOG) corrective regimen sliding scale   SubCutaneous at bedtime  pantoprazole    Tablet 40 milliGRAM(s) Oral before breakfast  potassium chloride    Tablet ER 20 milliEquivalent(s) Oral daily  predniSONE   Tablet 40 milliGRAM(s) Oral daily  sodium chloride 0.65% Nasal 1 Spray(s) Both Nostrils two times a day  tiotropium 18 MICROgram(s) Capsule 1 Capsule(s) Inhalation daily    MEDICATIONS  (PRN):  clonazePAM Tablet 0.5 milliGRAM(s) Oral three times a day PRN anxiety  dextrose 40% Gel 15 Gram(s) Oral once PRN Blood Glucose LESS THAN 70 milliGRAM(s)/deciliter  glucagon  Injectable 1 milliGRAM(s) IntraMuscular once PRN Glucose LESS THAN 70 milligrams/deciliter      EXAM:  Vital Signs Last 24 Hrs  T(C): 36.4 (12 Oct 2018 06:11), Max: 36.8 (11 Oct 2018 22:10)  T(F): 97.5 (12 Oct 2018 06:11), Max: 98.3 (11 Oct 2018 22:10)  HR: 83 (12 Oct 2018 10:38) (79 - 89)  BP: 119/42 (12 Oct 2018 06:11) (106/48 - 142/48)  BP(mean): --  RR: 20 (12 Oct 2018 06:11) (20 - 26)  SpO2: 96% (12 Oct 2018 07:02) (95% - 100%)    GENERAL: The patient is awake and alert in no apparent distress.     LUNGS: clear    HEART: S1/S2    LABS/IMAGING: reviewed                                   11.6   6.68  )-----------( 392      ( 11 Oct 2018 02:27 )             38.5   10-11    139  |  98  |  21  ----------------------------<  233<H>  3.5   |  24  |  0.93    Ca    8.6      11 Oct 2018 02:27  Phos  3.5     10-11  Mg     2.3     10-11    TPro  7.4  /  Alb  3.8  /  TBili  0.4  /  DBili  x   /  AST  17  /  ALT  20  /  AlkPhos  137<H>  10-11      < from: CT Chest No Cont (10.10.18 @ 22:54) >  IMPRESSION:    Severe centrilobular emphysema.    New consolidation within the right upper lobe for which a repeat chest CT   is recommended in 1 month.    The previously noted right lower lobe consolidation has resolved. There   is focal rounded atelectasis with new small right pleural effusion.    Stable 6 mm left lower lobe nodule.    < end of copied text >      PROBLEM LIST:  87y Female with HEALTH ISSUES - PROBLEM Dx:  Pneumonia  COPD  DAVID  Type 2 diabetes mellitus with complication  Essential Hypertension      RECS:  -abx x 7 days for pneumonia  -duonebs, symbicort  -prednisone 40mg daily x 5 days  -will need repeat chest ct in 3 months, follow up with  after discharge        Adrianne Garcia MD   [phone]

## 2018-10-12 NOTE — DISCHARGE NOTE ADULT - CONDITIONS AT DISCHARGE
This Patient is stable for discharge; self sufficient with her care ; Oxygen 3L NC/Min is used and at Home.; Skin is intact ; vital Signs are stable; she remains on a 1.5 Liter Fluid Restriction. Discharge Instructions are discussed and given. Left Arm Precautions were observed

## 2018-10-12 NOTE — PROGRESS NOTE ADULT - ASSESSMENT
87F with COPD 2/2 emphysema (3L home O2), DAVID on CPAP, moderate pulmonary HTN, HTN, breast ca s/p lumpectomy and RT (>20 years ago) presenting with shortness of breath x 1 day admitted for COPD exacerbation 2/2 likely PNA

## 2018-10-12 NOTE — PROGRESS NOTE ADULT - SUBJECTIVE AND OBJECTIVE BOX
Saida Velasquez, PGY1  Pager [phone]    Patient is a 87y old  Female who presents with a chief complaint of shortness of breath (11 Oct 2018 11:45)    INTERVAL HPI/OVERNIGHT EVENTS: Pt with episode of respiratory distress overnight after taking off nasal cannula due to discomfort. Tachypneic to 30s, with no desats. Tachypnea improved after nasal cannula replaced. Pt given duonebs x2. This AM, no complaints of SOB. States her cough has improved somewhat. No fever/chills, headache, N/V, abdominal pain. Last BM 2 days ago.     Vital Signs Last 24 Hrs  T(C): 36.4 (12 Oct 2018 06:11), Max: 36.8 (11 Oct 2018 22:10)  T(F): 97.5 (12 Oct 2018 06:11), Max: 98.3 (11 Oct 2018 22:10)  HR: 83 (12 Oct 2018 10:38) (79 - 89)  BP: 119/42 (12 Oct 2018 06:11) (106/48 - 142/48)  BP(mean): --  RR: 20 (12 Oct 2018 06:11) (20 - 26)  SpO2: 96% (12 Oct 2018 07:02) (95% - 100%)    PHYSICAL EXAM:  GENERAL: Obese woman, laying in bed, NAD  EYES: PERRLA, conjunctiva and sclera clear  ENMT: No tonsillar erythema, exudates, or enlargement; moist mucous membranes  NECK: Supple, No JVD  NERVOUS SYSTEM:  awake, alert, grossly nonfocal exam  CHEST/LUNG: Good inspiratory effort, scattered rhonchi, no crackles or wheezes.  HEART: Regular rate and rhythm; No murmurs, rubs, or gallops  ABDOMEN: Obese, soft, Nontender, Nondistended; Bowel sounds present  EXTREMITIES: trace edema, no cyanosis  SKIN: No rashes or lesions    LABS:                        11.6   6.68  )-----------( 392      ( 11 Oct 2018 02:27 )             38.5     10-11    139  |  98  |  21  ----------------------------<  233<H>  3.5   |  24  |  0.93    Ca    8.6      11 Oct 2018 02:27  Phos  3.5     10-11  Mg     2.3     10-11    TPro  7.4  /  Alb  3.8  /  TBili  0.4  /  DBili  x   /  AST  17  /  ALT  20  /  AlkPhos  137<H>  10-11    CAPILLARY BLOOD GLUCOSE    POCT Blood Glucose.: 155 mg/dL (12 Oct 2018 08:53)  POCT Blood Glucose.: 161 mg/dL (11 Oct 2018 22:08)  POCT Blood Glucose.: 154 mg/dL (11 Oct 2018 18:08)  POCT Blood Glucose.: 245 mg/dL (11 Oct 2018 12:28)    MEDICATIONS  (STANDING):  ALBUTerol/ipratropium for Nebulization 3 milliLiter(s) Nebulizer every 6 hours  amLODIPine   Tablet 10 milliGRAM(s) Oral daily  aspirin enteric coated 81 milliGRAM(s) Oral daily  azithromycin  IVPB 500 milliGRAM(s) IV Intermittent every 24 hours  azithromycin  IVPB      buDESOnide 160 MICROgram(s)/formoterol 4.5 MICROgram(s) Inhaler 2 Puff(s) Inhalation two times a day  cefTRIAXone   IVPB 1 Gram(s) IV Intermittent every 24 hours  dextrose 5%. 1000 milliLiter(s) (50 mL/Hr) IV Continuous <Continuous>  dextrose 50% Injectable 12.5 Gram(s) IV Push once  dextrose 50% Injectable 25 Gram(s) IV Push once  dextrose 50% Injectable 25 Gram(s) IV Push once  furosemide    Tablet 40 milliGRAM(s) Oral two times a day  insulin lispro (HumaLOG) corrective regimen sliding scale   SubCutaneous three times a day before meals  insulin lispro (HumaLOG) corrective regimen sliding scale   SubCutaneous at bedtime  pantoprazole    Tablet 40 milliGRAM(s) Oral before breakfast  potassium chloride    Tablet ER 20 milliEquivalent(s) Oral daily  predniSONE   Tablet 40 milliGRAM(s) Oral daily  sodium chloride 0.65% Nasal 1 Spray(s) Both Nostrils two times a day  tiotropium 18 MICROgram(s) Capsule 1 Capsule(s) Inhalation daily    MEDICATIONS  (PRN):  clonazePAM Tablet 0.5 milliGRAM(s) Oral three times a day PRN anxiety  dextrose 40% Gel 15 Gram(s) Oral once PRN Blood Glucose LESS THAN 70 milliGRAM(s)/deciliter  glucagon  Injectable 1 milliGRAM(s) IntraMuscular once PRN Glucose LESS THAN 70 milligrams/deciliter    Allergies    No Known Allergies    Intolerances

## 2018-10-12 NOTE — DISCHARGE NOTE ADULT - PLAN OF CARE
Continue taking medication as prescribed You presented with worsening of your shortness of breath, and CT chest showed pneumonia. You were treated with antibiotics and steroids with improvement. Continue antibiotics and inhalers as prescribed. Follow up your pulmonologist in 1-2 weeks for further management. Monitor for fevers, chills, worsening shortness of breath, chest pain.    Follow up with Dr. Sheets within 1-2 weeks for further evaluation. You presented with worsening of your shortness of breath and cough caused by an infection of your lung called pneumonia. This made your existing COPD worse. You were treated with antibiotics and steroids with improvement. Continue your antibiotics, steroids and inhalers as prescribed.     COPD is the name given to a group of diseases that limit the flow of air in and out of your lungs. This makes it harder to breathe. With COPD, you are also more likely to get lung infections. COPD includes chronic bronchitis and emphysema. COPD is most often caused by heavy, long-term cigarette smoking.    Please return to the ED if you experience fevers, chills, worsening shortness of breath, chest pain.    Follow up with Dr. Sheets on Oct 26th. Continue with current management Continue amlodipine and lasix. Monitor blood pressure regularly and monitor for visual changes, headaches or dizziness. Continue with CPAP machine as recommended. Follow up with your pulmonologist. Continue current management Continue home klonopin as needed Continue using home oxygen. Continue taking Spriva, Symbicort daily and Albuterol inhaler as needed. Follow up with your pulmonologist, Dr Sheets, on Oct 26th. You presented with worsening of your shortness of breath and cough caused by an infection of your lung called pneumonia. This made your existing COPD worse. You were treated with antibiotics and steroids with improvement. Continue your antibiotics, steroids and inhalers as prescribed.   COPD is the name given to a group of diseases that limit the flow of air in and out of your lungs. This makes it harder to breathe. With COPD, you are also more likely to get lung infections. COPD includes chronic bronchitis and emphysema. COPD is most often caused by heavy, long-term cigarette smoking.  Please return to the ED if you experience fevers, chills, worsening shortness of breath, chest pain.  Repeat CT chest in 3 months.  Follow up with Dr. Sheets on Oct 26th.

## 2018-10-12 NOTE — DISCHARGE NOTE ADULT - HOME CARE AGENCY
[facility] AT [place] [phone] Nurse to visit on 10/13/18. Nurse to call prior to visit. Physical Therapy to follow.

## 2018-10-12 NOTE — PROGRESS NOTE ADULT - PROBLEM SELECTOR PLAN 7
No hx of DM Type 2, not on medications, FS on past admission elevated likely 2/2 to steroid induced. FS in the past as high as >300 while on steroids  A1c this admission 6.2, was 7.4 in April 2018  c/w ISS, FS as patient on steroids

## 2018-10-12 NOTE — DISCHARGE NOTE ADULT - HOSPITAL COURSE
86yo Female with COPD (3L home O2), DAVID on CPAP, moderate pulmonary HTN, HTN, breast ca s/p lumpectomy and RT (>20 years ago) presenting with COPD exacerbation. Pt referred to ED by pulmonologist after O2 88% on her baseline 3L. Pt with 5-6 hospitalizations in the past year for either PNA or COPD exacerbation (last in July). Patient reports adherence with medications including Spiriva and Symbicort, with no additional use of albuterol rescue inhaler. Patient afebrile with no URI symptoms. Denies sick contacts.     In ED:  T98.7 /78 HR 88 RR18 90% on baseline supplemental 3L O2   Patient received Azithromycin 500 mg IV x 1, Ceftriaxone 1 g IV x 1,  cc x 1, duonebs x 1, solumedrol 125 mg IV x 1     Patient afebrile, with stable vital signs throughout hospital stay. RVP neg. Blood cultures with no growth. Pt with Xray showing bilateral hazy opacities, worse on the right side. CT chest showed severe centrilobular emphysema, new consolidation within the right upper lobe, and focal rounded atelectasis with new small right pleural effusion. Also showed stable 6 mm left lower lobe nodule. Pt initially managed with ceftriaxone and azithromycin, transitioned to augmentin on day of discharge. Patient to complete 7 day course of augmentin and 5 day course of prednisone as outpatient. Pt continued on home Spiriva, Symbicort and Duonebs.   Pt with two episodes of respiratory distress associated with anxiety or nasal cannula removal. Self-resolved with supportive measures. HgbA1C 6.3 (previously 7.4 in April), possibly due to increased prednisone use. Pt instructed to follow-up with pulmonologist to repeat HgbA1C and CT chest. 88yo Female with COPD (3L home O2), DAVID on CPAP, moderate pulmonary HTN, HTN, breast ca s/p lumpectomy and RT (>20 years ago) presenting with COPD exacerbation. Pt referred to ED by pulmonologist after O2 88% on her baseline 3L. Pt with 5-6 hospitalizations in the past year for either PNA or COPD exacerbation (last in July). Patient reports adherence with medications including Spiriva and Symbicort, with no additional use of albuterol rescue inhaler. Patient afebrile with no URI symptoms. Denies sick contacts.     In ED:  T98.7 /78 HR 88 RR18 90% on baseline supplemental 3L O2   Patient received Azithromycin 500 mg IV x 1, Ceftriaxone 1 g IV x 1,  cc x 1, duonebs x 1, solumedrol 125 mg IV x 1   WBC 11.2. Lactate normal. VBG with no evidence of acidosis. PCO2 40. RVP neg.   Initial EKG with QTc 500, repeat EKG normal QTc.    Patient afebrile, with stable vital signs throughout hospital stay. Blood cultures with no growth. Pt with Xray showing bilateral hazy opacities, worse on the right side. CT chest showed severe centrilobular emphysema, new consolidation within the right upper lobe, and focal rounded atelectasis with new small right pleural effusion. Also showed stable 6 mm left lower lobe nodule. SOB unlikely 2/2 heart failure as patient with no systolic/diastolic dysfunction on previous TTE, pro-BNP wnl. Pt treated for CAP with ceftriaxone and azithromycin, transitioned to augmentin on day of discharge. Patient to complete 7 day course of augmentin and 5 day course of prednisone as outpatient. Pt continued on home Spiriva, Symbicort and Duonebs. Pt with two episodes of respiratory distress associated with anxiety or nasal cannula removal. Self-resolved with supportive measures. HgbA1C 6.3 (previously 7.4 in April), possibly due to increased prednisone use. Pt instructed to follow-up with pulmonologist to repeat HgbA1C and CT chest.

## 2018-10-12 NOTE — DISCHARGE NOTE ADULT - CONDITION (STATED IN TERMS THAT PERMIT A SPECIFIC MEASURABLE COMPARISON WITH CONDITION ON ADMISSION):
On discharge, patient stable, with improvement in cough and SOB, with no signs of respiratory distress, on home oxygen rate of 3L.

## 2018-10-12 NOTE — DISCHARGE NOTE ADULT - CARE PLAN
Principal Discharge DX:	COPD exacerbation  Goal:	Continue taking medication as prescribed  Assessment and plan of treatment:	You presented with worsening of your shortness of breath, and CT chest showed pneumonia. You were treated with antibiotics and steroids with improvement. Continue antibiotics and inhalers as prescribed. Follow up your pulmonologist in 1-2 weeks for further management. Monitor for fevers, chills, worsening shortness of breath, chest pain.    Follow up with Dr. Sheets within 1-2 weeks for further evaluation. Principal Discharge DX:	COPD exacerbation  Goal:	Continue taking medication as prescribed  Assessment and plan of treatment:	You presented with worsening of your shortness of breath and cough caused by an infection of your lung called pneumonia. This made your existing COPD worse. You were treated with antibiotics and steroids with improvement. Continue your antibiotics, steroids and inhalers as prescribed.     COPD is the name given to a group of diseases that limit the flow of air in and out of your lungs. This makes it harder to breathe. With COPD, you are also more likely to get lung infections. COPD includes chronic bronchitis and emphysema. COPD is most often caused by heavy, long-term cigarette smoking.    Please return to the ED if you experience fevers, chills, worsening shortness of breath, chest pain.    Follow up with Dr. Sheets on Oct 26th.  Secondary Diagnosis:	Essential Hypertension  Goal:	Continue with current management  Assessment and plan of treatment:	Continue amlodipine and lasix. Monitor blood pressure regularly and monitor for visual changes, headaches or dizziness.  Secondary Diagnosis:	DAVID on CPAP  Goal:	Continue with current management  Assessment and plan of treatment:	Continue with CPAP machine as recommended. Follow up with your pulmonologist.  Secondary Diagnosis:	Anxiety disorder  Goal:	Continue current management  Assessment and plan of treatment:	Continue home klonopin as needed  Secondary Diagnosis:	Chronic bronchitis  Goal:	Continue current management  Assessment and plan of treatment:	Continue using home oxygen. Continue taking Spriva, Symbicort daily and Albuterol inhaler as needed. Follow up with your pulmonologist, Dr Sheets, on Oct 26th. Principal Discharge DX:	COPD exacerbation  Goal:	Continue taking medication as prescribed  Assessment and plan of treatment:	You presented with worsening of your shortness of breath and cough caused by an infection of your lung called pneumonia. This made your existing COPD worse. You were treated with antibiotics and steroids with improvement. Continue your antibiotics, steroids and inhalers as prescribed.   COPD is the name given to a group of diseases that limit the flow of air in and out of your lungs. This makes it harder to breathe. With COPD, you are also more likely to get lung infections. COPD includes chronic bronchitis and emphysema. COPD is most often caused by heavy, long-term cigarette smoking.  Please return to the ED if you experience fevers, chills, worsening shortness of breath, chest pain.  Repeat CT chest in 3 months.  Follow up with Dr. Sheets on Oct 26th.  Secondary Diagnosis:	Essential Hypertension  Goal:	Continue with current management  Assessment and plan of treatment:	Continue amlodipine and lasix. Monitor blood pressure regularly and monitor for visual changes, headaches or dizziness.  Secondary Diagnosis:	DAVID on CPAP  Goal:	Continue with current management  Assessment and plan of treatment:	Continue with CPAP machine as recommended. Follow up with your pulmonologist.  Secondary Diagnosis:	Anxiety disorder  Goal:	Continue current management  Assessment and plan of treatment:	Continue home klonopin as needed  Secondary Diagnosis:	Chronic bronchitis  Goal:	Continue current management  Assessment and plan of treatment:	Continue using home oxygen. Continue taking Spriva, Symbicort daily and Albuterol inhaler as needed. Follow up with your pulmonologist, Dr Sheets, on Oct 26th.

## 2018-10-12 NOTE — PROGRESS NOTE ADULT - PROBLEM SELECTOR PLAN 2
Episode of increased WOB last night, with no hypoxemia, requiring increased O2 and duonebs  Currently satting at goal on 4L O2, will titrate down to 3L (home requirement)  Continue BIPAP 10/5 (home settings CPAP 5) at night, though pt refused last night  VBG with no evidence of acidosis, PCO2 40, no need to trend  Goal O2 88-92% Episode of increased WOB last night, with no hypoxemia, requiring increased O2 and duonebs  Currently satting at goal on 3L O2 (home requirement)  Continue BIPAP 10/5 (home settings CPAP 5) at night, though pt refused last night  VBG with no evidence of acidosis, PCO2 40, no need to trend  Goal O2 88-92%

## 2018-10-12 NOTE — PROGRESS NOTE ADULT - ATTENDING COMMENTS
Patient was seen and examined personally by me. I have discussed the plan and reviewed the resident's note and agree with the above physical exam findings including assessment and plan except as indicated below.    Respiratory status improved, back to baseline home O2 on 3 liters  Lungs clear on exam, no rhonchi or wheezing    Continue prednisone 40mg x 5 days total  Augmentin on DC to complete 7 day course, Bcx NGTD  Pulm toileting, duonebs, spiriva, symbicort  c/w  home dose lasix  Outpt pulm followup recommended  PT recs: home PT  DC time: 34 min
Patient was seen and examined personally by me. I have discussed the plan and reviewed the resident's note and agree with the above physical exam findings including assessment and plan except as indicated below.    Per patient breathing improving    Continue prednisone 40mg x 5 days total  c/w CTX, azithro (Qtc<500 on repeat EKG today)  Bcx NGTD  Pulm toileting, duonebs, spiriva, symbicort  resume home dose lasix  PT recs: home PT

## 2018-10-15 ENCOUNTER — MOBILE ON CALL (OUTPATIENT)
Age: 83
End: 2018-10-15

## 2018-10-15 ENCOUNTER — APPOINTMENT (OUTPATIENT)
Age: 83
End: 2018-10-15
Payer: MEDICARE

## 2018-10-15 VITALS
BODY MASS INDEX: 39.27 KG/M2 | OXYGEN SATURATION: 98 % | TEMPERATURE: 97.1 F | DIASTOLIC BLOOD PRESSURE: 90 MMHG | RESPIRATION RATE: 16 BRPM | HEART RATE: 73 BPM | WEIGHT: 230 LBS | HEIGHT: 64 IN | SYSTOLIC BLOOD PRESSURE: 160 MMHG

## 2018-10-15 DIAGNOSIS — G62.9 POLYNEUROPATHY, UNSPECIFIED: ICD-10-CM

## 2018-10-15 DIAGNOSIS — R13.10 DYSPHAGIA, UNSPECIFIED: ICD-10-CM

## 2018-10-15 LAB
BACTERIA BLD CULT: SIGNIFICANT CHANGE UP
BACTERIA BLD CULT: SIGNIFICANT CHANGE UP

## 2018-10-15 PROCEDURE — 99349 HOME/RES VST EST MOD MDM 40: CPT

## 2018-10-15 RX ORDER — CLOTRIMAZOLE 10 MG/G
1 CREAM TOPICAL
Qty: 30 | Refills: 0 | Status: COMPLETED | COMMUNITY
Start: 2018-07-13

## 2018-10-15 RX ORDER — PREDNISONE 20 MG/1
20 TABLET ORAL
Qty: 4 | Refills: 0 | Status: COMPLETED | COMMUNITY
Start: 2018-10-12

## 2018-10-15 RX ORDER — TORSEMIDE 20 MG/1
20 TABLET ORAL
Refills: 0 | Status: DISCONTINUED | COMMUNITY
Start: 2018-07-30 | End: 2018-10-15

## 2018-10-15 RX ORDER — GABAPENTIN 400 MG/1
400 CAPSULE ORAL
Qty: 90 | Refills: 0 | Status: DISCONTINUED | COMMUNITY
Start: 2018-07-13

## 2018-10-15 RX ORDER — CIPROFLOXACIN HYDROCHLORIDE 250 MG/1
250 TABLET, FILM COATED ORAL
Qty: 14 | Refills: 0 | Status: COMPLETED | COMMUNITY
Start: 2018-05-10

## 2018-10-15 RX ORDER — CEFUROXIME AXETIL 500 MG/1
500 TABLET ORAL
Qty: 14 | Refills: 0 | Status: COMPLETED | COMMUNITY
Start: 2018-04-19

## 2018-10-17 ENCOUNTER — APPOINTMENT (OUTPATIENT)
Dept: PULMONOLOGY | Facility: CLINIC | Age: 83
End: 2018-10-17
Payer: MEDICARE

## 2018-10-17 VITALS
OXYGEN SATURATION: 94 % | SYSTOLIC BLOOD PRESSURE: 119 MMHG | WEIGHT: 230 LBS | HEIGHT: 64 IN | TEMPERATURE: 98.3 F | RESPIRATION RATE: 16 BRPM | HEART RATE: 100 BPM | DIASTOLIC BLOOD PRESSURE: 64 MMHG | BODY MASS INDEX: 39.27 KG/M2

## 2018-10-17 PROCEDURE — 71046 X-RAY EXAM CHEST 2 VIEWS: CPT

## 2018-10-17 PROCEDURE — 99496 TRANSJ CARE MGMT HIGH F2F 7D: CPT

## 2018-10-19 LAB
ALBUMIN SERPL ELPH-MCNC: 4.2 G/DL
ALP BLD-CCNC: 133 U/L
ALT SERPL-CCNC: 17 U/L
ANION GAP SERPL CALC-SCNC: 17 MMOL/L
AST SERPL-CCNC: 17 U/L
BASOPHILS # BLD AUTO: 0.04 K/UL
BASOPHILS NFR BLD AUTO: 0.3 %
BILIRUB SERPL-MCNC: 0.5 MG/DL
BUN SERPL-MCNC: 26 MG/DL
CALCIUM SERPL-MCNC: 9.6 MG/DL
CHLORIDE SERPL-SCNC: 98 MMOL/L
CO2 SERPL-SCNC: 29 MMOL/L
CREAT SERPL-MCNC: 1.27 MG/DL
EOSINOPHIL # BLD AUTO: 0.27 K/UL
EOSINOPHIL NFR BLD AUTO: 1.7 %
GLUCOSE SERPL-MCNC: 120 MG/DL
HCT VFR BLD CALC: 43.6 %
HGB BLD-MCNC: 12.6 G/DL
HIV1+2 AB SPEC QL IA.RAPID: NONREACTIVE
IGA SER QL IEP: 245 MG/DL
IGE SER-MCNC: 287 IU/ML
IGG SUBSET TOTAL IGG: 909 MG/DL
IGG1 SER-MCNC: 551 MG/DL
IGG2 SER-MCNC: 192 MG/DL
IGG3 SER-MCNC: 170 MG/DL
IGG4 SER-MCNC: 16 MG/DL
IGM SER QL IEP: 225 MG/DL
IMM GRANULOCYTES NFR BLD AUTO: 0.4 %
LYMPHOCYTES # BLD AUTO: 1.28 K/UL
LYMPHOCYTES NFR BLD AUTO: 8.3 %
MAN DIFF?: NORMAL
MCHC RBC-ENTMCNC: 23.1 PG
MCHC RBC-ENTMCNC: 28.9 GM/DL
MCV RBC AUTO: 79.9 FL
MONOCYTES # BLD AUTO: 0.85 K/UL
MONOCYTES NFR BLD AUTO: 5.5 %
NEUTROPHILS # BLD AUTO: 12.95 K/UL
NEUTROPHILS NFR BLD AUTO: 83.8 %
PLATELET # BLD AUTO: 401 K/UL
POTASSIUM SERPL-SCNC: 3.2 MMOL/L
PROT SERPL-MCNC: 7.1 G/DL
RBC # BLD: 5.46 M/UL
RBC # FLD: 16.7 %
SODIUM SERPL-SCNC: 144 MMOL/L
WBC # FLD AUTO: 15.45 K/UL

## 2018-10-25 ENCOUNTER — OTHER (OUTPATIENT)
Age: 83
End: 2018-10-25

## 2018-10-26 ENCOUNTER — APPOINTMENT (OUTPATIENT)
Dept: PULMONOLOGY | Facility: CLINIC | Age: 83
End: 2018-10-26
Payer: MEDICARE

## 2018-10-26 VITALS
DIASTOLIC BLOOD PRESSURE: 61 MMHG | WEIGHT: 230 LBS | HEART RATE: 90 BPM | HEIGHT: 64 IN | TEMPERATURE: 98.4 F | SYSTOLIC BLOOD PRESSURE: 119 MMHG | BODY MASS INDEX: 39.27 KG/M2 | OXYGEN SATURATION: 93 %

## 2018-10-26 DIAGNOSIS — Z87.01 PERSONAL HISTORY OF PNEUMONIA (RECURRENT): ICD-10-CM

## 2018-10-26 PROCEDURE — 99213 OFFICE O/P EST LOW 20 MIN: CPT | Mod: 25

## 2018-10-26 PROCEDURE — 71046 X-RAY EXAM CHEST 2 VIEWS: CPT

## 2018-10-26 RX ORDER — AMOXICILLIN AND CLAVULANATE POTASSIUM 500; 125 MG/1; MG/1
500-125 TABLET, FILM COATED ORAL
Qty: 18 | Refills: 0 | Status: DISCONTINUED | COMMUNITY
Start: 2018-10-12 | End: 2018-10-26

## 2018-10-31 LAB
BASOPHILS # BLD AUTO: 0.05 K/UL
BASOPHILS NFR BLD AUTO: 0.4 %
EOSINOPHIL # BLD AUTO: 0.19 K/UL
EOSINOPHIL NFR BLD AUTO: 1.7 %
HCT VFR BLD CALC: 42.6 %
HGB BLD-MCNC: 12.8 G/DL
IMM GRANULOCYTES NFR BLD AUTO: 0.4 %
LYMPHOCYTES # BLD AUTO: 1.29 K/UL
LYMPHOCYTES NFR BLD AUTO: 11.5 %
M TB IFN-G BLD-IMP: NEGATIVE
MAN DIFF?: NORMAL
MCHC RBC-ENTMCNC: 23.6 PG
MCHC RBC-ENTMCNC: 30 GM/DL
MCV RBC AUTO: 78.6 FL
MONOCYTES # BLD AUTO: 0.72 K/UL
MONOCYTES NFR BLD AUTO: 6.4 %
NEUTROPHILS # BLD AUTO: 8.9 K/UL
NEUTROPHILS NFR BLD AUTO: 79.6 %
PLATELET # BLD AUTO: 385 K/UL
QUANTIFERON TB PLUS MITOGEN MINUS NIL: 4.59 IU/ML
QUANTIFERON TB PLUS NIL: 0.02 IU/ML
QUANTIFERON TB PLUS TB1 MINUS NIL: 0.01 IU/ML
QUANTIFERON TB PLUS TB2 MINUS NIL: 0.01 IU/ML
RBC # BLD: 5.42 M/UL
RBC # FLD: 16.5 %
WBC # FLD AUTO: 11.19 K/UL

## 2018-11-01 NOTE — ED PROVIDER NOTE - OBJECTIVE STATEMENT
86F h/o COPD, Breast Ca in remission, HTN p/w L knee tingling s/p fall 3 days ago. Pt was in wheelchair and tried stopping with her foot, fell forward on to knees and hand. Has bruises to both knees. Noted L knee tingling today and was concerned she may have a blood clot. Is ambulatory. Denies CP, SOB, dizziness, head trauma, LOC.
Normal rate, regular rhythm.  Heart sounds S1, S2.  No murmurs, rubs or gallops.
Principal Discharge DX:	Headache

## 2018-11-07 ENCOUNTER — MESSAGE (OUTPATIENT)
Age: 83
End: 2018-11-07

## 2018-11-09 ENCOUNTER — APPOINTMENT (OUTPATIENT)
Dept: PULMONOLOGY | Facility: CLINIC | Age: 83
End: 2018-11-09
Payer: MEDICARE

## 2018-11-09 VITALS
TEMPERATURE: 98.2 F | RESPIRATION RATE: 16 BRPM | HEIGHT: 64 IN | BODY MASS INDEX: 39.27 KG/M2 | DIASTOLIC BLOOD PRESSURE: 66 MMHG | SYSTOLIC BLOOD PRESSURE: 120 MMHG | HEART RATE: 86 BPM | OXYGEN SATURATION: 93 % | WEIGHT: 230 LBS

## 2018-11-09 PROCEDURE — 99213 OFFICE O/P EST LOW 20 MIN: CPT

## 2018-11-14 ENCOUNTER — APPOINTMENT (OUTPATIENT)
Dept: SPEECH THERAPY | Facility: CLINIC | Age: 83
End: 2018-11-14

## 2018-11-16 ENCOUNTER — APPOINTMENT (OUTPATIENT)
Dept: SPEECH THERAPY | Facility: CLINIC | Age: 83
End: 2018-11-16

## 2018-11-19 ENCOUNTER — MEDICATION RENEWAL (OUTPATIENT)
Age: 83
End: 2018-11-19

## 2018-11-27 ENCOUNTER — APPOINTMENT (OUTPATIENT)
Dept: PULMONOLOGY | Facility: CLINIC | Age: 83
End: 2018-11-27
Payer: MEDICARE

## 2018-11-27 VITALS
SYSTOLIC BLOOD PRESSURE: 117 MMHG | OXYGEN SATURATION: 87 % | DIASTOLIC BLOOD PRESSURE: 67 MMHG | HEART RATE: 79 BPM | TEMPERATURE: 98.1 F

## 2018-11-27 DIAGNOSIS — T17.890A OTHER FOREIGN OBJECT IN OTHER PARTS OF RESPIRATORY TRACT CAUSING ASPHYXIATION, INITIAL ENCOUNTER: ICD-10-CM

## 2018-11-27 PROCEDURE — 99213 OFFICE O/P EST LOW 20 MIN: CPT | Mod: 25

## 2018-11-27 PROCEDURE — 71046 X-RAY EXAM CHEST 2 VIEWS: CPT

## 2018-11-27 PROCEDURE — 94060 EVALUATION OF WHEEZING: CPT

## 2018-11-28 PROBLEM — T17.890A ASPIRATION OF FOOD: Status: ACTIVE | Noted: 2018-11-28

## 2018-12-10 ENCOUNTER — FORM ENCOUNTER (OUTPATIENT)
Age: 83
End: 2018-12-10

## 2018-12-11 ENCOUNTER — APPOINTMENT (OUTPATIENT)
Dept: RADIOLOGY | Facility: HOSPITAL | Age: 83
End: 2018-12-11
Payer: MEDICARE

## 2018-12-11 ENCOUNTER — APPOINTMENT (OUTPATIENT)
Dept: SPEECH THERAPY | Facility: HOSPITAL | Age: 83
End: 2018-12-11
Payer: MEDICARE

## 2018-12-11 ENCOUNTER — OUTPATIENT (OUTPATIENT)
Dept: OUTPATIENT SERVICES | Facility: HOSPITAL | Age: 83
LOS: 1 days | End: 2018-12-11

## 2018-12-11 ENCOUNTER — OUTPATIENT (OUTPATIENT)
Dept: OUTPATIENT SERVICES | Facility: HOSPITAL | Age: 83
LOS: 1 days | Discharge: HOME | End: 2018-12-11

## 2018-12-11 DIAGNOSIS — Z98.89 OTHER SPECIFIED POSTPROCEDURAL STATES: Chronic | ICD-10-CM

## 2018-12-11 DIAGNOSIS — J18.9 PNEUMONIA, UNSPECIFIED ORGANISM: ICD-10-CM

## 2018-12-11 PROCEDURE — 74230 X-RAY XM SWLNG FUNCJ C+: CPT | Mod: 26

## 2018-12-11 NOTE — PHYSICAL THERAPY INITIAL EVALUATION ADULT - IMPAIRMENTS FOUND, PT EVAL
----- Message from ELDON Santo sent at 12/11/2018  7:42 AM PST -----  Please call pt and give lab results: Vitamin D is within normal limits at 38.  Total cholesterol is elevated at 234 and your LDL is mildly elevated at 122, HDL your healthy cholesterol is great at 98.  With regard to total cholesterol and LDL the past way to improve these numbers is healthy diet and exercise.  Increasing all fruits and vegetables, exercising 30 minutes 5 days/week.  Electrolytes, kidney, liver function are 100% within normal limits as are your red and white blood cells.      
Phone Number Called: [phone] (home)     Message: LVM to call back.     Left Message for patient to call back: yes      
Phone Number Called: [phone] (home)       Message: Called and spoke to patient, let patient know Jamies result note.  Pt had no further questions    Left Message for patient to call back: no      
muscle strength/aerobic capacity/endurance/gait, locomotion, and balance

## 2018-12-13 DIAGNOSIS — R13.12 DYSPHAGIA, OROPHARYNGEAL PHASE: ICD-10-CM

## 2018-12-18 ENCOUNTER — APPOINTMENT (OUTPATIENT)
Dept: COLORECTAL SURGERY | Facility: CLINIC | Age: 83
End: 2018-12-18

## 2018-12-19 ENCOUNTER — APPOINTMENT (OUTPATIENT)
Dept: PULMONOLOGY | Facility: CLINIC | Age: 83
End: 2018-12-19
Payer: MEDICARE

## 2018-12-19 VITALS
SYSTOLIC BLOOD PRESSURE: 135 MMHG | TEMPERATURE: 99.2 F | OXYGEN SATURATION: 96 % | HEART RATE: 84 BPM | DIASTOLIC BLOOD PRESSURE: 76 MMHG

## 2018-12-19 PROCEDURE — 99213 OFFICE O/P EST LOW 20 MIN: CPT

## 2018-12-19 RX ORDER — AMOXICILLIN AND CLAVULANATE POTASSIUM 875; 125 MG/1; MG/1
875-125 TABLET, COATED ORAL
Qty: 20 | Refills: 0 | Status: DISCONTINUED | COMMUNITY
Start: 2018-10-17 | End: 2018-12-19

## 2018-12-24 ENCOUNTER — MEDICATION RENEWAL (OUTPATIENT)
Age: 83
End: 2018-12-24

## 2018-12-28 ENCOUNTER — INPATIENT (INPATIENT)
Facility: HOSPITAL | Age: 83
LOS: 4 days | Discharge: HOME | End: 2019-01-02
Attending: INTERNAL MEDICINE | Admitting: INTERNAL MEDICINE
Payer: MEDICARE

## 2018-12-28 VITALS
OXYGEN SATURATION: 95 % | RESPIRATION RATE: 24 BRPM | SYSTOLIC BLOOD PRESSURE: 106 MMHG | TEMPERATURE: 99 F | HEART RATE: 106 BPM | DIASTOLIC BLOOD PRESSURE: 43 MMHG

## 2018-12-28 DIAGNOSIS — F41.9 ANXIETY DISORDER, UNSPECIFIED: ICD-10-CM

## 2018-12-28 DIAGNOSIS — J44.0 CHRONIC OBSTRUCTIVE PULMONARY DISEASE WITH (ACUTE) LOWER RESPIRATORY INFECTION: ICD-10-CM

## 2018-12-28 DIAGNOSIS — G62.9 POLYNEUROPATHY, UNSPECIFIED: ICD-10-CM

## 2018-12-28 DIAGNOSIS — I10 ESSENTIAL (PRIMARY) HYPERTENSION: ICD-10-CM

## 2018-12-28 DIAGNOSIS — R13.10 DYSPHAGIA, UNSPECIFIED: ICD-10-CM

## 2018-12-28 DIAGNOSIS — Z98.89 OTHER SPECIFIED POSTPROCEDURAL STATES: Chronic | ICD-10-CM

## 2018-12-28 DIAGNOSIS — J18.9 PNEUMONIA, UNSPECIFIED ORGANISM: ICD-10-CM

## 2018-12-28 DIAGNOSIS — R06.02 SHORTNESS OF BREATH: ICD-10-CM

## 2018-12-28 LAB
ALBUMIN SERPL ELPH-MCNC: 3.6 G/DL — SIGNIFICANT CHANGE UP (ref 3.3–5)
ALP SERPL-CCNC: 126 U/L — HIGH (ref 40–120)
ALT FLD-CCNC: 23 U/L — SIGNIFICANT CHANGE UP (ref 4–33)
APPEARANCE UR: SIGNIFICANT CHANGE UP
AST SERPL-CCNC: 23 U/L — SIGNIFICANT CHANGE UP (ref 4–32)
B PERT DNA SPEC QL NAA+PROBE: NOT DETECTED — SIGNIFICANT CHANGE UP
BACTERIA # UR AUTO: HIGH
BASE EXCESS BLDV CALC-SCNC: 5 MMOL/L — SIGNIFICANT CHANGE UP
BASOPHILS # BLD AUTO: 0.07 K/UL — SIGNIFICANT CHANGE UP (ref 0–0.2)
BASOPHILS NFR BLD AUTO: 0.3 % — SIGNIFICANT CHANGE UP (ref 0–2)
BILIRUB SERPL-MCNC: 1 MG/DL — SIGNIFICANT CHANGE UP (ref 0.2–1.2)
BILIRUB UR-MCNC: NEGATIVE — SIGNIFICANT CHANGE UP
BLOOD GAS VENOUS - CREATININE: 0.84 MG/DL — SIGNIFICANT CHANGE UP (ref 0.5–1.3)
BLOOD UR QL VISUAL: HIGH
BUN SERPL-MCNC: 19 MG/DL — SIGNIFICANT CHANGE UP (ref 7–23)
BUN SERPL-MCNC: 20 MG/DL — SIGNIFICANT CHANGE UP (ref 7–23)
C PNEUM DNA SPEC QL NAA+PROBE: NOT DETECTED — SIGNIFICANT CHANGE UP
CALCIUM SERPL-MCNC: 8.8 MG/DL — SIGNIFICANT CHANGE UP (ref 8.4–10.5)
CALCIUM SERPL-MCNC: 9 MG/DL — SIGNIFICANT CHANGE UP (ref 8.4–10.5)
CHLORIDE BLDV-SCNC: 105 MMOL/L — SIGNIFICANT CHANGE UP (ref 96–108)
CHLORIDE SERPL-SCNC: 98 MMOL/L — SIGNIFICANT CHANGE UP (ref 98–107)
CHLORIDE SERPL-SCNC: 99 MMOL/L — SIGNIFICANT CHANGE UP (ref 98–107)
CO2 SERPL-SCNC: 27 MMOL/L — SIGNIFICANT CHANGE UP (ref 22–31)
CO2 SERPL-SCNC: 27 MMOL/L — SIGNIFICANT CHANGE UP (ref 22–31)
COLOR SPEC: YELLOW — SIGNIFICANT CHANGE UP
CREAT SERPL-MCNC: 0.99 MG/DL — SIGNIFICANT CHANGE UP (ref 0.5–1.3)
CREAT SERPL-MCNC: 1.08 MG/DL — SIGNIFICANT CHANGE UP (ref 0.5–1.3)
EOSINOPHIL # BLD AUTO: 0 K/UL — SIGNIFICANT CHANGE UP (ref 0–0.5)
EOSINOPHIL NFR BLD AUTO: 0 % — SIGNIFICANT CHANGE UP (ref 0–6)
FLUAV H1 2009 PAND RNA SPEC QL NAA+PROBE: NOT DETECTED — SIGNIFICANT CHANGE UP
FLUAV H1 RNA SPEC QL NAA+PROBE: NOT DETECTED — SIGNIFICANT CHANGE UP
FLUAV H3 RNA SPEC QL NAA+PROBE: NOT DETECTED — SIGNIFICANT CHANGE UP
FLUAV SUBTYP SPEC NAA+PROBE: NOT DETECTED — SIGNIFICANT CHANGE UP
FLUBV RNA SPEC QL NAA+PROBE: NOT DETECTED — SIGNIFICANT CHANGE UP
GAS PNL BLDV: 139 MMOL/L — SIGNIFICANT CHANGE UP (ref 136–146)
GLUCOSE BLDV-MCNC: 177 — HIGH (ref 70–99)
GLUCOSE SERPL-MCNC: 164 MG/DL — HIGH (ref 70–99)
GLUCOSE SERPL-MCNC: 295 MG/DL — HIGH (ref 70–99)
GLUCOSE UR-MCNC: NEGATIVE — SIGNIFICANT CHANGE UP
HADV DNA SPEC QL NAA+PROBE: NOT DETECTED — SIGNIFICANT CHANGE UP
HCO3 BLDV-SCNC: 29 MMOL/L — HIGH (ref 20–27)
HCOV PNL SPEC NAA+PROBE: SIGNIFICANT CHANGE UP
HCT VFR BLD CALC: 38.7 % — SIGNIFICANT CHANGE UP (ref 34.5–45)
HCT VFR BLD CALC: 40.3 % — SIGNIFICANT CHANGE UP (ref 34.5–45)
HCT VFR BLDV CALC: 35.8 % — SIGNIFICANT CHANGE UP (ref 34.5–45)
HGB BLD-MCNC: 11.8 G/DL — SIGNIFICANT CHANGE UP (ref 11.5–15.5)
HGB BLD-MCNC: 12 G/DL — SIGNIFICANT CHANGE UP (ref 11.5–15.5)
HGB BLDV-MCNC: 11.6 G/DL — SIGNIFICANT CHANGE UP (ref 11.5–15.5)
HMPV RNA SPEC QL NAA+PROBE: NOT DETECTED — SIGNIFICANT CHANGE UP
HPIV1 RNA SPEC QL NAA+PROBE: NOT DETECTED — SIGNIFICANT CHANGE UP
HPIV2 RNA SPEC QL NAA+PROBE: NOT DETECTED — SIGNIFICANT CHANGE UP
HPIV3 RNA SPEC QL NAA+PROBE: NOT DETECTED — SIGNIFICANT CHANGE UP
HPIV4 RNA SPEC QL NAA+PROBE: NOT DETECTED — SIGNIFICANT CHANGE UP
HYALINE CASTS # UR AUTO: SIGNIFICANT CHANGE UP
IMM GRANULOCYTES # BLD AUTO: 0.13 # — SIGNIFICANT CHANGE UP
IMM GRANULOCYTES NFR BLD AUTO: 0.6 % — SIGNIFICANT CHANGE UP (ref 0–1.5)
KETONES UR-MCNC: NEGATIVE — SIGNIFICANT CHANGE UP
LACTATE BLDV-MCNC: 3.2 MMOL/L — HIGH (ref 0.5–2)
LACTATE SERPL-SCNC: 2.8 MMOL/L — HIGH (ref 0.5–2)
LEUKOCYTE ESTERASE UR-ACNC: SIGNIFICANT CHANGE UP
LYMPHOCYTES # BLD AUTO: 1.16 K/UL — SIGNIFICANT CHANGE UP (ref 1–3.3)
LYMPHOCYTES # BLD AUTO: 5.7 % — LOW (ref 13–44)
MCHC RBC-ENTMCNC: 22.9 PG — LOW (ref 27–34)
MCHC RBC-ENTMCNC: 23.1 PG — LOW (ref 27–34)
MCHC RBC-ENTMCNC: 29.8 % — LOW (ref 32–36)
MCHC RBC-ENTMCNC: 30.5 % — LOW (ref 32–36)
MCV RBC AUTO: 75.1 FL — LOW (ref 80–100)
MCV RBC AUTO: 77.5 FL — LOW (ref 80–100)
MONOCYTES # BLD AUTO: 1.24 K/UL — HIGH (ref 0–0.9)
MONOCYTES NFR BLD AUTO: 6 % — SIGNIFICANT CHANGE UP (ref 2–14)
NEUTROPHILS # BLD AUTO: 17.9 K/UL — HIGH (ref 1.8–7.4)
NEUTROPHILS NFR BLD AUTO: 87.4 % — HIGH (ref 43–77)
NITRITE UR-MCNC: NEGATIVE — SIGNIFICANT CHANGE UP
NRBC # FLD: 0 — SIGNIFICANT CHANGE UP
NRBC # FLD: 0 — SIGNIFICANT CHANGE UP
NT-PROBNP SERPL-SCNC: 1051 PG/ML — SIGNIFICANT CHANGE UP
PCO2 BLDV: 46 MMHG — SIGNIFICANT CHANGE UP (ref 41–51)
PH BLDV: 7.42 PH — SIGNIFICANT CHANGE UP (ref 7.32–7.43)
PH UR: 6 — SIGNIFICANT CHANGE UP (ref 5–8)
PLATELET # BLD AUTO: 309 K/UL — SIGNIFICANT CHANGE UP (ref 150–400)
PLATELET # BLD AUTO: 317 K/UL — SIGNIFICANT CHANGE UP (ref 150–400)
PMV BLD: 10 FL — SIGNIFICANT CHANGE UP (ref 7–13)
PMV BLD: 10 FL — SIGNIFICANT CHANGE UP (ref 7–13)
PO2 BLDV: 71 MMHG — HIGH (ref 35–40)
POTASSIUM BLDV-SCNC: 2.9 MMOL/L — CRITICAL LOW (ref 3.4–4.5)
POTASSIUM SERPL-MCNC: 3.2 MMOL/L — LOW (ref 3.5–5.3)
POTASSIUM SERPL-MCNC: 3.4 MMOL/L — LOW (ref 3.5–5.3)
POTASSIUM SERPL-SCNC: 3.2 MMOL/L — LOW (ref 3.5–5.3)
POTASSIUM SERPL-SCNC: 3.4 MMOL/L — LOW (ref 3.5–5.3)
PROT SERPL-MCNC: 6.7 G/DL — SIGNIFICANT CHANGE UP (ref 6–8.3)
PROT UR-MCNC: 20 — SIGNIFICANT CHANGE UP
RBC # BLD: 5.15 M/UL — SIGNIFICANT CHANGE UP (ref 3.8–5.2)
RBC # BLD: 5.2 M/UL — SIGNIFICANT CHANGE UP (ref 3.8–5.2)
RBC # FLD: 19.8 % — HIGH (ref 10.3–14.5)
RBC # FLD: 20 % — HIGH (ref 10.3–14.5)
RBC CASTS # UR COMP ASSIST: HIGH (ref 0–?)
RSV RNA SPEC QL NAA+PROBE: NOT DETECTED — SIGNIFICANT CHANGE UP
RV+EV RNA SPEC QL NAA+PROBE: NOT DETECTED — SIGNIFICANT CHANGE UP
SAO2 % BLDV: 92.2 % — HIGH (ref 60–85)
SODIUM SERPL-SCNC: 141 MMOL/L — SIGNIFICANT CHANGE UP (ref 135–145)
SODIUM SERPL-SCNC: 142 MMOL/L — SIGNIFICANT CHANGE UP (ref 135–145)
SP GR SPEC: 1.01 — SIGNIFICANT CHANGE UP (ref 1–1.04)
SQUAMOUS # UR AUTO: SIGNIFICANT CHANGE UP
TROPONIN T, HIGH SENSITIVITY: 20 NG/L — SIGNIFICANT CHANGE UP (ref ?–14)
TROPONIN T, HIGH SENSITIVITY: 24 NG/L — SIGNIFICANT CHANGE UP (ref ?–14)
UROBILINOGEN FLD QL: NORMAL — SIGNIFICANT CHANGE UP
WBC # BLD: 18.91 K/UL — HIGH (ref 3.8–10.5)
WBC # BLD: 20.5 K/UL — HIGH (ref 3.8–10.5)
WBC # FLD AUTO: 18.91 K/UL — HIGH (ref 3.8–10.5)
WBC # FLD AUTO: 20.5 K/UL — HIGH (ref 3.8–10.5)
WBC UR QL: >50 — HIGH (ref 0–?)

## 2018-12-28 PROCEDURE — 71045 X-RAY EXAM CHEST 1 VIEW: CPT | Mod: 26

## 2018-12-28 RX ORDER — CEFTRIAXONE 500 MG/1
1 INJECTION, POWDER, FOR SOLUTION INTRAMUSCULAR; INTRAVENOUS ONCE
Qty: 0 | Refills: 0 | Status: COMPLETED | OUTPATIENT
Start: 2018-12-28 | End: 2018-12-28

## 2018-12-28 RX ORDER — IPRATROPIUM/ALBUTEROL SULFATE 18-103MCG
3 AEROSOL WITH ADAPTER (GRAM) INHALATION ONCE
Qty: 0 | Refills: 0 | Status: COMPLETED | OUTPATIENT
Start: 2018-12-28 | End: 2018-12-28

## 2018-12-28 RX ORDER — AZITHROMYCIN 500 MG/1
500 TABLET, FILM COATED ORAL ONCE
Qty: 0 | Refills: 0 | Status: COMPLETED | OUTPATIENT
Start: 2018-12-28 | End: 2018-12-28

## 2018-12-28 RX ORDER — BUDESONIDE, MICRONIZED 100 %
0.5 POWDER (GRAM) MISCELLANEOUS EVERY 12 HOURS
Qty: 0 | Refills: 0 | Status: DISCONTINUED | OUTPATIENT
Start: 2018-12-28 | End: 2018-12-28

## 2018-12-28 RX ORDER — ALBUTEROL 90 UG/1
2 AEROSOL, METERED ORAL EVERY 4 HOURS
Qty: 0 | Refills: 0 | Status: DISCONTINUED | OUTPATIENT
Start: 2018-12-28 | End: 2019-01-02

## 2018-12-28 RX ORDER — FUROSEMIDE 40 MG
1 TABLET ORAL
Qty: 0 | Refills: 0 | COMMUNITY

## 2018-12-28 RX ORDER — POTASSIUM CHLORIDE 20 MEQ
40 PACKET (EA) ORAL ONCE
Qty: 0 | Refills: 0 | Status: COMPLETED | OUTPATIENT
Start: 2018-12-28 | End: 2018-12-28

## 2018-12-28 RX ORDER — CEFTRIAXONE 500 MG/1
1 INJECTION, POWDER, FOR SOLUTION INTRAMUSCULAR; INTRAVENOUS EVERY 24 HOURS
Qty: 0 | Refills: 0 | Status: DISCONTINUED | OUTPATIENT
Start: 2018-12-29 | End: 2018-12-30

## 2018-12-28 RX ORDER — POTASSIUM CHLORIDE 20 MEQ
10 PACKET (EA) ORAL ONCE
Qty: 0 | Refills: 0 | Status: COMPLETED | OUTPATIENT
Start: 2018-12-28 | End: 2018-12-28

## 2018-12-28 RX ORDER — FUROSEMIDE 40 MG
20 TABLET ORAL ONCE
Qty: 0 | Refills: 0 | Status: COMPLETED | OUTPATIENT
Start: 2018-12-28 | End: 2018-12-28

## 2018-12-28 RX ORDER — IPRATROPIUM/ALBUTEROL SULFATE 18-103MCG
3 AEROSOL WITH ADAPTER (GRAM) INHALATION EVERY 6 HOURS
Qty: 0 | Refills: 0 | Status: DISCONTINUED | OUTPATIENT
Start: 2018-12-28 | End: 2019-01-02

## 2018-12-28 RX ORDER — BUDESONIDE AND FORMOTEROL FUMARATE DIHYDRATE 160; 4.5 UG/1; UG/1
2 AEROSOL RESPIRATORY (INHALATION)
Qty: 0 | Refills: 0 | Status: DISCONTINUED | OUTPATIENT
Start: 2018-12-28 | End: 2019-01-02

## 2018-12-28 RX ORDER — AZITHROMYCIN 500 MG/1
500 TABLET, FILM COATED ORAL EVERY 24 HOURS
Qty: 0 | Refills: 0 | Status: DISCONTINUED | OUTPATIENT
Start: 2018-12-29 | End: 2018-12-30

## 2018-12-28 RX ORDER — HEPARIN SODIUM 5000 [USP'U]/ML
5000 INJECTION INTRAVENOUS; SUBCUTANEOUS EVERY 12 HOURS
Qty: 0 | Refills: 0 | Status: DISCONTINUED | OUTPATIENT
Start: 2018-12-28 | End: 2019-01-02

## 2018-12-28 RX ADMIN — AZITHROMYCIN 250 MILLIGRAM(S): 500 TABLET, FILM COATED ORAL at 16:04

## 2018-12-28 RX ADMIN — CEFTRIAXONE 100 GRAM(S): 500 INJECTION, POWDER, FOR SOLUTION INTRAMUSCULAR; INTRAVENOUS at 14:50

## 2018-12-28 RX ADMIN — Medication 20 MILLIGRAM(S): at 18:16

## 2018-12-28 RX ADMIN — Medication 3 MILLILITER(S): at 22:38

## 2018-12-28 RX ADMIN — Medication 40 MILLIEQUIVALENT(S): at 18:16

## 2018-12-28 RX ADMIN — Medication 3 MILLILITER(S): at 13:55

## 2018-12-28 RX ADMIN — Medication 3 MILLILITER(S): at 13:39

## 2018-12-28 RX ADMIN — Medication 125 MILLIGRAM(S): at 13:59

## 2018-12-28 RX ADMIN — CEFTRIAXONE 1 GRAM(S): 500 INJECTION, POWDER, FOR SOLUTION INTRAMUSCULAR; INTRAVENOUS at 16:05

## 2018-12-28 NOTE — H&P ADULT - PROBLEM SELECTOR PLAN 1
- CTX/AZX  - f/u Blood, sputum and Urine cultures  - Continuous O2 monitoring  - Serafin ATC Suspect developed community acquired PNA which led to some volume overload/pulmonary edema and exacerbation of underlying COPD.   - CTX/AZX  - f/u Blood, sputum and Urine cultures  - Continuous O2 monitoring  - Serafin ATC  - monitor UOP and response to 20mg IV lasix given in ED,   - Bipap for now given work of breathing Suspect volume overload/pulmonary edema from XXX with exacerbation of underlying COPD.   - CTX/AZX  - f/u Blood, sputum and Urine cultures  - Continuous O2 monitoring  - Kalebs ATC  - monitor UOP and response to 20mg IV lasix given in ED,   - Bipap for now given work of breathing Suspect volume overload/pulmonary edema from XXX with exacerbation of underlying COPD.   - CTX/AZX  - f/u Blood, sputum and Urine cultures  - Continuous O2 monitoring  - Duonebs ATC  - monitor UOP and response to 20mg IV lasix given in ED,   - TTE  - Bipap for now given work of breathing Suspect volume overload/pulmonary edema from Pneumonia with exacerbation of underlying COPD.   - CTX/AZX for CAP coverage  - f/u Blood, sputum and Urine cultures  - Continuous O2 monitoring  - Serafin ATC  - monitor UOP and response to 20mg IV lasix given in ED,   - TTE given new pulmonary edema, in the setting of negative trop, ?takatsubo  - Bipap for now given work of breathing Suspect volume overload/pulmonary edema from Pneumonia with exacerbation of underlying COPD and ?CHF.   - CTX/AZX for CAP coverage  - f/u Blood, sputum and Urine cultures  - Continuous O2 monitoring  - Duonebs ATC  - monitor UOP and response to 20mg IV lasix given in ED,   - TTE given new pulmonary edema, in the setting of negative trop, and normal echo recently ?takatsubo ?diastolic dysfunction   - Bipap for now given work of breathing

## 2018-12-28 NOTE — ED PROVIDER NOTE - ATTENDING CONTRIBUTION TO CARE
Eddi SALAS- 86 Y/O F with h/o copd with 3 L o2 at home p/w acute sob. DAVID, Cpap at night p/w cough, runny nose and difficulty breathing for few day and worse today, no fever, chills, nausea, vomiting, diarrhea, abd pain. Pt is ex smoker and never intubated    pt is alert, well appearing female, was tachypneic on arrival @ 30 , using accessory muscles, b/l profuse wheezing with rhonchi, mild jvd on left side , abd soft, nt, nd, normal bowel sounds, no cvat b/l, neuro exam aox3, cn 2-12 intact, no focal deficits, skin warm, dry, mod turgor    plan to check labs, do cxr to r./o pna, will treat as copd exacerbation will cover with duonebs, solumedrol, cef, azithro    pt got better after 1 duoneb and tachypnea resolved and no accessory muscle use

## 2018-12-28 NOTE — H&P ADULT - PROBLEM SELECTOR PLAN 4
- Hold home Gabapentin for now (takes 600mg with lunch, dinner and QHS) - Hold home Gabapentin for now (takes 600mg with lunch, dinner and QHS) while on BIPAP

## 2018-12-28 NOTE — ED PROVIDER NOTE - OBJECTIVE STATEMENT
86yo F with hx of COPD 3L of O2 at home, DAVID on CPAP at nights presents today with sob for few days. URI symptoms earlier in the week. +fevers, chills.

## 2018-12-28 NOTE — ED PROVIDER NOTE - NS ED ROS FT
ROS: denies HA, weakness, dizziness, nausea/vomiting, chest pain, diaphoresis, abdominal pain, diarrhea, joint pain, neuro deficits, dysuria/hematuria, rash    +sob, cough

## 2018-12-28 NOTE — ED ADULT NURSE REASSESSMENT NOTE - NS ED NURSE REASSESS COMMENT FT1
break coverage RN- as per Jazmin Zazueta- pt approved to go to [facility], trop resulted EKG unremarkable- report given to SOURAV weller pt in NAD, awaiting transportation.  Will continue to monitor patient closely.

## 2018-12-28 NOTE — H&P ADULT - ASSESSMENT
88yo F with hx of COPD 3L of O2 at home, DAVID on CPAP at night, HTN, hx of breast Ca (radiation in 1989) and anxiety who presented with URI symptoms admitted for pneumonia with CHF exacerbation. 86yo F with hx of COPD (3L of O2 QHS at home), DAVID (Occasionally uses CPAP at night), HTN, hx of breast Ca (radiation in 1989), dysphagia and anxiety who presented with shortness of breath admitted for pneumonia with component of CHF exacerbation. 88yo F with hx of COPD (3L of O2 QHS at home), DAVID (Occasionally uses CPAP at night), HTN, hx of breast Ca (radiation in 1989), dysphagia and anxiety who presented with shortness of breath admitted for community acquired pneumonia with component of pulmonary edema, acute CHF exacerbation.

## 2018-12-28 NOTE — ED PROVIDER NOTE - PROGRESS NOTE DETAILS
Pt breathing slightly more comfortably after 3 rounds of duonebs. will reassess after steroids. Pt breathing improved, will admit to PCU

## 2018-12-28 NOTE — ED ADULT TRIAGE NOTE - CHIEF COMPLAINT QUOTE
Arrives via EMS with c/o "dry mouth and runny nose since last night around 6 o'clock".  p[t took b/p medication prior to leaving house with EMS.

## 2018-12-28 NOTE — ED PROVIDER NOTE - PHYSICAL EXAMINATION
Gen: tachypneic, speaking in short sentences  Head: NCAT  HEENT: PERRL, dry MM, normal conjunctiva, anicteric, neck supple  Lung: wheezing in lung all lung fields  CV: RRR, no murmurs, rubs or gallops  Abd: soft, NTND, no rebound or guarding, no CVAT  MSK: No edema, no visible deformities  Neuro: No focal neurologic deficits. CN II-XII grossly intact. 5/5 strength and normal sensation in all extremities.  Skin: Warm and dry, no evidence of rash

## 2018-12-28 NOTE — CHART NOTE - NSCHARTNOTEFT_GEN_A_CORE
86yo F with hx of COPD (3L of O2 QHS at home), DAVID (Occasionally uses CPAP at night), HTN, hx of breast Ca (radiation in 1989), dysphagia and anxiety who presented with several days of worsening sob, runny nose and cough.  Pt resting in no acute distress. Denies SOB.  Pt wearing bipap, appears to be in no acute respiratory distress.     Resp: lungs clear but diminished  GI: abd soft, non-tender, obese    Vital Signs Last 24 Hrs  T(C): 36.6 (28 Dec 2018 21:30), Max: 37.8 (28 Dec 2018 13:39)  T(F): 97.8 (28 Dec 2018 21:30), Max: 100 (28 Dec 2018 13:39)  HR: 89 (28 Dec 2018 22:38) (89 - 112)  BP: 111/53 (28 Dec 2018 21:30) (100/40 - 117/41)  BP(mean): --  RR: 26 (28 Dec 2018 21:30) (22 - 36)  SpO2: 98% (28 Dec 2018 22:38) (93% - 99%)    -will continue to monitor pt on bipap

## 2018-12-28 NOTE — H&P ADULT - PROBLEM SELECTOR PLAN 3
- Hold home Amlodipine 10mg daily for now  - Hold home Lasix and Potassium for now - Hold home Amlodipine 10mg daily for now  - Hold home Lasix and Potassium for now, monitor response to IV lasix

## 2018-12-28 NOTE — ED ADULT NURSE NOTE - OBJECTIVE STATEMENT
pt alert,oriented x3. reports diff breathing since 6 pm last pm. denies recent cough. pt with non productive cough,md to jude. denies fever at home reports" cold symptoms" pt tachypneic,treatments given as ordered.unable to access/iv at present,md aware

## 2018-12-28 NOTE — ED ADULT NURSE NOTE - NSIMPLEMENTINTERV_GEN_ALL_ED
Implemented All Fall Risk Interventions:  [place] to call system. Call bell, personal items and telephone within reach. Instruct patient to call for assistance. Room bathroom lighting operational. Non-slip footwear when patient is off stretcher. Physically safe environment: no spills, clutter or unnecessary equipment. Stretcher in lowest position, wheels locked, appropriate side rails in place. Provide visual cue, wrist band, yellow gown, etc. Monitor gait and stability. Monitor for mental status changes and reorient to person, place, and time. Review medications for side effects contributing to fall risk. Reinforce activity limits and safety measures with patient and family.

## 2018-12-28 NOTE — H&P ADULT - PROBLEM SELECTOR PLAN 5
- Continue home PPI S/p outpatient EGD (8/2017) that revealed Gastritis, a small hiatal hernia, a markedly patulous LES, but no strictures or narrowing. Has been doing better with improvement in dysphagia symptoms on PPI daily.   - Continue home PPI  - Aspiration precautions  - Regular with Nectar Thick Liquids   - Positioning: seated upright in chair with lateral view projection (per Speech)

## 2018-12-28 NOTE — H&P ADULT - NSHPPHYSICALEXAM_GEN_ALL_CORE
PHYSICAL EXAM:  GENERAL: NAD, well-developed, obese  HEAD:  Atraumatic, Normocephalic  EYES: EOMI, PERRLA, conjunctiva and sclera clear  NECK: Supple, No JVD  CHEST/LUNG: Coarse breath sounds bilaterally, scattered wheeze.   HEART: Regular rate and rhythm; No murmurs, rubs, or gallops  ABDOMEN: Soft, obese, Nontender, Nondistended; Bowel sounds present  EXTREMITIES:  2+ Peripheral Pulses, No clubbing, cyanosis, or edema  PSYCH: AAOx3  NEUROLOGY: non-focal  SKIN: No rashes or lesions PHYSICAL EXAM:  GENERAL: NAD, well-developed, obese  HEAD:  Atraumatic, Normocephalic  EYES: EOMI, PERRLA, conjunctiva and sclera clear  NECK: Supple, +JVD  CHEST/LUNG: Coarse breath sounds bilaterally, bibasilar crackles, scattered wheeze.   HEART: Regular rate and rhythm; No murmurs, rubs, or gallops  ABDOMEN: Soft, obese, Nontender, Nondistended; Bowel sounds present  EXTREMITIES:  2+ Peripheral Pulses, No clubbing, cyanosis, or edema  PSYCH: AAOx3  NEUROLOGY: non-focal  SKIN: No rashes or lesions

## 2018-12-28 NOTE — H&P ADULT - NSHPSOCIALHISTORY_GEN_ALL_CORE
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

## 2018-12-28 NOTE — ED CLERICAL - NS ED CLERK NOTE PRE-ARRIVAL INFORMATION; ADDITIONAL PRE-ARRIVAL INFORMATION
This patient is enrolled in the COPD or PNA STARS readmission program and has active care navigation. This patient can be followed up by the care navigation team within 24 hours. To arrange close follow-up or to obtain additional clinical information about this patient, please call the contact number above. For patients previously on the faculty hospitalist or pulmonary service, please call the hospitalist or pulmonary fellow (7a-5p for patients previously admitted to the pulmonary service) for consultation PRIOR to admission or observation.  The hospitalist can be reached at [phone] and pulmonary fellow at [phone]. Consider CDU for management of COPD exacerbation or PNA per guidelines.

## 2018-12-28 NOTE — H&P ADULT - HISTORY OF PRESENT ILLNESS
86yo F with hx of COPD 3L of O2 at home, DAVID on CPAP at night, HTN, hx of breast Ca (radiation in 1989) and anxiety who presented with several days of worsening sob, runny nose, sinus congestion, fevers and chills. Lives with  who is not sick. Denies recent travel. 88yo F with hx of COPD 3L of O2 at home, DAVID on CPAP at night, HTN, hx of breast Ca (radiation in 1989) and anxiety who presented with several days of worsening sob, runny nose, sinus congestion, fevers and chills. Lives with  who is not sick. Denies recent travel.       In the ED received: CTX/AZX, Duoneb x3, Solumedrol 125mg IV x1,   In the ED labs notable for: WBC 20, K 3.4, lactate 3.2 RVP negative, CXR with pulmonary edema and trace pleural effusions 86yo F with hx of COPD 3L of O2 at home, DAVID on CPAP at night, HTN, hx of breast Ca (radiation in 1989) and anxiety who presented with several days of worsening sob, runny nose, sinus congestion, fevers and chills. Symptoms started with cough adn URI stx, and last night had a hard time laying flat and SOB was worse. Lives with  who is not sick. Denies recent travel. No GI stx. No UTI stx. No headaches, dizziness. LE swelling is chronic, no calf pain.     In the ED received: CTX/AZX, Duoneb x3, Solumedrol 125mg IV x1, lasix 20mg IV x1  In the ED labs notable for: WBC 20, K 3.4, lactate 3.2 RVP negative, CXR with pulmonary edema and trace pleural effusions    PFTs (11/27/18): FEV1 = 1.19L (79%) FVC = 2.05L (96%), FEV1/FVC = 58% No DLCO checked 86yo F with hx of COPD 3L of O2 at home, DAVID on CPAP at night, HTN, hx of breast Ca (radiation in 1989) and anxiety who presented with several days of worsening sob, runny nose and cough.     On 12/26/18 found out that her sister passed away. Spent the day crying and by he end of the day started to feel "woosy" and short of breath. Had a hard time going to sleep that night, put on her 3L of oxygen by nasal canula, used a nebulizer treatment with some relief and felt a little better. On 12/27 started to feel worse with a cough and progressive shortness of breath. Is able to lay flat has not needed more pillows to sleep. No sick contacts. Lives with  who is not sick. Denies recent travel. No GI stx. No UTI stx. No headaches, dizziness. LE swelling is chronic, no calf pain.     In the ED received: CTX/AZX, Duoneb x3, Solumedrol 125mg IV x1, lasix 20mg IV x1  In the ED labs notable for: WBC 20, K 3.4, lactate 3.2 RVP negative, CXR with pulmonary edema and trace pleural effusions    PFTs (11/27/18): FEV1 = 1.19L (79%) FVC = 2.05L (96%), FEV1/FVC = 58% No DLCO checked 86yo F with hx of COPD (3L of O2 QHS at home), DAVID (Occasionally uses CPAP at night), HTN, hx of breast Ca (radiation in 1989), dysphagia and anxiety who presented with several days of worsening sob, runny nose and cough.     On 12/26/18 found out that her sister passed away. Spent the day crying and by he end of the day started to feel "woosy" and short of breath. Had a hard time going to sleep that night, put on her 3L of oxygen by nasal canula, used a nebulizer treatment with some relief and felt a little better. On 12/27 started to feel worse with a cough and progressive shortness of breath. Is able to lay flat has not needed more pillows to sleep. No sick contacts. Lives with  who is not sick. Denies recent travel. No GI stx. No UTI stx. No headaches, dizziness. LE swelling is chronic, no calf pain.     In the ED received: CTX/AZX, Duoneb x3, Solumedrol 125mg IV x1, lasix 20mg IV x1  Vitals: Low grade temp 100F, Tachypnea improved with BIPAP  In the ED labs notable for: WBC 20, K 3.4, lactate 3.2 =>2.8 RVP negative, trop negative x2, EKG with no acute ST/T wave changes, CXR with pulmonary edema and trace pleural effusions    PFTs (11/27/18): FEV1 = 1.19L (79%) FVC = 2.05L (96%), FEV1/FVC = 58% No DLCO checked 86yo F with hx of COPD (3L of O2 QHS at home), DAVID (Occasionally uses CPAP at night), HTN, hx of breast Ca (radiation in 1989), dysphagia and anxiety who presented with several days of worsening sob, runny nose and cough.     On 12/26/18 found out that her sister passed away. Spent the day crying and by be end of the day started to feel "woosy" and short of breath. Had a hard time going to sleep that night, put on her 3L of oxygen by nasal canula, used a nebulizer treatment with some relief and felt a little better. On 12/27 started to feel worse with a cough and progressive shortness of breath. Is able to lay flat has not needed more pillows to sleep. No sick contacts. Lives with  who is not sick. Denies recent travel. No GI stx. No UTI stx. No headaches, dizziness. LE swelling is chronic, no calf pain.     In the ED received: CTX/AZX, Duoneb x3, Solumedrol 125mg IV x1, lasix 20mg IV x1  Vitals: Low grade temp 100F, Tachypnea improved with BIPAP  In the ED labs notable for: WBC 20, K 3.4, lactate 3.2 =>2.8 RVP negative, trop negative x2, EKG with no acute ST/T wave changes, CXR with pulmonary edema and trace pleural effusions    PFTs (11/27/18): FEV1 = 1.19L (79%) FVC = 2.05L (96%), FEV1/FVC = 58% No DLCO checked

## 2018-12-28 NOTE — H&P ADULT - NSHPLABSRESULTS_GEN_ALL_CORE
11.8   20.50 )-----------( 309      ( 28 Dec 2018 13:45 )             38.7   12-28 @ 13:45    142  |  99  |  20  ----------------------------<  164  3.4   |  27  |  0.99    TPro  6.7  /  Alb  3.6  /  TBili  1.0  /  DBili  x   /  AST  23  /  ALT  23  /  AlkPhos  126  12-28 @ 13:45

## 2018-12-28 NOTE — H&P ADULT - PROBLEM SELECTOR PLAN 2
Suspect exacerbated by PNA  - Serafin ATC  - Albuterol PRN  - continue home symbicort  - follow up with pulmonologist Dr. Sheets after discharge.   - continuous O2 monitoring. Suspect mild COPD exacerbation in the setting of PNA and pulmonary edema   - Serafin ATC  - Albuterol PRN  - continue home symbicort  - follow up with pulmonologist Dr. Sheets after discharge.   - continuous O2 monitoring. Suspect mild COPD exacerbation in the setting of pulmonary edema   - Serafin ATC  - Albuterol PRN  - continue home symbicort  - follow up with pulmonologist Dr. Sheets after discharge.   - continuous O2 monitoring.

## 2018-12-29 LAB
BUN SERPL-MCNC: 26 MG/DL — HIGH (ref 7–23)
CALCIUM SERPL-MCNC: 9.3 MG/DL — SIGNIFICANT CHANGE UP (ref 8.4–10.5)
CHLORIDE SERPL-SCNC: 99 MMOL/L — SIGNIFICANT CHANGE UP (ref 98–107)
CO2 SERPL-SCNC: 27 MMOL/L — SIGNIFICANT CHANGE UP (ref 22–31)
CREAT SERPL-MCNC: 1.12 MG/DL — SIGNIFICANT CHANGE UP (ref 0.5–1.3)
GLUCOSE SERPL-MCNC: 176 MG/DL — HIGH (ref 70–99)
GRAM STN SPT: SIGNIFICANT CHANGE UP
L PNEUMO AG UR QL: NEGATIVE — SIGNIFICANT CHANGE UP
LACTATE SERPL-SCNC: 1.5 MMOL/L — SIGNIFICANT CHANGE UP (ref 0.5–2)
MAGNESIUM SERPL-MCNC: 2 MG/DL — SIGNIFICANT CHANGE UP (ref 1.6–2.6)
POTASSIUM SERPL-MCNC: 4.3 MMOL/L — SIGNIFICANT CHANGE UP (ref 3.5–5.3)
POTASSIUM SERPL-SCNC: 4.3 MMOL/L — SIGNIFICANT CHANGE UP (ref 3.5–5.3)
SODIUM SERPL-SCNC: 141 MMOL/L — SIGNIFICANT CHANGE UP (ref 135–145)
SPECIMEN SOURCE: SIGNIFICANT CHANGE UP

## 2018-12-29 PROCEDURE — 99223 1ST HOSP IP/OBS HIGH 75: CPT | Mod: GC

## 2018-12-29 RX ORDER — FUROSEMIDE 40 MG
20 TABLET ORAL
Qty: 0 | Refills: 0 | Status: DISCONTINUED | OUTPATIENT
Start: 2018-12-29 | End: 2018-12-31

## 2018-12-29 RX ORDER — IPRATROPIUM/ALBUTEROL SULFATE 18-103MCG
3 AEROSOL WITH ADAPTER (GRAM) INHALATION EVERY 4 HOURS
Qty: 0 | Refills: 0 | Status: DISCONTINUED | OUTPATIENT
Start: 2018-12-29 | End: 2019-01-02

## 2018-12-29 RX ORDER — POTASSIUM CHLORIDE 20 MEQ
40 PACKET (EA) ORAL EVERY 4 HOURS
Qty: 0 | Refills: 0 | Status: DISCONTINUED | OUTPATIENT
Start: 2018-12-29 | End: 2018-12-29

## 2018-12-29 RX ORDER — CHLORHEXIDINE GLUCONATE 213 G/1000ML
1 SOLUTION TOPICAL
Qty: 0 | Refills: 0 | Status: DISCONTINUED | OUTPATIENT
Start: 2018-12-29 | End: 2019-01-02

## 2018-12-29 RX ADMIN — Medication 3 MILLILITER(S): at 10:50

## 2018-12-29 RX ADMIN — Medication 3 MILLILITER(S): at 03:58

## 2018-12-29 RX ADMIN — CEFTRIAXONE 100 GRAM(S): 500 INJECTION, POWDER, FOR SOLUTION INTRAMUSCULAR; INTRAVENOUS at 00:57

## 2018-12-29 RX ADMIN — Medication 3 MILLILITER(S): at 16:25

## 2018-12-29 RX ADMIN — Medication 20 MILLIGRAM(S): at 08:54

## 2018-12-29 RX ADMIN — HEPARIN SODIUM 5000 UNIT(S): 5000 INJECTION INTRAVENOUS; SUBCUTANEOUS at 05:09

## 2018-12-29 RX ADMIN — CHLORHEXIDINE GLUCONATE 1 APPLICATION(S): 213 SOLUTION TOPICAL at 10:18

## 2018-12-29 RX ADMIN — Medication 20 MILLIGRAM(S): at 17:05

## 2018-12-29 RX ADMIN — Medication 3 MILLILITER(S): at 22:55

## 2018-12-29 RX ADMIN — AZITHROMYCIN 250 MILLIGRAM(S): 500 TABLET, FILM COATED ORAL at 01:21

## 2018-12-29 RX ADMIN — HEPARIN SODIUM 5000 UNIT(S): 5000 INJECTION INTRAVENOUS; SUBCUTANEOUS at 17:05

## 2018-12-29 NOTE — PROGRESS NOTE ADULT - ASSESSMENT
86yo F with hx of COPD (3L of O2 QHS at home), DAVID (Occasionally uses CPAP at night), HTN, hx of breast Ca (radiation in 1989), dysphagia and anxiety who presented with shortness of breath admitted for pneumonia with component of CHF exacerbation.

## 2018-12-29 NOTE — PROGRESS NOTE ADULT - PROBLEM SELECTOR PLAN 2
Suspect mild COPD exacerbation in the setting of pulmonary edema   - Serafin ATC  - Albuterol PRN  - continue home symbicort  - follow up with pulmonologist Dr. Sheets after discharge.   - continuous O2 monitoring.

## 2018-12-29 NOTE — PROVIDER CONTACT NOTE (OTHER) - ASSESSMENT
Patient AOX4. Patient noted to have no signs of respiratory distress. Patient Sats are 98% on NC 3L.

## 2018-12-29 NOTE — PROGRESS NOTE ADULT - PROBLEM SELECTOR PLAN 5
S/p outpatient EGD (8/2017) that revealed Gastritis, a small hiatal hernia, a markedly patulous LES, but no strictures or narrowing. Has been doing better with improvement in dysphagia symptoms on PPI daily.   - Continue home PPI  - Aspiration precautions  - Regular with Nectar Thick Liquids   - Positioning: seated upright in chair with lateral view projection (per Speech)

## 2018-12-29 NOTE — PROGRESS NOTE ADULT - PROBLEM SELECTOR PLAN 1
Suspect volume overload/pulmonary edema from Pneumonia with exacerbation of underlying COPD.   - CTX/AZX for CAP coverage  - f/u Blood, sputum and Urine cultures  - Continuous O2 monitoring  - Serafin ATC   - TTE given new pulmonary edema, in the setting of negative trop, ?takatsubo  - Bipap for now given work of breathing

## 2018-12-29 NOTE — PROVIDER CONTACT NOTE (OTHER) - REASON
Patient took off bipap, stating that she doesn't want it right now and will put it on later. Sats are 98% on NC 3L.

## 2018-12-29 NOTE — PROGRESS NOTE ADULT - SUBJECTIVE AND OBJECTIVE BOX
Patient is a 87y old  Female who presents with a chief complaint of Sepsis, Pneumonia with CHF exacerbation (28 Dec 2018 17:46)        SUBJECTIVE / OVERNIGHT EVENTS:      MEDICATIONS  (STANDING):  ALBUTerol/ipratropium for Nebulization 3 milliLiter(s) Nebulizer every 6 hours  azithromycin  IVPB 500 milliGRAM(s) IV Intermittent every 24 hours  buDESOnide 160 MICROgram(s)/formoterol 4.5 MICROgram(s) Inhaler 2 Puff(s) Inhalation two times a day  cefTRIAXone   IVPB 1 Gram(s) IV Intermittent every 24 hours  chlorhexidine 4% Liquid 1 Application(s) Topical <User Schedule>  furosemide   Injectable 20 milliGRAM(s) IV Push two times a day  heparin  Injectable 5000 Unit(s) SubCutaneous every 12 hours    MEDICATIONS  (PRN):  ALBUTerol    90 MICROgram(s) HFA Inhaler 2 Puff(s) Inhalation every 4 hours PRN Shortness of Breath        CAPILLARY BLOOD GLUCOSE        I&O's Summary    28 Dec 2018 07:01  -  29 Dec 2018 07:00  --------------------------------------------------------  IN: 0 mL / OUT: 400 mL / NET: -400 mL        PHYSICAL EXAM  GENERAL: NAD, well-developed  HEAD:  Atraumatic, Normocephalic  EYES: EOMI, PERRLA, conjunctiva and sclera clear  NECK: Supple, No JVD  CHEST/LUNG: Clear to auscultation bilaterally; No wheeze  HEART: Regular rate and rhythm; No murmurs, rubs, or gallops  ABDOMEN: Soft, Nontender, Nondistended; Bowel sounds present  EXTREMITIES:  2+ Peripheral Pulses, No clubbing, cyanosis, or edema  PSYCH: AAOx3  SKIN: No rashes or lesions    LABS:                        12.0   18.91 )-----------( 317      ( 28 Dec 2018 17:55 )             40.3         141  |  98  |  19  ----------------------------<  295<H>  3.2<L>   |  27  |  1.08    Ca    9.0      28 Dec 2018 17:55    TPro  6.7  /  Alb  3.6  /  TBili  1.0  /  DBili  x   /  AST  23  /  ALT  23  /  AlkPhos  126<H>  12-28          Urinalysis Basic - ( 28 Dec 2018 20:45 )    Color: YELLOW / Appearance: Lt TURBID / S.012 / pH: 6.0  Gluc: NEGATIVE / Ketone: NEGATIVE  / Bili: NEGATIVE / Urobili: NORMAL   Blood: MODERATE / Protein: 20 / Nitrite: NEGATIVE   Leuk Esterase: LARGE / RBC: 26-50 / WBC >50   Sq Epi: MODERATE / Non Sq Epi: x / Bacteria: MANY        RADIOLOGY & ADDITIONAL TESTS:    Imaging Personally Reviewed:  Consultant(s) Notes Reviewed:    Care Discussed with Consultants/Other Providers: Patient is a 87y old  Female who presents with a chief complaint of Sepsis, Pneumonia with CHF exacerbation (28 Dec 2018 17:46)        SUBJECTIVE / OVERNIGHT EVENTS: No overnight events      MEDICATIONS  (STANDING):  ALBUTerol/ipratropium for Nebulization 3 milliLiter(s) Nebulizer every 6 hours  azithromycin  IVPB 500 milliGRAM(s) IV Intermittent every 24 hours  buDESOnide 160 MICROgram(s)/formoterol 4.5 MICROgram(s) Inhaler 2 Puff(s) Inhalation two times a day  cefTRIAXone   IVPB 1 Gram(s) IV Intermittent every 24 hours  chlorhexidine 4% Liquid 1 Application(s) Topical <User Schedule>  furosemide   Injectable 20 milliGRAM(s) IV Push two times a day  heparin  Injectable 5000 Unit(s) SubCutaneous every 12 hours    MEDICATIONS  (PRN):  ALBUTerol    90 MICROgram(s) HFA Inhaler 2 Puff(s) Inhalation every 4 hours PRN Shortness of Breath        CAPILLARY BLOOD GLUCOSE        I&O's Summary    28 Dec 2018 07:01  -  29 Dec 2018 07:00  --------------------------------------------------------  IN: 0 mL / OUT: 400 mL / NET: -400 mL        PHYSICAL EXAM  GENERAL: NAD, well-developed  HEAD:  Atraumatic, Normocephalic  EYES: EOMI, PERRLA, conjunctiva and sclera clear  NECK: Supple, No JVD  CHEST/LUNG: decreased breath sounds  HEART: Regular rate and rhythm; No murmurs, rubs, or gallops  ABDOMEN: Soft, Nontender, Nondistended; Bowel sounds present  EXTREMITIES:  2+ Peripheral Pulses, No clubbing, cyanosis, or edema  PSYCH: AAOx3  SKIN: No rashes or lesions    LABS:                        12.0   18.91 )-----------( 317      ( 28 Dec 2018 17:55 )             40.3         141  |  98  |  19  ----------------------------<  295<H>  3.2<L>   |  27  |  1.08    Ca    9.0      28 Dec 2018 17:55    TPro  6.7  /  Alb  3.6  /  TBili  1.0  /  DBili  x   /  AST  23  /  ALT  23  /  AlkPhos  126<H>  12-28          Urinalysis Basic - ( 28 Dec 2018 20:45 )    Color: YELLOW / Appearance: Lt TURBID / S.012 / pH: 6.0  Gluc: NEGATIVE / Ketone: NEGATIVE  / Bili: NEGATIVE / Urobili: NORMAL   Blood: MODERATE / Protein: 20 / Nitrite: NEGATIVE   Leuk Esterase: LARGE / RBC: 26-50 / WBC >50   Sq Epi: MODERATE / Non Sq Epi: x / Bacteria: MANY        RADIOLOGY & ADDITIONAL TESTS:    Imaging Personally Reviewed:  Consultant(s) Notes Reviewed:    Care Discussed with Consultants/Other Providers:

## 2018-12-30 DIAGNOSIS — E87.8 OTHER DISORDERS OF ELECTROLYTE AND FLUID BALANCE, NOT ELSEWHERE CLASSIFIED: ICD-10-CM

## 2018-12-30 LAB
BASOPHILS # BLD AUTO: 0.08 K/UL — SIGNIFICANT CHANGE UP (ref 0–0.2)
BASOPHILS NFR BLD AUTO: 0.7 % — SIGNIFICANT CHANGE UP (ref 0–2)
BUN SERPL-MCNC: 24 MG/DL — HIGH (ref 7–23)
CALCIUM SERPL-MCNC: 8.7 MG/DL — SIGNIFICANT CHANGE UP (ref 8.4–10.5)
CHLORIDE SERPL-SCNC: 101 MMOL/L — SIGNIFICANT CHANGE UP (ref 98–107)
CO2 SERPL-SCNC: 28 MMOL/L — SIGNIFICANT CHANGE UP (ref 22–31)
CREAT SERPL-MCNC: 0.99 MG/DL — SIGNIFICANT CHANGE UP (ref 0.5–1.3)
EOSINOPHIL # BLD AUTO: 0.2 K/UL — SIGNIFICANT CHANGE UP (ref 0–0.5)
EOSINOPHIL NFR BLD AUTO: 1.7 % — SIGNIFICANT CHANGE UP (ref 0–6)
GLUCOSE SERPL-MCNC: 165 MG/DL — HIGH (ref 70–99)
HCT VFR BLD CALC: 38 % — SIGNIFICANT CHANGE UP (ref 34.5–45)
HGB BLD-MCNC: 11.3 G/DL — LOW (ref 11.5–15.5)
IMM GRANULOCYTES # BLD AUTO: 0.07 # — SIGNIFICANT CHANGE UP
IMM GRANULOCYTES NFR BLD AUTO: 0.6 % — SIGNIFICANT CHANGE UP (ref 0–1.5)
LYMPHOCYTES # BLD AUTO: 1.2 K/UL — SIGNIFICANT CHANGE UP (ref 1–3.3)
LYMPHOCYTES # BLD AUTO: 10.5 % — LOW (ref 13–44)
MAGNESIUM SERPL-MCNC: 2.1 MG/DL — SIGNIFICANT CHANGE UP (ref 1.6–2.6)
MCHC RBC-ENTMCNC: 23.3 PG — LOW (ref 27–34)
MCHC RBC-ENTMCNC: 29.7 % — LOW (ref 32–36)
MCV RBC AUTO: 78.2 FL — LOW (ref 80–100)
MONOCYTES # BLD AUTO: 0.68 K/UL — SIGNIFICANT CHANGE UP (ref 0–0.9)
MONOCYTES NFR BLD AUTO: 5.9 % — SIGNIFICANT CHANGE UP (ref 2–14)
NEUTROPHILS # BLD AUTO: 9.2 K/UL — HIGH (ref 1.8–7.4)
NEUTROPHILS NFR BLD AUTO: 80.6 % — HIGH (ref 43–77)
NRBC # FLD: 0 — SIGNIFICANT CHANGE UP
PHOSPHATE SERPL-MCNC: 2.4 MG/DL — LOW (ref 2.5–4.5)
PLATELET # BLD AUTO: 300 K/UL — SIGNIFICANT CHANGE UP (ref 150–400)
PMV BLD: 10.2 FL — SIGNIFICANT CHANGE UP (ref 7–13)
POTASSIUM SERPL-MCNC: 3.1 MMOL/L — LOW (ref 3.5–5.3)
POTASSIUM SERPL-SCNC: 3.1 MMOL/L — LOW (ref 3.5–5.3)
RBC # BLD: 4.86 M/UL — SIGNIFICANT CHANGE UP (ref 3.8–5.2)
RBC # FLD: 19.3 % — HIGH (ref 10.3–14.5)
SODIUM SERPL-SCNC: 143 MMOL/L — SIGNIFICANT CHANGE UP (ref 135–145)
WBC # BLD: 11.43 K/UL — HIGH (ref 3.8–10.5)
WBC # FLD AUTO: 11.43 K/UL — HIGH (ref 3.8–10.5)

## 2018-12-30 PROCEDURE — 99233 SBSQ HOSP IP/OBS HIGH 50: CPT | Mod: GC

## 2018-12-30 RX ORDER — POLYETHYLENE GLYCOL 3350 17 G/17G
17 POWDER, FOR SOLUTION ORAL AT BEDTIME
Qty: 0 | Refills: 0 | Status: DISCONTINUED | OUTPATIENT
Start: 2018-12-30 | End: 2019-01-02

## 2018-12-30 RX ORDER — PANTOPRAZOLE SODIUM 20 MG/1
40 TABLET, DELAYED RELEASE ORAL
Qty: 0 | Refills: 0 | Status: DISCONTINUED | OUTPATIENT
Start: 2018-12-31 | End: 2019-01-02

## 2018-12-30 RX ORDER — CLONAZEPAM 1 MG
0.5 TABLET ORAL ONCE
Qty: 0 | Refills: 0 | Status: DISCONTINUED | OUTPATIENT
Start: 2018-12-30 | End: 2018-12-30

## 2018-12-30 RX ORDER — PANTOPRAZOLE SODIUM 20 MG/1
40 TABLET, DELAYED RELEASE ORAL ONCE
Qty: 0 | Refills: 0 | Status: COMPLETED | OUTPATIENT
Start: 2018-12-30 | End: 2018-12-30

## 2018-12-30 RX ORDER — POTASSIUM CHLORIDE 20 MEQ
40 PACKET (EA) ORAL EVERY 4 HOURS
Qty: 0 | Refills: 0 | Status: COMPLETED | OUTPATIENT
Start: 2018-12-30 | End: 2018-12-30

## 2018-12-30 RX ADMIN — CHLORHEXIDINE GLUCONATE 1 APPLICATION(S): 213 SOLUTION TOPICAL at 17:23

## 2018-12-30 RX ADMIN — Medication 3 MILLILITER(S): at 03:55

## 2018-12-30 RX ADMIN — Medication 20 MILLIGRAM(S): at 05:59

## 2018-12-30 RX ADMIN — HEPARIN SODIUM 5000 UNIT(S): 5000 INJECTION INTRAVENOUS; SUBCUTANEOUS at 05:59

## 2018-12-30 RX ADMIN — CEFTRIAXONE 100 GRAM(S): 500 INJECTION, POWDER, FOR SOLUTION INTRAMUSCULAR; INTRAVENOUS at 00:06

## 2018-12-30 RX ADMIN — AZITHROMYCIN 250 MILLIGRAM(S): 500 TABLET, FILM COATED ORAL at 00:55

## 2018-12-30 RX ADMIN — Medication 40 MILLIGRAM(S): at 06:00

## 2018-12-30 RX ADMIN — Medication 20 MILLIGRAM(S): at 17:23

## 2018-12-30 RX ADMIN — PANTOPRAZOLE SODIUM 40 MILLIGRAM(S): 20 TABLET, DELAYED RELEASE ORAL at 09:59

## 2018-12-30 RX ADMIN — Medication 0.5 MILLIGRAM(S): at 02:56

## 2018-12-30 RX ADMIN — Medication 0.5 MILLIGRAM(S): at 22:21

## 2018-12-30 RX ADMIN — Medication 3 MILLILITER(S): at 22:05

## 2018-12-30 RX ADMIN — Medication 3 MILLILITER(S): at 09:41

## 2018-12-30 RX ADMIN — Medication 40 MILLIEQUIVALENT(S): at 09:59

## 2018-12-30 RX ADMIN — Medication 3 MILLILITER(S): at 15:42

## 2018-12-30 RX ADMIN — Medication 40 MILLIEQUIVALENT(S): at 13:41

## 2018-12-30 RX ADMIN — HEPARIN SODIUM 5000 UNIT(S): 5000 INJECTION INTRAVENOUS; SUBCUTANEOUS at 17:23

## 2018-12-30 NOTE — PROVIDER CONTACT NOTE (OTHER) - SITUATION
pt stated her neck is hurting while azithromycin is running through her rt E.J,stopped the abx and PA notified

## 2018-12-30 NOTE — PROGRESS NOTE ADULT - SUBJECTIVE AND OBJECTIVE BOX
CHIEF COMPLAINT:    Interval Events:    OBJECTIVE:  ICU Vital Signs Last 24 Hrs  T(C): 36.8 (30 Dec 2018 05:56), Max: 36.9 (29 Dec 2018 21:54)  T(F): 98.3 (30 Dec 2018 05:56), Max: 98.5 (29 Dec 2018 21:54)  HR: 83 (30 Dec 2018 07:58) (71 - 90)  BP: 115/50 (30 Dec 2018 05:56) (112/53 - 115/50)  BP(mean): --  ABP: --  ABP(mean): --  RR: 18 (30 Dec 2018 05:56) (18 - 18)  SpO2: 99% (30 Dec 2018 07:58) (95% - 100%)         @ 07:  -   @ 07:00  --------------------------------------------------------  IN: 480 mL / OUT: 1050 mL / NET: -570 mL     @ 07:01  -   @ 09:40  --------------------------------------------------------  IN: 0 mL / OUT: 300 mL / NET: -300 mL      CAPILLARY BLOOD GLUCOSE    HOSPITAL MEDICATIONS:  MEDICATIONS  (STANDING):  ALBUTerol/ipratropium for Nebulization 3 milliLiter(s) Nebulizer every 6 hours  azithromycin  IVPB 500 milliGRAM(s) IV Intermittent every 24 hours  buDESOnide 160 MICROgram(s)/formoterol 4.5 MICROgram(s) Inhaler 2 Puff(s) Inhalation two times a day  cefTRIAXone   IVPB 1 Gram(s) IV Intermittent every 24 hours  chlorhexidine 4% Liquid 1 Application(s) Topical <User Schedule>  furosemide   Injectable 20 milliGRAM(s) IV Push two times a day  heparin  Injectable 5000 Unit(s) SubCutaneous every 12 hours  pantoprazole    Tablet 40 milliGRAM(s) Oral once  potassium chloride   Powder 40 milliEquivalent(s) Oral every 4 hours  predniSONE   Tablet 40 milliGRAM(s) Oral daily    MEDICATIONS  (PRN):  ALBUTerol    90 MICROgram(s) HFA Inhaler 2 Puff(s) Inhalation every 4 hours PRN Shortness of Breath  ALBUTerol/ipratropium for Nebulization 3 milliLiter(s) Nebulizer every 4 hours PRN Bronchospasm  polyethylene glycol 3350 17 Gram(s) Oral at bedtime PRN Constipation      LABS:                        11.3   11.43 )-----------( 300      ( 30 Dec 2018 05:54 )             38.0         143  |  101  |  24<H>  ----------------------------<  165<H>  3.1<L>   |  28  |  0.99    Ca    8.7      30 Dec 2018 05:54  Phos  2.4       Mg     2.1         TPro  6.7  /  Alb  3.6  /  TBili  1.0  /  DBili  x   /  AST  23  /  ALT  23  /  AlkPhos  126<H>        Urinalysis Basic - ( 28 Dec 2018 20:45 )    Color: YELLOW / Appearance: Lt TURBID / S.012 / pH: 6.0  Gluc: NEGATIVE / Ketone: NEGATIVE  / Bili: NEGATIVE / Urobili: NORMAL   Blood: MODERATE / Protein: 20 / Nitrite: NEGATIVE   Leuk Esterase: LARGE / RBC: 26-50 / WBC >50   Sq Epi: MODERATE / Non Sq Epi: x / Bacteria: MANY        Venous Blood Gas:   @ 13:45  7.42/46/71/29/92.2  VBG Lactate: 3.2    CXR dated 18: Interstitial pulmonary edema and trace bilateral pleural effusions. CHIEF COMPLAINT: no new complaine    Interval Events: off Bi-pap, on O 2 NC    OBJECTIVE:  ICU Vital Signs Last 24 Hrs  T(C): 36.8 (30 Dec 2018 05:56), Max: 36.9 (29 Dec 2018 21:54)  T(F): 98.3 (30 Dec 2018 05:56), Max: 98.5 (29 Dec 2018 21:54)  HR: 83 (30 Dec 2018 07:58) (71 - 90)  BP: 115/50 (30 Dec 2018 05:56) (112/53 - 115/50)    RR: 18 (30 Dec 2018 05:56) (18 - 18)  SpO2: 99% (30 Dec 2018 07:58) (95% - 100%)         @ 07:  -   @ 07:00  --------------------------------------------------------  IN: 480 mL / OUT: 1050 mL / NET: -570 mL     @ 07:  -   @ 09:40  --------------------------------------------------------  IN: 0 mL / OUT: 300 mL / NET: -300 mL      CAPILLARY BLOOD GLUCOSE    HOSPITAL MEDICATIONS:  MEDICATIONS  (STANDING):  ALBUTerol/ipratropium for Nebulization 3 milliLiter(s) Nebulizer every 6 hours  azithromycin  IVPB 500 milliGRAM(s) IV Intermittent every 24 hours  buDESOnide 160 MICROgram(s)/formoterol 4.5 MICROgram(s) Inhaler 2 Puff(s) Inhalation two times a day  cefTRIAXone   IVPB 1 Gram(s) IV Intermittent every 24 hours  chlorhexidine 4% Liquid 1 Application(s) Topical <User Schedule>  furosemide   Injectable 20 milliGRAM(s) IV Push two times a day  heparin  Injectable 5000 Unit(s) SubCutaneous every 12 hours  pantoprazole    Tablet 40 milliGRAM(s) Oral once  potassium chloride   Powder 40 milliEquivalent(s) Oral every 4 hours  predniSONE   Tablet 40 milliGRAM(s) Oral daily    MEDICATIONS  (PRN):  ALBUTerol    90 MICROgram(s) HFA Inhaler 2 Puff(s) Inhalation every 4 hours PRN Shortness of Breath  ALBUTerol/ipratropium for Nebulization 3 milliLiter(s) Nebulizer every 4 hours PRN Bronchospasm  polyethylene glycol 3350 17 Gram(s) Oral at bedtime PRN Constipation      LABS:                        11.3   11.43 )-----------( 300      ( 30 Dec 2018 05:54 )             38.0         143  |  101  |  24<H>  ----------------------------<  165<H>  3.1<L>   |  28  |  0.99    Ca    8.7      30 Dec 2018 05:54  Phos  2.4       Mg     2.1         TPro  6.7  /  Alb  3.6  /  TBili  1.0  /  DBili  x   /  AST  23  /  ALT  23  /  AlkPhos  126<H>        Urinalysis Basic - ( 28 Dec 2018 20:45 )    Color: YELLOW / Appearance: Lt TURBID / S.012 / pH: 6.0  Gluc: NEGATIVE / Ketone: NEGATIVE  / Bili: NEGATIVE / Urobili: NORMAL   Blood: MODERATE / Protein: 20 / Nitrite: NEGATIVE   Leuk Esterase: LARGE / RBC: 26-50 / WBC >50   Sq Epi: MODERATE / Non Sq Epi: x / Bacteria: MANY        Venous Blood Gas:   @ 13:45  7.42/46/71/29/92.2  VBG Lactate: 3.2    CXR dated 18: Interstitial pulmonary edema and trace bilateral pleural effusions.      PHYSICAL EXAM  GENERAL: NAD, well-developed  HEAD:  Atraumatic, Normocephalic  EYES: EOMI, PERRLA, conjunctiva and sclera clear  NECK: Supple, No JVD  CHEST/LUNG: decreased breath sounds  HEART: Regular rate and rhythm; No murmurs, rubs, or gallops  ABDOMEN: Soft, Nontender, Nondistended; Bowel sounds present  EXTREMITIES:  2+ Peripheral Pulses, No clubbing, cyanosis, or edema  PSYCH: AAOx3  SKIN: No rashes or lesions CHIEF COMPLAINT: no new complains, still SOBOE    Interval Events: off Bi-pap, on O 2 NC    OBJECTIVE:  ICU Vital Signs Last 24 Hrs  T(C): 36.8 (30 Dec 2018 05:56), Max: 36.9 (29 Dec 2018 21:54)  T(F): 98.3 (30 Dec 2018 05:56), Max: 98.5 (29 Dec 2018 21:54)  HR: 83 (30 Dec 2018 07:58) (71 - 90)  BP: 115/50 (30 Dec 2018 05:56) (112/53 - 115/50)    RR: 18 (30 Dec 2018 05:56) (18 - 18)  SpO2: 99% (30 Dec 2018 07:58) (95% - 100%)         @ 07:  -   @ 07:00  --------------------------------------------------------  IN: 480 mL / OUT: 1050 mL / NET: -570 mL     @ 07: @ 09:40  --------------------------------------------------------  IN: 0 mL / OUT: 300 mL / NET: -300 mL      CAPILLARY BLOOD GLUCOSE    HOSPITAL MEDICATIONS:  MEDICATIONS  (STANDING):  ALBUTerol/ipratropium for Nebulization 3 milliLiter(s) Nebulizer every 6 hours  azithromycin  IVPB 500 milliGRAM(s) IV Intermittent every 24 hours  buDESOnide 160 MICROgram(s)/formoterol 4.5 MICROgram(s) Inhaler 2 Puff(s) Inhalation two times a day  cefTRIAXone   IVPB 1 Gram(s) IV Intermittent every 24 hours  chlorhexidine 4% Liquid 1 Application(s) Topical <User Schedule>  furosemide   Injectable 20 milliGRAM(s) IV Push two times a day  heparin  Injectable 5000 Unit(s) SubCutaneous every 12 hours  pantoprazole    Tablet 40 milliGRAM(s) Oral once  potassium chloride   Powder 40 milliEquivalent(s) Oral every 4 hours  predniSONE   Tablet 40 milliGRAM(s) Oral daily    MEDICATIONS  (PRN):  ALBUTerol    90 MICROgram(s) HFA Inhaler 2 Puff(s) Inhalation every 4 hours PRN Shortness of Breath  ALBUTerol/ipratropium for Nebulization 3 milliLiter(s) Nebulizer every 4 hours PRN Bronchospasm  polyethylene glycol 3350 17 Gram(s) Oral at bedtime PRN Constipation      LABS:                        11.3   11.43 )-----------( 300      ( 30 Dec 2018 05:54 )             38.0     12    143  |  101  |  24<H>  ----------------------------<  165<H>  3.1<L>   |  28  |  0.99    Ca    8.7      30 Dec 2018 05:54  Phos  2.4       Mg     2.1         TPro  6.7  /  Alb  3.6  /  TBili  1.0  /  DBili  x   /  AST  23  /  ALT  23  /  AlkPhos  126<H>        Urinalysis Basic - ( 28 Dec 2018 20:45 )    Color: YELLOW / Appearance: Lt TURBID / S.012 / pH: 6.0  Gluc: NEGATIVE / Ketone: NEGATIVE  / Bili: NEGATIVE / Urobili: NORMAL   Blood: MODERATE / Protein: 20 / Nitrite: NEGATIVE   Leuk Esterase: LARGE / RBC: 26-50 / WBC >50   Sq Epi: MODERATE / Non Sq Epi: x / Bacteria: MANY        Venous Blood Gas:   @ 13:45  7.42/46/71/29/92.2  VBG Lactate: 3.2    CXR dated 18: Interstitial pulmonary edema and trace bilateral pleural effusions.      PHYSICAL EXAM  GENERAL: NAD, well-developed  HEAD:  Atraumatic, Normocephalic  EYES: EOMI, PERRLA, conjunctiva and sclera clear  NECK: Supple, No JVD  CHEST/LUNG: decreased breath sounds  HEART: Regular rate and rhythm; No murmurs, rubs, or gallops  ABDOMEN: Soft, Nontender, Nondistended; Bowel sounds present  EXTREMITIES:  2+ Peripheral Pulses, No clubbing, cyanosis, or edema  PSYCH: AAOx3  SKIN: No rashes or lesions

## 2018-12-30 NOTE — PROVIDER CONTACT NOTE (OTHER) - ACTION/TREATMENT ORDERED:
notified DILLON magaña,she stated to stop for some time and restart the iv abx,will continue to monitor
Patient can be on NC if Sats are good. Will report off to day team to make aware. Continue to monitor.

## 2018-12-30 NOTE — PROGRESS NOTE ADULT - PROBLEM SELECTOR PLAN 1
- CTX/AZX for CAP coverage  - Blood culture NTD, Legionella neg,  sputum GPC,   -f/u Urine cultures  - Continuous O2 monitoring  - Serafin ATC   - TTE given new pulmonary edema, in the setting of negative trop, ?takatsubo  - Bipap for now given work of breathing

## 2018-12-30 NOTE — PROGRESS NOTE ADULT - ASSESSMENT
88yo F with hx of COPD (3L of O2 QHS at home), DAVID (Occasionally uses CPAP at night), HTN, hx of breast Ca (radiation in 1989), dysphagia and anxiety who presented with shortness of breath admitted for pneumonia with component of CHF exacerbation.

## 2018-12-31 LAB
-  AMIKACIN: SIGNIFICANT CHANGE UP
-  AZTREONAM: SIGNIFICANT CHANGE UP
-  CEFEPIME: SIGNIFICANT CHANGE UP
-  CEFTAZIDIME: SIGNIFICANT CHANGE UP
-  CIPROFLOXACIN: SIGNIFICANT CHANGE UP
-  GENTAMICIN: SIGNIFICANT CHANGE UP
-  IMIPENEM: SIGNIFICANT CHANGE UP
-  LEVOFLOXACIN: SIGNIFICANT CHANGE UP
-  MEROPENEM: SIGNIFICANT CHANGE UP
-  PIPERACILLIN/TAZOBACTAM: SIGNIFICANT CHANGE UP
-  TOBRAMYCIN: SIGNIFICANT CHANGE UP
BACTERIA SPT RESP CULT: SIGNIFICANT CHANGE UP
BUN SERPL-MCNC: 28 MG/DL — HIGH (ref 7–23)
CALCIUM SERPL-MCNC: 9.6 MG/DL — SIGNIFICANT CHANGE UP (ref 8.4–10.5)
CHLORIDE SERPL-SCNC: 98 MMOL/L — SIGNIFICANT CHANGE UP (ref 98–107)
CO2 SERPL-SCNC: 24 MMOL/L — SIGNIFICANT CHANGE UP (ref 22–31)
CREAT SERPL-MCNC: 1.1 MG/DL — SIGNIFICANT CHANGE UP (ref 0.5–1.3)
GLUCOSE SERPL-MCNC: 345 MG/DL — HIGH (ref 70–99)
GRAM STN SPT: SIGNIFICANT CHANGE UP
HCT VFR BLD CALC: 41.3 % — SIGNIFICANT CHANGE UP (ref 34.5–45)
HGB BLD-MCNC: 12.4 G/DL — SIGNIFICANT CHANGE UP (ref 11.5–15.5)
MCHC RBC-ENTMCNC: 23.1 PG — LOW (ref 27–34)
MCHC RBC-ENTMCNC: 30 % — LOW (ref 32–36)
MCV RBC AUTO: 77.1 FL — LOW (ref 80–100)
METHOD TYPE: SIGNIFICANT CHANGE UP
NRBC # FLD: 0 — SIGNIFICANT CHANGE UP
ORGANISM # SPEC MICROSCOPIC CNT: SIGNIFICANT CHANGE UP
ORGANISM # SPEC MICROSCOPIC CNT: SIGNIFICANT CHANGE UP
PLATELET # BLD AUTO: 346 K/UL — SIGNIFICANT CHANGE UP (ref 150–400)
PMV BLD: 9.4 FL — SIGNIFICANT CHANGE UP (ref 7–13)
POTASSIUM SERPL-MCNC: 4 MMOL/L — SIGNIFICANT CHANGE UP (ref 3.5–5.3)
POTASSIUM SERPL-SCNC: 4 MMOL/L — SIGNIFICANT CHANGE UP (ref 3.5–5.3)
RBC # BLD: 5.36 M/UL — HIGH (ref 3.8–5.2)
RBC # FLD: 19.1 % — HIGH (ref 10.3–14.5)
SODIUM SERPL-SCNC: 138 MMOL/L — SIGNIFICANT CHANGE UP (ref 135–145)
WBC # BLD: 6.44 K/UL — SIGNIFICANT CHANGE UP (ref 3.8–10.5)
WBC # FLD AUTO: 6.44 K/UL — SIGNIFICANT CHANGE UP (ref 3.8–10.5)

## 2018-12-31 PROCEDURE — 99233 SBSQ HOSP IP/OBS HIGH 50: CPT | Mod: GC

## 2018-12-31 PROCEDURE — 93306 TTE W/DOPPLER COMPLETE: CPT | Mod: 26

## 2018-12-31 RX ORDER — CLONAZEPAM 1 MG
0.5 TABLET ORAL ONCE
Qty: 0 | Refills: 0 | Status: DISCONTINUED | OUTPATIENT
Start: 2018-12-31 | End: 2018-12-31

## 2018-12-31 RX ORDER — DOCUSATE SODIUM 100 MG
100 CAPSULE ORAL THREE TIMES A DAY
Qty: 0 | Refills: 0 | Status: DISCONTINUED | OUTPATIENT
Start: 2018-12-31 | End: 2019-01-02

## 2018-12-31 RX ORDER — FUROSEMIDE 40 MG
40 TABLET ORAL DAILY
Qty: 0 | Refills: 0 | Status: DISCONTINUED | OUTPATIENT
Start: 2018-12-31 | End: 2019-01-02

## 2018-12-31 RX ADMIN — Medication 100 MILLIGRAM(S): at 22:03

## 2018-12-31 RX ADMIN — Medication 3 MILLILITER(S): at 09:59

## 2018-12-31 RX ADMIN — Medication 40 MILLIGRAM(S): at 06:26

## 2018-12-31 RX ADMIN — Medication 3 MILLILITER(S): at 16:37

## 2018-12-31 RX ADMIN — CHLORHEXIDINE GLUCONATE 1 APPLICATION(S): 213 SOLUTION TOPICAL at 10:33

## 2018-12-31 RX ADMIN — HEPARIN SODIUM 5000 UNIT(S): 5000 INJECTION INTRAVENOUS; SUBCUTANEOUS at 06:27

## 2018-12-31 RX ADMIN — HEPARIN SODIUM 5000 UNIT(S): 5000 INJECTION INTRAVENOUS; SUBCUTANEOUS at 17:11

## 2018-12-31 RX ADMIN — Medication 20 MILLIGRAM(S): at 06:27

## 2018-12-31 RX ADMIN — Medication 3 MILLILITER(S): at 04:58

## 2018-12-31 RX ADMIN — Medication 0.5 MILLIGRAM(S): at 22:03

## 2018-12-31 RX ADMIN — PANTOPRAZOLE SODIUM 40 MILLIGRAM(S): 20 TABLET, DELAYED RELEASE ORAL at 06:26

## 2018-12-31 NOTE — PROGRESS NOTE ADULT - PROBLEM SELECTOR PLAN 3
- Hold home Amlodipine 10mg daily for now  - c/w Lasix 20mg IV BID - Hold home Amlodipine 10mg daily for now  - lasix changed to 40mg oral daily - Hold home Amlodipine 10mg daily for now  - c/w diuresis with oral lasix

## 2018-12-31 NOTE — CHART NOTE - NSCHARTNOTEFT_GEN_A_CORE
Spoke to pt about TTE results.  Pt expressed a desire to follow up with a cardiologist after discharge.  Encouraged pt to follow up as well.

## 2018-12-31 NOTE — PROGRESS NOTE ADULT - PROBLEM SELECTOR PLAN 7
K and Ph supplemented, monitor electrolytes K and Ph supplemented, monitor electrolytes  -fu stat cbc and bmp_____

## 2018-12-31 NOTE — PROGRESS NOTE ADULT - SUBJECTIVE AND OBJECTIVE BOX
CHIEF COMPLAINT:    Interval Events:    REVIEW OF SYSTEMS:  Constitutional:   Eyes:  ENT:  CV:  Resp:  GI:  :  MSK:  Integumentary:  Neurological:  Psychiatric:  Endocrine:  Hematologic/Lymphatic:  Allergic/Immunologic:  [ ] All other systems negative  [ ] Unable to assess ROS because ________    OBJECTIVE:  ICU Vital Signs Last 24 Hrs  T(C): 36.7 (31 Dec 2018 06:21), Max: 36.7 (30 Dec 2018 13:40)  T(F): 98.1 (31 Dec 2018 06:21), Max: 98.1 (30 Dec 2018 13:40)  HR: 80 (31 Dec 2018 07:52) (71 - 93)  BP: 135/55 (31 Dec 2018 06:21) (121/54 - 139/56)  BP(mean): --  ABP: --  ABP(mean): --  RR: 18 (31 Dec 2018 06:21) (18 - 19)  SpO2: 98% (31 Dec 2018 07:52) (96% - 100%)        12-30 @ 07:01 - 12-31 @ 07:00  --------------------------------------------------------  IN: 493 mL / OUT: 1400 mL / NET: -907 mL    12-31 @ 07:01  -  12-31 @ 09:12  --------------------------------------------------------  IN: 0 mL / OUT: 425 mL / NET: -425 mL      CAPILLARY BLOOD GLUCOSE          PHYSICAL EXAM:  General:   HEENT:   Lymph Nodes:  Neck:   Respiratory:   Cardiovascular:   Abdomen:   Extremities:   Skin:   Neurological:  Psychiatry:    HOSPITAL MEDICATIONS:  MEDICATIONS  (STANDING):  ALBUTerol/ipratropium for Nebulization 3 milliLiter(s) Nebulizer every 6 hours  buDESOnide 160 MICROgram(s)/formoterol 4.5 MICROgram(s) Inhaler 2 Puff(s) Inhalation two times a day  chlorhexidine 4% Liquid 1 Application(s) Topical <User Schedule>  furosemide   Injectable 20 milliGRAM(s) IV Push two times a day  heparin  Injectable 5000 Unit(s) SubCutaneous every 12 hours  levoFLOXacin IVPB      levoFLOXacin IVPB 750 milliGRAM(s) IV Intermittent every 24 hours  pantoprazole    Tablet 40 milliGRAM(s) Oral before breakfast  predniSONE   Tablet 40 milliGRAM(s) Oral daily    MEDICATIONS  (PRN):  ALBUTerol    90 MICROgram(s) HFA Inhaler 2 Puff(s) Inhalation every 4 hours PRN Shortness of Breath  ALBUTerol/ipratropium for Nebulization 3 milliLiter(s) Nebulizer every 4 hours PRN Bronchospasm  polyethylene glycol 3350 17 Gram(s) Oral at bedtime PRN Constipation      LABS:                        11.3   11.43 )-----------( 300      ( 30 Dec 2018 05:54 )             38.0     12-30    143  |  101  |  24<H>  ----------------------------<  165<H>  3.1<L>   |  28  |  0.99    Ca    8.7      30 Dec 2018 05:54  Phos  2.4     12-30  Mg     2.1     12-30                MICROBIOLOGY:     RADIOLOGY:  [ ] Reviewed and interpreted by me    PULMONARY FUNCTION TESTS:    EKG: CHIEF COMPLAINT:  shortness of breath     Interval Events:  no acute events overnight     OBJECTIVE:  ICU Vital Signs Last 24 Hrs  T(C): 36.7 (31 Dec 2018 06:21), Max: 36.7 (30 Dec 2018 13:40)  T(F): 98.1 (31 Dec 2018 06:21), Max: 98.1 (30 Dec 2018 13:40)  HR: 80 (31 Dec 2018 07:52) (71 - 93)  BP: 135/55 (31 Dec 2018 06:21) (121/54 - 139/56)  BP(mean): --  ABP: --  ABP(mean): --  RR: 18 (31 Dec 2018 06:21) (18 - 19)  SpO2: 98% (31 Dec 2018 07:52) (96% - 100%)        12-30 @ 07:01  -  12-31 @ 07:00  --------------------------------------------------------  IN: 493 mL / OUT: 1400 mL / NET: -907 mL    12-31 @ 07:01  -  12-31 @ 09:12  --------------------------------------------------------  IN: 0 mL / OUT: 425 mL / NET: -425 mL      CAPILLARY BLOOD GLUCOSE      HOSPITAL MEDICATIONS:  MEDICATIONS  (STANDING):  ALBUTerol/ipratropium for Nebulization 3 milliLiter(s) Nebulizer every 6 hours  buDESOnide 160 MICROgram(s)/formoterol 4.5 MICROgram(s) Inhaler 2 Puff(s) Inhalation two times a day  chlorhexidine 4% Liquid 1 Application(s) Topical <User Schedule>  furosemide   Injectable 20 milliGRAM(s) IV Push two times a day  heparin  Injectable 5000 Unit(s) SubCutaneous every 12 hours  levoFLOXacin IVPB      levoFLOXacin IVPB 750 milliGRAM(s) IV Intermittent every 24 hours  pantoprazole    Tablet 40 milliGRAM(s) Oral before breakfast  predniSONE   Tablet 40 milliGRAM(s) Oral daily    MEDICATIONS  (PRN):  ALBUTerol    90 MICROgram(s) HFA Inhaler 2 Puff(s) Inhalation every 4 hours PRN Shortness of Breath  ALBUTerol/ipratropium for Nebulization 3 milliLiter(s) Nebulizer every 4 hours PRN Bronchospasm  polyethylene glycol 3350 17 Gram(s) Oral at bedtime PRN Constipation      LABS:                        11.3   11.43 )-----------( 300      ( 30 Dec 2018 05:54 )             38.0     12-30    143  |  101  |  24<H>  ----------------------------<  165<H>  3.1<L>   |  28  |  0.99    Ca    8.7      30 Dec 2018 05:54  Phos  2.4     12-30  Mg     2.1     12-30                MICROBIOLOGY:     RADIOLOGY:  [ ] Reviewed and interpreted by me    PULMONARY FUNCTION TESTS:    EKG: CHIEF COMPLAINT:  shortness of breath     Interval Events:  no acute events overnight     OBJECTIVE:  ICU Vital Signs Last 24 Hrs  T(C): 36.7 (31 Dec 2018 06:21), Max: 36.7 (30 Dec 2018 13:40)  T(F): 98.1 (31 Dec 2018 06:21), Max: 98.1 (30 Dec 2018 13:40)  HR: 80 (31 Dec 2018 07:52) (71 - 93)  BP: 135/55 (31 Dec 2018 06:21) (121/54 - 139/56)  BP(mean): --  ABP: --  ABP(mean): --  RR: 18 (31 Dec 2018 06:21) (18 - 19)  SpO2: 98% (31 Dec 2018 07:52) (96% - 100%)        12-30 @ 07:01  -  12-31 @ 07:00  --------------------------------------------------------  IN: 493 mL / OUT: 1400 mL / NET: -907 mL    12-31 @ 07:01  -  12-31 @ 09:12  --------------------------------------------------------  IN: 0 mL / OUT: 425 mL / NET: -425 mL      CAPILLARY BLOOD GLUCOSE      HOSPITAL MEDICATIONS:  MEDICATIONS  (STANDING):  ALBUTerol/ipratropium for Nebulization 3 milliLiter(s) Nebulizer every 6 hours  buDESOnide 160 MICROgram(s)/formoterol 4.5 MICROgram(s) Inhaler 2 Puff(s) Inhalation two times a day  chlorhexidine 4% Liquid 1 Application(s) Topical <User Schedule>  furosemide   Injectable 20 milliGRAM(s) IV Push two times a day  heparin  Injectable 5000 Unit(s) SubCutaneous every 12 hours  levoFLOXacin IVPB      levoFLOXacin IVPB 750 milliGRAM(s) IV Intermittent every 24 hours  pantoprazole    Tablet 40 milliGRAM(s) Oral before breakfast  predniSONE   Tablet 40 milliGRAM(s) Oral daily    MEDICATIONS  (PRN):  ALBUTerol    90 MICROgram(s) HFA Inhaler 2 Puff(s) Inhalation every 4 hours PRN Shortness of Breath  ALBUTerol/ipratropium for Nebulization 3 milliLiter(s) Nebulizer every 4 hours PRN Bronchospasm  polyethylene glycol 3350 17 Gram(s) Oral at bedtime PRN Constipation      LABS:                        11.3   11.43 )-----------( 300      ( 30 Dec 2018 05:54 )             38.0     12-30    143  |  101  |  24<H>  ----------------------------<  165<H>  3.1<L>   |  28  |  0.99    Ca    8.7      30 Dec 2018 05:54  Phos  2.4     12-30  Mg     2.1     12-30

## 2018-12-31 NOTE — PROGRESS NOTE ADULT - PROBLEM SELECTOR PLAN 4
- Hold home Gabapentin for now (takes 600mg with lunch, dinner and QHS) while on BIPAP - pt does not take gabapentin at home as per pt

## 2018-12-31 NOTE — PROGRESS NOTE ADULT - ASSESSMENT
88yo F with hx of COPD (3L of O2 QHS at home), DAVID (Occasionally uses CPAP at night), HTN, hx of breast Ca (radiation in 1989), dysphagia and anxiety who presented with shortness of breath admitted for pneumonia with component of CHF exacerbation. 86yo F with hx of COPD (3L of O2 QHS at home), DAVID (Occasionally uses CPAP at night), HTN, hx of breast Ca (radiation in 1989), dysphagia and anxiety who presented with shortness of breath admitted for pneumonia with component of CHF exacerbation.     -S/p outpatient EGD (8/2017) that revealed Gastritis, a small hiatal hernia, a markedly patulous LES, but no strictures or narrowing. - Has been doing better with improvement in dysphagia symptoms on PPI daily.

## 2018-12-31 NOTE — PROGRESS NOTE ADULT - PROBLEM SELECTOR PLAN 2
Suspect mild COPD exacerbation in the setting of pulmonary edema   - Serafin ATC  - Albuterol PRN  - continue home symbicort  - follow up with pulmonologist Dr. Sheets after discharge.   - continuous O2 monitoring. - Serafin ATC  - Albuterol PRN  - continue home symbicort  - follow up with pulmonologist Dr. Sheets after discharge.   - continuous O2 monitoring.

## 2018-12-31 NOTE — PROGRESS NOTE ADULT - PROBLEM SELECTOR PLAN 1
- CTX/AZX for CAP coverage  - Blood culture NTD, Legionella neg,  sputum GPC,   -f/u Urine cultures  - Continuous O2 monitoring  - Serafin ATC   - TTE given new pulmonary edema, in the setting of negative trop, ?takatsubo  - Bipap for now given work of breathing - CTX/AZX for CAP coverage  - Blood culture NTD, Legionella neg,  sputum GPC,   - Continuous O2 monitoring  - Serafin ATC   - TTE given new pulmonary edema, in the setting of negative trop, ?takatsubo  - Bipap dc'd - Txd with CTX/AZX for CAP coverage - switched to levaquin  - Blood culture NTD, Legionella neg,  sputum GPC,   - c/w steroids  - Continuous O2 monitoring  - albuterol prn, duonebs ATC. symbicort  - TTE given new pulmonary edema, in the setting of negative trop, ?takatsubo  - Bipap dc'd

## 2019-01-01 ENCOUNTER — TRANSCRIPTION ENCOUNTER (OUTPATIENT)
Age: 84
End: 2019-01-01

## 2019-01-01 LAB
BUN SERPL-MCNC: 29 MG/DL — HIGH (ref 7–23)
CALCIUM SERPL-MCNC: 9.6 MG/DL — SIGNIFICANT CHANGE UP (ref 8.4–10.5)
CHLORIDE SERPL-SCNC: 103 MMOL/L — SIGNIFICANT CHANGE UP (ref 98–107)
CO2 SERPL-SCNC: 25 MMOL/L — SIGNIFICANT CHANGE UP (ref 22–31)
CREAT SERPL-MCNC: 1.06 MG/DL — SIGNIFICANT CHANGE UP (ref 0.5–1.3)
GLUCOSE SERPL-MCNC: 152 MG/DL — HIGH (ref 70–99)
HCT VFR BLD CALC: 37.8 % — SIGNIFICANT CHANGE UP (ref 34.5–45)
HGB BLD-MCNC: 11.6 G/DL — SIGNIFICANT CHANGE UP (ref 11.5–15.5)
MCHC RBC-ENTMCNC: 23.8 PG — LOW (ref 27–34)
MCHC RBC-ENTMCNC: 30.7 % — LOW (ref 32–36)
MCV RBC AUTO: 77.6 FL — LOW (ref 80–100)
NRBC # FLD: 0 — SIGNIFICANT CHANGE UP
PLATELET # BLD AUTO: 296 K/UL — SIGNIFICANT CHANGE UP (ref 150–400)
PMV BLD: 9.6 FL — SIGNIFICANT CHANGE UP (ref 7–13)
POTASSIUM SERPL-MCNC: 3.6 MMOL/L — SIGNIFICANT CHANGE UP (ref 3.5–5.3)
POTASSIUM SERPL-SCNC: 3.6 MMOL/L — SIGNIFICANT CHANGE UP (ref 3.5–5.3)
RBC # BLD: 4.87 M/UL — SIGNIFICANT CHANGE UP (ref 3.8–5.2)
RBC # FLD: 19.2 % — HIGH (ref 10.3–14.5)
SODIUM SERPL-SCNC: 142 MMOL/L — SIGNIFICANT CHANGE UP (ref 135–145)
WBC # BLD: 8.85 K/UL — SIGNIFICANT CHANGE UP (ref 3.8–10.5)
WBC # FLD AUTO: 8.85 K/UL — SIGNIFICANT CHANGE UP (ref 3.8–10.5)

## 2019-01-01 PROCEDURE — 99233 SBSQ HOSP IP/OBS HIGH 50: CPT | Mod: GC

## 2019-01-01 RX ORDER — CLONAZEPAM 1 MG
0.5 TABLET ORAL ONCE
Qty: 0 | Refills: 0 | Status: DISCONTINUED | OUTPATIENT
Start: 2019-01-01 | End: 2019-01-01

## 2019-01-01 RX ADMIN — PANTOPRAZOLE SODIUM 40 MILLIGRAM(S): 20 TABLET, DELAYED RELEASE ORAL at 06:00

## 2019-01-01 RX ADMIN — Medication 3 MILLILITER(S): at 11:06

## 2019-01-01 RX ADMIN — HEPARIN SODIUM 5000 UNIT(S): 5000 INJECTION INTRAVENOUS; SUBCUTANEOUS at 17:29

## 2019-01-01 RX ADMIN — Medication 3 MILLILITER(S): at 16:24

## 2019-01-01 RX ADMIN — Medication 100 MILLIGRAM(S): at 13:02

## 2019-01-01 RX ADMIN — Medication 40 MILLIGRAM(S): at 05:43

## 2019-01-01 RX ADMIN — HEPARIN SODIUM 5000 UNIT(S): 5000 INJECTION INTRAVENOUS; SUBCUTANEOUS at 05:45

## 2019-01-01 RX ADMIN — Medication 100 MILLIGRAM(S): at 05:43

## 2019-01-01 RX ADMIN — CHLORHEXIDINE GLUCONATE 1 APPLICATION(S): 213 SOLUTION TOPICAL at 10:29

## 2019-01-01 RX ADMIN — Medication 0.5 MILLIGRAM(S): at 22:27

## 2019-01-01 RX ADMIN — Medication 40 MILLIGRAM(S): at 05:44

## 2019-01-01 NOTE — PROGRESS NOTE ADULT - PROBLEM SELECTOR PLAN 5
- Continue home PPI  - Aspiration precautions  - Regular with Nectar Thick Liquids   - Positioning: seated upright in chair with lateral view projection (per Speech)

## 2019-01-01 NOTE — PROGRESS NOTE ADULT - PROBLEM SELECTOR PROBLEM 5
Dysphagia, unspecified type

## 2019-01-01 NOTE — PHYSICAL THERAPY INITIAL EVALUATION ADULT - GENERAL OBSERVATIONS, REHAB EVAL
Patient found sitting in chair in NAD; +02 at 3L/min NC; 02 sat 96% 3L/min NC pre and post ambulation on 3L/min NC

## 2019-01-01 NOTE — PROGRESS NOTE ADULT - PROBLEM SELECTOR PLAN 4
- pt does not take gabapentin at home as per pt - pt does not take gabapentin at home as per pt  -home with home PT

## 2019-01-01 NOTE — DISCHARGE NOTE ADULT - CARE PROVIDERS DIRECT ADDRESSES
,DirectAddress_Unknown,[email or web address] ,DirectAddress_Unknown,[email or web address]

## 2019-01-01 NOTE — PROGRESS NOTE ADULT - SUBJECTIVE AND OBJECTIVE BOX
CHIEF COMPLAINT:    Interval Events:    REVIEW OF SYSTEMS:  Constitutional:   Eyes:  ENT:  CV:  Resp:  GI:  :  MSK:  Integumentary:  Neurological:  Psychiatric:  Endocrine:  Hematologic/Lymphatic:  Allergic/Immunologic:  [ ] All other systems negative  [ ] Unable to assess ROS because ________    OBJECTIVE:  ICU Vital Signs Last 24 Hrs  T(C): 36.7 (01 Jan 2019 05:40), Max: 36.7 (31 Dec 2018 14:39)  T(F): 98 (01 Jan 2019 05:40), Max: 98.1 (31 Dec 2018 14:39)  HR: 72 (01 Jan 2019 05:40) (72 - 86)  BP: 142/65 (01 Jan 2019 05:40) (142/65 - 156/75)  BP(mean): --  ABP: --  ABP(mean): --  RR: 18 (01 Jan 2019 05:40) (18 - 18)  SpO2: 100% (01 Jan 2019 05:40) (98% - 100%)        12-31 @ 07:01  -  01-01 @ 07:00  --------------------------------------------------------  IN: 472 mL / OUT: 2175 mL / NET: -1703 mL      CAPILLARY BLOOD GLUCOSE          PHYSICAL EXAM:  General:   HEENT:   Lymph Nodes:  Neck:   Respiratory:   Cardiovascular:   Abdomen:   Extremities:   Skin:   Neurological:  Psychiatry:    HOSPITAL MEDICATIONS:  MEDICATIONS  (STANDING):  ALBUTerol/ipratropium for Nebulization 3 milliLiter(s) Nebulizer every 6 hours  buDESOnide 160 MICROgram(s)/formoterol 4.5 MICROgram(s) Inhaler 2 Puff(s) Inhalation two times a day  chlorhexidine 4% Liquid 1 Application(s) Topical <User Schedule>  docusate sodium 100 milliGRAM(s) Oral three times a day  furosemide    Tablet 40 milliGRAM(s) Oral daily  heparin  Injectable 5000 Unit(s) SubCutaneous every 12 hours  levoFLOXacin IVPB      levoFLOXacin IVPB 750 milliGRAM(s) IV Intermittent every 24 hours  pantoprazole    Tablet 40 milliGRAM(s) Oral before breakfast  predniSONE   Tablet 40 milliGRAM(s) Oral daily    MEDICATIONS  (PRN):  ALBUTerol    90 MICROgram(s) HFA Inhaler 2 Puff(s) Inhalation every 4 hours PRN Shortness of Breath  ALBUTerol/ipratropium for Nebulization 3 milliLiter(s) Nebulizer every 4 hours PRN Bronchospasm  polyethylene glycol 3350 17 Gram(s) Oral at bedtime PRN Constipation      LABS:                        11.6   8.85  )-----------( 296      ( 01 Jan 2019 06:30 )             37.8     12-31    138  |  98  |  28<H>  ----------------------------<  345<H>  4.0   |  24  |  1.10    Ca    9.6      31 Dec 2018 14:57                MICROBIOLOGY:     RADIOLOGY:  [ ] Reviewed and interpreted by me    PULMONARY FUNCTION TESTS:    EKG: CHIEF COMPLAINT:  SOB     Interval Events:  no acute events overnight     OBJECTIVE:  ICU Vital Signs Last 24 Hrs  T(C): 36.7 (01 Jan 2019 05:40), Max: 36.7 (31 Dec 2018 14:39)  T(F): 98 (01 Jan 2019 05:40), Max: 98.1 (31 Dec 2018 14:39)  HR: 72 (01 Jan 2019 05:40) (72 - 86)  BP: 142/65 (01 Jan 2019 05:40) (142/65 - 156/75)  BP(mean): --  ABP: --  ABP(mean): --  RR: 18 (01 Jan 2019 05:40) (18 - 18)  SpO2: 100% (01 Jan 2019 05:40) (98% - 100%)        12-31 @ 07:01  -  01-01 @ 07:00  --------------------------------------------------------  IN: 472 mL / OUT: 2175 mL / NET: -1703 mL      CAPILLARY BLOOD GLUCOSE        HOSPITAL MEDICATIONS:  MEDICATIONS  (STANDING):  ALBUTerol/ipratropium for Nebulization 3 milliLiter(s) Nebulizer every 6 hours  buDESOnide 160 MICROgram(s)/formoterol 4.5 MICROgram(s) Inhaler 2 Puff(s) Inhalation two times a day  chlorhexidine 4% Liquid 1 Application(s) Topical <User Schedule>  docusate sodium 100 milliGRAM(s) Oral three times a day  furosemide    Tablet 40 milliGRAM(s) Oral daily  heparin  Injectable 5000 Unit(s) SubCutaneous every 12 hours  levoFLOXacin IVPB      levoFLOXacin IVPB 750 milliGRAM(s) IV Intermittent every 24 hours  pantoprazole    Tablet 40 milliGRAM(s) Oral before breakfast  predniSONE   Tablet 40 milliGRAM(s) Oral daily    MEDICATIONS  (PRN):  ALBUTerol    90 MICROgram(s) HFA Inhaler 2 Puff(s) Inhalation every 4 hours PRN Shortness of Breath  ALBUTerol/ipratropium for Nebulization 3 milliLiter(s) Nebulizer every 4 hours PRN Bronchospasm  polyethylene glycol 3350 17 Gram(s) Oral at bedtime PRN Constipation      LABS:                        11.6   8.85  )-----------( 296      ( 01 Jan 2019 06:30 )             37.8     12-31    138  |  98  |  28<H>  ----------------------------<  345<H>  4.0   |  24  |  1.10    Ca    9.6      31 Dec 2018 14:57                MICROBIOLOGY:     RADIOLOGY:  [ ] Reviewed and interpreted by me    PULMONARY FUNCTION TESTS:    EKG: CHIEF COMPLAINT:  SOB     Interval Events:  no acute events overnight     OBJECTIVE:  ICU Vital Signs Last 24 Hrs  T(C): 36.7 (01 Jan 2019 05:40), Max: 36.7 (31 Dec 2018 14:39)  T(F): 98 (01 Jan 2019 05:40), Max: 98.1 (31 Dec 2018 14:39)  HR: 72 (01 Jan 2019 05:40) (72 - 86)  BP: 142/65 (01 Jan 2019 05:40) (142/65 - 156/75)  BP(mean): --  ABP: --  ABP(mean): --  RR: 18 (01 Jan 2019 05:40) (18 - 18)  SpO2: 100% (01 Jan 2019 05:40) (98% - 100%)        12-31 @ 07:01  -  01-01 @ 07:00  --------------------------------------------------------  IN: 472 mL / OUT: 2175 mL / NET: -1703 mL      CAPILLARY BLOOD GLUCOSE        HOSPITAL MEDICATIONS:  MEDICATIONS  (STANDING):  ALBUTerol/ipratropium for Nebulization 3 milliLiter(s) Nebulizer every 6 hours  buDESOnide 160 MICROgram(s)/formoterol 4.5 MICROgram(s) Inhaler 2 Puff(s) Inhalation two times a day  chlorhexidine 4% Liquid 1 Application(s) Topical <User Schedule>  docusate sodium 100 milliGRAM(s) Oral three times a day  furosemide    Tablet 40 milliGRAM(s) Oral daily  heparin  Injectable 5000 Unit(s) SubCutaneous every 12 hours  levoFLOXacin IVPB      levoFLOXacin IVPB 750 milliGRAM(s) IV Intermittent every 24 hours  pantoprazole    Tablet 40 milliGRAM(s) Oral before breakfast  predniSONE   Tablet 40 milliGRAM(s) Oral daily    MEDICATIONS  (PRN):  ALBUTerol    90 MICROgram(s) HFA Inhaler 2 Puff(s) Inhalation every 4 hours PRN Shortness of Breath  ALBUTerol/ipratropium for Nebulization 3 milliLiter(s) Nebulizer every 4 hours PRN Bronchospasm  polyethylene glycol 3350 17 Gram(s) Oral at bedtime PRN Constipation      LABS:                        11.6   8.85  )-----------( 296      ( 01 Jan 2019 06:30 )             37.8     12-31    138  |  98  |  28<H>  ----------------------------<  345<H>  4.0   |  24  |  1.10    Ca    9.6      31 Dec 2018 14:57 CHIEF COMPLAINT:  SOB     Interval Events:  no acute events overnight     OBJECTIVE:  ICU Vital Signs Last 24 Hrs  T(C): 36.7 (01 Jan 2019 05:40), Max: 36.7 (31 Dec 2018 14:39)  T(F): 98 (01 Jan 2019 05:40), Max: 98.1 (31 Dec 2018 14:39)  HR: 72 (01 Jan 2019 05:40) (72 - 86)  BP: 142/65 (01 Jan 2019 05:40) (142/65 - 156/75)  BP(mean): --  ABP: --  ABP(mean): --  RR: 18 (01 Jan 2019 05:40) (18 - 18)  SpO2: 100% (01 Jan 2019 05:40) (98% - 100%)        12-31 @ 07:01  -  01-01 @ 07:00  --------------------------------------------------------  IN: 472 mL / OUT: 2175 mL / NET: -1703 mL      CAPILLARY BLOOD GLUCOSE        HOSPITAL MEDICATIONS:  MEDICATIONS  (STANDING):  ALBUTerol/ipratropium for Nebulization 3 milliLiter(s) Nebulizer every 6 hours  buDESOnide 160 MICROgram(s)/formoterol 4.5 MICROgram(s) Inhaler 2 Puff(s) Inhalation two times a day  chlorhexidine 4% Liquid 1 Application(s) Topical <User Schedule>  docusate sodium 100 milliGRAM(s) Oral three times a day  furosemide    Tablet 40 milliGRAM(s) Oral daily  heparin  Injectable 5000 Unit(s) SubCutaneous every 12 hours  levoFLOXacin IVPB      levoFLOXacin IVPB 750 milliGRAM(s) IV Intermittent every 24 hours  pantoprazole    Tablet 40 milliGRAM(s) Oral before breakfast  predniSONE   Tablet 40 milliGRAM(s) Oral daily    MEDICATIONS  (PRN):  ALBUTerol    90 MICROgram(s) HFA Inhaler 2 Puff(s) Inhalation every 4 hours PRN Shortness of Breath  ALBUTerol/ipratropium for Nebulization 3 milliLiter(s) Nebulizer every 4 hours PRN Bronchospasm  polyethylene glycol 3350 17 Gram(s) Oral at bedtime PRN Constipation      LABS:                        11.6   8.85  )-----------( 296      ( 01 Jan 2019 06:30 )             37.8     12-31    138  |  98  |  28<H>  ----------------------------<  345<H>  4.0   |  24  |  1.10    Ca    9.6      31 Dec 2018 14:57              PROCEDURE: Transthoracic echocardiogram with 2-D, M-Mode  and complete spectral and color flow Doppler.  INDICATION: Dyspnea, unspecified (R06.00), Shortness of  breath (R06.02)  ------------------------------------------------------------------------  DIMENSIONS:  Dimensions:     Normal Values:  LA:     3.6 cm    2.0 - 4.0 cm  Ao:     3.6 cm    2.0 - 3.8 cm  SEPTUM: 0.8 cm    0.6 - 1.2 cm  PWT:    0.8 cm    0.6 - 1.1 cm  LVIDd:  6.0 cm    3.0 - 5.6 cm  LVIDs:  5.2 cm    1.8 - 4.0 cm  Derived Variables:  LVMI: 114 g/m2  RWT: 0.26  Fractional short: 13 %  Ejection Fraction (Visual Estimate): 40 %  ------------------------------------------------------------------------  OBSERVATIONS:  Mitral Valve: Normal mitral valve.  Aortic Root: Normal aortic root.  Aortic Valve: Normal trileaflet aortic valve.  Left Atrium: LA volume index = 14 cc/m2.  Left Ventricle: Endocardium not well visualized; grossly  Moderate global left ventricular systolic dysfunction.  Normal left ventricular internal dimensions and wall  thicknesses. Mild diastolic dysfunction (Stage I).  Right Heart: Normal right atrium. Normal right ventricular  size and function. Normal tricuspid valve. Minimal  tricuspid regurgitation. Normal pulmonic valve.  Pericardium/PleuraNormal pericardium with no pericardial  effusion.  Hemodynamic: Estimated right ventricular systolic pressure  equals 38 mm Hg, assuming right atrial pressure equals 10  mm Hg, consistent with borderline pulmonary hypertension.  ------------------------------------------------------------------------  CONCLUSIONS:  1. Endocardium not well visualized; grossly  Moderate  global left ventricular systolic dysfunction.  2. Mild diastolic dysfunction (Stage I).  3. Normal right ventricular size and function.  *** Compared with echocardiogram of 12/14/2017, LV function  has deteriorated.  ------------------------------------------------------------------------  Confirmed on  12/31/2018 - 15:07:15 by Dedrick Copeland M.D.  ------------------------------------------------------------------------

## 2019-01-01 NOTE — PROGRESS NOTE ADULT - ASSESSMENT
88yo F with hx of COPD (3L of O2 QHS at home), DAVID (Occasionally uses CPAP at night), HTN, hx of breast Ca (radiation in 1989), dysphagia and anxiety who presented with shortness of breath admitted for pneumonia with component of CHF exacerbation.     -S/p outpatient EGD (8/2017) that revealed Gastritis, a small hiatal hernia, a markedly patulous LES, but no strictures or narrowing. - Has been doing better with improvement in dysphagia symptoms on PPI daily.

## 2019-01-01 NOTE — PROGRESS NOTE ADULT - PROBLEM SELECTOR PLAN 2
- Serafin ATC  - Albuterol PRN  - continue home symbicort  - follow up with pulmonologist Dr. Sheets after discharge.   - continuous O2 monitoring.

## 2019-01-01 NOTE — DISCHARGE NOTE ADULT - PROVIDER TOKENS
GURPREET:'[id number]',GURPREET:'[id number]' TOKEN:'[id number]',LIZEN:'[id number]',GURPREET:'[id number]'

## 2019-01-01 NOTE — DISCHARGE NOTE ADULT - MEDICATION SUMMARY - MEDICATIONS TO STOP TAKING
I will STOP taking the medications listed below when I get home from the hospital:    amLODIPine 10 mg oral tablet  -- 1 tab(s) by mouth once a day    amoxicillin-clavulanate 500 mg-125 mg oral tablet  -- 1 tab(s) by mouth every 8 hours  Stop taking on 10/17    predniSONE 20 mg oral tablet  -- 2 tab(s) by mouth once a day.   Stop after 2 days.

## 2019-01-01 NOTE — DISCHARGE NOTE ADULT - MEDICATION SUMMARY - MEDICATIONS TO TAKE
I will START or STAY ON the medications listed below when I get home from the hospital:    Calcium  -- 1 tab(s) by mouth once a day  -- Indication: For Electrolyte imbalance    aspirin 81 mg oral tablet  -- 1 tab(s) by mouth once a day in am  -- Indication: For CAD    gabapentin 400 mg oral capsule  -- 4 cap(s) by mouth 2 times a day  -- Indication: For Pain    KlonoPIN 0.5 mg oral tablet  -- 1 tab(s) by mouth 3 times a day, As Needed  -- Indication: For Anxiety disorder, unspecified type    albuterol 90 mcg/inh inhalation powder  -- 2 puff(s) inhaled every 6 hours, As Needed   -- For inhalation only.  It is very important that you take or use this exactly as directed.  Do not skip doses or discontinue unless directed by your doctor.  Obtain medical advice before taking any non-prescription drugs as some may affect the action of this medication.    -- Indication: For Chronic obstructive pulmonary disease with acute lower respiratory infection    Spiriva 18 mcg inhalation capsule  -- 1 cap(s) inhaled once a day in am  -- Indication: For Shortness of breath    Symbicort 160 mcg-4.5 mcg/inh inhalation aerosol  -- 2 puff(s) inhaled 2 times a day  -- Indication: For Chronic obstructive pulmonary disease with acute lower respiratory infection    Lasix 40 mg oral tablet  -- 1 tab(s) by mouth 1 time a day  -- Indication: For Essential Hypertension    potassium chloride 20 mEq oral tablet, extended release  -- 1 tab(s) by mouth once a day  -- Indication: For Electrolyte imbalance    omeprazole 40 mg oral delayed release capsule  -- 1 cap(s) by mouth once a day in am  -- Indication: For GI ppx    Levaquin 750 mg oral tablet  -- 1 tab(s) by mouth once a day   -- Avoid prolonged or excessive exposure to direct and/or artificial sunlight while taking this medication.  Do not take dairy products, antacids, or iron preparations within one hour of this medication.  Finish all this medication unless otherwise directed by prescriber.  May cause drowsiness or dizziness.  Medication should be taken with plenty of water.    -- Indication: For Pneumonia    Vitamin D3 1000 intl units oral tablet  -- 1 tab(s) by mouth once a day in am  -- Indication: For Electrolyte imbalance

## 2019-01-01 NOTE — PROGRESS NOTE ADULT - PROBLEM SELECTOR PLAN 1
- Txd with CTX/AZX for CAP coverage - switched to levaquin  - Blood culture NTD, Legionella neg,  sputum GPC,   - c/w steroids  - Continuous O2 monitoring  - albuterol prn, duonebs ATC. symbicort  - TTE given new pulmonary edema, in the setting of negative trop, ?takatsubo  - Bipap dc'd - Txd with CTX/AZX for CAP coverage - switched to levaquin  - Blood culture NTD, Legionella neg,  sputum GPC,   - c/w steroids  - Continuous O2 monitoring  - albuterol prn, duonebs ATC. symbicort  - TTE--------  Moderate global left ventricular systolic dysfunction.   Mild diastolic dysfunction (Stage I).  - Bipap dc'd - Txd with CTX/AZX for CAP coverage - switched to levaquin  - Blood culture NTD, Legionella neg,  sputum GPC,   - c/w steroids  - Continuous O2 monitoring  - albuterol prn, duonebs ATC. symbicort  - TTE--------  Moderate global left ventricular systolic dysfunction.   Mild diastolic dysfunction (Stage I).  - Bipap dc'd  - PT - home with home PT

## 2019-01-01 NOTE — PROGRESS NOTE ADULT - PROBLEM SELECTOR PROBLEM 2
Chronic obstructive pulmonary disease with acute lower respiratory infection

## 2019-01-01 NOTE — PHYSICAL THERAPY INITIAL EVALUATION ADULT - DISCHARGE DISPOSITION, PT EVAL
Anticipated discharge to home with home PT services to improve functional mobility and strength, to optimize safety within the home environment, and to allow pt to return to prior level of function

## 2019-01-01 NOTE — PHYSICAL THERAPY INITIAL EVALUATION ADULT - PHYSICAL ASSIST/NONPHYSICAL ASSIST: SIT/STAND, REHAB EVAL
verbal cues/1 person assist Note Text (......Xxx Chief Complaint.): This diagnosis correlates with the Detail Level: Simple Other (Free Text): She has no PCP

## 2019-01-01 NOTE — DISCHARGE NOTE ADULT - SECONDARY DIAGNOSIS.
Pneumonia Neuropathy Chronic obstructive pulmonary disease with acute lower respiratory infection Anxiety disorder, unspecified type Essential Hypertension

## 2019-01-01 NOTE — DISCHARGE NOTE ADULT - HOME CARE AGENCY
[facility] [phone]: For Visiting Nurse Services & Home Physical Therapy. The 1st Visiting Nurse Viist will be for Thursday 1/3/19. The Nurse will call to arrange the Visit time.

## 2019-01-01 NOTE — DISCHARGE NOTE ADULT - PATIENT PORTAL LINK FT
You can access the [facility] Patient Portal, offered by [facility], by registering with the following website: [email or web address]

## 2019-01-01 NOTE — DISCHARGE NOTE ADULT - CARE PLAN
Principal Discharge DX:	SOB (shortness of breath)  Goal:	resolution of SOB  Secondary Diagnosis:	Pneumonia  Goal:	curing the infection, preventing death, alleviating the symptoms, returning the patient to normal activities, and preventing recurrence.  Secondary Diagnosis:	Neuropathy  Goal:	restoring function and improving pain control.  Secondary Diagnosis:	Chronic obstructive pulmonary disease with acute lower respiratory infection  Goal:	improve a patient's functional status and quality of life by preserving optimal lung function, improving symptoms, and preventing the recurrence of exacerbations.  Secondary Diagnosis:	Anxiety disorder, unspecified type  Goal:	Reduce anxiety  Secondary Diagnosis:	Essential Hypertension Principal Discharge DX:	SOB (shortness of breath)  Goal:	resolution of SOB  Assessment and plan of treatment:	Please continue your medications as ordered, please continue oxygen supplement to maintain oxygen saturation above 90%. Please continue all your respiratory treatment medications and follow up with pulmonary physician as scheduled. Please call your doctor immediately or 911 if you developed any increased shortness of breath, fever or chest pain.  Secondary Diagnosis:	Pneumonia  Goal:	curing the infection, preventing death, alleviating the symptoms, returning the patient to normal activities, and preventing recurrence.  Assessment and plan of treatment:	Please continue your medication Levaquin to complete 7 days treatment. Please call your doctor immediately or 911 if you developed any increased shortness of breath, fever or chest pain.  Secondary Diagnosis:	Neuropathy  Goal:	restoring function and improving pain control.  Assessment and plan of treatment:	Please continue your medications.  Secondary Diagnosis:	Chronic obstructive pulmonary disease with acute lower respiratory infection  Goal:	improve a patient's functional status and quality of life by preserving optimal lung function, improving symptoms, and preventing the recurrence of exacerbations.  Assessment and plan of treatment:	Please continue your medications as ordered, please continue oxygen supplement to maintain oxygen saturation above 90%. Please continue all your respiratory treatment medications and follow up with pulmonary physician as scheduled. Please call your doctor immediately or 911 if you developed any increased shortness of breath, fever or chest pain.  Secondary Diagnosis:	Anxiety disorder, unspecified type  Goal:	Reduce anxiety  Assessment and plan of treatment:	Please continue your medications as ordered. Please follow up with PMD within one week.  Secondary Diagnosis:	Essential Hypertension  Goal:	stable  Assessment and plan of treatment:	Please continue to check your medications as schedule, continue to monitor your blood pressure. Principal Discharge DX:	SOB (shortness of breath)  Goal:	resolution of SOB  Assessment and plan of treatment:	Please continue your medications as ordered, please continue oxygen supplement to maintain oxygen saturation above 90%. Please continue all your respiratory treatment medications and follow up with pulmonary physician as scheduled. appointment made for 1/8/19 at 250pm. Continue CPAP as recommended. Please call your doctor immediately or 911 if you developed any increased shortness of breath, fever or chest pain.  Secondary Diagnosis:	Pneumonia  Goal:	curing the infection, preventing death, alleviating the symptoms, returning the patient to normal activities, and preventing recurrence.  Assessment and plan of treatment:	Please continue your medication Levaquin to complete 7 days treatment. Please call your doctor immediately or 911 if you developed any increased shortness of breath, fever or chest pain.  Secondary Diagnosis:	Neuropathy  Goal:	restoring function and improving pain control.  Assessment and plan of treatment:	Please continue your medications.  Secondary Diagnosis:	Chronic obstructive pulmonary disease with acute lower respiratory infection  Goal:	improve a patient's functional status and quality of life by preserving optimal lung function, improving symptoms, and preventing the recurrence of exacerbations.  Assessment and plan of treatment:	Please continue your medications as ordered, please continue oxygen supplement to maintain oxygen saturation above 90%. Please continue all your respiratory treatment medications and follow up with pulmonary physician as scheduled. Please call your doctor immediately or 911 if you developed any increased shortness of breath, fever or chest pain.  Secondary Diagnosis:	Anxiety disorder, unspecified type  Goal:	Reduce anxiety  Assessment and plan of treatment:	Please continue your medications as ordered. Please follow up with PMD within one week.  Secondary Diagnosis:	Essential Hypertension  Goal:	stable  Assessment and plan of treatment:	Please continue to check your medications as schedule, continue to monitor your blood pressure.

## 2019-01-01 NOTE — DISCHARGE NOTE ADULT - PLAN OF CARE
resolution of SOB curing the infection, preventing death, alleviating the symptoms, returning the patient to normal activities, and preventing recurrence. restoring function and improving pain control. improve a patient's functional status and quality of life by preserving optimal lung function, improving symptoms, and preventing the recurrence of exacerbations. Reduce anxiety Please continue your medications as ordered, please continue oxygen supplement to maintain oxygen saturation above 90%. Please continue all your respiratory treatment medications and follow up with pulmonary physician as scheduled. Please call your doctor immediately or 911 if you developed any increased shortness of breath, fever or chest pain. Please continue your medication Levaquin to complete 7 days treatment. Please call your doctor immediately or 911 if you developed any increased shortness of breath, fever or chest pain. Please continue your medications. Please continue your medications as ordered. Please follow up with PMD within one week. stable Please continue to check your medications as schedule, continue to monitor your blood pressure. Please continue your medications as ordered, please continue oxygen supplement to maintain oxygen saturation above 90%. Please continue all your respiratory treatment medications and follow up with pulmonary physician as scheduled. appointment made for 1/8/19 at 250pm. Continue CPAP as recommended. Please call your doctor immediately or 911 if you developed any increased shortness of breath, fever or chest pain.

## 2019-01-01 NOTE — PHYSICAL THERAPY INITIAL EVALUATION ADULT - PERTINENT HX OF CURRENT PROBLEM, REHAB EVAL
86yo F with hx of COPD (3L of O2 QHS at home), DAVID (Occasionally uses CPAP at night), HTN, hx of breast Ca (radiation in 1989), dysphagia and anxiety who presented with several days of worsening sob, runny nose and cough.

## 2019-01-01 NOTE — PROGRESS NOTE ADULT - PROBLEM SELECTOR PLAN 6
No acute issues  - Hold home PRN Klonopin (takes PRN)

## 2019-01-01 NOTE — DISCHARGE NOTE ADULT - HOSPITAL COURSE
88yo F with hx of COPD (3L of O2 QHS at home), DAVID (Occasionally uses CPAP at night), HTN, hx of breast Ca (radiation in 1989), dysphagia and anxiety who presented with shortness of breath admitted for pneumonia with component of CHF exacerbation.     Pneumonia.    - CTX/AZX for CAP coverage  - Blood culture NTD, sputum GPC__  -f/u Urine cultures  - Continuous O2 monitoring  - Duonebs ATC  - monitor UOP and response to 20mg IV lasix given in ED,   - TTE given new pulmonary edema, in the setting of negative trop, ?takatsubo  12/29 off Bipap on O2 NC-monitor        COPD exacerbation in the setting of pulmonary edema   - Duonebs ATC, Albuterol PRN, symbicort  - follow up with pulmonologist Dr. Sheets after discharge.   - continuous O2 monitoring.       Essential Hypertension.     - Hold home Amlodipine 10mg daily for now  -  home Lasix      Neuropathy.    - Hold home Gabapentin for now (takes 600mg with lunch, dinner and QHS) while on BIPAP. - Hold home Gabapentin for now (takes 600mg with lunch, dinner and QHS)     · Dysphagia,   - S/p outpatient EGD (8/2017) that revealed Gastritis, a small hiatal hernia, a markedly patulous LES, but no strictures or narrowing. Has been doing better with improvement in dysphagia symptoms on PPI daily.   - Continue home PPI  - Aspiration precautions  - Regular with Nectar Thick Liquids          Anxiety disorder, 86yo F with hx of COPD (3L of O2 QHS at home), DAVID (Occasionally uses CPAP at night), HTN, hx of breast Ca (radiation in 1989), dysphagia and anxiety who presented with shortness of breath admitted for pneumonia with component of CHF exacerbation.     Pneumonia.    - CTX/AZX for CAP coverage  - Blood culture NTD, sputum GPC__  -f/u Urine cultures  - Continuous O2 monitoring  - Duonebs ATC  - monitor UOP and response to 20mg IV lasix given in ED,   - TTE given new pulmonary edema, in the setting of negative trop, ?takatsubo  12/29 off Bipap on O2 NC-monitor        COPD exacerbation in the setting of pulmonary edema   - Duonebs ATC, Albuterol PRN, symbicort  - follow up with pulmonologist Dr. Sheets after discharge.   - continuous O2 monitoring.       Essential Hypertension.     - Hold home Amlodipine 10mg daily for now  -  home Lasix      Neuropathy.    - Hold home Gabapentin for now (takes 600mg with lunch, dinner and QHS) while on BIPAP. - Hold home Gabapentin for now (takes 600mg with lunch, dinner and QHS)     · Dysphagia,   - S/p outpatient EGD (8/2017) that revealed Gastritis, a small hiatal hernia, a markedly patulous LES, but no strictures or narrowing. Has been doing better with improvement in dysphagia symptoms on PPI daily.   - Continue home PPI  - Aspiration precautions  - Regular with Nectar Thick Liquids          Anxiety disorder:  - continue follow up with PMD 86yo F with hx of COPD (3L of O2 QHS at home), DAVID (Occasionally uses CPAP at night), HTN, hx of breast Ca (radiation in 1989), dysphagia and anxiety who presented with shortness of breath admitted for pneumonia with component of CHF exacerbation.     Pneumonia.    - CTX/AZX for CAP coverage  - Blood culture NTD,   - Urine cultures  - Continuous O2 monitoring  - Duonebs ATC  - monitor UOP and response to 20mg IV lasix given in ED,   - TTE given new pulmonary edema, in the setting of negative trop, ?takthaisbo  12/29 off Bipap on O2 NC-monitor        COPD exacerbation in the setting of pulmonary edema   - Duonebs ATC, Albuterol PRN, symbicort  - follow up with pulmonologist Dr. Sheets after discharge- appointment done   - continuous O2 monitoring.       Essential Hypertension.     - Hold home Amlodipine 10mg daily for now  -  home Lasix      Neuropathy.    - Hold home Gabapentin for now (takes 600mg with lunch, dinner and QHS) while on BIPAP. - Hold home Gabapentin for now (takes 600mg with lunch, dinner and QHS)     · Dysphagia,   - S/p outpatient EGD (8/2017) that revealed Gastritis, a small hiatal hernia, a markedly patulous LES, but no strictures or narrowing. Has been doing better with improvement in dysphagia symptoms on PPI daily.   - Continue home PPI  - Aspiration precautions  - Regular with Nectar Thick Liquids          Anxiety disorder:  - continue follow up with PMD 88yo F with hx of COPD (3L of O2 QHS at home), DAVID (Occasionally uses CPAP at night), HTN, hx of breast Ca (radiation in 1989), dysphagia and anxiety who presented with shortness of breath admitted for pneumonia with component of CHF exacerbation.     Pneumonia.    - CTX/AZX for CAP coverage  - Blood culture NTD,   - Urine cultures  - Continuous O2 monitoring  - Duonebs ATC  - monitor UOP and response to 20mg IV lasix given in ED,   - TTE given new pulmonary edema, in the setting of negative trop, ?takatsubo  12/29 off Bipap on O2 NC-monitor        COPD exacerbation in the setting of pulmonary edema   - Duonebs ATC, Albuterol PRN, symbicort  - follow up with pulmonologist Dr. Sheets after discharge- appointment done on 1/8/19 at 250pm.  - continuous O2 monitoring.       Essential Hypertension.     - Hold home Amlodipine 10mg daily for now- restart as needed  -  home Lasix      Neuropathy.    - c/w  Gabapentin    · Dysphagia,   - S/p outpatient EGD (8/2017) that revealed Gastritis, a small hiatal hernia, a markedly patulous LES, but no strictures or narrowing. Has been doing better with improvement in dysphagia symptoms on PPI daily.   - Continue home PPI  - Aspiration precautions  - Regular with Nectar Thick Liquids     PT eval:  - home w/ PT      Anxiety disorder:  - continue follow up with PMD

## 2019-01-01 NOTE — PROGRESS NOTE ADULT - RS GEN PE MLT RESP DETAILS PC
breath sounds equal/normal/respirations labored/with exertion/airway patent rhonchi/with exertion/respirations labored/breath sounds equal/airway patent with exertion/respirations labored/breath sounds equal/airway patent

## 2019-01-01 NOTE — DISCHARGE NOTE ADULT - CARE PROVIDER_API CALL
Mike Sheets), Internal Medicine; Pulmonary Disease  [address] [facility]  [address]  [place] [address]  Phone: [phone]  Fax: [phone]    Lisker, Gita N (MD), Medicine  Pulmonary Medicine  [address] [facility] [address]  [place] [address]  Phone: [phone]  Fax: [phone] Mike Sheets), Internal Medicine; Pulmonary Disease  [address] [facility]  [address]  [place] [address]  Phone: [phone]  Fax: [phone]    Lisker, Gita N (MD), Medicine  Pulmonary Medicine  [address] [place] [address] [address]  [place] [address]  Phone: [phone]  Fax: [phone]    Adrianne Garcia), Critical Care Medicine; Internal Medicine; Pulmonary Disease  [address] [place] [address]  Phone: [phone]  Fax: [phone]

## 2019-01-01 NOTE — PROGRESS NOTE ADULT - PROBLEM SELECTOR PROBLEM 6
Anxiety disorder, unspecified type

## 2019-01-02 VITALS — WEIGHT: 231.04 LBS

## 2019-01-02 LAB
BACTERIA BLD CULT: SIGNIFICANT CHANGE UP
BACTERIA BLD CULT: SIGNIFICANT CHANGE UP
BUN SERPL-MCNC: 28 MG/DL — HIGH (ref 7–23)
CALCIUM SERPL-MCNC: 9.4 MG/DL — SIGNIFICANT CHANGE UP (ref 8.4–10.5)
CHLORIDE SERPL-SCNC: 100 MMOL/L — SIGNIFICANT CHANGE UP (ref 98–107)
CO2 SERPL-SCNC: 28 MMOL/L — SIGNIFICANT CHANGE UP (ref 22–31)
CREAT SERPL-MCNC: 1.07 MG/DL — SIGNIFICANT CHANGE UP (ref 0.5–1.3)
GLUCOSE SERPL-MCNC: 140 MG/DL — HIGH (ref 70–99)
HCT VFR BLD CALC: 38.5 % — SIGNIFICANT CHANGE UP (ref 34.5–45)
HGB BLD-MCNC: 11.8 G/DL — SIGNIFICANT CHANGE UP (ref 11.5–15.5)
MCHC RBC-ENTMCNC: 23.3 PG — LOW (ref 27–34)
MCHC RBC-ENTMCNC: 30.6 % — LOW (ref 32–36)
MCV RBC AUTO: 75.9 FL — LOW (ref 80–100)
NRBC # FLD: 0 — SIGNIFICANT CHANGE UP
PLATELET # BLD AUTO: 308 K/UL — SIGNIFICANT CHANGE UP (ref 150–400)
PMV BLD: 9.5 FL — SIGNIFICANT CHANGE UP (ref 7–13)
POTASSIUM SERPL-MCNC: 3.4 MMOL/L — LOW (ref 3.5–5.3)
POTASSIUM SERPL-SCNC: 3.4 MMOL/L — LOW (ref 3.5–5.3)
RBC # BLD: 5.07 M/UL — SIGNIFICANT CHANGE UP (ref 3.8–5.2)
RBC # FLD: 19.3 % — HIGH (ref 10.3–14.5)
SODIUM SERPL-SCNC: 142 MMOL/L — SIGNIFICANT CHANGE UP (ref 135–145)
WBC # BLD: 9.17 K/UL — SIGNIFICANT CHANGE UP (ref 3.8–10.5)
WBC # FLD AUTO: 9.17 K/UL — SIGNIFICANT CHANGE UP (ref 3.8–10.5)

## 2019-01-02 PROCEDURE — 99238 HOSP IP/OBS DSCHRG MGMT 30/<: CPT

## 2019-01-02 RX ORDER — AMLODIPINE BESYLATE 2.5 MG/1
1 TABLET ORAL
Qty: 0 | Refills: 0 | COMMUNITY

## 2019-01-02 RX ORDER — POTASSIUM CHLORIDE 20 MEQ
40 PACKET (EA) ORAL ONCE
Qty: 0 | Refills: 0 | Status: COMPLETED | OUTPATIENT
Start: 2019-01-02 | End: 2019-01-02

## 2019-01-02 RX ADMIN — Medication 40 MILLIGRAM(S): at 05:47

## 2019-01-02 RX ADMIN — Medication 100 MILLIGRAM(S): at 05:48

## 2019-01-02 RX ADMIN — Medication 40 MILLIEQUIVALENT(S): at 09:12

## 2019-01-02 RX ADMIN — PANTOPRAZOLE SODIUM 40 MILLIGRAM(S): 20 TABLET, DELAYED RELEASE ORAL at 05:47

## 2019-01-02 RX ADMIN — CHLORHEXIDINE GLUCONATE 1 APPLICATION(S): 213 SOLUTION TOPICAL at 09:16

## 2019-01-02 RX ADMIN — Medication 3 MILLILITER(S): at 10:11

## 2019-01-02 RX ADMIN — HEPARIN SODIUM 5000 UNIT(S): 5000 INJECTION INTRAVENOUS; SUBCUTANEOUS at 05:47

## 2019-01-02 NOTE — PROGRESS NOTE ADULT - ATTENDING COMMENTS
Pt stable for discharge, home with home PT  Complete 7 days total abx  Lasix 40 daily  Has home O2 and home CPAP  outpt follow up with her pulmonologist, Dr. Sheets, next week
patient seen and examined during RCU rounds on 12/29/18 in the morning. case discussed with RCU team and patient
acute on chronic resp failure with hypoxia, h/o severe COPD, DAVID on CPAP at home  pseudomonas pneumonia, CHF  doing much better  cont levaquin 750, po  lasix changed to po  on baseline O2  nocturnal cpap  PT eval
Patient seen and examined, agree with above.   Severe COPD (emphysema), chronic hypoxemic respiratory failure, O2 dependent, DAVID on CPAP  P/w acute onset SOB, elevated BNP. productive cough and wheezing. being treated for COPD exacerbation 2/2 CAP, element of pulmonary edema and acute CHF, proBNP 1000 on admission. transiently required NIPPV for work of breathing.    Generally improving with diuresis, BP control, steroids and azithromycin, Ceftriaxone.   Still with cough and wheeze, general malaise  C/w above care, supplemental o2, goal sats low 90s, chest pt, out of bed as tolerated, acapella   Follow up sputum cultures. urine legionella negative  CPAP at night and when sleeping  DVT ppx    I was physically present for the key portions of the evaluation and management (E/M) service provided.  I agree with the above history, physical, and plan which I have reviewed and edited where appropriate.     Plan discussed with RCU team and patient.
acute on chronic resp failure with hypoxia, h/o severe COPD, DAVID on CPAP at home  pseudomonas pneumonia, CHF    Today more SOB, not feeling well, "congested"- using Aerobika with bronchodilators  TTE with worsening LV sys fxn from 12/2017 - will request patient's cardiologist, Dr. Combs to evaluate    cont levaquin 750, po  lasix changed to po  on baseline O2  nocturnal cpap  PT eval.

## 2019-01-02 NOTE — PROGRESS NOTE ADULT - SUBJECTIVE AND OBJECTIVE BOX
CHIEF COMPLAINT:    Interval Events:    REVIEW OF SYSTEMS:  Constitutional:   Eyes:  ENT:  CV:  Resp:  GI:  :  MSK:  Integumentary:  Neurological:  Psychiatric:  Endocrine:  Hematologic/Lymphatic:  Allergic/Immunologic:  [ ] All other systems negative  [ ] Unable to assess ROS because ________    OBJECTIVE:  ICU Vital Signs Last 24 Hrs  T(C): 36.9 (02 Jan 2019 05:44), Max: 36.9 (02 Jan 2019 05:44)  T(F): 98.4 (02 Jan 2019 05:44), Max: 98.4 (02 Jan 2019 05:44)  HR: 90 (02 Jan 2019 05:44) (80 - 92)  BP: 124/55 (02 Jan 2019 05:44) (118/56 - 126/42)  BP(mean): --  ABP: --  ABP(mean): --  RR: 18 (02 Jan 2019 05:44) (18 - 18)  SpO2: 100% (02 Jan 2019 05:44) (96% - 100%)        01-01 @ 07:01  -  01-02 @ 07:00  --------------------------------------------------------  IN: 390 mL / OUT: 1250 mL / NET: -860 mL      CAPILLARY BLOOD GLUCOSE              HOSPITAL MEDICATIONS:  MEDICATIONS  (STANDING):  ALBUTerol/ipratropium for Nebulization 3 milliLiter(s) Nebulizer every 6 hours  buDESOnide 160 MICROgram(s)/formoterol 4.5 MICROgram(s) Inhaler 2 Puff(s) Inhalation two times a day  chlorhexidine 4% Liquid 1 Application(s) Topical <User Schedule>  docusate sodium 100 milliGRAM(s) Oral three times a day  furosemide    Tablet 40 milliGRAM(s) Oral daily  heparin  Injectable 5000 Unit(s) SubCutaneous every 12 hours  levoFLOXacin IVPB      levoFLOXacin IVPB 750 milliGRAM(s) IV Intermittent every 24 hours  pantoprazole    Tablet 40 milliGRAM(s) Oral before breakfast    MEDICATIONS  (PRN):  ALBUTerol    90 MICROgram(s) HFA Inhaler 2 Puff(s) Inhalation every 4 hours PRN Shortness of Breath  ALBUTerol/ipratropium for Nebulization 3 milliLiter(s) Nebulizer every 4 hours PRN Bronchospasm  polyethylene glycol 3350 17 Gram(s) Oral at bedtime PRN Constipation      LABS:                        11.8   9.17  )-----------( 308      ( 02 Jan 2019 06:50 )             38.5     01-02    142  |  100  |  28<H>  ----------------------------<  140<H>  3.4<L>   |  28  |  1.07    Ca    9.4      02 Jan 2019 06:50                MICROBIOLOGY:         RADIOLOGY:  [ ] Reviewed and interpreted by me    PULMONARY FUNCTION TESTS:    EKG:

## 2019-01-02 NOTE — PROGRESS NOTE ADULT - REASON FOR ADMISSION
Sepsis, Pneumonia with CHF exacerbation

## 2019-01-03 RX ORDER — AMLODIPINE BESYLATE 10 MG/1
10 TABLET ORAL
Refills: 0 | Status: DISCONTINUED | COMMUNITY
End: 2019-01-03

## 2019-01-05 ENCOUNTER — RECORD ABSTRACTING (OUTPATIENT)
Age: 84
End: 2019-01-05

## 2019-01-07 ENCOUNTER — RESULT CHARGE (OUTPATIENT)
Age: 84
End: 2019-01-07

## 2019-01-07 LAB
DEPRECATED S PNEUM 1 IGG SER-MCNC: 13.7 MCG/ML — SIGNIFICANT CHANGE UP
DEPRECATED S PNEUM12 IGG SER-MCNC: SIGNIFICANT CHANGE UP
DEPRECATED S PNEUM14 IGG SER-MCNC: 17.7 MCG/ML — SIGNIFICANT CHANGE UP
DEPRECATED S PNEUM17 IGG SER-MCNC: 10.9 MCG/ML — SIGNIFICANT CHANGE UP
DEPRECATED S PNEUM19 IGG SER-MCNC: 1.6 MCG/ML — SIGNIFICANT CHANGE UP
DEPRECATED S PNEUM19 IGG SER-MCNC: 6.7 MCG/ML — SIGNIFICANT CHANGE UP
DEPRECATED S PNEUM2 IGG SER-MCNC: 1.2 MCG/ML — SIGNIFICANT CHANGE UP
DEPRECATED S PNEUM20 IGG SER-MCNC: 1.3 MCG/ML — SIGNIFICANT CHANGE UP
DEPRECATED S PNEUM22 IGG SER-MCNC: 1.8 MCG/ML — SIGNIFICANT CHANGE UP
DEPRECATED S PNEUM23 IGG SER-MCNC: 3.1 MCG/ML — SIGNIFICANT CHANGE UP
DEPRECATED S PNEUM3 IGG SER-MCNC: 0.4 MCG/ML — SIGNIFICANT CHANGE UP
DEPRECATED S PNEUM5 IGG SER-MCNC: 63.1 MCG/ML — SIGNIFICANT CHANGE UP
DEPRECATED S PNEUM8 IGG SER-MCNC: 0.6 MCG/ML — SIGNIFICANT CHANGE UP
DEPRECATED S PNEUM9 IGG SER-MCNC: 1 MCG/ML — SIGNIFICANT CHANGE UP
DEPRECATED S PNEUM9 IGG SER-MCNC: 3.8 MCG/ML — SIGNIFICANT CHANGE UP
S PNEUM SEROTYPE IGG SER-IMP: 0.9 MCG/ML — SIGNIFICANT CHANGE UP
S PNEUM SEROTYPE IGG SER-IMP: 1.1 MCG/ML — SIGNIFICANT CHANGE UP
S PNEUM SEROTYPE IGG SER-IMP: 11 MCG/ML — SIGNIFICANT CHANGE UP
S PNEUM SEROTYPE IGG SER-IMP: 2 MCG/ML — SIGNIFICANT CHANGE UP
S PNEUM SEROTYPE IGG SER-IMP: 2.2 MCG/ML — SIGNIFICANT CHANGE UP
S PNEUM SEROTYPE IGG SER-IMP: 2.6 MCG/ML — SIGNIFICANT CHANGE UP
S PNEUM SEROTYPE IGG SER-IMP: 31.2 MCG/ML — SIGNIFICANT CHANGE UP

## 2019-01-08 ENCOUNTER — APPOINTMENT (OUTPATIENT)
Dept: PULMONOLOGY | Facility: CLINIC | Age: 84
End: 2019-01-08
Payer: MEDICARE

## 2019-01-08 VITALS
OXYGEN SATURATION: 96 % | DIASTOLIC BLOOD PRESSURE: 71 MMHG | SYSTOLIC BLOOD PRESSURE: 117 MMHG | HEART RATE: 86 BPM | RESPIRATION RATE: 16 BRPM

## 2019-01-08 DIAGNOSIS — Z87.01 PERSONAL HISTORY OF PNEUMONIA (RECURRENT): ICD-10-CM

## 2019-01-08 PROCEDURE — 71046 X-RAY EXAM CHEST 2 VIEWS: CPT

## 2019-01-08 PROCEDURE — 36415 COLL VENOUS BLD VENIPUNCTURE: CPT

## 2019-01-08 PROCEDURE — 94060 EVALUATION OF WHEEZING: CPT

## 2019-01-08 PROCEDURE — 99496 TRANSJ CARE MGMT HIGH F2F 7D: CPT | Mod: 25

## 2019-01-08 NOTE — PROCEDURE
[FreeTextEntry1] : cxr in office improved from while in the hospital.  Less vascular congestion, trace bilat pleural effusions, basilar atelectasis, no focal consolidation.  cardiac silhouette appears normal, no beatriz abnormality.\par \par korey in office moderate obstruction with significant BD response. FEV1 decreased by about 100cc from last visit.

## 2019-01-08 NOTE — ASSESSMENT
[FreeTextEntry1] : f/u cardiologist\par \par cont thick-it\par \par add qvar sample 40 mcg 2 puffs bid \par \par \par Agree with above assessment and plan--.  Recent hospitalization for sepsis/pna/CHF.  Improving, will add qvar given korey results today.  Fu with  1 month.

## 2019-01-08 NOTE — PHYSICAL EXAM
[General Appearance - Well Developed] : well developed [Normal Oropharynx] : normal oropharynx [Heart Rate And Rhythm] : heart rate and rhythm were normal [Bowel Sounds] : normal bowel sounds [Abdomen Soft] : soft [Nail Clubbing] : no clubbing of the fingernails [Oriented To Time, Place, And Person] : oriented to person, place, and time [Impaired Insight] : insight and judgment were intact [FreeTextEntry1] : sad after death of sister

## 2019-01-08 NOTE — HISTORY OF PRESENT ILLNESS
[FreeTextEntry1] : was hospitalized last week  for  almost 1 week . Told had pneumonia D/c on lasix 80 and 40 and seeing cardiologist tomorrow. Took 2 doses of antibiotics after discharge. Now minimal cough and mucus No prednisone \par \par now using thick it with liquids, had recent swallow study \par Using nebulizer bid and Symbicort and Spiriva\par \par using cpap nightly and o2

## 2019-01-09 LAB
ALBUMIN SERPL ELPH-MCNC: 3.7 G/DL
ALP BLD-CCNC: 148 U/L
ALT SERPL-CCNC: 16 U/L
ANION GAP SERPL CALC-SCNC: 21 MMOL/L
AST SERPL-CCNC: 23 U/L
BASOPHILS # BLD AUTO: 0.09 K/UL
BASOPHILS NFR BLD AUTO: 0.6 %
BILIRUB SERPL-MCNC: 0.4 MG/DL
BUN SERPL-MCNC: 22 MG/DL
CALCIUM SERPL-MCNC: 9.3 MG/DL
CHLORIDE SERPL-SCNC: 102 MMOL/L
CO2 SERPL-SCNC: 22 MMOL/L
CREAT SERPL-MCNC: 1.12 MG/DL
EOSINOPHIL # BLD AUTO: 0.35 K/UL
EOSINOPHIL NFR BLD AUTO: 2.5 %
GLUCOSE SERPL-MCNC: 144 MG/DL
HCT VFR BLD CALC: 41.9 %
HGB BLD-MCNC: 13 G/DL
IMM GRANULOCYTES NFR BLD AUTO: 0.4 %
LYMPHOCYTES # BLD AUTO: 1.8 K/UL
LYMPHOCYTES NFR BLD AUTO: 12.9 %
MAN DIFF?: NORMAL
MCHC RBC-ENTMCNC: 23.8 PG
MCHC RBC-ENTMCNC: 31 GM/DL
MCV RBC AUTO: 76.6 FL
MONOCYTES # BLD AUTO: 0.8 K/UL
MONOCYTES NFR BLD AUTO: 5.7 %
NEUTROPHILS # BLD AUTO: 10.89 K/UL
NEUTROPHILS NFR BLD AUTO: 77.9 %
NT-PROBNP SERPL-MCNC: 284 PG/ML
PLATELET # BLD AUTO: 314 K/UL
POTASSIUM SERPL-SCNC: 3.5 MMOL/L
PROT SERPL-MCNC: 6.8 G/DL
RBC # BLD: 5.47 M/UL
RBC # FLD: 20.2 %
SODIUM SERPL-SCNC: 145 MMOL/L
WBC # FLD AUTO: 13.99 K/UL

## 2019-01-31 ENCOUNTER — MEDICATION RENEWAL (OUTPATIENT)
Age: 84
End: 2019-01-31

## 2019-02-12 ENCOUNTER — RX RENEWAL (OUTPATIENT)
Age: 84
End: 2019-02-12

## 2019-02-20 ENCOUNTER — APPOINTMENT (OUTPATIENT)
Dept: PULMONOLOGY | Facility: CLINIC | Age: 84
End: 2019-02-20
Payer: MEDICARE

## 2019-02-20 VITALS — OXYGEN SATURATION: 90 % | SYSTOLIC BLOOD PRESSURE: 121 MMHG | DIASTOLIC BLOOD PRESSURE: 67 MMHG | HEART RATE: 89 BPM

## 2019-02-20 VITALS — OXYGEN SATURATION: 96 %

## 2019-02-20 PROCEDURE — 99213 OFFICE O/P EST LOW 20 MIN: CPT | Mod: 25

## 2019-02-20 PROCEDURE — 94010 BREATHING CAPACITY TEST: CPT

## 2019-02-20 NOTE — ASSESSMENT
[FreeTextEntry1] : Consider evaluation for biologic\par Add additional inhaled steroid.\par Continue present bronchodilator therapy\par \par cardio f/u

## 2019-02-20 NOTE — PROCEDURE
[FreeTextEntry1] : Pulmonary function testing.\par These data demonstrate a mild obstructive ventilatory deficit.

## 2019-02-20 NOTE — PHYSICAL EXAM
[Normal Conjunctiva] : the conjunctiva exhibited no abnormalities [Normal Oropharynx] : normal oropharynx [Jugular Venous Distention Increased] : there was no jugular-venous distention [Heart Rate And Rhythm] : heart rate and rhythm were normal [Heart Sounds] : normal S1 and S2 [Auscultation Breath Sounds / Voice Sounds] : lungs were clear to auscultation bilaterally [Lungs Percussion] : the lungs were normal to percussion [FreeTextEntry1] : Ess clear. No wheeze. Rare basilar crackle. Rare rhonchi [Kyphosis] : kyphosis [Abdomen Soft] : soft [Abdomen Tenderness] : non-tender [Abdomen Mass (___ Cm)] : no abdominal mass palpated [Nail Clubbing] : no clubbing of the fingernails [Cyanosis, Localized] : no localized cyanosis [Petechial Hemorrhages (___cm)] : no petechial hemorrhages [] : no ischemic changes [Oriented To Time, Place, And Person] : oriented to person, place, and time [Impaired Insight] : insight and judgment were intact [Affect] : the affect was normal

## 2019-02-20 NOTE — HISTORY OF PRESENT ILLNESS
[FreeTextEntry1] : Feels likes gets winded sooner than she used to . c/o burping . on omeprazole.\par  using Symbicort and spiriva and neb PRN. very nervous .t took clonazepam prn\par \par Thinks last hosp. may have been more secondary to anxiety.

## 2019-03-06 ENCOUNTER — MEDICATION RENEWAL (OUTPATIENT)
Age: 84
End: 2019-03-06

## 2019-03-18 ENCOUNTER — RX RENEWAL (OUTPATIENT)
Age: 84
End: 2019-03-18

## 2019-03-27 ENCOUNTER — APPOINTMENT (OUTPATIENT)
Dept: PULMONOLOGY | Facility: CLINIC | Age: 84
End: 2019-03-27
Payer: MEDICARE

## 2019-03-27 VITALS
OXYGEN SATURATION: 94 % | WEIGHT: 233 LBS | HEART RATE: 76 BPM | DIASTOLIC BLOOD PRESSURE: 50 MMHG | SYSTOLIC BLOOD PRESSURE: 106 MMHG | BODY MASS INDEX: 39.99 KG/M2

## 2019-03-27 DIAGNOSIS — R05 COUGH: ICD-10-CM

## 2019-03-27 PROCEDURE — 94060 EVALUATION OF WHEEZING: CPT

## 2019-03-27 PROCEDURE — 99213 OFFICE O/P EST LOW 20 MIN: CPT | Mod: 25

## 2019-03-27 NOTE — ASSESSMENT
[FreeTextEntry1] : COPD improved.\par \par Continue present bronchodilator therapy\par Weight loss recommended and discussed.\par

## 2019-03-27 NOTE — HISTORY OF PRESENT ILLNESS
[FreeTextEntry1] : finished antibiotics 2 weeks ago with help in cough , still with mucus using nebulizer bid to tid on Symbicort and spiriva\par feeling significantly improved.

## 2019-03-27 NOTE — PROCEDURE
[FreeTextEntry1] : Pulmonary function testing.\par These data demonstrate a mild obstructive ventilatory deficit. There is no significant bronchodilator response. \par Fx increased.

## 2019-03-27 NOTE — PHYSICAL EXAM
[Auscultation Breath Sounds / Voice Sounds] : lungs were clear to auscultation bilaterally [Normal Conjunctiva] : the conjunctiva exhibited no abnormalities [Normal Oropharynx] : normal oropharynx [Jugular Venous Distention Increased] : there was no jugular-venous distention [Heart Rate And Rhythm] : heart rate and rhythm were normal [Heart Sounds] : normal S1 and S2 [Lungs Percussion] : the lungs were normal to percussion [Kyphosis] : kyphosis [Abdomen Soft] : soft [Abdomen Tenderness] : non-tender [Abdomen Mass (___ Cm)] : no abdominal mass palpated [Nail Clubbing] : no clubbing of the fingernails [Cyanosis, Localized] : no localized cyanosis [Petechial Hemorrhages (___cm)] : no petechial hemorrhages [] : no ischemic changes [Oriented To Time, Place, And Person] : oriented to person, place, and time [Impaired Insight] : insight and judgment were intact [Affect] : the affect was normal [FreeTextEntry1] : Overweight

## 2019-04-05 ENCOUNTER — MEDICATION RENEWAL (OUTPATIENT)
Age: 84
End: 2019-04-05

## 2019-04-16 ENCOUNTER — LABORATORY RESULT (OUTPATIENT)
Age: 84
End: 2019-04-16

## 2019-04-16 ENCOUNTER — NON-APPOINTMENT (OUTPATIENT)
Age: 84
End: 2019-04-16

## 2019-04-16 ENCOUNTER — APPOINTMENT (OUTPATIENT)
Dept: CARDIOLOGY | Facility: CLINIC | Age: 84
End: 2019-04-16
Payer: MEDICARE

## 2019-04-16 VITALS
WEIGHT: 233 LBS | HEART RATE: 73 BPM | HEIGHT: 64 IN | DIASTOLIC BLOOD PRESSURE: 65 MMHG | SYSTOLIC BLOOD PRESSURE: 135 MMHG | BODY MASS INDEX: 39.78 KG/M2 | OXYGEN SATURATION: 85 %

## 2019-04-16 PROCEDURE — 93000 ELECTROCARDIOGRAM COMPLETE: CPT

## 2019-04-16 PROCEDURE — 99214 OFFICE O/P EST MOD 30 MIN: CPT

## 2019-04-16 PROCEDURE — 99204 OFFICE O/P NEW MOD 45 MIN: CPT

## 2019-04-16 RX ORDER — TRIAMCINOLONE ACETONIDE 1 MG/G
0.1 CREAM TOPICAL
Qty: 60 | Refills: 3 | Status: DISCONTINUED | COMMUNITY
Start: 2019-02-20 | End: 2019-04-16

## 2019-04-17 DIAGNOSIS — R74.8 ABNORMAL LEVELS OF OTHER SERUM ENZYMES: ICD-10-CM

## 2019-04-17 LAB
ALBUMIN SERPL ELPH-MCNC: 3.9 G/DL
ALP BLD-CCNC: 136 U/L
ALT SERPL-CCNC: 16 U/L
ANION GAP SERPL CALC-SCNC: 16 MMOL/L
AST SERPL-CCNC: 22 U/L
BASOPHILS # BLD AUTO: 0.12 K/UL
BASOPHILS NFR BLD AUTO: 1.2 %
BILIRUB SERPL-MCNC: 0.4 MG/DL
BUN SERPL-MCNC: 28 MG/DL
CALCIUM SERPL-MCNC: 9 MG/DL
CHLORIDE SERPL-SCNC: 101 MMOL/L
CHOLEST SERPL-MCNC: 153 MG/DL
CHOLEST/HDLC SERPL: 3.1 RATIO
CO2 SERPL-SCNC: 27 MMOL/L
CREAT SERPL-MCNC: 1.1 MG/DL
EOSINOPHIL # BLD AUTO: 0.33 K/UL
EOSINOPHIL NFR BLD AUTO: 3.2 %
ESTIMATED AVERAGE GLUCOSE: 134 MG/DL
GLUCOSE SERPL-MCNC: 127 MG/DL
HBA1C MFR BLD HPLC: 6.3 %
HCT VFR BLD CALC: 42.6 %
HDLC SERPL-MCNC: 49 MG/DL
HGB BLD-MCNC: 12.6 G/DL
IMM GRANULOCYTES NFR BLD AUTO: 0.3 %
LDLC SERPL CALC-MCNC: 84 MG/DL
LYMPHOCYTES # BLD AUTO: 1.98 K/UL
LYMPHOCYTES NFR BLD AUTO: 19.1 %
MAGNESIUM SERPL-MCNC: 2.2 MG/DL
MAN DIFF?: NORMAL
MCHC RBC-ENTMCNC: 24 PG
MCHC RBC-ENTMCNC: 29.6 GM/DL
MCV RBC AUTO: 81.3 FL
MONOCYTES # BLD AUTO: 0.64 K/UL
MONOCYTES NFR BLD AUTO: 6.2 %
NEUTROPHILS # BLD AUTO: 7.28 K/UL
NEUTROPHILS NFR BLD AUTO: 70 %
NT-PROBNP SERPL-MCNC: 203 PG/ML
PHOSPHATE SERPL-MCNC: 3.8 MG/DL
PLATELET # BLD AUTO: 354 K/UL
POTASSIUM SERPL-SCNC: 4.2 MMOL/L
PROT SERPL-MCNC: 7 G/DL
RBC # BLD: 5.24 M/UL
RBC # FLD: 18.3 %
SODIUM SERPL-SCNC: 144 MMOL/L
T3RU NFR SERPL: 1 TBI
T4 FREE SERPL-MCNC: 1.5 NG/DL
T4 SERPL-MCNC: 9 UG/DL
TRIGL SERPL-MCNC: 100 MG/DL
TSH SERPL-ACNC: 0.73 UIU/ML
URATE SERPL-MCNC: 9.1 MG/DL
WBC # FLD AUTO: 10.38 K/UL

## 2019-04-19 NOTE — PHYSICAL EXAM
[General Appearance - Well Developed] : well developed [Well Groomed] : well groomed [Normal Appearance] : normal appearance [No Deformities] : no deformities [General Appearance - Well Nourished] : well nourished [Eyelids - No Xanthelasma] : the eyelids demonstrated no xanthelasmas [General Appearance - In No Acute Distress] : no acute distress [Normal Conjunctiva] : the conjunctiva exhibited no abnormalities [No Oral Pallor] : no oral pallor [No Oral Cyanosis] : no oral cyanosis [Normal Oral Mucosa] : normal oral mucosa [Normal Jugular Venous A Waves Present] : normal jugular venous A waves present [Normal Jugular Venous V Waves Present] : normal jugular venous V waves present [No Jugular Venous Staton A Waves] : no jugular venous staton A waves [Respiration, Rhythm And Depth] : normal respiratory rhythm and effort [Exaggerated Use Of Accessory Muscles For Inspiration] : no accessory muscle use [Auscultation Breath Sounds / Voice Sounds] : lungs were clear to auscultation bilaterally [Abdomen Soft] : soft [Abdomen Tenderness] : non-tender [Abdomen Mass (___ Cm)] : no abdominal mass palpated [Abnormal Walk] : normal gait [Gait - Sufficient For Exercise Testing] : the gait was sufficient for exercise testing [Cyanosis, Localized] : no localized cyanosis [Nail Clubbing] : no clubbing of the fingernails [Petechial Hemorrhages (___cm)] : no petechial hemorrhages [Skin Color & Pigmentation] : normal skin color and pigmentation [] : no rash [Skin Lesions] : no skin lesions [No Venous Stasis] : no venous stasis [No Skin Ulcers] : no skin ulcer [Oriented To Time, Place, And Person] : oriented to person, place, and time [No Xanthoma] : no  xanthoma was observed [Mood] : the mood was normal [No Anxiety] : not feeling anxious [Affect] : the affect was normal [FreeTextEntry1] : Bilateral pedal edema

## 2019-04-19 NOTE — HISTORY OF PRESENT ILLNESS
[FreeTextEntry1] : The patient here for evaluation of high blood pressure. Patient is currently tolerating the current antihypertensive regime and they deny headaches, stiff neck, visual changes, or PND. The patient has been trying to stay on a low-sodium diet.\par The patient presents today for follow up of complaints of leg Edema. They admit to high dietary sodium intake recently. The patient denies any chest pain, shortness of breath, PND. Orthopnea, dizziness, nausea, vomiting, lightheadedness, abdominal pain, or fever. Pt takes Lasix 80mg qam and Lasix 40mg qhs. \par The patient  has COPD they report chronic cough with clear sputum expectoration  and  they are  taking inhalation medications when advised. They report mild dyspnea which is unchanged from their baseline and have follow-up with their pulmonary specialist, Dr. Sheets. \par \par \par

## 2019-04-30 ENCOUNTER — OUTPATIENT (OUTPATIENT)
Dept: OUTPATIENT SERVICES | Facility: HOSPITAL | Age: 84
LOS: 1 days | End: 2019-04-30
Payer: MEDICARE

## 2019-04-30 ENCOUNTER — APPOINTMENT (OUTPATIENT)
Dept: ULTRASOUND IMAGING | Facility: IMAGING CENTER | Age: 84
End: 2019-04-30
Payer: MEDICARE

## 2019-04-30 DIAGNOSIS — K64.9 UNSPECIFIED HEMORRHOIDS: ICD-10-CM

## 2019-04-30 DIAGNOSIS — Z98.89 OTHER SPECIFIED POSTPROCEDURAL STATES: Chronic | ICD-10-CM

## 2019-04-30 DIAGNOSIS — R74.8 ABNORMAL LEVELS OF OTHER SERUM ENZYMES: ICD-10-CM

## 2019-04-30 PROCEDURE — 76700 US EXAM ABDOM COMPLETE: CPT

## 2019-04-30 PROCEDURE — 76700 US EXAM ABDOM COMPLETE: CPT | Mod: 26

## 2019-05-01 ENCOUNTER — RX RENEWAL (OUTPATIENT)
Age: 84
End: 2019-05-01

## 2019-05-03 ENCOUNTER — RX RENEWAL (OUTPATIENT)
Age: 84
End: 2019-05-03

## 2019-05-22 ENCOUNTER — APPOINTMENT (OUTPATIENT)
Dept: PULMONOLOGY | Facility: CLINIC | Age: 84
End: 2019-05-22
Payer: MEDICARE

## 2019-05-22 VITALS
BODY MASS INDEX: 39.78 KG/M2 | RESPIRATION RATE: 16 BRPM | WEIGHT: 233 LBS | OXYGEN SATURATION: 95 % | DIASTOLIC BLOOD PRESSURE: 70 MMHG | HEIGHT: 64 IN | SYSTOLIC BLOOD PRESSURE: 123 MMHG | HEART RATE: 82 BPM

## 2019-05-22 PROCEDURE — 94727 GAS DIL/WSHOT DETER LNG VOL: CPT

## 2019-05-22 PROCEDURE — 94729 DIFFUSING CAPACITY: CPT

## 2019-05-22 PROCEDURE — 94060 EVALUATION OF WHEEZING: CPT

## 2019-05-22 PROCEDURE — 99213 OFFICE O/P EST LOW 20 MIN: CPT | Mod: 25

## 2019-05-22 RX ORDER — IPRATROPIUM BROMIDE AND ALBUTEROL SULFATE 2.5; .5 MG/3ML; MG/3ML
0.5-2.5 (3) SOLUTION RESPIRATORY (INHALATION) 4 TIMES DAILY
Qty: 120 | Refills: 0 | Status: DISCONTINUED | COMMUNITY
Start: 2019-01-03 | End: 2019-05-22

## 2019-05-22 RX ORDER — CEFUROXIME AXETIL 500 MG/1
500 TABLET ORAL
Qty: 20 | Refills: 0 | Status: DISCONTINUED | COMMUNITY
Start: 2019-03-06 | End: 2019-05-22

## 2019-05-23 NOTE — PROCEDURE
[FreeTextEntry1] : Pulmonary function testing.\par These data demonstrate a mild obstructive ventilatory deficit. There is no significant bronchodilator response. There is evidence of mild hyperinflation. There is a severe diffusion impairment.

## 2019-05-23 NOTE — HISTORY OF PRESENT ILLNESS
Secondary adrenal insufficiency    * best time to take medication is 3-4 am to mimic normal diurnal patterns, if not feasible please take before bed. If it causes insomnia- take this medication first thing in the morning     Continue Hydrocortisone regimen  If you have surgery please notify your MD of you adrenal condition.  Will get medical alert tag    Referral to optometry for routine eye exam  Schedule bone density     [FreeTextEntry1] : patient doing well Needs meds renewed . On inhalers regularly

## 2019-05-23 NOTE — PHYSICAL EXAM
[Normal Conjunctiva] : the conjunctiva exhibited no abnormalities [Normal Oropharynx] : normal oropharynx [Jugular Venous Distention Increased] : there was no jugular-venous distention [Heart Rate And Rhythm] : heart rate and rhythm were normal [Heart Sounds] : normal S1 and S2 [Auscultation Breath Sounds / Voice Sounds] : lungs were clear to auscultation bilaterally [Lungs Percussion] : the lungs were normal to percussion [Kyphosis] : kyphosis [Abdomen Soft] : soft [Abdomen Tenderness] : non-tender [Abdomen Mass (___ Cm)] : no abdominal mass palpated [Nail Clubbing] : no clubbing of the fingernails [Cyanosis, Localized] : no localized cyanosis [Petechial Hemorrhages (___cm)] : no petechial hemorrhages [] : no ischemic changes [Oriented To Time, Place, And Person] : oriented to person, place, and time [Impaired Insight] : insight and judgment were intact [Affect] : the affect was normal [FreeTextEntry1] : Overweight

## 2019-05-24 ENCOUNTER — MEDICATION RENEWAL (OUTPATIENT)
Age: 84
End: 2019-05-24

## 2019-06-05 ENCOUNTER — FORM ENCOUNTER (OUTPATIENT)
Age: 84
End: 2019-06-05

## 2019-06-06 ENCOUNTER — APPOINTMENT (OUTPATIENT)
Dept: CT IMAGING | Facility: IMAGING CENTER | Age: 84
End: 2019-06-06
Payer: MEDICARE

## 2019-06-06 ENCOUNTER — OUTPATIENT (OUTPATIENT)
Dept: OUTPATIENT SERVICES | Facility: HOSPITAL | Age: 84
LOS: 1 days | End: 2019-06-06
Payer: MEDICARE

## 2019-06-06 DIAGNOSIS — Z98.89 OTHER SPECIFIED POSTPROCEDURAL STATES: Chronic | ICD-10-CM

## 2019-06-06 DIAGNOSIS — J44.9 CHRONIC OBSTRUCTIVE PULMONARY DISEASE, UNSPECIFIED: ICD-10-CM

## 2019-06-06 PROCEDURE — 71250 CT THORAX DX C-: CPT

## 2019-06-06 PROCEDURE — 71250 CT THORAX DX C-: CPT | Mod: 26

## 2019-06-21 ENCOUNTER — OTHER (OUTPATIENT)
Age: 84
End: 2019-06-21

## 2019-06-21 ENCOUNTER — MEDICATION RENEWAL (OUTPATIENT)
Age: 84
End: 2019-06-21

## 2019-07-19 ENCOUNTER — OTHER (OUTPATIENT)
Age: 84
End: 2019-07-19

## 2019-07-25 ENCOUNTER — RX RENEWAL (OUTPATIENT)
Age: 84
End: 2019-07-25

## 2019-07-25 ENCOUNTER — MEDICATION RENEWAL (OUTPATIENT)
Age: 84
End: 2019-07-25

## 2019-08-21 ENCOUNTER — APPOINTMENT (OUTPATIENT)
Dept: PULMONOLOGY | Facility: CLINIC | Age: 84
End: 2019-08-21
Payer: MEDICARE

## 2019-08-21 ENCOUNTER — OTHER (OUTPATIENT)
Age: 84
End: 2019-08-21

## 2019-08-21 VITALS
WEIGHT: 233 LBS | SYSTOLIC BLOOD PRESSURE: 112 MMHG | HEIGHT: 64 IN | HEART RATE: 87 BPM | DIASTOLIC BLOOD PRESSURE: 68 MMHG | BODY MASS INDEX: 39.78 KG/M2 | OXYGEN SATURATION: 80 %

## 2019-08-21 VITALS — OXYGEN SATURATION: 85 %

## 2019-08-21 PROCEDURE — 94060 EVALUATION OF WHEEZING: CPT

## 2019-08-21 PROCEDURE — 99213 OFFICE O/P EST LOW 20 MIN: CPT | Mod: 25

## 2019-08-21 PROCEDURE — 71046 X-RAY EXAM CHEST 2 VIEWS: CPT

## 2019-08-21 RX ORDER — METHYLPREDNISOLONE 4 MG/1
4 TABLET ORAL
Qty: 1 | Refills: 0 | Status: DISCONTINUED | COMMUNITY
Start: 2019-07-19 | End: 2019-08-21

## 2019-08-21 NOTE — PROCEDURE
[FreeTextEntry1] : Pulmonary function testing.\par These data demonstrate a mild obstructive ventilatory deficit. There is no significant bronchodilator response. \par Mild decrease in flow rates.\par \par PA and lateral chest radiograph reveals Cardiomegaly. She is kyphotic and minimally scoliotic. Bony structures are intact. There are patchy basilar infiltrate and no significant pleural disease.\par \par Compared to prior chest radiograph of 1/8/19 basilar infiltrates are new\par \par \par \par

## 2019-08-21 NOTE — ASSESSMENT
[FreeTextEntry1] : O2 24/7\par Course ceftin\par Course prednisone\par Continue present bronchodilator therapy\par Continue Lasix increase to BID for 2-3 days. \par ER if increased SOB\par \par Add: on Lasix 80 am 40 PM\par

## 2019-08-21 NOTE — PHYSICAL EXAM
[Normal Conjunctiva] : the conjunctiva exhibited no abnormalities [Normal Oropharynx] : normal oropharynx [Jugular Venous Distention Increased] : there was no jugular-venous distention [Heart Sounds] : normal S1 and S2 [Heart Rate And Rhythm] : heart rate and rhythm were normal [Lungs Percussion] : the lungs were normal to percussion [Kyphosis] : kyphosis [Abdomen Soft] : soft [Abdomen Tenderness] : non-tender [Abdomen Mass (___ Cm)] : no abdominal mass palpated [Cyanosis, Localized] : no localized cyanosis [Nail Clubbing] : no clubbing of the fingernails [Petechial Hemorrhages (___cm)] : no petechial hemorrhages [] : no ischemic changes [Oriented To Time, Place, And Person] : oriented to person, place, and time [Impaired Insight] : insight and judgment were intact [Affect] : the affect was normal [FreeTextEntry1] : 1 plus wheeze. Rare crackle

## 2019-08-21 NOTE — HISTORY OF PRESENT ILLNESS
[FreeTextEntry1] : Last  week noticed increase sob and needed to do o2 more and nebulizer prn in addition to spiriva and Symbicort and Tessalon pearls

## 2019-08-26 ENCOUNTER — OTHER (OUTPATIENT)
Age: 84
End: 2019-08-26

## 2019-09-02 PROBLEM — Z09 FOLLOW UP: Status: ACTIVE | Noted: 2017-08-29

## 2019-09-04 ENCOUNTER — APPOINTMENT (OUTPATIENT)
Dept: PULMONOLOGY | Facility: CLINIC | Age: 84
End: 2019-09-04
Payer: MEDICARE

## 2019-09-04 VITALS
HEART RATE: 77 BPM | RESPIRATION RATE: 14 BRPM | OXYGEN SATURATION: 91 % | BODY MASS INDEX: 39.78 KG/M2 | DIASTOLIC BLOOD PRESSURE: 65 MMHG | SYSTOLIC BLOOD PRESSURE: 107 MMHG | HEIGHT: 64 IN | WEIGHT: 233 LBS

## 2019-09-04 PROCEDURE — 99213 OFFICE O/P EST LOW 20 MIN: CPT | Mod: 25

## 2019-09-04 PROCEDURE — 71046 X-RAY EXAM CHEST 2 VIEWS: CPT

## 2019-09-04 NOTE — PHYSICAL EXAM
[Normal Conjunctiva] : the conjunctiva exhibited no abnormalities [Normal Oropharynx] : normal oropharynx [Jugular Venous Distention Increased] : there was no jugular-venous distention [Heart Rate And Rhythm] : heart rate and rhythm were normal [Heart Sounds] : normal S1 and S2 [Lungs Percussion] : the lungs were normal to percussion [Kyphosis] : kyphosis [Abdomen Soft] : soft [Abdomen Tenderness] : non-tender [Abdomen Mass (___ Cm)] : no abdominal mass palpated [Nail Clubbing] : no clubbing of the fingernails [Cyanosis, Localized] : no localized cyanosis [Petechial Hemorrhages (___cm)] : no petechial hemorrhages [] : no ischemic changes [Oriented To Time, Place, And Person] : oriented to person, place, and time [Impaired Insight] : insight and judgment were intact [Affect] : the affect was normal [FreeTextEntry1] : 1 plus wheeze. Rare crackle

## 2019-09-04 NOTE — PROCEDURE
[FreeTextEntry1] : \par \par 9/4/19\par PA and lateral chest radiograph reveals Cardiomegaly. She is kyphotic and minimally scoliotic. Bony structures are intact. There are mild increased markings at right base  and no significant pleural disease.\par \par Compared to prior chest radiograph of 8/21/19 basilar infiltrates is improved. \par \par \par \par

## 2019-09-04 NOTE — ASSESSMENT
[FreeTextEntry1] : O2 24/7\par Continue present bronchodilator therapy\par Continue Lasix \par F/U 1 month flu vacc. \par

## 2019-09-04 NOTE — HISTORY OF PRESENT ILLNESS
[FreeTextEntry1] : feeling much better after antibiotics and steroids Using o2 day and night and water pills

## 2019-09-05 ENCOUNTER — NON-APPOINTMENT (OUTPATIENT)
Age: 84
End: 2019-09-05

## 2019-09-05 ENCOUNTER — APPOINTMENT (OUTPATIENT)
Dept: CARDIOLOGY | Facility: CLINIC | Age: 84
End: 2019-09-05
Payer: MEDICARE

## 2019-09-05 VITALS
DIASTOLIC BLOOD PRESSURE: 60 MMHG | HEART RATE: 71 BPM | HEIGHT: 64 IN | OXYGEN SATURATION: 86 % | SYSTOLIC BLOOD PRESSURE: 119 MMHG

## 2019-09-05 DIAGNOSIS — M79.89 OTHER SPECIFIED SOFT TISSUE DISORDERS: ICD-10-CM

## 2019-09-05 PROCEDURE — 99214 OFFICE O/P EST MOD 30 MIN: CPT

## 2019-09-05 PROCEDURE — 93000 ELECTROCARDIOGRAM COMPLETE: CPT

## 2019-09-06 ENCOUNTER — OTHER (OUTPATIENT)
Age: 84
End: 2019-09-06

## 2019-09-06 LAB
ALBUMIN SERPL ELPH-MCNC: 4 G/DL
ALP BLD-CCNC: 103 U/L
ALT SERPL-CCNC: 17 U/L
ANION GAP SERPL CALC-SCNC: 18 MMOL/L
AST SERPL-CCNC: 21 U/L
BASOPHILS # BLD AUTO: 0.09 K/UL
BASOPHILS NFR BLD AUTO: 0.8 %
BILIRUB SERPL-MCNC: 0.5 MG/DL
BUN SERPL-MCNC: 19 MG/DL
CALCIUM SERPL-MCNC: 10 MG/DL
CHLORIDE SERPL-SCNC: 99 MMOL/L
CO2 SERPL-SCNC: 30 MMOL/L
CREAT SERPL-MCNC: 1.13 MG/DL
EOSINOPHIL # BLD AUTO: 0.33 K/UL
EOSINOPHIL NFR BLD AUTO: 3.1 %
GLUCOSE SERPL-MCNC: 119 MG/DL
HCT VFR BLD CALC: 43.7 %
HGB BLD-MCNC: 12.8 G/DL
IMM GRANULOCYTES NFR BLD AUTO: 0.7 %
LYMPHOCYTES # BLD AUTO: 2.37 K/UL
LYMPHOCYTES NFR BLD AUTO: 22.2 %
MAN DIFF?: NORMAL
MCHC RBC-ENTMCNC: 24 PG
MCHC RBC-ENTMCNC: 29.3 GM/DL
MCV RBC AUTO: 81.8 FL
MONOCYTES # BLD AUTO: 0.8 K/UL
MONOCYTES NFR BLD AUTO: 7.5 %
NEUTROPHILS # BLD AUTO: 7.03 K/UL
NEUTROPHILS NFR BLD AUTO: 65.7 %
NT-PROBNP SERPL-MCNC: 211 PG/ML
PLATELET # BLD AUTO: 331 K/UL
POTASSIUM SERPL-SCNC: 4.4 MMOL/L
PROT SERPL-MCNC: 6.8 G/DL
RBC # BLD: 5.34 M/UL
RBC # FLD: 19.4 %
SODIUM SERPL-SCNC: 147 MMOL/L
WBC # FLD AUTO: 10.69 K/UL

## 2019-09-06 NOTE — REASON FOR VISIT
[FreeTextEntry1] : The patient  has COPD they report occasional cough with clear sputum expectoration  and  they are  taking inhalation medications when advised. They report mild dyspnea which is unchanged from their baseline and have follow-up with their pulmonary specialist. \par Patient is following with Dr. Barb Becker for basal cell carcinoma that was removed last week. patient to follow-up today for removal of stitches.

## 2019-09-06 NOTE — PHYSICAL EXAM
[General Appearance - Well Developed] : well developed [Well Groomed] : well groomed [Normal Appearance] : normal appearance [General Appearance - Well Nourished] : well nourished [No Deformities] : no deformities [General Appearance - In No Acute Distress] : no acute distress [Normal Conjunctiva] : the conjunctiva exhibited no abnormalities [Eyelids - No Xanthelasma] : the eyelids demonstrated no xanthelasmas [No Oral Pallor] : no oral pallor [Normal Oral Mucosa] : normal oral mucosa [No Oral Cyanosis] : no oral cyanosis [Normal Jugular Venous A Waves Present] : normal jugular venous A waves present [Normal Jugular Venous V Waves Present] : normal jugular venous V waves present [No Jugular Venous Staton A Waves] : no jugular venous staton A waves [Respiration, Rhythm And Depth] : normal respiratory rhythm and effort [Exaggerated Use Of Accessory Muscles For Inspiration] : no accessory muscle use [Abdomen Tenderness] : non-tender [Abdomen Soft] : soft [Abdomen Mass (___ Cm)] : no abdominal mass palpated [Abnormal Walk] : normal gait [Gait - Sufficient For Exercise Testing] : the gait was sufficient for exercise testing [Skin Color & Pigmentation] : normal skin color and pigmentation [] : no rash [No Venous Stasis] : no venous stasis [No Skin Ulcers] : no skin ulcer [Skin Lesions] : no skin lesions [No Xanthoma] : no  xanthoma was observed [Affect] : the affect was normal [Oriented To Time, Place, And Person] : oriented to person, place, and time [No Anxiety] : not feeling anxious [Mood] : the mood was normal [FreeTextEntry1] : stitches to right forehead

## 2019-09-23 ENCOUNTER — RX RENEWAL (OUTPATIENT)
Age: 84
End: 2019-09-23

## 2019-09-30 ENCOUNTER — MEDICATION RENEWAL (OUTPATIENT)
Age: 84
End: 2019-09-30

## 2019-09-30 ENCOUNTER — OTHER (OUTPATIENT)
Age: 84
End: 2019-09-30

## 2019-10-11 NOTE — PROGRESS NOTE ADULT - ENMT
Assumed care of this patient at 5pm from Dr. Garcia, patient found to have right distal femur fracture, he is nonambulatory at baseline, per orthopedics recommend knee immoblizer, analgesia and f/u with Dr. Kong negative No oral lesions; no gross abnormalities

## 2019-10-15 ENCOUNTER — FORM ENCOUNTER (OUTPATIENT)
Age: 84
End: 2019-10-15

## 2019-10-16 ENCOUNTER — APPOINTMENT (OUTPATIENT)
Dept: PULMONOLOGY | Facility: CLINIC | Age: 84
End: 2019-10-16
Payer: MEDICARE

## 2019-10-16 VITALS
HEART RATE: 73 BPM | TEMPERATURE: 98.2 F | RESPIRATION RATE: 16 BRPM | OXYGEN SATURATION: 85 % | SYSTOLIC BLOOD PRESSURE: 110 MMHG | DIASTOLIC BLOOD PRESSURE: 53 MMHG

## 2019-10-16 DIAGNOSIS — Z99.81 DEPENDENCE ON SUPPLEMENTAL OXYGEN: ICD-10-CM

## 2019-10-16 PROCEDURE — 94729 DIFFUSING CAPACITY: CPT

## 2019-10-16 PROCEDURE — 94727 GAS DIL/WSHOT DETER LNG VOL: CPT

## 2019-10-16 PROCEDURE — 99213 OFFICE O/P EST LOW 20 MIN: CPT | Mod: 25

## 2019-10-16 PROCEDURE — G0008: CPT

## 2019-10-16 PROCEDURE — 94060 EVALUATION OF WHEEZING: CPT

## 2019-10-16 PROCEDURE — 90653 IIV ADJUVANT VACCINE IM: CPT

## 2019-10-17 PROBLEM — Z99.81 OXYGEN DEPENDENT: Status: ACTIVE | Noted: 2018-09-15

## 2019-10-17 NOTE — PROCEDURE
[FreeTextEntry1] : 10/17/2019\par Pulmonary function testing\par These data demonstrate a mild-moderate obstructive ventilatory deficit. There is a significant bronchodilator response There is evidence of mild hyperinflation. There is elevation in the RV/TLC ratio indicative of possible air trapping. There is a moderate-severe diffusion impairment. \par Mild decrease in function\par \par 9/4/19\par PA and lateral chest radiograph reveals Cardiomegaly. She is kyphotic and minimally scoliotic. Bony structures are intact. There are mild increased markings at right base  and no significant pleural disease.\par \par Compared to prior chest radiograph of 8/21/19 basilar infiltrates is improved. \par \par \par \par

## 2019-10-17 NOTE — ASSESSMENT
[FreeTextEntry1] : O2 24/7\par Continue present bronchodilator therapy\par Continue Lasix \par Repeat CXR rto\par

## 2019-11-03 ENCOUNTER — RX RENEWAL (OUTPATIENT)
Age: 84
End: 2019-11-03

## 2019-11-15 NOTE — PATIENT PROFILE ADULT. - NS PRO AD PATIENT TYPE ON CHART
Do Not Resuscitate (DNR)
Admission Reconciliation is Completed  Discharge Reconciliation is Completed

## 2019-11-18 ENCOUNTER — OTHER (OUTPATIENT)
Age: 84
End: 2019-11-18

## 2019-11-26 ENCOUNTER — APPOINTMENT (OUTPATIENT)
Dept: PULMONOLOGY | Facility: CLINIC | Age: 84
End: 2019-11-26
Payer: MEDICARE

## 2019-11-26 VITALS — DIASTOLIC BLOOD PRESSURE: 60 MMHG | SYSTOLIC BLOOD PRESSURE: 106 MMHG

## 2019-11-26 VITALS
SYSTOLIC BLOOD PRESSURE: 98 MMHG | RESPIRATION RATE: 17 BRPM | DIASTOLIC BLOOD PRESSURE: 50 MMHG | HEART RATE: 100 BPM | OXYGEN SATURATION: 90 %

## 2019-11-26 DIAGNOSIS — R93.89 ABNORMAL FINDINGS ON DIAGNOSTIC IMAGING OF OTHER SPECIFIED BODY STRUCTURES: ICD-10-CM

## 2019-11-26 PROCEDURE — 71046 X-RAY EXAM CHEST 2 VIEWS: CPT

## 2019-11-26 PROCEDURE — 99213 OFFICE O/P EST LOW 20 MIN: CPT | Mod: 25

## 2019-11-26 RX ORDER — CEFUROXIME AXETIL 500 MG/1
500 TABLET ORAL
Qty: 20 | Refills: 0 | Status: DISCONTINUED | COMMUNITY
Start: 2019-08-21 | End: 2019-11-26

## 2019-11-26 RX ORDER — FUROSEMIDE 40 MG/1
40 TABLET ORAL
Refills: 0 | Status: DISCONTINUED | COMMUNITY
Start: 2019-08-21 | End: 2019-11-26

## 2019-11-26 RX ORDER — PREDNISONE 10 MG/1
10 TABLET ORAL
Qty: 30 | Refills: 1 | Status: DISCONTINUED | COMMUNITY
Start: 2019-08-21 | End: 2019-11-26

## 2019-11-26 RX ORDER — AZITHROMYCIN 250 MG/1
250 TABLET, FILM COATED ORAL
Qty: 1 | Refills: 1 | Status: DISCONTINUED | COMMUNITY
Start: 2019-11-18 | End: 2019-11-26

## 2019-11-26 NOTE — PHYSICAL EXAM
[Normal Conjunctiva] : the conjunctiva exhibited no abnormalities [Normal Oropharynx] : normal oropharynx [Jugular Venous Distention Increased] : there was no jugular-venous distention [Heart Sounds] : normal S1 and S2 [Heart Rate And Rhythm] : heart rate and rhythm were normal [Kyphosis] : kyphosis [Lungs Percussion] : the lungs were normal to percussion [Abdomen Tenderness] : non-tender [Abdomen Soft] : soft [Abdomen Mass (___ Cm)] : no abdominal mass palpated [Nail Clubbing] : no clubbing of the fingernails [] : no ischemic changes [Cyanosis, Localized] : no localized cyanosis [Petechial Hemorrhages (___cm)] : no petechial hemorrhages [Oriented To Time, Place, And Person] : oriented to person, place, and time [Impaired Insight] : insight and judgment were intact [Affect] : the affect was normal [FreeTextEntry1] : 1 plus wheeze. Rare crackle

## 2019-11-26 NOTE — PROCEDURE
[FreeTextEntry1] : \par \par 11/26/2019 \par PA and lateral chest radiograph reveals Cardiomegaly. She is kyphotic and minimally scoliotic. Bony structures are intact. There is no significant pleural disease. There is hyperinflation. No bilateral increased bronchovascular markings most notably in the lower lobe and probable bronchiectasis. There are no acute infiltrates\par Compared to prior chest radiograph of  9/4/19 there is no significant change.\par \par \par \par

## 2019-11-26 NOTE — HISTORY OF PRESENT ILLNESS
[FreeTextEntry1] : Doing well.\par \par Following instructions re: speech and swallow. Using thicket. \par \par Minimal cough. \par \par On Lasix 80 Am and 40 PM

## 2019-11-27 LAB
ANION GAP SERPL CALC-SCNC: 17 MMOL/L
BUN SERPL-MCNC: 26 MG/DL
CALCIUM SERPL-MCNC: 9.3 MG/DL
CHLORIDE SERPL-SCNC: 99 MMOL/L
CO2 SERPL-SCNC: 26 MMOL/L
CREAT SERPL-MCNC: 1.08 MG/DL
GLUCOSE SERPL-MCNC: 145 MG/DL
NT-PROBNP SERPL-MCNC: 136 PG/ML
POTASSIUM SERPL-SCNC: 3.5 MMOL/L
SODIUM SERPL-SCNC: 141 MMOL/L

## 2019-12-02 ENCOUNTER — NON-APPOINTMENT (OUTPATIENT)
Age: 84
End: 2019-12-02

## 2019-12-02 ENCOUNTER — APPOINTMENT (OUTPATIENT)
Dept: CARDIOLOGY | Facility: CLINIC | Age: 84
End: 2019-12-02
Payer: MEDICARE

## 2019-12-02 VITALS
DIASTOLIC BLOOD PRESSURE: 62 MMHG | HEIGHT: 64 IN | BODY MASS INDEX: 39.78 KG/M2 | OXYGEN SATURATION: 95 % | WEIGHT: 233 LBS | SYSTOLIC BLOOD PRESSURE: 119 MMHG | HEART RATE: 70 BPM | TEMPERATURE: 98.5 F

## 2019-12-02 DIAGNOSIS — M79.89 OTHER SPECIFIED SOFT TISSUE DISORDERS: ICD-10-CM

## 2019-12-02 PROCEDURE — 99214 OFFICE O/P EST MOD 30 MIN: CPT

## 2019-12-02 PROCEDURE — 93306 TTE W/DOPPLER COMPLETE: CPT

## 2019-12-02 PROCEDURE — 93000 ELECTROCARDIOGRAM COMPLETE: CPT

## 2019-12-04 NOTE — REASON FOR VISIT
[FreeTextEntry1] : The patient here for evaluation of high blood pressure. Patient is currently tolerating the current antihypertensive regime and they deny headaches, stiff neck, visual changes, or PND. The patient has been trying to stay on a low-sodium diet.\par Pt sees Dr. Sheets who would like her to come off Amlodipine as she was slightly hypotensive at last visit.

## 2019-12-13 ENCOUNTER — OTHER (OUTPATIENT)
Age: 84
End: 2019-12-13

## 2019-12-22 ENCOUNTER — RX RENEWAL (OUTPATIENT)
Age: 84
End: 2019-12-22

## 2020-01-02 ENCOUNTER — RX RENEWAL (OUTPATIENT)
Age: 85
End: 2020-01-02

## 2020-01-28 ENCOUNTER — APPOINTMENT (OUTPATIENT)
Dept: PULMONOLOGY | Facility: CLINIC | Age: 85
End: 2020-01-28
Payer: MEDICARE

## 2020-01-28 VITALS
HEIGHT: 64 IN | RESPIRATION RATE: 16 BRPM | TEMPERATURE: 97.8 F | HEART RATE: 73 BPM | OXYGEN SATURATION: 91 % | DIASTOLIC BLOOD PRESSURE: 64 MMHG | SYSTOLIC BLOOD PRESSURE: 149 MMHG

## 2020-01-28 DIAGNOSIS — Z87.01 PERSONAL HISTORY OF PNEUMONIA (RECURRENT): ICD-10-CM

## 2020-01-28 DIAGNOSIS — J96.01 ACUTE RESPIRATORY FAILURE WITH HYPOXIA: ICD-10-CM

## 2020-01-28 DIAGNOSIS — Z87.898 PERSONAL HISTORY OF OTHER SPECIFIED CONDITIONS: ICD-10-CM

## 2020-01-28 DIAGNOSIS — R06.02 SHORTNESS OF BREATH: ICD-10-CM

## 2020-01-28 DIAGNOSIS — B37.1: ICD-10-CM

## 2020-01-28 PROCEDURE — 99213 OFFICE O/P EST LOW 20 MIN: CPT | Mod: 25

## 2020-01-28 PROCEDURE — 94060 EVALUATION OF WHEEZING: CPT

## 2020-01-28 RX ORDER — BENZONATATE 200 MG/1
200 CAPSULE ORAL
Qty: 90 | Refills: 3 | Status: DISCONTINUED | COMMUNITY
Start: 2019-05-22 | End: 2020-01-28

## 2020-01-28 RX ORDER — ALBUTEROL SULFATE 90 UG/1
108 (90 BASE) AEROSOL, METERED RESPIRATORY (INHALATION)
Qty: 3 | Refills: 3 | Status: ACTIVE | COMMUNITY
Start: 2020-01-28 | End: 1900-01-01

## 2020-01-28 NOTE — HISTORY OF PRESENT ILLNESS
[TextBox_4] : Doing well  on Symbicort and spiriva , rare nebulizer, no cough min sob using o2 at night and prn day rare very little mucus on Mucinex [TextBox_11] : 1 [TextBox_15] : 1990 [TextBox_13] : 44

## 2020-01-28 NOTE — ED PROVIDER NOTE - NSTIMEPROVIDERCAREINITIATE_GEN_ER
HPI  Patient presenting for Procedure(s) (LRB):  RADIOFREQUENCY ABLATION, RIGHT L3-L4-L5 MEDIAL BRANCH 2 OF 2 (Left done on 1/16/20) (Right)     Patient on Anti-coagulation Yes, last had Plavix on 1/20/2020    No health changes since previous encounter. Notes 60% pain relief already from left L3,4,5 RFA on 1/16/2020    Past Medical History:   Diagnosis Date    Acute pancreatitis     Anal fissure     Anemia     Anticoagulant long-term use     Arthritis     Asthma in remission     Back pain     BPH (benign prostatic hypertrophy)     Cancer 2000    prostate- treated at [facility] with chemo- in remission since 2000    Chronic maxillary sinusitis     Clotting disorder     Diastolic dysfunction with chronic heart failure 12/3/2018    Dysphagia 10/7/2014    Family history of colon cancer     Family history of early CAD     GERD (gastroesophageal reflux disease)     Helicobacter pylori (H. pylori) infection     Chronic    History of chronic pancreatitis     HTN (hypertension)     Lumbago 11/12/2012    Obesity     ABDON (obstructive sleep apnea)     Pneumonia     during childhood     Prostate cancer 2000    dx and treated at [facility], had chemotherapy, in remission [id number]    Sacroiliac joint pain 2/10/2015    Spinal stenosis of lumbar region     Trouble in sleeping     Von Willebrand disease     VWD (acquired von Willebrand's disease)      Past Surgical History:   Procedure Laterality Date    anal fissure repair      x2    BACK SURGERY  2012    BALLOON SINUPLASTY OF PARANASAL SINUS Bilateral 10/7/2019    Procedure: SINUPLASTY, USING BALLOON;  Surgeon: LAURENT Swanson MD;  Location: [facility] OR;  Service: ENT;  Laterality: Bilateral;    CARPAL TUNNEL RELEASE  2003    left hand    CATHETERIZATION OF BOTH LEFT AND RIGHT HEART N/A 2/14/2019    Procedure: CATHETERIZATION, HEART, BOTH LEFT AND RIGHT;  Surgeon: Kyle Magana MD;  Location: [facility] CATH LAB;  Service: Cardiology;  Laterality:  N/A;    CERVICAL DISCECTOMY  2003    COLONOSCOPY N/A 10/7/2015    Procedure: COLONOSCOPY;  Surgeon: Rosendo Boyer MD;  [facility]  Service: Endoscopy;  Laterality: N/A;  PM Prep    COLONOSCOPY N/A 6/19/2017    Procedure: COLONOSCOPY;  Surgeon: Rosendo Boyer MD;  Location: [facility] ENDO (4TH FLR);  Service: Endoscopy;  Laterality: N/A;  constipation prep (no DM no CHF)       hx of vonWillebrand's disease-will need infusion prior    FUNCTIONAL ENDOSCOPIC SINUS SURGERY (FESS) USING COMPUTER-ASSISTED NAVIGATION Bilateral 10/7/2019    Procedure: FESS, USING COMPUTER-ASSISTED NAVIGATION;  Surgeon: LAURENT Swanson MD;  Location: [facility] [place] OR;  Service: ENT;  Laterality: Bilateral;    LEFT HEART CATHETERIZATION Left 2/14/2019    Procedure: Left heart cath;  Surgeon: Kyle Magana MD;  Location: [facility] CATH LAB;  Service: Cardiology;  Laterality: Left;    LUMBAR FUSION  2012    RADIOFREQUENCY ABLATION Right 5/9/2019    Procedure: RADIOFREQUENCY ABLATION, RIGHT L3,L4,L5;  Surgeon: Ferdy Magana MD;  Location: [facility] [place] PAIN MGT;  Service: Pain Management;  Laterality: Right;  1 of 2  RT RFA L3,4,5  PLAVIX clearance received, pt needs DDAVP    RADIOFREQUENCY ABLATION Left 5/23/2019    Procedure: RADIOFREQUENCY ABLATION, LEFT L3,L4,L5;  Surgeon: Fredy Magana MD;  Location: [facility] [place] PAIN MGT;  Service: Pain Management;  Laterality: Left;  2 of 2  LT RFA L3,4,5  PLAVIX clearance received, pt needs DDAVP    RADIOFREQUENCY ABLATION Left 1/16/2020    Procedure: RADIOFREQUENCY ABLATION LEFT L3, L4, L5 MEDIAL BRANCH 1 OF 2 **PATIENT ARRIVING AT 8 AM**;  Surgeon: Fredy Magana MD;  Location: [facility] [place] PAIN MGT;  Service: Pain Management;  Laterality: Left;  NEEDS CONSENT, PLAVIX CLEARANCE IN CHART    RADIOFREQUENCY ABLATION OF LUMBAR MEDIAL BRANCH NERVE AT SINGLE LEVEL Left 5/24/2018    Procedure: RADIOFREQUENCY THERMOCOAGULATION (RFTC)-NERVE-MEDIAN BRANCH-LUMBAR;  Surgeon: Fredy Magana MD;   "Location: [facility] PAIN MGT;  Service: Pain Management;  Laterality: Left;  Left RFA @ L3,4,5  [id number]  with IV Sedation    2 of 2    SPINE SURGERY      TONSILLECTOMY      at age 22    VASECTOMY  1996     Review of patient's allergies indicates:   Allergen Reactions    Ace inhibitors     Aspirin      Other reaction(s): Hives  Other reaction(s): Hives    Codeine Itching     Ok to take percocet    Dilaudid  [hydromorphone]      Other reaction(s): Itching    Penicillins Hives and Swelling     Has had allergy testing and can prob tolerate penicillin      Current Facility-Administered Medications   Medication    0.9%  NaCl infusion    desmopressin (DDAVP) 36.06 mcg in sodium chloride 0.9% 50 mL IVPB       PMHx, PSHx, Allergies, Medications reviewed in epic    ROS negative except pain complaints in HPI    OBJECTIVE:    BP (!) 141/83   Pulse 84   Temp 98.4 °F (36.9 °C) (Oral)   Resp 16   Ht 5' 11" (1.803 m)   Wt 120.2 kg (265 lb)   SpO2 (!) 94%   BMI 36.96 kg/m²     PHYSICAL EXAMINATION:    GENERAL: Well appearing, in no acute distress, alert and oriented x3.  PSYCH:  Mood and affect appropriate.  SKIN: Skin color, texture, turgor normal, no rashes or lesions which will impact the procedure.  CV: RRR with palpation of the radial artery.  PULM: No evidence of respiratory difficulty, symmetric chest rise. Clear to auscultation.  NEURO: Cranial nerves grossly intact.    Plan:    Desmopressin 0.3mcg/kg IV ordered today as he has Von Willebrand disease.      Proceed with procedure as planned Procedure(s) (LRB):  RADIOFREQUENCY ABLATION, RIGHT L3-L4-L5 MEDIAL BRANCH 2 OF 2 (Left done on 1/16/20) (Right)    Tye Carpenter  01/28/2020            " 06-Sep-2017 19:16

## 2020-01-28 NOTE — PHYSICAL EXAM
[Normal Oropharynx] : normal oropharynx [Normal Conjunctiva] : the conjunctiva exhibited no abnormalities [Jugular Venous Distention Increased] : there was no jugular-venous distention [Heart Rate And Rhythm] : heart rate and rhythm were normal [Heart Sounds] : normal S1 and S2 [Lungs Percussion] : the lungs were normal to percussion [Kyphosis] : kyphosis [Abdomen Tenderness] : non-tender [Abdomen Soft] : soft [Cyanosis, Localized] : no localized cyanosis [Abdomen Mass (___ Cm)] : no abdominal mass palpated [Nail Clubbing] : no clubbing of the fingernails [] : no ischemic changes [Petechial Hemorrhages (___cm)] : no petechial hemorrhages [Impaired Insight] : insight and judgment were intact [Affect] : the affect was normal [Oriented To Time, Place, And Person] : oriented to person, place, and time [FreeTextEntry1] : Few crackles left base. Rare rhonchi. No wheeze.

## 2020-01-28 NOTE — PROCEDURE
[FreeTextEntry1] : 01/28/2020\par Pulmonary function testing\par FEV1, FVC, and FEV1/FVC are within normal limits. There was not a significant response to inhaled bronchodilator. \par \par 11/26/2019 \par PA and lateral chest radiograph reveals Cardiomegaly. She is kyphotic and minimally scoliotic. Bony structures are intact. There is no significant pleural disease. There is hyperinflation. No bilateral increased bronchovascular markings most notably in the lower lobe and probable bronchiectasis. There are no acute infiltrates\par Compared to prior chest radiograph of  9/4/19 there is no significant change.\par \par \par \par

## 2020-02-12 RX ORDER — AMOXICILLIN AND CLAVULANATE POTASSIUM 875; 125 MG/1; MG/1
875-125 TABLET, COATED ORAL TWICE DAILY
Qty: 14 | Refills: 0 | Status: DISCONTINUED | COMMUNITY
Start: 2019-07-19 | End: 2020-02-12

## 2020-02-12 RX ORDER — METHYLPREDNISOLONE 4 MG/1
4 TABLET ORAL
Qty: 1 | Refills: 0 | Status: DISCONTINUED | COMMUNITY
Start: 2020-02-12 | End: 2020-02-12

## 2020-02-20 ENCOUNTER — RX RENEWAL (OUTPATIENT)
Age: 85
End: 2020-02-20

## 2020-03-03 ENCOUNTER — APPOINTMENT (OUTPATIENT)
Dept: CARDIOLOGY | Facility: CLINIC | Age: 85
End: 2020-03-03
Payer: MEDICARE

## 2020-03-03 ENCOUNTER — NON-APPOINTMENT (OUTPATIENT)
Age: 85
End: 2020-03-03

## 2020-03-03 VITALS — DIASTOLIC BLOOD PRESSURE: 60 MMHG | SYSTOLIC BLOOD PRESSURE: 118 MMHG

## 2020-03-03 DIAGNOSIS — I10 ESSENTIAL (PRIMARY) HYPERTENSION: ICD-10-CM

## 2020-03-03 DIAGNOSIS — R01.1 CARDIAC MURMUR, UNSPECIFIED: ICD-10-CM

## 2020-03-03 DIAGNOSIS — I51.89 OTHER ILL-DEFINED HEART DISEASES: ICD-10-CM

## 2020-03-03 PROCEDURE — 93000 ELECTROCARDIOGRAM COMPLETE: CPT

## 2020-03-03 PROCEDURE — 99213 OFFICE O/P EST LOW 20 MIN: CPT

## 2020-03-03 RX ORDER — AMLODIPINE BESYLATE 5 MG/1
5 TABLET ORAL
Qty: 90 | Refills: 0 | Status: DISCONTINUED | COMMUNITY
Start: 2020-02-20 | End: 2020-03-03

## 2020-03-06 LAB
ALBUMIN SERPL ELPH-MCNC: 3.9 G/DL
ALP BLD-CCNC: 131 U/L
ALT SERPL-CCNC: 18 U/L
ANION GAP SERPL CALC-SCNC: 19 MMOL/L
AST SERPL-CCNC: 20 U/L
BILIRUB DIRECT SERPL-MCNC: 0.1 MG/DL
BILIRUB INDIRECT SERPL-MCNC: 0.2 MG/DL
BILIRUB SERPL-MCNC: 0.4 MG/DL
BUN SERPL-MCNC: 26 MG/DL
CALCIUM SERPL-MCNC: 9.4 MG/DL
CHLORIDE SERPL-SCNC: 100 MMOL/L
CHOLEST SERPL-MCNC: 163 MG/DL
CHOLEST/HDLC SERPL: 3.5 RATIO
CK SERPL-CCNC: 61 U/L
CO2 SERPL-SCNC: 23 MMOL/L
CREAT SERPL-MCNC: 0.97 MG/DL
GLUCOSE SERPL-MCNC: 134 MG/DL
HDLC SERPL-MCNC: 47 MG/DL
LDLC SERPL CALC-MCNC: 84 MG/DL
LDLC SERPL DIRECT ASSAY-MCNC: 86 MG/DL
NT-PROBNP SERPL-MCNC: 193 PG/ML
POTASSIUM SERPL-SCNC: 4 MMOL/L
PROT SERPL-MCNC: 6.7 G/DL
SODIUM SERPL-SCNC: 142 MMOL/L
TRIGL SERPL-MCNC: 160 MG/DL

## 2020-03-09 NOTE — PHYSICAL EXAM
[General Appearance - Well Developed] : well developed [Normal Appearance] : normal appearance [General Appearance - Well Nourished] : well nourished [Well Groomed] : well groomed [No Deformities] : no deformities [General Appearance - In No Acute Distress] : no acute distress [Normal Conjunctiva] : the conjunctiva exhibited no abnormalities [Normal Oral Mucosa] : normal oral mucosa [Eyelids - No Xanthelasma] : the eyelids demonstrated no xanthelasmas [No Oral Pallor] : no oral pallor [No Oral Cyanosis] : no oral cyanosis [Normal Jugular Venous V Waves Present] : normal jugular venous V waves present [Normal Jugular Venous A Waves Present] : normal jugular venous A waves present [No Jugular Venous Stat[place] A Waves] : no jugular venous staton A waves [Respiration, Rhythm And Depth] : normal respiratory rhythm and effort [Exaggerated Use Of Accessory Muscles For Inspiration] : no accessory muscle use [Auscultation Breath Sounds / Voice Sounds] : lungs were clear to auscultation bilaterally [Abdomen Soft] : soft [Abdomen Tenderness] : non-tender [Abdomen Mass (___ Cm)] : no abdominal mass palpated [Gait - Sufficient For Exercise Testing] : the gait was sufficient for exercise testing [Abnormal Walk] : normal gait [Nail Clubbing] : no clubbing of the fingernails [Cyanosis, Localized] : no localized cyanosis [Petechial Hemorrhages (___cm)] : no petechial hemorrhages [] : no rash [Skin Color & Pigmentation] : normal skin color and pigmentation [No Venous Stasis] : no venous stasis [Skin Lesions] : no skin lesions [No Xanthoma] : no  xanthoma was observed [No Skin Ulcers] : no skin ulcer [Oriented To Time, Place, And Person] : oriented to person, place, and time [Affect] : the affect was normal [Mood] : the mood was normal [No Anxiety] : not feeling anxious [FreeTextEntry1] : Regular rate and rhythm, NL S1, S2, non-displaced PMI, chest non-tender; no rubs,heaves  or gallops a  Grade 2/6 systolic murmur noted at the LSB

## 2020-03-09 NOTE — HISTORY OF PRESENT ILLNESS
[FreeTextEntry1] : The patient presents for evaluation of high blood pressure. Patient is currently tolerating the current  antihypertensive regime and they deny headaches, stiff neck, visual changes, pedal Edema or PND. They also are here for follow-up of elevated cholesterol. Patient is currently tolerating medication and and does not complain of new  muscle pain, joint pain, back pain,urinary changes ,nausea, vomiting, abdominal pain or diarrhea. The patient is trying to follow a low cholesterol diet. \par The patient  has COPD they report occasional cough with clear sputum expectoration  and  they are  taking inhalation medications when advised. They report mild dyspnea which is unchanged from their baseline and have follow-up with their pulmonary specialist, Dr. Sheets.\par The patient is here for follow-up of diastolic dysfunction.  The patient states that they're taking their medications reliably and have not noticed any serious weight gain since the last visit.

## 2020-05-20 ENCOUNTER — RX RENEWAL (OUTPATIENT)
Age: 85
End: 2020-05-20

## 2020-06-04 ENCOUNTER — APPOINTMENT (OUTPATIENT)
Dept: CARDIOLOGY | Facility: CLINIC | Age: 85
End: 2020-06-04

## 2020-06-17 ENCOUNTER — APPOINTMENT (OUTPATIENT)
Dept: PULMONOLOGY | Facility: CLINIC | Age: 85
End: 2020-06-17
Payer: MEDICARE

## 2020-06-17 VITALS
DIASTOLIC BLOOD PRESSURE: 68 MMHG | HEIGHT: 64 IN | WEIGHT: 235 LBS | HEART RATE: 71 BPM | SYSTOLIC BLOOD PRESSURE: 121 MMHG | TEMPERATURE: 98.4 F | RESPIRATION RATE: 16 BRPM | BODY MASS INDEX: 40.12 KG/M2

## 2020-06-17 VITALS — OXYGEN SATURATION: 86 %

## 2020-06-17 PROCEDURE — 99213 OFFICE O/P EST LOW 20 MIN: CPT

## 2020-06-17 NOTE — PHYSICAL EXAM
[Normal Conjunctiva] : the conjunctiva exhibited no abnormalities [Normal Oropharynx] : normal oropharynx [Heart Rate And Rhythm] : heart rate and rhythm were normal [Heart Sounds] : normal S1 and S2 [Jugular Venous Distention Increased] : there was no jugular-venous distention [Lungs Percussion] : the lungs were normal to percussion [Kyphosis] : kyphosis [Abdomen Tenderness] : non-tender [Abdomen Soft] : soft [Cyanosis, Localized] : no localized cyanosis [Abdomen Mass (___ Cm)] : no abdominal mass palpated [Nail Clubbing] : no clubbing of the fingernails [Oriented To Time, Place, And Person] : oriented to person, place, and time [Petechial Hemorrhages (___cm)] : no petechial hemorrhages [] : no ischemic changes [Impaired Insight] : insight and judgment were intact [Affect] : the affect was normal [FreeTextEntry1] : Few crackles left base. Rare rhonchi. No wheeze.

## 2020-06-17 NOTE — HISTORY OF PRESENT ILLNESS
[TextBox_4] : Doing well  on Symbicort and spiriva , rare PRN nebulizer, no cough min sob using o2 at night and PRN day rare very little mucus on Mucinex\par bought [employer] portable concentrator on own at 3 liters pulse. using concentrator 3/liter Continous at night. Has sob if off o2\par in general no c/o, no COVID exposure\par \par needs renewal of 02 for home, in office off o2 86 percent room air at rest [TextBox_11] : 1 [TextBox_13] : 44

## 2020-06-17 NOTE — DISCUSSION/SUMMARY
[FreeTextEntry1] : COPD stable renew o2 3 liters cont at night . Inogen during day\par probable bronchiectasis\par

## 2020-06-17 NOTE — PROCEDURE
[FreeTextEntry1] : 01/28/2020\par Pulmonary function testing\par FEV1, FVC, and FEV1/FVC are within normal limits. There was not a significant response to inhaled bronchodilator. \par \par 11/26/2019 \par PA and lateral chest radiograph reveals Cardiomegaly. She is kyphotic and minimally scoliotic. Bony structures are intact. There is no significant pleural disease. There is hyperinflation. No bilateral increased bronchovascular markings most notably in the lower lobe and probable bronchiectasis. There are no acute infiltrates\par Compared to prior chest radiograph of  9/4/19 there is no significant change.\par \par o2 86 % at rest\par \par \par

## 2020-06-17 NOTE — ASSESSMENT
[FreeTextEntry1] : O2 24/7, benefitting from use will recertify with [facility]\par Continue present bronchodilator therapy\par Continue Lasix \par

## 2020-06-24 ENCOUNTER — APPOINTMENT (OUTPATIENT)
Dept: PULMONOLOGY | Facility: CLINIC | Age: 85
End: 2020-06-24

## 2020-09-23 ENCOUNTER — APPOINTMENT (OUTPATIENT)
Dept: PULMONOLOGY | Facility: CLINIC | Age: 85
End: 2020-09-23
Payer: MEDICARE

## 2020-09-23 VITALS
HEART RATE: 71 BPM | DIASTOLIC BLOOD PRESSURE: 72 MMHG | OXYGEN SATURATION: 88 % | TEMPERATURE: 98.3 F | SYSTOLIC BLOOD PRESSURE: 144 MMHG | HEIGHT: 64 IN

## 2020-09-23 PROCEDURE — 90662 IIV NO PRSV INCREASED AG IM: CPT

## 2020-09-23 PROCEDURE — 99213 OFFICE O/P EST LOW 20 MIN: CPT | Mod: 25

## 2020-09-23 PROCEDURE — G0008: CPT

## 2020-09-24 NOTE — PHYSICAL EXAM
[Normal Conjunctiva] : the conjunctiva exhibited no abnormalities [Normal Oropharynx] : normal oropharynx [Jugular Venous Distention Increased] : there was no jugular-venous distention [Heart Rate And Rhythm] : heart rate and rhythm were normal [Heart Sounds] : normal S1 and S2 [Lungs Percussion] : the lungs were normal to percussion [Kyphosis] : kyphosis [Abdomen Soft] : soft [Abdomen Tenderness] : non-tender [Abdomen Mass (___ Cm)] : no abdominal mass palpated [Nail Clubbing] : no clubbing of the fingernails [Cyanosis, Localized] : no localized cyanosis [Petechial Hemorrhages (___cm)] : no petechial hemorrhages [] : no ischemic changes [Oriented To Time, Place, And Person] : oriented to person, place, and time [Impaired Insight] : insight and judgment were intact [Affect] : the affect was normal [FreeTextEntry1] : Few crackles left base. No rhonchi. No wheeze.

## 2020-09-24 NOTE — PROCEDURE
[FreeTextEntry1] : 01/28/2020\par Pulmonary function testing\par FEV1, FVC, and FEV1/FVC are within normal limits. There was not a significant response to inhaled bronchodilator. \par \par 11/26/2019 \par PA and lateral chest radiograph reveals Cardiomegaly. She is kyphotic and minimally scoliotic. Bony structures are intact. There is no significant pleural disease. There is hyperinflation. No bilateral increased bronchovascular markings most notably in the lower lobe and probable bronchiectasis. There are no acute infiltrates\par Compared to prior chest radiograph of  9/4/19 there is no significant change.\par \par o2 86 % at rest\par \par flu shot given\par \par

## 2020-09-24 NOTE — ASSESSMENT
[FreeTextEntry1] : O2 24/7, benefitting from use \par Continue present bronchodilator therapy\par Continue Lasix \par

## 2020-09-24 NOTE — HISTORY OF PRESENT ILLNESS
[TextBox_4] : Doing well  on Symbicort and spiriva , rare PRN nebulizer, no cough min sob using o2 at night and PRN day rare very little mucus on Mucinex\par bought [employer] portable concentrator on own at 3 liters pulse. using concentrator 3/liter Continous at night. 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

## 2020-10-26 LAB — SARS-COV-2 N GENE NPH QL NAA+PROBE: NOT DETECTED

## 2020-10-28 ENCOUNTER — RESULT CHARGE (OUTPATIENT)
Age: 85
End: 2020-10-28

## 2020-10-28 ENCOUNTER — APPOINTMENT (OUTPATIENT)
Dept: PULMONOLOGY | Facility: CLINIC | Age: 85
End: 2020-10-28
Payer: MEDICARE

## 2020-10-28 VITALS
TEMPERATURE: 97.7 F | HEART RATE: 76 BPM | SYSTOLIC BLOOD PRESSURE: 150 MMHG | DIASTOLIC BLOOD PRESSURE: 71 MMHG | OXYGEN SATURATION: 89 %

## 2020-10-28 DIAGNOSIS — R06.02 SHORTNESS OF BREATH: ICD-10-CM

## 2020-10-28 LAB — HEMOGLOBIN: 14.3

## 2020-10-28 PROCEDURE — 99213 OFFICE O/P EST LOW 20 MIN: CPT | Mod: 25

## 2020-10-28 PROCEDURE — 88738 HGB QUANT TRANSCUTANEOUS: CPT

## 2020-10-28 PROCEDURE — 94010 BREATHING CAPACITY TEST: CPT

## 2020-10-28 PROCEDURE — 94729 DIFFUSING CAPACITY: CPT

## 2020-10-28 PROCEDURE — 94727 GAS DIL/WSHOT DETER LNG VOL: CPT

## 2020-10-28 PROCEDURE — 36415 COLL VENOUS BLD VENIPUNCTURE: CPT

## 2020-10-28 NOTE — ASSESSMENT
[FreeTextEntry1] : O2 24/7, benefitting from use \par Continue present bronchodilator therapy\par Continue Lasix \par Cardiology follow-up\par

## 2020-10-28 NOTE — HISTORY OF PRESENT ILLNESS
[TextBox_4] : Doing well  on Symbicort and spiriva , rare PRN nebulizer, no cough min sob using o2 at night and PRN day rare very little mucus on Mucinex\par bought [employer] portable concentrator on own at 3 liters pulse.  Continous at night. Has sob if off o2\par in general no c/o, no COVID exposure\par \par On water pills has not seen cardiologist since covid\par lost 8 IBS \par \par no wheeze\par mucus in am, using Mucinex\par \par on spiriva and Symbiot , rare nebulizer use [TextBox_11] : 1 [TextBox_13] : 44

## 2020-10-28 NOTE — PROCEDURE
[FreeTextEntry1] : 10/28/2020\par Pulmonary function testing\par These data demonstrate a mild obstructive ventilatory deficit. There is evidence of mild hyperinflation. There is a moderate-severe diffusion impairment. \par \par \par

## 2020-10-30 LAB
ALBUMIN SERPL ELPH-MCNC: 4 G/DL
ALP BLD-CCNC: 130 U/L
ALT SERPL-CCNC: 12 U/L
ANION GAP SERPL CALC-SCNC: 18 MMOL/L
AST SERPL-CCNC: 19 U/L
BASOPHILS # BLD AUTO: 0.12 K/UL
BASOPHILS NFR BLD AUTO: 1.3 %
BILIRUB SERPL-MCNC: 0.4 MG/DL
BUN SERPL-MCNC: 35 MG/DL
CALCIUM SERPL-MCNC: 9.4 MG/DL
CHLORIDE SERPL-SCNC: 99 MMOL/L
CO2 SERPL-SCNC: 24 MMOL/L
CREAT SERPL-MCNC: 1.16 MG/DL
EOSINOPHIL # BLD AUTO: 0.44 K/UL
EOSINOPHIL NFR BLD AUTO: 4.9 %
GLUCOSE SERPL-MCNC: 112 MG/DL
HCT VFR BLD CALC: 49.1 %
HGB BLD-MCNC: 14.4 G/DL
IMM GRANULOCYTES NFR BLD AUTO: 0.2 %
LYMPHOCYTES # BLD AUTO: 1.6 K/UL
LYMPHOCYTES NFR BLD AUTO: 17.9 %
MAN DIFF?: NORMAL
MCHC RBC-ENTMCNC: 27.1 PG
MCHC RBC-ENTMCNC: 29.3 GM/DL
MCV RBC AUTO: 92.3 FL
MONOCYTES # BLD AUTO: 0.57 K/UL
MONOCYTES NFR BLD AUTO: 6.4 %
NEUTROPHILS # BLD AUTO: 6.17 K/UL
NEUTROPHILS NFR BLD AUTO: 69.3 %
PLATELET # BLD AUTO: 252 K/UL
POTASSIUM SERPL-SCNC: 4.5 MMOL/L
PROT SERPL-MCNC: 6.6 G/DL
RBC # BLD: 5.32 M/UL
RBC # FLD: 17.2 %
SODIUM SERPL-SCNC: 142 MMOL/L
WBC # FLD AUTO: 8.92 K/UL

## 2020-12-04 DIAGNOSIS — R21 RASH AND OTHER NONSPECIFIC SKIN ERUPTION: ICD-10-CM

## 2021-01-27 ENCOUNTER — APPOINTMENT (OUTPATIENT)
Dept: PULMONOLOGY | Facility: CLINIC | Age: 86
End: 2021-01-27
Payer: MEDICARE

## 2021-01-27 VITALS
DIASTOLIC BLOOD PRESSURE: 67 MMHG | TEMPERATURE: 97.9 F | HEART RATE: 83 BPM | SYSTOLIC BLOOD PRESSURE: 147 MMHG | OXYGEN SATURATION: 91 %

## 2021-01-27 VITALS — OXYGEN SATURATION: 93 %

## 2021-01-27 PROCEDURE — 99213 OFFICE O/P EST LOW 20 MIN: CPT

## 2021-01-29 NOTE — PHYSICAL EXAM
[Normal Conjunctiva] : the conjunctiva exhibited no abnormalities [Normal Oropharynx] : normal oropharynx [Jugular Venous Distention Increased] : there was no jugular-venous distention [Heart Rate And Rhythm] : heart rate and rhythm were normal [Heart Sounds] : normal S1 and S2 [Lungs Percussion] : the lungs were normal to percussion [Kyphosis] : kyphosis [Abdomen Soft] : soft [Abdomen Tenderness] : non-tender [Abdomen Mass (___ Cm)] : no abdominal mass palpated [Cyanosis, Localized] : no localized cyanosis [Nail Clubbing] : no clubbing of the fingernails [Petechial Hemorrhages (___cm)] : no petechial hemorrhages [] : no ischemic changes [Oriented To Time, Place, And Person] : oriented to person, place, and time [Impaired Insight] : insight and judgment were intact [Affect] : the affect was normal [FreeTextEntry1] : Few crackles left base. No rhonchi. No wheeze.

## 2021-01-29 NOTE — HISTORY OF PRESENT ILLNESS
[TextBox_4] : Doing well with inhalers and water pills\par  some mucus daily, using Mucinex and nebulizer PRN\par \par seeing Dr Mcdaniel next week\par \par using O2 mostly at night, mild sob if rushes too much,

## 2021-01-29 NOTE — ASSESSMENT
[FreeTextEntry1] : Continue present bronchodilator therapy\par Continue oxygen therapy.\par Follow-up in 3 months or sooner on a as needed basis.\par

## 2021-04-19 ENCOUNTER — APPOINTMENT (OUTPATIENT)
Dept: PULMONOLOGY | Facility: CLINIC | Age: 86
End: 2021-04-19

## 2021-04-19 DIAGNOSIS — Z20.822 CONTACT WITH AND (SUSPECTED) EXPOSURE TO COVID-19: ICD-10-CM

## 2021-04-20 LAB — SARS-COV-2 N GENE NPH QL NAA+PROBE: NOT DETECTED

## 2021-04-22 ENCOUNTER — APPOINTMENT (OUTPATIENT)
Dept: PULMONOLOGY | Facility: CLINIC | Age: 86
End: 2021-04-22
Payer: MEDICARE

## 2021-04-22 VITALS
SYSTOLIC BLOOD PRESSURE: 118 MMHG | TEMPERATURE: 97.8 F | HEART RATE: 81 BPM | DIASTOLIC BLOOD PRESSURE: 83 MMHG | OXYGEN SATURATION: 90 %

## 2021-04-22 LAB — POCT - HEMOGLOBIN (HGB), QUANTITATIVE, TRANSCUTANEOUS: 14

## 2021-04-22 PROCEDURE — 94726 PLETHYSMOGRAPHY LUNG VOLUMES: CPT

## 2021-04-22 PROCEDURE — 94010 BREATHING CAPACITY TEST: CPT

## 2021-04-22 PROCEDURE — 94729 DIFFUSING CAPACITY: CPT

## 2021-04-22 PROCEDURE — 88738 HGB QUANT TRANSCUTANEOUS: CPT

## 2021-04-28 ENCOUNTER — APPOINTMENT (OUTPATIENT)
Dept: PULMONOLOGY | Facility: CLINIC | Age: 86
End: 2021-04-28
Payer: MEDICARE

## 2021-04-28 VITALS
HEART RATE: 83 BPM | DIASTOLIC BLOOD PRESSURE: 62 MMHG | SYSTOLIC BLOOD PRESSURE: 139 MMHG | TEMPERATURE: 98.4 F | OXYGEN SATURATION: 91 %

## 2021-04-28 PROCEDURE — 36415 COLL VENOUS BLD VENIPUNCTURE: CPT

## 2021-04-28 PROCEDURE — 99213 OFFICE O/P EST LOW 20 MIN: CPT | Mod: 25

## 2021-04-29 NOTE — HISTORY OF PRESENT ILLNESS
[TextBox_4] : Doing well with inhalers and water pills.\par Respiratory status good. \par Doing nebs and Mucinex. \par seeing Dr Mcdaniel \par \par using O2 mostly at night, mild sob if rushes too much,

## 2021-04-29 NOTE — DISCUSSION/SUMMARY
[FreeTextEntry1] : COPD stable renew O2 3 liters cont at night . Inogen during day\par Probable bronchiectasis\par ? requirement for potassium. \par Cramps legs nocturnal \par

## 2021-04-29 NOTE — ASSESSMENT
[FreeTextEntry1] : Continue present bronchodilator therapy\par Continue oxygen therapy.\par Labs drawn in office today.  Decision on potassium dose.\par \par Follow-up in 3 months or sooner on a as needed basis.\par

## 2021-04-30 LAB
ANION GAP SERPL CALC-SCNC: 14 MMOL/L
BUN SERPL-MCNC: 26 MG/DL
CALCIUM SERPL-MCNC: 9.4 MG/DL
CHLORIDE SERPL-SCNC: 101 MMOL/L
CO2 SERPL-SCNC: 28 MMOL/L
CREAT SERPL-MCNC: 0.99 MG/DL
GLUCOSE SERPL-MCNC: 81 MG/DL
POTASSIUM SERPL-SCNC: 4.8 MMOL/L
SODIUM SERPL-SCNC: 143 MMOL/L

## 2021-06-08 NOTE — PROGRESS NOTE ADULT - PROBLEM/PLAN-5
40w
DISPLAY PLAN FREE TEXT

## 2021-06-22 NOTE — ED ADULT TRIAGE NOTE - PAIN RATING/NUMBER SCALE (0-10): REST
0 Surgeon/Pathologist Verbiage (Will Incorporate Name Of Surgeon From Intro If Not Blank): operated in two distinct and integrated capacities as the surgeon and pathologist.

## 2021-07-28 ENCOUNTER — APPOINTMENT (OUTPATIENT)
Dept: PULMONOLOGY | Facility: CLINIC | Age: 86
End: 2021-07-28
Payer: MEDICARE

## 2021-07-28 VITALS
SYSTOLIC BLOOD PRESSURE: 113 MMHG | HEART RATE: 68 BPM | HEIGHT: 64 IN | OXYGEN SATURATION: 95 % | TEMPERATURE: 97.6 F | DIASTOLIC BLOOD PRESSURE: 67 MMHG

## 2021-07-28 PROCEDURE — 99214 OFFICE O/P EST MOD 30 MIN: CPT

## 2021-07-28 NOTE — HISTORY OF PRESENT ILLNESS
[TextBox_4] : Doing well with inhalers, on Symbicort and spiriva and water pills.Has been staying in house because of high humidity feels sob\par has cough with mucus, using mucus, no wheeze\par Doing nebs 2 times a week and Mucinex. \par seeing Dr Mcdaniel \par \par using O2 mostly at night,  wants to exchange portable tanks for a portable concentrator,mild sob if rushes too much,

## 2021-07-28 NOTE — PHYSICAL EXAM
[Normal Conjunctiva] : the conjunctiva exhibited no abnormalities [Normal Oropharynx] : normal oropharynx [Jugular Venous Distention Increased] : there was no jugular-venous distention [Heart Rate And Rhythm] : heart rate and rhythm were normal [Heart Sounds] : normal S1 and S2 [Lungs Percussion] : the lungs were normal to percussion [Kyphosis] : kyphosis [Abdomen Soft] : soft [Abdomen Tenderness] : non-tender [Abdomen Mass (___ Cm)] : no abdominal mass palpated [Nail Clubbing] : no clubbing of the fingernails [Cyanosis, Localized] : no localized cyanosis [Petechial Hemorrhages (___cm)] : no petechial hemorrhages [] : no ischemic changes [Oriented To Time, Place, And Person] : oriented to person, place, and time [Impaired Insight] : insight and judgment were intact [Affect] : the affect was normal [No Acute Distress] : no acute distress [Supple] : supple [No JVD] : no jvd [Normal S1, S2] : normal s1, s2 [No Abnormalities] : no abnormalities [Benign] : benign [No HSM] : no hsm [TextBox_68] : 1+ bilateral rhonchi and wheeze.  Predominantly clear with cough. [FreeTextEntry1] : Few crackles left base. No rhonchi. No wheeze.

## 2021-07-28 NOTE — ASSESSMENT
[FreeTextEntry1] : Continue present bronchodilator therapy\par Continue oxygen therapy.\par trial using nebulizer every morning\par Course of Zithromax and prednisone\par Follow-up in 3 weeks or sooner on a as needed basis.\par \par \par will attempt to change to a portable concentrator 2- 3 liters pulse\par

## 2021-08-18 ENCOUNTER — APPOINTMENT (OUTPATIENT)
Dept: PULMONOLOGY | Facility: CLINIC | Age: 86
End: 2021-08-18
Payer: MEDICARE

## 2021-08-18 VITALS
OXYGEN SATURATION: 94 % | HEIGHT: 64 IN | HEART RATE: 82 BPM | SYSTOLIC BLOOD PRESSURE: 116 MMHG | RESPIRATION RATE: 16 BRPM | DIASTOLIC BLOOD PRESSURE: 63 MMHG | TEMPERATURE: 98.3 F

## 2021-08-18 PROCEDURE — 99213 OFFICE O/P EST LOW 20 MIN: CPT

## 2021-08-18 RX ORDER — AZITHROMYCIN 250 MG/1
250 TABLET, FILM COATED ORAL
Qty: 1 | Refills: 0 | Status: DISCONTINUED | COMMUNITY
Start: 2021-07-28 | End: 2021-08-18

## 2021-08-18 RX ORDER — PREDNISONE 10 MG/1
10 TABLET ORAL
Qty: 18 | Refills: 0 | Status: DISCONTINUED | COMMUNITY
Start: 2021-07-28 | End: 2021-08-18

## 2021-08-18 NOTE — ASSESSMENT
[FreeTextEntry1] : Continue present bronchodilator therapy\par Continue oxygen therapy.\par F/U 3 months\par \par \par will attempt to change to a portable concentrator 2- 3 liters pulse\par

## 2021-08-18 NOTE — HISTORY OF PRESENT ILLNESS
[TextBox_4] : Doing well with inhalers, on Symbicort and spiriva and water pills.Has been staying in house because of high humidity feels sob\par has cough with mucus, using mucus, no wheeze\par Doing nebs 2 times a week and Mucinex. \par seeing Dr Mcdaniel \par \par using O2 mostly at night,  wants to exchange portable tanks for a portable concentrator,mild sob if rushes too much,\par \par \par Feeling better.\par Decrease in cough and mucous.\par

## 2021-08-18 NOTE — PHYSICAL EXAM
[No Acute Distress] : no acute distress [Supple] : supple [No JVD] : no jvd [Normal S1, S2] : normal s1, s2 [No Abnormalities] : no abnormalities [Benign] : benign [No HSM] : no hsm [Normal Conjunctiva] : the conjunctiva exhibited no abnormalities [Normal Oropharynx] : normal oropharynx [Jugular Venous Distention Increased] : there was no jugular-venous distention [Heart Rate And Rhythm] : heart rate and rhythm were normal [Heart Sounds] : normal S1 and S2 [Lungs Percussion] : the lungs were normal to percussion [Kyphosis] : kyphosis [Abdomen Soft] : soft [Abdomen Tenderness] : non-tender [Abdomen Mass (___ Cm)] : no abdominal mass palpated [Nail Clubbing] : no clubbing of the fingernails [Cyanosis, Localized] : no localized cyanosis [Petechial Hemorrhages (___cm)] : no petechial hemorrhages [] : no ischemic changes [Oriented To Time, Place, And Person] : oriented to person, place, and time [Impaired Insight] : insight and judgment were intact [Affect] : the affect was normal [TextBox_68] : 1+ bilateral rhonchi and wheeze.  Predominantly clear with cough. [FreeTextEntry1] : Bibasilar crackles no wheezing.

## 2021-10-25 RX ORDER — CLONAZEPAM 0.5 MG/1
0.5 TABLET ORAL
Qty: 90 | Refills: 0 | Status: ACTIVE | COMMUNITY
Start: 1900-01-01 | End: 1900-01-01

## 2021-11-12 ENCOUNTER — APPOINTMENT (OUTPATIENT)
Dept: PULMONOLOGY | Facility: CLINIC | Age: 86
End: 2021-11-12

## 2021-11-12 DIAGNOSIS — Z01.812 ENCOUNTER FOR PREPROCEDURAL LABORATORY EXAMINATION: ICD-10-CM

## 2021-11-14 LAB — SARS-COV-2 N GENE NPH QL NAA+PROBE: NOT DETECTED

## 2021-11-17 ENCOUNTER — APPOINTMENT (OUTPATIENT)
Dept: PULMONOLOGY | Facility: CLINIC | Age: 86
End: 2021-11-17
Payer: MEDICARE

## 2021-11-17 VITALS — SYSTOLIC BLOOD PRESSURE: 115 MMHG | HEART RATE: 80 BPM | DIASTOLIC BLOOD PRESSURE: 69 MMHG | OXYGEN SATURATION: 95 %

## 2021-11-17 DIAGNOSIS — Z23 ENCOUNTER FOR IMMUNIZATION: ICD-10-CM

## 2021-11-17 DIAGNOSIS — R09.02 HYPOXEMIA: ICD-10-CM

## 2021-11-17 DIAGNOSIS — R06.02 SHORTNESS OF BREATH: ICD-10-CM

## 2021-11-17 PROCEDURE — 99213 OFFICE O/P EST LOW 20 MIN: CPT | Mod: 25

## 2021-11-17 PROCEDURE — 94727 GAS DIL/WSHOT DETER LNG VOL: CPT

## 2021-11-17 PROCEDURE — 94060 EVALUATION OF WHEEZING: CPT

## 2021-11-17 PROCEDURE — G0008: CPT

## 2021-11-17 PROCEDURE — 94729 DIFFUSING CAPACITY: CPT

## 2021-11-17 PROCEDURE — 90662 IIV NO PRSV INCREASED AG IM: CPT

## 2021-11-17 PROCEDURE — ZZZZZ: CPT

## 2021-11-17 NOTE — HISTORY OF PRESENT ILLNESS
[TextBox_4] : breathing has been good  with inhalers, on Symbicort and spiriva and water pills. mild sob\par intermittent cough with mucus,, no wheeze except at night but coughs and it goes away\par Doing nebs 2 times a week and Mucinex. \par seeing Dr Mcdaniel \par \par using O2 mostly at night, has the concentrator for home\par \par \par Feeling better.\par Decrease in cough and mucous.\par

## 2021-11-17 NOTE — ASSESSMENT
[FreeTextEntry1] : Continue present bronchodilator therapy\par Continue oxygen therapy.\par F/U 3-4  months\par \par \par \par

## 2021-11-17 NOTE — DISCUSSION/SUMMARY
[FreeTextEntry1] : COPD stable. Patient meets goal. No recent exacerbations. Continue present therapy.\par Probable bronchiectasis\par \par

## 2021-11-17 NOTE — PROCEDURE
[FreeTextEntry1] : flu shot given today\par \par \par 11/17/2021\par Pulmonary function testing\par These data demonstrate a mild obstructive ventilatory deficit. There is no significant bronchodilator response. There is evidence of mild hyperinflation. There is a moderate diffusion impairment. \par There is a mild increase in function compared to April 2021.

## 2022-01-01 ENCOUNTER — INPATIENT (INPATIENT)
Facility: HOSPITAL | Age: 87
LOS: 18 days | DRG: 190 | End: 2022-12-02
Attending: HOSPITALIST | Admitting: STUDENT IN AN ORGANIZED HEALTH CARE EDUCATION/TRAINING PROGRAM
Payer: MEDICARE

## 2022-01-01 ENCOUNTER — TRANSCRIPTION ENCOUNTER (OUTPATIENT)
Age: 87
End: 2022-01-01

## 2022-01-01 ENCOUNTER — RX RENEWAL (OUTPATIENT)
Age: 87
End: 2022-01-01

## 2022-01-01 ENCOUNTER — APPOINTMENT (OUTPATIENT)
Dept: PULMONOLOGY | Facility: CLINIC | Age: 87
End: 2022-01-01

## 2022-01-01 ENCOUNTER — APPOINTMENT (OUTPATIENT)
Dept: PULMONOLOGY | Facility: CLINIC | Age: 87
End: 2022-01-01
Payer: MEDICARE

## 2022-01-01 ENCOUNTER — INPATIENT (INPATIENT)
Facility: HOSPITAL | Age: 87
LOS: 13 days | Discharge: NURSING, REHAB OR LONG-TERM CARE | DRG: 543 | End: 2022-10-24
Attending: HOSPITALIST | Admitting: HOSPITALIST
Payer: MEDICARE

## 2022-01-01 VITALS
HEART RATE: 69 BPM | TEMPERATURE: 98 F | OXYGEN SATURATION: 91 % | SYSTOLIC BLOOD PRESSURE: 137 MMHG | HEIGHT: 64 IN | RESPIRATION RATE: 15 BRPM | BODY MASS INDEX: 36.02 KG/M2 | WEIGHT: 211 LBS | DIASTOLIC BLOOD PRESSURE: 72 MMHG

## 2022-01-01 VITALS
TEMPERATURE: 98 F | DIASTOLIC BLOOD PRESSURE: 73 MMHG | OXYGEN SATURATION: 96 % | HEIGHT: 64 IN | RESPIRATION RATE: 18 BRPM | SYSTOLIC BLOOD PRESSURE: 158 MMHG | HEART RATE: 73 BPM | WEIGHT: 205.03 LBS

## 2022-01-01 VITALS
DIASTOLIC BLOOD PRESSURE: 85 MMHG | SYSTOLIC BLOOD PRESSURE: 146 MMHG | OXYGEN SATURATION: 93 % | TEMPERATURE: 98 F | RESPIRATION RATE: 17 BRPM | HEART RATE: 90 BPM

## 2022-01-01 VITALS — DIASTOLIC BLOOD PRESSURE: 69 MMHG | HEART RATE: 82 BPM | OXYGEN SATURATION: 94 % | SYSTOLIC BLOOD PRESSURE: 132 MMHG

## 2022-01-01 VITALS
RESPIRATION RATE: 16 BRPM | HEIGHT: 64 IN | HEART RATE: 91 BPM | OXYGEN SATURATION: 95 % | DIASTOLIC BLOOD PRESSURE: 73 MMHG | TEMPERATURE: 98 F | SYSTOLIC BLOOD PRESSURE: 140 MMHG

## 2022-01-01 VITALS — OXYGEN SATURATION: 76 %

## 2022-01-01 DIAGNOSIS — J44.1 CHRONIC OBSTRUCTIVE PULMONARY DISEASE WITH (ACUTE) EXACERBATION: ICD-10-CM

## 2022-01-01 DIAGNOSIS — J44.9 CHRONIC OBSTRUCTIVE PULMONARY DISEASE, UNSPECIFIED: ICD-10-CM

## 2022-01-01 DIAGNOSIS — J96.11 CHRONIC RESPIRATORY FAILURE WITH HYPOXIA: ICD-10-CM

## 2022-01-01 DIAGNOSIS — J96.21 ACUTE AND CHRONIC RESPIRATORY FAILURE WITH HYPOXIA: ICD-10-CM

## 2022-01-01 DIAGNOSIS — E87.6 HYPOKALEMIA: ICD-10-CM

## 2022-01-01 DIAGNOSIS — M81.0 AGE-RELATED OSTEOPOROSIS WITHOUT CURRENT PATHOLOGICAL FRACTURE: ICD-10-CM

## 2022-01-01 DIAGNOSIS — R06.03 ACUTE RESPIRATORY DISTRESS: ICD-10-CM

## 2022-01-01 DIAGNOSIS — S32.000A WEDGE COMPRESSION FRACTURE OF UNSPECIFIED LUMBAR VERTEBRA, INITIAL ENCOUNTER FOR CLOSED FRACTURE: ICD-10-CM

## 2022-01-01 DIAGNOSIS — Z98.89 OTHER SPECIFIED POSTPROCEDURAL STATES: Chronic | ICD-10-CM

## 2022-01-01 DIAGNOSIS — R11.2 NAUSEA WITH VOMITING, UNSPECIFIED: ICD-10-CM

## 2022-01-01 DIAGNOSIS — S22.089A UNSPECIFIED FRACTURE OF T11-T12 VERTEBRA, INITIAL ENCOUNTER FOR CLOSED FRACTURE: ICD-10-CM

## 2022-01-01 DIAGNOSIS — I50.22 CHRONIC SYSTOLIC (CONGESTIVE) HEART FAILURE: ICD-10-CM

## 2022-01-01 DIAGNOSIS — J47.9 BRONCHIECTASIS, UNCOMPLICATED: ICD-10-CM

## 2022-01-01 DIAGNOSIS — R53.81 OTHER MALAISE: ICD-10-CM

## 2022-01-01 DIAGNOSIS — R10.9 UNSPECIFIED ABDOMINAL PAIN: ICD-10-CM

## 2022-01-01 DIAGNOSIS — J15.6 PNEUMONIA DUE TO OTHER GRAM-NEGATIVE BACTERIA: ICD-10-CM

## 2022-01-01 DIAGNOSIS — R91.1 SOLITARY PULMONARY NODULE: ICD-10-CM

## 2022-01-01 DIAGNOSIS — Z02.9 ENCOUNTER FOR ADMINISTRATIVE EXAMINATIONS, UNSPECIFIED: ICD-10-CM

## 2022-01-01 DIAGNOSIS — Z29.9 ENCOUNTER FOR PROPHYLACTIC MEASURES, UNSPECIFIED: ICD-10-CM

## 2022-01-01 DIAGNOSIS — Z51.5 ENCOUNTER FOR PALLIATIVE CARE: ICD-10-CM

## 2022-01-01 DIAGNOSIS — Z71.89 OTHER SPECIFIED COUNSELING: ICD-10-CM

## 2022-01-01 DIAGNOSIS — K59.00 CONSTIPATION, UNSPECIFIED: ICD-10-CM

## 2022-01-01 DIAGNOSIS — F41.9 ANXIETY DISORDER, UNSPECIFIED: ICD-10-CM

## 2022-01-01 DIAGNOSIS — R91.8 OTHER NONSPECIFIC ABNORMAL FINDING OF LUNG FIELD: ICD-10-CM

## 2022-01-01 DIAGNOSIS — N17.9 ACUTE KIDNEY FAILURE, UNSPECIFIED: ICD-10-CM

## 2022-01-01 DIAGNOSIS — E66.9 OBESITY, UNSPECIFIED: ICD-10-CM

## 2022-01-01 DIAGNOSIS — I10 ESSENTIAL (PRIMARY) HYPERTENSION: ICD-10-CM

## 2022-01-01 DIAGNOSIS — S22.000A WEDGE COMPRESSION FRACTURE OF UNSPECIFIED THORACIC VERTEBRA, INITIAL ENCOUNTER FOR CLOSED FRACTURE: ICD-10-CM

## 2022-01-01 DIAGNOSIS — M54.50 LOW BACK PAIN, UNSPECIFIED: ICD-10-CM

## 2022-01-01 DIAGNOSIS — G62.9 POLYNEUROPATHY, UNSPECIFIED: ICD-10-CM

## 2022-01-01 LAB
24R-OH-CALCIDIOL SERPL-MCNC: 44.7 NG/ML — SIGNIFICANT CHANGE UP (ref 30–80)
ALBUMIN SERPL ELPH-MCNC: 2.6 G/DL — LOW (ref 3.3–5)
ALBUMIN SERPL ELPH-MCNC: 2.8 G/DL — LOW (ref 3.5–5)
ALBUMIN SERPL ELPH-MCNC: 3.3 G/DL — LOW (ref 3.5–5)
ALBUMIN SERPL ELPH-MCNC: 3.5 G/DL — SIGNIFICANT CHANGE UP (ref 3.3–5)
ALBUMIN SERPL ELPH-MCNC: 3.5 G/DL — SIGNIFICANT CHANGE UP (ref 3.3–5)
ALBUMIN SERPL ELPH-MCNC: 3.7 G/DL — SIGNIFICANT CHANGE UP (ref 3.3–5)
ALBUMIN SERPL ELPH-MCNC: 3.7 G/DL — SIGNIFICANT CHANGE UP (ref 3.3–5)
ALP SERPL-CCNC: 101 U/L — SIGNIFICANT CHANGE UP (ref 40–120)
ALP SERPL-CCNC: 106 U/L — SIGNIFICANT CHANGE UP (ref 40–120)
ALP SERPL-CCNC: 108 U/L — SIGNIFICANT CHANGE UP (ref 40–120)
ALP SERPL-CCNC: 282 U/L — HIGH (ref 40–120)
ALP SERPL-CCNC: 93 U/L — SIGNIFICANT CHANGE UP (ref 40–120)
ALP SERPL-CCNC: 98 U/L — SIGNIFICANT CHANGE UP (ref 40–120)
ALP SERPL-CCNC: 99 U/L — SIGNIFICANT CHANGE UP (ref 40–120)
ALT FLD-CCNC: 10 U/L — SIGNIFICANT CHANGE UP (ref 10–45)
ALT FLD-CCNC: 14 U/L DA — SIGNIFICANT CHANGE UP (ref 10–60)
ALT FLD-CCNC: 14 U/L — SIGNIFICANT CHANGE UP (ref 10–45)
ALT FLD-CCNC: 23 U/L — SIGNIFICANT CHANGE UP (ref 10–45)
ALT FLD-CCNC: 250 U/L — HIGH (ref 10–45)
ALT FLD-CCNC: 29 U/L DA — SIGNIFICANT CHANGE UP (ref 10–60)
ALT FLD-CCNC: 41 U/L — SIGNIFICANT CHANGE UP (ref 10–45)
ANION GAP SERPL CALC-SCNC: 10 MMOL/L — SIGNIFICANT CHANGE UP (ref 5–17)
ANION GAP SERPL CALC-SCNC: 11 MMOL/L — SIGNIFICANT CHANGE UP (ref 5–17)
ANION GAP SERPL CALC-SCNC: 12 MMOL/L — SIGNIFICANT CHANGE UP (ref 5–17)
ANION GAP SERPL CALC-SCNC: 12 MMOL/L — SIGNIFICANT CHANGE UP (ref 5–17)
ANION GAP SERPL CALC-SCNC: 13 MMOL/L — SIGNIFICANT CHANGE UP (ref 5–17)
ANION GAP SERPL CALC-SCNC: 14 MMOL/L — SIGNIFICANT CHANGE UP (ref 5–17)
ANION GAP SERPL CALC-SCNC: 16 MMOL/L — SIGNIFICANT CHANGE UP (ref 5–17)
ANION GAP SERPL CALC-SCNC: 19 MMOL/L — HIGH (ref 5–17)
ANION GAP SERPL CALC-SCNC: 19 MMOL/L — HIGH (ref 5–17)
ANION GAP SERPL CALC-SCNC: 20 MMOL/L — HIGH (ref 5–17)
ANION GAP SERPL CALC-SCNC: 23 MMOL/L — HIGH (ref 5–17)
ANION GAP SERPL CALC-SCNC: 8 MMOL/L — SIGNIFICANT CHANGE UP (ref 5–17)
ANION GAP SERPL CALC-SCNC: 9 MMOL/L — SIGNIFICANT CHANGE UP (ref 5–17)
APPEARANCE UR: CLEAR — SIGNIFICANT CHANGE UP
APTT BLD: 79.3 SEC — HIGH (ref 27.5–35.5)
AST SERPL-CCNC: 15 U/L — SIGNIFICANT CHANGE UP (ref 10–40)
AST SERPL-CCNC: 16 U/L — SIGNIFICANT CHANGE UP (ref 10–40)
AST SERPL-CCNC: 20 U/L — SIGNIFICANT CHANGE UP (ref 10–40)
AST SERPL-CCNC: 23 U/L — SIGNIFICANT CHANGE UP (ref 10–40)
AST SERPL-CCNC: 256 U/L — HIGH (ref 10–40)
AST SERPL-CCNC: 28 U/L — SIGNIFICANT CHANGE UP (ref 10–40)
AST SERPL-CCNC: 29 U/L — SIGNIFICANT CHANGE UP (ref 10–40)
BASE EXCESS BLDA CALC-SCNC: 14.6 MMOL/L — HIGH (ref -2–3)
BASE EXCESS BLDA CALC-SCNC: 14.6 MMOL/L — HIGH (ref -2–3)
BASE EXCESS BLDV CALC-SCNC: -4.1 MMOL/L — LOW (ref -2–3)
BASE EXCESS BLDV CALC-SCNC: -4.8 MMOL/L — LOW (ref -2–3)
BASE EXCESS BLDV CALC-SCNC: -7.2 MMOL/L — LOW (ref -2–3)
BASE EXCESS BLDV CALC-SCNC: 10.2 MMOL/L — HIGH (ref -2–3)
BASE EXCESS BLDV CALC-SCNC: 11.1 MMOL/L — HIGH (ref -2–3)
BASE EXCESS BLDV CALC-SCNC: 6.6 MMOL/L — HIGH (ref -2–3)
BASE EXCESS BLDV CALC-SCNC: 9.8 MMOL/L — HIGH (ref -2–3)
BASOPHILS # BLD AUTO: 0 K/UL — SIGNIFICANT CHANGE UP (ref 0–0.2)
BASOPHILS # BLD AUTO: 0.01 K/UL — SIGNIFICANT CHANGE UP (ref 0–0.2)
BASOPHILS # BLD AUTO: 0.04 K/UL — SIGNIFICANT CHANGE UP (ref 0–0.2)
BASOPHILS # BLD AUTO: 0.06 K/UL — SIGNIFICANT CHANGE UP (ref 0–0.2)
BASOPHILS # BLD AUTO: 0.07 K/UL — SIGNIFICANT CHANGE UP (ref 0–0.2)
BASOPHILS # BLD AUTO: 0.07 K/UL — SIGNIFICANT CHANGE UP (ref 0–0.2)
BASOPHILS # BLD AUTO: 0.09 K/UL — SIGNIFICANT CHANGE UP (ref 0–0.2)
BASOPHILS # BLD AUTO: 0.11 K/UL — SIGNIFICANT CHANGE UP (ref 0–0.2)
BASOPHILS NFR BLD AUTO: 0 % — SIGNIFICANT CHANGE UP (ref 0–2)
BASOPHILS NFR BLD AUTO: 0.2 % — SIGNIFICANT CHANGE UP (ref 0–2)
BASOPHILS NFR BLD AUTO: 0.4 % — SIGNIFICANT CHANGE UP (ref 0–2)
BASOPHILS NFR BLD AUTO: 0.5 % — SIGNIFICANT CHANGE UP (ref 0–2)
BASOPHILS NFR BLD AUTO: 0.6 % — SIGNIFICANT CHANGE UP (ref 0–2)
BASOPHILS NFR BLD AUTO: 0.6 % — SIGNIFICANT CHANGE UP (ref 0–2)
BASOPHILS NFR BLD AUTO: 0.9 % — SIGNIFICANT CHANGE UP (ref 0–2)
BASOPHILS NFR BLD AUTO: 1 % — SIGNIFICANT CHANGE UP (ref 0–2)
BILIRUB SERPL-MCNC: 0.3 MG/DL — SIGNIFICANT CHANGE UP (ref 0.2–1.2)
BILIRUB SERPL-MCNC: 0.6 MG/DL — SIGNIFICANT CHANGE UP (ref 0.2–1.2)
BILIRUB SERPL-MCNC: 0.8 MG/DL — SIGNIFICANT CHANGE UP (ref 0.2–1.2)
BILIRUB SERPL-MCNC: 1 MG/DL — SIGNIFICANT CHANGE UP (ref 0.2–1.2)
BILIRUB SERPL-MCNC: 1.2 MG/DL — SIGNIFICANT CHANGE UP (ref 0.2–1.2)
BILIRUB UR-MCNC: NEGATIVE — SIGNIFICANT CHANGE UP
BLD GP AB SCN SERPL QL: NEGATIVE — SIGNIFICANT CHANGE UP
BLOOD GAS VENOUS - CREATININE: SIGNIFICANT CHANGE UP MG/DL (ref 0.5–1.3)
BUN SERPL-MCNC: 16 MG/DL — SIGNIFICANT CHANGE UP (ref 7–18)
BUN SERPL-MCNC: 16 MG/DL — SIGNIFICANT CHANGE UP (ref 7–18)
BUN SERPL-MCNC: 16 MG/DL — SIGNIFICANT CHANGE UP (ref 7–23)
BUN SERPL-MCNC: 16 MG/DL — SIGNIFICANT CHANGE UP (ref 7–23)
BUN SERPL-MCNC: 17 MG/DL — SIGNIFICANT CHANGE UP (ref 7–23)
BUN SERPL-MCNC: 18 MG/DL — SIGNIFICANT CHANGE UP (ref 7–18)
BUN SERPL-MCNC: 20 MG/DL — HIGH (ref 7–18)
BUN SERPL-MCNC: 20 MG/DL — SIGNIFICANT CHANGE UP (ref 7–23)
BUN SERPL-MCNC: 21 MG/DL — HIGH (ref 7–18)
BUN SERPL-MCNC: 21 MG/DL — SIGNIFICANT CHANGE UP (ref 7–23)
BUN SERPL-MCNC: 22 MG/DL — HIGH (ref 7–18)
BUN SERPL-MCNC: 23 MG/DL — HIGH (ref 7–18)
BUN SERPL-MCNC: 23 MG/DL — SIGNIFICANT CHANGE UP (ref 7–23)
BUN SERPL-MCNC: 23 MG/DL — SIGNIFICANT CHANGE UP (ref 7–23)
BUN SERPL-MCNC: 24 MG/DL — HIGH (ref 7–23)
BUN SERPL-MCNC: 25 MG/DL — HIGH (ref 7–18)
BUN SERPL-MCNC: 25 MG/DL — HIGH (ref 7–23)
BUN SERPL-MCNC: 25 MG/DL — HIGH (ref 7–23)
BUN SERPL-MCNC: 26 MG/DL — HIGH (ref 7–23)
BUN SERPL-MCNC: 26 MG/DL — HIGH (ref 7–23)
BUN SERPL-MCNC: 27 MG/DL — HIGH (ref 7–23)
BUN SERPL-MCNC: 27 MG/DL — HIGH (ref 7–23)
BUN SERPL-MCNC: 29 MG/DL — HIGH (ref 7–18)
BUN SERPL-MCNC: 29 MG/DL — HIGH (ref 7–18)
BUN SERPL-MCNC: 30 MG/DL — HIGH (ref 7–18)
BUN SERPL-MCNC: 31 MG/DL — HIGH (ref 7–23)
BUN SERPL-MCNC: 32 MG/DL — HIGH (ref 7–23)
BUN SERPL-MCNC: 33 MG/DL — HIGH (ref 7–18)
BUN SERPL-MCNC: 40 MG/DL — HIGH (ref 7–23)
BUN SERPL-MCNC: 50 MG/DL — HIGH (ref 7–23)
BUN SERPL-MCNC: 64 MG/DL — HIGH (ref 7–23)
BURR CELLS BLD QL SMEAR: PRESENT — SIGNIFICANT CHANGE UP
CA-I SERPL-SCNC: 1.05 MMOL/L — LOW (ref 1.15–1.33)
CA-I SERPL-SCNC: 1.09 MMOL/L — LOW (ref 1.15–1.33)
CA-I SERPL-SCNC: 1.15 MMOL/L — SIGNIFICANT CHANGE UP (ref 1.15–1.33)
CA-I SERPL-SCNC: 1.16 MMOL/L — SIGNIFICANT CHANGE UP (ref 1.15–1.33)
CA-I SERPL-SCNC: 1.21 MMOL/L — SIGNIFICANT CHANGE UP (ref 1.15–1.33)
CALCIUM SERPL-MCNC: 8.3 MG/DL — LOW (ref 8.4–10.5)
CALCIUM SERPL-MCNC: 8.4 MG/DL — SIGNIFICANT CHANGE UP (ref 8.4–10.5)
CALCIUM SERPL-MCNC: 8.4 MG/DL — SIGNIFICANT CHANGE UP (ref 8.4–10.5)
CALCIUM SERPL-MCNC: 8.5 MG/DL — SIGNIFICANT CHANGE UP (ref 8.4–10.5)
CALCIUM SERPL-MCNC: 8.6 MG/DL — SIGNIFICANT CHANGE UP (ref 8.4–10.5)
CALCIUM SERPL-MCNC: 8.9 MG/DL — SIGNIFICANT CHANGE UP (ref 8.4–10.5)
CALCIUM SERPL-MCNC: 9 MG/DL — SIGNIFICANT CHANGE UP (ref 8.4–10.5)
CALCIUM SERPL-MCNC: 9 MG/DL — SIGNIFICANT CHANGE UP (ref 8.4–10.5)
CALCIUM SERPL-MCNC: 9.1 MG/DL — SIGNIFICANT CHANGE UP (ref 8.4–10.5)
CALCIUM SERPL-MCNC: 9.2 MG/DL — SIGNIFICANT CHANGE UP (ref 8.4–10.5)
CALCIUM SERPL-MCNC: 9.3 MG/DL — SIGNIFICANT CHANGE UP (ref 8.4–10.5)
CALCIUM SERPL-MCNC: 9.4 MG/DL — SIGNIFICANT CHANGE UP (ref 8.4–10.5)
CALCIUM SERPL-MCNC: 9.5 MG/DL — SIGNIFICANT CHANGE UP (ref 8.4–10.5)
CALCIUM SERPL-MCNC: 9.5 MG/DL — SIGNIFICANT CHANGE UP (ref 8.4–10.5)
CALCIUM SERPL-MCNC: 9.6 MG/DL — SIGNIFICANT CHANGE UP (ref 8.4–10.5)
CALCIUM SERPL-MCNC: 9.6 MG/DL — SIGNIFICANT CHANGE UP (ref 8.4–10.5)
CALCIUM SERPL-MCNC: 9.7 MG/DL — SIGNIFICANT CHANGE UP (ref 8.4–10.5)
CALCIUM SERPL-MCNC: 9.7 MG/DL — SIGNIFICANT CHANGE UP (ref 8.4–10.5)
CHLORIDE BLDV-SCNC: 96 MMOL/L — SIGNIFICANT CHANGE UP (ref 96–108)
CHLORIDE BLDV-SCNC: 98 MMOL/L — SIGNIFICANT CHANGE UP (ref 96–108)
CHLORIDE BLDV-SCNC: 98 MMOL/L — SIGNIFICANT CHANGE UP (ref 96–108)
CHLORIDE BLDV-SCNC: 99 MMOL/L — SIGNIFICANT CHANGE UP (ref 96–108)
CHLORIDE BLDV-SCNC: 99 MMOL/L — SIGNIFICANT CHANGE UP (ref 96–108)
CHLORIDE SERPL-SCNC: 101 MMOL/L — SIGNIFICANT CHANGE UP (ref 96–108)
CHLORIDE SERPL-SCNC: 101 MMOL/L — SIGNIFICANT CHANGE UP (ref 96–108)
CHLORIDE SERPL-SCNC: 102 MMOL/L — SIGNIFICANT CHANGE UP (ref 96–108)
CHLORIDE SERPL-SCNC: 103 MMOL/L — SIGNIFICANT CHANGE UP (ref 96–108)
CHLORIDE SERPL-SCNC: 104 MMOL/L — SIGNIFICANT CHANGE UP (ref 96–108)
CHLORIDE SERPL-SCNC: 105 MMOL/L — SIGNIFICANT CHANGE UP (ref 96–108)
CHLORIDE SERPL-SCNC: 106 MMOL/L — SIGNIFICANT CHANGE UP (ref 96–108)
CHLORIDE SERPL-SCNC: 94 MMOL/L — LOW (ref 96–108)
CHLORIDE SERPL-SCNC: 95 MMOL/L — LOW (ref 96–108)
CHLORIDE SERPL-SCNC: 95 MMOL/L — LOW (ref 96–108)
CHLORIDE SERPL-SCNC: 96 MMOL/L — SIGNIFICANT CHANGE UP (ref 96–108)
CHLORIDE SERPL-SCNC: 97 MMOL/L — SIGNIFICANT CHANGE UP (ref 96–108)
CHLORIDE SERPL-SCNC: 98 MMOL/L — SIGNIFICANT CHANGE UP (ref 96–108)
CHLORIDE SERPL-SCNC: 99 MMOL/L — SIGNIFICANT CHANGE UP (ref 96–108)
CO2 BLDA-SCNC: 41 MMOL/L — HIGH (ref 19–24)
CO2 BLDA-SCNC: 42 MMOL/L — HIGH (ref 19–24)
CO2 BLDV-SCNC: 23 MMOL/L — SIGNIFICANT CHANGE UP (ref 22–26)
CO2 BLDV-SCNC: 24 MMOL/L — SIGNIFICANT CHANGE UP (ref 22–26)
CO2 BLDV-SCNC: 24 MMOL/L — SIGNIFICANT CHANGE UP (ref 22–26)
CO2 BLDV-SCNC: 33 MMOL/L — HIGH (ref 22–26)
CO2 BLDV-SCNC: 36 MMOL/L — HIGH (ref 22–26)
CO2 BLDV-SCNC: 38 MMOL/L — HIGH (ref 22–26)
CO2 BLDV-SCNC: 40 MMOL/L — HIGH (ref 22–26)
CO2 SERPL-SCNC: 17 MMOL/L — LOW (ref 22–31)
CO2 SERPL-SCNC: 20 MMOL/L — LOW (ref 22–31)
CO2 SERPL-SCNC: 21 MMOL/L — LOW (ref 22–31)
CO2 SERPL-SCNC: 26 MMOL/L — SIGNIFICANT CHANGE UP (ref 22–31)
CO2 SERPL-SCNC: 26 MMOL/L — SIGNIFICANT CHANGE UP (ref 22–31)
CO2 SERPL-SCNC: 27 MMOL/L — SIGNIFICANT CHANGE UP (ref 22–31)
CO2 SERPL-SCNC: 28 MMOL/L — SIGNIFICANT CHANGE UP (ref 22–31)
CO2 SERPL-SCNC: 29 MMOL/L — SIGNIFICANT CHANGE UP (ref 22–31)
CO2 SERPL-SCNC: 30 MMOL/L — SIGNIFICANT CHANGE UP (ref 22–31)
CO2 SERPL-SCNC: 31 MMOL/L — SIGNIFICANT CHANGE UP (ref 22–31)
CO2 SERPL-SCNC: 31 MMOL/L — SIGNIFICANT CHANGE UP (ref 22–31)
CO2 SERPL-SCNC: 32 MMOL/L — HIGH (ref 22–31)
CO2 SERPL-SCNC: 32 MMOL/L — HIGH (ref 22–31)
CO2 SERPL-SCNC: 33 MMOL/L — HIGH (ref 22–31)
CO2 SERPL-SCNC: 34 MMOL/L — HIGH (ref 22–31)
CO2 SERPL-SCNC: 34 MMOL/L — HIGH (ref 22–31)
COLOR SPEC: YELLOW — SIGNIFICANT CHANGE UP
CREAT ?TM UR-MCNC: 49 MG/DL — SIGNIFICANT CHANGE UP
CREAT SERPL-MCNC: 0.68 MG/DL — SIGNIFICANT CHANGE UP (ref 0.5–1.3)
CREAT SERPL-MCNC: 0.69 MG/DL — SIGNIFICANT CHANGE UP (ref 0.5–1.3)
CREAT SERPL-MCNC: 0.73 MG/DL — SIGNIFICANT CHANGE UP (ref 0.5–1.3)
CREAT SERPL-MCNC: 0.77 MG/DL — SIGNIFICANT CHANGE UP (ref 0.5–1.3)
CREAT SERPL-MCNC: 0.78 MG/DL — SIGNIFICANT CHANGE UP (ref 0.5–1.3)
CREAT SERPL-MCNC: 0.78 MG/DL — SIGNIFICANT CHANGE UP (ref 0.5–1.3)
CREAT SERPL-MCNC: 0.79 MG/DL — SIGNIFICANT CHANGE UP (ref 0.5–1.3)
CREAT SERPL-MCNC: 0.8 MG/DL — SIGNIFICANT CHANGE UP (ref 0.5–1.3)
CREAT SERPL-MCNC: 0.82 MG/DL — SIGNIFICANT CHANGE UP (ref 0.5–1.3)
CREAT SERPL-MCNC: 0.82 MG/DL — SIGNIFICANT CHANGE UP (ref 0.5–1.3)
CREAT SERPL-MCNC: 0.87 MG/DL — SIGNIFICANT CHANGE UP (ref 0.5–1.3)
CREAT SERPL-MCNC: 0.87 MG/DL — SIGNIFICANT CHANGE UP (ref 0.5–1.3)
CREAT SERPL-MCNC: 0.89 MG/DL — SIGNIFICANT CHANGE UP (ref 0.5–1.3)
CREAT SERPL-MCNC: 0.92 MG/DL — SIGNIFICANT CHANGE UP (ref 0.5–1.3)
CREAT SERPL-MCNC: 0.93 MG/DL — SIGNIFICANT CHANGE UP (ref 0.5–1.3)
CREAT SERPL-MCNC: 0.94 MG/DL — SIGNIFICANT CHANGE UP (ref 0.5–1.3)
CREAT SERPL-MCNC: 0.96 MG/DL — SIGNIFICANT CHANGE UP (ref 0.5–1.3)
CREAT SERPL-MCNC: 0.98 MG/DL — SIGNIFICANT CHANGE UP (ref 0.5–1.3)
CREAT SERPL-MCNC: 0.99 MG/DL — SIGNIFICANT CHANGE UP (ref 0.5–1.3)
CREAT SERPL-MCNC: 1 MG/DL — SIGNIFICANT CHANGE UP (ref 0.5–1.3)
CREAT SERPL-MCNC: 1.05 MG/DL — SIGNIFICANT CHANGE UP (ref 0.5–1.3)
CREAT SERPL-MCNC: 1.11 MG/DL — SIGNIFICANT CHANGE UP (ref 0.5–1.3)
CREAT SERPL-MCNC: 1.18 MG/DL — SIGNIFICANT CHANGE UP (ref 0.5–1.3)
CREAT SERPL-MCNC: 1.18 MG/DL — SIGNIFICANT CHANGE UP (ref 0.5–1.3)
CREAT SERPL-MCNC: 1.25 MG/DL — SIGNIFICANT CHANGE UP (ref 0.5–1.3)
CREAT SERPL-MCNC: 1.32 MG/DL — HIGH (ref 0.5–1.3)
CREAT SERPL-MCNC: 1.76 MG/DL — HIGH (ref 0.5–1.3)
CREAT SERPL-MCNC: 2.23 MG/DL — HIGH (ref 0.5–1.3)
CREAT SERPL-MCNC: 2.7 MG/DL — HIGH (ref 0.5–1.3)
CRP SERPL-MCNC: 15 MG/L — HIGH (ref 0–4)
DIFF PNL FLD: NEGATIVE — SIGNIFICANT CHANGE UP
EGFR: 16 ML/MIN/1.73M2 — LOW
EGFR: 20 ML/MIN/1.73M2 — LOW
EGFR: 27 ML/MIN/1.73M2 — LOW
EGFR: 38 ML/MIN/1.73M2 — LOW
EGFR: 41 ML/MIN/1.73M2 — LOW
EGFR: 44 ML/MIN/1.73M2 — LOW
EGFR: 44 ML/MIN/1.73M2 — LOW
EGFR: 47 ML/MIN/1.73M2 — LOW
EGFR: 50 ML/MIN/1.73M2 — LOW
EGFR: 53 ML/MIN/1.73M2 — LOW
EGFR: 54 ML/MIN/1.73M2 — LOW
EGFR: 54 ML/MIN/1.73M2 — LOW
EGFR: 56 ML/MIN/1.73M2 — LOW
EGFR: 57 ML/MIN/1.73M2 — LOW
EGFR: 58 ML/MIN/1.73M2 — LOW
EGFR: 59 ML/MIN/1.73M2 — LOW
EGFR: 61 ML/MIN/1.73M2 — SIGNIFICANT CHANGE UP
EGFR: 63 ML/MIN/1.73M2 — SIGNIFICANT CHANGE UP
EGFR: 63 ML/MIN/1.73M2 — SIGNIFICANT CHANGE UP
EGFR: 67 ML/MIN/1.73M2 — SIGNIFICANT CHANGE UP
EGFR: 67 ML/MIN/1.73M2 — SIGNIFICANT CHANGE UP
EGFR: 70 ML/MIN/1.73M2 — SIGNIFICANT CHANGE UP
EGFR: 71 ML/MIN/1.73M2 — SIGNIFICANT CHANGE UP
EGFR: 72 ML/MIN/1.73M2 — SIGNIFICANT CHANGE UP
EGFR: 72 ML/MIN/1.73M2 — SIGNIFICANT CHANGE UP
EGFR: 73 ML/MIN/1.73M2 — SIGNIFICANT CHANGE UP
EGFR: 78 ML/MIN/1.73M2 — SIGNIFICANT CHANGE UP
EGFR: 82 ML/MIN/1.73M2 — SIGNIFICANT CHANGE UP
EGFR: 82 ML/MIN/1.73M2 — SIGNIFICANT CHANGE UP
EOSINOPHIL # BLD AUTO: 0 K/UL — SIGNIFICANT CHANGE UP (ref 0–0.5)
EOSINOPHIL # BLD AUTO: 0 K/UL — SIGNIFICANT CHANGE UP (ref 0–0.5)
EOSINOPHIL # BLD AUTO: 0.02 K/UL — SIGNIFICANT CHANGE UP (ref 0–0.5)
EOSINOPHIL # BLD AUTO: 0.14 K/UL — SIGNIFICANT CHANGE UP (ref 0–0.5)
EOSINOPHIL # BLD AUTO: 0.14 K/UL — SIGNIFICANT CHANGE UP (ref 0–0.5)
EOSINOPHIL # BLD AUTO: 0.27 K/UL — SIGNIFICANT CHANGE UP (ref 0–0.5)
EOSINOPHIL # BLD AUTO: 0.31 K/UL — SIGNIFICANT CHANGE UP (ref 0–0.5)
EOSINOPHIL # BLD AUTO: 0.41 K/UL — SIGNIFICANT CHANGE UP (ref 0–0.5)
EOSINOPHIL NFR BLD AUTO: 0 % — SIGNIFICANT CHANGE UP (ref 0–6)
EOSINOPHIL NFR BLD AUTO: 0 % — SIGNIFICANT CHANGE UP (ref 0–6)
EOSINOPHIL NFR BLD AUTO: 0.2 % — SIGNIFICANT CHANGE UP (ref 0–6)
EOSINOPHIL NFR BLD AUTO: 0.9 % — SIGNIFICANT CHANGE UP (ref 0–6)
EOSINOPHIL NFR BLD AUTO: 1.6 % — SIGNIFICANT CHANGE UP (ref 0–6)
EOSINOPHIL NFR BLD AUTO: 2.5 % — SIGNIFICANT CHANGE UP (ref 0–6)
EOSINOPHIL NFR BLD AUTO: 3.3 % — SIGNIFICANT CHANGE UP (ref 0–6)
EOSINOPHIL NFR BLD AUTO: 3.9 % — SIGNIFICANT CHANGE UP (ref 0–6)
ERYTHROCYTE [SEDIMENTATION RATE] IN BLOOD: 52 MM/HR — HIGH (ref 0–20)
FLUAV AG NPH QL: SIGNIFICANT CHANGE UP
FLUBV AG NPH QL: SIGNIFICANT CHANGE UP
GAS PNL BLDA: SIGNIFICANT CHANGE UP
GAS PNL BLDV: 128 MMOL/L — LOW (ref 136–145)
GAS PNL BLDV: 130 MMOL/L — LOW (ref 136–145)
GAS PNL BLDV: 134 MMOL/L — LOW (ref 136–145)
GAS PNL BLDV: 137 MMOL/L — SIGNIFICANT CHANGE UP (ref 136–145)
GAS PNL BLDV: 137 MMOL/L — SIGNIFICANT CHANGE UP (ref 136–145)
GAS PNL BLDV: SIGNIFICANT CHANGE UP
GLUCOSE BLDC GLUCOMTR-MCNC: 105 MG/DL — HIGH (ref 70–99)
GLUCOSE BLDC GLUCOMTR-MCNC: 106 MG/DL — HIGH (ref 70–99)
GLUCOSE BLDC GLUCOMTR-MCNC: 114 MG/DL — HIGH (ref 70–99)
GLUCOSE BLDC GLUCOMTR-MCNC: 121 MG/DL — HIGH (ref 70–99)
GLUCOSE BLDC GLUCOMTR-MCNC: 124 MG/DL — HIGH (ref 70–99)
GLUCOSE BLDC GLUCOMTR-MCNC: 126 MG/DL — HIGH (ref 70–99)
GLUCOSE BLDC GLUCOMTR-MCNC: 126 MG/DL — HIGH (ref 70–99)
GLUCOSE BLDC GLUCOMTR-MCNC: 128 MG/DL — HIGH (ref 70–99)
GLUCOSE BLDC GLUCOMTR-MCNC: 128 MG/DL — HIGH (ref 70–99)
GLUCOSE BLDC GLUCOMTR-MCNC: 129 MG/DL — HIGH (ref 70–99)
GLUCOSE BLDC GLUCOMTR-MCNC: 130 MG/DL — HIGH (ref 70–99)
GLUCOSE BLDC GLUCOMTR-MCNC: 136 MG/DL — HIGH (ref 70–99)
GLUCOSE BLDC GLUCOMTR-MCNC: 139 MG/DL — HIGH (ref 70–99)
GLUCOSE BLDC GLUCOMTR-MCNC: 139 MG/DL — HIGH (ref 70–99)
GLUCOSE BLDC GLUCOMTR-MCNC: 143 MG/DL — HIGH (ref 70–99)
GLUCOSE BLDC GLUCOMTR-MCNC: 144 MG/DL — HIGH (ref 70–99)
GLUCOSE BLDC GLUCOMTR-MCNC: 147 MG/DL — HIGH (ref 70–99)
GLUCOSE BLDC GLUCOMTR-MCNC: 151 MG/DL — HIGH (ref 70–99)
GLUCOSE BLDC GLUCOMTR-MCNC: 158 MG/DL — HIGH (ref 70–99)
GLUCOSE BLDC GLUCOMTR-MCNC: 159 MG/DL — HIGH (ref 70–99)
GLUCOSE BLDC GLUCOMTR-MCNC: 171 MG/DL — HIGH (ref 70–99)
GLUCOSE BLDC GLUCOMTR-MCNC: 176 MG/DL — HIGH (ref 70–99)
GLUCOSE BLDC GLUCOMTR-MCNC: 188 MG/DL — HIGH (ref 70–99)
GLUCOSE BLDC GLUCOMTR-MCNC: 191 MG/DL — HIGH (ref 70–99)
GLUCOSE BLDC GLUCOMTR-MCNC: 194 MG/DL — HIGH (ref 70–99)
GLUCOSE BLDC GLUCOMTR-MCNC: 194 MG/DL — HIGH (ref 70–99)
GLUCOSE BLDC GLUCOMTR-MCNC: 213 MG/DL — HIGH (ref 70–99)
GLUCOSE BLDC GLUCOMTR-MCNC: 213 MG/DL — HIGH (ref 70–99)
GLUCOSE BLDC GLUCOMTR-MCNC: 220 MG/DL — HIGH (ref 70–99)
GLUCOSE BLDC GLUCOMTR-MCNC: 221 MG/DL — HIGH (ref 70–99)
GLUCOSE BLDC GLUCOMTR-MCNC: 89 MG/DL — SIGNIFICANT CHANGE UP (ref 70–99)
GLUCOSE BLDV-MCNC: 106 MG/DL — HIGH (ref 70–99)
GLUCOSE BLDV-MCNC: 113 MG/DL — HIGH (ref 70–99)
GLUCOSE BLDV-MCNC: 147 MG/DL — HIGH (ref 70–99)
GLUCOSE BLDV-MCNC: 175 MG/DL — HIGH (ref 70–99)
GLUCOSE BLDV-MCNC: 185 MG/DL — HIGH (ref 70–99)
GLUCOSE SERPL-MCNC: 100 MG/DL — HIGH (ref 70–99)
GLUCOSE SERPL-MCNC: 100 MG/DL — HIGH (ref 70–99)
GLUCOSE SERPL-MCNC: 102 MG/DL — HIGH (ref 70–99)
GLUCOSE SERPL-MCNC: 104 MG/DL — HIGH (ref 70–99)
GLUCOSE SERPL-MCNC: 105 MG/DL — HIGH (ref 70–99)
GLUCOSE SERPL-MCNC: 114 MG/DL — HIGH (ref 70–99)
GLUCOSE SERPL-MCNC: 114 MG/DL — HIGH (ref 70–99)
GLUCOSE SERPL-MCNC: 116 MG/DL — HIGH (ref 70–99)
GLUCOSE SERPL-MCNC: 118 MG/DL — HIGH (ref 70–99)
GLUCOSE SERPL-MCNC: 122 MG/DL — HIGH (ref 70–99)
GLUCOSE SERPL-MCNC: 123 MG/DL — HIGH (ref 70–99)
GLUCOSE SERPL-MCNC: 136 MG/DL — HIGH (ref 70–99)
GLUCOSE SERPL-MCNC: 139 MG/DL — HIGH (ref 70–99)
GLUCOSE SERPL-MCNC: 139 MG/DL — HIGH (ref 70–99)
GLUCOSE SERPL-MCNC: 140 MG/DL — HIGH (ref 70–99)
GLUCOSE SERPL-MCNC: 148 MG/DL — HIGH (ref 70–99)
GLUCOSE SERPL-MCNC: 158 MG/DL — HIGH (ref 70–99)
GLUCOSE SERPL-MCNC: 159 MG/DL — HIGH (ref 70–99)
GLUCOSE SERPL-MCNC: 163 MG/DL — HIGH (ref 70–99)
GLUCOSE SERPL-MCNC: 169 MG/DL — HIGH (ref 70–99)
GLUCOSE SERPL-MCNC: 172 MG/DL — HIGH (ref 70–99)
GLUCOSE SERPL-MCNC: 182 MG/DL — HIGH (ref 70–99)
GLUCOSE SERPL-MCNC: 187 MG/DL — HIGH (ref 70–99)
GLUCOSE SERPL-MCNC: 191 MG/DL — HIGH (ref 70–99)
GLUCOSE SERPL-MCNC: 191 MG/DL — HIGH (ref 70–99)
GLUCOSE SERPL-MCNC: 313 MG/DL — HIGH (ref 70–99)
GLUCOSE SERPL-MCNC: 87 MG/DL — SIGNIFICANT CHANGE UP (ref 70–99)
GLUCOSE SERPL-MCNC: 90 MG/DL — SIGNIFICANT CHANGE UP (ref 70–99)
GLUCOSE SERPL-MCNC: 90 MG/DL — SIGNIFICANT CHANGE UP (ref 70–99)
GLUCOSE SERPL-MCNC: 92 MG/DL — SIGNIFICANT CHANGE UP (ref 70–99)
GLUCOSE SERPL-MCNC: 99 MG/DL — SIGNIFICANT CHANGE UP (ref 70–99)
GLUCOSE UR QL: NEGATIVE — SIGNIFICANT CHANGE UP
HCO3 BLDA-SCNC: 39 MMOL/L — HIGH (ref 21–28)
HCO3 BLDA-SCNC: 40 MMOL/L — HIGH (ref 21–28)
HCO3 BLDV-SCNC: 21 MMOL/L — LOW (ref 22–29)
HCO3 BLDV-SCNC: 23 MMOL/L — SIGNIFICANT CHANGE UP (ref 22–29)
HCO3 BLDV-SCNC: 23 MMOL/L — SIGNIFICANT CHANGE UP (ref 22–29)
HCO3 BLDV-SCNC: 32 MMOL/L — HIGH (ref 22–29)
HCO3 BLDV-SCNC: 35 MMOL/L — HIGH (ref 22–29)
HCO3 BLDV-SCNC: 36 MMOL/L — HIGH (ref 22–29)
HCO3 BLDV-SCNC: 38 MMOL/L — HIGH (ref 22–29)
HCT VFR BLD CALC: 39.1 % — SIGNIFICANT CHANGE UP (ref 34.5–45)
HCT VFR BLD CALC: 40.1 % — SIGNIFICANT CHANGE UP (ref 34.5–45)
HCT VFR BLD CALC: 40.2 % — SIGNIFICANT CHANGE UP (ref 34.5–45)
HCT VFR BLD CALC: 40.8 % — SIGNIFICANT CHANGE UP (ref 34.5–45)
HCT VFR BLD CALC: 41.2 % — SIGNIFICANT CHANGE UP (ref 34.5–45)
HCT VFR BLD CALC: 41.4 % — SIGNIFICANT CHANGE UP (ref 34.5–45)
HCT VFR BLD CALC: 41.7 % — SIGNIFICANT CHANGE UP (ref 34.5–45)
HCT VFR BLD CALC: 42.3 % — SIGNIFICANT CHANGE UP (ref 34.5–45)
HCT VFR BLD CALC: 42.5 % — SIGNIFICANT CHANGE UP (ref 34.5–45)
HCT VFR BLD CALC: 43.7 % — SIGNIFICANT CHANGE UP (ref 34.5–45)
HCT VFR BLD CALC: 43.8 % — SIGNIFICANT CHANGE UP (ref 34.5–45)
HCT VFR BLD CALC: 44 % — SIGNIFICANT CHANGE UP (ref 34.5–45)
HCT VFR BLD CALC: 44 % — SIGNIFICANT CHANGE UP (ref 34.5–45)
HCT VFR BLD CALC: 44.1 % — SIGNIFICANT CHANGE UP (ref 34.5–45)
HCT VFR BLD CALC: 44.8 % — SIGNIFICANT CHANGE UP (ref 34.5–45)
HCT VFR BLD CALC: 44.8 % — SIGNIFICANT CHANGE UP (ref 34.5–45)
HCT VFR BLD CALC: 44.9 % — SIGNIFICANT CHANGE UP (ref 34.5–45)
HCT VFR BLD CALC: 45.1 % — HIGH (ref 34.5–45)
HCT VFR BLD CALC: 45.2 % — HIGH (ref 34.5–45)
HCT VFR BLD CALC: 45.3 % — HIGH (ref 34.5–45)
HCT VFR BLD CALC: 45.7 % — HIGH (ref 34.5–45)
HCT VFR BLD CALC: 45.8 % — HIGH (ref 34.5–45)
HCT VFR BLD CALC: 45.8 % — HIGH (ref 34.5–45)
HCT VFR BLD CALC: 46.6 % — HIGH (ref 34.5–45)
HCT VFR BLD CALC: 47.1 % — HIGH (ref 34.5–45)
HCT VFR BLD CALC: 47.5 % — HIGH (ref 34.5–45)
HCT VFR BLD CALC: 49.7 % — HIGH (ref 34.5–45)
HCT VFR BLD CALC: 50.5 % — HIGH (ref 34.5–45)
HCT VFR BLD CALC: 52.5 % — HIGH (ref 34.5–45)
HCT VFR BLD CALC: 53.2 % — HIGH (ref 34.5–45)
HCT VFR BLD CALC: 55.9 % — HIGH (ref 34.5–45)
HCT VFR BLDA CALC: 43 % — SIGNIFICANT CHANGE UP (ref 34.5–46.5)
HCT VFR BLDA CALC: 47 % — HIGH (ref 34.5–46.5)
HCT VFR BLDA CALC: 47 % — HIGH (ref 34.5–46.5)
HGB BLD CALC-MCNC: 14.3 G/DL — SIGNIFICANT CHANGE UP (ref 11.7–16.1)
HGB BLD CALC-MCNC: 14.3 G/DL — SIGNIFICANT CHANGE UP (ref 11.7–16.1)
HGB BLD CALC-MCNC: 14.4 G/DL — SIGNIFICANT CHANGE UP (ref 11.7–16.1)
HGB BLD CALC-MCNC: 15.7 G/DL — SIGNIFICANT CHANGE UP (ref 11.7–16.1)
HGB BLD CALC-MCNC: 15.8 G/DL — SIGNIFICANT CHANGE UP (ref 11.7–16.1)
HGB BLD-MCNC: 12.4 G/DL — SIGNIFICANT CHANGE UP (ref 11.5–15.5)
HGB BLD-MCNC: 12.7 G/DL — SIGNIFICANT CHANGE UP (ref 11.5–15.5)
HGB BLD-MCNC: 12.7 G/DL — SIGNIFICANT CHANGE UP (ref 11.5–15.5)
HGB BLD-MCNC: 12.9 G/DL — SIGNIFICANT CHANGE UP (ref 11.5–15.5)
HGB BLD-MCNC: 12.9 G/DL — SIGNIFICANT CHANGE UP (ref 11.5–15.5)
HGB BLD-MCNC: 13.2 G/DL — SIGNIFICANT CHANGE UP (ref 11.5–15.5)
HGB BLD-MCNC: 13.2 G/DL — SIGNIFICANT CHANGE UP (ref 11.5–15.5)
HGB BLD-MCNC: 13.4 G/DL — SIGNIFICANT CHANGE UP (ref 11.5–15.5)
HGB BLD-MCNC: 13.5 G/DL — SIGNIFICANT CHANGE UP (ref 11.5–15.5)
HGB BLD-MCNC: 13.7 G/DL — SIGNIFICANT CHANGE UP (ref 11.5–15.5)
HGB BLD-MCNC: 13.9 G/DL — SIGNIFICANT CHANGE UP (ref 11.5–15.5)
HGB BLD-MCNC: 14 G/DL — SIGNIFICANT CHANGE UP (ref 11.5–15.5)
HGB BLD-MCNC: 14.1 G/DL — SIGNIFICANT CHANGE UP (ref 11.5–15.5)
HGB BLD-MCNC: 14.3 G/DL — SIGNIFICANT CHANGE UP (ref 11.5–15.5)
HGB BLD-MCNC: 14.5 G/DL — SIGNIFICANT CHANGE UP (ref 11.5–15.5)
HGB BLD-MCNC: 14.5 G/DL — SIGNIFICANT CHANGE UP (ref 11.5–15.5)
HGB BLD-MCNC: 14.6 G/DL — SIGNIFICANT CHANGE UP (ref 11.5–15.5)
HGB BLD-MCNC: 14.8 G/DL — SIGNIFICANT CHANGE UP (ref 11.5–15.5)
HGB BLD-MCNC: 14.8 G/DL — SIGNIFICANT CHANGE UP (ref 11.5–15.5)
HGB BLD-MCNC: 15 G/DL — SIGNIFICANT CHANGE UP (ref 11.5–15.5)
HGB BLD-MCNC: 15.4 G/DL — SIGNIFICANT CHANGE UP (ref 11.5–15.5)
HGB BLD-MCNC: 15.5 G/DL — SIGNIFICANT CHANGE UP (ref 11.5–15.5)
HGB BLD-MCNC: 15.8 G/DL — HIGH (ref 11.5–15.5)
HGB BLD-MCNC: 16.1 G/DL — HIGH (ref 11.5–15.5)
HGB BLD-MCNC: 16.2 G/DL — HIGH (ref 11.5–15.5)
HGB BLD-MCNC: 17.1 G/DL — HIGH (ref 11.5–15.5)
HGB BLD-MCNC: 17.1 G/DL — HIGH (ref 11.5–15.5)
HOROWITZ INDEX BLDA+IHG-RTO: 40 — SIGNIFICANT CHANGE UP
HOROWITZ INDEX BLDV+IHG-RTO: 100 — SIGNIFICANT CHANGE UP
IMM GRANULOCYTES NFR BLD AUTO: 0.4 % — SIGNIFICANT CHANGE UP (ref 0–0.9)
IMM GRANULOCYTES NFR BLD AUTO: 0.5 % — SIGNIFICANT CHANGE UP (ref 0–0.9)
IMM GRANULOCYTES NFR BLD AUTO: 0.7 % — SIGNIFICANT CHANGE UP (ref 0–0.9)
IMM GRANULOCYTES NFR BLD AUTO: 0.8 % — SIGNIFICANT CHANGE UP (ref 0–0.9)
IMM GRANULOCYTES NFR BLD AUTO: 0.8 % — SIGNIFICANT CHANGE UP (ref 0–0.9)
INR BLD: 1.26 RATIO — HIGH (ref 0.88–1.16)
KETONES UR-MCNC: NEGATIVE — SIGNIFICANT CHANGE UP
LACTATE BLDV-MCNC: 1.4 MMOL/L — SIGNIFICANT CHANGE UP (ref 0.5–2)
LACTATE BLDV-MCNC: 1.5 MMOL/L — SIGNIFICANT CHANGE UP (ref 0.5–2)
LACTATE BLDV-MCNC: 1.9 MMOL/L — SIGNIFICANT CHANGE UP (ref 0.5–2)
LACTATE BLDV-MCNC: 5.2 MMOL/L — CRITICAL HIGH (ref 0.5–2)
LACTATE BLDV-MCNC: 5.7 MMOL/L — CRITICAL HIGH (ref 0.5–2)
LACTATE SERPL-SCNC: 1.7 MMOL/L — SIGNIFICANT CHANGE UP (ref 0.5–2)
LDH SERPL L TO P-CCNC: 190 U/L — SIGNIFICANT CHANGE UP (ref 50–242)
LEGIONELLA AG UR QL: NEGATIVE — SIGNIFICANT CHANGE UP
LEGIONELLA AG UR QL: NEGATIVE — SIGNIFICANT CHANGE UP
LEUKOCYTE ESTERASE UR-ACNC: NEGATIVE — SIGNIFICANT CHANGE UP
LYMPHOCYTES # BLD AUTO: 0.53 K/UL — LOW (ref 1–3.3)
LYMPHOCYTES # BLD AUTO: 0.63 K/UL — LOW (ref 1–3.3)
LYMPHOCYTES # BLD AUTO: 1.37 K/UL — SIGNIFICANT CHANGE UP (ref 1–3.3)
LYMPHOCYTES # BLD AUTO: 1.44 K/UL — SIGNIFICANT CHANGE UP (ref 1–3.3)
LYMPHOCYTES # BLD AUTO: 1.54 K/UL — SIGNIFICANT CHANGE UP (ref 1–3.3)
LYMPHOCYTES # BLD AUTO: 1.7 % — LOW (ref 13–44)
LYMPHOCYTES # BLD AUTO: 1.87 K/UL — SIGNIFICANT CHANGE UP (ref 1–3.3)
LYMPHOCYTES # BLD AUTO: 12.9 % — LOW (ref 13–44)
LYMPHOCYTES # BLD AUTO: 14.5 % — SIGNIFICANT CHANGE UP (ref 13–44)
LYMPHOCYTES # BLD AUTO: 15.2 % — SIGNIFICANT CHANGE UP (ref 13–44)
LYMPHOCYTES # BLD AUTO: 19.1 % — SIGNIFICANT CHANGE UP (ref 13–44)
LYMPHOCYTES # BLD AUTO: 2.22 K/UL — SIGNIFICANT CHANGE UP (ref 1–3.3)
LYMPHOCYTES # BLD AUTO: 2.65 K/UL — SIGNIFICANT CHANGE UP (ref 1–3.3)
LYMPHOCYTES # BLD AUTO: 21.4 % — SIGNIFICANT CHANGE UP (ref 13–44)
LYMPHOCYTES # BLD AUTO: 24.9 % — SIGNIFICANT CHANGE UP (ref 13–44)
LYMPHOCYTES # BLD AUTO: 9.3 % — LOW (ref 13–44)
MAGNESIUM SERPL-MCNC: 1.9 MG/DL — SIGNIFICANT CHANGE UP (ref 1.6–2.6)
MAGNESIUM SERPL-MCNC: 2 MG/DL — SIGNIFICANT CHANGE UP (ref 1.6–2.6)
MAGNESIUM SERPL-MCNC: 2 MG/DL — SIGNIFICANT CHANGE UP (ref 1.6–2.6)
MAGNESIUM SERPL-MCNC: 2.1 MG/DL — SIGNIFICANT CHANGE UP (ref 1.6–2.6)
MAGNESIUM SERPL-MCNC: 2.2 MG/DL — SIGNIFICANT CHANGE UP (ref 1.6–2.6)
MAGNESIUM SERPL-MCNC: 2.2 MG/DL — SIGNIFICANT CHANGE UP (ref 1.6–2.6)
MAGNESIUM SERPL-MCNC: 2.4 MG/DL — SIGNIFICANT CHANGE UP (ref 1.6–2.6)
MAGNESIUM SERPL-MCNC: 2.4 MG/DL — SIGNIFICANT CHANGE UP (ref 1.6–2.6)
MAGNESIUM SERPL-MCNC: 2.6 MG/DL — SIGNIFICANT CHANGE UP (ref 1.6–2.6)
MAGNESIUM SERPL-MCNC: 2.7 MG/DL — HIGH (ref 1.6–2.6)
MANUAL SMEAR VERIFICATION: SIGNIFICANT CHANGE UP
MCHC RBC-ENTMCNC: 28.4 PG — SIGNIFICANT CHANGE UP (ref 27–34)
MCHC RBC-ENTMCNC: 28.5 PG — SIGNIFICANT CHANGE UP (ref 27–34)
MCHC RBC-ENTMCNC: 28.6 PG — SIGNIFICANT CHANGE UP (ref 27–34)
MCHC RBC-ENTMCNC: 28.6 PG — SIGNIFICANT CHANGE UP (ref 27–34)
MCHC RBC-ENTMCNC: 28.7 PG — SIGNIFICANT CHANGE UP (ref 27–34)
MCHC RBC-ENTMCNC: 28.8 PG — SIGNIFICANT CHANGE UP (ref 27–34)
MCHC RBC-ENTMCNC: 28.9 PG — SIGNIFICANT CHANGE UP (ref 27–34)
MCHC RBC-ENTMCNC: 29 PG — SIGNIFICANT CHANGE UP (ref 27–34)
MCHC RBC-ENTMCNC: 29.1 PG — SIGNIFICANT CHANGE UP (ref 27–34)
MCHC RBC-ENTMCNC: 29.2 PG — SIGNIFICANT CHANGE UP (ref 27–34)
MCHC RBC-ENTMCNC: 29.2 PG — SIGNIFICANT CHANGE UP (ref 27–34)
MCHC RBC-ENTMCNC: 29.4 PG — SIGNIFICANT CHANGE UP (ref 27–34)
MCHC RBC-ENTMCNC: 29.5 PG — SIGNIFICANT CHANGE UP (ref 27–34)
MCHC RBC-ENTMCNC: 29.5 PG — SIGNIFICANT CHANGE UP (ref 27–34)
MCHC RBC-ENTMCNC: 30.6 GM/DL — LOW (ref 32–36)
MCHC RBC-ENTMCNC: 30.9 GM/DL — LOW (ref 32–36)
MCHC RBC-ENTMCNC: 31.1 GM/DL — LOW (ref 32–36)
MCHC RBC-ENTMCNC: 31.1 GM/DL — LOW (ref 32–36)
MCHC RBC-ENTMCNC: 31.2 GM/DL — LOW (ref 32–36)
MCHC RBC-ENTMCNC: 31.3 GM/DL — LOW (ref 32–36)
MCHC RBC-ENTMCNC: 31.4 GM/DL — LOW (ref 32–36)
MCHC RBC-ENTMCNC: 31.5 GM/DL — LOW (ref 32–36)
MCHC RBC-ENTMCNC: 31.6 GM/DL — LOW (ref 32–36)
MCHC RBC-ENTMCNC: 31.7 GM/DL — LOW (ref 32–36)
MCHC RBC-ENTMCNC: 31.8 GM/DL — LOW (ref 32–36)
MCHC RBC-ENTMCNC: 31.9 GM/DL — LOW (ref 32–36)
MCHC RBC-ENTMCNC: 31.9 GM/DL — LOW (ref 32–36)
MCHC RBC-ENTMCNC: 32 GM/DL — SIGNIFICANT CHANGE UP (ref 32–36)
MCHC RBC-ENTMCNC: 32 GM/DL — SIGNIFICANT CHANGE UP (ref 32–36)
MCHC RBC-ENTMCNC: 32.1 GM/DL — SIGNIFICANT CHANGE UP (ref 32–36)
MCHC RBC-ENTMCNC: 32.2 GM/DL — SIGNIFICANT CHANGE UP (ref 32–36)
MCHC RBC-ENTMCNC: 32.3 GM/DL — SIGNIFICANT CHANGE UP (ref 32–36)
MCHC RBC-ENTMCNC: 32.3 GM/DL — SIGNIFICANT CHANGE UP (ref 32–36)
MCHC RBC-ENTMCNC: 32.4 GM/DL — SIGNIFICANT CHANGE UP (ref 32–36)
MCHC RBC-ENTMCNC: 32.6 GM/DL — SIGNIFICANT CHANGE UP (ref 32–36)
MCHC RBC-ENTMCNC: 32.9 GM/DL — SIGNIFICANT CHANGE UP (ref 32–36)
MCHC RBC-ENTMCNC: 33 GM/DL — SIGNIFICANT CHANGE UP (ref 32–36)
MCV RBC AUTO: 88 FL — SIGNIFICANT CHANGE UP (ref 80–100)
MCV RBC AUTO: 88.7 FL — SIGNIFICANT CHANGE UP (ref 80–100)
MCV RBC AUTO: 89.1 FL — SIGNIFICANT CHANGE UP (ref 80–100)
MCV RBC AUTO: 89.1 FL — SIGNIFICANT CHANGE UP (ref 80–100)
MCV RBC AUTO: 89.3 FL — SIGNIFICANT CHANGE UP (ref 80–100)
MCV RBC AUTO: 89.4 FL — SIGNIFICANT CHANGE UP (ref 80–100)
MCV RBC AUTO: 89.7 FL — SIGNIFICANT CHANGE UP (ref 80–100)
MCV RBC AUTO: 89.9 FL — SIGNIFICANT CHANGE UP (ref 80–100)
MCV RBC AUTO: 90.2 FL — SIGNIFICANT CHANGE UP (ref 80–100)
MCV RBC AUTO: 90.3 FL — SIGNIFICANT CHANGE UP (ref 80–100)
MCV RBC AUTO: 90.4 FL — SIGNIFICANT CHANGE UP (ref 80–100)
MCV RBC AUTO: 90.4 FL — SIGNIFICANT CHANGE UP (ref 80–100)
MCV RBC AUTO: 90.5 FL — SIGNIFICANT CHANGE UP (ref 80–100)
MCV RBC AUTO: 90.7 FL — SIGNIFICANT CHANGE UP (ref 80–100)
MCV RBC AUTO: 90.9 FL — SIGNIFICANT CHANGE UP (ref 80–100)
MCV RBC AUTO: 91.1 FL — SIGNIFICANT CHANGE UP (ref 80–100)
MCV RBC AUTO: 91.6 FL — SIGNIFICANT CHANGE UP (ref 80–100)
MCV RBC AUTO: 91.6 FL — SIGNIFICANT CHANGE UP (ref 80–100)
MCV RBC AUTO: 91.8 FL — SIGNIFICANT CHANGE UP (ref 80–100)
MCV RBC AUTO: 92 FL — SIGNIFICANT CHANGE UP (ref 80–100)
MCV RBC AUTO: 92 FL — SIGNIFICANT CHANGE UP (ref 80–100)
MCV RBC AUTO: 92.1 FL — SIGNIFICANT CHANGE UP (ref 80–100)
MCV RBC AUTO: 92.1 FL — SIGNIFICANT CHANGE UP (ref 80–100)
MCV RBC AUTO: 92.8 FL — SIGNIFICANT CHANGE UP (ref 80–100)
MCV RBC AUTO: 93.2 FL — SIGNIFICANT CHANGE UP (ref 80–100)
MCV RBC AUTO: 93.2 FL — SIGNIFICANT CHANGE UP (ref 80–100)
MCV RBC AUTO: 93.4 FL — SIGNIFICANT CHANGE UP (ref 80–100)
MCV RBC AUTO: 94.4 FL — SIGNIFICANT CHANGE UP (ref 80–100)
MONOCYTES # BLD AUTO: 0.13 K/UL — SIGNIFICANT CHANGE UP (ref 0–0.9)
MONOCYTES # BLD AUTO: 0.55 K/UL — SIGNIFICANT CHANGE UP (ref 0–0.9)
MONOCYTES # BLD AUTO: 0.61 K/UL — SIGNIFICANT CHANGE UP (ref 0–0.9)
MONOCYTES # BLD AUTO: 0.63 K/UL — SIGNIFICANT CHANGE UP (ref 0–0.9)
MONOCYTES # BLD AUTO: 0.72 K/UL — SIGNIFICANT CHANGE UP (ref 0–0.9)
MONOCYTES # BLD AUTO: 0.94 K/UL — HIGH (ref 0–0.9)
MONOCYTES # BLD AUTO: 1.1 K/UL — HIGH (ref 0–0.9)
MONOCYTES # BLD AUTO: 1.27 K/UL — HIGH (ref 0–0.9)
MONOCYTES NFR BLD AUTO: 11.7 % — SIGNIFICANT CHANGE UP (ref 2–14)
MONOCYTES NFR BLD AUTO: 3 % — SIGNIFICANT CHANGE UP (ref 2–14)
MONOCYTES NFR BLD AUTO: 3.6 % — SIGNIFICANT CHANGE UP (ref 2–14)
MONOCYTES NFR BLD AUTO: 4.1 % — SIGNIFICANT CHANGE UP (ref 2–14)
MONOCYTES NFR BLD AUTO: 5 % — SIGNIFICANT CHANGE UP (ref 2–14)
MONOCYTES NFR BLD AUTO: 5.9 % — SIGNIFICANT CHANGE UP (ref 2–14)
MONOCYTES NFR BLD AUTO: 8.1 % — SIGNIFICANT CHANGE UP (ref 2–14)
MONOCYTES NFR BLD AUTO: 8.9 % — SIGNIFICANT CHANGE UP (ref 2–14)
MRSA PCR RESULT.: SIGNIFICANT CHANGE UP
MRSA PCR RESULT.: SIGNIFICANT CHANGE UP
NEUTROPHILS # BLD AUTO: 13.09 K/UL — HIGH (ref 1.8–7.4)
NEUTROPHILS # BLD AUTO: 29.18 K/UL — HIGH (ref 1.8–7.4)
NEUTROPHILS # BLD AUTO: 3.54 K/UL — SIGNIFICANT CHANGE UP (ref 1.8–7.4)
NEUTROPHILS # BLD AUTO: 5.15 K/UL — SIGNIFICANT CHANGE UP (ref 1.8–7.4)
NEUTROPHILS # BLD AUTO: 5.77 K/UL — SIGNIFICANT CHANGE UP (ref 1.8–7.4)
NEUTROPHILS # BLD AUTO: 8.09 K/UL — HIGH (ref 1.8–7.4)
NEUTROPHILS # BLD AUTO: 8.2 K/UL — HIGH (ref 1.8–7.4)
NEUTROPHILS # BLD AUTO: 9.63 K/UL — HIGH (ref 1.8–7.4)
NEUTROPHILS NFR BLD AUTO: 63.9 % — SIGNIFICANT CHANGE UP (ref 43–77)
NEUTROPHILS NFR BLD AUTO: 64.6 % — SIGNIFICANT CHANGE UP (ref 43–77)
NEUTROPHILS NFR BLD AUTO: 65.1 % — SIGNIFICANT CHANGE UP (ref 43–77)
NEUTROPHILS NFR BLD AUTO: 77.2 % — HIGH (ref 43–77)
NEUTROPHILS NFR BLD AUTO: 78.2 % — HIGH (ref 43–77)
NEUTROPHILS NFR BLD AUTO: 81.8 % — HIGH (ref 43–77)
NEUTROPHILS NFR BLD AUTO: 84.9 % — HIGH (ref 43–77)
NEUTROPHILS NFR BLD AUTO: 90.9 % — HIGH (ref 43–77)
NEUTS BAND # BLD: 3.3 % — SIGNIFICANT CHANGE UP (ref 0–8)
NITRITE UR-MCNC: NEGATIVE — SIGNIFICANT CHANGE UP
NRBC # BLD: 0 /100 WBCS — SIGNIFICANT CHANGE UP (ref 0–0)
NT-PROBNP SERPL-SCNC: 2788 PG/ML — HIGH (ref 0–300)
NT-PROBNP SERPL-SCNC: 326 PG/ML — HIGH (ref 0–300)
NT-PROBNP SERPL-SCNC: 334 PG/ML — HIGH (ref 0–300)
OB PNL STL: POSITIVE
OVALOCYTES BLD QL SMEAR: SLIGHT — SIGNIFICANT CHANGE UP
PCO2 BLDA: 47 MMHG — HIGH (ref 32–45)
PCO2 BLDA: 53 MMHG — HIGH (ref 32–45)
PCO2 BLDV: 47 MMHG — HIGH (ref 39–42)
PCO2 BLDV: 47 MMHG — HIGH (ref 39–42)
PCO2 BLDV: 49 MMHG — HIGH (ref 39–42)
PCO2 BLDV: 51 MMHG — HIGH (ref 39–42)
PCO2 BLDV: 52 MMHG — HIGH (ref 39–42)
PCO2 BLDV: 53 MMHG — HIGH (ref 39–42)
PCO2 BLDV: 57 MMHG — HIGH (ref 39–42)
PH BLDA: 7.49 — HIGH (ref 7.35–7.45)
PH BLDA: 7.53 — HIGH (ref 7.35–7.45)
PH BLDV: 7.21 — LOW (ref 7.32–7.43)
PH BLDV: 7.26 — LOW (ref 7.32–7.43)
PH BLDV: 7.28 — LOW (ref 7.32–7.43)
PH BLDV: 7.43 — SIGNIFICANT CHANGE UP (ref 7.32–7.43)
PH BLDV: 7.44 — HIGH (ref 7.32–7.43)
PH BLDV: 7.45 — HIGH (ref 7.32–7.43)
PH BLDV: 7.48 — HIGH (ref 7.32–7.43)
PH UR: 6 — SIGNIFICANT CHANGE UP (ref 5–8)
PHOSPHATE SERPL-MCNC: 2.2 MG/DL — LOW (ref 2.5–4.5)
PHOSPHATE SERPL-MCNC: 2.5 MG/DL — SIGNIFICANT CHANGE UP (ref 2.5–4.5)
PHOSPHATE SERPL-MCNC: 2.7 MG/DL — SIGNIFICANT CHANGE UP (ref 2.5–4.5)
PHOSPHATE SERPL-MCNC: 2.8 MG/DL — SIGNIFICANT CHANGE UP (ref 2.5–4.5)
PHOSPHATE SERPL-MCNC: 2.9 MG/DL — SIGNIFICANT CHANGE UP (ref 2.5–4.5)
PHOSPHATE SERPL-MCNC: 3.1 MG/DL — SIGNIFICANT CHANGE UP (ref 2.5–4.5)
PHOSPHATE SERPL-MCNC: 3.3 MG/DL — SIGNIFICANT CHANGE UP (ref 2.5–4.5)
PHOSPHATE SERPL-MCNC: 5.2 MG/DL — HIGH (ref 2.5–4.5)
PHOSPHATE SERPL-MCNC: 5.4 MG/DL — HIGH (ref 2.5–4.5)
PLAT MORPH BLD: NORMAL — SIGNIFICANT CHANGE UP
PLATELET # BLD AUTO: 221 K/UL — SIGNIFICANT CHANGE UP (ref 150–400)
PLATELET # BLD AUTO: 231 K/UL — SIGNIFICANT CHANGE UP (ref 150–400)
PLATELET # BLD AUTO: 233 K/UL — SIGNIFICANT CHANGE UP (ref 150–400)
PLATELET # BLD AUTO: 243 K/UL — SIGNIFICANT CHANGE UP (ref 150–400)
PLATELET # BLD AUTO: 246 K/UL — SIGNIFICANT CHANGE UP (ref 150–400)
PLATELET # BLD AUTO: 246 K/UL — SIGNIFICANT CHANGE UP (ref 150–400)
PLATELET # BLD AUTO: 248 K/UL — SIGNIFICANT CHANGE UP (ref 150–400)
PLATELET # BLD AUTO: 254 K/UL — SIGNIFICANT CHANGE UP (ref 150–400)
PLATELET # BLD AUTO: 255 K/UL — SIGNIFICANT CHANGE UP (ref 150–400)
PLATELET # BLD AUTO: 255 K/UL — SIGNIFICANT CHANGE UP (ref 150–400)
PLATELET # BLD AUTO: 256 K/UL — SIGNIFICANT CHANGE UP (ref 150–400)
PLATELET # BLD AUTO: 257 K/UL — SIGNIFICANT CHANGE UP (ref 150–400)
PLATELET # BLD AUTO: 258 K/UL — SIGNIFICANT CHANGE UP (ref 150–400)
PLATELET # BLD AUTO: 259 K/UL — SIGNIFICANT CHANGE UP (ref 150–400)
PLATELET # BLD AUTO: 260 K/UL — SIGNIFICANT CHANGE UP (ref 150–400)
PLATELET # BLD AUTO: 260 K/UL — SIGNIFICANT CHANGE UP (ref 150–400)
PLATELET # BLD AUTO: 261 K/UL — SIGNIFICANT CHANGE UP (ref 150–400)
PLATELET # BLD AUTO: 263 K/UL — SIGNIFICANT CHANGE UP (ref 150–400)
PLATELET # BLD AUTO: 265 K/UL — SIGNIFICANT CHANGE UP (ref 150–400)
PLATELET # BLD AUTO: 266 K/UL — SIGNIFICANT CHANGE UP (ref 150–400)
PLATELET # BLD AUTO: 268 K/UL — SIGNIFICANT CHANGE UP (ref 150–400)
PLATELET # BLD AUTO: 271 K/UL — SIGNIFICANT CHANGE UP (ref 150–400)
PLATELET # BLD AUTO: 272 K/UL — SIGNIFICANT CHANGE UP (ref 150–400)
PLATELET # BLD AUTO: 275 K/UL — SIGNIFICANT CHANGE UP (ref 150–400)
PLATELET # BLD AUTO: 280 K/UL — SIGNIFICANT CHANGE UP (ref 150–400)
PLATELET # BLD AUTO: 286 K/UL — SIGNIFICANT CHANGE UP (ref 150–400)
PLATELET # BLD AUTO: 288 K/UL — SIGNIFICANT CHANGE UP (ref 150–400)
PLATELET # BLD AUTO: 313 K/UL — SIGNIFICANT CHANGE UP (ref 150–400)
PLATELET # BLD AUTO: 316 K/UL — SIGNIFICANT CHANGE UP (ref 150–400)
PO2 BLDA: 79 MMHG — LOW (ref 83–108)
PO2 BLDA: 84 MMHG — SIGNIFICANT CHANGE UP (ref 83–108)
PO2 BLDV: 39 MMHG — SIGNIFICANT CHANGE UP (ref 25–45)
PO2 BLDV: 42 MMHG — SIGNIFICANT CHANGE UP (ref 25–45)
PO2 BLDV: 44 MMHG — SIGNIFICANT CHANGE UP (ref 25–45)
PO2 BLDV: 47 MMHG — HIGH (ref 25–45)
PO2 BLDV: 65 MMHG — HIGH (ref 25–45)
PO2 BLDV: 82 MMHG — HIGH (ref 25–45)
PO2 BLDV: 86 MMHG — HIGH (ref 25–45)
POCT - HEMOGLOBIN (HGB), QUANTITATIVE, TRANSCUTANEOUS: 13.9
POIKILOCYTOSIS BLD QL AUTO: SIGNIFICANT CHANGE UP
POTASSIUM BLDV-SCNC: 3.1 MMOL/L — LOW (ref 3.5–5.1)
POTASSIUM BLDV-SCNC: 3.3 MMOL/L — LOW (ref 3.5–5.1)
POTASSIUM BLDV-SCNC: 3.4 MMOL/L — LOW (ref 3.5–5.1)
POTASSIUM BLDV-SCNC: 4.8 MMOL/L — SIGNIFICANT CHANGE UP (ref 3.5–5.1)
POTASSIUM BLDV-SCNC: 4.8 MMOL/L — SIGNIFICANT CHANGE UP (ref 3.5–5.1)
POTASSIUM SERPL-MCNC: 2.5 MMOL/L — CRITICAL LOW (ref 3.5–5.3)
POTASSIUM SERPL-MCNC: 3 MMOL/L — LOW (ref 3.5–5.3)
POTASSIUM SERPL-MCNC: 3.1 MMOL/L — LOW (ref 3.5–5.3)
POTASSIUM SERPL-MCNC: 3.1 MMOL/L — LOW (ref 3.5–5.3)
POTASSIUM SERPL-MCNC: 3.4 MMOL/L — LOW (ref 3.5–5.3)
POTASSIUM SERPL-MCNC: 3.4 MMOL/L — LOW (ref 3.5–5.3)
POTASSIUM SERPL-MCNC: 3.5 MMOL/L — SIGNIFICANT CHANGE UP (ref 3.5–5.3)
POTASSIUM SERPL-MCNC: 3.6 MMOL/L — SIGNIFICANT CHANGE UP (ref 3.5–5.3)
POTASSIUM SERPL-MCNC: 3.6 MMOL/L — SIGNIFICANT CHANGE UP (ref 3.5–5.3)
POTASSIUM SERPL-MCNC: 3.7 MMOL/L — SIGNIFICANT CHANGE UP (ref 3.5–5.3)
POTASSIUM SERPL-MCNC: 3.8 MMOL/L — SIGNIFICANT CHANGE UP (ref 3.5–5.3)
POTASSIUM SERPL-MCNC: 3.9 MMOL/L — SIGNIFICANT CHANGE UP (ref 3.5–5.3)
POTASSIUM SERPL-MCNC: 3.9 MMOL/L — SIGNIFICANT CHANGE UP (ref 3.5–5.3)
POTASSIUM SERPL-MCNC: 4 MMOL/L — SIGNIFICANT CHANGE UP (ref 3.5–5.3)
POTASSIUM SERPL-MCNC: 4 MMOL/L — SIGNIFICANT CHANGE UP (ref 3.5–5.3)
POTASSIUM SERPL-MCNC: 4.1 MMOL/L — SIGNIFICANT CHANGE UP (ref 3.5–5.3)
POTASSIUM SERPL-MCNC: 4.2 MMOL/L — SIGNIFICANT CHANGE UP (ref 3.5–5.3)
POTASSIUM SERPL-MCNC: 4.3 MMOL/L — SIGNIFICANT CHANGE UP (ref 3.5–5.3)
POTASSIUM SERPL-MCNC: 4.5 MMOL/L — SIGNIFICANT CHANGE UP (ref 3.5–5.3)
POTASSIUM SERPL-MCNC: 4.7 MMOL/L — SIGNIFICANT CHANGE UP (ref 3.5–5.3)
POTASSIUM SERPL-MCNC: 4.9 MMOL/L — SIGNIFICANT CHANGE UP (ref 3.5–5.3)
POTASSIUM SERPL-MCNC: 5.4 MMOL/L — HIGH (ref 3.5–5.3)
POTASSIUM SERPL-SCNC: 2.5 MMOL/L — CRITICAL LOW (ref 3.5–5.3)
POTASSIUM SERPL-SCNC: 3 MMOL/L — LOW (ref 3.5–5.3)
POTASSIUM SERPL-SCNC: 3.1 MMOL/L — LOW (ref 3.5–5.3)
POTASSIUM SERPL-SCNC: 3.1 MMOL/L — LOW (ref 3.5–5.3)
POTASSIUM SERPL-SCNC: 3.4 MMOL/L — LOW (ref 3.5–5.3)
POTASSIUM SERPL-SCNC: 3.4 MMOL/L — LOW (ref 3.5–5.3)
POTASSIUM SERPL-SCNC: 3.5 MMOL/L — SIGNIFICANT CHANGE UP (ref 3.5–5.3)
POTASSIUM SERPL-SCNC: 3.6 MMOL/L — SIGNIFICANT CHANGE UP (ref 3.5–5.3)
POTASSIUM SERPL-SCNC: 3.6 MMOL/L — SIGNIFICANT CHANGE UP (ref 3.5–5.3)
POTASSIUM SERPL-SCNC: 3.7 MMOL/L — SIGNIFICANT CHANGE UP (ref 3.5–5.3)
POTASSIUM SERPL-SCNC: 3.8 MMOL/L — SIGNIFICANT CHANGE UP (ref 3.5–5.3)
POTASSIUM SERPL-SCNC: 3.9 MMOL/L — SIGNIFICANT CHANGE UP (ref 3.5–5.3)
POTASSIUM SERPL-SCNC: 3.9 MMOL/L — SIGNIFICANT CHANGE UP (ref 3.5–5.3)
POTASSIUM SERPL-SCNC: 4 MMOL/L — SIGNIFICANT CHANGE UP (ref 3.5–5.3)
POTASSIUM SERPL-SCNC: 4 MMOL/L — SIGNIFICANT CHANGE UP (ref 3.5–5.3)
POTASSIUM SERPL-SCNC: 4.1 MMOL/L — SIGNIFICANT CHANGE UP (ref 3.5–5.3)
POTASSIUM SERPL-SCNC: 4.2 MMOL/L — SIGNIFICANT CHANGE UP (ref 3.5–5.3)
POTASSIUM SERPL-SCNC: 4.3 MMOL/L — SIGNIFICANT CHANGE UP (ref 3.5–5.3)
POTASSIUM SERPL-SCNC: 4.5 MMOL/L — SIGNIFICANT CHANGE UP (ref 3.5–5.3)
POTASSIUM SERPL-SCNC: 4.7 MMOL/L — SIGNIFICANT CHANGE UP (ref 3.5–5.3)
POTASSIUM SERPL-SCNC: 4.9 MMOL/L — SIGNIFICANT CHANGE UP (ref 3.5–5.3)
POTASSIUM SERPL-SCNC: 5.4 MMOL/L — HIGH (ref 3.5–5.3)
PROCALCITONIN SERPL-MCNC: 0.13 NG/ML — HIGH (ref 0.02–0.1)
PROCALCITONIN SERPL-MCNC: 0.19 NG/ML — HIGH (ref 0.02–0.1)
PROT SERPL-MCNC: 5.8 G/DL — LOW (ref 6–8.3)
PROT SERPL-MCNC: 6.2 G/DL — SIGNIFICANT CHANGE UP (ref 6–8.3)
PROT SERPL-MCNC: 6.6 G/DL — SIGNIFICANT CHANGE UP (ref 6–8.3)
PROT SERPL-MCNC: 7 G/DL — SIGNIFICANT CHANGE UP (ref 6–8.3)
PROT SERPL-MCNC: 7.1 G/DL — SIGNIFICANT CHANGE UP (ref 6–8.3)
PROT SERPL-MCNC: 7.2 G/DL — SIGNIFICANT CHANGE UP (ref 6–8.3)
PROT SERPL-MCNC: 7.3 G/DL — SIGNIFICANT CHANGE UP (ref 6–8.3)
PROT UR-MCNC: ABNORMAL
PROTHROM AB SERPL-ACNC: 14.6 SEC — HIGH (ref 10.5–13.4)
RAPID RVP RESULT: SIGNIFICANT CHANGE UP
RBC # BLD: 4.31 M/UL — SIGNIFICANT CHANGE UP (ref 3.8–5.2)
RBC # BLD: 4.36 M/UL — SIGNIFICANT CHANGE UP (ref 3.8–5.2)
RBC # BLD: 4.38 M/UL — SIGNIFICANT CHANGE UP (ref 3.8–5.2)
RBC # BLD: 4.38 M/UL — SIGNIFICANT CHANGE UP (ref 3.8–5.2)
RBC # BLD: 4.44 M/UL — SIGNIFICANT CHANGE UP (ref 3.8–5.2)
RBC # BLD: 4.48 M/UL — SIGNIFICANT CHANGE UP (ref 3.8–5.2)
RBC # BLD: 4.58 M/UL — SIGNIFICANT CHANGE UP (ref 3.8–5.2)
RBC # BLD: 4.64 M/UL — SIGNIFICANT CHANGE UP (ref 3.8–5.2)
RBC # BLD: 4.68 M/UL — SIGNIFICANT CHANGE UP (ref 3.8–5.2)
RBC # BLD: 4.71 M/UL — SIGNIFICANT CHANGE UP (ref 3.8–5.2)
RBC # BLD: 4.83 M/UL — SIGNIFICANT CHANGE UP (ref 3.8–5.2)
RBC # BLD: 4.85 M/UL — SIGNIFICANT CHANGE UP (ref 3.8–5.2)
RBC # BLD: 4.89 M/UL — SIGNIFICANT CHANGE UP (ref 3.8–5.2)
RBC # BLD: 4.89 M/UL — SIGNIFICANT CHANGE UP (ref 3.8–5.2)
RBC # BLD: 4.9 M/UL — SIGNIFICANT CHANGE UP (ref 3.8–5.2)
RBC # BLD: 4.92 M/UL — SIGNIFICANT CHANGE UP (ref 3.8–5.2)
RBC # BLD: 4.94 M/UL — SIGNIFICANT CHANGE UP (ref 3.8–5.2)
RBC # BLD: 4.95 M/UL — SIGNIFICANT CHANGE UP (ref 3.8–5.2)
RBC # BLD: 5 M/UL — SIGNIFICANT CHANGE UP (ref 3.8–5.2)
RBC # BLD: 5.05 M/UL — SIGNIFICANT CHANGE UP (ref 3.8–5.2)
RBC # BLD: 5.05 M/UL — SIGNIFICANT CHANGE UP (ref 3.8–5.2)
RBC # BLD: 5.06 M/UL — SIGNIFICANT CHANGE UP (ref 3.8–5.2)
RBC # BLD: 5.08 M/UL — SIGNIFICANT CHANGE UP (ref 3.8–5.2)
RBC # BLD: 5.23 M/UL — HIGH (ref 3.8–5.2)
RBC # BLD: 5.32 M/UL — HIGH (ref 3.8–5.2)
RBC # BLD: 5.35 M/UL — HIGH (ref 3.8–5.2)
RBC # BLD: 5.48 M/UL — HIGH (ref 3.8–5.2)
RBC # BLD: 5.58 M/UL — HIGH (ref 3.8–5.2)
RBC # BLD: 5.7 M/UL — HIGH (ref 3.8–5.2)
RBC # BLD: 5.92 M/UL — HIGH (ref 3.8–5.2)
RBC # BLD: 5.93 M/UL — HIGH (ref 3.8–5.2)
RBC # FLD: 14.4 % — SIGNIFICANT CHANGE UP (ref 10.3–14.5)
RBC # FLD: 14.5 % — SIGNIFICANT CHANGE UP (ref 10.3–14.5)
RBC # FLD: 14.6 % — HIGH (ref 10.3–14.5)
RBC # FLD: 14.7 % — HIGH (ref 10.3–14.5)
RBC # FLD: 14.8 % — HIGH (ref 10.3–14.5)
RBC # FLD: 14.9 % — HIGH (ref 10.3–14.5)
RBC # FLD: 15 % — HIGH (ref 10.3–14.5)
RBC # FLD: 15 % — HIGH (ref 10.3–14.5)
RBC # FLD: 15.1 % — HIGH (ref 10.3–14.5)
RBC # FLD: 15.2 % — HIGH (ref 10.3–14.5)
RBC # FLD: 15.7 % — HIGH (ref 10.3–14.5)
RBC # FLD: 15.9 % — HIGH (ref 10.3–14.5)
RBC # FLD: 15.9 % — HIGH (ref 10.3–14.5)
RBC BLD AUTO: ABNORMAL
RH IG SCN BLD-IMP: POSITIVE — SIGNIFICANT CHANGE UP
RSV RNA NPH QL NAA+NON-PROBE: SIGNIFICANT CHANGE UP
S AUREUS DNA NOSE QL NAA+PROBE: SIGNIFICANT CHANGE UP
S AUREUS DNA NOSE QL NAA+PROBE: SIGNIFICANT CHANGE UP
S PNEUM AG UR QL: NEGATIVE — SIGNIFICANT CHANGE UP
SAO2 % BLDA: 95.5 % — SIGNIFICANT CHANGE UP (ref 94–98)
SAO2 % BLDA: 96.2 % — SIGNIFICANT CHANGE UP (ref 94–98)
SAO2 % BLDV: 62.3 % — LOW (ref 67–88)
SAO2 % BLDV: 69.4 % — SIGNIFICANT CHANGE UP (ref 67–88)
SAO2 % BLDV: 71.9 % — SIGNIFICANT CHANGE UP (ref 67–88)
SAO2 % BLDV: 78.7 % — SIGNIFICANT CHANGE UP (ref 67–88)
SAO2 % BLDV: 92.8 % — HIGH (ref 67–88)
SAO2 % BLDV: 96.1 % — HIGH (ref 67–88)
SAO2 % BLDV: 96.5 % — HIGH (ref 67–88)
SARS-COV-2 RNA SPEC QL NAA+PROBE: SIGNIFICANT CHANGE UP
SODIUM SERPL-SCNC: 135 MMOL/L — SIGNIFICANT CHANGE UP (ref 135–145)
SODIUM SERPL-SCNC: 135 MMOL/L — SIGNIFICANT CHANGE UP (ref 135–145)
SODIUM SERPL-SCNC: 136 MMOL/L — SIGNIFICANT CHANGE UP (ref 135–145)
SODIUM SERPL-SCNC: 137 MMOL/L — SIGNIFICANT CHANGE UP (ref 135–145)
SODIUM SERPL-SCNC: 138 MMOL/L — SIGNIFICANT CHANGE UP (ref 135–145)
SODIUM SERPL-SCNC: 139 MMOL/L — SIGNIFICANT CHANGE UP (ref 135–145)
SODIUM SERPL-SCNC: 140 MMOL/L — SIGNIFICANT CHANGE UP (ref 135–145)
SODIUM SERPL-SCNC: 141 MMOL/L — SIGNIFICANT CHANGE UP (ref 135–145)
SODIUM SERPL-SCNC: 142 MMOL/L — SIGNIFICANT CHANGE UP (ref 135–145)
SODIUM SERPL-SCNC: 143 MMOL/L — SIGNIFICANT CHANGE UP (ref 135–145)
SODIUM SERPL-SCNC: 144 MMOL/L — SIGNIFICANT CHANGE UP (ref 135–145)
SODIUM SERPL-SCNC: 144 MMOL/L — SIGNIFICANT CHANGE UP (ref 135–145)
SODIUM SERPL-SCNC: 147 MMOL/L — HIGH (ref 135–145)
SODIUM UR-SCNC: 44 MMOL/L — SIGNIFICANT CHANGE UP
SP GR SPEC: 1.02 — SIGNIFICANT CHANGE UP (ref 1.01–1.02)
UROBILINOGEN FLD QL: NEGATIVE — SIGNIFICANT CHANGE UP
UUN UR-MCNC: 502 MG/DL — SIGNIFICANT CHANGE UP
WBC # BLD: 10.09 K/UL — SIGNIFICANT CHANGE UP (ref 3.8–10.5)
WBC # BLD: 10.54 K/UL — HIGH (ref 3.8–10.5)
WBC # BLD: 10.63 K/UL — HIGH (ref 3.8–10.5)
WBC # BLD: 11.15 K/UL — HIGH (ref 3.8–10.5)
WBC # BLD: 12.3 K/UL — HIGH (ref 3.8–10.5)
WBC # BLD: 12.41 K/UL — HIGH (ref 3.8–10.5)
WBC # BLD: 15.42 K/UL — HIGH (ref 3.8–10.5)
WBC # BLD: 23.85 K/UL — HIGH (ref 3.8–10.5)
WBC # BLD: 30.98 K/UL — HIGH (ref 3.8–10.5)
WBC # BLD: 32.46 K/UL — HIGH (ref 3.8–10.5)
WBC # BLD: 4.33 K/UL — SIGNIFICANT CHANGE UP (ref 3.8–10.5)
WBC # BLD: 5.95 K/UL — SIGNIFICANT CHANGE UP (ref 3.8–10.5)
WBC # BLD: 5.98 K/UL — SIGNIFICANT CHANGE UP (ref 3.8–10.5)
WBC # BLD: 6.11 K/UL — SIGNIFICANT CHANGE UP (ref 3.8–10.5)
WBC # BLD: 6.55 K/UL — SIGNIFICANT CHANGE UP (ref 3.8–10.5)
WBC # BLD: 7.01 K/UL — SIGNIFICANT CHANGE UP (ref 3.8–10.5)
WBC # BLD: 7.14 K/UL — SIGNIFICANT CHANGE UP (ref 3.8–10.5)
WBC # BLD: 7.49 K/UL — SIGNIFICANT CHANGE UP (ref 3.8–10.5)
WBC # BLD: 7.56 K/UL — SIGNIFICANT CHANGE UP (ref 3.8–10.5)
WBC # BLD: 7.65 K/UL — SIGNIFICANT CHANGE UP (ref 3.8–10.5)
WBC # BLD: 7.71 K/UL — SIGNIFICANT CHANGE UP (ref 3.8–10.5)
WBC # BLD: 8.02 K/UL — SIGNIFICANT CHANGE UP (ref 3.8–10.5)
WBC # BLD: 8.05 K/UL — SIGNIFICANT CHANGE UP (ref 3.8–10.5)
WBC # BLD: 8.08 K/UL — SIGNIFICANT CHANGE UP (ref 3.8–10.5)
WBC # BLD: 8.26 K/UL — SIGNIFICANT CHANGE UP (ref 3.8–10.5)
WBC # BLD: 8.71 K/UL — SIGNIFICANT CHANGE UP (ref 3.8–10.5)
WBC # BLD: 8.88 K/UL — SIGNIFICANT CHANGE UP (ref 3.8–10.5)
WBC # BLD: 8.93 K/UL — SIGNIFICANT CHANGE UP (ref 3.8–10.5)
WBC # BLD: 9.07 K/UL — SIGNIFICANT CHANGE UP (ref 3.8–10.5)
WBC # BLD: 9.28 K/UL — SIGNIFICANT CHANGE UP (ref 3.8–10.5)
WBC # BLD: 9.88 K/UL — SIGNIFICANT CHANGE UP (ref 3.8–10.5)
WBC # FLD AUTO: 10.09 K/UL — SIGNIFICANT CHANGE UP (ref 3.8–10.5)
WBC # FLD AUTO: 10.54 K/UL — HIGH (ref 3.8–10.5)
WBC # FLD AUTO: 10.63 K/UL — HIGH (ref 3.8–10.5)
WBC # FLD AUTO: 11.15 K/UL — HIGH (ref 3.8–10.5)
WBC # FLD AUTO: 12.3 K/UL — HIGH (ref 3.8–10.5)
WBC # FLD AUTO: 12.41 K/UL — HIGH (ref 3.8–10.5)
WBC # FLD AUTO: 15.42 K/UL — HIGH (ref 3.8–10.5)
WBC # FLD AUTO: 23.85 K/UL — HIGH (ref 3.8–10.5)
WBC # FLD AUTO: 30.98 K/UL — HIGH (ref 3.8–10.5)
WBC # FLD AUTO: 32.46 K/UL — HIGH (ref 3.8–10.5)
WBC # FLD AUTO: 4.33 K/UL — SIGNIFICANT CHANGE UP (ref 3.8–10.5)
WBC # FLD AUTO: 5.95 K/UL — SIGNIFICANT CHANGE UP (ref 3.8–10.5)
WBC # FLD AUTO: 5.98 K/UL — SIGNIFICANT CHANGE UP (ref 3.8–10.5)
WBC # FLD AUTO: 6.11 K/UL — SIGNIFICANT CHANGE UP (ref 3.8–10.5)
WBC # FLD AUTO: 6.55 K/UL — SIGNIFICANT CHANGE UP (ref 3.8–10.5)
WBC # FLD AUTO: 7.01 K/UL — SIGNIFICANT CHANGE UP (ref 3.8–10.5)
WBC # FLD AUTO: 7.14 K/UL — SIGNIFICANT CHANGE UP (ref 3.8–10.5)
WBC # FLD AUTO: 7.49 K/UL — SIGNIFICANT CHANGE UP (ref 3.8–10.5)
WBC # FLD AUTO: 7.56 K/UL — SIGNIFICANT CHANGE UP (ref 3.8–10.5)
WBC # FLD AUTO: 7.65 K/UL — SIGNIFICANT CHANGE UP (ref 3.8–10.5)
WBC # FLD AUTO: 7.71 K/UL — SIGNIFICANT CHANGE UP (ref 3.8–10.5)
WBC # FLD AUTO: 8.02 K/UL — SIGNIFICANT CHANGE UP (ref 3.8–10.5)
WBC # FLD AUTO: 8.05 K/UL — SIGNIFICANT CHANGE UP (ref 3.8–10.5)
WBC # FLD AUTO: 8.08 K/UL — SIGNIFICANT CHANGE UP (ref 3.8–10.5)
WBC # FLD AUTO: 8.26 K/UL — SIGNIFICANT CHANGE UP (ref 3.8–10.5)
WBC # FLD AUTO: 8.71 K/UL — SIGNIFICANT CHANGE UP (ref 3.8–10.5)
WBC # FLD AUTO: 8.88 K/UL — SIGNIFICANT CHANGE UP (ref 3.8–10.5)
WBC # FLD AUTO: 8.93 K/UL — SIGNIFICANT CHANGE UP (ref 3.8–10.5)
WBC # FLD AUTO: 9.07 K/UL — SIGNIFICANT CHANGE UP (ref 3.8–10.5)
WBC # FLD AUTO: 9.28 K/UL — SIGNIFICANT CHANGE UP (ref 3.8–10.5)
WBC # FLD AUTO: 9.88 K/UL — SIGNIFICANT CHANGE UP (ref 3.8–10.5)

## 2022-01-01 PROCEDURE — 94729 DIFFUSING CAPACITY: CPT

## 2022-01-01 PROCEDURE — 99232 SBSQ HOSP IP/OBS MODERATE 35: CPT

## 2022-01-01 PROCEDURE — 99233 SBSQ HOSP IP/OBS HIGH 50: CPT | Mod: FS

## 2022-01-01 PROCEDURE — 99233 SBSQ HOSP IP/OBS HIGH 50: CPT

## 2022-01-01 PROCEDURE — 93010 ELECTROCARDIOGRAM REPORT: CPT

## 2022-01-01 PROCEDURE — 99223 1ST HOSP IP/OBS HIGH 75: CPT

## 2022-01-01 PROCEDURE — 99233 SBSQ HOSP IP/OBS HIGH 50: CPT | Mod: GC

## 2022-01-01 PROCEDURE — 71045 X-RAY EXAM CHEST 1 VIEW: CPT | Mod: 26,76

## 2022-01-01 PROCEDURE — 80053 COMPREHEN METABOLIC PANEL: CPT

## 2022-01-01 PROCEDURE — 84132 ASSAY OF SERUM POTASSIUM: CPT

## 2022-01-01 PROCEDURE — 99497 ADVNCD CARE PLAN 30 MIN: CPT | Mod: 25

## 2022-01-01 PROCEDURE — 87899 AGENT NOS ASSAY W/OPTIC: CPT

## 2022-01-01 PROCEDURE — 83605 ASSAY OF LACTIC ACID: CPT

## 2022-01-01 PROCEDURE — 12345: CPT | Mod: NC

## 2022-01-01 PROCEDURE — 71045 X-RAY EXAM CHEST 1 VIEW: CPT | Mod: 26

## 2022-01-01 PROCEDURE — 87449 NOS EACH ORGANISM AG IA: CPT

## 2022-01-01 PROCEDURE — 99232 SBSQ HOSP IP/OBS MODERATE 35: CPT | Mod: FS

## 2022-01-01 PROCEDURE — 85027 COMPLETE CBC AUTOMATED: CPT

## 2022-01-01 PROCEDURE — 71045 X-RAY EXAM CHEST 1 VIEW: CPT

## 2022-01-01 PROCEDURE — 84145 PROCALCITONIN (PCT): CPT

## 2022-01-01 PROCEDURE — 99222 1ST HOSP IP/OBS MODERATE 55: CPT

## 2022-01-01 PROCEDURE — 83735 ASSAY OF MAGNESIUM: CPT

## 2022-01-01 PROCEDURE — 82565 ASSAY OF CREATININE: CPT

## 2022-01-01 PROCEDURE — 82570 ASSAY OF URINE CREATININE: CPT

## 2022-01-01 PROCEDURE — ZZZZZ: CPT

## 2022-01-01 PROCEDURE — 74018 RADEX ABDOMEN 1 VIEW: CPT | Mod: 26

## 2022-01-01 PROCEDURE — 97530 THERAPEUTIC ACTIVITIES: CPT

## 2022-01-01 PROCEDURE — 86900 BLOOD TYPING SEROLOGIC ABO: CPT

## 2022-01-01 PROCEDURE — 74018 RADEX ABDOMEN 1 VIEW: CPT

## 2022-01-01 PROCEDURE — 82803 BLOOD GASES ANY COMBINATION: CPT

## 2022-01-01 PROCEDURE — 36600 WITHDRAWAL OF ARTERIAL BLOOD: CPT

## 2022-01-01 PROCEDURE — 82947 ASSAY GLUCOSE BLOOD QUANT: CPT

## 2022-01-01 PROCEDURE — 71275 CT ANGIOGRAPHY CHEST: CPT

## 2022-01-01 PROCEDURE — 94640 AIRWAY INHALATION TREATMENT: CPT

## 2022-01-01 PROCEDURE — 84100 ASSAY OF PHOSPHORUS: CPT

## 2022-01-01 PROCEDURE — 72131 CT LUMBAR SPINE W/O DYE: CPT | Mod: MA

## 2022-01-01 PROCEDURE — 87637 SARSCOV2&INF A&B&RSV AMP PRB: CPT

## 2022-01-01 PROCEDURE — 99231 SBSQ HOSP IP/OBS SF/LOW 25: CPT | Mod: GC

## 2022-01-01 PROCEDURE — 85730 THROMBOPLASTIN TIME PARTIAL: CPT

## 2022-01-01 PROCEDURE — 71275 CT ANGIOGRAPHY CHEST: CPT | Mod: 26

## 2022-01-01 PROCEDURE — 99223 1ST HOSP IP/OBS HIGH 75: CPT | Mod: GC

## 2022-01-01 PROCEDURE — 96374 THER/PROPH/DIAG INJ IV PUSH: CPT

## 2022-01-01 PROCEDURE — 83880 ASSAY OF NATRIURETIC PEPTIDE: CPT

## 2022-01-01 PROCEDURE — 97162 PT EVAL MOD COMPLEX 30 MIN: CPT

## 2022-01-01 PROCEDURE — 82272 OCCULT BLD FECES 1-3 TESTS: CPT

## 2022-01-01 PROCEDURE — 86901 BLOOD TYPING SEROLOGIC RH(D): CPT

## 2022-01-01 PROCEDURE — 80048 BASIC METABOLIC PNL TOTAL CA: CPT

## 2022-01-01 PROCEDURE — 94060 EVALUATION OF WHEEZING: CPT

## 2022-01-01 PROCEDURE — 36415 COLL VENOUS BLD VENIPUNCTURE: CPT

## 2022-01-01 PROCEDURE — 85025 COMPLETE CBC W/AUTO DIFF WBC: CPT

## 2022-01-01 PROCEDURE — 97110 THERAPEUTIC EXERCISES: CPT

## 2022-01-01 PROCEDURE — 87040 BLOOD CULTURE FOR BACTERIA: CPT

## 2022-01-01 PROCEDURE — 99214 OFFICE O/P EST MOD 30 MIN: CPT

## 2022-01-01 PROCEDURE — 82330 ASSAY OF CALCIUM: CPT

## 2022-01-01 PROCEDURE — 72131 CT LUMBAR SPINE W/O DYE: CPT | Mod: 26,MA

## 2022-01-01 PROCEDURE — 99284 EMERGENCY DEPT VISIT MOD MDM: CPT | Mod: FS

## 2022-01-01 PROCEDURE — 72128 CT CHEST SPINE W/O DYE: CPT | Mod: 26

## 2022-01-01 PROCEDURE — 88738 HGB QUANT TRANSCUTANEOUS: CPT

## 2022-01-01 PROCEDURE — 94727 GAS DIL/WSHOT DETER LNG VOL: CPT

## 2022-01-01 PROCEDURE — 93005 ELECTROCARDIOGRAM TRACING: CPT

## 2022-01-01 PROCEDURE — 86850 RBC ANTIBODY SCREEN: CPT

## 2022-01-01 PROCEDURE — 87635 SARS-COV-2 COVID-19 AMP PRB: CPT

## 2022-01-01 PROCEDURE — 99239 HOSP IP/OBS DSCHRG MGMT >30: CPT

## 2022-01-01 PROCEDURE — 71250 CT THORAX DX C-: CPT

## 2022-01-01 PROCEDURE — 94660 CPAP INITIATION&MGMT: CPT

## 2022-01-01 PROCEDURE — 99285 EMERGENCY DEPT VISIT HI MDM: CPT

## 2022-01-01 PROCEDURE — 0225U NFCT DS DNA&RNA 21 SARSCOV2: CPT

## 2022-01-01 PROCEDURE — 93306 TTE W/DOPPLER COMPLETE: CPT | Mod: 26

## 2022-01-01 PROCEDURE — 99221 1ST HOSP IP/OBS SF/LOW 40: CPT | Mod: GC

## 2022-01-01 PROCEDURE — 82435 ASSAY OF BLOOD CHLORIDE: CPT

## 2022-01-01 PROCEDURE — 85018 HEMOGLOBIN: CPT

## 2022-01-01 PROCEDURE — 81001 URINALYSIS AUTO W/SCOPE: CPT

## 2022-01-01 PROCEDURE — 85014 HEMATOCRIT: CPT

## 2022-01-01 PROCEDURE — 97161 PT EVAL LOW COMPLEX 20 MIN: CPT

## 2022-01-01 PROCEDURE — 97116 GAIT TRAINING THERAPY: CPT

## 2022-01-01 PROCEDURE — 84300 ASSAY OF URINE SODIUM: CPT

## 2022-01-01 PROCEDURE — 72128 CT CHEST SPINE W/O DYE: CPT

## 2022-01-01 PROCEDURE — 85652 RBC SED RATE AUTOMATED: CPT

## 2022-01-01 PROCEDURE — 84295 ASSAY OF SERUM SODIUM: CPT

## 2022-01-01 PROCEDURE — 92610 EVALUATE SWALLOWING FUNCTION: CPT

## 2022-01-01 PROCEDURE — 87640 STAPH A DNA AMP PROBE: CPT

## 2022-01-01 PROCEDURE — 99285 EMERGENCY DEPT VISIT HI MDM: CPT | Mod: 25

## 2022-01-01 PROCEDURE — 83615 LACTATE (LD) (LDH) ENZYME: CPT

## 2022-01-01 PROCEDURE — 99291 CRITICAL CARE FIRST HOUR: CPT

## 2022-01-01 PROCEDURE — 82306 VITAMIN D 25 HYDROXY: CPT

## 2022-01-01 PROCEDURE — C1751: CPT

## 2022-01-01 PROCEDURE — 99213 OFFICE O/P EST LOW 20 MIN: CPT | Mod: 25

## 2022-01-01 PROCEDURE — 93306 TTE W/DOPPLER COMPLETE: CPT

## 2022-01-01 PROCEDURE — 71250 CT THORAX DX C-: CPT | Mod: 26,MA

## 2022-01-01 PROCEDURE — 71250 CT THORAX DX C-: CPT | Mod: 26

## 2022-01-01 PROCEDURE — 86140 C-REACTIVE PROTEIN: CPT

## 2022-01-01 PROCEDURE — 84540 ASSAY OF URINE/UREA-N: CPT

## 2022-01-01 PROCEDURE — 76604 US EXAM CHEST: CPT | Mod: 26,GC

## 2022-01-01 PROCEDURE — 82962 GLUCOSE BLOOD TEST: CPT

## 2022-01-01 PROCEDURE — 85610 PROTHROMBIN TIME: CPT

## 2022-01-01 PROCEDURE — 36569 INSJ PICC 5 YR+ W/O IMAGING: CPT

## 2022-01-01 PROCEDURE — 87641 MR-STAPH DNA AMP PROBE: CPT

## 2022-01-01 RX ORDER — TIOTROPIUM BROMIDE 18 UG/1
1 CAPSULE ORAL; RESPIRATORY (INHALATION) DAILY
Refills: 0 | Status: DISCONTINUED | OUTPATIENT
Start: 2022-01-01 | End: 2022-01-01

## 2022-01-01 RX ORDER — PIPERACILLIN AND TAZOBACTAM 4; .5 G/20ML; G/20ML
3.38 INJECTION, POWDER, LYOPHILIZED, FOR SOLUTION INTRAVENOUS EVERY 8 HOURS
Refills: 0 | Status: DISCONTINUED | OUTPATIENT
Start: 2022-01-01 | End: 2022-01-01

## 2022-01-01 RX ORDER — ASPIRIN/CALCIUM CARB/MAGNESIUM 324 MG
81 TABLET ORAL DAILY
Refills: 0 | Status: DISCONTINUED | OUTPATIENT
Start: 2022-01-01 | End: 2022-01-01

## 2022-01-01 RX ORDER — POTASSIUM CHLORIDE 20 MEQ
10 PACKET (EA) ORAL
Refills: 0 | Status: COMPLETED | OUTPATIENT
Start: 2022-01-01 | End: 2022-01-01

## 2022-01-01 RX ORDER — METRONIDAZOLE 500 MG
500 TABLET ORAL EVERY 8 HOURS
Refills: 0 | Status: DISCONTINUED | OUTPATIENT
Start: 2022-01-01 | End: 2022-01-01

## 2022-01-01 RX ORDER — MORPHINE SULFATE 50 MG/1
0.5 CAPSULE, EXTENDED RELEASE ORAL EVERY 4 HOURS
Refills: 0 | Status: DISCONTINUED | OUTPATIENT
Start: 2022-01-01 | End: 2022-01-01

## 2022-01-01 RX ORDER — CHOLECALCIFEROL (VITAMIN D3) 125 MCG
2000 CAPSULE ORAL DAILY
Refills: 0 | Status: DISCONTINUED | OUTPATIENT
Start: 2022-01-01 | End: 2022-01-01

## 2022-01-01 RX ORDER — PANTOPRAZOLE SODIUM 20 MG/1
40 TABLET, DELAYED RELEASE ORAL
Refills: 0 | Status: DISCONTINUED | OUTPATIENT
Start: 2022-01-01 | End: 2022-01-01

## 2022-01-01 RX ORDER — CALCIUM CARBONATE 500(1250)
1 TABLET ORAL
Qty: 0 | Refills: 0 | DISCHARGE

## 2022-01-01 RX ORDER — BACITRACIN ZINC 500 UNIT/G
1 OINTMENT IN PACKET (EA) TOPICAL
Refills: 0 | Status: DISCONTINUED | OUTPATIENT
Start: 2022-01-01 | End: 2022-01-01

## 2022-01-01 RX ORDER — SENNA PLUS 8.6 MG/1
2 TABLET ORAL
Qty: 0 | Refills: 0 | DISCHARGE
Start: 2022-01-01

## 2022-01-01 RX ORDER — SODIUM CHLORIDE 9 MG/ML
500 INJECTION, SOLUTION INTRAVENOUS ONCE
Refills: 0 | Status: COMPLETED | OUTPATIENT
Start: 2022-01-01 | End: 2022-01-01

## 2022-01-01 RX ORDER — PIPERACILLIN AND TAZOBACTAM 4; .5 G/20ML; G/20ML
3.38 INJECTION, POWDER, LYOPHILIZED, FOR SOLUTION INTRAVENOUS ONCE
Refills: 0 | Status: COMPLETED | OUTPATIENT
Start: 2022-01-01 | End: 2022-01-01

## 2022-01-01 RX ORDER — CYCLOBENZAPRINE HYDROCHLORIDE 10 MG/1
1 TABLET, FILM COATED ORAL
Qty: 0 | Refills: 0 | DISCHARGE
Start: 2022-01-01

## 2022-01-01 RX ORDER — BUDESONIDE AND FORMOTEROL FUMARATE DIHYDRATE 160; 4.5 UG/1; UG/1
2 AEROSOL RESPIRATORY (INHALATION)
Refills: 0 | Status: DISCONTINUED | OUTPATIENT
Start: 2022-01-01 | End: 2022-01-01

## 2022-01-01 RX ORDER — SENNA PLUS 8.6 MG/1
2 TABLET ORAL AT BEDTIME
Refills: 0 | Status: DISCONTINUED | OUTPATIENT
Start: 2022-01-01 | End: 2022-01-01

## 2022-01-01 RX ORDER — ALPRAZOLAM 0.25 MG
0.5 TABLET ORAL DAILY
Refills: 0 | Status: DISCONTINUED | OUTPATIENT
Start: 2022-01-01 | End: 2022-01-01

## 2022-01-01 RX ORDER — HYDROMORPHONE HYDROCHLORIDE 2 MG/ML
0.25 INJECTION INTRAMUSCULAR; INTRAVENOUS; SUBCUTANEOUS EVERY 4 HOURS
Refills: 0 | Status: DISCONTINUED | OUTPATIENT
Start: 2022-01-01 | End: 2022-01-01

## 2022-01-01 RX ORDER — ACETAMINOPHEN 500 MG
975 TABLET ORAL ONCE
Refills: 0 | Status: COMPLETED | OUTPATIENT
Start: 2022-01-01 | End: 2022-01-01

## 2022-01-01 RX ORDER — ACETYLCYSTEINE 200 MG/ML
4 VIAL (ML) MISCELLANEOUS
Refills: 0 | Status: DISCONTINUED | OUTPATIENT
Start: 2022-01-01 | End: 2022-01-01

## 2022-01-01 RX ORDER — FUROSEMIDE 40 MG
20 TABLET ORAL ONCE
Refills: 0 | Status: DISCONTINUED | OUTPATIENT
Start: 2022-01-01 | End: 2022-01-01

## 2022-01-01 RX ORDER — FUROSEMIDE 40 MG
20 TABLET ORAL ONCE
Refills: 0 | Status: COMPLETED | OUTPATIENT
Start: 2022-01-01 | End: 2022-01-01

## 2022-01-01 RX ORDER — LIDOCAINE 4 G/100G
2 CREAM TOPICAL DAILY
Refills: 0 | Status: DISCONTINUED | OUTPATIENT
Start: 2022-01-01 | End: 2022-01-01

## 2022-01-01 RX ORDER — IPRATROPIUM/ALBUTEROL SULFATE 18-103MCG
3 AEROSOL WITH ADAPTER (GRAM) INHALATION ONCE
Refills: 0 | Status: COMPLETED | OUTPATIENT
Start: 2022-01-01 | End: 2022-01-01

## 2022-01-01 RX ORDER — CLONAZEPAM 1 MG
1 TABLET ORAL
Qty: 0 | Refills: 0 | DISCHARGE

## 2022-01-01 RX ORDER — ASPIRIN/CALCIUM CARB/MAGNESIUM 324 MG
1 TABLET ORAL
Qty: 0 | Refills: 0 | DISCHARGE

## 2022-01-01 RX ORDER — CALCIUM CARBONATE 500(1250)
1 TABLET ORAL
Qty: 0 | Refills: 0 | DISCHARGE
Start: 2022-01-01

## 2022-01-01 RX ORDER — INSULIN HUMAN 100 [IU]/ML
10 INJECTION, SOLUTION SUBCUTANEOUS ONCE
Refills: 0 | Status: COMPLETED | OUTPATIENT
Start: 2022-01-01 | End: 2022-01-01

## 2022-01-01 RX ORDER — LIDOCAINE 4 G/100G
1 CREAM TOPICAL EVERY 24 HOURS
Refills: 0 | Status: DISCONTINUED | OUTPATIENT
Start: 2022-01-01 | End: 2022-01-01

## 2022-01-01 RX ORDER — ACETAMINOPHEN 500 MG
650 TABLET ORAL EVERY 6 HOURS
Refills: 0 | Status: DISCONTINUED | OUTPATIENT
Start: 2022-01-01 | End: 2022-01-01

## 2022-01-01 RX ORDER — MORPHINE SULFATE 50 MG/1
1 CAPSULE, EXTENDED RELEASE ORAL ONCE
Refills: 0 | Status: DISCONTINUED | OUTPATIENT
Start: 2022-01-01 | End: 2022-01-01

## 2022-01-01 RX ORDER — ALBUTEROL 90 UG/1
2 AEROSOL, METERED ORAL EVERY 4 HOURS
Refills: 0 | Status: DISCONTINUED | OUTPATIENT
Start: 2022-01-01 | End: 2022-01-01

## 2022-01-01 RX ORDER — GABAPENTIN 300 MG/1
300 CAPSULE ORAL
Qty: 240 | Refills: 0 | Status: DISCONTINUED | COMMUNITY
Start: 2018-08-27 | End: 2022-01-01

## 2022-01-01 RX ORDER — POTASSIUM CHLORIDE 20 MEQ
40 PACKET (EA) ORAL ONCE
Refills: 0 | Status: COMPLETED | OUTPATIENT
Start: 2022-01-01 | End: 2022-01-01

## 2022-01-01 RX ORDER — CLOTRIMAZOLE AND BETAMETHASONE DIPROPIONATE 10; .5 MG/G; MG/G
1-0.05 CREAM TOPICAL TWICE DAILY
Qty: 1 | Refills: 1 | Status: ACTIVE | COMMUNITY
Start: 2020-12-04 | End: 1900-01-01

## 2022-01-01 RX ORDER — HYDROMORPHONE HYDROCHLORIDE 2 MG/ML
0.25 INJECTION INTRAMUSCULAR; INTRAVENOUS; SUBCUTANEOUS ONCE
Refills: 0 | Status: DISCONTINUED | OUTPATIENT
Start: 2022-01-01 | End: 2022-01-01

## 2022-01-01 RX ORDER — ONDANSETRON 8 MG/1
4 TABLET, FILM COATED ORAL ONCE
Refills: 0 | Status: COMPLETED | OUTPATIENT
Start: 2022-01-01 | End: 2022-01-01

## 2022-01-01 RX ORDER — ACETAMINOPHEN 500 MG
2 TABLET ORAL
Qty: 0 | Refills: 0 | DISCHARGE
Start: 2022-01-01

## 2022-01-01 RX ORDER — ACETAMINOPHEN 500 MG
1000 TABLET ORAL EVERY 8 HOURS
Refills: 0 | Status: COMPLETED | OUTPATIENT
Start: 2022-01-01 | End: 2022-01-01

## 2022-01-01 RX ORDER — FUROSEMIDE 40 MG
40 TABLET ORAL DAILY
Refills: 0 | Status: DISCONTINUED | OUTPATIENT
Start: 2022-01-01 | End: 2022-01-01

## 2022-01-01 RX ORDER — DEXTROSE 50 % IN WATER 50 %
50 SYRINGE (ML) INTRAVENOUS ONCE
Refills: 0 | Status: COMPLETED | OUTPATIENT
Start: 2022-01-01 | End: 2022-01-01

## 2022-01-01 RX ORDER — METRONIDAZOLE 500 MG
500 TABLET ORAL ONCE
Refills: 0 | Status: COMPLETED | OUTPATIENT
Start: 2022-01-01 | End: 2022-01-01

## 2022-01-01 RX ORDER — FUROSEMIDE 40 MG
20 TABLET ORAL DAILY
Refills: 0 | Status: DISCONTINUED | OUTPATIENT
Start: 2022-01-01 | End: 2022-01-01

## 2022-01-01 RX ORDER — BUDESONIDE, GLYCOPYRROLATE, AND FORMOTEROL FUMARATE 160; 9; 4.8 UG/1; UG/1; UG/1
160-9-4.8 AEROSOL, METERED RESPIRATORY (INHALATION) TWICE DAILY
Qty: 3 | Refills: 3 | Status: ACTIVE | COMMUNITY
Start: 2022-01-01 | End: 1900-01-01

## 2022-01-01 RX ORDER — CYCLOBENZAPRINE HYDROCHLORIDE 10 MG/1
5 TABLET, FILM COATED ORAL THREE TIMES A DAY
Refills: 0 | Status: COMPLETED | OUTPATIENT
Start: 2022-01-01 | End: 2022-01-01

## 2022-01-01 RX ORDER — HALOPERIDOL DECANOATE 100 MG/ML
1 INJECTION INTRAMUSCULAR ONCE
Refills: 0 | Status: COMPLETED | OUTPATIENT
Start: 2022-01-01 | End: 2022-01-01

## 2022-01-01 RX ORDER — SODIUM CHLORIDE 9 MG/ML
500 INJECTION, SOLUTION INTRAVENOUS
Refills: 0 | Status: DISCONTINUED | OUTPATIENT
Start: 2022-01-01 | End: 2022-01-01

## 2022-01-01 RX ORDER — ONDANSETRON 8 MG/1
4 TABLET, FILM COATED ORAL EVERY 8 HOURS
Refills: 0 | Status: DISCONTINUED | OUTPATIENT
Start: 2022-01-01 | End: 2022-01-01

## 2022-01-01 RX ORDER — OXYCODONE HYDROCHLORIDE 5 MG/1
2.5 TABLET ORAL EVERY 6 HOURS
Refills: 0 | Status: DISCONTINUED | OUTPATIENT
Start: 2022-01-01 | End: 2022-01-01

## 2022-01-01 RX ORDER — LANOLIN ALCOHOL/MO/W.PET/CERES
3 CREAM (GRAM) TOPICAL AT BEDTIME
Refills: 0 | Status: DISCONTINUED | OUTPATIENT
Start: 2022-01-01 | End: 2022-01-01

## 2022-01-01 RX ORDER — TRAMADOL HYDROCHLORIDE 50 MG/1
50 TABLET ORAL EVERY 8 HOURS
Refills: 0 | Status: DISCONTINUED | OUTPATIENT
Start: 2022-01-01 | End: 2022-01-01

## 2022-01-01 RX ORDER — OMEPRAZOLE 40 MG/1
40 CAPSULE, DELAYED RELEASE ORAL
Qty: 90 | Refills: 3 | Status: ACTIVE | COMMUNITY
Start: 2018-07-31 | End: 1900-01-01

## 2022-01-01 RX ORDER — BENZONATATE 200 MG/1
200 CAPSULE ORAL 3 TIMES DAILY
Qty: 90 | Refills: 2 | Status: ACTIVE | COMMUNITY
Start: 2022-01-01 | End: 1900-01-01

## 2022-01-01 RX ORDER — CEFTRIAXONE 500 MG/1
1000 INJECTION, POWDER, FOR SOLUTION INTRAMUSCULAR; INTRAVENOUS ONCE
Refills: 0 | Status: COMPLETED | OUTPATIENT
Start: 2022-01-01 | End: 2022-01-01

## 2022-01-01 RX ORDER — ROBINUL 0.2 MG/ML
0.4 INJECTION INTRAMUSCULAR; INTRAVENOUS ONCE
Refills: 0 | Status: DISCONTINUED | OUTPATIENT
Start: 2022-01-01 | End: 2022-01-01

## 2022-01-01 RX ORDER — INFLUENZA VIRUS VACCINE 15; 15; 15; 15 UG/.5ML; UG/.5ML; UG/.5ML; UG/.5ML
0.7 SUSPENSION INTRAMUSCULAR ONCE
Refills: 0 | Status: DISCONTINUED | OUTPATIENT
Start: 2022-01-01 | End: 2022-01-01

## 2022-01-01 RX ORDER — GABAPENTIN 400 MG/1
300 CAPSULE ORAL THREE TIMES A DAY
Refills: 0 | Status: DISCONTINUED | OUTPATIENT
Start: 2022-01-01 | End: 2022-01-01

## 2022-01-01 RX ORDER — CYCLOBENZAPRINE HYDROCHLORIDE 10 MG/1
5 TABLET, FILM COATED ORAL THREE TIMES A DAY
Refills: 0 | Status: DISCONTINUED | OUTPATIENT
Start: 2022-01-01 | End: 2022-01-01

## 2022-01-01 RX ORDER — HYDROMORPHONE HYDROCHLORIDE 2 MG/ML
0.5 INJECTION INTRAMUSCULAR; INTRAVENOUS; SUBCUTANEOUS ONCE
Refills: 0 | Status: DISCONTINUED | OUTPATIENT
Start: 2022-01-01 | End: 2022-01-01

## 2022-01-01 RX ORDER — AMLODIPINE BESYLATE 2.5 MG/1
2.5 TABLET ORAL
Qty: 90 | Refills: 3 | Status: ACTIVE | COMMUNITY
Start: 2019-02-20 | End: 1900-01-01

## 2022-01-01 RX ORDER — FUROSEMIDE 40 MG
1 TABLET ORAL
Qty: 0 | Refills: 0 | DISCHARGE

## 2022-01-01 RX ORDER — CEFEPIME 1 G/1
INJECTION, POWDER, FOR SOLUTION INTRAMUSCULAR; INTRAVENOUS
Refills: 0 | Status: DISCONTINUED | OUTPATIENT
Start: 2022-01-01 | End: 2022-01-01

## 2022-01-01 RX ORDER — VANCOMYCIN HCL 1 G
750 VIAL (EA) INTRAVENOUS ONCE
Refills: 0 | Status: COMPLETED | OUTPATIENT
Start: 2022-01-01 | End: 2022-01-01

## 2022-01-01 RX ORDER — POTASSIUM CHLORIDE 20 MEQ
1 PACKET (EA) ORAL
Qty: 0 | Refills: 0 | DISCHARGE

## 2022-01-01 RX ORDER — ALBUTEROL 90 UG/1
2.5 AEROSOL, METERED ORAL ONCE
Refills: 0 | Status: COMPLETED | OUTPATIENT
Start: 2022-01-01 | End: 2022-01-01

## 2022-01-01 RX ORDER — CALCITONIN SALMON 200 [IU]/ML
1 INJECTION, SOLUTION INTRAMUSCULAR DAILY
Refills: 0 | Status: DISCONTINUED | OUTPATIENT
Start: 2022-01-01 | End: 2022-01-01

## 2022-01-01 RX ORDER — POTASSIUM CHLORIDE 1500 MG/1
20 TABLET, EXTENDED RELEASE ORAL DAILY
Qty: 90 | Refills: 0 | Status: ACTIVE | COMMUNITY
Start: 2018-08-22 | End: 1900-01-01

## 2022-01-01 RX ORDER — AMLODIPINE BESYLATE 2.5 MG/1
2.5 TABLET ORAL DAILY
Refills: 0 | Status: DISCONTINUED | OUTPATIENT
Start: 2022-01-01 | End: 2022-01-01

## 2022-01-01 RX ORDER — HEPARIN SODIUM 5000 [USP'U]/ML
5000 INJECTION INTRAVENOUS; SUBCUTANEOUS EVERY 8 HOURS
Refills: 0 | Status: DISCONTINUED | OUTPATIENT
Start: 2022-01-01 | End: 2022-01-01

## 2022-01-01 RX ORDER — CALCITONIN SALMON 200 [IU]/ML
1 INJECTION, SOLUTION INTRAMUSCULAR
Qty: 30 | Refills: 0
Start: 2022-01-01 | End: 2022-01-01

## 2022-01-01 RX ORDER — FUROSEMIDE 40 MG
80 TABLET ORAL ONCE
Refills: 0 | Status: COMPLETED | OUTPATIENT
Start: 2022-01-01 | End: 2022-01-01

## 2022-01-01 RX ORDER — ACETAMINOPHEN 500 MG
1000 TABLET ORAL EVERY 8 HOURS
Refills: 0 | Status: DISCONTINUED | OUTPATIENT
Start: 2022-01-01 | End: 2022-01-01

## 2022-01-01 RX ORDER — PIPERACILLIN AND TAZOBACTAM 4; .5 G/20ML; G/20ML
3.38 INJECTION, POWDER, LYOPHILIZED, FOR SOLUTION INTRAVENOUS EVERY 8 HOURS
Refills: 0 | Status: COMPLETED | OUTPATIENT
Start: 2022-01-01 | End: 2022-01-01

## 2022-01-01 RX ORDER — ROBINUL 0.2 MG/ML
0.4 INJECTION INTRAMUSCULAR; INTRAVENOUS EVERY 6 HOURS
Refills: 0 | Status: DISCONTINUED | OUTPATIENT
Start: 2022-01-01 | End: 2022-01-01

## 2022-01-01 RX ORDER — SIMETHICONE 80 MG/1
80 TABLET, CHEWABLE ORAL DAILY
Refills: 0 | Status: DISCONTINUED | OUTPATIENT
Start: 2022-01-01 | End: 2022-01-01

## 2022-01-01 RX ORDER — SODIUM CHLORIDE 9 MG/ML
500 INJECTION INTRAMUSCULAR; INTRAVENOUS; SUBCUTANEOUS ONCE
Refills: 0 | Status: COMPLETED | OUTPATIENT
Start: 2022-01-01 | End: 2022-01-01

## 2022-01-01 RX ORDER — ACETAMINOPHEN 500 MG
1000 TABLET ORAL ONCE
Refills: 0 | Status: DISCONTINUED | OUTPATIENT
Start: 2022-01-01 | End: 2022-01-01

## 2022-01-01 RX ORDER — LIDOCAINE 4 G/100G
1 CREAM TOPICAL
Refills: 0 | Status: DISCONTINUED | OUTPATIENT
Start: 2022-01-01 | End: 2022-01-01

## 2022-01-01 RX ORDER — DIPHENHYDRAMINE HCL 50 MG
25 CAPSULE ORAL ONCE
Refills: 0 | Status: COMPLETED | OUTPATIENT
Start: 2022-01-01 | End: 2022-01-01

## 2022-01-01 RX ORDER — NYSTATIN CREAM 100000 [USP'U]/G
1 CREAM TOPICAL THREE TIMES A DAY
Refills: 0 | Status: DISCONTINUED | OUTPATIENT
Start: 2022-01-01 | End: 2022-01-01

## 2022-01-01 RX ORDER — CEFEPIME 1 G/1
1000 INJECTION, POWDER, FOR SOLUTION INTRAMUSCULAR; INTRAVENOUS ONCE
Refills: 0 | Status: COMPLETED | OUTPATIENT
Start: 2022-01-01 | End: 2022-01-01

## 2022-01-01 RX ORDER — PANTOPRAZOLE SODIUM 20 MG/1
80 TABLET, DELAYED RELEASE ORAL ONCE
Refills: 0 | Status: COMPLETED | OUTPATIENT
Start: 2022-01-01 | End: 2022-01-01

## 2022-01-01 RX ORDER — AZITHROMYCIN 500 MG/1
500 TABLET, FILM COATED ORAL ONCE
Refills: 0 | Status: COMPLETED | OUTPATIENT
Start: 2022-01-01 | End: 2022-01-01

## 2022-01-01 RX ORDER — GABAPENTIN 400 MG/1
1 CAPSULE ORAL
Qty: 0 | Refills: 0 | DISCHARGE
Start: 2022-01-01

## 2022-01-01 RX ORDER — IPRATROPIUM/ALBUTEROL SULFATE 18-103MCG
3 AEROSOL WITH ADAPTER (GRAM) INHALATION ONCE
Refills: 0 | Status: DISCONTINUED | OUTPATIENT
Start: 2022-01-01 | End: 2022-01-01

## 2022-01-01 RX ORDER — POTASSIUM CHLORIDE 750 MG/1
10 TABLET, FILM COATED, EXTENDED RELEASE ORAL
Qty: 30 | Refills: 5 | Status: DISCONTINUED | COMMUNITY
Start: 2018-07-30 | End: 2022-01-01

## 2022-01-01 RX ORDER — CEFEPIME 1 G/1
1000 INJECTION, POWDER, FOR SOLUTION INTRAMUSCULAR; INTRAVENOUS EVERY 24 HOURS
Refills: 0 | Status: DISCONTINUED | OUTPATIENT
Start: 2022-01-01 | End: 2022-01-01

## 2022-01-01 RX ORDER — TRIAMCINOLONE ACETONIDE 1 MG/G
0.1 CREAM TOPICAL
Qty: 80 | Refills: 2 | Status: ACTIVE | COMMUNITY
Start: 2019-12-13 | End: 1900-01-01

## 2022-01-01 RX ORDER — CYCLOBENZAPRINE HYDROCHLORIDE 10 MG/1
10 TABLET, FILM COATED ORAL ONCE
Refills: 0 | Status: COMPLETED | OUTPATIENT
Start: 2022-01-01 | End: 2022-01-01

## 2022-01-01 RX ORDER — SODIUM CHLORIDE 9 MG/ML
4 INJECTION INTRAMUSCULAR; INTRAVENOUS; SUBCUTANEOUS EVERY 6 HOURS
Refills: 0 | Status: DISCONTINUED | OUTPATIENT
Start: 2022-01-01 | End: 2022-01-01

## 2022-01-01 RX ORDER — TRAMADOL HYDROCHLORIDE 50 MG/1
1 TABLET ORAL
Qty: 0 | Refills: 0 | DISCHARGE
Start: 2022-01-01

## 2022-01-01 RX ORDER — AMLODIPINE BESYLATE 2.5 MG/1
1 TABLET ORAL
Qty: 0 | Refills: 0 | DISCHARGE

## 2022-01-01 RX ORDER — CHOLECALCIFEROL (VITAMIN D3) 125 MCG
1000 CAPSULE ORAL DAILY
Refills: 0 | Status: DISCONTINUED | OUTPATIENT
Start: 2022-01-01 | End: 2022-01-01

## 2022-01-01 RX ORDER — POLYETHYLENE GLYCOL 3350 17 G/17G
17 POWDER, FOR SOLUTION ORAL
Qty: 0 | Refills: 0 | DISCHARGE
Start: 2022-01-01

## 2022-01-01 RX ORDER — POTASSIUM CHLORIDE 20 MEQ
40 PACKET (EA) ORAL EVERY 4 HOURS
Refills: 0 | Status: COMPLETED | OUTPATIENT
Start: 2022-01-01 | End: 2022-01-01

## 2022-01-01 RX ORDER — CEFTRIAXONE 500 MG/1
1000 INJECTION, POWDER, FOR SOLUTION INTRAMUSCULAR; INTRAVENOUS EVERY 24 HOURS
Refills: 0 | Status: DISCONTINUED | OUTPATIENT
Start: 2022-01-01 | End: 2022-01-01

## 2022-01-01 RX ORDER — DEXAMETHASONE 0.5 MG/5ML
1 ELIXIR ORAL
Qty: 2 | Refills: 0
Start: 2022-01-01 | End: 2022-01-01

## 2022-01-01 RX ORDER — TRAMADOL HYDROCHLORIDE 50 MG/1
25 TABLET ORAL EVERY 8 HOURS
Refills: 0 | Status: DISCONTINUED | OUTPATIENT
Start: 2022-01-01 | End: 2022-01-01

## 2022-01-01 RX ORDER — ACETAMINOPHEN 500 MG
1000 TABLET ORAL ONCE
Refills: 0 | Status: COMPLETED | OUTPATIENT
Start: 2022-01-01 | End: 2022-01-01

## 2022-01-01 RX ORDER — TRAMADOL HYDROCHLORIDE 50 MG/1
50 TABLET ORAL EVERY 6 HOURS
Refills: 0 | Status: DISCONTINUED | OUTPATIENT
Start: 2022-01-01 | End: 2022-01-01

## 2022-01-01 RX ORDER — CLONAZEPAM 1 MG
0.5 TABLET ORAL ONCE
Refills: 0 | Status: DISCONTINUED | OUTPATIENT
Start: 2022-01-01 | End: 2022-01-01

## 2022-01-01 RX ORDER — TIOTROPIUM BROMIDE 18 UG/1
2 CAPSULE ORAL; RESPIRATORY (INHALATION) DAILY
Refills: 0 | Status: DISCONTINUED | OUTPATIENT
Start: 2022-01-01 | End: 2022-01-01

## 2022-01-01 RX ORDER — SODIUM CHLORIDE 9 MG/ML
1000 INJECTION INTRAMUSCULAR; INTRAVENOUS; SUBCUTANEOUS ONCE
Refills: 0 | Status: COMPLETED | OUTPATIENT
Start: 2022-01-01 | End: 2022-01-01

## 2022-01-01 RX ORDER — ENOXAPARIN SODIUM 100 MG/ML
40 INJECTION SUBCUTANEOUS EVERY 24 HOURS
Refills: 0 | Status: DISCONTINUED | OUTPATIENT
Start: 2022-01-01 | End: 2022-01-01

## 2022-01-01 RX ORDER — IPRATROPIUM/ALBUTEROL SULFATE 18-103MCG
3 AEROSOL WITH ADAPTER (GRAM) INHALATION EVERY 6 HOURS
Refills: 0 | Status: DISCONTINUED | OUTPATIENT
Start: 2022-01-01 | End: 2022-01-01

## 2022-01-01 RX ORDER — CALCIUM CARBONATE 500(1250)
1 TABLET ORAL
Refills: 0 | Status: DISCONTINUED | OUTPATIENT
Start: 2022-01-01 | End: 2022-01-01

## 2022-01-01 RX ORDER — CHLORHEXIDINE GLUCONATE 213 G/1000ML
1 SOLUTION TOPICAL DAILY
Refills: 0 | Status: DISCONTINUED | OUTPATIENT
Start: 2022-01-01 | End: 2022-01-01

## 2022-01-01 RX ORDER — GABAPENTIN 400 MG/1
400 CAPSULE ORAL THREE TIMES A DAY
Refills: 0 | Status: DISCONTINUED | OUTPATIENT
Start: 2022-01-01 | End: 2022-01-01

## 2022-01-01 RX ORDER — HYDROMORPHONE HYDROCHLORIDE 2 MG/ML
0.5 INJECTION INTRAMUSCULAR; INTRAVENOUS; SUBCUTANEOUS
Refills: 0 | Status: DISCONTINUED | OUTPATIENT
Start: 2022-01-01 | End: 2022-01-01

## 2022-01-01 RX ORDER — POLYETHYLENE GLYCOL 3350 17 G/17G
17 POWDER, FOR SOLUTION ORAL DAILY
Refills: 0 | Status: DISCONTINUED | OUTPATIENT
Start: 2022-01-01 | End: 2022-01-01

## 2022-01-01 RX ORDER — KETOROLAC TROMETHAMINE 30 MG/ML
15 SYRINGE (ML) INJECTION ONCE
Refills: 0 | Status: DISCONTINUED | OUTPATIENT
Start: 2022-01-01 | End: 2022-01-01

## 2022-01-01 RX ORDER — SODIUM CHLORIDE 9 MG/ML
1000 INJECTION, SOLUTION INTRAVENOUS
Refills: 0 | Status: DISCONTINUED | OUTPATIENT
Start: 2022-01-01 | End: 2022-01-01

## 2022-01-01 RX ORDER — DOCUSATE SODIUM 100 MG
2 CAPSULE ORAL
Qty: 0 | Refills: 0 | DISCHARGE

## 2022-01-01 RX ORDER — METRONIDAZOLE 500 MG
TABLET ORAL
Refills: 0 | Status: DISCONTINUED | OUTPATIENT
Start: 2022-01-01 | End: 2022-01-01

## 2022-01-01 RX ORDER — CHOLECALCIFEROL (VITAMIN D3) 125 MCG
2000 CAPSULE ORAL
Qty: 0 | Refills: 0 | DISCHARGE
Start: 2022-01-01

## 2022-01-01 RX ORDER — CYCLOBENZAPRINE HYDROCHLORIDE 10 MG/1
5 TABLET, FILM COATED ORAL
Refills: 0 | Status: DISCONTINUED | OUTPATIENT
Start: 2022-01-01 | End: 2022-01-01

## 2022-01-01 RX ORDER — FUROSEMIDE 40 MG
40 TABLET ORAL ONCE
Refills: 0 | Status: COMPLETED | OUTPATIENT
Start: 2022-01-01 | End: 2022-01-01

## 2022-01-01 RX ORDER — AZITHROMYCIN 500 MG/1
250 TABLET, FILM COATED ORAL DAILY
Refills: 0 | Status: DISCONTINUED | OUTPATIENT
Start: 2022-01-01 | End: 2022-01-01

## 2022-01-01 RX ORDER — IPRATROPIUM/ALBUTEROL SULFATE 18-103MCG
3 AEROSOL WITH ADAPTER (GRAM) INHALATION
Qty: 0 | Refills: 0 | DISCHARGE

## 2022-01-01 RX ORDER — DORNASE ALFA 1 MG/ML
2.5 SOLUTION RESPIRATORY (INHALATION) DAILY
Refills: 0 | Status: DISCONTINUED | OUTPATIENT
Start: 2022-01-01 | End: 2022-01-01

## 2022-01-01 RX ORDER — DEXAMETHASONE 0.5 MG/5ML
2 ELIXIR ORAL
Refills: 0 | Status: DISCONTINUED | OUTPATIENT
Start: 2022-01-01 | End: 2022-01-01

## 2022-01-01 RX ORDER — TIOTROPIUM BROMIDE 18 UG/1
18 CAPSULE ORAL; RESPIRATORY (INHALATION)
Qty: 30 | Refills: 0 | Status: ACTIVE | COMMUNITY
Start: 2019-01-31 | End: 1900-01-01

## 2022-01-01 RX ORDER — PANTOPRAZOLE SODIUM 20 MG/1
8 TABLET, DELAYED RELEASE ORAL
Qty: 80 | Refills: 0 | Status: DISCONTINUED | OUTPATIENT
Start: 2022-01-01 | End: 2022-01-01

## 2022-01-01 RX ORDER — CHOLECALCIFEROL (VITAMIN D3) 125 MCG
1 CAPSULE ORAL
Qty: 0 | Refills: 0 | DISCHARGE

## 2022-01-01 RX ORDER — FUROSEMIDE 40 MG/1
40 TABLET ORAL
Qty: 360 | Refills: 0 | Status: ACTIVE | COMMUNITY
Start: 2018-08-21 | End: 1900-01-01

## 2022-01-01 RX ORDER — FAMOTIDINE 10 MG/ML
20 INJECTION INTRAVENOUS
Refills: 0 | Status: DISCONTINUED | OUTPATIENT
Start: 2022-01-01 | End: 2022-01-01

## 2022-01-01 RX ORDER — SUCRALFATE 1 G
1 TABLET ORAL
Refills: 0 | Status: DISCONTINUED | OUTPATIENT
Start: 2022-01-01 | End: 2022-01-01

## 2022-01-01 RX ORDER — FAMOTIDINE 10 MG/ML
20 INJECTION INTRAVENOUS ONCE
Refills: 0 | Status: COMPLETED | OUTPATIENT
Start: 2022-01-01 | End: 2022-01-01

## 2022-01-01 RX ADMIN — CYCLOBENZAPRINE HYDROCHLORIDE 5 MILLIGRAM(S): 10 TABLET, FILM COATED ORAL at 22:29

## 2022-01-01 RX ADMIN — FAMOTIDINE 20 MILLIGRAM(S): 10 INJECTION INTRAVENOUS at 17:59

## 2022-01-01 RX ADMIN — SODIUM CHLORIDE 4 MILLILITER(S): 9 INJECTION INTRAMUSCULAR; INTRAVENOUS; SUBCUTANEOUS at 18:19

## 2022-01-01 RX ADMIN — MORPHINE SULFATE 1 MILLIGRAM(S): 50 CAPSULE, EXTENDED RELEASE ORAL at 23:00

## 2022-01-01 RX ADMIN — SODIUM CHLORIDE 4 MILLILITER(S): 9 INJECTION INTRAMUSCULAR; INTRAVENOUS; SUBCUTANEOUS at 23:33

## 2022-01-01 RX ADMIN — ALBUTEROL 2 PUFF(S): 90 AEROSOL, METERED ORAL at 02:02

## 2022-01-01 RX ADMIN — BUDESONIDE AND FORMOTEROL FUMARATE DIHYDRATE 2 PUFF(S): 160; 4.5 AEROSOL RESPIRATORY (INHALATION) at 10:39

## 2022-01-01 RX ADMIN — GABAPENTIN 400 MILLIGRAM(S): 400 CAPSULE ORAL at 05:38

## 2022-01-01 RX ADMIN — Medication 10 MILLIGRAM(S): at 22:25

## 2022-01-01 RX ADMIN — PANTOPRAZOLE SODIUM 40 MILLIGRAM(S): 20 TABLET, DELAYED RELEASE ORAL at 19:43

## 2022-01-01 RX ADMIN — ALBUTEROL 2 PUFF(S): 90 AEROSOL, METERED ORAL at 14:17

## 2022-01-01 RX ADMIN — Medication 1 GRAM(S): at 12:20

## 2022-01-01 RX ADMIN — Medication 30 MILLIGRAM(S): at 05:54

## 2022-01-01 RX ADMIN — BUDESONIDE AND FORMOTEROL FUMARATE DIHYDRATE 2 PUFF(S): 160; 4.5 AEROSOL RESPIRATORY (INHALATION) at 06:08

## 2022-01-01 RX ADMIN — CALCITONIN SALMON 1 SPRAY(S): 200 INJECTION, SOLUTION INTRAMUSCULAR at 12:22

## 2022-01-01 RX ADMIN — TRAMADOL HYDROCHLORIDE 50 MILLIGRAM(S): 50 TABLET ORAL at 02:48

## 2022-01-01 RX ADMIN — PANTOPRAZOLE SODIUM 40 MILLIGRAM(S): 20 TABLET, DELAYED RELEASE ORAL at 07:19

## 2022-01-01 RX ADMIN — Medication 1000 MILLIGRAM(S): at 22:05

## 2022-01-01 RX ADMIN — ALBUTEROL 2 PUFF(S): 90 AEROSOL, METERED ORAL at 06:12

## 2022-01-01 RX ADMIN — Medication 400 MILLIGRAM(S): at 00:43

## 2022-01-01 RX ADMIN — ALBUTEROL 2 PUFF(S): 90 AEROSOL, METERED ORAL at 12:19

## 2022-01-01 RX ADMIN — SENNA PLUS 2 TABLET(S): 8.6 TABLET ORAL at 21:56

## 2022-01-01 RX ADMIN — SODIUM CHLORIDE 120 MILLILITER(S): 9 INJECTION, SOLUTION INTRAVENOUS at 23:41

## 2022-01-01 RX ADMIN — CYCLOBENZAPRINE HYDROCHLORIDE 5 MILLIGRAM(S): 10 TABLET, FILM COATED ORAL at 22:28

## 2022-01-01 RX ADMIN — Medication 650 MILLIGRAM(S): at 01:57

## 2022-01-01 RX ADMIN — ALBUTEROL 2 PUFF(S): 90 AEROSOL, METERED ORAL at 11:21

## 2022-01-01 RX ADMIN — MORPHINE SULFATE 1 MILLIGRAM(S): 50 CAPSULE, EXTENDED RELEASE ORAL at 22:19

## 2022-01-01 RX ADMIN — Medication 1000 MILLIGRAM(S): at 16:35

## 2022-01-01 RX ADMIN — SODIUM CHLORIDE 4 MILLILITER(S): 9 INJECTION INTRAMUSCULAR; INTRAVENOUS; SUBCUTANEOUS at 17:20

## 2022-01-01 RX ADMIN — Medication 1000 MILLIGRAM(S): at 22:29

## 2022-01-01 RX ADMIN — Medication 1 TABLET(S): at 13:18

## 2022-01-01 RX ADMIN — POLYETHYLENE GLYCOL 3350 17 GRAM(S): 17 POWDER, FOR SOLUTION ORAL at 12:01

## 2022-01-01 RX ADMIN — POLYETHYLENE GLYCOL 3350 17 GRAM(S): 17 POWDER, FOR SOLUTION ORAL at 12:10

## 2022-01-01 RX ADMIN — Medication 1000 MILLIGRAM(S): at 05:56

## 2022-01-01 RX ADMIN — Medication 3 MILLILITER(S): at 00:23

## 2022-01-01 RX ADMIN — Medication 2 MILLIGRAM(S): at 05:39

## 2022-01-01 RX ADMIN — LIDOCAINE 1 PATCH: 4 CREAM TOPICAL at 17:09

## 2022-01-01 RX ADMIN — BUDESONIDE AND FORMOTEROL FUMARATE DIHYDRATE 2 PUFF(S): 160; 4.5 AEROSOL RESPIRATORY (INHALATION) at 05:49

## 2022-01-01 RX ADMIN — Medication 40 MILLIGRAM(S): at 05:25

## 2022-01-01 RX ADMIN — TIOTROPIUM BROMIDE 1 CAPSULE(S): 18 CAPSULE ORAL; RESPIRATORY (INHALATION) at 11:53

## 2022-01-01 RX ADMIN — BUDESONIDE AND FORMOTEROL FUMARATE DIHYDRATE 2 PUFF(S): 160; 4.5 AEROSOL RESPIRATORY (INHALATION) at 18:20

## 2022-01-01 RX ADMIN — ALBUTEROL 2 PUFF(S): 90 AEROSOL, METERED ORAL at 21:25

## 2022-01-01 RX ADMIN — Medication 1000 MILLIGRAM(S): at 06:11

## 2022-01-01 RX ADMIN — BUDESONIDE AND FORMOTEROL FUMARATE DIHYDRATE 2 PUFF(S): 160; 4.5 AEROSOL RESPIRATORY (INHALATION) at 22:01

## 2022-01-01 RX ADMIN — AMLODIPINE BESYLATE 2.5 MILLIGRAM(S): 2.5 TABLET ORAL at 05:36

## 2022-01-01 RX ADMIN — SODIUM CHLORIDE 100 MILLILITER(S): 9 INJECTION, SOLUTION INTRAVENOUS at 16:29

## 2022-01-01 RX ADMIN — ALBUTEROL 2 PUFF(S): 90 AEROSOL, METERED ORAL at 05:57

## 2022-01-01 RX ADMIN — TIOTROPIUM BROMIDE 1 CAPSULE(S): 18 CAPSULE ORAL; RESPIRATORY (INHALATION) at 11:56

## 2022-01-01 RX ADMIN — AMLODIPINE BESYLATE 2.5 MILLIGRAM(S): 2.5 TABLET ORAL at 05:41

## 2022-01-01 RX ADMIN — Medication 1 TABLET(S): at 11:47

## 2022-01-01 RX ADMIN — PIPERACILLIN AND TAZOBACTAM 25 GRAM(S): 4; .5 INJECTION, POWDER, LYOPHILIZED, FOR SOLUTION INTRAVENOUS at 23:23

## 2022-01-01 RX ADMIN — Medication 100 MILLIEQUIVALENT(S): at 21:34

## 2022-01-01 RX ADMIN — BUDESONIDE AND FORMOTEROL FUMARATE DIHYDRATE 2 PUFF(S): 160; 4.5 AEROSOL RESPIRATORY (INHALATION) at 17:56

## 2022-01-01 RX ADMIN — BUDESONIDE AND FORMOTEROL FUMARATE DIHYDRATE 2 PUFF(S): 160; 4.5 AEROSOL RESPIRATORY (INHALATION) at 06:21

## 2022-01-01 RX ADMIN — TRAMADOL HYDROCHLORIDE 25 MILLIGRAM(S): 50 TABLET ORAL at 00:18

## 2022-01-01 RX ADMIN — Medication 40 MILLIGRAM(S): at 06:24

## 2022-01-01 RX ADMIN — BUDESONIDE AND FORMOTEROL FUMARATE DIHYDRATE 2 PUFF(S): 160; 4.5 AEROSOL RESPIRATORY (INHALATION) at 06:12

## 2022-01-01 RX ADMIN — Medication 40 MILLIGRAM(S): at 18:29

## 2022-01-01 RX ADMIN — Medication 1000 MILLIGRAM(S): at 05:11

## 2022-01-01 RX ADMIN — ENOXAPARIN SODIUM 40 MILLIGRAM(S): 100 INJECTION SUBCUTANEOUS at 22:41

## 2022-01-01 RX ADMIN — Medication 3 MILLILITER(S): at 01:12

## 2022-01-01 RX ADMIN — Medication 3 MILLILITER(S): at 23:17

## 2022-01-01 RX ADMIN — ALBUTEROL 2 PUFF(S): 90 AEROSOL, METERED ORAL at 17:22

## 2022-01-01 RX ADMIN — Medication 650 MILLIGRAM(S): at 06:10

## 2022-01-01 RX ADMIN — BUDESONIDE AND FORMOTEROL FUMARATE DIHYDRATE 2 PUFF(S): 160; 4.5 AEROSOL RESPIRATORY (INHALATION) at 10:28

## 2022-01-01 RX ADMIN — OXYCODONE HYDROCHLORIDE 2.5 MILLIGRAM(S): 5 TABLET ORAL at 00:38

## 2022-01-01 RX ADMIN — Medication 20 MILLIGRAM(S): at 14:15

## 2022-01-01 RX ADMIN — HYDROMORPHONE HYDROCHLORIDE 0.25 MILLIGRAM(S): 2 INJECTION INTRAMUSCULAR; INTRAVENOUS; SUBCUTANEOUS at 19:00

## 2022-01-01 RX ADMIN — Medication 1 TABLET(S): at 13:21

## 2022-01-01 RX ADMIN — GABAPENTIN 400 MILLIGRAM(S): 400 CAPSULE ORAL at 12:53

## 2022-01-01 RX ADMIN — PANTOPRAZOLE SODIUM 80 MILLIGRAM(S): 20 TABLET, DELAYED RELEASE ORAL at 22:58

## 2022-01-01 RX ADMIN — SODIUM CHLORIDE 4 MILLILITER(S): 9 INJECTION INTRAMUSCULAR; INTRAVENOUS; SUBCUTANEOUS at 00:05

## 2022-01-01 RX ADMIN — Medication 1000 MILLIGRAM(S): at 21:56

## 2022-01-01 RX ADMIN — Medication 40 MILLIEQUIVALENT(S): at 18:19

## 2022-01-01 RX ADMIN — SODIUM CHLORIDE 4 MILLILITER(S): 9 INJECTION INTRAMUSCULAR; INTRAVENOUS; SUBCUTANEOUS at 18:02

## 2022-01-01 RX ADMIN — ALBUTEROL 2 PUFF(S): 90 AEROSOL, METERED ORAL at 09:47

## 2022-01-01 RX ADMIN — SODIUM CHLORIDE 4 MILLILITER(S): 9 INJECTION INTRAMUSCULAR; INTRAVENOUS; SUBCUTANEOUS at 12:08

## 2022-01-01 RX ADMIN — ENOXAPARIN SODIUM 40 MILLIGRAM(S): 100 INJECTION SUBCUTANEOUS at 23:55

## 2022-01-01 RX ADMIN — SODIUM CHLORIDE 120 MILLILITER(S): 9 INJECTION, SOLUTION INTRAVENOUS at 21:36

## 2022-01-01 RX ADMIN — Medication 1 APPLICATION(S): at 17:05

## 2022-01-01 RX ADMIN — ALBUTEROL 2 PUFF(S): 90 AEROSOL, METERED ORAL at 21:39

## 2022-01-01 RX ADMIN — Medication 250 MILLIGRAM(S): at 17:16

## 2022-01-01 RX ADMIN — Medication 3 MILLILITER(S): at 23:50

## 2022-01-01 RX ADMIN — TIOTROPIUM BROMIDE 1 CAPSULE(S): 18 CAPSULE ORAL; RESPIRATORY (INHALATION) at 14:21

## 2022-01-01 RX ADMIN — PIPERACILLIN AND TAZOBACTAM 25 GRAM(S): 4; .5 INJECTION, POWDER, LYOPHILIZED, FOR SOLUTION INTRAVENOUS at 08:34

## 2022-01-01 RX ADMIN — Medication 15 MILLIGRAM(S): at 12:00

## 2022-01-01 RX ADMIN — PIPERACILLIN AND TAZOBACTAM 25 GRAM(S): 4; .5 INJECTION, POWDER, LYOPHILIZED, FOR SOLUTION INTRAVENOUS at 07:49

## 2022-01-01 RX ADMIN — Medication 3 MILLILITER(S): at 18:02

## 2022-01-01 RX ADMIN — PANTOPRAZOLE SODIUM 40 MILLIGRAM(S): 20 TABLET, DELAYED RELEASE ORAL at 06:40

## 2022-01-01 RX ADMIN — PANTOPRAZOLE SODIUM 40 MILLIGRAM(S): 20 TABLET, DELAYED RELEASE ORAL at 05:58

## 2022-01-01 RX ADMIN — GABAPENTIN 400 MILLIGRAM(S): 400 CAPSULE ORAL at 22:53

## 2022-01-01 RX ADMIN — AMLODIPINE BESYLATE 2.5 MILLIGRAM(S): 2.5 TABLET ORAL at 05:53

## 2022-01-01 RX ADMIN — Medication 3 MILLILITER(S): at 11:47

## 2022-01-01 RX ADMIN — ALBUTEROL 2 PUFF(S): 90 AEROSOL, METERED ORAL at 22:34

## 2022-01-01 RX ADMIN — ENOXAPARIN SODIUM 40 MILLIGRAM(S): 100 INJECTION SUBCUTANEOUS at 23:04

## 2022-01-01 RX ADMIN — LIDOCAINE 1 PATCH: 4 CREAM TOPICAL at 07:16

## 2022-01-01 RX ADMIN — LIDOCAINE 1 APPLICATION(S): 4 CREAM TOPICAL at 05:57

## 2022-01-01 RX ADMIN — Medication 1 GRAM(S): at 05:52

## 2022-01-01 RX ADMIN — CYCLOBENZAPRINE HYDROCHLORIDE 5 MILLIGRAM(S): 10 TABLET, FILM COATED ORAL at 17:41

## 2022-01-01 RX ADMIN — ALBUTEROL 2 PUFF(S): 90 AEROSOL, METERED ORAL at 19:41

## 2022-01-01 RX ADMIN — Medication 40 MILLIGRAM(S): at 06:20

## 2022-01-01 RX ADMIN — BUDESONIDE AND FORMOTEROL FUMARATE DIHYDRATE 2 PUFF(S): 160; 4.5 AEROSOL RESPIRATORY (INHALATION) at 06:39

## 2022-01-01 RX ADMIN — Medication 1000 MILLIGRAM(S): at 05:49

## 2022-01-01 RX ADMIN — Medication 81 MILLIGRAM(S): at 12:02

## 2022-01-01 RX ADMIN — Medication 1 TABLET(S): at 17:08

## 2022-01-01 RX ADMIN — LIDOCAINE 1 PATCH: 4 CREAM TOPICAL at 17:29

## 2022-01-01 RX ADMIN — Medication 2000 UNIT(S): at 11:56

## 2022-01-01 RX ADMIN — NYSTATIN CREAM 1 APPLICATION(S): 100000 CREAM TOPICAL at 21:28

## 2022-01-01 RX ADMIN — Medication 40 MILLIGRAM(S): at 06:17

## 2022-01-01 RX ADMIN — Medication 40 MILLIEQUIVALENT(S): at 15:00

## 2022-01-01 RX ADMIN — ALBUTEROL 2 PUFF(S): 90 AEROSOL, METERED ORAL at 02:11

## 2022-01-01 RX ADMIN — CHLORHEXIDINE GLUCONATE 1 APPLICATION(S): 213 SOLUTION TOPICAL at 12:45

## 2022-01-01 RX ADMIN — LIDOCAINE 1 APPLICATION(S): 4 CREAM TOPICAL at 18:11

## 2022-01-01 RX ADMIN — LIDOCAINE 2 PATCH: 4 CREAM TOPICAL at 01:15

## 2022-01-01 RX ADMIN — Medication 1000 MILLIGRAM(S): at 01:10

## 2022-01-01 RX ADMIN — SODIUM CHLORIDE 4 MILLILITER(S): 9 INJECTION INTRAMUSCULAR; INTRAVENOUS; SUBCUTANEOUS at 18:47

## 2022-01-01 RX ADMIN — Medication 100 MILLIGRAM(S): at 06:29

## 2022-01-01 RX ADMIN — BUDESONIDE AND FORMOTEROL FUMARATE DIHYDRATE 2 PUFF(S): 160; 4.5 AEROSOL RESPIRATORY (INHALATION) at 21:31

## 2022-01-01 RX ADMIN — LIDOCAINE 1 PATCH: 4 CREAM TOPICAL at 06:20

## 2022-01-01 RX ADMIN — Medication 40 MILLIGRAM(S): at 18:31

## 2022-01-01 RX ADMIN — Medication 1000 MILLIGRAM(S): at 14:03

## 2022-01-01 RX ADMIN — Medication 40 MILLIGRAM(S): at 05:04

## 2022-01-01 RX ADMIN — CALCITONIN SALMON 1 SPRAY(S): 200 INJECTION, SOLUTION INTRAMUSCULAR at 12:02

## 2022-01-01 RX ADMIN — Medication 3 MILLILITER(S): at 17:36

## 2022-01-01 RX ADMIN — PANTOPRAZOLE SODIUM 40 MILLIGRAM(S): 20 TABLET, DELAYED RELEASE ORAL at 05:55

## 2022-01-01 RX ADMIN — FAMOTIDINE 20 MILLIGRAM(S): 10 INJECTION INTRAVENOUS at 06:53

## 2022-01-01 RX ADMIN — LIDOCAINE 2 PATCH: 4 CREAM TOPICAL at 01:09

## 2022-01-01 RX ADMIN — GABAPENTIN 400 MILLIGRAM(S): 400 CAPSULE ORAL at 22:14

## 2022-01-01 RX ADMIN — Medication 81 MILLIGRAM(S): at 11:09

## 2022-01-01 RX ADMIN — Medication 1000 MILLIGRAM(S): at 22:28

## 2022-01-01 RX ADMIN — BUDESONIDE AND FORMOTEROL FUMARATE DIHYDRATE 2 PUFF(S): 160; 4.5 AEROSOL RESPIRATORY (INHALATION) at 21:50

## 2022-01-01 RX ADMIN — ALBUTEROL 2 PUFF(S): 90 AEROSOL, METERED ORAL at 13:26

## 2022-01-01 RX ADMIN — Medication 1000 MILLIGRAM(S): at 22:35

## 2022-01-01 RX ADMIN — Medication 1 GRAM(S): at 23:43

## 2022-01-01 RX ADMIN — AMLODIPINE BESYLATE 2.5 MILLIGRAM(S): 2.5 TABLET ORAL at 05:56

## 2022-01-01 RX ADMIN — GABAPENTIN 400 MILLIGRAM(S): 400 CAPSULE ORAL at 21:23

## 2022-01-01 RX ADMIN — SENNA PLUS 2 TABLET(S): 8.6 TABLET ORAL at 22:26

## 2022-01-01 RX ADMIN — Medication 1000 MILLIGRAM(S): at 15:53

## 2022-01-01 RX ADMIN — CYCLOBENZAPRINE HYDROCHLORIDE 5 MILLIGRAM(S): 10 TABLET, FILM COATED ORAL at 13:45

## 2022-01-01 RX ADMIN — AMLODIPINE BESYLATE 2.5 MILLIGRAM(S): 2.5 TABLET ORAL at 05:45

## 2022-01-01 RX ADMIN — CYCLOBENZAPRINE HYDROCHLORIDE 5 MILLIGRAM(S): 10 TABLET, FILM COATED ORAL at 05:12

## 2022-01-01 RX ADMIN — ALBUTEROL 2 PUFF(S): 90 AEROSOL, METERED ORAL at 13:33

## 2022-01-01 RX ADMIN — SENNA PLUS 2 TABLET(S): 8.6 TABLET ORAL at 21:23

## 2022-01-01 RX ADMIN — NYSTATIN CREAM 1 APPLICATION(S): 100000 CREAM TOPICAL at 21:32

## 2022-01-01 RX ADMIN — SENNA PLUS 2 TABLET(S): 8.6 TABLET ORAL at 22:01

## 2022-01-01 RX ADMIN — ENOXAPARIN SODIUM 40 MILLIGRAM(S): 100 INJECTION SUBCUTANEOUS at 05:42

## 2022-01-01 RX ADMIN — Medication 2000 UNIT(S): at 12:49

## 2022-01-01 RX ADMIN — Medication 2000 UNIT(S): at 12:20

## 2022-01-01 RX ADMIN — Medication 1 MILLIGRAM(S): at 14:40

## 2022-01-01 RX ADMIN — BUDESONIDE AND FORMOTEROL FUMARATE DIHYDRATE 2 PUFF(S): 160; 4.5 AEROSOL RESPIRATORY (INHALATION) at 17:20

## 2022-01-01 RX ADMIN — ENOXAPARIN SODIUM 40 MILLIGRAM(S): 100 INJECTION SUBCUTANEOUS at 05:55

## 2022-01-01 RX ADMIN — ENOXAPARIN SODIUM 40 MILLIGRAM(S): 100 INJECTION SUBCUTANEOUS at 23:41

## 2022-01-01 RX ADMIN — PIPERACILLIN AND TAZOBACTAM 25 GRAM(S): 4; .5 INJECTION, POWDER, LYOPHILIZED, FOR SOLUTION INTRAVENOUS at 09:13

## 2022-01-01 RX ADMIN — SODIUM CHLORIDE 4 MILLILITER(S): 9 INJECTION INTRAMUSCULAR; INTRAVENOUS; SUBCUTANEOUS at 23:49

## 2022-01-01 RX ADMIN — OXYCODONE HYDROCHLORIDE 2.5 MILLIGRAM(S): 5 TABLET ORAL at 18:41

## 2022-01-01 RX ADMIN — Medication 3 MILLILITER(S): at 05:15

## 2022-01-01 RX ADMIN — SODIUM CHLORIDE 4 MILLILITER(S): 9 INJECTION INTRAMUSCULAR; INTRAVENOUS; SUBCUTANEOUS at 17:35

## 2022-01-01 RX ADMIN — Medication 100 MILLIGRAM(S): at 21:45

## 2022-01-01 RX ADMIN — LIDOCAINE 1 APPLICATION(S): 4 CREAM TOPICAL at 06:04

## 2022-01-01 RX ADMIN — ALBUTEROL 2 PUFF(S): 90 AEROSOL, METERED ORAL at 13:17

## 2022-01-01 RX ADMIN — LIDOCAINE 1 PATCH: 4 CREAM TOPICAL at 19:41

## 2022-01-01 RX ADMIN — SODIUM CHLORIDE 4 MILLILITER(S): 9 INJECTION INTRAMUSCULAR; INTRAVENOUS; SUBCUTANEOUS at 23:25

## 2022-01-01 RX ADMIN — MORPHINE SULFATE 0.5 MILLIGRAM(S): 50 CAPSULE, EXTENDED RELEASE ORAL at 15:12

## 2022-01-01 RX ADMIN — GABAPENTIN 400 MILLIGRAM(S): 400 CAPSULE ORAL at 16:38

## 2022-01-01 RX ADMIN — ALBUTEROL 2 PUFF(S): 90 AEROSOL, METERED ORAL at 10:03

## 2022-01-01 RX ADMIN — Medication 1000 MILLIGRAM(S): at 12:40

## 2022-01-01 RX ADMIN — PIPERACILLIN AND TAZOBACTAM 25 GRAM(S): 4; .5 INJECTION, POWDER, LYOPHILIZED, FOR SOLUTION INTRAVENOUS at 16:29

## 2022-01-01 RX ADMIN — OXYCODONE HYDROCHLORIDE 2.5 MILLIGRAM(S): 5 TABLET ORAL at 08:37

## 2022-01-01 RX ADMIN — BUDESONIDE AND FORMOTEROL FUMARATE DIHYDRATE 2 PUFF(S): 160; 4.5 AEROSOL RESPIRATORY (INHALATION) at 22:09

## 2022-01-01 RX ADMIN — SODIUM CHLORIDE 4 MILLILITER(S): 9 INJECTION INTRAMUSCULAR; INTRAVENOUS; SUBCUTANEOUS at 18:31

## 2022-01-01 RX ADMIN — SODIUM CHLORIDE 500 MILLILITER(S): 9 INJECTION INTRAMUSCULAR; INTRAVENOUS; SUBCUTANEOUS at 18:49

## 2022-01-01 RX ADMIN — CHLORHEXIDINE GLUCONATE 1 APPLICATION(S): 213 SOLUTION TOPICAL at 13:32

## 2022-01-01 RX ADMIN — SODIUM CHLORIDE 4 MILLILITER(S): 9 INJECTION INTRAMUSCULAR; INTRAVENOUS; SUBCUTANEOUS at 23:41

## 2022-01-01 RX ADMIN — Medication 2000 UNIT(S): at 12:10

## 2022-01-01 RX ADMIN — ALBUTEROL 2 PUFF(S): 90 AEROSOL, METERED ORAL at 13:35

## 2022-01-01 RX ADMIN — GABAPENTIN 400 MILLIGRAM(S): 400 CAPSULE ORAL at 05:33

## 2022-01-01 RX ADMIN — Medication 1 TABLET(S): at 13:32

## 2022-01-01 RX ADMIN — GABAPENTIN 400 MILLIGRAM(S): 400 CAPSULE ORAL at 05:53

## 2022-01-01 RX ADMIN — Medication 1000 MILLIGRAM(S): at 00:30

## 2022-01-01 RX ADMIN — SODIUM CHLORIDE 4 MILLILITER(S): 9 INJECTION INTRAMUSCULAR; INTRAVENOUS; SUBCUTANEOUS at 00:24

## 2022-01-01 RX ADMIN — AMLODIPINE BESYLATE 2.5 MILLIGRAM(S): 2.5 TABLET ORAL at 05:28

## 2022-01-01 RX ADMIN — SENNA PLUS 2 TABLET(S): 8.6 TABLET ORAL at 22:54

## 2022-01-01 RX ADMIN — Medication 1000 MILLIGRAM(S): at 13:58

## 2022-01-01 RX ADMIN — Medication 250 MILLIGRAM(S): at 06:19

## 2022-01-01 RX ADMIN — Medication 1000 UNIT(S): at 11:59

## 2022-01-01 RX ADMIN — ALBUTEROL 2 PUFF(S): 90 AEROSOL, METERED ORAL at 21:27

## 2022-01-01 RX ADMIN — ENOXAPARIN SODIUM 40 MILLIGRAM(S): 100 INJECTION SUBCUTANEOUS at 22:33

## 2022-01-01 RX ADMIN — NYSTATIN CREAM 1 APPLICATION(S): 100000 CREAM TOPICAL at 13:33

## 2022-01-01 RX ADMIN — PIPERACILLIN AND TAZOBACTAM 25 GRAM(S): 4; .5 INJECTION, POWDER, LYOPHILIZED, FOR SOLUTION INTRAVENOUS at 08:29

## 2022-01-01 RX ADMIN — Medication 3 MILLILITER(S): at 18:44

## 2022-01-01 RX ADMIN — TRAMADOL HYDROCHLORIDE 50 MILLIGRAM(S): 50 TABLET ORAL at 18:30

## 2022-01-01 RX ADMIN — ALBUTEROL 2 PUFF(S): 90 AEROSOL, METERED ORAL at 01:51

## 2022-01-01 RX ADMIN — ENOXAPARIN SODIUM 40 MILLIGRAM(S): 100 INJECTION SUBCUTANEOUS at 05:24

## 2022-01-01 RX ADMIN — Medication 1000 MILLIGRAM(S): at 05:44

## 2022-01-01 RX ADMIN — CYCLOBENZAPRINE HYDROCHLORIDE 5 MILLIGRAM(S): 10 TABLET, FILM COATED ORAL at 06:59

## 2022-01-01 RX ADMIN — GABAPENTIN 400 MILLIGRAM(S): 400 CAPSULE ORAL at 05:55

## 2022-01-01 RX ADMIN — Medication 3 MILLILITER(S): at 05:04

## 2022-01-01 RX ADMIN — GABAPENTIN 300 MILLIGRAM(S): 400 CAPSULE ORAL at 05:36

## 2022-01-01 RX ADMIN — Medication 40 MILLIEQUIVALENT(S): at 11:31

## 2022-01-01 RX ADMIN — Medication 1 TABLET(S): at 12:04

## 2022-01-01 RX ADMIN — Medication 81 MILLIGRAM(S): at 12:21

## 2022-01-01 RX ADMIN — AMLODIPINE BESYLATE 2.5 MILLIGRAM(S): 2.5 TABLET ORAL at 05:51

## 2022-01-01 RX ADMIN — LIDOCAINE 1 PATCH: 4 CREAM TOPICAL at 04:30

## 2022-01-01 RX ADMIN — Medication 3 MILLIGRAM(S): at 22:34

## 2022-01-01 RX ADMIN — Medication 600 MILLIGRAM(S): at 17:57

## 2022-01-01 RX ADMIN — TIOTROPIUM BROMIDE 1 CAPSULE(S): 18 CAPSULE ORAL; RESPIRATORY (INHALATION) at 12:22

## 2022-01-01 RX ADMIN — ALBUTEROL 2 PUFF(S): 90 AEROSOL, METERED ORAL at 18:20

## 2022-01-01 RX ADMIN — NYSTATIN CREAM 1 APPLICATION(S): 100000 CREAM TOPICAL at 06:54

## 2022-01-01 RX ADMIN — GABAPENTIN 400 MILLIGRAM(S): 400 CAPSULE ORAL at 06:21

## 2022-01-01 RX ADMIN — GABAPENTIN 400 MILLIGRAM(S): 400 CAPSULE ORAL at 13:58

## 2022-01-01 RX ADMIN — BUDESONIDE AND FORMOTEROL FUMARATE DIHYDRATE 2 PUFF(S): 160; 4.5 AEROSOL RESPIRATORY (INHALATION) at 23:37

## 2022-01-01 RX ADMIN — OXYCODONE HYDROCHLORIDE 2.5 MILLIGRAM(S): 5 TABLET ORAL at 21:10

## 2022-01-01 RX ADMIN — Medication 600 MILLIGRAM(S): at 05:40

## 2022-01-01 RX ADMIN — Medication 30 MILLIGRAM(S): at 06:21

## 2022-01-01 RX ADMIN — HYDROMORPHONE HYDROCHLORIDE 0.5 MILLIGRAM(S): 2 INJECTION INTRAMUSCULAR; INTRAVENOUS; SUBCUTANEOUS at 18:41

## 2022-01-01 RX ADMIN — GABAPENTIN 400 MILLIGRAM(S): 400 CAPSULE ORAL at 05:45

## 2022-01-01 RX ADMIN — ENOXAPARIN SODIUM 40 MILLIGRAM(S): 100 INJECTION SUBCUTANEOUS at 05:41

## 2022-01-01 RX ADMIN — CYCLOBENZAPRINE HYDROCHLORIDE 5 MILLIGRAM(S): 10 TABLET, FILM COATED ORAL at 13:16

## 2022-01-01 RX ADMIN — Medication 50 MILLILITER(S): at 01:35

## 2022-01-01 RX ADMIN — ALBUTEROL 2 PUFF(S): 90 AEROSOL, METERED ORAL at 17:52

## 2022-01-01 RX ADMIN — Medication 40 MILLIGRAM(S): at 05:17

## 2022-01-01 RX ADMIN — LIDOCAINE 1 PATCH: 4 CREAM TOPICAL at 23:54

## 2022-01-01 RX ADMIN — Medication 2000 UNIT(S): at 12:53

## 2022-01-01 RX ADMIN — CYCLOBENZAPRINE HYDROCHLORIDE 5 MILLIGRAM(S): 10 TABLET, FILM COATED ORAL at 21:50

## 2022-01-01 RX ADMIN — AMLODIPINE BESYLATE 2.5 MILLIGRAM(S): 2.5 TABLET ORAL at 05:11

## 2022-01-01 RX ADMIN — SODIUM CHLORIDE 75 MILLILITER(S): 9 INJECTION, SOLUTION INTRAVENOUS at 12:02

## 2022-01-01 RX ADMIN — PANTOPRAZOLE SODIUM 40 MILLIGRAM(S): 20 TABLET, DELAYED RELEASE ORAL at 05:04

## 2022-01-01 RX ADMIN — Medication 1000 MILLIGRAM(S): at 14:45

## 2022-01-01 RX ADMIN — PANTOPRAZOLE SODIUM 40 MILLIGRAM(S): 20 TABLET, DELAYED RELEASE ORAL at 05:57

## 2022-01-01 RX ADMIN — Medication 1 GRAM(S): at 12:54

## 2022-01-01 RX ADMIN — MORPHINE SULFATE 1 MILLIGRAM(S): 50 CAPSULE, EXTENDED RELEASE ORAL at 01:50

## 2022-01-01 RX ADMIN — BUDESONIDE AND FORMOTEROL FUMARATE DIHYDRATE 2 PUFF(S): 160; 4.5 AEROSOL RESPIRATORY (INHALATION) at 22:55

## 2022-01-01 RX ADMIN — GABAPENTIN 400 MILLIGRAM(S): 400 CAPSULE ORAL at 23:36

## 2022-01-01 RX ADMIN — GABAPENTIN 400 MILLIGRAM(S): 400 CAPSULE ORAL at 06:25

## 2022-01-01 RX ADMIN — SODIUM CHLORIDE 4 MILLILITER(S): 9 INJECTION INTRAMUSCULAR; INTRAVENOUS; SUBCUTANEOUS at 19:41

## 2022-01-01 RX ADMIN — Medication 1 TABLET(S): at 17:44

## 2022-01-01 RX ADMIN — PANTOPRAZOLE SODIUM 40 MILLIGRAM(S): 20 TABLET, DELAYED RELEASE ORAL at 05:26

## 2022-01-01 RX ADMIN — Medication 81 MILLIGRAM(S): at 11:56

## 2022-01-01 RX ADMIN — BUDESONIDE AND FORMOTEROL FUMARATE DIHYDRATE 2 PUFF(S): 160; 4.5 AEROSOL RESPIRATORY (INHALATION) at 19:41

## 2022-01-01 RX ADMIN — GABAPENTIN 400 MILLIGRAM(S): 400 CAPSULE ORAL at 13:34

## 2022-01-01 RX ADMIN — SODIUM CHLORIDE 4 MILLILITER(S): 9 INJECTION INTRAMUSCULAR; INTRAVENOUS; SUBCUTANEOUS at 12:22

## 2022-01-01 RX ADMIN — LIDOCAINE 1 PATCH: 4 CREAM TOPICAL at 05:10

## 2022-01-01 RX ADMIN — Medication 1000 MILLIGRAM(S): at 06:14

## 2022-01-01 RX ADMIN — HYDROMORPHONE HYDROCHLORIDE 0.25 MILLIGRAM(S): 2 INJECTION INTRAMUSCULAR; INTRAVENOUS; SUBCUTANEOUS at 11:44

## 2022-01-01 RX ADMIN — LIDOCAINE 2 PATCH: 4 CREAM TOPICAL at 13:09

## 2022-01-01 RX ADMIN — Medication 1 GRAM(S): at 06:21

## 2022-01-01 RX ADMIN — SENNA PLUS 2 TABLET(S): 8.6 TABLET ORAL at 21:38

## 2022-01-01 RX ADMIN — BUDESONIDE AND FORMOTEROL FUMARATE DIHYDRATE 2 PUFF(S): 160; 4.5 AEROSOL RESPIRATORY (INHALATION) at 22:29

## 2022-01-01 RX ADMIN — LIDOCAINE 2 PATCH: 4 CREAM TOPICAL at 12:21

## 2022-01-01 RX ADMIN — NYSTATIN CREAM 1 APPLICATION(S): 100000 CREAM TOPICAL at 05:56

## 2022-01-01 RX ADMIN — Medication 1000 MILLIGRAM(S): at 06:26

## 2022-01-01 RX ADMIN — CYCLOBENZAPRINE HYDROCHLORIDE 5 MILLIGRAM(S): 10 TABLET, FILM COATED ORAL at 05:39

## 2022-01-01 RX ADMIN — TIOTROPIUM BROMIDE 1 CAPSULE(S): 18 CAPSULE ORAL; RESPIRATORY (INHALATION) at 13:21

## 2022-01-01 RX ADMIN — Medication 40 MILLIGRAM(S): at 05:49

## 2022-01-01 RX ADMIN — SENNA PLUS 2 TABLET(S): 8.6 TABLET ORAL at 21:48

## 2022-01-01 RX ADMIN — ALBUTEROL 2 PUFF(S): 90 AEROSOL, METERED ORAL at 09:08

## 2022-01-01 RX ADMIN — ALBUTEROL 2 PUFF(S): 90 AEROSOL, METERED ORAL at 17:23

## 2022-01-01 RX ADMIN — FAMOTIDINE 20 MILLIGRAM(S): 10 INJECTION INTRAVENOUS at 05:27

## 2022-01-01 RX ADMIN — OXYCODONE HYDROCHLORIDE 2.5 MILLIGRAM(S): 5 TABLET ORAL at 08:34

## 2022-01-01 RX ADMIN — Medication 1 GRAM(S): at 17:58

## 2022-01-01 RX ADMIN — Medication 1 GRAM(S): at 06:40

## 2022-01-01 RX ADMIN — Medication 20 MILLIGRAM(S): at 14:24

## 2022-01-01 RX ADMIN — Medication 81 MILLIGRAM(S): at 13:11

## 2022-01-01 RX ADMIN — AMLODIPINE BESYLATE 2.5 MILLIGRAM(S): 2.5 TABLET ORAL at 06:17

## 2022-01-01 RX ADMIN — NYSTATIN CREAM 1 APPLICATION(S): 100000 CREAM TOPICAL at 07:03

## 2022-01-01 RX ADMIN — PIPERACILLIN AND TAZOBACTAM 25 GRAM(S): 4; .5 INJECTION, POWDER, LYOPHILIZED, FOR SOLUTION INTRAVENOUS at 01:12

## 2022-01-01 RX ADMIN — TIOTROPIUM BROMIDE 1 CAPSULE(S): 18 CAPSULE ORAL; RESPIRATORY (INHALATION) at 13:10

## 2022-01-01 RX ADMIN — Medication 3 MILLILITER(S): at 23:40

## 2022-01-01 RX ADMIN — Medication 2000 UNIT(S): at 13:53

## 2022-01-01 RX ADMIN — CYCLOBENZAPRINE HYDROCHLORIDE 5 MILLIGRAM(S): 10 TABLET, FILM COATED ORAL at 23:38

## 2022-01-01 RX ADMIN — ENOXAPARIN SODIUM 40 MILLIGRAM(S): 100 INJECTION SUBCUTANEOUS at 23:29

## 2022-01-01 RX ADMIN — GABAPENTIN 400 MILLIGRAM(S): 400 CAPSULE ORAL at 22:40

## 2022-01-01 RX ADMIN — TRAMADOL HYDROCHLORIDE 50 MILLIGRAM(S): 50 TABLET ORAL at 12:45

## 2022-01-01 RX ADMIN — Medication 1000 UNIT(S): at 11:09

## 2022-01-01 RX ADMIN — Medication 2000 UNIT(S): at 12:00

## 2022-01-01 RX ADMIN — SODIUM CHLORIDE 120 MILLILITER(S): 9 INJECTION, SOLUTION INTRAVENOUS at 17:36

## 2022-01-01 RX ADMIN — LIDOCAINE 1 PATCH: 4 CREAM TOPICAL at 19:28

## 2022-01-01 RX ADMIN — Medication 81 MILLIGRAM(S): at 12:54

## 2022-01-01 RX ADMIN — Medication 1000 MILLIGRAM(S): at 15:30

## 2022-01-01 RX ADMIN — ENOXAPARIN SODIUM 40 MILLIGRAM(S): 100 INJECTION SUBCUTANEOUS at 21:59

## 2022-01-01 RX ADMIN — Medication 975 MILLIGRAM(S): at 11:22

## 2022-01-01 RX ADMIN — LIDOCAINE 1 PATCH: 4 CREAM TOPICAL at 12:53

## 2022-01-01 RX ADMIN — Medication 3 MILLILITER(S): at 05:28

## 2022-01-01 RX ADMIN — Medication 2000 UNIT(S): at 11:47

## 2022-01-01 RX ADMIN — PANTOPRAZOLE SODIUM 40 MILLIGRAM(S): 20 TABLET, DELAYED RELEASE ORAL at 06:21

## 2022-01-01 RX ADMIN — Medication 1 TABLET(S): at 17:51

## 2022-01-01 RX ADMIN — LIDOCAINE 1 APPLICATION(S): 4 CREAM TOPICAL at 06:13

## 2022-01-01 RX ADMIN — SODIUM CHLORIDE 4 MILLILITER(S): 9 INJECTION INTRAMUSCULAR; INTRAVENOUS; SUBCUTANEOUS at 05:57

## 2022-01-01 RX ADMIN — SODIUM CHLORIDE 4 MILLILITER(S): 9 INJECTION INTRAMUSCULAR; INTRAVENOUS; SUBCUTANEOUS at 22:49

## 2022-01-01 RX ADMIN — TRAMADOL HYDROCHLORIDE 25 MILLIGRAM(S): 50 TABLET ORAL at 23:54

## 2022-01-01 RX ADMIN — GABAPENTIN 400 MILLIGRAM(S): 400 CAPSULE ORAL at 22:26

## 2022-01-01 RX ADMIN — TIOTROPIUM BROMIDE 1 CAPSULE(S): 18 CAPSULE ORAL; RESPIRATORY (INHALATION) at 12:10

## 2022-01-01 RX ADMIN — ENOXAPARIN SODIUM 40 MILLIGRAM(S): 100 INJECTION SUBCUTANEOUS at 22:52

## 2022-01-01 RX ADMIN — SENNA PLUS 2 TABLET(S): 8.6 TABLET ORAL at 21:58

## 2022-01-01 RX ADMIN — Medication 1000 MILLIGRAM(S): at 22:59

## 2022-01-01 RX ADMIN — ALBUTEROL 2 PUFF(S): 90 AEROSOL, METERED ORAL at 12:55

## 2022-01-01 RX ADMIN — ALBUTEROL 2 PUFF(S): 90 AEROSOL, METERED ORAL at 01:21

## 2022-01-01 RX ADMIN — Medication 4 MILLILITER(S): at 17:52

## 2022-01-01 RX ADMIN — Medication 81 MILLIGRAM(S): at 12:10

## 2022-01-01 RX ADMIN — AMLODIPINE BESYLATE 2.5 MILLIGRAM(S): 2.5 TABLET ORAL at 06:21

## 2022-01-01 RX ADMIN — ENOXAPARIN SODIUM 40 MILLIGRAM(S): 100 INJECTION SUBCUTANEOUS at 22:56

## 2022-01-01 RX ADMIN — SODIUM CHLORIDE 4 MILLILITER(S): 9 INJECTION INTRAMUSCULAR; INTRAVENOUS; SUBCUTANEOUS at 17:59

## 2022-01-01 RX ADMIN — GABAPENTIN 400 MILLIGRAM(S): 400 CAPSULE ORAL at 22:41

## 2022-01-01 RX ADMIN — FAMOTIDINE 20 MILLIGRAM(S): 10 INJECTION INTRAVENOUS at 06:25

## 2022-01-01 RX ADMIN — Medication 3 MILLILITER(S): at 15:08

## 2022-01-01 RX ADMIN — BUDESONIDE AND FORMOTEROL FUMARATE DIHYDRATE 2 PUFF(S): 160; 4.5 AEROSOL RESPIRATORY (INHALATION) at 11:57

## 2022-01-01 RX ADMIN — Medication 2 MILLIGRAM(S): at 17:51

## 2022-01-01 RX ADMIN — ONDANSETRON 4 MILLIGRAM(S): 8 TABLET, FILM COATED ORAL at 10:40

## 2022-01-01 RX ADMIN — Medication 2 MILLIGRAM(S): at 17:44

## 2022-01-01 RX ADMIN — SODIUM CHLORIDE 4 MILLILITER(S): 9 INJECTION INTRAMUSCULAR; INTRAVENOUS; SUBCUTANEOUS at 07:03

## 2022-01-01 RX ADMIN — ENOXAPARIN SODIUM 40 MILLIGRAM(S): 100 INJECTION SUBCUTANEOUS at 23:10

## 2022-01-01 RX ADMIN — Medication 3 MILLILITER(S): at 17:51

## 2022-01-01 RX ADMIN — SENNA PLUS 2 TABLET(S): 8.6 TABLET ORAL at 23:03

## 2022-01-01 RX ADMIN — CYCLOBENZAPRINE HYDROCHLORIDE 5 MILLIGRAM(S): 10 TABLET, FILM COATED ORAL at 14:03

## 2022-01-01 RX ADMIN — BUDESONIDE AND FORMOTEROL FUMARATE DIHYDRATE 2 PUFF(S): 160; 4.5 AEROSOL RESPIRATORY (INHALATION) at 05:04

## 2022-01-01 RX ADMIN — GABAPENTIN 300 MILLIGRAM(S): 400 CAPSULE ORAL at 22:06

## 2022-01-01 RX ADMIN — TIOTROPIUM BROMIDE 1 CAPSULE(S): 18 CAPSULE ORAL; RESPIRATORY (INHALATION) at 07:20

## 2022-01-01 RX ADMIN — FAMOTIDINE 20 MILLIGRAM(S): 10 INJECTION INTRAVENOUS at 05:53

## 2022-01-01 RX ADMIN — NYSTATIN CREAM 1 APPLICATION(S): 100000 CREAM TOPICAL at 14:10

## 2022-01-01 RX ADMIN — Medication 4 MILLILITER(S): at 18:46

## 2022-01-01 RX ADMIN — Medication 81 MILLIGRAM(S): at 11:31

## 2022-01-01 RX ADMIN — MORPHINE SULFATE 0.5 MILLIGRAM(S): 50 CAPSULE, EXTENDED RELEASE ORAL at 10:42

## 2022-01-01 RX ADMIN — Medication 1000 MILLIGRAM(S): at 14:34

## 2022-01-01 RX ADMIN — Medication 1 GRAM(S): at 11:48

## 2022-01-01 RX ADMIN — LIDOCAINE 1 PATCH: 4 CREAM TOPICAL at 05:00

## 2022-01-01 RX ADMIN — BUDESONIDE AND FORMOTEROL FUMARATE DIHYDRATE 2 PUFF(S): 160; 4.5 AEROSOL RESPIRATORY (INHALATION) at 06:54

## 2022-01-01 RX ADMIN — Medication 3 MILLILITER(S): at 05:52

## 2022-01-01 RX ADMIN — BUDESONIDE AND FORMOTEROL FUMARATE DIHYDRATE 2 PUFF(S): 160; 4.5 AEROSOL RESPIRATORY (INHALATION) at 05:54

## 2022-01-01 RX ADMIN — AMLODIPINE BESYLATE 2.5 MILLIGRAM(S): 2.5 TABLET ORAL at 05:57

## 2022-01-01 RX ADMIN — SODIUM CHLORIDE 120 MILLILITER(S): 9 INJECTION, SOLUTION INTRAVENOUS at 05:05

## 2022-01-01 RX ADMIN — Medication 81 MILLIGRAM(S): at 13:20

## 2022-01-01 RX ADMIN — ALBUTEROL 2 PUFF(S): 90 AEROSOL, METERED ORAL at 05:58

## 2022-01-01 RX ADMIN — CYCLOBENZAPRINE HYDROCHLORIDE 5 MILLIGRAM(S): 10 TABLET, FILM COATED ORAL at 06:12

## 2022-01-01 RX ADMIN — SENNA PLUS 2 TABLET(S): 8.6 TABLET ORAL at 22:06

## 2022-01-01 RX ADMIN — Medication 1 GRAM(S): at 18:37

## 2022-01-01 RX ADMIN — Medication 0.5 MILLIGRAM(S): at 21:49

## 2022-01-01 RX ADMIN — Medication 1000 MILLIGRAM(S): at 22:53

## 2022-01-01 RX ADMIN — Medication 0.25 MILLIGRAM(S): at 09:22

## 2022-01-01 RX ADMIN — ALBUTEROL 2 PUFF(S): 90 AEROSOL, METERED ORAL at 13:10

## 2022-01-01 RX ADMIN — NYSTATIN CREAM 1 APPLICATION(S): 100000 CREAM TOPICAL at 06:58

## 2022-01-01 RX ADMIN — TRAMADOL HYDROCHLORIDE 50 MILLIGRAM(S): 50 TABLET ORAL at 02:18

## 2022-01-01 RX ADMIN — Medication 1000 MILLIGRAM(S): at 05:41

## 2022-01-01 RX ADMIN — Medication 3 MILLILITER(S): at 12:22

## 2022-01-01 RX ADMIN — LIDOCAINE 1 PATCH: 4 CREAM TOPICAL at 18:10

## 2022-01-01 RX ADMIN — ALBUTEROL 2 PUFF(S): 90 AEROSOL, METERED ORAL at 09:37

## 2022-01-01 RX ADMIN — Medication 3 MILLILITER(S): at 17:22

## 2022-01-01 RX ADMIN — SENNA PLUS 2 TABLET(S): 8.6 TABLET ORAL at 22:30

## 2022-01-01 RX ADMIN — HYDROMORPHONE HYDROCHLORIDE 0.5 MILLIGRAM(S): 2 INJECTION INTRAMUSCULAR; INTRAVENOUS; SUBCUTANEOUS at 14:39

## 2022-01-01 RX ADMIN — SODIUM CHLORIDE 4 MILLILITER(S): 9 INJECTION INTRAMUSCULAR; INTRAVENOUS; SUBCUTANEOUS at 06:12

## 2022-01-01 RX ADMIN — ALBUTEROL 2 PUFF(S): 90 AEROSOL, METERED ORAL at 01:03

## 2022-01-01 RX ADMIN — INSULIN HUMAN 10 UNIT(S): 100 INJECTION, SOLUTION SUBCUTANEOUS at 01:35

## 2022-01-01 RX ADMIN — Medication 81 MILLIGRAM(S): at 11:23

## 2022-01-01 RX ADMIN — ENOXAPARIN SODIUM 40 MILLIGRAM(S): 100 INJECTION SUBCUTANEOUS at 22:16

## 2022-01-01 RX ADMIN — SIMETHICONE 80 MILLIGRAM(S): 80 TABLET, CHEWABLE ORAL at 11:22

## 2022-01-01 RX ADMIN — Medication 4 MILLILITER(S): at 06:59

## 2022-01-01 RX ADMIN — BUDESONIDE AND FORMOTEROL FUMARATE DIHYDRATE 2 PUFF(S): 160; 4.5 AEROSOL RESPIRATORY (INHALATION) at 17:58

## 2022-01-01 RX ADMIN — CYCLOBENZAPRINE HYDROCHLORIDE 5 MILLIGRAM(S): 10 TABLET, FILM COATED ORAL at 21:30

## 2022-01-01 RX ADMIN — Medication 1 GRAM(S): at 00:17

## 2022-01-01 RX ADMIN — SODIUM CHLORIDE 4 MILLILITER(S): 9 INJECTION INTRAMUSCULAR; INTRAVENOUS; SUBCUTANEOUS at 12:45

## 2022-01-01 RX ADMIN — LIDOCAINE 1 PATCH: 4 CREAM TOPICAL at 16:41

## 2022-01-01 RX ADMIN — BUDESONIDE AND FORMOTEROL FUMARATE DIHYDRATE 2 PUFF(S): 160; 4.5 AEROSOL RESPIRATORY (INHALATION) at 05:36

## 2022-01-01 RX ADMIN — Medication 40 MILLIGRAM(S): at 05:46

## 2022-01-01 RX ADMIN — POLYETHYLENE GLYCOL 3350 17 GRAM(S): 17 POWDER, FOR SOLUTION ORAL at 11:31

## 2022-01-01 RX ADMIN — Medication 1000 MILLIGRAM(S): at 06:43

## 2022-01-01 RX ADMIN — GABAPENTIN 400 MILLIGRAM(S): 400 CAPSULE ORAL at 22:29

## 2022-01-01 RX ADMIN — CYCLOBENZAPRINE HYDROCHLORIDE 5 MILLIGRAM(S): 10 TABLET, FILM COATED ORAL at 05:54

## 2022-01-01 RX ADMIN — Medication 3 MILLILITER(S): at 18:26

## 2022-01-01 RX ADMIN — Medication 100 MILLIEQUIVALENT(S): at 00:12

## 2022-01-01 RX ADMIN — SODIUM CHLORIDE 4 MILLILITER(S): 9 INJECTION INTRAMUSCULAR; INTRAVENOUS; SUBCUTANEOUS at 23:27

## 2022-01-01 RX ADMIN — Medication 650 MILLIGRAM(S): at 18:11

## 2022-01-01 RX ADMIN — GABAPENTIN 400 MILLIGRAM(S): 400 CAPSULE ORAL at 05:14

## 2022-01-01 RX ADMIN — Medication 1 APPLICATION(S): at 07:01

## 2022-01-01 RX ADMIN — Medication 40 MILLIGRAM(S): at 05:56

## 2022-01-01 RX ADMIN — GABAPENTIN 400 MILLIGRAM(S): 400 CAPSULE ORAL at 21:39

## 2022-01-01 RX ADMIN — Medication 40 MILLIGRAM(S): at 05:55

## 2022-01-01 RX ADMIN — BUDESONIDE AND FORMOTEROL FUMARATE DIHYDRATE 2 PUFF(S): 160; 4.5 AEROSOL RESPIRATORY (INHALATION) at 01:18

## 2022-01-01 RX ADMIN — Medication 40 MILLIGRAM(S): at 06:40

## 2022-01-01 RX ADMIN — Medication 1 GRAM(S): at 18:21

## 2022-01-01 RX ADMIN — SODIUM CHLORIDE 4 MILLILITER(S): 9 INJECTION INTRAMUSCULAR; INTRAVENOUS; SUBCUTANEOUS at 11:48

## 2022-01-01 RX ADMIN — LIDOCAINE 1 APPLICATION(S): 4 CREAM TOPICAL at 17:08

## 2022-01-01 RX ADMIN — Medication 3 MILLILITER(S): at 23:43

## 2022-01-01 RX ADMIN — Medication 1000 MILLIGRAM(S): at 07:26

## 2022-01-01 RX ADMIN — CYCLOBENZAPRINE HYDROCHLORIDE 5 MILLIGRAM(S): 10 TABLET, FILM COATED ORAL at 21:59

## 2022-01-01 RX ADMIN — ALBUTEROL 2 PUFF(S): 90 AEROSOL, METERED ORAL at 16:39

## 2022-01-01 RX ADMIN — TRAMADOL HYDROCHLORIDE 50 MILLIGRAM(S): 50 TABLET ORAL at 01:30

## 2022-01-01 RX ADMIN — ENOXAPARIN SODIUM 40 MILLIGRAM(S): 100 INJECTION SUBCUTANEOUS at 23:07

## 2022-01-01 RX ADMIN — Medication 1000 MILLIGRAM(S): at 15:37

## 2022-01-01 RX ADMIN — SODIUM CHLORIDE 4 MILLILITER(S): 9 INJECTION INTRAMUSCULAR; INTRAVENOUS; SUBCUTANEOUS at 11:22

## 2022-01-01 RX ADMIN — Medication 1 TABLET(S): at 05:25

## 2022-01-01 RX ADMIN — ONDANSETRON 4 MILLIGRAM(S): 8 TABLET, FILM COATED ORAL at 03:28

## 2022-01-01 RX ADMIN — GABAPENTIN 400 MILLIGRAM(S): 400 CAPSULE ORAL at 21:47

## 2022-01-01 RX ADMIN — Medication 1000 MILLIGRAM(S): at 00:10

## 2022-01-01 RX ADMIN — POLYETHYLENE GLYCOL 3350 17 GRAM(S): 17 POWDER, FOR SOLUTION ORAL at 15:02

## 2022-01-01 RX ADMIN — Medication 1000 UNIT(S): at 12:02

## 2022-01-01 RX ADMIN — SODIUM CHLORIDE 4 MILLILITER(S): 9 INJECTION INTRAMUSCULAR; INTRAVENOUS; SUBCUTANEOUS at 06:31

## 2022-01-01 RX ADMIN — LIDOCAINE 1 APPLICATION(S): 4 CREAM TOPICAL at 17:05

## 2022-01-01 RX ADMIN — LIDOCAINE 1 PATCH: 4 CREAM TOPICAL at 19:30

## 2022-01-01 RX ADMIN — SENNA PLUS 2 TABLET(S): 8.6 TABLET ORAL at 22:40

## 2022-01-01 RX ADMIN — Medication 1 GRAM(S): at 21:38

## 2022-01-01 RX ADMIN — TIOTROPIUM BROMIDE 1 CAPSULE(S): 18 CAPSULE ORAL; RESPIRATORY (INHALATION) at 11:08

## 2022-01-01 RX ADMIN — Medication 1000 UNIT(S): at 11:54

## 2022-01-01 RX ADMIN — Medication 4 MILLILITER(S): at 06:28

## 2022-01-01 RX ADMIN — OXYCODONE HYDROCHLORIDE 2.5 MILLIGRAM(S): 5 TABLET ORAL at 11:51

## 2022-01-01 RX ADMIN — Medication 3 MILLILITER(S): at 06:20

## 2022-01-01 RX ADMIN — PIPERACILLIN AND TAZOBACTAM 25 GRAM(S): 4; .5 INJECTION, POWDER, LYOPHILIZED, FOR SOLUTION INTRAVENOUS at 08:03

## 2022-01-01 RX ADMIN — PIPERACILLIN AND TAZOBACTAM 25 GRAM(S): 4; .5 INJECTION, POWDER, LYOPHILIZED, FOR SOLUTION INTRAVENOUS at 16:46

## 2022-01-01 RX ADMIN — Medication 1 TABLET(S): at 05:40

## 2022-01-01 RX ADMIN — CYCLOBENZAPRINE HYDROCHLORIDE 5 MILLIGRAM(S): 10 TABLET, FILM COATED ORAL at 22:14

## 2022-01-01 RX ADMIN — TIOTROPIUM BROMIDE 1 CAPSULE(S): 18 CAPSULE ORAL; RESPIRATORY (INHALATION) at 13:33

## 2022-01-01 RX ADMIN — Medication 1 GRAM(S): at 23:34

## 2022-01-01 RX ADMIN — POLYETHYLENE GLYCOL 3350 17 GRAM(S): 17 POWDER, FOR SOLUTION ORAL at 13:11

## 2022-01-01 RX ADMIN — POLYETHYLENE GLYCOL 3350 17 GRAM(S): 17 POWDER, FOR SOLUTION ORAL at 11:25

## 2022-01-01 RX ADMIN — NYSTATIN CREAM 1 APPLICATION(S): 100000 CREAM TOPICAL at 06:56

## 2022-01-01 RX ADMIN — Medication 400 MILLIGRAM(S): at 20:39

## 2022-01-01 RX ADMIN — TIOTROPIUM BROMIDE 1 CAPSULE(S): 18 CAPSULE ORAL; RESPIRATORY (INHALATION) at 10:39

## 2022-01-01 RX ADMIN — SODIUM CHLORIDE 1000 MILLILITER(S): 9 INJECTION, SOLUTION INTRAVENOUS at 10:58

## 2022-01-01 RX ADMIN — ALBUTEROL 2 PUFF(S): 90 AEROSOL, METERED ORAL at 18:27

## 2022-01-01 RX ADMIN — Medication 40 MILLIGRAM(S): at 05:54

## 2022-01-01 RX ADMIN — CYCLOBENZAPRINE HYDROCHLORIDE 5 MILLIGRAM(S): 10 TABLET, FILM COATED ORAL at 14:25

## 2022-01-01 RX ADMIN — Medication 2 MILLIGRAM(S): at 05:40

## 2022-01-01 RX ADMIN — ALBUTEROL 2 PUFF(S): 90 AEROSOL, METERED ORAL at 06:38

## 2022-01-01 RX ADMIN — CYCLOBENZAPRINE HYDROCHLORIDE 10 MILLIGRAM(S): 10 TABLET, FILM COATED ORAL at 11:22

## 2022-01-01 RX ADMIN — TRAMADOL HYDROCHLORIDE 25 MILLIGRAM(S): 50 TABLET ORAL at 05:37

## 2022-01-01 RX ADMIN — PIPERACILLIN AND TAZOBACTAM 25 GRAM(S): 4; .5 INJECTION, POWDER, LYOPHILIZED, FOR SOLUTION INTRAVENOUS at 00:05

## 2022-01-01 RX ADMIN — GABAPENTIN 400 MILLIGRAM(S): 400 CAPSULE ORAL at 22:00

## 2022-01-01 RX ADMIN — BUDESONIDE AND FORMOTEROL FUMARATE DIHYDRATE 2 PUFF(S): 160; 4.5 AEROSOL RESPIRATORY (INHALATION) at 12:49

## 2022-01-01 RX ADMIN — GABAPENTIN 300 MILLIGRAM(S): 400 CAPSULE ORAL at 14:32

## 2022-01-01 RX ADMIN — OXYCODONE HYDROCHLORIDE 2.5 MILLIGRAM(S): 5 TABLET ORAL at 11:21

## 2022-01-01 RX ADMIN — Medication 2000 UNIT(S): at 11:59

## 2022-01-01 RX ADMIN — SODIUM CHLORIDE 4 MILLILITER(S): 9 INJECTION INTRAMUSCULAR; INTRAVENOUS; SUBCUTANEOUS at 12:05

## 2022-01-01 RX ADMIN — CYCLOBENZAPRINE HYDROCHLORIDE 5 MILLIGRAM(S): 10 TABLET, FILM COATED ORAL at 22:40

## 2022-01-01 RX ADMIN — GABAPENTIN 400 MILLIGRAM(S): 400 CAPSULE ORAL at 13:20

## 2022-01-01 RX ADMIN — Medication 650 MILLIGRAM(S): at 01:27

## 2022-01-01 RX ADMIN — Medication 40 MILLIEQUIVALENT(S): at 00:23

## 2022-01-01 RX ADMIN — BUDESONIDE AND FORMOTEROL FUMARATE DIHYDRATE 2 PUFF(S): 160; 4.5 AEROSOL RESPIRATORY (INHALATION) at 22:13

## 2022-01-01 RX ADMIN — OXYCODONE HYDROCHLORIDE 2.5 MILLIGRAM(S): 5 TABLET ORAL at 12:12

## 2022-01-01 RX ADMIN — Medication 1000 MILLIGRAM(S): at 14:44

## 2022-01-01 RX ADMIN — Medication 3 MILLILITER(S): at 17:42

## 2022-01-01 RX ADMIN — Medication 250 MILLIGRAM(S): at 05:51

## 2022-01-01 RX ADMIN — Medication 1 APPLICATION(S): at 05:44

## 2022-01-01 RX ADMIN — ENOXAPARIN SODIUM 40 MILLIGRAM(S): 100 INJECTION SUBCUTANEOUS at 21:28

## 2022-01-01 RX ADMIN — BUDESONIDE AND FORMOTEROL FUMARATE DIHYDRATE 2 PUFF(S): 160; 4.5 AEROSOL RESPIRATORY (INHALATION) at 05:34

## 2022-01-01 RX ADMIN — Medication 1000 MILLIGRAM(S): at 05:25

## 2022-01-01 RX ADMIN — ALBUTEROL 2 PUFF(S): 90 AEROSOL, METERED ORAL at 02:00

## 2022-01-01 RX ADMIN — BUDESONIDE AND FORMOTEROL FUMARATE DIHYDRATE 2 PUFF(S): 160; 4.5 AEROSOL RESPIRATORY (INHALATION) at 09:47

## 2022-01-01 RX ADMIN — TRAMADOL HYDROCHLORIDE 25 MILLIGRAM(S): 50 TABLET ORAL at 06:10

## 2022-01-01 RX ADMIN — PIPERACILLIN AND TAZOBACTAM 25 GRAM(S): 4; .5 INJECTION, POWDER, LYOPHILIZED, FOR SOLUTION INTRAVENOUS at 00:36

## 2022-01-01 RX ADMIN — Medication 15 MILLIGRAM(S): at 14:00

## 2022-01-01 RX ADMIN — Medication 30 MILLIGRAM(S): at 17:37

## 2022-01-01 RX ADMIN — ALBUTEROL 2 PUFF(S): 90 AEROSOL, METERED ORAL at 22:41

## 2022-01-01 RX ADMIN — Medication 40 MILLIGRAM(S): at 05:48

## 2022-01-01 RX ADMIN — POLYETHYLENE GLYCOL 3350 17 GRAM(S): 17 POWDER, FOR SOLUTION ORAL at 13:17

## 2022-01-01 RX ADMIN — Medication 2 MILLIGRAM(S): at 17:50

## 2022-01-01 RX ADMIN — SODIUM CHLORIDE 500 MILLILITER(S): 9 INJECTION, SOLUTION INTRAVENOUS at 04:03

## 2022-01-01 RX ADMIN — GABAPENTIN 400 MILLIGRAM(S): 400 CAPSULE ORAL at 21:19

## 2022-01-01 RX ADMIN — CYCLOBENZAPRINE HYDROCHLORIDE 5 MILLIGRAM(S): 10 TABLET, FILM COATED ORAL at 05:53

## 2022-01-01 RX ADMIN — ALBUTEROL 2 PUFF(S): 90 AEROSOL, METERED ORAL at 06:54

## 2022-01-01 RX ADMIN — LIDOCAINE 1 PATCH: 4 CREAM TOPICAL at 05:13

## 2022-01-01 RX ADMIN — Medication 30 MILLILITER(S): at 17:37

## 2022-01-01 RX ADMIN — PANTOPRAZOLE SODIUM 40 MILLIGRAM(S): 20 TABLET, DELAYED RELEASE ORAL at 19:39

## 2022-01-01 RX ADMIN — BUDESONIDE AND FORMOTEROL FUMARATE DIHYDRATE 2 PUFF(S): 160; 4.5 AEROSOL RESPIRATORY (INHALATION) at 06:15

## 2022-01-01 RX ADMIN — PANTOPRAZOLE SODIUM 40 MILLIGRAM(S): 20 TABLET, DELAYED RELEASE ORAL at 06:25

## 2022-01-01 RX ADMIN — GABAPENTIN 400 MILLIGRAM(S): 400 CAPSULE ORAL at 13:38

## 2022-01-01 RX ADMIN — Medication 1000 MILLIGRAM(S): at 23:54

## 2022-01-01 RX ADMIN — HALOPERIDOL DECANOATE 1 MILLIGRAM(S): 100 INJECTION INTRAMUSCULAR at 23:49

## 2022-01-01 RX ADMIN — Medication 250 MILLIGRAM(S): at 14:31

## 2022-01-01 RX ADMIN — SODIUM CHLORIDE 4 MILLILITER(S): 9 INJECTION INTRAMUSCULAR; INTRAVENOUS; SUBCUTANEOUS at 05:28

## 2022-01-01 RX ADMIN — Medication 1000 MILLIGRAM(S): at 14:00

## 2022-01-01 RX ADMIN — ALBUTEROL 2 PUFF(S): 90 AEROSOL, METERED ORAL at 18:24

## 2022-01-01 RX ADMIN — Medication 81 MILLIGRAM(S): at 11:59

## 2022-01-01 RX ADMIN — Medication 2 MILLIGRAM(S): at 05:54

## 2022-01-01 RX ADMIN — ENOXAPARIN SODIUM 40 MILLIGRAM(S): 100 INJECTION SUBCUTANEOUS at 23:39

## 2022-01-01 RX ADMIN — PIPERACILLIN AND TAZOBACTAM 200 GRAM(S): 4; .5 INJECTION, POWDER, LYOPHILIZED, FOR SOLUTION INTRAVENOUS at 13:38

## 2022-01-01 RX ADMIN — Medication 40 MILLIGRAM(S): at 07:00

## 2022-01-01 RX ADMIN — BUDESONIDE AND FORMOTEROL FUMARATE DIHYDRATE 2 PUFF(S): 160; 4.5 AEROSOL RESPIRATORY (INHALATION) at 18:38

## 2022-01-01 RX ADMIN — TRAMADOL HYDROCHLORIDE 50 MILLIGRAM(S): 50 TABLET ORAL at 19:10

## 2022-01-01 RX ADMIN — Medication 3 MILLILITER(S): at 23:08

## 2022-01-01 RX ADMIN — Medication 3 MILLILITER(S): at 13:42

## 2022-01-01 RX ADMIN — Medication 600 MILLIGRAM(S): at 17:51

## 2022-01-01 RX ADMIN — LIDOCAINE 1 PATCH: 4 CREAM TOPICAL at 16:44

## 2022-01-01 RX ADMIN — GABAPENTIN 300 MILLIGRAM(S): 400 CAPSULE ORAL at 05:40

## 2022-01-01 RX ADMIN — AMLODIPINE BESYLATE 2.5 MILLIGRAM(S): 2.5 TABLET ORAL at 06:41

## 2022-01-01 RX ADMIN — SODIUM CHLORIDE 4 MILLILITER(S): 9 INJECTION INTRAMUSCULAR; INTRAVENOUS; SUBCUTANEOUS at 12:09

## 2022-01-01 RX ADMIN — ALBUTEROL 2 PUFF(S): 90 AEROSOL, METERED ORAL at 02:51

## 2022-01-01 RX ADMIN — CYCLOBENZAPRINE HYDROCHLORIDE 5 MILLIGRAM(S): 10 TABLET, FILM COATED ORAL at 05:41

## 2022-01-01 RX ADMIN — Medication 1 GRAM(S): at 11:57

## 2022-01-01 RX ADMIN — Medication 1 GRAM(S): at 22:47

## 2022-01-01 RX ADMIN — Medication 3 MILLILITER(S): at 05:33

## 2022-01-01 RX ADMIN — ENOXAPARIN SODIUM 40 MILLIGRAM(S): 100 INJECTION SUBCUTANEOUS at 21:35

## 2022-01-01 RX ADMIN — Medication 1000 MILLIGRAM(S): at 06:08

## 2022-01-01 RX ADMIN — Medication 81 MILLIGRAM(S): at 13:33

## 2022-01-01 RX ADMIN — ENOXAPARIN SODIUM 40 MILLIGRAM(S): 100 INJECTION SUBCUTANEOUS at 23:58

## 2022-01-01 RX ADMIN — Medication 81 MILLIGRAM(S): at 11:54

## 2022-01-01 RX ADMIN — TIOTROPIUM BROMIDE 1 CAPSULE(S): 18 CAPSULE ORAL; RESPIRATORY (INHALATION) at 07:04

## 2022-01-01 RX ADMIN — SENNA PLUS 2 TABLET(S): 8.6 TABLET ORAL at 22:53

## 2022-01-01 RX ADMIN — Medication 40 MILLIGRAM(S): at 05:27

## 2022-01-01 RX ADMIN — TIOTROPIUM BROMIDE 1 CAPSULE(S): 18 CAPSULE ORAL; RESPIRATORY (INHALATION) at 12:01

## 2022-01-01 RX ADMIN — GABAPENTIN 400 MILLIGRAM(S): 400 CAPSULE ORAL at 05:47

## 2022-01-01 RX ADMIN — Medication 1000 MILLIGRAM(S): at 21:59

## 2022-01-01 RX ADMIN — SODIUM CHLORIDE 4 MILLILITER(S): 9 INJECTION INTRAMUSCULAR; INTRAVENOUS; SUBCUTANEOUS at 05:54

## 2022-01-01 RX ADMIN — BUDESONIDE AND FORMOTEROL FUMARATE DIHYDRATE 2 PUFF(S): 160; 4.5 AEROSOL RESPIRATORY (INHALATION) at 05:58

## 2022-01-01 RX ADMIN — LIDOCAINE 1 APPLICATION(S): 4 CREAM TOPICAL at 07:03

## 2022-01-01 RX ADMIN — PIPERACILLIN AND TAZOBACTAM 25 GRAM(S): 4; .5 INJECTION, POWDER, LYOPHILIZED, FOR SOLUTION INTRAVENOUS at 17:56

## 2022-01-01 RX ADMIN — ENOXAPARIN SODIUM 40 MILLIGRAM(S): 100 INJECTION SUBCUTANEOUS at 21:39

## 2022-01-01 RX ADMIN — OXYCODONE HYDROCHLORIDE 2.5 MILLIGRAM(S): 5 TABLET ORAL at 17:35

## 2022-01-01 RX ADMIN — FAMOTIDINE 20 MILLIGRAM(S): 10 INJECTION INTRAVENOUS at 18:22

## 2022-01-01 RX ADMIN — ALBUTEROL 2 PUFF(S): 90 AEROSOL, METERED ORAL at 06:11

## 2022-01-01 RX ADMIN — ALBUTEROL 2 PUFF(S): 90 AEROSOL, METERED ORAL at 14:48

## 2022-01-01 RX ADMIN — TRAMADOL HYDROCHLORIDE 50 MILLIGRAM(S): 50 TABLET ORAL at 18:18

## 2022-01-01 RX ADMIN — Medication 0.25 MILLIGRAM(S): at 04:38

## 2022-01-01 RX ADMIN — Medication 20 MILLIGRAM(S): at 06:28

## 2022-01-01 RX ADMIN — Medication 4 MILLILITER(S): at 06:39

## 2022-01-01 RX ADMIN — ALBUTEROL 2 PUFF(S): 90 AEROSOL, METERED ORAL at 05:03

## 2022-01-01 RX ADMIN — Medication 3 MILLILITER(S): at 17:24

## 2022-01-01 RX ADMIN — POLYETHYLENE GLYCOL 3350 17 GRAM(S): 17 POWDER, FOR SOLUTION ORAL at 12:20

## 2022-01-01 RX ADMIN — Medication 3 MILLILITER(S): at 13:00

## 2022-01-01 RX ADMIN — GABAPENTIN 300 MILLIGRAM(S): 400 CAPSULE ORAL at 14:07

## 2022-01-01 RX ADMIN — Medication 2 MILLIGRAM(S): at 05:24

## 2022-01-01 RX ADMIN — Medication 3 MILLILITER(S): at 18:47

## 2022-01-01 RX ADMIN — LIDOCAINE 1 PATCH: 4 CREAM TOPICAL at 05:07

## 2022-01-01 RX ADMIN — Medication 2000 UNIT(S): at 11:24

## 2022-01-01 RX ADMIN — Medication 1000 MILLIGRAM(S): at 13:11

## 2022-01-01 RX ADMIN — Medication 3 MILLILITER(S): at 00:35

## 2022-01-01 RX ADMIN — Medication 40 MILLIGRAM(S): at 05:45

## 2022-01-01 RX ADMIN — ALBUTEROL 2 PUFF(S): 90 AEROSOL, METERED ORAL at 17:58

## 2022-01-01 RX ADMIN — ALBUTEROL 2 PUFF(S): 90 AEROSOL, METERED ORAL at 15:15

## 2022-01-01 RX ADMIN — Medication 3 MILLILITER(S): at 12:09

## 2022-01-01 RX ADMIN — Medication 1 TABLET(S): at 12:20

## 2022-01-01 RX ADMIN — CALCITONIN SALMON 1 SPRAY(S): 200 INJECTION, SOLUTION INTRAMUSCULAR at 17:14

## 2022-01-01 RX ADMIN — Medication 1 TABLET(S): at 13:33

## 2022-01-01 RX ADMIN — CYCLOBENZAPRINE HYDROCHLORIDE 5 MILLIGRAM(S): 10 TABLET, FILM COATED ORAL at 21:58

## 2022-01-01 RX ADMIN — Medication 1 TABLET(S): at 17:20

## 2022-01-01 RX ADMIN — Medication 1 TABLET(S): at 12:53

## 2022-01-01 RX ADMIN — Medication 2 MILLIGRAM(S): at 18:06

## 2022-01-01 RX ADMIN — ALBUTEROL 2 PUFF(S): 90 AEROSOL, METERED ORAL at 06:58

## 2022-01-01 RX ADMIN — Medication 1000 MILLIGRAM(S): at 13:34

## 2022-01-01 RX ADMIN — Medication 1000 MILLIGRAM(S): at 23:07

## 2022-01-01 RX ADMIN — Medication 4 MILLILITER(S): at 16:19

## 2022-01-01 RX ADMIN — FAMOTIDINE 20 MILLIGRAM(S): 10 INJECTION INTRAVENOUS at 17:58

## 2022-01-01 RX ADMIN — GABAPENTIN 400 MILLIGRAM(S): 400 CAPSULE ORAL at 23:07

## 2022-01-01 RX ADMIN — Medication 20 MILLIGRAM(S): at 21:20

## 2022-01-01 RX ADMIN — Medication 3 MILLILITER(S): at 05:54

## 2022-01-01 RX ADMIN — Medication 3 MILLILITER(S): at 19:30

## 2022-01-01 RX ADMIN — LIDOCAINE 2 PATCH: 4 CREAM TOPICAL at 20:14

## 2022-01-01 RX ADMIN — OXYCODONE HYDROCHLORIDE 2.5 MILLIGRAM(S): 5 TABLET ORAL at 19:11

## 2022-01-01 RX ADMIN — SODIUM CHLORIDE 4 MILLILITER(S): 9 INJECTION INTRAMUSCULAR; INTRAVENOUS; SUBCUTANEOUS at 05:49

## 2022-01-01 RX ADMIN — Medication 3 MILLILITER(S): at 12:21

## 2022-01-01 RX ADMIN — AMLODIPINE BESYLATE 2.5 MILLIGRAM(S): 2.5 TABLET ORAL at 05:02

## 2022-01-01 RX ADMIN — SODIUM CHLORIDE 120 MILLILITER(S): 9 INJECTION, SOLUTION INTRAVENOUS at 14:28

## 2022-01-01 RX ADMIN — SENNA PLUS 2 TABLET(S): 8.6 TABLET ORAL at 22:41

## 2022-01-01 RX ADMIN — PIPERACILLIN AND TAZOBACTAM 25 GRAM(S): 4; .5 INJECTION, POWDER, LYOPHILIZED, FOR SOLUTION INTRAVENOUS at 16:39

## 2022-01-01 RX ADMIN — NYSTATIN CREAM 1 APPLICATION(S): 100000 CREAM TOPICAL at 21:44

## 2022-01-01 RX ADMIN — GABAPENTIN 400 MILLIGRAM(S): 400 CAPSULE ORAL at 22:22

## 2022-01-01 RX ADMIN — GABAPENTIN 400 MILLIGRAM(S): 400 CAPSULE ORAL at 21:30

## 2022-01-01 RX ADMIN — GABAPENTIN 400 MILLIGRAM(S): 400 CAPSULE ORAL at 14:07

## 2022-01-01 RX ADMIN — Medication 1000 MILLIGRAM(S): at 06:13

## 2022-01-01 RX ADMIN — Medication 3 MILLILITER(S): at 17:58

## 2022-01-01 RX ADMIN — GABAPENTIN 400 MILLIGRAM(S): 400 CAPSULE ORAL at 05:57

## 2022-01-01 RX ADMIN — SODIUM CHLORIDE 4 MILLILITER(S): 9 INJECTION INTRAMUSCULAR; INTRAVENOUS; SUBCUTANEOUS at 00:46

## 2022-01-01 RX ADMIN — SODIUM CHLORIDE 4 MILLILITER(S): 9 INJECTION INTRAMUSCULAR; INTRAVENOUS; SUBCUTANEOUS at 17:42

## 2022-01-01 RX ADMIN — TIOTROPIUM BROMIDE 1 CAPSULE(S): 18 CAPSULE ORAL; RESPIRATORY (INHALATION) at 08:31

## 2022-01-01 RX ADMIN — Medication 250 MILLIGRAM(S): at 18:46

## 2022-01-01 RX ADMIN — GABAPENTIN 400 MILLIGRAM(S): 400 CAPSULE ORAL at 05:29

## 2022-01-01 RX ADMIN — POLYETHYLENE GLYCOL 3350 17 GRAM(S): 17 POWDER, FOR SOLUTION ORAL at 13:21

## 2022-01-01 RX ADMIN — Medication 3 MILLILITER(S): at 17:21

## 2022-01-01 RX ADMIN — Medication 1000 UNIT(S): at 13:11

## 2022-01-01 RX ADMIN — Medication 1000 MILLIGRAM(S): at 06:57

## 2022-01-01 RX ADMIN — ALBUTEROL 2 PUFF(S): 90 AEROSOL, METERED ORAL at 21:26

## 2022-01-01 RX ADMIN — ONDANSETRON 4 MILLIGRAM(S): 8 TABLET, FILM COATED ORAL at 16:13

## 2022-01-01 RX ADMIN — ROBINUL 0.4 MILLIGRAM(S): 0.2 INJECTION INTRAMUSCULAR; INTRAVENOUS at 18:29

## 2022-01-01 RX ADMIN — SIMETHICONE 80 MILLIGRAM(S): 80 TABLET, CHEWABLE ORAL at 12:04

## 2022-01-01 RX ADMIN — LIDOCAINE 1 APPLICATION(S): 4 CREAM TOPICAL at 05:11

## 2022-01-01 RX ADMIN — SIMETHICONE 80 MILLIGRAM(S): 80 TABLET, CHEWABLE ORAL at 22:46

## 2022-01-01 RX ADMIN — Medication 3 MILLILITER(S): at 00:14

## 2022-01-01 RX ADMIN — Medication 1 GRAM(S): at 17:59

## 2022-01-01 RX ADMIN — SODIUM CHLORIDE 4 MILLILITER(S): 9 INJECTION INTRAMUSCULAR; INTRAVENOUS; SUBCUTANEOUS at 05:52

## 2022-01-01 RX ADMIN — ENOXAPARIN SODIUM 40 MILLIGRAM(S): 100 INJECTION SUBCUTANEOUS at 22:25

## 2022-01-01 RX ADMIN — PIPERACILLIN AND TAZOBACTAM 25 GRAM(S): 4; .5 INJECTION, POWDER, LYOPHILIZED, FOR SOLUTION INTRAVENOUS at 23:40

## 2022-01-01 RX ADMIN — SODIUM CHLORIDE 4 MILLILITER(S): 9 INJECTION INTRAMUSCULAR; INTRAVENOUS; SUBCUTANEOUS at 12:00

## 2022-01-01 RX ADMIN — GABAPENTIN 400 MILLIGRAM(S): 400 CAPSULE ORAL at 21:32

## 2022-01-01 RX ADMIN — BUDESONIDE AND FORMOTEROL FUMARATE DIHYDRATE 2 PUFF(S): 160; 4.5 AEROSOL RESPIRATORY (INHALATION) at 09:43

## 2022-01-01 RX ADMIN — Medication 1000 UNIT(S): at 11:42

## 2022-01-01 RX ADMIN — Medication 3 MILLILITER(S): at 06:16

## 2022-01-01 RX ADMIN — ALBUTEROL 2 PUFF(S): 90 AEROSOL, METERED ORAL at 10:30

## 2022-01-01 RX ADMIN — Medication 600 MILLIGRAM(S): at 06:40

## 2022-01-01 RX ADMIN — Medication 250 MILLIGRAM(S): at 18:15

## 2022-01-01 RX ADMIN — Medication 3 MILLILITER(S): at 06:12

## 2022-01-01 RX ADMIN — SODIUM CHLORIDE 4 MILLILITER(S): 9 INJECTION INTRAMUSCULAR; INTRAVENOUS; SUBCUTANEOUS at 12:54

## 2022-01-01 RX ADMIN — Medication 30 MILLILITER(S): at 14:14

## 2022-01-01 RX ADMIN — OXYCODONE HYDROCHLORIDE 2.5 MILLIGRAM(S): 5 TABLET ORAL at 01:08

## 2022-01-01 RX ADMIN — GABAPENTIN 300 MILLIGRAM(S): 400 CAPSULE ORAL at 21:56

## 2022-01-01 RX ADMIN — POLYETHYLENE GLYCOL 3350 17 GRAM(S): 17 POWDER, FOR SOLUTION ORAL at 12:22

## 2022-01-01 RX ADMIN — Medication 20 MILLIGRAM(S): at 05:33

## 2022-01-01 RX ADMIN — ALBUTEROL 2 PUFF(S): 90 AEROSOL, METERED ORAL at 17:37

## 2022-01-01 RX ADMIN — SODIUM CHLORIDE 1000 MILLILITER(S): 9 INJECTION INTRAMUSCULAR; INTRAVENOUS; SUBCUTANEOUS at 21:45

## 2022-01-01 RX ADMIN — LIDOCAINE 1 PATCH: 4 CREAM TOPICAL at 17:57

## 2022-01-01 RX ADMIN — LIDOCAINE 1 PATCH: 4 CREAM TOPICAL at 19:34

## 2022-01-01 RX ADMIN — Medication 1 GRAM(S): at 13:32

## 2022-01-01 RX ADMIN — ALBUTEROL 2 PUFF(S): 90 AEROSOL, METERED ORAL at 01:27

## 2022-01-01 RX ADMIN — AMLODIPINE BESYLATE 2.5 MILLIGRAM(S): 2.5 TABLET ORAL at 05:39

## 2022-01-01 RX ADMIN — Medication 40 MILLIGRAM(S): at 06:07

## 2022-01-01 RX ADMIN — Medication 1 GRAM(S): at 05:02

## 2022-01-01 RX ADMIN — OXYCODONE HYDROCHLORIDE 2.5 MILLIGRAM(S): 5 TABLET ORAL at 09:05

## 2022-01-01 RX ADMIN — NYSTATIN CREAM 1 APPLICATION(S): 100000 CREAM TOPICAL at 15:09

## 2022-01-01 RX ADMIN — SODIUM CHLORIDE 4 MILLILITER(S): 9 INJECTION INTRAMUSCULAR; INTRAVENOUS; SUBCUTANEOUS at 17:57

## 2022-01-01 RX ADMIN — ENOXAPARIN SODIUM 40 MILLIGRAM(S): 100 INJECTION SUBCUTANEOUS at 21:32

## 2022-01-01 RX ADMIN — Medication 600 MILLIGRAM(S): at 17:59

## 2022-01-01 RX ADMIN — LIDOCAINE 2 PATCH: 4 CREAM TOPICAL at 12:02

## 2022-01-01 RX ADMIN — GABAPENTIN 400 MILLIGRAM(S): 400 CAPSULE ORAL at 13:45

## 2022-01-01 RX ADMIN — BUDESONIDE AND FORMOTEROL FUMARATE DIHYDRATE 2 PUFF(S): 160; 4.5 AEROSOL RESPIRATORY (INHALATION) at 21:59

## 2022-01-01 RX ADMIN — Medication 1 TABLET(S): at 05:54

## 2022-01-01 RX ADMIN — GABAPENTIN 300 MILLIGRAM(S): 400 CAPSULE ORAL at 14:59

## 2022-01-01 RX ADMIN — Medication 3 MILLILITER(S): at 18:19

## 2022-01-01 RX ADMIN — Medication 1000 MILLIGRAM(S): at 21:58

## 2022-01-01 RX ADMIN — Medication 2000 UNIT(S): at 13:21

## 2022-01-01 RX ADMIN — PIPERACILLIN AND TAZOBACTAM 25 GRAM(S): 4; .5 INJECTION, POWDER, LYOPHILIZED, FOR SOLUTION INTRAVENOUS at 15:08

## 2022-01-01 RX ADMIN — GABAPENTIN 400 MILLIGRAM(S): 400 CAPSULE ORAL at 22:27

## 2022-01-01 RX ADMIN — Medication 1000 MILLIGRAM(S): at 14:08

## 2022-01-01 RX ADMIN — GABAPENTIN 400 MILLIGRAM(S): 400 CAPSULE ORAL at 14:24

## 2022-01-01 RX ADMIN — LIDOCAINE 2 PATCH: 4 CREAM TOPICAL at 20:02

## 2022-01-01 RX ADMIN — ALBUTEROL 2 PUFF(S): 90 AEROSOL, METERED ORAL at 18:02

## 2022-01-01 RX ADMIN — CYCLOBENZAPRINE HYDROCHLORIDE 5 MILLIGRAM(S): 10 TABLET, FILM COATED ORAL at 05:56

## 2022-01-01 RX ADMIN — Medication 0.5 MILLIGRAM(S): at 11:44

## 2022-01-01 RX ADMIN — Medication 1 GRAM(S): at 23:05

## 2022-01-01 RX ADMIN — FAMOTIDINE 20 MILLIGRAM(S): 10 INJECTION INTRAVENOUS at 06:41

## 2022-01-01 RX ADMIN — Medication 1000 MILLIGRAM(S): at 23:11

## 2022-01-01 RX ADMIN — GABAPENTIN 400 MILLIGRAM(S): 400 CAPSULE ORAL at 14:14

## 2022-01-01 RX ADMIN — GABAPENTIN 400 MILLIGRAM(S): 400 CAPSULE ORAL at 23:03

## 2022-01-01 RX ADMIN — LIDOCAINE 1 PATCH: 4 CREAM TOPICAL at 05:12

## 2022-01-01 RX ADMIN — OXYCODONE HYDROCHLORIDE 2.5 MILLIGRAM(S): 5 TABLET ORAL at 08:50

## 2022-01-01 RX ADMIN — TIOTROPIUM BROMIDE 1 CAPSULE(S): 18 CAPSULE ORAL; RESPIRATORY (INHALATION) at 12:21

## 2022-01-01 RX ADMIN — OXYCODONE HYDROCHLORIDE 2.5 MILLIGRAM(S): 5 TABLET ORAL at 00:30

## 2022-01-01 RX ADMIN — PIPERACILLIN AND TAZOBACTAM 25 GRAM(S): 4; .5 INJECTION, POWDER, LYOPHILIZED, FOR SOLUTION INTRAVENOUS at 15:12

## 2022-01-01 RX ADMIN — Medication 3 MILLILITER(S): at 22:19

## 2022-01-01 RX ADMIN — Medication 40 MILLIGRAM(S): at 17:35

## 2022-01-01 RX ADMIN — LIDOCAINE 1 PATCH: 4 CREAM TOPICAL at 19:32

## 2022-01-01 RX ADMIN — GABAPENTIN 400 MILLIGRAM(S): 400 CAPSULE ORAL at 21:58

## 2022-01-01 RX ADMIN — TIOTROPIUM BROMIDE 1 CAPSULE(S): 18 CAPSULE ORAL; RESPIRATORY (INHALATION) at 11:47

## 2022-01-01 RX ADMIN — Medication 2000 UNIT(S): at 11:23

## 2022-01-01 RX ADMIN — Medication 1000 MILLIGRAM(S): at 22:13

## 2022-01-01 RX ADMIN — NYSTATIN CREAM 1 APPLICATION(S): 100000 CREAM TOPICAL at 06:38

## 2022-01-01 RX ADMIN — SODIUM CHLORIDE 4 MILLILITER(S): 9 INJECTION INTRAMUSCULAR; INTRAVENOUS; SUBCUTANEOUS at 18:38

## 2022-01-01 RX ADMIN — PANTOPRAZOLE SODIUM 40 MILLIGRAM(S): 20 TABLET, DELAYED RELEASE ORAL at 18:02

## 2022-01-01 RX ADMIN — Medication 20 MILLIGRAM(S): at 15:14

## 2022-01-01 RX ADMIN — Medication 1 TABLET(S): at 13:20

## 2022-01-01 RX ADMIN — CYCLOBENZAPRINE HYDROCHLORIDE 5 MILLIGRAM(S): 10 TABLET, FILM COATED ORAL at 13:12

## 2022-01-01 RX ADMIN — GABAPENTIN 400 MILLIGRAM(S): 400 CAPSULE ORAL at 21:27

## 2022-01-01 RX ADMIN — Medication 1 TABLET(S): at 05:53

## 2022-01-01 RX ADMIN — BUDESONIDE AND FORMOTEROL FUMARATE DIHYDRATE 2 PUFF(S): 160; 4.5 AEROSOL RESPIRATORY (INHALATION) at 11:32

## 2022-01-01 RX ADMIN — Medication 1000 MILLIGRAM(S): at 06:21

## 2022-01-01 RX ADMIN — Medication 1 GRAM(S): at 23:44

## 2022-01-01 RX ADMIN — BUDESONIDE AND FORMOTEROL FUMARATE DIHYDRATE 2 PUFF(S): 160; 4.5 AEROSOL RESPIRATORY (INHALATION) at 11:52

## 2022-01-01 RX ADMIN — ALBUTEROL 2 PUFF(S): 90 AEROSOL, METERED ORAL at 10:47

## 2022-01-01 RX ADMIN — LIDOCAINE 1 APPLICATION(S): 4 CREAM TOPICAL at 06:27

## 2022-01-01 RX ADMIN — Medication 1 GRAM(S): at 12:04

## 2022-01-01 RX ADMIN — ALBUTEROL 2 PUFF(S): 90 AEROSOL, METERED ORAL at 10:28

## 2022-01-01 RX ADMIN — LIDOCAINE 1 APPLICATION(S): 4 CREAM TOPICAL at 05:56

## 2022-01-01 RX ADMIN — GABAPENTIN 400 MILLIGRAM(S): 400 CAPSULE ORAL at 14:16

## 2022-01-01 RX ADMIN — SODIUM CHLORIDE 4 MILLILITER(S): 9 INJECTION INTRAMUSCULAR; INTRAVENOUS; SUBCUTANEOUS at 05:04

## 2022-01-01 RX ADMIN — Medication 650 MILLIGRAM(S): at 19:06

## 2022-01-01 RX ADMIN — Medication 3 MILLILITER(S): at 06:08

## 2022-01-01 RX ADMIN — AZITHROMYCIN 255 MILLIGRAM(S): 500 TABLET, FILM COATED ORAL at 21:25

## 2022-01-01 RX ADMIN — FAMOTIDINE 20 MILLIGRAM(S): 10 INJECTION INTRAVENOUS at 19:48

## 2022-01-01 RX ADMIN — ALBUTEROL 2 PUFF(S): 90 AEROSOL, METERED ORAL at 22:16

## 2022-01-01 RX ADMIN — SIMETHICONE 80 MILLIGRAM(S): 80 TABLET, CHEWABLE ORAL at 13:34

## 2022-01-01 RX ADMIN — LIDOCAINE 1 APPLICATION(S): 4 CREAM TOPICAL at 05:45

## 2022-01-01 RX ADMIN — Medication 1 TABLET(S): at 12:21

## 2022-01-01 RX ADMIN — Medication 3 MILLILITER(S): at 12:05

## 2022-01-01 RX ADMIN — HYDROMORPHONE HYDROCHLORIDE 0.5 MILLIGRAM(S): 2 INJECTION INTRAMUSCULAR; INTRAVENOUS; SUBCUTANEOUS at 18:29

## 2022-01-01 RX ADMIN — Medication 3 MILLILITER(S): at 17:20

## 2022-01-01 RX ADMIN — ALBUTEROL 2 PUFF(S): 90 AEROSOL, METERED ORAL at 13:23

## 2022-01-01 RX ADMIN — POLYETHYLENE GLYCOL 3350 17 GRAM(S): 17 POWDER, FOR SOLUTION ORAL at 13:35

## 2022-01-01 RX ADMIN — CYCLOBENZAPRINE HYDROCHLORIDE 5 MILLIGRAM(S): 10 TABLET, FILM COATED ORAL at 13:34

## 2022-01-01 RX ADMIN — BUDESONIDE AND FORMOTEROL FUMARATE DIHYDRATE 2 PUFF(S): 160; 4.5 AEROSOL RESPIRATORY (INHALATION) at 21:56

## 2022-01-01 RX ADMIN — Medication 1 GRAM(S): at 23:50

## 2022-01-01 RX ADMIN — SODIUM CHLORIDE 4 MILLILITER(S): 9 INJECTION INTRAMUSCULAR; INTRAVENOUS; SUBCUTANEOUS at 23:07

## 2022-01-01 RX ADMIN — Medication 1000 UNIT(S): at 17:16

## 2022-01-01 RX ADMIN — Medication 40 MILLIEQUIVALENT(S): at 14:16

## 2022-01-01 RX ADMIN — SODIUM CHLORIDE 4 MILLILITER(S): 9 INJECTION INTRAMUSCULAR; INTRAVENOUS; SUBCUTANEOUS at 12:59

## 2022-01-01 RX ADMIN — Medication 3 MILLILITER(S): at 22:49

## 2022-01-01 RX ADMIN — Medication 3 MILLILITER(S): at 00:46

## 2022-01-01 RX ADMIN — PANTOPRAZOLE SODIUM 40 MILLIGRAM(S): 20 TABLET, DELAYED RELEASE ORAL at 05:38

## 2022-01-01 RX ADMIN — Medication 3 MILLILITER(S): at 11:52

## 2022-01-01 RX ADMIN — Medication 3 MILLILITER(S): at 12:44

## 2022-01-01 RX ADMIN — POLYETHYLENE GLYCOL 3350 17 GRAM(S): 17 POWDER, FOR SOLUTION ORAL at 11:44

## 2022-01-01 RX ADMIN — ALBUTEROL 2 PUFF(S): 90 AEROSOL, METERED ORAL at 21:47

## 2022-01-01 RX ADMIN — Medication 1000 MILLIGRAM(S): at 13:45

## 2022-01-01 RX ADMIN — Medication 3 MILLILITER(S): at 18:38

## 2022-01-01 RX ADMIN — BUDESONIDE AND FORMOTEROL FUMARATE DIHYDRATE 2 PUFF(S): 160; 4.5 AEROSOL RESPIRATORY (INHALATION) at 17:37

## 2022-01-01 RX ADMIN — Medication 1 GRAM(S): at 17:52

## 2022-01-01 RX ADMIN — PIPERACILLIN AND TAZOBACTAM 25 GRAM(S): 4; .5 INJECTION, POWDER, LYOPHILIZED, FOR SOLUTION INTRAVENOUS at 17:42

## 2022-01-01 RX ADMIN — SODIUM CHLORIDE 4 MILLILITER(S): 9 INJECTION INTRAMUSCULAR; INTRAVENOUS; SUBCUTANEOUS at 00:01

## 2022-01-01 RX ADMIN — ALBUTEROL 2 PUFF(S): 90 AEROSOL, METERED ORAL at 17:59

## 2022-01-01 RX ADMIN — TRAMADOL HYDROCHLORIDE 50 MILLIGRAM(S): 50 TABLET ORAL at 10:00

## 2022-01-01 RX ADMIN — Medication 0.5 MILLIGRAM(S): at 22:21

## 2022-01-01 RX ADMIN — TIOTROPIUM BROMIDE 1 CAPSULE(S): 18 CAPSULE ORAL; RESPIRATORY (INHALATION) at 13:39

## 2022-01-01 RX ADMIN — ALBUTEROL 2 PUFF(S): 90 AEROSOL, METERED ORAL at 05:54

## 2022-01-01 RX ADMIN — PANTOPRAZOLE SODIUM 40 MILLIGRAM(S): 20 TABLET, DELAYED RELEASE ORAL at 05:53

## 2022-01-01 RX ADMIN — Medication 2000 UNIT(S): at 13:18

## 2022-01-01 RX ADMIN — SODIUM CHLORIDE 120 MILLILITER(S): 9 INJECTION, SOLUTION INTRAVENOUS at 05:17

## 2022-01-01 RX ADMIN — GABAPENTIN 400 MILLIGRAM(S): 400 CAPSULE ORAL at 05:54

## 2022-01-01 RX ADMIN — Medication 40 MILLIGRAM(S): at 06:31

## 2022-01-01 RX ADMIN — Medication 81 MILLIGRAM(S): at 12:04

## 2022-01-01 RX ADMIN — Medication 1000 MILLIGRAM(S): at 17:34

## 2022-01-01 RX ADMIN — Medication 1 GRAM(S): at 22:22

## 2022-01-01 RX ADMIN — NYSTATIN CREAM 1 APPLICATION(S): 100000 CREAM TOPICAL at 17:58

## 2022-01-01 RX ADMIN — Medication 15 MILLIGRAM(S): at 11:22

## 2022-01-01 RX ADMIN — LIDOCAINE 1 APPLICATION(S): 4 CREAM TOPICAL at 17:45

## 2022-01-01 RX ADMIN — Medication 3 MILLILITER(S): at 00:01

## 2022-01-01 RX ADMIN — SODIUM CHLORIDE 4 MILLILITER(S): 9 INJECTION INTRAMUSCULAR; INTRAVENOUS; SUBCUTANEOUS at 05:02

## 2022-01-01 RX ADMIN — BUDESONIDE AND FORMOTEROL FUMARATE DIHYDRATE 2 PUFF(S): 160; 4.5 AEROSOL RESPIRATORY (INHALATION) at 13:10

## 2022-01-01 RX ADMIN — ALBUTEROL 2 PUFF(S): 90 AEROSOL, METERED ORAL at 22:52

## 2022-01-01 RX ADMIN — CHLORHEXIDINE GLUCONATE 1 APPLICATION(S): 213 SOLUTION TOPICAL at 15:09

## 2022-01-01 RX ADMIN — PANTOPRAZOLE SODIUM 40 MILLIGRAM(S): 20 TABLET, DELAYED RELEASE ORAL at 18:23

## 2022-01-01 RX ADMIN — Medication 3 MILLILITER(S): at 20:38

## 2022-01-01 RX ADMIN — PANTOPRAZOLE SODIUM 40 MILLIGRAM(S): 20 TABLET, DELAYED RELEASE ORAL at 06:20

## 2022-01-01 RX ADMIN — BUDESONIDE AND FORMOTEROL FUMARATE DIHYDRATE 2 PUFF(S): 160; 4.5 AEROSOL RESPIRATORY (INHALATION) at 17:22

## 2022-01-01 RX ADMIN — Medication 30 MILLIGRAM(S): at 06:41

## 2022-01-01 RX ADMIN — OXYCODONE HYDROCHLORIDE 2.5 MILLIGRAM(S): 5 TABLET ORAL at 10:50

## 2022-01-01 RX ADMIN — Medication 15 MILLIGRAM(S): at 13:20

## 2022-01-01 RX ADMIN — ALBUTEROL 2 PUFF(S): 90 AEROSOL, METERED ORAL at 21:19

## 2022-01-01 RX ADMIN — Medication 1000 MILLIGRAM(S): at 05:51

## 2022-01-01 RX ADMIN — Medication 40 MILLIGRAM(S): at 05:51

## 2022-01-01 RX ADMIN — GABAPENTIN 400 MILLIGRAM(S): 400 CAPSULE ORAL at 05:41

## 2022-01-01 RX ADMIN — SODIUM CHLORIDE 4 MILLILITER(S): 9 INJECTION INTRAMUSCULAR; INTRAVENOUS; SUBCUTANEOUS at 17:22

## 2022-01-01 RX ADMIN — PANTOPRAZOLE SODIUM 40 MILLIGRAM(S): 20 TABLET, DELAYED RELEASE ORAL at 05:40

## 2022-01-01 RX ADMIN — GABAPENTIN 400 MILLIGRAM(S): 400 CAPSULE ORAL at 21:51

## 2022-01-01 RX ADMIN — Medication 1000 MILLIGRAM(S): at 21:50

## 2022-01-01 RX ADMIN — Medication 40 MILLIGRAM(S): at 05:41

## 2022-01-01 RX ADMIN — ALBUTEROL 2 PUFF(S): 90 AEROSOL, METERED ORAL at 18:39

## 2022-01-01 RX ADMIN — Medication 1 TABLET(S): at 11:22

## 2022-01-01 RX ADMIN — OXYCODONE HYDROCHLORIDE 2.5 MILLIGRAM(S): 5 TABLET ORAL at 09:11

## 2022-01-01 RX ADMIN — CYCLOBENZAPRINE HYDROCHLORIDE 5 MILLIGRAM(S): 10 TABLET, FILM COATED ORAL at 05:24

## 2022-01-01 RX ADMIN — LIDOCAINE 2 PATCH: 4 CREAM TOPICAL at 19:44

## 2022-01-01 RX ADMIN — LIDOCAINE 1 APPLICATION(S): 4 CREAM TOPICAL at 05:41

## 2022-01-01 RX ADMIN — BUDESONIDE AND FORMOTEROL FUMARATE DIHYDRATE 2 PUFF(S): 160; 4.5 AEROSOL RESPIRATORY (INHALATION) at 11:53

## 2022-01-01 RX ADMIN — MORPHINE SULFATE 0.5 MILLIGRAM(S): 50 CAPSULE, EXTENDED RELEASE ORAL at 11:12

## 2022-01-01 RX ADMIN — AMLODIPINE BESYLATE 2.5 MILLIGRAM(S): 2.5 TABLET ORAL at 05:58

## 2022-01-01 RX ADMIN — Medication 3 MILLILITER(S): at 05:56

## 2022-01-01 RX ADMIN — SIMETHICONE 80 MILLIGRAM(S): 80 TABLET, CHEWABLE ORAL at 12:53

## 2022-01-01 RX ADMIN — GABAPENTIN 400 MILLIGRAM(S): 400 CAPSULE ORAL at 13:33

## 2022-01-01 RX ADMIN — Medication 250 MILLIGRAM(S): at 15:02

## 2022-01-01 RX ADMIN — GABAPENTIN 400 MILLIGRAM(S): 400 CAPSULE ORAL at 22:34

## 2022-01-01 RX ADMIN — LIDOCAINE 1 APPLICATION(S): 4 CREAM TOPICAL at 17:51

## 2022-01-01 RX ADMIN — GABAPENTIN 400 MILLIGRAM(S): 400 CAPSULE ORAL at 13:19

## 2022-01-01 RX ADMIN — Medication 40 MILLIGRAM(S): at 06:12

## 2022-01-01 RX ADMIN — Medication 2000 UNIT(S): at 12:21

## 2022-01-01 RX ADMIN — LIDOCAINE 1 PATCH: 4 CREAM TOPICAL at 17:10

## 2022-01-01 RX ADMIN — SODIUM CHLORIDE 4 MILLILITER(S): 9 INJECTION INTRAMUSCULAR; INTRAVENOUS; SUBCUTANEOUS at 06:20

## 2022-01-01 RX ADMIN — Medication 3 MILLILITER(S): at 06:22

## 2022-01-01 RX ADMIN — ALBUTEROL 2 PUFF(S): 90 AEROSOL, METERED ORAL at 09:14

## 2022-01-01 RX ADMIN — Medication 1 GRAM(S): at 05:27

## 2022-01-01 RX ADMIN — Medication 3 MILLILITER(S): at 00:29

## 2022-01-01 RX ADMIN — Medication 2000 UNIT(S): at 12:03

## 2022-01-01 RX ADMIN — ENOXAPARIN SODIUM 40 MILLIGRAM(S): 100 INJECTION SUBCUTANEOUS at 22:49

## 2022-01-01 RX ADMIN — Medication 1000 MILLIGRAM(S): at 21:55

## 2022-01-01 RX ADMIN — DORNASE ALFA 2.5 MILLIGRAM(S): 1 SOLUTION RESPIRATORY (INHALATION) at 12:54

## 2022-01-01 RX ADMIN — Medication 1 TABLET(S): at 11:24

## 2022-01-01 RX ADMIN — SODIUM CHLORIDE 4 MILLILITER(S): 9 INJECTION INTRAMUSCULAR; INTRAVENOUS; SUBCUTANEOUS at 00:23

## 2022-01-01 RX ADMIN — Medication 100 MILLIEQUIVALENT(S): at 23:07

## 2022-01-01 RX ADMIN — ALBUTEROL 2 PUFF(S): 90 AEROSOL, METERED ORAL at 21:32

## 2022-01-01 RX ADMIN — SODIUM CHLORIDE 4 MILLILITER(S): 9 INJECTION INTRAMUSCULAR; INTRAVENOUS; SUBCUTANEOUS at 06:22

## 2022-01-01 RX ADMIN — FAMOTIDINE 20 MILLIGRAM(S): 10 INJECTION INTRAVENOUS at 11:57

## 2022-01-01 RX ADMIN — LIDOCAINE 1 PATCH: 4 CREAM TOPICAL at 19:10

## 2022-01-01 RX ADMIN — SODIUM CHLORIDE 4 MILLILITER(S): 9 INJECTION INTRAMUSCULAR; INTRAVENOUS; SUBCUTANEOUS at 23:17

## 2022-01-01 RX ADMIN — Medication 3 MILLILITER(S): at 11:21

## 2022-01-01 RX ADMIN — Medication 1 TABLET(S): at 05:39

## 2022-01-01 RX ADMIN — MORPHINE SULFATE 0.5 MILLIGRAM(S): 50 CAPSULE, EXTENDED RELEASE ORAL at 17:51

## 2022-01-01 RX ADMIN — Medication 1000 MILLIGRAM(S): at 22:41

## 2022-01-01 RX ADMIN — ALBUTEROL 2 PUFF(S): 90 AEROSOL, METERED ORAL at 12:11

## 2022-01-01 RX ADMIN — TRAMADOL HYDROCHLORIDE 50 MILLIGRAM(S): 50 TABLET ORAL at 12:09

## 2022-01-01 RX ADMIN — Medication 1 TABLET(S): at 12:00

## 2022-01-01 RX ADMIN — Medication 1000 MILLIGRAM(S): at 06:03

## 2022-01-01 RX ADMIN — Medication 3 MILLILITER(S): at 23:05

## 2022-01-01 RX ADMIN — HYDROMORPHONE HYDROCHLORIDE 0.25 MILLIGRAM(S): 2 INJECTION INTRAMUSCULAR; INTRAVENOUS; SUBCUTANEOUS at 12:15

## 2022-01-01 RX ADMIN — ALBUTEROL 2 PUFF(S): 90 AEROSOL, METERED ORAL at 14:14

## 2022-01-01 RX ADMIN — Medication 1000 MILLIGRAM(S): at 15:07

## 2022-01-01 RX ADMIN — ONDANSETRON 4 MILLIGRAM(S): 8 TABLET, FILM COATED ORAL at 22:20

## 2022-01-01 RX ADMIN — BUDESONIDE AND FORMOTEROL FUMARATE DIHYDRATE 2 PUFF(S): 160; 4.5 AEROSOL RESPIRATORY (INHALATION) at 18:25

## 2022-01-01 RX ADMIN — Medication 1000 MILLIGRAM(S): at 23:35

## 2022-01-01 RX ADMIN — NYSTATIN CREAM 1 APPLICATION(S): 100000 CREAM TOPICAL at 14:40

## 2022-01-01 RX ADMIN — ALBUTEROL 2 PUFF(S): 90 AEROSOL, METERED ORAL at 05:48

## 2022-01-01 RX ADMIN — LIDOCAINE 1 APPLICATION(S): 4 CREAM TOPICAL at 17:11

## 2022-01-01 RX ADMIN — MORPHINE SULFATE 1 MILLIGRAM(S): 50 CAPSULE, EXTENDED RELEASE ORAL at 00:43

## 2022-01-01 RX ADMIN — CEFTRIAXONE 100 MILLIGRAM(S): 500 INJECTION, POWDER, FOR SOLUTION INTRAMUSCULAR; INTRAVENOUS at 17:30

## 2022-01-01 RX ADMIN — LIDOCAINE 2 PATCH: 4 CREAM TOPICAL at 12:00

## 2022-01-01 RX ADMIN — ALBUTEROL 2 PUFF(S): 90 AEROSOL, METERED ORAL at 06:44

## 2022-01-01 RX ADMIN — Medication 1 TABLET(S): at 12:10

## 2022-01-01 RX ADMIN — LIDOCAINE 1 PATCH: 4 CREAM TOPICAL at 19:25

## 2022-01-01 RX ADMIN — Medication 40 MILLIGRAM(S): at 05:53

## 2022-01-01 RX ADMIN — Medication 1 APPLICATION(S): at 05:56

## 2022-01-01 RX ADMIN — Medication 40 MILLIGRAM(S): at 05:35

## 2022-01-01 RX ADMIN — ALBUTEROL 2 PUFF(S): 90 AEROSOL, METERED ORAL at 22:22

## 2022-01-01 RX ADMIN — ALBUTEROL 2 PUFF(S): 90 AEROSOL, METERED ORAL at 17:56

## 2022-01-01 RX ADMIN — Medication 3 MILLILITER(S): at 12:54

## 2022-01-01 RX ADMIN — OXYCODONE HYDROCHLORIDE 2.5 MILLIGRAM(S): 5 TABLET ORAL at 11:22

## 2022-01-01 RX ADMIN — BUDESONIDE AND FORMOTEROL FUMARATE DIHYDRATE 2 PUFF(S): 160; 4.5 AEROSOL RESPIRATORY (INHALATION) at 05:53

## 2022-01-01 RX ADMIN — Medication 1 TABLET(S): at 11:23

## 2022-01-01 RX ADMIN — Medication 3 MILLILITER(S): at 23:25

## 2022-01-01 RX ADMIN — Medication 1000 MILLIGRAM(S): at 14:24

## 2022-01-01 RX ADMIN — PANTOPRAZOLE SODIUM 10 MG/HR: 20 TABLET, DELAYED RELEASE ORAL at 12:47

## 2022-01-01 RX ADMIN — GABAPENTIN 400 MILLIGRAM(S): 400 CAPSULE ORAL at 15:12

## 2022-01-01 RX ADMIN — SIMETHICONE 80 MILLIGRAM(S): 80 TABLET, CHEWABLE ORAL at 11:47

## 2022-01-01 RX ADMIN — FAMOTIDINE 20 MILLIGRAM(S): 10 INJECTION INTRAVENOUS at 18:19

## 2022-01-01 RX ADMIN — SODIUM CHLORIDE 4 MILLILITER(S): 9 INJECTION INTRAMUSCULAR; INTRAVENOUS; SUBCUTANEOUS at 05:34

## 2022-01-01 RX ADMIN — PIPERACILLIN AND TAZOBACTAM 25 GRAM(S): 4; .5 INJECTION, POWDER, LYOPHILIZED, FOR SOLUTION INTRAVENOUS at 15:16

## 2022-01-01 RX ADMIN — PIPERACILLIN AND TAZOBACTAM 25 GRAM(S): 4; .5 INJECTION, POWDER, LYOPHILIZED, FOR SOLUTION INTRAVENOUS at 23:16

## 2022-01-01 RX ADMIN — PANTOPRAZOLE SODIUM 40 MILLIGRAM(S): 20 TABLET, DELAYED RELEASE ORAL at 07:00

## 2022-01-01 RX ADMIN — HYDROMORPHONE HYDROCHLORIDE 0.25 MILLIGRAM(S): 2 INJECTION INTRAMUSCULAR; INTRAVENOUS; SUBCUTANEOUS at 18:42

## 2022-01-01 RX ADMIN — AZITHROMYCIN 250 MILLIGRAM(S): 500 TABLET, FILM COATED ORAL at 14:54

## 2022-01-01 RX ADMIN — Medication 3 MILLILITER(S): at 11:09

## 2022-01-01 RX ADMIN — Medication 1000 MILLIGRAM(S): at 21:33

## 2022-01-01 RX ADMIN — AMLODIPINE BESYLATE 2.5 MILLIGRAM(S): 2.5 TABLET ORAL at 05:46

## 2022-01-01 RX ADMIN — TRAMADOL HYDROCHLORIDE 50 MILLIGRAM(S): 50 TABLET ORAL at 17:08

## 2022-01-01 RX ADMIN — AMLODIPINE BESYLATE 2.5 MILLIGRAM(S): 2.5 TABLET ORAL at 06:12

## 2022-01-01 RX ADMIN — SENNA PLUS 2 TABLET(S): 8.6 TABLET ORAL at 22:29

## 2022-01-01 RX ADMIN — PANTOPRAZOLE SODIUM 40 MILLIGRAM(S): 20 TABLET, DELAYED RELEASE ORAL at 05:52

## 2022-01-01 RX ADMIN — CYCLOBENZAPRINE HYDROCHLORIDE 5 MILLIGRAM(S): 10 TABLET, FILM COATED ORAL at 15:07

## 2022-01-01 RX ADMIN — SODIUM CHLORIDE 4 MILLILITER(S): 9 INJECTION INTRAMUSCULAR; INTRAVENOUS; SUBCUTANEOUS at 19:36

## 2022-01-01 RX ADMIN — SODIUM CHLORIDE 4 MILLILITER(S): 9 INJECTION INTRAMUSCULAR; INTRAVENOUS; SUBCUTANEOUS at 23:05

## 2022-01-01 RX ADMIN — Medication 0.5 MILLIGRAM(S): at 18:18

## 2022-01-01 RX ADMIN — LIDOCAINE 1 PATCH: 4 CREAM TOPICAL at 16:20

## 2022-01-01 RX ADMIN — BUDESONIDE AND FORMOTEROL FUMARATE DIHYDRATE 2 PUFF(S): 160; 4.5 AEROSOL RESPIRATORY (INHALATION) at 10:56

## 2022-01-01 RX ADMIN — PIPERACILLIN AND TAZOBACTAM 25 GRAM(S): 4; .5 INJECTION, POWDER, LYOPHILIZED, FOR SOLUTION INTRAVENOUS at 06:12

## 2022-01-01 RX ADMIN — PANTOPRAZOLE SODIUM 40 MILLIGRAM(S): 20 TABLET, DELAYED RELEASE ORAL at 05:12

## 2022-01-01 RX ADMIN — TIOTROPIUM BROMIDE 1 CAPSULE(S): 18 CAPSULE ORAL; RESPIRATORY (INHALATION) at 08:14

## 2022-01-01 RX ADMIN — Medication 4 MILLILITER(S): at 06:35

## 2022-01-01 RX ADMIN — Medication 1000 MILLIGRAM(S): at 14:37

## 2022-01-01 RX ADMIN — Medication 3 MILLILITER(S): at 06:58

## 2022-01-01 RX ADMIN — MORPHINE SULFATE 0.5 MILLIGRAM(S): 50 CAPSULE, EXTENDED RELEASE ORAL at 04:49

## 2022-01-01 RX ADMIN — Medication 1 GRAM(S): at 11:24

## 2022-01-01 RX ADMIN — FAMOTIDINE 20 MILLIGRAM(S): 10 INJECTION INTRAVENOUS at 06:20

## 2022-01-01 RX ADMIN — Medication 975 MILLIGRAM(S): at 12:00

## 2022-01-01 RX ADMIN — TIOTROPIUM BROMIDE 2 PUFF(S): 18 CAPSULE ORAL; RESPIRATORY (INHALATION) at 17:52

## 2022-01-01 RX ADMIN — GABAPENTIN 300 MILLIGRAM(S): 400 CAPSULE ORAL at 23:54

## 2022-01-01 RX ADMIN — PIPERACILLIN AND TAZOBACTAM 25 GRAM(S): 4; .5 INJECTION, POWDER, LYOPHILIZED, FOR SOLUTION INTRAVENOUS at 08:15

## 2022-01-01 RX ADMIN — Medication 3 MILLILITER(S): at 23:44

## 2022-01-01 RX ADMIN — BUDESONIDE AND FORMOTEROL FUMARATE DIHYDRATE 2 PUFF(S): 160; 4.5 AEROSOL RESPIRATORY (INHALATION) at 10:21

## 2022-01-01 RX ADMIN — ALBUTEROL 2.5 MILLIGRAM(S): 90 AEROSOL, METERED ORAL at 22:23

## 2022-01-01 RX ADMIN — ALBUTEROL 2 PUFF(S): 90 AEROSOL, METERED ORAL at 06:15

## 2022-01-01 RX ADMIN — OXYCODONE HYDROCHLORIDE 2.5 MILLIGRAM(S): 5 TABLET ORAL at 18:57

## 2022-01-01 RX ADMIN — Medication 81 MILLIGRAM(S): at 11:42

## 2022-01-01 RX ADMIN — Medication 1000 MILLIGRAM(S): at 00:00

## 2022-01-01 RX ADMIN — SENNA PLUS 2 TABLET(S): 8.6 TABLET ORAL at 21:50

## 2022-01-01 RX ADMIN — TIOTROPIUM BROMIDE 1 CAPSULE(S): 18 CAPSULE ORAL; RESPIRATORY (INHALATION) at 12:44

## 2022-01-01 RX ADMIN — SODIUM CHLORIDE 4 MILLILITER(S): 9 INJECTION INTRAMUSCULAR; INTRAVENOUS; SUBCUTANEOUS at 06:08

## 2022-01-01 RX ADMIN — SODIUM CHLORIDE 120 MILLILITER(S): 9 INJECTION, SOLUTION INTRAVENOUS at 09:29

## 2022-01-01 RX ADMIN — GABAPENTIN 400 MILLIGRAM(S): 400 CAPSULE ORAL at 13:10

## 2022-01-01 RX ADMIN — Medication 125 MILLIGRAM(S): at 18:44

## 2022-01-01 RX ADMIN — LIDOCAINE 1 PATCH: 4 CREAM TOPICAL at 17:04

## 2022-01-01 RX ADMIN — SODIUM CHLORIDE 4 MILLILITER(S): 9 INJECTION INTRAMUSCULAR; INTRAVENOUS; SUBCUTANEOUS at 23:43

## 2022-01-01 RX ADMIN — Medication 1 GRAM(S): at 05:57

## 2022-01-01 RX ADMIN — Medication 81 MILLIGRAM(S): at 12:38

## 2022-01-01 RX ADMIN — Medication 1 GRAM(S): at 06:24

## 2022-01-01 RX ADMIN — Medication 3 MILLILITER(S): at 05:49

## 2022-01-01 RX ADMIN — POLYETHYLENE GLYCOL 3350 17 GRAM(S): 17 POWDER, FOR SOLUTION ORAL at 14:34

## 2022-01-01 RX ADMIN — Medication 40 MILLIGRAM(S): at 16:43

## 2022-01-01 RX ADMIN — AMLODIPINE BESYLATE 2.5 MILLIGRAM(S): 2.5 TABLET ORAL at 05:40

## 2022-01-01 RX ADMIN — FAMOTIDINE 20 MILLIGRAM(S): 10 INJECTION INTRAVENOUS at 17:21

## 2022-01-01 RX ADMIN — Medication 40 MILLIEQUIVALENT(S): at 09:08

## 2022-01-01 RX ADMIN — LIDOCAINE 2 PATCH: 4 CREAM TOPICAL at 00:27

## 2022-01-01 RX ADMIN — Medication 20 MILLIGRAM(S): at 06:25

## 2022-01-01 RX ADMIN — Medication 3 MILLILITER(S): at 23:27

## 2022-01-01 RX ADMIN — SODIUM CHLORIDE 4 MILLILITER(S): 9 INJECTION INTRAMUSCULAR; INTRAVENOUS; SUBCUTANEOUS at 17:36

## 2022-01-01 RX ADMIN — Medication 1 GRAM(S): at 06:22

## 2022-01-01 RX ADMIN — Medication 1000 UNIT(S): at 11:31

## 2022-01-01 RX ADMIN — SODIUM CHLORIDE 4 MILLILITER(S): 9 INJECTION INTRAMUSCULAR; INTRAVENOUS; SUBCUTANEOUS at 18:01

## 2022-01-01 RX ADMIN — ENOXAPARIN SODIUM 40 MILLIGRAM(S): 100 INJECTION SUBCUTANEOUS at 23:33

## 2022-01-01 RX ADMIN — Medication 3 MILLILITER(S): at 06:31

## 2022-01-01 RX ADMIN — LIDOCAINE 2 PATCH: 4 CREAM TOPICAL at 19:55

## 2022-01-01 RX ADMIN — Medication 1000 MILLIGRAM(S): at 05:55

## 2022-01-01 RX ADMIN — Medication 40 MILLIGRAM(S): at 06:21

## 2022-01-01 RX ADMIN — BUDESONIDE AND FORMOTEROL FUMARATE DIHYDRATE 2 PUFF(S): 160; 4.5 AEROSOL RESPIRATORY (INHALATION) at 17:59

## 2022-01-01 RX ADMIN — PANTOPRAZOLE SODIUM 40 MILLIGRAM(S): 20 TABLET, DELAYED RELEASE ORAL at 05:35

## 2022-01-01 RX ADMIN — OXYCODONE HYDROCHLORIDE 2.5 MILLIGRAM(S): 5 TABLET ORAL at 10:30

## 2022-01-01 RX ADMIN — LIDOCAINE 1 APPLICATION(S): 4 CREAM TOPICAL at 17:53

## 2022-01-01 RX ADMIN — BUDESONIDE AND FORMOTEROL FUMARATE DIHYDRATE 2 PUFF(S): 160; 4.5 AEROSOL RESPIRATORY (INHALATION) at 18:02

## 2022-01-01 RX ADMIN — Medication 20 MILLIGRAM(S): at 06:31

## 2022-01-01 RX ADMIN — Medication 40 MILLIGRAM(S): at 17:23

## 2022-01-01 RX ADMIN — BUDESONIDE AND FORMOTEROL FUMARATE DIHYDRATE 2 PUFF(S): 160; 4.5 AEROSOL RESPIRATORY (INHALATION) at 05:28

## 2022-01-01 RX ADMIN — Medication 3 MILLILITER(S): at 17:34

## 2022-01-01 RX ADMIN — SODIUM CHLORIDE 4 MILLILITER(S): 9 INJECTION INTRAMUSCULAR; INTRAVENOUS; SUBCUTANEOUS at 00:47

## 2022-01-01 RX ADMIN — TIOTROPIUM BROMIDE 2 PUFF(S): 18 CAPSULE ORAL; RESPIRATORY (INHALATION) at 13:36

## 2022-01-01 RX ADMIN — AMLODIPINE BESYLATE 2.5 MILLIGRAM(S): 2.5 TABLET ORAL at 05:55

## 2022-01-01 RX ADMIN — GABAPENTIN 400 MILLIGRAM(S): 400 CAPSULE ORAL at 21:06

## 2022-01-01 RX ADMIN — Medication 1000 MILLIGRAM(S): at 07:00

## 2022-01-01 RX ADMIN — NYSTATIN CREAM 1 APPLICATION(S): 100000 CREAM TOPICAL at 22:15

## 2022-01-01 RX ADMIN — GABAPENTIN 400 MILLIGRAM(S): 400 CAPSULE ORAL at 06:17

## 2022-01-01 RX ADMIN — GABAPENTIN 400 MILLIGRAM(S): 400 CAPSULE ORAL at 14:48

## 2022-01-01 RX ADMIN — Medication 250 MILLIGRAM(S): at 23:23

## 2022-01-01 RX ADMIN — Medication 3 MILLILITER(S): at 23:03

## 2022-01-01 RX ADMIN — PIPERACILLIN AND TAZOBACTAM 200 GRAM(S): 4; .5 INJECTION, POWDER, LYOPHILIZED, FOR SOLUTION INTRAVENOUS at 16:46

## 2022-01-01 RX ADMIN — Medication 250 MILLIGRAM(S): at 22:54

## 2022-01-01 RX ADMIN — OXYCODONE HYDROCHLORIDE 2.5 MILLIGRAM(S): 5 TABLET ORAL at 09:00

## 2022-01-01 RX ADMIN — TRAMADOL HYDROCHLORIDE 50 MILLIGRAM(S): 50 TABLET ORAL at 09:06

## 2022-01-01 RX ADMIN — GABAPENTIN 400 MILLIGRAM(S): 400 CAPSULE ORAL at 14:03

## 2022-01-01 RX ADMIN — BUDESONIDE AND FORMOTEROL FUMARATE DIHYDRATE 2 PUFF(S): 160; 4.5 AEROSOL RESPIRATORY (INHALATION) at 22:41

## 2022-01-01 RX ADMIN — SENNA PLUS 2 TABLET(S): 8.6 TABLET ORAL at 21:19

## 2022-01-01 RX ADMIN — Medication 1000 MILLIGRAM(S): at 22:20

## 2022-01-01 RX ADMIN — TIOTROPIUM BROMIDE 1 CAPSULE(S): 18 CAPSULE ORAL; RESPIRATORY (INHALATION) at 10:26

## 2022-01-01 RX ADMIN — Medication 1 GRAM(S): at 19:36

## 2022-01-01 RX ADMIN — ALBUTEROL 2 PUFF(S): 90 AEROSOL, METERED ORAL at 06:08

## 2022-01-01 RX ADMIN — CYCLOBENZAPRINE HYDROCHLORIDE 5 MILLIGRAM(S): 10 TABLET, FILM COATED ORAL at 23:04

## 2022-01-01 RX ADMIN — MORPHINE SULFATE 0.5 MILLIGRAM(S): 50 CAPSULE, EXTENDED RELEASE ORAL at 05:19

## 2022-01-01 RX ADMIN — LIDOCAINE 1 PATCH: 4 CREAM TOPICAL at 17:17

## 2022-01-01 RX ADMIN — ALBUTEROL 2 PUFF(S): 90 AEROSOL, METERED ORAL at 01:58

## 2022-01-01 RX ADMIN — LIDOCAINE 2 PATCH: 4 CREAM TOPICAL at 12:48

## 2022-01-01 RX ADMIN — BUDESONIDE AND FORMOTEROL FUMARATE DIHYDRATE 2 PUFF(S): 160; 4.5 AEROSOL RESPIRATORY (INHALATION) at 18:27

## 2022-01-01 RX ADMIN — Medication 1000 MILLIGRAM(S): at 06:19

## 2022-01-01 RX ADMIN — NYSTATIN CREAM 1 APPLICATION(S): 100000 CREAM TOPICAL at 12:55

## 2022-01-01 RX ADMIN — Medication 3 MILLILITER(S): at 06:24

## 2022-01-01 RX ADMIN — ALBUTEROL 2 PUFF(S): 90 AEROSOL, METERED ORAL at 01:56

## 2022-01-01 RX ADMIN — GABAPENTIN 300 MILLIGRAM(S): 400 CAPSULE ORAL at 22:54

## 2022-01-01 RX ADMIN — LIDOCAINE 1 PATCH: 4 CREAM TOPICAL at 19:08

## 2022-01-01 RX ADMIN — PANTOPRAZOLE SODIUM 40 MILLIGRAM(S): 20 TABLET, DELAYED RELEASE ORAL at 05:27

## 2022-01-01 RX ADMIN — FAMOTIDINE 20 MILLIGRAM(S): 10 INJECTION INTRAVENOUS at 18:37

## 2022-01-01 RX ADMIN — ENOXAPARIN SODIUM 40 MILLIGRAM(S): 100 INJECTION SUBCUTANEOUS at 06:39

## 2022-01-01 RX ADMIN — SODIUM CHLORIDE 4 MILLILITER(S): 9 INJECTION INTRAMUSCULAR; INTRAVENOUS; SUBCUTANEOUS at 13:20

## 2022-01-01 RX ADMIN — GABAPENTIN 400 MILLIGRAM(S): 400 CAPSULE ORAL at 05:35

## 2022-01-01 RX ADMIN — PIPERACILLIN AND TAZOBACTAM 25 GRAM(S): 4; .5 INJECTION, POWDER, LYOPHILIZED, FOR SOLUTION INTRAVENOUS at 23:27

## 2022-01-01 RX ADMIN — TRAMADOL HYDROCHLORIDE 50 MILLIGRAM(S): 50 TABLET ORAL at 11:55

## 2022-01-01 RX ADMIN — OXYCODONE HYDROCHLORIDE 2.5 MILLIGRAM(S): 5 TABLET ORAL at 23:35

## 2022-01-01 RX ADMIN — PIPERACILLIN AND TAZOBACTAM 25 GRAM(S): 4; .5 INJECTION, POWDER, LYOPHILIZED, FOR SOLUTION INTRAVENOUS at 23:08

## 2022-01-01 RX ADMIN — Medication 250 MILLIGRAM(S): at 06:21

## 2022-01-01 RX ADMIN — LIDOCAINE 1 PATCH: 4 CREAM TOPICAL at 19:37

## 2022-01-01 RX ADMIN — BUDESONIDE AND FORMOTEROL FUMARATE DIHYDRATE 2 PUFF(S): 160; 4.5 AEROSOL RESPIRATORY (INHALATION) at 17:24

## 2022-01-01 RX ADMIN — GABAPENTIN 400 MILLIGRAM(S): 400 CAPSULE ORAL at 22:15

## 2022-01-01 RX ADMIN — PANTOPRAZOLE SODIUM 40 MILLIGRAM(S): 20 TABLET, DELAYED RELEASE ORAL at 05:50

## 2022-01-01 RX ADMIN — GABAPENTIN 400 MILLIGRAM(S): 400 CAPSULE ORAL at 21:59

## 2022-01-01 RX ADMIN — FAMOTIDINE 20 MILLIGRAM(S): 10 INJECTION INTRAVENOUS at 05:02

## 2022-01-01 RX ADMIN — CEFTRIAXONE 100 MILLIGRAM(S): 500 INJECTION, POWDER, FOR SOLUTION INTRAMUSCULAR; INTRAVENOUS at 14:47

## 2022-01-01 RX ADMIN — CALCITONIN SALMON 1 SPRAY(S): 200 INJECTION, SOLUTION INTRAMUSCULAR at 12:49

## 2022-01-01 RX ADMIN — Medication 1000 UNIT(S): at 12:10

## 2022-01-01 RX ADMIN — TIOTROPIUM BROMIDE 1 CAPSULE(S): 18 CAPSULE ORAL; RESPIRATORY (INHALATION) at 10:30

## 2022-01-01 RX ADMIN — SODIUM CHLORIDE 4 MILLILITER(S): 9 INJECTION INTRAMUSCULAR; INTRAVENOUS; SUBCUTANEOUS at 15:08

## 2022-01-01 RX ADMIN — Medication 81 MILLIGRAM(S): at 13:17

## 2022-01-01 RX ADMIN — Medication 3 MILLILITER(S): at 19:43

## 2022-01-01 RX ADMIN — Medication 1 APPLICATION(S): at 17:30

## 2022-01-01 RX ADMIN — OXYCODONE HYDROCHLORIDE 2.5 MILLIGRAM(S): 5 TABLET ORAL at 20:40

## 2022-01-01 RX ADMIN — Medication 650 MILLIGRAM(S): at 05:36

## 2022-01-01 RX ADMIN — LIDOCAINE 1 APPLICATION(S): 4 CREAM TOPICAL at 17:31

## 2022-01-01 RX ADMIN — CEFEPIME 1000 MILLIGRAM(S): 1 INJECTION, POWDER, FOR SOLUTION INTRAMUSCULAR; INTRAVENOUS at 18:00

## 2022-01-01 RX ADMIN — CALCITONIN SALMON 1 SPRAY(S): 200 INJECTION, SOLUTION INTRAMUSCULAR at 12:04

## 2022-01-01 RX ADMIN — Medication 3 MILLILITER(S): at 12:08

## 2022-01-01 RX ADMIN — LIDOCAINE 1 PATCH: 4 CREAM TOPICAL at 19:16

## 2022-01-01 RX ADMIN — Medication 0.5 MILLIGRAM(S): at 18:19

## 2022-01-01 RX ADMIN — Medication 40 MILLIGRAM(S): at 10:20

## 2022-01-01 RX ADMIN — Medication 3 MILLILITER(S): at 05:02

## 2022-01-01 RX ADMIN — TIOTROPIUM BROMIDE 1 CAPSULE(S): 18 CAPSULE ORAL; RESPIRATORY (INHALATION) at 13:13

## 2022-01-01 RX ADMIN — PANTOPRAZOLE SODIUM 10 MG/HR: 20 TABLET, DELAYED RELEASE ORAL at 00:23

## 2022-01-01 RX ADMIN — PANTOPRAZOLE SODIUM 40 MILLIGRAM(S): 20 TABLET, DELAYED RELEASE ORAL at 05:46

## 2022-01-01 RX ADMIN — SENNA PLUS 2 TABLET(S): 8.6 TABLET ORAL at 22:22

## 2022-01-01 RX ADMIN — POLYETHYLENE GLYCOL 3350 17 GRAM(S): 17 POWDER, FOR SOLUTION ORAL at 11:56

## 2022-01-01 RX ADMIN — LIDOCAINE 1 APPLICATION(S): 4 CREAM TOPICAL at 17:17

## 2022-01-01 RX ADMIN — FAMOTIDINE 20 MILLIGRAM(S): 10 INJECTION INTRAVENOUS at 17:51

## 2022-01-01 RX ADMIN — BUDESONIDE AND FORMOTEROL FUMARATE DIHYDRATE 2 PUFF(S): 160; 4.5 AEROSOL RESPIRATORY (INHALATION) at 12:21

## 2022-01-01 RX ADMIN — CYCLOBENZAPRINE HYDROCHLORIDE 5 MILLIGRAM(S): 10 TABLET, FILM COATED ORAL at 15:53

## 2022-01-01 RX ADMIN — GABAPENTIN 400 MILLIGRAM(S): 400 CAPSULE ORAL at 07:00

## 2022-01-01 RX ADMIN — PANTOPRAZOLE SODIUM 40 MILLIGRAM(S): 20 TABLET, DELAYED RELEASE ORAL at 05:51

## 2022-01-01 RX ADMIN — GABAPENTIN 400 MILLIGRAM(S): 400 CAPSULE ORAL at 05:52

## 2022-01-01 RX ADMIN — Medication 1000 MILLIGRAM(S): at 23:23

## 2022-01-01 RX ADMIN — ALBUTEROL 2 PUFF(S): 90 AEROSOL, METERED ORAL at 22:29

## 2022-01-01 RX ADMIN — Medication 1000 MILLIGRAM(S): at 14:32

## 2022-01-01 RX ADMIN — PIPERACILLIN AND TAZOBACTAM 25 GRAM(S): 4; .5 INJECTION, POWDER, LYOPHILIZED, FOR SOLUTION INTRAVENOUS at 08:47

## 2022-01-01 RX ADMIN — Medication 1000 MILLIGRAM(S): at 05:38

## 2022-01-01 RX ADMIN — Medication 1000 MILLIGRAM(S): at 15:02

## 2022-01-01 RX ADMIN — Medication 1000 MILLIGRAM(S): at 17:08

## 2022-01-01 RX ADMIN — ENOXAPARIN SODIUM 40 MILLIGRAM(S): 100 INJECTION SUBCUTANEOUS at 22:26

## 2022-01-01 RX ADMIN — ALBUTEROL 2 PUFF(S): 90 AEROSOL, METERED ORAL at 02:30

## 2022-01-01 RX ADMIN — GABAPENTIN 400 MILLIGRAM(S): 400 CAPSULE ORAL at 15:07

## 2022-01-01 RX ADMIN — GABAPENTIN 400 MILLIGRAM(S): 400 CAPSULE ORAL at 05:25

## 2022-01-01 RX ADMIN — LIDOCAINE 1 PATCH: 4 CREAM TOPICAL at 17:53

## 2022-01-01 RX ADMIN — ALBUTEROL 2 PUFF(S): 90 AEROSOL, METERED ORAL at 23:03

## 2022-01-01 RX ADMIN — GABAPENTIN 300 MILLIGRAM(S): 400 CAPSULE ORAL at 05:49

## 2022-01-01 RX ADMIN — TIOTROPIUM BROMIDE 1 CAPSULE(S): 18 CAPSULE ORAL; RESPIRATORY (INHALATION) at 12:46

## 2022-01-01 RX ADMIN — Medication 3 MILLILITER(S): at 05:17

## 2022-01-01 RX ADMIN — ALBUTEROL 2 PUFF(S): 90 AEROSOL, METERED ORAL at 17:19

## 2022-01-01 RX ADMIN — ALBUTEROL 2 PUFF(S): 90 AEROSOL, METERED ORAL at 05:29

## 2022-01-01 RX ADMIN — Medication 250 MILLIGRAM(S): at 05:50

## 2022-01-01 RX ADMIN — CHLORHEXIDINE GLUCONATE 1 APPLICATION(S): 213 SOLUTION TOPICAL at 13:03

## 2022-01-01 RX ADMIN — SODIUM CHLORIDE 4 MILLILITER(S): 9 INJECTION INTRAMUSCULAR; INTRAVENOUS; SUBCUTANEOUS at 13:00

## 2022-01-01 RX ADMIN — POLYETHYLENE GLYCOL 3350 17 GRAM(S): 17 POWDER, FOR SOLUTION ORAL at 11:54

## 2022-01-01 RX ADMIN — Medication 1 TABLET(S): at 11:59

## 2022-01-01 RX ADMIN — Medication 40 MILLIGRAM(S): at 05:02

## 2022-01-01 RX ADMIN — GABAPENTIN 400 MILLIGRAM(S): 400 CAPSULE ORAL at 06:14

## 2022-01-01 RX ADMIN — SODIUM CHLORIDE 2000 MILLILITER(S): 9 INJECTION INTRAMUSCULAR; INTRAVENOUS; SUBCUTANEOUS at 16:18

## 2022-01-01 RX ADMIN — LIDOCAINE 1 APPLICATION(S): 4 CREAM TOPICAL at 17:54

## 2022-01-01 RX ADMIN — FAMOTIDINE 20 MILLIGRAM(S): 10 INJECTION INTRAVENOUS at 05:56

## 2022-01-01 RX ADMIN — LIDOCAINE 1 PATCH: 4 CREAM TOPICAL at 18:11

## 2022-01-01 RX ADMIN — Medication 1 GRAM(S): at 18:19

## 2022-01-01 RX ADMIN — ALBUTEROL 2 PUFF(S): 90 AEROSOL, METERED ORAL at 21:06

## 2022-01-01 RX ADMIN — SODIUM CHLORIDE 4 MILLILITER(S): 9 INJECTION INTRAMUSCULAR; INTRAVENOUS; SUBCUTANEOUS at 05:14

## 2022-01-01 RX ADMIN — PANTOPRAZOLE SODIUM 40 MILLIGRAM(S): 20 TABLET, DELAYED RELEASE ORAL at 06:13

## 2022-01-01 RX ADMIN — GABAPENTIN 400 MILLIGRAM(S): 400 CAPSULE ORAL at 15:55

## 2022-01-01 RX ADMIN — SODIUM CHLORIDE 4 MILLILITER(S): 9 INJECTION INTRAMUSCULAR; INTRAVENOUS; SUBCUTANEOUS at 01:22

## 2022-01-01 RX ADMIN — Medication 1000 MILLIGRAM(S): at 14:07

## 2022-01-01 RX ADMIN — POLYETHYLENE GLYCOL 3350 17 GRAM(S): 17 POWDER, FOR SOLUTION ORAL at 11:48

## 2022-01-01 RX ADMIN — SODIUM CHLORIDE 4 MILLILITER(S): 9 INJECTION INTRAMUSCULAR; INTRAVENOUS; SUBCUTANEOUS at 12:21

## 2022-01-01 RX ADMIN — BUDESONIDE AND FORMOTEROL FUMARATE DIHYDRATE 2 PUFF(S): 160; 4.5 AEROSOL RESPIRATORY (INHALATION) at 17:52

## 2022-01-01 RX ADMIN — GABAPENTIN 400 MILLIGRAM(S): 400 CAPSULE ORAL at 13:11

## 2022-01-01 RX ADMIN — Medication 81 MILLIGRAM(S): at 11:53

## 2022-01-01 RX ADMIN — Medication 80 MILLIGRAM(S): at 13:00

## 2022-01-01 RX ADMIN — NYSTATIN CREAM 1 APPLICATION(S): 100000 CREAM TOPICAL at 23:33

## 2022-01-01 RX ADMIN — Medication 1 GRAM(S): at 17:22

## 2022-01-01 RX ADMIN — AMLODIPINE BESYLATE 2.5 MILLIGRAM(S): 2.5 TABLET ORAL at 05:25

## 2022-01-01 RX ADMIN — PIPERACILLIN AND TAZOBACTAM 25 GRAM(S): 4; .5 INJECTION, POWDER, LYOPHILIZED, FOR SOLUTION INTRAVENOUS at 00:01

## 2022-01-01 RX ADMIN — Medication 81 MILLIGRAM(S): at 12:20

## 2022-01-01 RX ADMIN — BUDESONIDE AND FORMOTEROL FUMARATE DIHYDRATE 2 PUFF(S): 160; 4.5 AEROSOL RESPIRATORY (INHALATION) at 06:58

## 2022-01-01 RX ADMIN — Medication 2000 UNIT(S): at 17:08

## 2022-01-01 RX ADMIN — Medication 3 MILLILITER(S): at 23:34

## 2022-01-01 RX ADMIN — SODIUM CHLORIDE 4 MILLILITER(S): 9 INJECTION INTRAMUSCULAR; INTRAVENOUS; SUBCUTANEOUS at 18:29

## 2022-01-01 RX ADMIN — CYCLOBENZAPRINE HYDROCHLORIDE 5 MILLIGRAM(S): 10 TABLET, FILM COATED ORAL at 05:45

## 2022-01-01 RX ADMIN — Medication 20 MILLIGRAM(S): at 22:22

## 2022-01-01 RX ADMIN — Medication 3 MILLILITER(S): at 12:48

## 2022-01-01 RX ADMIN — Medication 81 MILLIGRAM(S): at 11:46

## 2022-01-01 RX ADMIN — SODIUM CHLORIDE 4 MILLILITER(S): 9 INJECTION INTRAMUSCULAR; INTRAVENOUS; SUBCUTANEOUS at 00:29

## 2022-01-01 RX ADMIN — Medication 3 MILLILITER(S): at 17:57

## 2022-01-01 RX ADMIN — Medication 40 MILLIGRAM(S): at 05:13

## 2022-01-01 RX ADMIN — Medication 81 MILLIGRAM(S): at 12:49

## 2022-01-01 RX ADMIN — NYSTATIN CREAM 1 APPLICATION(S): 100000 CREAM TOPICAL at 22:34

## 2022-01-01 RX ADMIN — SODIUM CHLORIDE 4 MILLILITER(S): 9 INJECTION INTRAMUSCULAR; INTRAVENOUS; SUBCUTANEOUS at 13:35

## 2022-01-01 RX ADMIN — Medication 30 MILLIGRAM(S): at 05:55

## 2022-01-01 RX ADMIN — GABAPENTIN 400 MILLIGRAM(S): 400 CAPSULE ORAL at 05:03

## 2022-01-01 RX ADMIN — PIPERACILLIN AND TAZOBACTAM 25 GRAM(S): 4; .5 INJECTION, POWDER, LYOPHILIZED, FOR SOLUTION INTRAVENOUS at 16:09

## 2022-01-01 RX ADMIN — Medication 3 MILLILITER(S): at 12:00

## 2022-01-01 RX ADMIN — ENOXAPARIN SODIUM 40 MILLIGRAM(S): 100 INJECTION SUBCUTANEOUS at 21:48

## 2022-01-01 RX ADMIN — SODIUM CHLORIDE 4 MILLILITER(S): 9 INJECTION INTRAMUSCULAR; INTRAVENOUS; SUBCUTANEOUS at 06:24

## 2022-01-01 RX ADMIN — BUDESONIDE AND FORMOTEROL FUMARATE DIHYDRATE 2 PUFF(S): 160; 4.5 AEROSOL RESPIRATORY (INHALATION) at 11:08

## 2022-01-01 RX ADMIN — ENOXAPARIN SODIUM 40 MILLIGRAM(S): 100 INJECTION SUBCUTANEOUS at 22:17

## 2022-01-01 RX ADMIN — ALBUTEROL 2 PUFF(S): 90 AEROSOL, METERED ORAL at 05:34

## 2022-01-01 RX ADMIN — GABAPENTIN 400 MILLIGRAM(S): 400 CAPSULE ORAL at 13:17

## 2022-01-01 RX ADMIN — Medication 40 MILLIGRAM(S): at 05:40

## 2022-01-01 RX ADMIN — ALBUTEROL 2 PUFF(S): 90 AEROSOL, METERED ORAL at 10:00

## 2022-01-01 RX ADMIN — GABAPENTIN 300 MILLIGRAM(S): 400 CAPSULE ORAL at 05:52

## 2022-01-01 RX ADMIN — SODIUM CHLORIDE 4 MILLILITER(S): 9 INJECTION INTRAMUSCULAR; INTRAVENOUS; SUBCUTANEOUS at 18:23

## 2022-01-01 RX ADMIN — GABAPENTIN 400 MILLIGRAM(S): 400 CAPSULE ORAL at 06:40

## 2022-01-01 RX ADMIN — Medication 600 MILLIGRAM(S): at 06:24

## 2022-01-01 RX ADMIN — Medication 1000 MILLIGRAM(S): at 13:30

## 2022-01-01 RX ADMIN — Medication 3 MILLILITER(S): at 19:39

## 2022-01-01 RX ADMIN — AZITHROMYCIN 250 MILLIGRAM(S): 500 TABLET, FILM COATED ORAL at 11:23

## 2022-01-01 RX ADMIN — Medication 40 MILLIGRAM(S): at 05:38

## 2022-01-01 RX ADMIN — SODIUM CHLORIDE 1000 MILLILITER(S): 9 INJECTION, SOLUTION INTRAVENOUS at 13:38

## 2022-01-01 RX ADMIN — SODIUM CHLORIDE 4 MILLILITER(S): 9 INJECTION INTRAMUSCULAR; INTRAVENOUS; SUBCUTANEOUS at 05:17

## 2022-01-01 RX ADMIN — ALBUTEROL 2 PUFF(S): 90 AEROSOL, METERED ORAL at 14:09

## 2022-01-01 RX ADMIN — LIDOCAINE 2 PATCH: 4 CREAM TOPICAL at 01:28

## 2022-01-01 RX ADMIN — Medication 4 MILLILITER(S): at 17:58

## 2022-01-01 RX ADMIN — SODIUM CHLORIDE 4 MILLILITER(S): 9 INJECTION INTRAMUSCULAR; INTRAVENOUS; SUBCUTANEOUS at 06:44

## 2022-01-01 RX ADMIN — HYDROMORPHONE HYDROCHLORIDE 0.5 MILLIGRAM(S): 2 INJECTION INTRAMUSCULAR; INTRAVENOUS; SUBCUTANEOUS at 10:35

## 2022-01-01 RX ADMIN — Medication 2000 UNIT(S): at 13:12

## 2022-01-01 RX ADMIN — Medication 20 MILLIGRAM(S): at 11:44

## 2022-01-11 NOTE — PATIENT PROFILE ADULT - EQUIPMENT CURRENTLY USED AT HOME
Graft Placement Text (Optional-Leave Blank If You Don't Want Separate From The Placement Text In The Library): button bolster sutured in place yes

## 2022-03-23 NOTE — PATIENT PROFILE ADULT - BRADEN ACTIVITY
Left message reminding Mrs Powell of her appointment for Thursday, March 24th, 2022 at 815 am with Dr Payne. Also advised Mrs Powell if she had any questions, concerns or needs to reschedule to please call the office at [phone].   (3) walks occasionally

## 2022-04-07 PROBLEM — J44.1 COPD EXACERBATION: Status: ACTIVE | Noted: 2019-08-21

## 2022-04-07 NOTE — CONSULT LETTER
[Dear  ___] : Dear ~BERNICE, [Consult Letter:] : I had the pleasure of evaluating your patient, [unfilled]. [Consult Closing:] : Thank you very much for allowing me to participate in the care of this patient.  If you have any questions, please do not hesitate to contact me. [Sincerely,] : Sincerely, [FreeTextEntry2] : Hakeem Mcdaniel MD\par  [FreeTextEntry3] : Mike Sheets MD FCCP\par

## 2022-04-07 NOTE — ASSESSMENT
[FreeTextEntry1] : Continue present bronchodilator therapy\par Continue oxygen therapy.\par F/U 3-4  months\par Follow CT.  Can repeat in 1 year.\par \par 35 minutes spent in evaluation management and review of studies.\par \par \par \par

## 2022-04-07 NOTE — HISTORY OF PRESENT ILLNESS
[Former] : former [TextBox_4] : breathing has been good  with inhalers, on Symbicort and spiriva and water pills. mild sob\par no wheezing \par Doing nebs PRN\par seeing Dr Mcdaniel had echo last year and labs\par lost weight and feels breathing is better\par \par \par using O2 mostly at night, has the concentrator for home\par \par \par Feeling better.\par Decrease in cough and mucous.\par Sent for CT by Dr Mcdaniel.\par  [TextBox_13] : 40 [YearQuit] : 1978

## 2022-04-07 NOTE — PROCEDURE
[FreeTextEntry1] : ct chest 4/4/22 reviewed\par Multiple small pulmonary nodules.\par 1 cm irregular nodule in the apex of the right upper lobe.  Does appear to be present on older CTs.\par Bronchiectasis changes.\par \par 1 / 1 Alon Luz   \par  \par \par \par \par Report date: 6/7/2019 \par  \par  View Order\par \par \par (Report matches study selected on Patient History pane)\par \par \par   \par \par \par \par \par \par \par \par \par EXAM: CT CHEST \par \par \par PROCEDURE DATE: 06/06/2019 \par \par \par \par INTERPRETATION: CLINICAL INFORMATION: COPD. Follow-up. \par \par COMPARISON: CT of chest on 10/10/2018 and 4/1/2018. \par \par PROCEDURE: \par CT of the Chest was performed without intravenous contrast. \par Sagittal and coronal reformats were performed. \par \par \par FINDINGS: \par \par LUNGS AND AIRWAYS: Patent central airways. Emphysema bilaterally. Interval \par resolution of the right upper lobe consolidation from 10/10/2018 with only \par subtle areas of groundglass opacities. Small nodules measuring up to 4 mm \par are present in the right upper lobe (2:33, 35) not previously seen. A 6 mm \par nodule in the left lower lobe is unchanged from 4/1/2018. \par \par PLEURA: Resolved small right pleural effusion. \par \par MEDIASTINUM AND WEI: Several lymph nodes are present in the pretracheal \par space, subcarinal region and the cardiophrenic angle. \par \par VESSELS: Atherosclerotic calcification of the thoracic aorta. \par \par HEART: Heart size is enlarged. Lipomatous hypertrophy of the inter-artrial \par septum. No pericardial effusion. \par \par CHEST WALL AND LOWER NECK: Within normal limits. \par \par VISUALIZED UPPER ABDOMEN: Cholelithiasis. Small hiatal hernia. And IVC \par filter is noted in place. Several lymph nodes are present in the \par gastrohepatic ligament/celiac axis region. \par \par BONES: Degenerative changes of the spine. \par \par IMPRESSION: \par \par Diffuse emphysema. \par \par Resolved right upper lobe consolidation from 10/10/2018. \par \par Small nodules in the right upper and left lower lobes as described above. \par \par \par \par \par \par \par VANESSA MELENDEZ M.D., RADIOLOGY RESIDENT \par This document has been electronically signed. \par ALON LUZ M.D., ATTENDING RADIOLOGIST \par This document has been electronically signed. Jun 7 2019 1:06PM \par 11/17/2021\par Pulmonary function testing\par These data demonstrate a mild obstructive ventilatory deficit. There is no significant bronchodilator response. There is evidence of mild hyperinflation. There is a moderate diffusion impairment. \par There is a mild increase in function compared to April 2021.

## 2022-04-07 NOTE — DISCUSSION/SUMMARY
[FreeTextEntry1] : COPD stable. Patient meets goal. No recent exacerbations. Continue present therapy.\par Bronchiectasis\par Multiple pulmonary nodules.  1 cm irregular density in the right upper lobe does appear to have been present on prior CAT scans without definitive change.\par Left lower lobe nodule also present may be slightly increased in size.\par \par

## 2022-07-06 PROBLEM — J47.9 BRONCHIECTASIS: Status: ACTIVE | Noted: 2022-01-01

## 2022-07-07 PROBLEM — R91.8 PULMONARY NODULES: Status: ACTIVE | Noted: 2022-01-01

## 2022-07-07 NOTE — ASSESSMENT
[FreeTextEntry1] : Continue present bronchodilator therapy\par Continue oxygen therapy.\par F/U 3-4  months\par Follow CT.  Can repeat in 1 year.\par change inhaler to brezstri, d/c symbicort and spiriva\par \par \par \par \par

## 2022-07-07 NOTE — PHYSICAL EXAM
[No Acute Distress] : no acute distress [Supple] : supple [No JVD] : no jvd [Normal S1, S2] : normal s1, s2 [No Abnormalities] : no abnormalities [Benign] : benign [No HSM] : no hsm [Clear to Auscultation Bilaterally] : clear to auscultation bilaterally [Normal to Percussion] : normal to percussion

## 2022-07-07 NOTE — PROCEDURE
[FreeTextEntry1] : 07/06/2022\par Pulmonary function testing\par These data demonstrate a mild obstructive ventilatory deficit. There is no significant bronchodilator response. There is evidence of mild hyperinflation. There is a moderate-severe diffusion impairment. \par Function is relatively stable compared to November 2021.\par \par ct chest 4/4/22 reviewed\par Multiple small pulmonary nodules.\par 1 cm irregular nodule in the apex of the right upper lobe.  Does appear to be present on older CTs.\par Bronchiectasis changes.\par \par 1 / 1 Alon Luz   \par  \par Report date: 6/7/2019 \par  \par  View Order\par \par \par (Report matches study selected on Patient History pane)\par \par \par   \par \par \par \par \par \par EXAM: CT CHEST \par \par \par PROCEDURE DATE: 06/06/2019 \par \par \par \par INTERPRETATION: CLINICAL INFORMATION: COPD. Follow-up. \par \par COMPARISON: CT of chest on 10/10/2018 and 4/1/2018. \par \par PROCEDURE: \par CT of the Chest was performed without intravenous contrast. \par Sagittal and coronal reformats were performed. \par \par \par FINDINGS: \par \par LUNGS AND AIRWAYS: Patent central airways. Emphysema bilaterally. Interval \par resolution of the right upper lobe consolidation from 10/10/2018 with only \par subtle areas of groundglass opacities. Small nodules measuring up to 4 mm \par are present in the right upper lobe (2:33, 35) not previously seen. A 6 mm \par nodule in the left lower lobe is unchanged from 4/1/2018. \par \par PLEURA: Resolved small right pleural effusion. \par \par MEDIASTINUM AND WEI: Several lymph nodes are present in the pretracheal \par space, subcarinal region and the cardiophrenic angle. \par \par VESSELS: Atherosclerotic calcification of the thoracic aorta. \par \par HEART: Heart size is enlarged. Lipomatous hypertrophy of the inter-artrial \par septum. No pericardial effusion. \par \par CHEST WALL AND LOWER NECK: Within normal limits. \par \par VISUALIZED UPPER ABDOMEN: Cholelithiasis. Small hiatal hernia. And IVC \par filter is noted in place. Several lymph nodes are present in the \par gastrohepatic ligament/celiac axis region. \par \par BONES: Degenerative changes of the spine. \par \par IMPRESSION: \par \par Diffuse emphysema. \par \par Resolved right upper lobe consolidation from 10/10/2018. \par \par Small nodules in the right upper and left lower lobes as described above. \par \par \par \par \par \par \par VANESSA MELENDEZ M.D., RADIOLOGY RESIDENT \par This document has been electronically signed. \par ALON LUZ M.D., ATTENDING RADIOLOGIST \par This document has been electronically signed. Jun 7 2019 1:06PM \par 11/17/2021\par Pulmonary function testing\par These data demonstrate a mild obstructive ventilatory deficit. There is no significant bronchodilator response. There is evidence of mild hyperinflation. There is a moderate diffusion impairment. \par There is a mild increase in function compared to April 2021.

## 2022-08-08 NOTE — PHYSICAL EXAM
Patient given instruction on lid scrubs. [General Appearance - Well Developed] : well developed [Normal Appearance] : normal appearance [General Appearance - Well Nourished] : well nourished [Well Groomed] : well groomed [General Appearance - In No Acute Distress] : no acute distress [No Deformities] : no deformities [Eyelids - No Xanthelasma] : the eyelids demonstrated no xanthelasmas [Normal Conjunctiva] : the conjunctiva exhibited no abnormalities [No Oral Pallor] : no oral pallor [Normal Oral Mucosa] : normal oral mucosa [No Oral Cyanosis] : no oral cyanosis [Normal Jugular Venous A Waves Present] : normal jugular venous A waves present [Normal Jugular Venous V Waves Present] : normal jugular venous V waves present [No Jugular Venous Staton A Waves] : no jugular venous staton A waves [Respiration, Rhythm And Depth] : normal respiratory rhythm and effort [Exaggerated Use Of Accessory Muscles For Inspiration] : no accessory muscle use [Auscultation Breath Sounds / Voice Sounds] : lungs were clear to auscultation bilaterally [Abdomen Soft] : soft [Abdomen Tenderness] : non-tender [Abdomen Mass (___ Cm)] : no abdominal mass palpated [Abnormal Walk] : normal gait [Gait - Sufficient For Exercise Testing] : the gait was sufficient for exercise testing [Nail Clubbing] : no clubbing of the fingernails [Cyanosis, Localized] : no localized cyanosis [Petechial Hemorrhages (___cm)] : no petechial hemorrhages [Skin Color & Pigmentation] : normal skin color and pigmentation [No Venous Stasis] : no venous stasis [] : no rash [No Skin Ulcers] : no skin ulcer [Skin Lesions] : no skin lesions [No Xanthoma] : no  xanthoma was observed [Oriented To Time, Place, And Person] : oriented to person, place, and time [Affect] : the affect was normal [Mood] : the mood was normal [No Anxiety] : not feeling anxious [FreeTextEntry1] : Regular rate and rhythm, NL S1, S2, non-displaced PMI, chest non-tender; no rubs,heaves  or gallops a  Grade 2/6 systolic murmur noted at the LSB

## 2022-08-25 NOTE — ED PROVIDER NOTE - OBJECTIVE STATEMENT
Addended by: BRIDGETTE FRIAS on: 8/25/2022 02:32 PM     Modules accepted: Orders     86 yo female presenting with one day hx of worsening shortness of breath associated with productive cough. sent in by pmd concerning for pna on cxr. on 3L O2 at home.  symptoms worse with exertion.  patient tried inhaler and and mucinex with mild transient relief in symptoms.  usually satting mid 90s on 3L from COPD. 86 yo female presenting with one day hx of worsening shortness of breath associated with productive cough. sent in by pmd concerning for pna on cxr. on 3L O2 at home.  symptoms worse with exertion.  patient tried inhaler and and mucinex with mild transient relief in symptoms.  usually satting mid 90s on 3L from COPD.  does not want nebulizer at this time. 86 yo female presenting with one day hx of worsening shortness of breath associated with productive cough. sent in by pmd concerning for pna on cxr. on 3L O2 at home.  symptoms worse with exertion.  patient tried inhaler and and mucinex with mild transient relief in symptoms.  usually satting mid 90s on 3L from COPD.  does not want nebulizer at this time.    pcp- carmelo lopez

## 2022-09-10 NOTE — PROCEDURE
CC:  Possible sinus infection.    HPI: Pleasant 62 years of age female patient came to Urgent Care for evaluation of possible sinusitis.  Face sinus pressure of moderate severity started 1 week ago.  Also has sinus headache.  No fever or cough.  Has some mild right ear discomfort nonspecific.  No shortness of breath.  Took Mucinex OTC.  In May patient was treated with Augmentin for similar sinusitis with good effect.  Patient has esophageal reflux and history of pneumonia.  Patient is tobacco smoker.    ROS: No diarrhea.  Sinus pressure.  I have reviewed the allergy list and the vital signs.    PE:  Stable, alert and oriented to time and place patient in no acute distress. Lungs have good air entry and are clear. Heart is regular. There is no heart murmur.  Oral mucosa is moist with mild marginal pharyngeal erythema.  Both ear canals are normal with mild tenderness on examination of right ear canal introitus. Tympanic membranes are normal. No excess cerumen.  Percussion of sinuses is moderately tender.  Conjunctivae are clear.  There is no enlargement of lymph nodes at the neck. There are no meningeal signs. Voice is not hoarse. Skin is dry and warm. Gait is normal.    A/P:  Pansinusitis.  I have prescribed Augmentin orally. Side effects and expectations reviewed. Probiotics encouraged.  Patient was advised to consider OTC remedies for the symptoms as advised by Pharmacist.  Patient declined treatment for presumed very early otitis externa on the right side.  Please see discharge instructions for details.  I advised to follow-up with primary care provider as needed.  All questions were answered. The patient indicated understanding of the diagnosis and agreed with the plan of care.      [FreeTextEntry1] : \par \par \par

## 2022-10-10 NOTE — ED PROVIDER NOTE - NSICDXPASTSURGICALHX_GEN_ALL_CORE_FT
PAST SURGICAL HISTORY:  Joint Fixation (Surgical)- L Hip     S/P Breast Lumpectomy- left     S/P      S/P Insertion of IVC (Inferior Vena Caval) Filter     Status Post Total Knee Replacement-bilateral

## 2022-10-10 NOTE — H&P ADULT - PROBLEM SELECTOR PROBLEM 4
Chronic obstructive pulmonary disease (COPD) HTN (hypertension) Chronic systolic congestive heart failure

## 2022-10-10 NOTE — H&P ADULT - PROBLEM SELECTOR PLAN 3
- hx of HTN on amlodipine 2.5mg, Lasix 40mg in AM and 20mg in PM  - c/w home meds with parameters  - monitor BP - pt has hx of COPD on 2L home O2 at night  - CT Scan shows incidental interstitial fibrosis suggested at the lung bases. Nonspecific 3.0 cm pleural-based nodular opacity at the right lung base.  - c/w home inhalers Spiriva and Symbicort  - c/w 2L supplemental O2 at night time - pt has hx of COPD on 2L home O2 at night  - CT Scan shows incidental interstitial fibrosis suggested at the lung bases. Nonspecific 3.0 cm pleural-based nodular opacity at the right lung base.  - c/w home inhalers Spiriva and Symbicort  - c/w 2L supplemental O2 at night time  - OP evaluation for nodular opacity documented on CT

## 2022-10-10 NOTE — H&P ADULT - HISTORY OF PRESENT ILLNESS
89 year old female, from home, with PMH of HTN, COPD (on 2L home O2 at night), ambulates with wheelchair or walker, presents to ED with severe low back pain. She states the pain first started 2 weeks ago and got worse last Thursday. She went to an orthopedic doctor and got a cortisone shot, which she states did not help. Pt states the pain became severe yesterday, 10/10, achy stabbing pain that gets worse on movement and ambulation. States she had no hx of trauma or falls. 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 Otherwise, she denies numbness, tingling, loss of sensation, weakness or incontinence.    In ED: vitals HR 77, /64, Temp 98.4F, 99% on RA.  s/p ketorolac, Tylenol and flexeril in ED 89 year old female, from home, with PMH of HTN, COPD (on 3L home O2 at night), ambulates with wheelchair or walker, presents to ED with severe low back pain. She states the pain first started 2 weeks ago and got worse last Thursday. She went to an orthopedic doctor and got a cortisone shot, which she states did not help. Pt states the pain became severe yesterday, 10/10, achy stabbing in nature,  worsened by movement and ambulation. Denies recent trauma or falls. 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 Otherwise, she denies worsening numbness or tingling in LE, loss of sensation, weakness or urinary or bowel incontinence.    In ED: vitals HR 77, /64, Temp 98.4F, 99% on RA.  s/p ketorolac, Tylenol and flexeril in ED 89 year old female, from home, with PMH of HTN, COPD (on 3L home O2 at night), CHFrEF, ambulates with wheelchair or walker, presents to ED with severe low back pain. She states the pain first started 2 weeks ago and got worse last Thursday. She went to an orthopedic doctor and got a cortisone shot, which she states did not help. Pt states the pain became severe yesterday, 10/10, achy stabbing in nature,  worsened by movement and ambulation. Denies recent trauma or falls. 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 Otherwise, she denies worsening numbness or tingling in LE, loss of sensation, weakness or urinary or bowel incontinence.    In ED: vitals HR 77, /64, Temp 98.4F, 99% on RA.  s/p ketorolac, Tylenol and flexeril in ED

## 2022-10-10 NOTE — ED PROVIDER NOTE - NSICDXPASTMEDICALHX_GEN_ALL_CORE_FT
PAST MEDICAL HISTORY:  Anxiety disorder     Chronic bronchitis     COPD (Chronic Obstructive Pulmonary Disease) on home O2 AT NIGHT    Dysphagia     Essential Hypertension     Hip Fracture-Left     Hx of Breast Cancer HAd Rt in 1989    Morbid obesity     Neuropathy     DAVID on CPAP

## 2022-10-10 NOTE — H&P ADULT - PROBLEM SELECTOR PLAN 5
- DVT: Lovenox - DVT: Heparin SQ q8 - hx of anxiety, on alprazolam 0.5mg PRN  - resume home med - hx of HTN on amlodipine 2.5mg, Lasix 40mg in AM and 20mg in PM  - c/w home dose of amlodipine and Lasix 40mg  - monitor BP

## 2022-10-10 NOTE — H&P ADULT - PROBLEM SELECTOR PLAN 1
- p/w severe back pain for 2 weeks, s/p outpt cortisone injection on 10/6  - CT Lumbar Spine shows acute marked compression fracture of T12 with minimally retropulsed bone at the level of the inferior endplate. Multilevel degenerative changes.  - pain management prn  - f/u PT consult  - Consult Ortho - p/w severe back pain for 2 weeks, s/p outpt cortisone injection on 10/6  - CT Lumbar Spine shows acute marked compression fracture of T12 with minimally retropulsed bone at the level of the inferior endplate. Multilevel degenerative changes.  - pain management prn  - resume home gabapentin 300mg TID  - f/u PT consult  - Consult Ortho - p/w severe back pain for 2 weeks, s/p outpt cortisone injection on 10/6  - CT Lumbar Spine shows acute marked compression fracture of T12 with minimally retropulsed bone at the level of the inferior endplate. Multilevel degenerative changes.  - tylenol 650 mg q 6 hrs for pain standing for 2 days , then transition to PRN if tolerated  - lidocaine patch  - resume home gabapentin 300mg TID  - f/u PT consult  - Consult Ortho - p/w severe back pain for 2 weeks, s/p outpt cortisone injection on 10/6  - CT Lumbar Spine shows acute marked compression fracture of T12 with minimally retropulsed bone at the level of the inferior endplate. Multilevel degenerative changes.  - non-traumatic, likely osteoporotic   - tylenol 650 mg q 6 hrs for pain standing for 2 days , then transition to PRN if tolerated  - lidocaine patch  - resume home gabapentin 300mg TID  - f/u Vit D level  - f/u PT consult - p/w severe back pain for 2 weeks, s/p outpt cortisone injection on 10/6  - CT Lumbar Spine shows acute marked compression fracture of T12 with minimally retropulsed bone at the level of the inferior endplate. Multilevel degenerative changes.  - non-traumatic, likely osteoporotic   - Tylenol 650 mg q 6 hrs for pain standing for 2 days , then transition to PRN if tolerated  - lidocaine patch  - resume home gabapentin 300mg TID  - f/u Vit D level  - f/u PT consult - p/w severe back pain for 2 weeks, s/p outpt cortisone injection on 10/6  - CT Lumbar Spine shows acute marked compression fracture of T12 with minimally retropulsed bone at the level of the inferior endplate. Multilevel degenerative changes.  - non-traumatic, likely osteoporotic   - Tylenol 650 mg q 6 hrs for pain standing for 2 days , then transition to PRN if tolerated  - lidocaine patch  - toradol 25 mg PO Q 8 hrs  - resume home gabapentin 300mg TID  - f/u Vit D level  - f/u PT consult and pain control

## 2022-10-10 NOTE — H&P ADULT - NSHPSOCIALHISTORY_GEN_ALL_CORE
Pt lives at home with her . Denies current alcohol, tobacco or illicit drug use. States she smoked 2ppd for about 55 years. Quit smoking about 10 years ago.

## 2022-10-10 NOTE — H&P ADULT - PROBLEM SELECTOR PLAN 4
- pt has hx of COPD on 2L home O2 at night  - CT Scan shows incidental interstitial fibrosis suggested at the lung bases. Nonspecific 3.0 cm pleural-based nodular opacity at the right lung base.  - c/w home inhalers Spiriva and Symbicort - hx of HTN on amlodipine 2.5mg, Lasix 40mg in AM and 20mg in PM  - c/w home meds with parameters  - monitor BP - hx of HTN on amlodipine 2.5mg, Lasix 40mg in AM and 20mg in PM  - c/w amlodipine and Lasix 20mg with parameters  - monitor BP - hx of HTN on amlodipine 2.5mg, Lasix 40mg in AM and 20mg in PM  - c/w home dose of  amlodipine  - c/w Lasix 20mg with parameters, held 40 mg PO dose in the AM given absence of LE edema and eGFR  - monitor BP - pt has bibasilar crackles, no leg swelling  - last echo on record 2018 showed EF 40% and mild stage I diastolic dysfunction  - pt on Lasix 40mg in AM an Lasix 20mg in PM  - resume Lasix 40mg qd

## 2022-10-10 NOTE — H&P ADULT - CONVERSATION DETAILS
An extensive goals of care conversation was held including options, risks and benefits of many medical treatments including CPR, intubation, and tube feeding. Patient indicated that in the event that her condition worsened, she would not want CPR or intubation and would like to allow for natural death. A MOLST has been completed to this effect.

## 2022-10-10 NOTE — ED PROVIDER NOTE - NSICDXFAMILYHX_GEN_ALL_CORE_FT
FAMILY HISTORY:  Family history of congestive heart failure  Family history of lymphoma    Sibling  Still living? Unknown  Family history of renal disease, Age at diagnosis: Age Unknown

## 2022-10-10 NOTE — H&P ADULT - ATTENDING COMMENTS
Patient seen and examined with PGy1 Dr. VOGT    Vital Signs Last 24 Hrs  T(C): 36.7 (10 Oct 2022 16:29), Max: 36.9 (10 Oct 2022 11:00)  T(F): 98 (10 Oct 2022 16:29), Max: 98.4 (10 Oct 2022 11:00)  HR: 78 (10 Oct 2022 16:29) (73 - 78)  BP: 132/68 (10 Oct 2022 16:29) (122/64 - 158/73)  RR: 20 (10 Oct 2022 16:29) (18 - 20)  SpO2: 94% (10 Oct 2022 16:29) (94% - 99%) room air    P/E;  Psych: AAO x3  Neuro: No gross focal deficits; Power and sensation intact  CVS: S1S2 present, regular, no edema  Resp: BLAE+, No wheeze or Rhonchi  GI: Soft, BS+, Non tender, non distended  Extr: No  calf tenderness B/L Lower extremities  Skin: Warm and moist without any rashes    labs:                        14.8   10.63 )-----------( 286      ( 10 Oct 2022 11:10 )             45.8   10-10    143  |  104  |  29<H>  ----------------------------<  116<H>  3.7   |  30  |  1.25    Ca    9.2      10 Oct 2022 11:10    TPro  7.3  /  Alb  3.3<L>  /  TBili  0.8  /  DBili  x   /  AST  15  /  ALT  14  /  AlkPhos  108  10-10    CT Lumbar Spine No Cont (10.10.22 @ 12:08)     FINDINGS:  Marked compression fracture of T12. Discontinuity of the inferior and superior endplates suggests acuity of fracture. Minimally retropulsed   bone at the level of the inferior endplate.  Moderate compression deformity of L2 appears chronic. A large hemangioma occupies much of the L2 vertebral body.  Remaining vertebral bodies demonstrate normal height. Facet alignment is maintained. Grade 1 anterolisthesis of L4 on L5.  Mild to moderate dextroscoliosis of the lumbar spine, the apex is at L2-L3.  Diffuse disc space narrowing in the lumbar region.  Multilevel degenerative changes.  Interstitial fibrosis suggested at the lung bases. Nonspecific 3.0 cm pleural-based nodular opacity at the right lung base.    IMPRESSION:  Findings suggesting acute marked compression fracture of T12 with minimally retropulsed bone at the level of the inferior endplate.  Moderate compression deformity of L2 appears chronic. Large hemangioma within the L2 vertebral body.  Multilevel degenerative changes.  Interstitial fibrosis suggested at the lung bases. Nonspecific 3.0 cm pleural-based nodular opacity at the right lung base. If clinically indicated, correlation with CT of the chest may be obtained for further evaluation. Patient seen and examined with PGy1 Dr. VOGT    Vital Signs Last 24 Hrs  T(C): 36.7 (10 Oct 2022 16:29), Max: 36.9 (10 Oct 2022 11:00)  T(F): 98 (10 Oct 2022 16:29), Max: 98.4 (10 Oct 2022 11:00)  HR: 78 (10 Oct 2022 16:29) (73 - 78)  BP: 132/68 (10 Oct 2022 16:29) (122/64 - 158/73)  RR: 20 (10 Oct 2022 16:29) (18 - 20)  SpO2: 94% (10 Oct 2022 16:29) (94% - 99%) room air    P/E;  Psych: AAO x3  Neuro: No gross focal deficits; Power and sensation intact  CVS: S1S2 present, regular, no edema  Resp: BLAE+, No wheeze or Rhonchi  GI: Soft, BS+, Non tender, non distended  Extr: No  calf tenderness B/L Lower extremities  Skin: Warm and moist without any rashes    labs:                        14.8   10.63 )-----------( 286      ( 10 Oct 2022 11:10 )             45.8   10-10    143  |  104  |  29<H>  ----------------------------<  116<H>  3.7   |  30  |  1.25    Ca    9.2      10 Oct 2022 11:10    TPro  7.3  /  Alb  3.3<L>  /  TBili  0.8  /  DBili  x   /  AST  15  /  ALT  14  /  AlkPhos  108  10-10    CT Lumbar Spine No Cont (10.10.22 @ 12:08)     FINDINGS:  Marked compression fracture of T12. Discontinuity of the inferior and superior endplates suggests acuity of fracture. Minimally retropulsed   bone at the level of the inferior endplate.  Moderate compression deformity of L2 appears chronic. A large hemangioma occupies much of the L2 vertebral body.  Remaining vertebral bodies demonstrate normal height. Facet alignment is maintained. Grade 1 anterolisthesis of L4 on L5.  Mild to moderate dextroscoliosis of the lumbar spine, the apex is at L2-L3.  Diffuse disc space narrowing in the lumbar region.  Multilevel degenerative changes.  Interstitial fibrosis suggested at the lung bases. Nonspecific 3.0 cm pleural-based nodular opacity at the right lung base.    IMPRESSION:  Findings suggesting acute marked compression fracture of T12 with minimally retropulsed bone at the level of the inferior endplate.  Moderate compression deformity of L2 appears chronic. Large hemangioma within the L2 vertebral body.  Multilevel degenerative changes.  Interstitial fibrosis suggested at the lung bases. Nonspecific 3.0 cm pleural-based nodular opacity at the right lung base. If clinically indicated, correlation with CT of the chest may be obtained for further evaluation.    D/D:  Acute T12 compression fracture, nontraumatic  likely osteoporotic   Chronic L2 fracture  Hx COPD/ DAVID on nocturnal oxygen (off BiPAP she used to)  ?Chronic Systolic HF    Plan:  Medicine  Tylenol 1gm IV x once tonight  Pain control: Tylenol prn for mild pain, Tramadol for moderate pain and Lidocaine patch  If severe pain can give low dose Morphine  if pain persist can consider short course of steroids for 2-3 days if okay with pain mgmt  Pain mgmt consult AM  Patient appears dry and not in acute HF; Will decrease lasix to 40 mg daily for now;   Patient not aware of HF diagnosis but ECHO with EF 40% in 2018  Patient is not on a beta blocker or ACEI/ ARB; May reach out to PCP for additional information; PCP is also Cardio as per patient  DVT ppx    Discussed with patient findings and plan of care  Discussed with PGY 1 and PGY 3 Patient seen and examined with PGy1 Dr. Smith and d/w PGY1 and PGY2 Dr. Mccarty    89 year old female, from home, with PMH of HTN, COPD (on 3L home O2 at night), CHFrEF, ambulates with wheelchair or walker, presents to ED with severe low back pain. She states the pain first started 2 weeks ago and got worse last Thursday. She went to an orthopedic doctor and got a cortisone shot, which she states did not help. Pt states the pain became severe yesterday, 10/10, achy stabbing in nature,  worsened by movement and ambulation. Denies recent trauma or falls. 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 Otherwise, she denies worsening numbness or tingling in LE, loss of sensation, weakness or urinary or bowel incontinence.    Patient follows with PCP who is also Cardio and Pulmonary; not aware of prior HF but on Lasix 40 mg AM and 20 mg PM.     ROS/FH/SH: as above    Vital Signs Last 24 Hrs  T(C): 36.7 (10 Oct 2022 16:29), Max: 36.9 (10 Oct 2022 11:00)  T(F): 98 (10 Oct 2022 16:29), Max: 98.4 (10 Oct 2022 11:00)  HR: 78 (10 Oct 2022 16:29) (73 - 78)  BP: 132/68 (10 Oct 2022 16:29) (122/64 - 158/73)  RR: 20 (10 Oct 2022 16:29) (18 - 20)  SpO2: 94% (10 Oct 2022 16:29) (94% - 99%) room air    P/E;  Psych: AAO x3  Neuro: No gross focal deficits; Power and sensation intact  CVS: S1S2 present, regular, no edema  Resp: BLAE+, No wheeze or Rhonchi  GI: Soft, BS+, Non tender, non distended  Extr: No  calf tenderness B/L Lower extremities  Skin: Warm and moist without any rashes    labs:                        14.8   10.63 )-----------( 286      ( 10 Oct 2022 11:10 )             45.8   10-10    143  |  104  |  29<H>  ----------------------------<  116<H>  3.7   |  30  |  1.25    Ca    9.2      10 Oct 2022 11:10    TPro  7.3  /  Alb  3.3<L>  /  TBili  0.8  /  DBili  x   /  AST  15  /  ALT  14  /  AlkPhos  108  10-10    CT Lumbar Spine No Cont (10.10.22 @ 12:08)     FINDINGS:  Marked compression fracture of T12. Discontinuity of the inferior and superior endplates suggests acuity of fracture. Minimally retropulsed   bone at the level of the inferior endplate.  Moderate compression deformity of L2 appears chronic. A large hemangioma occupies much of the L2 vertebral body.  Remaining vertebral bodies demonstrate normal height. Facet alignment is maintained. Grade 1 anterolisthesis of L4 on L5.  Mild to moderate dextroscoliosis of the lumbar spine, the apex is at L2-L3.  Diffuse disc space narrowing in the lumbar region.  Multilevel degenerative changes.  Interstitial fibrosis suggested at the lung bases. Nonspecific 3.0 cm pleural-based nodular opacity at the right lung base.    IMPRESSION:  Findings suggesting acute marked compression fracture of T12 with minimally retropulsed bone at the level of the inferior endplate.  Moderate compression deformity of L2 appears chronic. Large hemangioma within the L2 vertebral body.  Multilevel degenerative changes.  Interstitial fibrosis suggested at the lung bases. Nonspecific 3.0 cm pleural-based nodular opacity at the right lung base. If clinically indicated, correlation with CT of the chest may be obtained for further evaluation.    D/D:  Acute T12 compression fracture, nontraumatic  likely osteoporotic   Chronic L2 fracture  Hx COPD/ DAVID on nocturnal oxygen (off BiPAP she used to)  ?Chronic Systolic HF    Plan:  Medicine  Tylenol 1gm IV x once tonight  Pain control: Tylenol prn for mild pain, Tramadol for moderate pain and Lidocaine patch  If severe pain can give low dose Morphine  if pain persist can consider short course of steroids for 2-3 days if okay with pain mgmt  Pain mgmt consult AM  Bowel regimen with Senna HS and Miralax PRN given risk of Constipation with pain meds and immobility  Get Vitamin D level; Continue callcium supplement  Please also consult Ortho Spine Dr. Verma AM  Patient appears dry and not in acute HF; Will decrease Lasix to 40 mg daily for now;   Patient not aware of HF diagnosis but ECHO with EF 40% in 2018  Patient is not on a beta blocker or ACEI/ ARB; May reach out to PCP for additional information; PCP is also Cardio as per patient  DVT ppx  Also will place on GI PPX with PPI while on Pain meds    Discussed with patient findings and plan of care  Discussed with PGY 1 and PGY 3  Hospitalist Colleague to assume care AM

## 2022-10-10 NOTE — H&P ADULT - NSHPPHYSICALEXAM_GEN_ALL_CORE
LOS:     VITALS:   T(C): 36.9 (10-10-22 @ 11:00), Max: 36.9 (10-10-22 @ 11:00)  HR: 77 (10-10-22 @ 11:00) (73 - 77)  BP: 122/64 (10-10-22 @ 11:00) (122/64 - 158/73)  RR: 18 (10-10-22 @ 11:00) (18 - 18)  SpO2: 99% (10-10-22 @ 11:00) (96% - 99%)    GENERAL: NAD, obese elderly female  HEAD:  Atraumatic, Normocephalic  EYES: EOMI, PERRLA, conjunctiva and sclera clear  ENT: Moist mucous membranes  NECK: Supple, No JVD  CHEST/LUNG: + bibasilar expiratory crackles; No rales, rhonchi, wheezing, or rubs. Unlabored respirations  HEART: Regular rate and rhythm; No murmurs, rubs, or gallops  ABDOMEN: BSx4; Soft, nontender, nondistended  EXTREMITIES:  2+ Peripheral Pulses, brisk capillary refill. No clubbing, cyanosis, or edema  MUSCULOSKELETAL: + tenderness to palpation of thoracolumbar junction  NERVOUS SYSTEM:  A&Ox3, no focal deficits   SKIN: No rashes or lesions LOS:     VITALS:   T(C): 36.9 (10-10-22 @ 11:00), Max: 36.9 (10-10-22 @ 11:00)  HR: 77 (10-10-22 @ 11:00) (73 - 77)  BP: 122/64 (10-10-22 @ 11:00) (122/64 - 158/73)  RR: 18 (10-10-22 @ 11:00) (18 - 18)  SpO2: 99% (10-10-22 @ 11:00) (96% - 99%)    GENERAL: NAD, obese elderly female  HEAD:  Atraumatic, Normocephalic  EYES: EOMI, PERRLA, conjunctiva and sclera clear  ENT: Moist mucous membranes  NECK: Supple, No JVD  CHEST/LUNG: + bibasilar expiratory crackles; No rales, rhonchi, wheezing, or rubs. Unlabored respirations  HEART: Regular rate and rhythm; No murmurs, rubs, or gallops  ABDOMEN: BSx4; Soft, nontender, nondistended  EXTREMITIES:  2+ Peripheral Pulses, brisk capillary refill. No clubbing, cyanosis, or edema  MUSCULOSKELETAL: + tenderness to palpation of thoracolumbar junction  NERVOUS SYSTEM:  A&Ox3, no focal deficits, sensation intact on LE  SKIN: No rashes or lesions

## 2022-10-10 NOTE — ED ADULT NURSE NOTE - NSIMPLEMENTINTERV_GEN_ALL_ED
Implemented All Fall with Harm Risk Interventions:  [place] to call system. Call bell, personal items and telephone within reach. Instruct patient to call for assistance. Room bathroom lighting operational. Non-slip footwear when patient is off stretcher. Physically safe environment: no spills, clutter or unnecessary equipment. Stretcher in lowest position, wheels locked, appropriate side rails in place. Provide visual cue, wrist band, yellow gown, etc. Monitor gait and stability. Monitor for mental status changes and reorient to person, place, and time. Review medications for side effects contributing to fall risk. Reinforce activity limits and safety measures with patient and family. Provide visual clues: red socks.

## 2022-10-10 NOTE — H&P ADULT - ASSESSMENT
89 year old female, from home, with PMH of HTN, COPD (on 2L home O2 at night), ambulates with wheelchair or walker, presents to ED with low back pain for the past 2 weeks. S/p outpatient cortisone injection on 10/6. Found to have acute T12 compression fracture and likely chronic L2 compression deformity & large hemangioma within L2 vertebral body. 89 year old female, from home, with PMH of HTN, COPD (on 2L home O2 at night), ambulates with wheelchair or walker, presents to ED with low back pain for the past 2 weeks. S/p outpatient cortisone injection on 10/6. Found to have acute T12 compression fracture and likely chronic L2 compression deformity & large hemangioma within L2 vertebral body on lumbar CT. Pt admitted for pain control

## 2022-10-10 NOTE — ED PROVIDER NOTE - OBJECTIVE STATEMENT
89 y/o female, history of COPD on 2 L oxygen intermittently, presents w/ lower back pain x1 week. Denies any trauma. Denies LE numbness, tingling, weakness, saddle anesthesia, fever, chills, IVDU, hx of cancer, or any other concerning features. Received a Cortizone shot last week w/o relief. She is here today because it is difficult to walk. Has an MRI scheduled for tomorrow. No known drug allergies.

## 2022-10-10 NOTE — H&P ADULT - NSHPREVIEWOFSYSTEMS_GEN_ALL_CORE
REVIEW OF SYSTEMS:    CONSTITUTIONAL: No weakness, fevers or chills  EYES/ENT: No visual changes;  No vertigo or throat pain   NECK: No pain or stiffness  RESPIRATORY: No cough, wheezing, hemoptysis; No shortness of breath  CARDIOVASCULAR: No chest pain or palpitations  GASTROINTESTINAL: No abdominal or epigastric pain. No nausea, vomiting, or hematemesis; No diarrhea or constipation. No melena or hematochezia.  GENITOURINARY: No dysuria, frequency or hematuria  NEUROLOGICAL: + back pain. No numbness or weakness  SKIN: No itching, rashes

## 2022-10-10 NOTE — ED ADULT NURSE REASSESSMENT NOTE - NS ED NURSE REASSESS COMMENT FT1
Pt received axox3 resting in bed no distress noted with family members at bedside eating no distress noted ed observation continues.

## 2022-10-11 NOTE — PROGRESS NOTE ADULT - PROBLEM SELECTOR PLAN 6
- hx of anxiety, on alprazolam 0.5mg PRN  - resume home med 3cm pleural based lung nodule  Outpatient follow up with repeat imaging

## 2022-10-11 NOTE — PROGRESS NOTE ADULT - SUBJECTIVE AND OBJECTIVE BOX
NP Note discussed with  primary attending    Patient is a 89y old  Female who presents with a chief complaint of compression fracture (11 Oct 2022 08:29)      INTERVAL HPI/OVERNIGHT EVENTS: no new complaints    MEDICATIONS  (STANDING):  acetaminophen     Tablet .. 1000 milliGRAM(s) Oral every 8 hours  amLODIPine   Tablet 2.5 milliGRAM(s) Oral daily  aspirin enteric coated 81 milliGRAM(s) Oral daily  budesonide 160 MICROgram(s)/formoterol 4.5 MICROgram(s) Inhaler 2 Puff(s) Inhalation two times a day  cholecalciferol 1000 Unit(s) Oral daily  enoxaparin Injectable 40 milliGRAM(s) SubCutaneous every 24 hours  furosemide    Tablet 40 milliGRAM(s) Oral daily  gabapentin 300 milliGRAM(s) Oral three times a day  lidocaine   4% Patch 1 Patch Transdermal every 24 hours  naproxen 250 milliGRAM(s) Oral three times a day  pantoprazole    Tablet 40 milliGRAM(s) Oral before breakfast  polyethylene glycol 3350 17 Gram(s) Oral daily  senna 2 Tablet(s) Oral at bedtime  tiotropium 18 MICROgram(s) Capsule 1 Capsule(s) Inhalation daily    MEDICATIONS  (PRN):  ALPRAZolam 0.5 milliGRAM(s) Oral daily PRN anxiety  aluminum hydroxide/magnesium hydroxide/simethicone Suspension 30 milliLiter(s) Oral every 4 hours PRN Dyspepsia  melatonin 3 milliGRAM(s) Oral at bedtime PRN Insomnia  ondansetron Injectable 4 milliGRAM(s) IV Push every 8 hours PRN Nausea and/or Vomiting  oxyCODONE    IR 2.5 milliGRAM(s) Oral every 6 hours PRN Severe Pain (7 - 10)      __________________________________________________  REVIEW OF SYSTEMS:    CONSTITUTIONAL: No fever,   RESPIRATORY: No cough; No shortness of breath  CARDIOVASCULAR: No chest pain,  GASTROINTESTINAL: No pain. No nausea or vomiting; No diarrhea   NEUROLOGICAL: No headache or numbness, no tremors, no saddle anesthesia  no numbness or tingling to LE   MUSCULOSKELETAL: co lower back pain   GENITOURINARY: no dysuria,         Vital Signs Last 24 Hrs  T(C): 36.4 (11 Oct 2022 11:20), Max: 36.7 (10 Oct 2022 16:29)  T(F): 97.6 (11 Oct 2022 11:20), Max: 98.1 (10 Oct 2022 20:35)  HR: 80 (11 Oct 2022 11:20) (70 - 80)  BP: 125/66 (11 Oct 2022 11:20) (108/68 - 140/72)  BP(mean): --  RR: 19 (11 Oct 2022 11:20) (19 - 20)  SpO2: 94% (11 Oct 2022 11:20) (94% - 95%)    Parameters below as of 11 Oct 2022 11:20  Patient On (Oxygen Delivery Method): nasal cannula  O2 Flow (L/min): 3      ________________________________________________  PHYSICAL EXAM:  GENERAL: NAD  CHEST/LUNG: Clear to ausculitation bilaterally   HEART: S1 S2  regular;  ABDOMEN: Soft, Nontender, Nondistended; Bowel sounds present  EXTREMITIES: no cyanosis; no edema; no calf tenderness  SKIN: warm and dry; no rash  NERVOUS SYSTEM:  Awake and alert; Oriented  to place, person and time ; no new deficits    _________________________________________________  LABS:                        14.1   9.88  )-----------( 272      ( 11 Oct 2022 05:45 )             45.1     10-11    144  |  106  |  25<H>  ----------------------------<  114<H>  3.4<L>   |  29  |  1.18    Ca    9.2      11 Oct 2022 05:45  Phos  3.3     10-11  Mg     2.7     10-11    TPro  7.3  /  Alb  3.3<L>  /  TBili  0.8  /  DBili  x   /  AST  15  /  ALT  14  /  AlkPhos  108  10-10        CAPILLARY BLOOD GLUCOSE            RADIOLOGY & ADDITIONAL TESTS:    Imaging Personally Reviewed:  YES/NO    Consultant(s) Notes Reviewed:   YES/ No    Care Discussed with Consultants :     Plan of care was discussed with patient and /or primary care giver; all questions and concerns were addressed and care was aligned with patient's wishes.

## 2022-10-11 NOTE — PROGRESS NOTE ADULT - PROBLEM SELECTOR PLAN 5
- hx of HTN on amlodipine 2.5mg, Lasix 40mg in AM and 20mg in PM  - c/w home dose of amlodipine and Lasix 40mg  - monitor BP

## 2022-10-11 NOTE — PROGRESS NOTE ADULT - PROBLEM SELECTOR PLAN 2
- CT Lumbar Spine shows moderate compression deformity of L2 appears chronic. Large hemangioma within the L2 vertebral body.  - conservative management as above pending ortho consult

## 2022-10-11 NOTE — PROGRESS NOTE ADULT - PROBLEM SELECTOR PLAN 1
+ acute marked compression fracture of T12 with minimally retropulsed bone. Multilevel degenerative changes.  - non-traumatic, likely osteoporotic   - pain managementon board   - c/w Tylenol 650 mg q 6 hrs, lidocaine patch, toradol 25 mg PO Q 8 hrs  - c/w  gabapentin 300mg TID  - f/u Vit D level  - f/u PT eval  - pain management on board   - ortho consulted

## 2022-10-11 NOTE — PROGRESS NOTE ADULT - ATTENDING COMMENTS
patient was seen and examined at bedside. Complains of back pain. Denies any fall. pain improving from yesterday. Uses 2-3L NC at home     ICU Vital Signs Last 24 Hrs  T(C): 36.7 (11 Oct 2022 15:52), Max: 36.7 (10 Oct 2022 20:35)  T(F): 98 (11 Oct 2022 15:52), Max: 98.1 (10 Oct 2022 20:35)  HR: 71 (11 Oct 2022 15:52) (70 - 80)  BP: 116/64 (11 Oct 2022 15:52) (108/68 - 140/72)  BP(mean): --  ABP: --  ABP(mean): --  RR: 20 (11 Oct 2022 15:52) (19 - 20)  SpO2: 94% (11 Oct 2022 15:52) (94% - 95%)    O2 Parameters below as of 11 Oct 2022 15:52  Patient On (Oxygen Delivery Method): nasal cannula  O2 Flow (L/min): 3      P/E:  In mild distress due to pain   AAOx3, no focal deficit  BL few crepitations, no wheezing   S1S2 WNL, no MRG   Abd soft, non tender, BS present   Mid lumbar spine tenderness   BL LE no edema     Labs noted     A/P:  pain management consult appreciated   Cont bowel regimen   Ortho spine consult pending   Patient is euvolemic now, cont am dose of Lasix   Outpatient follow up for pleural based lung nodule   Pending PT eval   CM and SW for safe discharge  Plan of care was discussed with patient and NP Alirio; all questions and concerns were addressed and care was aligned with patient's wishes.

## 2022-10-11 NOTE — PROGRESS NOTE ADULT - PROBLEM SELECTOR PLAN 3
- pt has hx of COPD on 2L home O2 at night  - CT Scan shows incidental interstitial fibrosis suggested at the lung bases. Nonspecific 3.0 cm pleural-based nodular opacity at the right lung base.  - c/w home inhalers Spiriva and Symbicort  - c/w 2L supplemental O2 at night time  - OP evaluation for nodular opacity documented on CT

## 2022-10-11 NOTE — PROGRESS NOTE ADULT - ASSESSMENT
89 year old female, from home, with PMH of HTN, COPD (on 3L home O2 at night), CHFrEF, ambulates with wheelchair or walker, presents to with severe low back pain for 2 weeks,  getting worse   States was seen by orthopedic doctor and got a cortisone shot with no improvement   CT Lumbar Spine No Cont: acute compression fracture of T12 with minimally retropulsed bone.   Moderate compression deformity of L2 appears chronic. Large hemangioma within the L2 vertebral body.  Multilevel degenerative changes.  Interstitial fibrosis suggested at the lung bases. Nonspecific 3.0 cm pleural-based nodular opacity at the right lung base    Admitted for Acute back pain, T12 compression fracture   Pain management and ortho spine consulted.   Pt seen, co mild pain 4/10, pending ortho eval

## 2022-10-11 NOTE — PROGRESS NOTE ADULT - PROBLEM SELECTOR PLAN 4
- pt has bibasilar crackles, no leg swelling  - last echo on record 2018 showed EF 40% and mild stage I diastolic dysfunction  - c/w  Lasix 40mg qd

## 2022-10-11 NOTE — CONSULT NOTE ADULT - PROBLEM/RECOMMENDATION-1
[General Appearance - Alert] : alert [General Appearance - In No Acute Distress] : in no acute distress [Sclera] : the sclera and conjunctiva were normal [PERRL With Normal Accommodation] : pupils were equal in size, round, and reactive to light [Extraocular Movements] : extraocular movements were intact [Outer Ear] : the ears and nose were normal in appearance DISPLAY PLAN FREE TEXT [Oropharynx] : the oropharynx was normal [Neck Appearance] : the appearance of the neck was normal [Neck Cervical Mass (___cm)] : no neck mass was observed [Jugular Venous Distention Increased] : there was no jugular-venous distention [Thyroid Nodule] : there were no palpable thyroid nodules [Thyroid Diffuse Enlargement] : the thyroid was not enlarged [Heart Rate And Rhythm] : heart rate was normal and rhythm regular [Heart Sounds] : normal S1 and S2 [Heart Sounds Gallop] : no gallops [Murmurs] : no murmurs [Full Pulse] : the pedal pulses are present [Heart Sounds Pericardial Friction Rub] : no pericardial rub [Edema] : there was no peripheral edema [Obese] : obese [Normal] : normal [Soft, Nontender] : the abdomen was soft and nontender [Cervical Lymph Nodes Enlarged Posterior Bilaterally] : posterior cervical [Cervical Lymph Nodes Enlarged Anterior Bilaterally] : anterior cervical [Supraclavicular Lymph Nodes Enlarged Bilaterally] : supraclavicular [Axillary Lymph Nodes Enlarged Bilaterally] : axillary [Femoral Lymph Nodes Enlarged Bilaterally] : femoral [Inguinal Lymph Nodes Enlarged Bilaterally] : inguinal [No CVA Tenderness] : no ~M costovertebral angle tenderness [Abnormal Walk] : normal gait [No Spinal Tenderness] : no spinal tenderness [Nail Clubbing] : no clubbing  or cyanosis of the fingernails [Musculoskeletal - Swelling] : no joint swelling seen [Skin Color & Pigmentation] : normal skin color and pigmentation [Motor Tone] : muscle strength and tone were normal [] : no rash [Skin Turgor] : normal skin turgor [Deep Tendon Reflexes (DTR)] : deep tendon reflexes were 2+ and symmetric [No Focal Deficits] : no focal deficits [Sensation] : the sensory exam was normal to light touch and pinprick [Oriented To Time, Place, And Person] : oriented to person, place, and time [Impaired Insight] : insight and judgment were intact [Affect] : the affect was normal [Scleral Icterus] : No Scleral Icterus [Spider Angioma] : No spider angioma(s) were observed [Abdominal Bruit] : no abdominal bruit [Abdominal  Ascites] : no ascites [Scrotal Edema] : Scrotum is not edematous [Asterixis] : no asterixis observed [Jaundice] : No jaundice [Depression] : no depression [Hallucinations] : ~T no ~M hallucinations [Epigastric] : not in the epigastric area [Periumbilical] : not periumbilical [Suprapubic] : not in the suprapubic area [RUQ] : not in the RUQ [RLQ] : not in the RLQ [LUQ] : not in the LUQ [Firm] : not firm [LLQ] : not in the LLQ [Rigid] : not rigid [Guarding] : no guarding [Rebound] : no rebound [Davies's] : a negative Davies's sign

## 2022-10-11 NOTE — PATIENT PROFILE ADULT - FALL HARM RISK - HARM RISK INTERVENTIONS
Communicate Risk of Fall with Harm to all staff/Reinforce activity limits and safety measures with patient and family/Tailored Fall Risk Interventions/Visual Cue: Yellow wristband and red socks/Bed in lowest position, wheels locked, appropriate side rails in place/Call bell, personal items and telephone in reach/Instruct patient to call for assistance before getting out of bed or chair/Non-slip footwear when patient is out of bed/[place] to call system/Physically safe environment - no spills, clutter or unnecessary equipment/Purposeful Proactive Rounding/Room/bathroom lighting operational, light cord in reach Assistance with ambulation/Assistance OOB with selected safe patient handling equipment/Communicate Risk of Fall with Harm to all staff/Discuss with provider need for PT consult/Monitor gait and stability/Provide patient with walking aids - walker, cane, crutches/Reinforce activity limits and safety measures with patient and family/Tailored Fall Risk Interventions/Visual Cue: Yellow wristband and red socks/Bed in lowest position, wheels locked, appropriate side rails in place/Call bell, personal items and telephone in reach/Instruct patient to call for assistance before getting out of bed or chair/Non-slip footwear when patient is out of bed/[place] to call system/Physically safe environment - no spills, clutter or unnecessary equipment/Purposeful Proactive Rounding/Room/bathroom lighting operational, light cord in reach

## 2022-10-11 NOTE — CONSULT NOTE ADULT - PROBLEM SELECTOR RECOMMENDATION 9
Pt with acute lumbar back pain which is somatic and neuropathic in nature due to radiating pain from marked compression fracture of T12 and moderate chronic compression deformity of L2, multilevel degenerative changes and large hemangioma within the L2 vertebral body. Pt also with chronic neuropathy.  High risk medications reviewed. Avoid polypharmacy. Avoid IV opioids. Avoid benzodiazepines. Non-pharmacological sleep aides initiated. Non-opioid medications and non-pharmacological pain management measures initiated.    Opioid pain recommendations   - Discontinue Tramadol standing.   - Oxycodone 2.5 mg PO q 6 hours PRN severe pain. Monitor for sedation/ respiratory depression.   Non-opioid pain recommendations   - Acetaminophen 1 gram PO q 8 hours x 3 days. Monitor LFTs  - Naproxen 250mg PO TID x 2 days. On PPI.  - Continue Gabapentin 300mg po q 8 hours (home dose). Monitor renal function.   - Continue Lidoderm 4% patch daily.   Bowel Regimen  - Miralax 17G PO daily  - Senna 2 tablets at bedtime for constipation  Mild pain   - Non-pharmacological pain treatment recommendations  - Warm/ Cool packs PRN   - Repositioning, imagery, relaxation, distraction.  - Physical therapy OOB if no contraindications   Recommendations discussed with primary team and RN

## 2022-10-12 NOTE — PROGRESS NOTE ADULT - PROBLEM SELECTOR PLAN 1
Pt with acute lumbar back pain which is somatic and neuropathic in nature due to radiating pain from marked compression fracture of T12 and moderate chronic compression deformity of L2, multilevel degenerative changes and large hemangioma within the L2 vertebral body. Pt also with chronic neuropathy.  High risk medications reviewed. Avoid polypharmacy. Avoid IV opioids. Avoid benzodiazepines. Non-pharmacological sleep aides initiated. Non-opioid medications and non-pharmacological pain management measures initiated.    Opioid pain recommendations   - Discontinue Tramadol standing.   - Oxycodone 2.5 mg PO q 6 hours PRN severe pain. Monitor for sedation/ respiratory depression.   Non-opioid pain recommendations   - Acetaminophen 1 gram PO q 8 hours x 3 days. Monitor LFTs  - Naproxen 250mg PO TID x 2 days. On PPI.  - Continue Gabapentin 300mg po q 8 hours (home dose). Monitor renal function.   - Continue Lidoderm 4% patch daily.   Bowel Regimen  - Miralax 17G PO daily  - Senna 2 tablets at bedtime for constipation  - Fleet enema x 1  Mild pain   - Non-pharmacological pain treatment recommendations  - Warm/ Cool packs PRN   - Repositioning, imagery, relaxation, distraction.  - Physical therapy OOB if no contraindications   Recommendations discussed with primary team and RN.

## 2022-10-12 NOTE — PROGRESS NOTE ADULT - PROBLEM SELECTOR PLAN 4
- last echo on record 2018 showed EF 40% and mild stage I diastolic dysfunction  - c/w  Lasix 40mg qd

## 2022-10-12 NOTE — PROGRESS NOTE ADULT - SUBJECTIVE AND OBJECTIVE BOX
Patient is a 89y old  Female who presents with a chief complaint of compression fracture (11 Oct 2022 14:42)      INTERVAL HPI/OVERNIGHT EVENTS: no acute events overnight, Pt mildly SOB while on commode this am. neb tx stat once ordered for pt      REVIEW OF SYSTEMS:  CONSTITUTIONAL: No fever, chills  ENMT:  No difficulty hearing, no change in vision  NECK: No pain or stiffness  RESPIRATORY: No cough, SOB  CARDIOVASCULAR: No chest pain, palpitations  GASTROINTESTINAL: No abdominal pain. No nausea, vomiting, or diarrhea  GENITOURINARY: No dysuria  NEUROLOGICAL: No HA  SKIN: No itching, burning, rashes, or lesions   LYMPH NODES: No enlarged glands  ENDOCRINE: No heat or cold intolerance; No hair loss  Musculoskeletal : back pain  PSYCHIATRIC: No depression, anxiety  HEME/LYMPH: No easy bruising, or bleeding gums    T(C): 36.5 (10-12-22 @ 05:24), Max: 36.7 (10-11-22 @ 15:52)  HR: 70 (10-12-22 @ 10:43) (63 - 80)  BP: 126/46 (10-12-22 @ 10:43) (95/55 - 126/46)  RR: 18 (10-12-22 @ 10:43) (18 - 20)  SpO2: 94% (10-12-22 @ 10:43) (93% - 97%)  Wt(kg): --Vital Signs Last 24 Hrs  T(C): 36.5 (12 Oct 2022 05:24), Max: 36.7 (11 Oct 2022 15:52)  T(F): 97.7 (12 Oct 2022 05:24), Max: 98 (11 Oct 2022 15:52)  HR: 70 (12 Oct 2022 10:43) (63 - 80)  BP: 126/46 (12 Oct 2022 10:43) (95/55 - 126/46)  BP(mean): 61 (12 Oct 2022 10:43) (61 - 61)  RR: 18 (12 Oct 2022 10:43) (18 - 20)  SpO2: 94% (12 Oct 2022 10:43) (93% - 97%)    Parameters below as of 12 Oct 2022 10:43  Patient On (Oxygen Delivery Method): nasal cannula  O2 Flow (L/min): 3    PHYSICAL EXAM:  GENERAL: NAD  EYES: clear conjunctiva; EOMI  ENMT: Moist mucous membranes  NECK: Supple, No JVD, Normal thyroid  CHEST/LUNG: poor air entry thru out  HEART: S1, S2, Regular rate and rhythm  ABDOMEN: Soft, Nontender, Nondistended; Bowel sounds present  NEURO: Alert & Oriented X3  EXTREMITIES: No LE edema, no calf tenderness  LYMPH: No lymphadenopathy noted  SKIN: No rashes or lesions    Consultant(s) Notes Reviewed:  [x ] YES  [ ] NO  Care Discussed with Consultants/Other Providers [ x] YES  [ ] NO    LABS:                        12.9   8.88  )-----------( 263      ( 12 Oct 2022 08:29 )             40.8     10-12    142  |  106  |  33<H>  ----------------------------<  118<H>  3.7   |  28  |  1.32<H>    Ca    9.1      12 Oct 2022 08:29  Phos  3.3     10-11  Mg     2.7     10-11    CAPILLARY BLOOD GLUCOSE    RADIOLOGY & ADDITIONAL TESTS:    Imaging Personally Reviewed:  [x ] YES  [ ] NO  < from: CT Lumbar Spine No Cont (10.10.22 @ 12:08) >    ACC: [id number] EXAM:  CT LUMBAR SPINE                          PROCEDURE DATE:  10/10/2022          INTERPRETATION:  Noncontrast CT examination of the lumbar spine    CLINICAL INDICATION: Low back pain    TECHNIQUE:  Direct axial CT scanning of the lumbar spine was obtained   without the administration of intravenous contrast.  Sagittal and coronal   reformats were provided.    COMPARISON: None available    FINDINGS:    Marked compression fracture of T12. Discontinuity of the inferior and   superior endplates suggests acuity of fracture. Minimally retropulsed   bone at the level of the inferior endplate.    Moderate compression deformity of L2 appears chronic. A large hemangioma   occupies much of the L2 vertebral body.    Remaining vertebral bodies demonstrate normal height. Facet alignment is   maintained. Grade 1 anterolisthesis of L4 on L5.    Mild to moderate dextroscoliosis of the lumbar spine, the apex is at   L2-L3.    Diffuse disc space narrowing in the lumbar region.    Multilevel degenerative changes.    Interstitial fibrosis suggested at the lung bases. Nonspecific 3.0 cm   pleural-based nodular opacity at the right lung base.    IMPRESSION:    Findings suggesting acute marked compression fracture of T12 with   minimally retropulsed bone at the level of the inferior endplate.    Moderate compression deformity of L2 appears chronic. Large hemangioma   within the L2 vertebral body.    Multilevel degenerative changes.    Interstitial fibrosis suggested at the lung bases.Nonspecific 3.0 cm   pleural-based nodular opacity at the right lung base. If clinically   indicated, correlation with CT of the chest may be obtained for further   evaluation.    Dr. Landers discussed these findings with Dr. Ho on 10/10/2022 1:02   PM with read back.        --- End of Report ---            JESSICA LANDERS MD; Attending Radiologist  This document has been electronically signed. Oct 1    < end of copied text >

## 2022-10-12 NOTE — PROGRESS NOTE ADULT - PROBLEM SELECTOR PLAN 2
- CT Lumbar Spine shows moderate compression deformity of L2 appears chronic. Large hemangioma within the L2 vertebral body.  - f/u ortho consult

## 2022-10-12 NOTE — PROGRESS NOTE ADULT - ASSESSMENT
Confidential Drug Utilization Report  Search Terms: Suri Felix, 01/31/1931Search Date: 10/11/2022 08:29:56 AM  The Drug Utilization Report below displays all of the controlled substance prescriptions, if any, that your patient has filled in the last twelve months. The information displayed on this report is compiled from pharmacy submissions to the Department, and accurately reflects the information as submitted by the pharmacies.    This report was requested by: Lakshmi Mancini | Reference #: [id number]    You have not added a BECCA number. Keeping your BECCA number(s) up to date on the My BECCA # page will enable the separation of your prescriptions from others in the search results.    Others' Prescriptions  Patient Name: Suri FelixBirth Date: 01/31/1931  Address: [id number], [place] [address]Xzc: Female  Rx Written	Rx Dispensed	Drug	Quantity	Days Supply	Prescriber Name	Prescriber Becca #	Payment Method  10/25/2021	11/01/2021	clonazepam 0.5 mg tablet	90	30	Ricarda Ledezma NP	[id number]	Medicare  Dispenser [facility] #[id number]  * - Drugs marked with an asterisk are compound drugs. If the compound drug is made up of more than one controlled substance, then each controlled substance will be a separate row in the table.

## 2022-10-12 NOTE — PROGRESS NOTE ADULT - ASSESSMENT
89 year old female, from home, with PMH of HTN, COPD (on 3L home O2 at night), CHFrEF, ambulates with wheelchair or walker, presents with worsening severe low back pain for 2 weeks. S/p cortisone shot no improvement as OP. CT Lumbar Spine revealed acute compression fracture of T12 with minimally retropulsed bone. Moderate compression deformity of L2 appears chronic. Large hemangioma within the L2 vertebral body.  Interstitial fibrosis suggested at the lung bases. Nonspecific 3.0 cm pleural-based nodular opacity at the right lung base  Admitted for Acute back pain, T12 compression fracture. Pain management and ortho spine following.

## 2022-10-12 NOTE — PROGRESS NOTE ADULT - PROBLEM SELECTOR PLAN 3
- wears 2L home O2 at night, no in excerebration  - CT Scan shows incidental interstitial fibrosis suggested at the lung bases. Nonspecific 3.0 cm pleural-based nodular opacity at the right lung base.  - c/w home inhalers Spiriva and Symbicort  - c/w 2L supplemental O2 at night time  - OP evaluation for nodular opacity documented on CT  - neb tx x1 for HERNANDEZ

## 2022-10-12 NOTE — PROGRESS NOTE ADULT - SUBJECTIVE AND OBJECTIVE BOX
Source of information: VIDAL LOUIE, Chart review  Patient language: English  : n/a    HPI:  89 year old female, from home, with PMH of HTN, COPD (on 3L home O2 at night), CHFrEF, ambulates with wheelchair or walker, presents to ED with severe low back pain. She states the pain first started 2 weeks ago and got worse last Thursday. She went to an orthopedic doctor and got a cortisone shot, which she states did not help. Pt states the pain became severe yesterday, 10/10, achy stabbing in nature,  worsened by movement and ambulation. Denies recent trauma or falls. 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 Otherwise, she denies worsening numbness or tingling in LE, loss of sensation, weakness or urinary or bowel incontinence.    In ED: vitals HR 77, /64, Temp 98.4F, 99% on RA.  s/p ketorolac, Tylenol and flexeril in ED (10 Oct 2022 15:57)    CT lumbar demonstrates Findings suggesting acute marked compression fracture of T12 with   minimally retropulsed bone at the level of the inferior endplate.    Moderate compression deformity of L2 appears chronic. Large hemangioma   within the L2 vertebral body.    Multilevel degenerative changes.    Interstitial fibrosis suggested at the lung bases.Nonspecific 3.0 cm   pleural-based nodular opacity at the right lung base. If clinically   indicated, correlation with CT of the chest may be obtained for further   evaluation.    Pain consulted for back pain. Pt seen and examined at bedside. Pt sitting in bed eating breakfast. Reports lumbar back pain started a couple weeks ago and progressively worsened. Denies trauma/ falls or hx back pain in the past. Reports taking Tylenol, aleve and had massage at home with no relief of pain. Was scheduled to begin her PT sessions 10/10. Currently rating lumbar back score 7/10 and not tolerable  SCALE USED: (1-10 VNRS). Pt describes pain as constant, radiating over lumbar back, slightly alleviated by pain medication, exacerbated by movement. Pt also reports numbness over toes in b/l feet x 1 year and is on gabapentin at home. Pt tolerating PO diet. Denies lethargy, chest pain, SOB, nausea, vomiting. Reports constipation, last BM 10/9. Requesting enema. Patient stated goal for pain control: to be able to take deep breaths, get out of bed to chair and ambulate with tolerable pain control. Pt has been out of bed to chair. Pt ambulate with walker and uses wheelchair at home at baseline.     PAST MEDICAL & SURGICAL HISTORY:  Essential Hypertension      COPD (Chronic Obstructive Pulmonary Disease)  on home O2 AT NIGHT      Hip Fracture-Left      Hx of Breast Cancer  HAd Rt in       Chronic bronchitis      Anxiety disorder      Morbid obesity      DAVID on CPAP      Dysphagia      Neuropathy      S/P Insertion of IVC (Inferior Vena Caval) Filter      Joint Fixation (Surgical)- L Hip      S/P Breast Lumpectomy- left      Status Post Total Knee Replacement-bilateral      S/P           FAMILY HISTORY:  Family history of renal disease (Sibling)    Family history of lymphoma    Family history of congestive heart failure        Social History:  Pt lives at home with her . Denies current alcohol, tobacco or illicit drug use. States she smoked 2ppd for about 55 years. Quit smoking about 10 years ago. (10 Oct 2022 15:57)    Allergies    No Known Allergies      MEDICATIONS  (STANDING):  acetaminophen     Tablet .. 1000 milliGRAM(s) Oral every 8 hours  amLODIPine   Tablet 2.5 milliGRAM(s) Oral daily  aspirin enteric coated 81 milliGRAM(s) Oral daily  budesonide 160 MICROgram(s)/formoterol 4.5 MICROgram(s) Inhaler 2 Puff(s) Inhalation two times a day  cholecalciferol 1000 Unit(s) Oral daily  enoxaparin Injectable 40 milliGRAM(s) SubCutaneous every 24 hours  furosemide    Tablet 40 milliGRAM(s) Oral daily  gabapentin 300 milliGRAM(s) Oral three times a day  influenza  Vaccine (HIGH DOSE) 0.7 milliLiter(s) IntraMuscular once  lidocaine   4% Patch 1 Patch Transdermal every 24 hours  naproxen 250 milliGRAM(s) Oral three times a day  pantoprazole    Tablet 40 milliGRAM(s) Oral before breakfast  polyethylene glycol 3350 17 Gram(s) Oral daily  senna 2 Tablet(s) Oral at bedtime  tiotropium 18 MICROgram(s) Capsule 1 Capsule(s) Inhalation daily    MEDICATIONS  (PRN):  ALPRAZolam 0.5 milliGRAM(s) Oral daily PRN anxiety  aluminum hydroxide/magnesium hydroxide/simethicone Suspension 30 milliLiter(s) Oral every 4 hours PRN Dyspepsia  melatonin 3 milliGRAM(s) Oral at bedtime PRN Insomnia  ondansetron Injectable 4 milliGRAM(s) IV Push every 8 hours PRN Nausea and/or Vomiting  oxyCODONE    IR 2.5 milliGRAM(s) Oral every 6 hours PRN Severe Pain (7 - 10)      Vital Signs Last 24 Hrs  T(C): 36.5 (12 Oct 2022 05:24), Max: 36.7 (11 Oct 2022 15:52)  T(F): 97.7 (12 Oct 2022 05:24), Max: 98 (11 Oct 2022 15:52)  HR: 70 (12 Oct 2022 10:43) (63 - 78)  BP: 126/46 (12 Oct 2022 10:43) (95/55 - 126/46)  BP(mean): 61 (12 Oct 2022 10:43) (61 - 61)  RR: 18 (12 Oct 2022 10:43) (18 - 20)  SpO2: 94% (12 Oct 2022 10:43) (93% - 97%)    Parameters below as of 12 Oct 2022 10:43  Patient On (Oxygen Delivery Method): nasal cannula  O2 Flow (L/min): 3    COVID-19 PCR: NotDetec (10 Oct 2022 16:13)    LABS: Reviewed                          12.9   8.88  )-----------( 263      ( 12 Oct 2022 08:29 )             40.8     10-12    142  |  106  |  33<H>  ----------------------------<  118<H>  3.7   |  28  |  1.32<H>    Ca    9.1      12 Oct 2022 08:29  Phos  3.3     10-11  Mg     2.7     10-11    COVID-19 PCR: NotDetec (10 Oct 2022 16:13)    Radiology: Reviewed.   < from: CT Lumbar Spine No Cont (10.10.22 @ 12:08) >    ACC: [id number] EXAM:  CT LUMBAR SPINE                          PROCEDURE DATE:  10/10/2022          INTERPRETATION:  Noncontrast CT examination of the lumbar spine    CLINICAL INDICATION: Low back pain    TECHNIQUE:  Direct axial CT scanning of the lumbar spine was obtained   without the administration of intravenous contrast.  Sagittal and coronal   reformats were provided.    COMPARISON: None available    FINDINGS:    Marked compression fracture of T12. Discontinuity of the inferior and   superior endplates suggests acuity of fracture. Minimally retropulsed   bone at the level of the inferior endplate.    Moderate compression deformity of L2 appears chronic. A large hemangioma   occupies much of the L2 vertebral body.    Remaining vertebral bodies demonstrate normal height. Facet alignment is   maintained. Grade 1 anterolisthesis of L4 on L5.    Mild to moderate dextroscoliosis of the lumbar spine, the apex is at   L2-L3.    Diffuse disc space narrowing in the lumbar region.    Multilevel degenerative changes.    Interstitial fibrosis suggested at the lung bases. Nonspecific 3.0 cm   pleural-based nodular opacity at the right lung base.    IMPRESSION:    Findings suggesting acute marked compression fracture of T12 with   minimally retropulsed bone at the level of the inferior endplate.    Moderate compression deformity of L2 appears chronic. Large hemangioma   within the L2 vertebral body.    Multilevel degenerative changes.    Interstitial fibrosis suggested at the lung bases.Nonspecific 3.0 cm   pleural-based nodular opacity at the right lung base. If clinically   indicated, correlation with CT of the chest may be obtained for further   evaluation.    Dr. Landers discussed these findings with Dr. Ho on 10/10/2022 1:02   PM with read back.        --- End of Report ---            JESSICA LANDERS MD; Attending Radiologist  This document has been electronically signed. Oct 10 2022  1:02PM    < end of copied text >      ORT Score -   Family Hx of substance abuse	Female	      Male  Alcohol 	                                           1                     3  Illegal drugs	                                   2                     3  Rx drugs                                           4 	                  4  Personal Hx of substance abuse		  Alcohol 	                                          3	                  3  Illegal drugs                                     4	                  4  Rx drugs                                            5 	                  5  Age between 16- 45 years	           1                     1  hx preadolescent sexual abuse	   3 	                  0  Psychological disease		  ADD, OCD, bipolar, schizophrenia   2	          2  Depression                                           1 	          1  Total: 0    a score of 3 or lower indicates low risk for opioid abuse		  a score of 4-7 indicates moderate risk for opioid abuse		  a score of 8 or higher indicates high risk for opioid abuse  	  REVIEW OF SYSTEMS:  CONSTITUTIONAL: No fever + fatigue  HEENT:  No difficulty hearing, no change in vision  NECK: No pain or stiffness  RESPIRATORY: No cough, wheezing, chills or hemoptysis; No shortness of breath, + on chronic O2 at home during bedtime.   CARDIOVASCULAR: No chest pain, palpitations, dizziness, or leg swelling  GASTROINTESTINAL: No loss of appetite, decreased PO intake. No abdominal or epigastric pain. No nausea, vomiting; No diarrhea or constipation.   GENITOURINARY: No dysuria, frequency, hematuria, retention or incontinence  MUSCULOSKELETAL: + lumbar back pain. No joint swelling; no upper motor strength weakness, +  b/l lower motor strength weakness left >right (chronic), no saddle anesthesia, bowel/bladder incontinence, no falls   NEURO: No headaches, + numbness/tingling b/l toes, + chronic left leg weaknesss  PSYCHIATRIC: + hx anxiety     PHYSICAL EXAM:  GENERAL:  + fatigued, alert & Oriented X4, cooperative, NAD, Good concentration. Speech is clear.   RESPIRATORY: Respirations even and unlabored. Clear to auscultation bilaterally; No rales, rhonchi, wheezing, or rubs   CARDIOVASCULAR: Normal S1/S2, regular rate and rhythm; No murmurs, rubs, or gallops. No JVD.   GASTROINTESTINAL: + obese per BMI Soft, Nontender, Nondistended; Bowel sounds present  PERIPHERAL VASCULAR:  Extremities warm without edema. 2+ Peripheral Pulses, No cyanosis, No calf tenderness  MUSCULOSKELETAL: Motor Strength 4/5 B/L upper and 3/5 left lower extremities 4/5 right lower extremitiy; + decreased ROM left LE; negative SLR; + lumbar paraspinal and lumbar back tenderness on palpation   SKIN: + ecchymosis of b/l arms and right lumbar back; Warm, dry, intact. No rashes, lesions, scars or wounds.     Risk factors associated with adverse outcomes related to opioid treatment  [X ]  Concurrent benzodiazepine use  [ ]  History/ Active substance use or alcohol use disorder  [X ] Psychiatric co-morbidity  [X ] Sleep apnea  [ ] COPD  [X ] BMI> 35  [ ] Liver dysfunction  [ ] Renal dysfunction  [X ] CHF  [X ] Former Smoker  [ X]  Age > 60 years    [X ]  [facility]  Reviewed and Copied to Chart. See below.    Plan of care and goal oriented pain management treatment options were discussed with patient and /or primary care giver; all questions and concerns were addressed and care was aligned with patient's wishes.    Educated patient on goal oriented pain management treatment options     10-12-22 @ 13:28

## 2022-10-12 NOTE — PROGRESS NOTE ADULT - PROBLEM SELECTOR PLAN 5
- on amlodipine 2.5mg, Lasix 40mg in AM and 20mg in PM  - c/w home dose of amlodipine and Lasix 40mg

## 2022-10-12 NOTE — PROGRESS NOTE ADULT - NS ATTEND AMEND GEN_ALL_CORE FT
89F PMH HTN, COPD on 3L O2 at home, HFrEF, p/w LBP 2w, found to have T12 fxr, chronic L2 compression deformity, L2 hemangioma. Admission for back pain.    #T12 fxr  #LBP  #L2 compression deformity  #L2 hemangioma  #COPD  #HTN  #HFrEF      A/P:  -f/u pain management recs  -f/u orthospine recs  -c/w home lasix   -Outpatient follow up for pleural based lung nodule   -Pending PT eval   -rest of care as above

## 2022-10-12 NOTE — PROGRESS NOTE ADULT - PROBLEM SELECTOR PLAN 1
+ acute marked compression fracture of T12 with minimally retropulsed bone. Multilevel degenerative changes.  - non-traumatic, likely osteoporotic   - c/w Tylenol 650 mg q 6 hrs, lidocaine patch, toradol 25 mg PO Q 8 hrs  - c/w  gabapentin 300mg TID  - f/u Vit D level  - f/u PT eval  - orthro  consulted- f/u recs  - pain management following  - ortho consulted

## 2022-10-13 NOTE — PROGRESS NOTE ADULT - SUBJECTIVE AND OBJECTIVE BOX
Source of information: VIDAL LOUIE, Chart review  Patient language: English  : n/a    HPI:  89 year old female, from home, with PMH of HTN, COPD (on 3L home O2 at night), CHFrEF, ambulates with wheelchair or walker, presents to ED with severe low back pain. She states the pain first started 2 weeks ago and got worse last Thursday. She went to an orthopedic doctor and got a cortisone shot, which she states did not help. Pt states the pain became severe yesterday, 10/10, achy stabbing in nature,  worsened by movement and ambulation. Denies recent trauma or falls. 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 Otherwise, she denies worsening numbness or tingling in LE, loss of sensation, weakness or urinary or bowel incontinence.    In ED: vitals HR 77, /64, Temp 98.4F, 99% on RA.  s/p ketorolac, Tylenol and flexeril in ED (10 Oct 2022 15:57)    CT lumbar demonstrates Findings suggesting acute marked compression fracture of T12 with   minimally retropulsed bone at the level of the inferior endplate.    Moderate compression deformity of L2 appears chronic. Large hemangioma   within the L2 vertebral body.    Multilevel degenerative changes.    Interstitial fibrosis suggested at the lung bases.Nonspecific 3.0 cm   pleural-based nodular opacity at the right lung base. If clinically   indicated, correlation with CT of the chest may be obtained for further   evaluation.    Pain consulted for back pain. Pt seen and examined at bedside. Pt sitting in bed eating breakfast. Reports lumbar back pain started a couple weeks ago and progressively worsened. Denies trauma/ falls or hx back pain in the past. Reports taking Tylenol, aleve and had massage at home with no relief of pain. Was scheduled to begin her PT sessions 10/10. Currently rating lumbar back score 7/10 and not tolerable  SCALE USED: (1-10 VNRS). Pt describes pain as constant, radiating over lumbar back, slightly alleviated by pain medication, exacerbated by movement. Pt also reports numbness over toes in b/l feet x 1 year and is on gabapentin at home. Pt tolerating PO diet. Denies lethargy, chest pain, SOB, nausea, vomiting. Reports constipation, last BM 10/9. Requesting enema. Discussed with primary team. Patient stated goal for pain control: to be able to take deep breaths, get out of bed to chair and ambulate with tolerable pain control. Pt has been out of bed to chair. Pt ambulate with walker and uses wheelchair at home at baseline.     PAST MEDICAL & SURGICAL HISTORY:  Essential Hypertension      COPD (Chronic Obstructive Pulmonary Disease)  on home O2 AT NIGHT      Hip Fracture-Left      Hx of Breast Cancer  HAd Rt in       Chronic bronchitis      Anxiety disorder      Morbid obesity      DAVID on CPAP      Dysphagia      Neuropathy      S/P Insertion of IVC (Inferior Vena Caval) Filter      Joint Fixation (Surgical)- L Hip      S/P Breast Lumpectomy- left      Status Post Total Knee Replacement-bilateral      S/P           FAMILY HISTORY:  Family history of renal disease (Sibling)    Family history of lymphoma    Family history of congestive heart failure        Social History:  Pt lives at home with her . Denies current alcohol, tobacco or illicit drug use. States she smoked 2ppd for about 55 years. Quit smoking about 10 years ago. (10 Oct 2022 15:57)    Allergies    No Known Allergies      MEDICATIONS  (STANDING):  acetaminophen     Tablet .. 1000 milliGRAM(s) Oral every 8 hours  amLODIPine   Tablet 2.5 milliGRAM(s) Oral daily  aspirin enteric coated 81 milliGRAM(s) Oral daily  budesonide 160 MICROgram(s)/formoterol 4.5 MICROgram(s) Inhaler 2 Puff(s) Inhalation two times a day  cholecalciferol 1000 Unit(s) Oral daily  cyclobenzaprine 5 milliGRAM(s) Oral two times a day  enoxaparin Injectable 40 milliGRAM(s) SubCutaneous every 24 hours  furosemide    Tablet 40 milliGRAM(s) Oral daily  gabapentin 300 milliGRAM(s) Oral three times a day  influenza  Vaccine (HIGH DOSE) 0.7 milliLiter(s) IntraMuscular once  lidocaine   4% Patch 1 Patch Transdermal every 24 hours  pantoprazole    Tablet 40 milliGRAM(s) Oral before breakfast  polyethylene glycol 3350 17 Gram(s) Oral daily  senna 2 Tablet(s) Oral at bedtime  tiotropium 18 MICROgram(s) Capsule 1 Capsule(s) Inhalation daily    MEDICATIONS  (PRN):  ALPRAZolam 0.5 milliGRAM(s) Oral daily PRN anxiety  aluminum hydroxide/magnesium hydroxide/simethicone Suspension 30 milliLiter(s) Oral every 4 hours PRN Dyspepsia  melatonin 3 milliGRAM(s) Oral at bedtime PRN Insomnia  ondansetron Injectable 4 milliGRAM(s) IV Push every 8 hours PRN Nausea and/or Vomiting  oxyCODONE    IR 2.5 milliGRAM(s) Oral every 6 hours PRN Severe Pain (7 - 10)      Vital Signs Last 24 Hrs  T(C): 37.3 (13 Oct 2022 13:55), Max: 37.3 (13 Oct 2022 13:55)  T(F): 99.1 (13 Oct 2022 13:55), Max: 99.1 (13 Oct 2022 13:55)  HR: 73 (13 Oct 2022 13:55) (73 - 88)  BP: 109/50 (13 Oct 2022 13:55) (109/50 - 142/71)  BP(mean): 65 (13 Oct 2022 13:55) (65 - 65)  RR: 19 (13 Oct 2022 13:55) (17 - 20)  SpO2: 95% (13 Oct 2022 13:55) (94% - 98%)    Parameters below as of 13 Oct 2022 13:55  Patient On (Oxygen Delivery Method): nasal cannula  O2 Flow (L/min): 3    COVID-19 PCR: NotDetec (10 Oct 2022 16:13)    LABS: Reviewed                          12.4   7.71  )-----------( 260      ( 13 Oct 2022 06:05 )             39.1     10-    140  |  105  |  29<H>  ----------------------------<  90  3.5   |  26  |  1.18    Ca    8.5      13 Oct 2022 06:05      COVID-19 PCR: NotDetec (10 Oct 2022 16:13)    Radiology: Reviewed.   < from: CT Lumbar Spine No Cont (10.10.22 @ 12:08) >    ACC: [id number] EXAM:  CT LUMBAR SPINE                          PROCEDURE DATE:  10/10/2022          INTERPRETATION:  Noncontrast CT examination of the lumbar spine    CLINICAL INDICATION: Low back pain    TECHNIQUE:  Direct axial CT scanning of the lumbar spine was obtained   without the administration of intravenous contrast.  Sagittal and coronal   reformats were provided.    COMPARISON: None available    FINDINGS:    Marked compression fracture of T12. Discontinuity of the inferior and   superior endplates suggests acuity of fracture. Minimally retropulsed   bone at the level of the inferior endplate.    Moderate compression deformity of L2 appears chronic. A large hemangioma   occupies much of the L2 vertebral body.    Remaining vertebral bodies demonstrate normal height. Facet alignment is   maintained. Grade 1 anterolisthesis of L4 on L5.    Mild to moderate dextroscoliosis of the lumbar spine, the apex is at   L2-L3.    Diffuse disc space narrowing in the lumbar region.    Multilevel degenerative changes.    Interstitial fibrosis suggested at the lung bases. Nonspecific 3.0 cm   pleural-based nodular opacity at the right lung base.    IMPRESSION:    Findings suggesting acute marked compression fracture of T12 with   minimally retropulsed bone at the level of the inferior endplate.    Moderate compression deformity of L2 appears chronic. Large hemangioma   within the L2 vertebral body.    Multilevel degenerative changes.    Interstitial fibrosis suggested at the lung bases.Nonspecific 3.0 cm   pleural-based nodular opacity at the right lung base. If clinically   indicated, correlation with CT of the chest may be obtained for further   evaluation.    Dr. Landers discussed these findings with Dr. Ho on 10/10/2022 1:02   PM with read back.        --- End of Report ---            JESSICA LANDERS MD; Attending Radiologist  This document has been electronically signed. Oct 10 2022  1:02PM    < end of copied text >      ORT Score -   Family Hx of substance abuse	Female	      Male  Alcohol 	                                           1                     3  Illegal drugs	                                   2                     3  Rx drugs                                           4 	                  4  Personal Hx of substance abuse		  Alcohol 	                                          3	                  3  Illegal drugs                                     4	                  4  Rx drugs                                            5 	                  5  Age between 16- 45 years	           1                     1  hx preadolescent sexual abuse	   3 	                  0  Psychological disease		  ADD, OCD, bipolar, schizophrenia   2	          2  Depression                                           1 	          1  Total: 0    a score of 3 or lower indicates low risk for opioid abuse		  a score of 4-7 indicates moderate risk for opioid abuse		  a score of 8 or higher indicates high risk for opioid abuse  	  REVIEW OF SYSTEMS:  CONSTITUTIONAL: No fever + fatigue  HEENT:  No difficulty hearing, no change in vision  NECK: No pain or stiffness  RESPIRATORY: No cough, wheezing, chills or hemoptysis; No shortness of breath, + on chronic O2 at home during bedtime.   CARDIOVASCULAR: No chest pain, palpitations, dizziness, or leg swelling  GASTROINTESTINAL: No loss of appetite, decreased PO intake. No abdominal or epigastric pain. No nausea, vomiting; No diarrhea or constipation.   GENITOURINARY: No dysuria, frequency, hematuria, retention or incontinence  MUSCULOSKELETAL: + lumbar back pain greatest over right side. No joint swelling; no upper motor strength weakness, +  b/l lower motor strength weakness (chronic), no saddle anesthesia, bowel/bladder incontinence, no falls   NEURO: No headaches, + numbness/tingling b/l toes, + chronic left leg weaknesss  PSYCHIATRIC: + hx anxiety     PHYSICAL EXAM:  GENERAL:  + fatigued, alert & Oriented X4, cooperative, NAD, Good concentration. Speech is clear.   RESPIRATORY: Respirations even and unlabored. Clear to auscultation bilaterally; No rales, rhonchi, wheezing, or rubs + O2 3L NC   CARDIOVASCULAR: Normal S1/S2, regular rate and rhythm; No murmurs, rubs, or gallops. No JVD.   GASTROINTESTINAL: + obese per BMI Soft, Nontender, + distended; Bowel sounds present  PERIPHERAL VASCULAR:  Extremities warm without edema. 2+ Peripheral Pulses, No cyanosis, No calf tenderness  MUSCULOSKELETAL: Motor Strength 4/5 B/L upper and 4/5 b/l lower extremitiy; + decreased ROM left LE; negative SLR; + right SI joint tenderness, lumbar back tenderness on palpation, no spinal tenderness today.   SKIN: + ecchymosis of b/l arms and right lumbar back; + scattered moles generalized back; Warm, dry, intact. No rashes, lesions, scars or wounds.     Risk factors associated with adverse outcomes related to opioid treatment  [X ]  Concurrent benzodiazepine use  [ ]  History/ Active substance use or alcohol use disorder  [X ] Psychiatric co-morbidity  [X ] Sleep apnea  [ ] COPD  [X ] BMI> 35  [ ] Liver dysfunction  [ ] Renal dysfunction  [X ] CHF  [X ] Former Smoker  [ X]  Age > 60 years    [X ]  [facility]  Reviewed and Copied to Chart. See below.    Plan of care and goal oriented pain management treatment options were discussed with patient and /or primary care giver; all questions and concerns were addressed and care was aligned with patient's wishes.    Educated patient on goal oriented pain management treatment options     10-13-22 @ 15:22

## 2022-10-13 NOTE — PHYSICAL THERAPY INITIAL EVALUATION ADULT - PATIENT PROFILE REVIEW, REHAB EVAL
including labs and imaging. DILLON Kahn cleared pt. for activity as tolerated prior to f/u imaging./yes

## 2022-10-13 NOTE — PROGRESS NOTE ADULT - PROBLEM SELECTOR PLAN 2
- CT Lumbar Spine shows moderate compression deformity of L2 appears chronic. Large hemangioma within the L2 vertebral body.  - f/u ortho consult - CT Lumbar Spine shows moderate compression deformity of L2 appears chronic. Large hemangioma within the L2 vertebral body.  - Plan same as above

## 2022-10-13 NOTE — PROGRESS NOTE ADULT - SUBJECTIVE AND OBJECTIVE BOX
Patient is a 89y old  Female who presents with a chief complaint of Back pain secondary to compression fx. (12 Oct 2022 15:09)    INTERVAL HPI/OVERNIGHT EVENTS: no acute events    REVIEW OF SYSTEMS:  CONSTITUTIONAL: No fever, chills  ENMT:  No difficulty hearing, no change in vision  NECK: No pain or stiffness  RESPIRATORY: + dry cough, + SOB  CARDIOVASCULAR: No chest pain, palpitations  GASTROINTESTINAL: No abdominal pain. No nausea, vomiting, or diarrhea  GENITOURINARY: No dysuria  NEUROLOGICAL: No HA  SKIN: No itching, burning, rashes, or lesions   LYMPH NODES: No enlarged glands  ENDOCRINE: No heat or cold intolerance; No hair loss  MUSCULOSKELETAL: No joint pain or swelling; No muscle, back, or extremity pain  PSYCHIATRIC: No depression, anxiety  HEME/LYMPH: No easy bruising, or bleeding gums    MEDICATIONS  (STANDING):  acetaminophen     Tablet .. 1000 milliGRAM(s) Oral every 8 hours  amLODIPine   Tablet 2.5 milliGRAM(s) Oral daily  aspirin enteric coated 81 milliGRAM(s) Oral daily  budesonide 160 MICROgram(s)/formoterol 4.5 MICROgram(s) Inhaler 2 Puff(s) Inhalation two times a day  cholecalciferol 1000 Unit(s) Oral daily  cyclobenzaprine 5 milliGRAM(s) Oral two times a day  enoxaparin Injectable 40 milliGRAM(s) SubCutaneous every 24 hours  furosemide    Tablet 40 milliGRAM(s) Oral daily  gabapentin 300 milliGRAM(s) Oral three times a day  influenza  Vaccine (HIGH DOSE) 0.7 milliLiter(s) IntraMuscular once  lidocaine   4% Patch 1 Patch Transdermal every 24 hours  pantoprazole    Tablet 40 milliGRAM(s) Oral before breakfast  polyethylene glycol 3350 17 Gram(s) Oral daily  saline laxative (FLEET) Rectal Enema 1 Enema Rectal once  senna 2 Tablet(s) Oral at bedtime  tiotropium 18 MICROgram(s) Capsule 1 Capsule(s) Inhalation daily    MEDICATIONS  (PRN):  ALPRAZolam 0.5 milliGRAM(s) Oral daily PRN anxiety  aluminum hydroxide/magnesium hydroxide/simethicone Suspension 30 milliLiter(s) Oral every 4 hours PRN Dyspepsia  melatonin 3 milliGRAM(s) Oral at bedtime PRN Insomnia  ondansetron Injectable 4 milliGRAM(s) IV Push every 8 hours PRN Nausea and/or Vomiting  oxyCODONE    IR 2.5 milliGRAM(s) Oral every 6 hours PRN Severe Pain (7 - 10)      T(C): 36.3 (10-13-22 @ 05:18), Max: 36.7 (10-12-22 @ 20:40)  HR: 73 (10-13-22 @ 05:18) (68 - 88)  BP: 142/62 (10-13-22 @ 09:41) (113/64 - 142/71)  RR: 17 (10-13-22 @ 05:18) (17 - 20)  SpO2: 98% (10-13-22 @ 09:41) (94% - 98%)  Wt(kg): --Vital Signs Last 24 Hrs  T(C): 36.3 (13 Oct 2022 05:18), Max: 36.7 (12 Oct 2022 20:40)  T(F): 97.4 (13 Oct 2022 05:18), Max: 98 (12 Oct 2022 20:40)  HR: 73 (13 Oct 2022 05:18) (68 - 88)  BP: 142/62 (13 Oct 2022 09:41) (113/64 - 142/71)  BP(mean): --  RR: 17 (13 Oct 2022 05:18) (17 - 20)  SpO2: 98% (13 Oct 2022 09:41) (94% - 98%)    Parameters below as of 13 Oct 2022 09:41  Patient On (Oxygen Delivery Method): nasal cannula  O2 Flow (L/min): 3      PHYSICAL EXAM:  GENERAL: Obesity  EYES: clear conjunctiva; EOMI  ENMT: Moist mucous membranes  NECK: Supple, No JVD, Normal thyroid  CHEST/LUNG: SOB, Clear, diminished to auscultation bilaterally; No rales, rhonchi, wheezing, or rubs  HEART: S1, S2, Regular rate and rhythm  ABDOMEN: Soft, Nontender, Nondistended; Bowel sounds present  NEURO: Alert & Oriented X3  EXTREMITIES: No LE edema, no calf tenderness  LYMPH: No lymphadenopathy noted  SKIN: No rashes or lesions    Consultant(s) Notes Reviewed:  [x ] YES  [ ] NO  Care Discussed with Consultants/Other Providers [ x] YES  [ ] NO    LABS:                        12.4   7.71  )-----------( 260      ( 13 Oct 2022 06:05 )             39.1     10-13    140  |  105  |  29<H>  ----------------------------<  90  3.5   |  26  |  1.18    Ca    8.5      13 Oct 2022 06:05        CAPILLARY BLOOD GLUCOSE                RADIOLOGY & ADDITIONAL TESTS:    Imaging Personally Reviewed:  [ ] YES  [ ] NO   Patient is a 89y old  Female who presents with a chief complaint of Back pain secondary to compression fx. (12 Oct 2022 15:09)    INTERVAL HPI/OVERNIGHT EVENTS: no acute events ovenright, continues to have back pain , Pending ct scan and x-ray    REVIEW OF SYSTEMS:  CONSTITUTIONAL: No fever, chills  ENMT:  No difficulty hearing, no change in vision  NECK: No pain or stiffness  RESPIRATORY: + dry cough, + SOB  CARDIOVASCULAR: No chest pain, palpitations  GASTROINTESTINAL: No abdominal pain. No nausea, vomiting, or diarrhea  GENITOURINARY: No dysuria  NEUROLOGICAL: No HA  SKIN: No itching, burning, rashes, or lesions   LYMPH NODES: No enlarged glands  ENDOCRINE: No heat or cold intolerance; No hair loss  MUSCULOSKELETAL: back pain   PSYCHIATRIC: No depression, anxiety  HEME/LYMPH: No easy bruising, or bleeding gums    MEDICATIONS  (STANDING):  acetaminophen     Tablet .. 1000 milliGRAM(s) Oral every 8 hours  amLODIPine   Tablet 2.5 milliGRAM(s) Oral daily  aspirin enteric coated 81 milliGRAM(s) Oral daily  budesonide 160 MICROgram(s)/formoterol 4.5 MICROgram(s) Inhaler 2 Puff(s) Inhalation two times a day  cholecalciferol 1000 Unit(s) Oral daily  cyclobenzaprine 5 milliGRAM(s) Oral two times a day  enoxaparin Injectable 40 milliGRAM(s) SubCutaneous every 24 hours  furosemide    Tablet 40 milliGRAM(s) Oral daily  gabapentin 300 milliGRAM(s) Oral three times a day  influenza  Vaccine (HIGH DOSE) 0.7 milliLiter(s) IntraMuscular once  lidocaine   4% Patch 1 Patch Transdermal every 24 hours  pantoprazole    Tablet 40 milliGRAM(s) Oral before breakfast  polyethylene glycol 3350 17 Gram(s) Oral daily  saline laxative (FLEET) Rectal Enema 1 Enema Rectal once  senna 2 Tablet(s) Oral at bedtime  tiotropium 18 MICROgram(s) Capsule 1 Capsule(s) Inhalation daily    MEDICATIONS  (PRN):  ALPRAZolam 0.5 milliGRAM(s) Oral daily PRN anxiety  aluminum hydroxide/magnesium hydroxide/simethicone Suspension 30 milliLiter(s) Oral every 4 hours PRN Dyspepsia  melatonin 3 milliGRAM(s) Oral at bedtime PRN Insomnia  ondansetron Injectable 4 milliGRAM(s) IV Push every 8 hours PRN Nausea and/or Vomiting  oxyCODONE    IR 2.5 milliGRAM(s) Oral every 6 hours PRN Severe Pain (7 - 10)      T(C): 36.3 (10-13-22 @ 05:18), Max: 36.7 (10-12-22 @ 20:40)  HR: 73 (10-13-22 @ 05:18) (68 - 88)  BP: 142/62 (10-13-22 @ 09:41) (113/64 - 142/71)  RR: 17 (10-13-22 @ 05:18) (17 - 20)  SpO2: 98% (10-13-22 @ 09:41) (94% - 98%)  Wt(kg): --Vital Signs Last 24 Hrs  T(C): 36.3 (13 Oct 2022 05:18), Max: 36.7 (12 Oct 2022 20:40)  T(F): 97.4 (13 Oct 2022 05:18), Max: 98 (12 Oct 2022 20:40)  HR: 73 (13 Oct 2022 05:18) (68 - 88)  BP: 142/62 (13 Oct 2022 09:41) (113/64 - 142/71)  BP(mean): --  RR: 17 (13 Oct 2022 05:18) (17 - 20)  SpO2: 98% (13 Oct 2022 09:41) (94% - 98%)    Parameters below as of 13 Oct 2022 09:41  Patient On (Oxygen Delivery Method): nasal cannula  O2 Flow (L/min): 3      PHYSICAL EXAM:  GENERAL: Obesity  EYES: clear conjunctiva; EOMI  ENMT: Moist mucous membranes  NECK: Supple, No JVD, Normal thyroid  CHEST/LUNG: diminished to auscultation bilaterally  HEART: S1, S2, Regular rate and rhythm  ABDOMEN: Soft, Nontender, Nondistended; Bowel sounds present  NEURO: Alert & Oriented X3  EXTREMITIES: No LE edema, no calf tenderness  LYMPH: No lymphadenopathy noted  SKIN: No rashes or lesions    Consultant(s) Notes Reviewed:  [x ] YES  [ ] NO  Care Discussed with Consultants/Other Providers [ x] YES  [ ] NO    LABS:                        12.4   7.71  )-----------( 260      ( 13 Oct 2022 06:05 )             39.1     10-13    140  |  105  |  29<H>  ----------------------------<  90  3.5   |  26  |  1.18    Ca    8.5      13 Oct 2022 06:05        CAPILLARY BLOOD GLUCOSE      RADIOLOGY & ADDITIONAL TESTS:    Imaging Personally Reviewed:  [ x] YES  [ ] NO  < from: CT Lumbar Spine No Cont (10.10.22 @ 12:08) >    ACC: [id number] EXAM:  CT LUMBAR SPINE                          PROCEDURE DATE:  10/10/2022          INTERPRETATION:  Noncontrast CT examination of the lumbar spine    CLINICAL INDICATION: Low back pain    TECHNIQUE:  Direct axial CT scanning of the lumbar spine was obtained   without the administration of intravenous contrast.  Sagittal and coronal   reformats were provided.    COMPARISON: None available    FINDINGS:    Marked compression fracture of T12. Discontinuity of the inferior and   superior endplates suggests acuity of fracture. Minimally retropulsed   bone at the level of the inferior endplate.    Moderate compression deformity of L2 appears chronic. A large hemangioma   occupies much of the L2 vertebral body.    Remaining vertebral bodies demonstrate normal height. Facet alignment is   maintained. Grade 1 anterolisthesis of L4 on L5.    Mild to moderate dextroscoliosis of the lumbar spine, the apex is at   L2-L3.    Diffuse disc space narrowing in the lumbar region.    Multilevel degenerative changes.    Interstitial fibrosis suggested at the lung bases. Nonspecific 3.0 cm   pleural-based nodular opacity at the right lung base.    IMPRESSION:    Findings suggesting acute marked compression fracture of T12 with   minimally retropulsed bone at the level of the inferior endplate.    Moderate compression deformity of L2 appears chronic. Large hemangioma   within the L2 vertebral body.    Multilevel degenerative changes.    Interstitial fibrosis suggested at the lung bases.Nonspecific 3.0 cm   pleural-based nodular opacity at the right lung base. If clinically   indicated, correlation with CT of the chest may be obtained for further   evaluation.    Dr. Landers discussed these findings with Dr. Ho on 10/10/2022 1:02   PM with read back.        --- End of Report ---            JESSICA LANDERS MD; Attending Radiologist  This document has been electronically signed. Oct 10 2022  1:02PM    < end of copied text >

## 2022-10-13 NOTE — PROGRESS NOTE ADULT - PROBLEM SELECTOR PLAN 1
Pt with acute lumbar back pain which is somatic and neuropathic in nature due to radiating pain from marked compression fracture of T12 and moderate chronic compression deformity of L2, multilevel degenerative changes and large hemangioma within the L2 vertebral body. Pt also with chronic neuropathy.  High risk medications reviewed. Avoid polypharmacy. Avoid IV opioids. Avoid benzodiazepines. Non-pharmacological sleep aides initiated. Non-opioid medications and non-pharmacological pain management measures initiated.    Opioid pain recommendations   - Continue oxycodone 2.5 mg PO q 6 hours PRN severe pain. Monitor for sedation/ respiratory depression.   Non-opioid pain recommendations   - Continue Acetaminophen 1 gram PO q 8 hours x 3 days then PRN moderate pain. Monitor LFTs  - Naproxen 250mg PO TID x 2 days completed with PPI.  - Continue Gabapentin 300mg po q 8 hours (home dose). Monitor renal function.   - Continue Lidoderm 4% patch daily.   - Flexeril 5mg PO BID x 3 days (started 10/13)  Bowel Regimen  - Miralax 17G PO daily  - Senna 2 tablets at bedtime for constipation  - Fleet enema x 1 now   Mild pain   - Non-pharmacological pain treatment recommendations  - Warm/ Cool packs PRN   - Repositioning, imagery, relaxation, distraction.  - Physical therapy OOB if no contraindications   Recommendations discussed with primary team and RN.

## 2022-10-13 NOTE — PROGRESS NOTE ADULT - NS ATTEND AMEND GEN_ALL_CORE FT
89F PMH HTN, COPD on 3L O2 at home, HFrEF, p/w LBP 2w, found to have T12 fxr, chronic L2 compression deformity, L2 hemangioma. Admission for back pain.    #T12 fxr  #LBP  #L2 compression deformity  #L2 hemangioma  #COPD  #HTN  #HFrEF      A/P:  -f/u pain management recs  -f/u orthospine recs  -obtain CT chest   -c/w home lasix   -Outpatient follow up for pleural based lung nodule   -Pending PT eval   -rest of care as above

## 2022-10-13 NOTE — PHYSICAL THERAPY INITIAL EVALUATION ADULT - PASSIVE RANGE OF MOTION EXAMINATION, REHAB EVAL
except Lt shoulder to ~90 deg flx (pt. reports chronic impairment)/bilateral upper extremity Passive ROM was WFL (within functional limits)/bilateral lower extremity Passive ROM was WFL (within functional limits)

## 2022-10-13 NOTE — PHYSICAL THERAPY INITIAL EVALUATION ADULT - GENERAL OBSERVATIONS, REHAB EVAL
Consult received, chart reviewed. Patient received seated EOB, NAD, +NC. Patient agreed to EVALUATION from Physical Therapist.

## 2022-10-13 NOTE — PROGRESS NOTE ADULT - PROBLEM SELECTOR PLAN 8
- DVT: Heparin SQ q8        Dispo: f/u ortho recs and pain  management  -F/u Thoracic spine and xray lumbar - - DVT: Lovenox  - GI: PPI        Dispo: f/u neuro surgery recs after imaging   -F/u Thoracic spine and xray lumbar

## 2022-10-13 NOTE — PHYSICAL THERAPY INITIAL EVALUATION ADULT - MANUAL MUSCLE TESTING RESULTS, REHAB EVAL
BUE 4+/5 except Lt shoulder at least 2+/5. Rt hip 4-/5, Lt hip 3-/. Knees 4+/5 and ankles at least 3+/5 functionally

## 2022-10-13 NOTE — PHYSICAL THERAPY INITIAL EVALUATION ADULT - GAIT DISTANCE, PT EVAL
reported severe exertion (reports moderate at rest), Spo2 decreased to 88% on NC @ 3l/min. IMproved to baseline with deep, realxed breathing in <1 min w/ seated rest./5 feet

## 2022-10-13 NOTE — PHYSICAL THERAPY INITIAL EVALUATION ADULT - PLANNED THERAPY INTERVENTIONS, PT EVAL
aerobic capacity/endurance training/balance training/bed mobility training/gait training/lumbar stabilization/neuromuscular re-education/postural re-education/ROM/strengthening/stretching/transfer training

## 2022-10-13 NOTE — PROGRESS NOTE ADULT - PROBLEM SELECTOR PLAN 3
- wears 2L home O2 at night, no in excerebration  - CT Scan shows incidental interstitial fibrosis suggested at the lung bases. Nonspecific 3.0 cm pleural-based nodular opacity at the right lung base.  - c/w home inhalers Spiriva and Symbicort  - c/w 2L supplemental O2 at night time  - OP evaluation for nodular opacity documented on CT  - neb tx x1 for HERNANDEZ - wears 2L home O2 at night, no in excerebration  - CT Scan shows incidental interstitial fibrosis suggested at the lung bases. Nonspecific 3.0 cm pleural-based nodular opacity at the right lung base.  - c/w home inhalers Spiriva and Symbicort  - c/w 2L supplemental O2 at night time  - OP evaluation for nodular opacity documented on CT

## 2022-10-13 NOTE — PROGRESS NOTE ADULT - ASSESSMENT
Confidential Drug Utilization Report  Search Terms: Suri Felix, 01/31/1931Search Date: 10/11/2022 08:29:56 AM  The Drug Utilization Report below displays all of the controlled substance prescriptions, if any, that your patient has filled in the last twelve months. The information displayed on this report is compiled from pharmacy submissions to the Department, and accurately reflects the information as submitted by the pharmacies.    This report was requested by: Lakshmi Mancini | Reference #: [id number]    You have not added a BECCA number. Keeping your BECCA number(s) up to date on the My BECCA # page will enable the separation of your prescriptions from others in the search results.    Others' Prescriptions  Patient Name: Suri FelixBirth Date: 01/31/1931  Address: [address], [place] [address]Tir: Female  Rx Written	Rx Dispensed	Drug	Quantity	Days Supply	Prescriber Name	Prescriber Becca #	Payment Method  10/25/2021	11/01/2021	clonazepam 0.5 mg tablet	90	30	Ricarda Ledezma NP	[id number]	Medicare  Dispenser [facility] #[id number]  * - Drugs marked with an asterisk are compound drugs. If the compound drug is made up of more than one controlled substance, then each controlled substance will be a separate row in the table.

## 2022-10-13 NOTE — PROGRESS NOTE ADULT - PROBLEM SELECTOR PLAN 4
- last echo on record 2018 showed EF 40% and mild stage I diastolic dysfunction  - c/w  Lasix 40mg qd - last echo on record 2018 showed EF 40% and mild stage I diastolic dysfunction  - Euvolemic  - c/w  Lasix 40mg qd

## 2022-10-13 NOTE — PROGRESS NOTE ADULT - PROBLEM SELECTOR PLAN 1
+ acute marked compression fracture of T12 with minimally retropulsed bone. Multilevel degenerative changes.  - non-traumatic, likely osteoporotic   - c/w Tylenol 650 mg q 6 hrs, lidocaine patch,   - c/w  gabapentin 300mg TID  - f/u Vit D level  - f/u PT eval  - orthro consulted- f/u recs - - CT Thoracic spine today  - Neurosurgery consulted - f/u recs of CT thoracic spine and x-ray lumbar  - pain management following  - ortho consulted + acute marked compression fracture of T12 with minimally retropulsed bone. Multilevel degenerative changes.  - non-traumatic, likely osteoporotic   - c/w Tylenol 650 mg q 6 hrs, lidocaine patch,   - c/w  gabapentin 300mg TID  - c/w oxy for severe pain   - cont neuro check q shift  - PT eval , home PT   - F/u CT Thoracic spine and lumber x-ray- called radiology, will get done today  - Neurosurgery following   - pain management following

## 2022-10-13 NOTE — PHYSICAL THERAPY INITIAL EVALUATION ADULT - DIAGNOSIS, PT EVAL
(ICF Model) Pt. present w/impairments in Body Structures/Function, incl: Strength, Balance, ROM, aerobic capacity/endurance and pain leading to deficits in performing the below noted Activities (Limitations).

## 2022-10-13 NOTE — PROGRESS NOTE ADULT - ASSESSMENT
89 year old female, from home, with PMH of HTN, COPD (on 3L home O2 at night), CHFrEF, ambulates with wheelchair or walker, presents with worsening severe low back pain for 2 weeks. S/p cortisone shot no improvement as OP. CT Lumbar Spine revealed acute compression fracture of T12 with minimally retropulsed bone. Moderate compression deformity of L2 appears chronic. Large hemangioma within the L2 vertebral body.  Interstitial fibrosis suggested at the lung bases. Nonspecific 3.0 cm pleural-based nodular opacity at the right lung base  Admitted for Acute back pain, T12 compression fracture. Pain management and ortho spine following. Plan to get a CT thoracic spine & xray lumboscral spine this afternoon - Neurosurgery following           89 year old female, from home, with PMH of HTN, COPD (on 3L home O2 at night), CHFrEF, ambulates with wheelchair or walker, presents with worsening severe low back pain for 2 weeks. S/p cortisone shot OP  with no improvement. CT Lumbar Spine revealed acute compression fracture of T12 with minimally retropulsed bone. Moderate compression deformity of L2 appears chronic. Large hemangioma within the L2 vertebral body.  Interstitial fibrosis suggested at the lung bases. Nonspecific 3.0 cm pleural-based nodular opacity at the right lung base  Admitted for Acute back pain, T12 compression fracture. Pending CT of thoracic spine & xray lumbosacral spine. Neurosurgery  and pain management following.

## 2022-10-14 NOTE — PROGRESS NOTE ADULT - PROBLEM SELECTOR PLAN 8
- DVT: Lovenox  - GI: PPI        Dispo: f/u neuro surgery recs after imaging   -F/u Thoracic spine and xray lumbar

## 2022-10-14 NOTE — PROGRESS NOTE ADULT - PROBLEM SELECTOR PLAN 4
- last echo on record 2018 showed EF 40% and mild stage I diastolic dysfunction  - Euvolemic  - c/w  Lasix 40mg qd

## 2022-10-14 NOTE — PROGRESS NOTE ADULT - ASSESSMENT
Confidential Drug Utilization Report  Search Terms: Suri Felix, 01/31/1931Search Date: 10/11/2022 08:29:56 AM  The Drug Utilization Report below displays all of the controlled substance prescriptions, if any, that your patient has filled in the last twelve months. The information displayed on this report is compiled from pharmacy submissions to the Department, and accurately reflects the information as submitted by the pharmacies.    This report was requested by: Lakshmi Mancini | Reference #: [id number]    You have not added a BECCA number. Keeping your BECCA number(s) up to date on the My BECCA # page will enable the separation of your prescriptions from others in the search results.    Others' Prescriptions  Patient Name: Suri FelixBirth Date: 01/31/1931  Address: [address], [place] [address]Ulp: Female  Rx Written	Rx Dispensed	Drug	Quantity	Days Supply	Prescriber Name	Prescriber Becca #	Payment Method  10/25/2021	11/01/2021	clonazepam 0.5 mg tablet	90	30	Ricarda Ledezma NP	[id number]	Medicare  Dispenser [facility] #[id number]  * - Drugs marked with an asterisk are compound drugs. If the compound drug is made up of more than one controlled substance, then each controlled substance will be a separate row in the table.

## 2022-10-14 NOTE — CHART NOTE - NSCHARTNOTEFT_GEN_A_CORE
EVENT: Primary RN called to notify of patient c/o sob.    HPI: 89 year old female, from home, with PMH of HTN, COPD (on 3L home O2 at night), CHFrEF, ambulates with wheelchair or walker, presents with worsening severe low back pain for 2 weeks. S/p cortisone shot OP  with no improvement. CT Lumbar Spine revealed acute compression fracture of T12 with minimally retropulsed bone. Moderate compression deformity of L2 appears chronic. Large hemangioma within the L2 vertebral body. Interstitial fibrosis suggested at the lung bases. Nonspecific 3.0 cm pleural-based nodular opacity at the right lung base. Admitted for Acute back pain, T12 compression fracture. Pending CT of thoracic spine & xray lumbosacral spine. Neurosurgery and pain management following.      OBJECTIVE:  Vital Signs Last 24 Hrs  T(C): 36.9 (13 Oct 2022 20:39), Max: 37.3 (13 Oct 2022 13:55)  T(F): 98.4 (13 Oct 2022 20:39), Max: 99.1 (13 Oct 2022 13:55)  HR: 71 (13 Oct 2022 20:39) (71 - 73)  BP: 144/71 (13 Oct 2022 20:39) (109/50 - 144/71)  BP(mean): 65 (13 Oct 2022 13:55) (65 - 65)  RR: 19 (13 Oct 2022 20:39) (17 - 19)  SpO2: 96% (13 Oct 2022 20:39) (94% - 98%)    Parameters below as of 13 Oct 2022 20:39  Patient On (Oxygen Delivery Method): nasal cannula  O2 Flow (L/min): 3      PHYSICAL EXAM:  Neuro: Awake and alert, oriented to person, place, and time  Cardiovascular: + S1, S2, no murmurs, rubs, or bruits  Respiratory: clear to auscultation bilaterally with diminished air entry, minimal wheezing b/l  GI: Abdomen soft, non-tender, bowel sounds present   : Non distended;   Skin: warm and dry; no rash      LABS:                        12.4   7.71  )-----------( 260      ( 13 Oct 2022 06:05 )             39.1     10-13    140  |  105  |  29<H>  ----------------------------<  90  3.5   |  26  |  1.18    Ca    8.5      13 Oct 2022 06:05      ASSESSMENT/PROBLEM: 89 y/o female PMH COPD c/o sob, likely due to mild COPD exacerbation. Examined at bedside, no acute respiratory distress.       PLAN: Duoneb x1 stat    FOLLOW UP / RESULT: Primary RN will relay any further episodes of sob and notify provider of increasing work of breathing or O2 requirements.

## 2022-10-14 NOTE — PROGRESS NOTE ADULT - ASSESSMENT
89 year old female, from home, with PMH of HTN, COPD (on 3L home O2 at night), CHFrEF, ambulates with wheelchair or walker, presents with worsening severe low back pain for 2 weeks. S/p cortisone shot OP  with no improvement. CT Lumbar Spine revealed acute compression fracture of T12 with minimally retropulsed bone. Moderate compression deformity of L2 appears chronic. Large hemangioma within the L2 vertebral body.  Interstitial fibrosis suggested at the lung bases. Nonspecific 3.0 cm pleural-based nodular opacity at the right lung base  Admitted for Acute back pain, T12 compression fracture. Pending CT of thoracic spine & xray lumbosacral spine. Neurosurgery  and pain management following. As per Neurosurgery, pt may need surgery pending CT scan findings.

## 2022-10-14 NOTE — PROGRESS NOTE ADULT - SUBJECTIVE AND OBJECTIVE BOX
NP Note discussed with  primary attending    Patient is a 89y old  Female who presents with a chief complaint of compression fracture (13 Oct 2022 15:22)      INTERVAL HPI/OVERNIGHT EVENTS: no new complaints    MEDICATIONS  (STANDING):  acetaminophen     Tablet .. 1000 milliGRAM(s) Oral every 8 hours  amLODIPine   Tablet 2.5 milliGRAM(s) Oral daily  aspirin enteric coated 81 milliGRAM(s) Oral daily  budesonide 160 MICROgram(s)/formoterol 4.5 MICROgram(s) Inhaler 2 Puff(s) Inhalation two times a day  cholecalciferol 1000 Unit(s) Oral daily  cyclobenzaprine 5 milliGRAM(s) Oral three times a day  enoxaparin Injectable 40 milliGRAM(s) SubCutaneous every 24 hours  furosemide    Tablet 40 milliGRAM(s) Oral daily  gabapentin 300 milliGRAM(s) Oral three times a day  influenza  Vaccine (HIGH DOSE) 0.7 milliLiter(s) IntraMuscular once  lidocaine   4% Patch 1 Patch Transdermal every 24 hours  lidocaine 4% Cream 1 Application(s) Topical two times a day  pantoprazole    Tablet 40 milliGRAM(s) Oral before breakfast  polyethylene glycol 3350 17 Gram(s) Oral daily  senna 2 Tablet(s) Oral at bedtime  tiotropium 18 MICROgram(s) Capsule 1 Capsule(s) Inhalation daily    MEDICATIONS  (PRN):  ALPRAZolam 0.5 milliGRAM(s) Oral daily PRN anxiety  aluminum hydroxide/magnesium hydroxide/simethicone Suspension 30 milliLiter(s) Oral every 4 hours PRN Dyspepsia  melatonin 3 milliGRAM(s) Oral at bedtime PRN Insomnia  ondansetron Injectable 4 milliGRAM(s) IV Push every 8 hours PRN Nausea and/or Vomiting  oxyCODONE    IR 2.5 milliGRAM(s) Oral every 6 hours PRN Severe Pain (7 - 10)      __________________________________________________  REVIEW OF SYSTEMS:    CONSTITUTIONAL: No fever,   EYES: no acute visual disturbances  NECK: No pain or stiffness  RESPIRATORY: No cough; No shortness of breath  CARDIOVASCULAR: No chest pain, no palpitations  GASTROINTESTINAL: No pain. No nausea or vomiting; No diarrhea   NEUROLOGICAL: No headache or numbness, no tremors  MUSCULOSKELETAL: No joint pain, no muscle pain  GENITOURINARY: no dysuria, no frequency, no hesitancy  PSYCHIATRY: no depression , no anxiety  ALL OTHER  ROS negative        Vital Signs Last 24 Hrs  T(C): 36.8 (14 Oct 2022 05:12), Max: 37.3 (13 Oct 2022 13:55)  T(F): 98.3 (14 Oct 2022 05:12), Max: 99.1 (13 Oct 2022 13:55)  HR: 65 (14 Oct 2022 05:12) (65 - 73)  BP: 122/55 (14 Oct 2022 05:12) (109/50 - 144/71)  BP(mean): 72 (14 Oct 2022 05:12) (65 - 72)  RR: 17 (14 Oct 2022 05:12) (17 - 19)  SpO2: 94% (14 Oct 2022 05:12) (94% - 96%)    Parameters below as of 14 Oct 2022 05:12  Patient On (Oxygen Delivery Method): nasal cannula  O2 Flow (L/min): 3      ________________________________________________  PHYSICAL EXAM:  GENERAL: NAD  HEENT: Normocephalic;  conjunctivae and sclerae clear; moist mucous membranes;   NECK : supple  CHEST/LUNG: Clear to ausculitation bilaterally with good air entry   HEART: S1 S2  regular; no murmurs, gallops or rubs  ABDOMEN: Soft, Nontender, Nondistended; Bowel sounds present  EXTREMITIES: no cyanosis; no edema; no calf tenderness  SKIN: warm and dry; no rash  NERVOUS SYSTEM:  Awake and alert; Oriented  to place, person and time ; no new deficits    _________________________________________________  LABS:                        12.7   5.95  )-----------( 255      ( 14 Oct 2022 06:39 )             40.2     10-14    144  |  106  |  21<H>  ----------------------------<  87  4.5   |  30  |  1.00    Ca    9.2      14 Oct 2022 06:39          CAPILLARY BLOOD GLUCOSE            RADIOLOGY & ADDITIONAL TESTS:  < from: CT Lumbar Spine No Cont (10.10.22 @ 12:08) >    IMPRESSION:    Findings suggesting acute marked compression fracture of T12 with   minimally retropulsed bone at the level of the inferior endplate.    Moderate compression deformity of L2 appears chronic. Large hemangioma   within the L2 vertebral body.    Multilevel degenerative changes.    Interstitial fibrosis suggested at the lung bases.Nonspecific 3.0 cm   pleural-based nodular opacity at the right lung base. If clinically   indicated, correlation with CT of the chest may be obtained for further   evaluation.    Dr. Mireles discussed these findings with Dr. Ho on 10/10/2022 1:02   PM with read back.          < end of copied text >    Imaging Personally Reviewed:  YES    Consultant(s) Notes Reviewed:   YES    Care Discussed with Consultants :     Plan of care was discussed with patient and /or primary care giver; all questions and concerns were addressed and care was aligned with patient's wishes.

## 2022-10-14 NOTE — PROGRESS NOTE ADULT - PROBLEM SELECTOR PLAN 1
+ acute marked compression fracture of T12 with minimally retropulsed bone. Multilevel degenerative changes.  - non-traumatic, likely osteoporotic   - c/w Tylenol 650 mg q 6 hrs, lidocaine patch,   - c/w  gabapentin 300mg TID  - c/w oxy for severe pain   - cont neuro check q shift  - PT eval , home PT   - F/u CT Thoracic spine and lumber x-ray-   - Neurosurgery following   - pain management following

## 2022-10-14 NOTE — PROGRESS NOTE ADULT - NS ATTEND AMEND GEN_ALL_CORE FT
89F PMH HTN, COPD on 3L O2 at home, HFrEF, p/w LBP 2w, found to have T12 fxr, chronic L2 compression deformity, L2 hemangioma. Admission for back pain.    #T12 fxr  #LBP  #L2 compression deformity  #L2 hemangioma  #COPD  #HTN  #HFrEF      A/P:  -f/u pain management recs  -f/u orthospine recs  -obtain CT T spine  -c/w home lasix   -Outpatient follow up for pleural based lung nodule   -Pending PT eval   -rest of care as above

## 2022-10-14 NOTE — PROGRESS NOTE ADULT - SUBJECTIVE AND OBJECTIVE BOX
Source of information: VIDAL LOUIE, Chart review  Patient language: English  : n/a    HPI:  89 year old female, from home, with PMH of HTN, COPD (on 3L home O2 at night), CHFrEF, ambulates with wheelchair or walker, presents to ED with severe low back pain. She states the pain first started 2 weeks ago and got worse last Thursday. She went to an orthopedic doctor and got a cortisone shot, which she states did not help. Pt states the pain became severe yesterday, 10/10, achy stabbing in nature,  worsened by movement and ambulation. Denies recent trauma or falls. 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 Otherwise, she denies worsening numbness or tingling in LE, loss of sensation, weakness or urinary or bowel incontinence.    In ED: vitals HR 77, /64, Temp 98.4F, 99% on RA.  s/p ketorolac, Tylenol and flexeril in ED (10 Oct 2022 15:57)    CT lumbar demonstrates Findings suggesting acute marked compression fracture of T12 with   minimally retropulsed bone at the level of the inferior endplate.    Moderate compression deformity of L2 appears chronic. Large hemangioma   within the L2 vertebral body.    Multilevel degenerative changes.    Interstitial fibrosis suggested at the lung bases.Nonspecific 3.0 cm   pleural-based nodular opacity at the right lung base. If clinically   indicated, correlation with CT of the chest may be obtained for further   evaluation.    Pending results of thoracic CT. Pain consulted for back pain. Pt seen and examined at bedside. Pt sitting in bed eating breakfast. Reports lumbar back pain started a couple weeks ago and progressively worsened. Denies trauma/ falls or hx back pain in the past. Reports taking Tylenol, aleve and had massage at home with no relief of pain. Was scheduled to begin her PT sessions 10/10. Currently rating lumbar back score 5/10 and tolerable  SCALE USED: (1-10 VNRS). Pain has subsided after taking PRN pain meds. Pt describes pain as constant, radiating over lumbar back, alleviated by pain medication, exacerbated by movement. Pt also reports numbness over toes in b/l feet x 1 year and is on gabapentin at home. Requesting additional medications to decrease numbness in b/l feet. States she has tried different creams at home which have been unsuccessful in decreasing her numbness. Pt tolerating PO diet. Denies lethargy, chest pain, SOB, nausea, vomiting. Reports hx constipation, last BM 10/13 after receiving an enema. Discussed with primary team. Patient stated goal for pain control: to be able to take deep breaths, get out of bed to chair and ambulate with tolerable pain control. Pt has been out of bed to chair. Pt ambulate with walker and uses wheelchair at home at baseline.     PAST MEDICAL & SURGICAL HISTORY:  Essential Hypertension      COPD (Chronic Obstructive Pulmonary Disease)  on home O2 AT NIGHT      Hip Fracture-Left      Hx of Breast Cancer  HAd Rt in       Chronic bronchitis      Anxiety disorder      Morbid obesity      DAVID on CPAP      Dysphagia      Neuropathy      S/P Insertion of IVC (Inferior Vena Caval) Filter      Joint Fixation (Surgical)- L Hip      S/P Breast Lumpectomy- left      Status Post Total Knee Replacement-bilateral      S/P           FAMILY HISTORY:  Family history of renal disease (Sibling)    Family history of lymphoma    Family history of congestive heart failure        Social History:  Pt lives at home with her . Denies current alcohol, tobacco or illicit drug use. States she smoked 2ppd for about 55 years. Quit smoking about 10 years ago. (10 Oct 2022 15:57)    Allergies    No Known Allergies    MEDICATIONS  (STANDING):  acetaminophen     Tablet .. 1000 milliGRAM(s) Oral every 8 hours  amLODIPine   Tablet 2.5 milliGRAM(s) Oral daily  aspirin enteric coated 81 milliGRAM(s) Oral daily  budesonide 160 MICROgram(s)/formoterol 4.5 MICROgram(s) Inhaler 2 Puff(s) Inhalation two times a day  cholecalciferol 1000 Unit(s) Oral daily  cyclobenzaprine 5 milliGRAM(s) Oral three times a day  enoxaparin Injectable 40 milliGRAM(s) SubCutaneous every 24 hours  furosemide    Tablet 40 milliGRAM(s) Oral daily  gabapentin 300 milliGRAM(s) Oral three times a day  influenza  Vaccine (HIGH DOSE) 0.7 milliLiter(s) IntraMuscular once  lidocaine   4% Patch 1 Patch Transdermal every 24 hours  lidocaine 4% Cream 1 Application(s) Topical two times a day  pantoprazole    Tablet 40 milliGRAM(s) Oral before breakfast  polyethylene glycol 3350 17 Gram(s) Oral daily  senna 2 Tablet(s) Oral at bedtime  tiotropium 18 MICROgram(s) Capsule 1 Capsule(s) Inhalation daily    MEDICATIONS  (PRN):  ALPRAZolam 0.5 milliGRAM(s) Oral daily PRN anxiety  aluminum hydroxide/magnesium hydroxide/simethicone Suspension 30 milliLiter(s) Oral every 4 hours PRN Dyspepsia  melatonin 3 milliGRAM(s) Oral at bedtime PRN Insomnia  ondansetron Injectable 4 milliGRAM(s) IV Push every 8 hours PRN Nausea and/or Vomiting  oxyCODONE    IR 2.5 milliGRAM(s) Oral every 6 hours PRN Severe Pain (7 - 10)      Vital Signs Last 24 Hrs  T(C): 36.8 (14 Oct 2022 05:12), Max: 37.3 (13 Oct 2022 13:55)  T(F): 98.3 (14 Oct 2022 05:12), Max: 99.1 (13 Oct 2022 13:55)  HR: 65 (14 Oct 2022 05:12) (65 - 73)  BP: 167/51 (14 Oct 2022 11:52) (109/50 - 167/51)  BP(mean): 72 (14 Oct 2022 05:12) (65 - 72)  RR: 17 (14 Oct 2022 05:12) (17 - 19)  SpO2: 97% (14 Oct 2022 11:52) (94% - 97%)    Parameters below as of 14 Oct 2022 11:52  Patient On (Oxygen Delivery Method): nasal cannula  O2 Flow (L/min): 3    COVID-19 PCR: NotDetec (10 Oct 2022 16:13)    LABS: Reviewed                          12.7   5.95  )-----------( 255      ( 14 Oct 2022 06:39 )             40.2     10-    144  |  106  |  21<H>  ----------------------------<  87  4.5   |  30  |  1.00    Ca    9.2      14 Oct 2022 06:39      COVID-19 PCR: NotDetec (10 Oct 2022 16:13)    Radiology: Reviewed.   < from: CT Lumbar Spine No Cont (10.10.22 @ 12:08) >    ACC: [id number] EXAM:  CT LUMBAR SPINE                          PROCEDURE DATE:  10/10/2022          INTERPRETATION:  Noncontrast CT examination of the lumbar spine    CLINICAL INDICATION: Low back pain    TECHNIQUE:  Direct axial CT scanning of the lumbar spine was obtained   without the administration of intravenous contrast.  Sagittal and coronal   reformats were provided.    COMPARISON: None available    FINDINGS:    Marked compression fracture of T12. Discontinuity of the inferior and   superior endplates suggests acuity of fracture. Minimally retropulsed   bone at the level of the inferior endplate.    Moderate compression deformity of L2 appears chronic. A large hemangioma   occupies much of the L2 vertebral body.    Remaining vertebral bodies demonstrate normal height. Facet alignment is   maintained. Grade 1 anterolisthesis of L4 on L5.    Mild to moderate dextroscoliosis of the lumbar spine, the apex is at   L2-L3.    Diffuse disc space narrowing in the lumbar region.    Multilevel degenerative changes.    Interstitial fibrosis suggested at the lung bases. Nonspecific 3.0 cm   pleural-based nodular opacity at the right lung base.    IMPRESSION:    Findings suggesting acute marked compression fracture of T12 with   minimally retropulsed bone at the level of the inferior endplate.    Moderate compression deformity of L2 appears chronic. Large hemangioma   within the L2 vertebral body.    Multilevel degenerative changes.    Interstitial fibrosis suggested at the lung bases.Nonspecific 3.0 cm   pleural-based nodular opacity at the right lung base. If clinically   indicated, correlation with CT of the chest may be obtained for further   evaluation.    Dr. Landers discussed these findings with Dr. Ho on 10/10/2022 1:02   PM with read back.        --- End of Report ---            JESSICA LANDERS MD; Attending Radiologist  This document has been electronically signed. Oct 10 2022  1:02PM    < end of copied text >      ORT Score -   Family Hx of substance abuse	Female	      Male  Alcohol 	                                           1                     3  Illegal drugs	                                   2                     3  Rx drugs                                           4 	                  4  Personal Hx of substance abuse		  Alcohol 	                                          3	                  3  Illegal drugs                                     4	                  4  Rx drugs                                            5 	                  5  Age between 16- 45 years	           1                     1  hx preadolescent sexual abuse	   3 	                  0  Psychological disease		  ADD, OCD, bipolar, schizophrenia   2	          2  Depression                                           1 	          1  Total: 0    a score of 3 or lower indicates low risk for opioid abuse		  a score of 4-7 indicates moderate risk for opioid abuse		  a score of 8 or higher indicates high risk for opioid abuse  	  REVIEW OF SYSTEMS:  CONSTITUTIONAL: No fever + fatigue  HEENT:  No difficulty hearing, no change in vision  NECK: No pain or stiffness  RESPIRATORY: No cough, wheezing, chills or hemoptysis; + dyspnea on exertion + on chronic O2 at home during bedtime.   CARDIOVASCULAR: No chest pain, palpitations, dizziness, or leg swelling  GASTROINTESTINAL: No loss of appetite, decreased PO intake. No abdominal or epigastric pain. No nausea, vomiting; No diarrhea or constipation.   GENITOURINARY: No dysuria, frequency, hematuria, retention or incontinence  MUSCULOSKELETAL: + lumbar back pain greatest over right side. No joint swelling; no upper motor strength weakness, +  b/l lower motor strength weakness (chronic), no saddle anesthesia, bowel/bladder incontinence, no falls   NEURO: No headaches, + numbness/tingling b/l toes, + chronic left leg weaknesss  PSYCHIATRIC: + hx anxiety     PHYSICAL EXAM:  GENERAL:  + fatigued, alert & Oriented X4, cooperative, NAD, Good concentration. Speech is clear.   RESPIRATORY: Respirations even and unlabored. Clear to auscultation bilaterally; No rales, rhonchi, wheezing, or rubs + O2 3L NC   CARDIOVASCULAR: Normal S1/S2, regular rate and rhythm; No murmurs, rubs, or gallops. No JVD.   GASTROINTESTINAL: + obese per BMI Soft, Nontender, + distended; Bowel sounds present  PERIPHERAL VASCULAR:  Extremities warm without edema. 2+ Peripheral Pulses, No cyanosis, No calf tenderness  MUSCULOSKELETAL: Motor Strength 4/5 B/L upper and 4/5 b/l lower extremitiy; + decreased ROM left LE; negative SLR; + right SI joint tenderness, lumbar back tenderness on palpation, no spinal tenderness today.   SKIN: + ecchymosis of b/l arms and right lumbar back; + scattered moles generalized back; Warm, dry, intact. No rashes, lesions, scars or wounds.     Risk factors associated with adverse outcomes related to opioid treatment  [X ]  Concurrent benzodiazepine use  [ ]  History/ Active substance use or alcohol use disorder  [X ] Psychiatric co-morbidity  [X ] Sleep apnea  [ ] COPD  [X ] BMI> 35  [ ] Liver dysfunction  [ ] Renal dysfunction  [X ] CHF  [X ] Former Smoker  [ X]  Age > 60 years    [X ]  [place]  Reviewed and Copied to Chart. See below.    Plan of care and goal oriented pain management treatment options were discussed with patient and /or primary care giver; all questions and concerns were addressed and care was aligned with patient's wishes.    Educated patient on goal oriented pain management treatment options     10-14-22 @ 12:11

## 2022-10-14 NOTE — PROGRESS NOTE ADULT - PROBLEM SELECTOR PLAN 1
Pt with acute lumbar back pain which is somatic and neuropathic in nature due to radiating pain from marked compression fracture of T12 and moderate chronic compression deformity of L2, multilevel degenerative changes and large hemangioma within the L2 vertebral body. Pt also with chronic neuropathy. Pending results of thoracic CT.  High risk medications reviewed. Avoid polypharmacy. Avoid IV opioids. Avoid benzodiazepines. Non-pharmacological sleep aides initiated. Non-opioid medications and non-pharmacological pain management measures initiated.    Opioid pain recommendations   - Continue oxycodone 2.5 mg PO q 6 hours PRN severe pain. Monitor for sedation/ respiratory depression.   Non-opioid pain recommendations   - Renew Acetaminophen 1 gram PO q 8 hours x additional 3 days. Monitor LFTs  - Naproxen 250mg PO TID x 2 days completed with PPI.  - Increase Gabapentin 400mg po q 8 hours (Pt on 300mg PO TID home dose). Monitor renal function.   - Continue Lidoderm 4% patch daily.   - Change Flexeril 5mg PO TID x 3 days.   - Lidocaine 4% cream b/l feet  Bowel Regimen  - Miralax 17G PO daily  - Senna 2 tablets at bedtime for constipation  Mild pain   - Non-pharmacological pain treatment recommendations  - Warm/ Cool packs PRN   - Repositioning, imagery, relaxation, distraction.  - Physical therapy OOB if no contraindications   Recommendations discussed with primary team and RN.

## 2022-10-14 NOTE — PROGRESS NOTE ADULT - PROBLEM SELECTOR PLAN 2
- CT Lumbar Spine shows moderate compression deformity of L2 appears chronic. Large hemangioma within the L2 vertebral body.  - Plan same as above

## 2022-10-14 NOTE — PROGRESS NOTE ADULT - PROBLEM SELECTOR PLAN 3
- wears 2L home O2 at night, no in excerebration  - CT Scan shows incidental interstitial fibrosis suggested at the lung bases. Nonspecific 3.0 cm pleural-based nodular opacity at the right lung base.  - c/w home inhalers Spiriva and Symbicort  - c/w 2L supplemental O2 at night time  - OP evaluation for nodular opacity documented on CT

## 2022-10-15 NOTE — PROGRESS NOTE ADULT - SUBJECTIVE AND OBJECTIVE BOX
Interval of present illness: No acute events overnight. Pt seen at bedside. No new complaints. Still with back pain but well managed with current regimen    REVIEW OF SYSTEMS:    CONSTITUTIONAL: No fever  EYES: No acute visual disturbances  NECK: No pain or stiffness  RESPIRATORY: No cough; No shortness of breath  CARDIOVASCULAR: No chest pain, no palpitations  GASTROINTESTINAL: No pain. No nausea or vomiting.  No diarrhea   NEUROLOGICAL: No headache or numbness, no tremors  MUSCULOSKELETAL: back pain+  GENITOURINARY: No dysuria, no frequency, no hesitancy  PSYCHIATRY: No depression , no anxiety  ALL OTHER  ROS negative     O:  Vital Signs Last 24 Hrs  T(C): 36.4 (15 Oct 2022 05:20), Max: 37.3 (14 Oct 2022 13:45)  T(F): 97.5 (15 Oct 2022 05:20), Max: 99.1 (14 Oct 2022 13:45)  HR: 63 (15 Oct 2022 05:20) (63 - 70)  BP: 103/54 (15 Oct 2022 05:20) (103/54 - 150/57)  BP(mean): 82 (14 Oct 2022 13:45) (82 - 82)  RR: 18 (15 Oct 2022 05:20) (17 - 18)  SpO2: 98% (15 Oct 2022 05:20) (94% - 100%)    Parameters below as of 15 Oct 2022 05:20  Patient On (Oxygen Delivery Method): nasal cannula  O2 Flow (L/min): 3      Gen: NAD  Neuro: alert, answering qs appropriately, moves all extremities  HEENT: anicteric, moist oral mucosa  Neck: supple, no JVD elevation  Cards: RRR  Pulm: good inspiratory effort, CTAB  Abd: soft, NT/ND, BS+  Ext: no edema  Back: spinal point tenderness+  Skin: warm, dry      acetaminophen     Tablet .. 1000 milliGRAM(s) Oral every 8 hours  ALPRAZolam 0.5 milliGRAM(s) Oral daily PRN  aluminum hydroxide/magnesium hydroxide/simethicone Suspension 30 milliLiter(s) Oral every 4 hours PRN  amLODIPine   Tablet 2.5 milliGRAM(s) Oral daily  aspirin enteric coated 81 milliGRAM(s) Oral daily  BACItracin   Ointment 1 Application(s) Topical two times a day  budesonide 160 MICROgram(s)/formoterol 4.5 MICROgram(s) Inhaler 2 Puff(s) Inhalation two times a day  cholecalciferol 1000 Unit(s) Oral daily  cyclobenzaprine 5 milliGRAM(s) Oral three times a day  enoxaparin Injectable 40 milliGRAM(s) SubCutaneous every 24 hours  furosemide    Tablet 40 milliGRAM(s) Oral daily  gabapentin 400 milliGRAM(s) Oral three times a day  influenza  Vaccine (HIGH DOSE) 0.7 milliLiter(s) IntraMuscular once  lidocaine   4% Patch 1 Patch Transdermal every 24 hours  lidocaine 4% Cream 1 Application(s) Topical two times a day  melatonin 3 milliGRAM(s) Oral at bedtime PRN  ondansetron Injectable 4 milliGRAM(s) IV Push every 8 hours PRN  oxyCODONE    IR 2.5 milliGRAM(s) Oral every 6 hours PRN  pantoprazole    Tablet 40 milliGRAM(s) Oral before breakfast  polyethylene glycol 3350 17 Gram(s) Oral daily  senna 2 Tablet(s) Oral at bedtime  tiotropium 18 MICROgram(s) Capsule 1 Capsule(s) Inhalation daily                            13.2   6.11  )-----------( 261      ( 15 Oct 2022 06:44 )             42.5       10-14    144  |  106  |  21<H>  ----------------------------<  87  4.5   |  30  |  1.00    Ca    9.2      14 Oct 2022 06:39

## 2022-10-15 NOTE — PROGRESS NOTE ADULT - ASSESSMENT
89F PMH HTN, COPD on 3L O2 at home, HFrEF, p/w LBP 2w, found to have T12 fxr, chronic L2 compression deformity, L2 hemangioma. Admission for back pain.    #T12 fxr: with minimal retropulsion with minima spinal canal narrowing  #LBP  #L2 compression deformity  #L2 hemangioma  #COPD  #HTN  #HFrEF      A/P:  -f/u pain management recs  -f/u orthospine recs  -obtain CT T spine  -c/w home lasix   -Outpatient follow up for pleural based lung nodule   -Pending PT eval    89F PMH HTN, COPD on 3L O2 at home, HFrEF, p/w LBP 2w, found to have T12 fxr, chronic L2 compression deformity, L2 hemangioma. Admission for back pain.    #T12 fxr: with minimal retropulsion with minima spinal canal narrowing  #LBP  #L2 compression deformity  #L2 hemangioma  #COPD  #HTN  #HFrEF      A/P:  -f/u pain management recs  -f/u orthospine recs  -c/w home lasix   -Outpatient follow up for pleural based lung nodule   -Pending PT eval

## 2022-10-16 NOTE — PROGRESS NOTE ADULT - ASSESSMENT
89F PMH HTN, COPD on 3L O2 at home, HFrEF, p/w LBP 2w, found to have T12 fxr, chronic L2 compression deformity, L2 hemangioma. Admission for back pain.    #T12 fxr: with minimal retropulsion with minima spinal canal narrowing  #LBP  #L2 compression deformity  #L2 hemangioma  #COPD  #HTN  #HFrEF    A/P:  -f/u pain management recs  -f/u orthospine recs  -c/w home lasix   -Outpatient follow up for pleural based lung nodule   -Pending PT eval

## 2022-10-17 NOTE — DISCHARGE NOTE PROVIDER - CARE PROVIDER_API CALL
Hakeem Mcdaniel J  CARDIOVASCULAR DISEASE  [phone] [place] [address]  Phone: [phone]  Fax: [phone]  Established Patient  Follow Up Time: 1 week

## 2022-10-17 NOTE — PROGRESS NOTE ADULT - ASSESSMENT
89 year old female, from home, with PMH of HTN, COPD (on 3L home O2 at night), CHFrEF, ambulates with wheelchair or walker, presents with worsening severe low back pain for 2 weeks. S/p cortisone shot OP  with no improvement. CT Lumbar Spine revealed acute compression fracture of T12 with minimally retropulsed bone. Moderate compression deformity of L2 appears chronic. Large hemangioma within the L2 vertebral body.  Interstitial fibrosis suggested at the lung bases. Nonspecific 3.0 cm pleural-based nodular opacity at the right lung base  Admitted for Acute back pain, T12 compression fracture. Pending CT of thoracic spine & xray lumbosacral spine. Neurosurgery  and pain management following. As per Neurosurgery, pt may need surgery pending CT scan findings. Follow up on Neusg recommendations.

## 2022-10-17 NOTE — PROGRESS NOTE ADULT - PROBLEM SELECTOR PLAN 1
Pt with acute lumbar back pain which is somatic and neuropathic in nature due to radiating pain from marked compression fracture of T12 and moderate chronic compression deformity of L2, multilevel degenerative changes and large hemangioma within the L2 vertebral body. Thoracic CT demonstrates- SEVERE ACUTE COMPRESSION FRACTURE INFERIOR GREATER THAN SUPERIOR ENDPLATES OF T12. MINIMAL RETROPULSION WITH MINIMAL SPINAL CANAL NARROWING.  Pt also with chronic neuropathy.   High risk medications reviewed. Avoid polypharmacy. Avoid IV opioids. Avoid benzodiazepines. Non-pharmacological sleep aides initiated. Non-opioid medications and non-pharmacological pain management measures initiated.    Opioid pain recommendations   - Continue oxycodone 2.5 mg PO q 6 hours PRN severe pain. Monitor for sedation/ respiratory depression.   - Tramadol 50mg PO q8h PRN moderate pain.  Non-opioid pain recommendations   - Renew Acetaminophen 1 gram PO q 8 hours x 2 days. Monitor LFTs  - Naproxen 250mg PO TID x 2 days completed with PPI on 10/13  - Continue Gabapentin 400mg po q 8 hours (Pt on 300mg PO TID home dose, increased on 10/14). Monitor renal function.   - Continue Lidoderm 4% patch daily.   - Renew Flexeril 5mg PO TID.   - Continue Lidocaine 4% cream b/l feet  Bowel Regimen  - Miralax 17G PO daily  - Senna 2 tablets at bedtime for constipation

## 2022-10-17 NOTE — ADVANCED PRACTICE NURSE CONSULT - ASSESSMENT
This is a 89yr old female patient admitted for T11-T12 Fracture, presenting with a Stage 2 Pressure Injury to the Coccyx (x2) with pink tissue, drainage, and periwound maceration

## 2022-10-17 NOTE — PROGRESS NOTE ADULT - PROBLEM SELECTOR PLAN 2
Mild pain   - Non-pharmacological pain treatment recommendations  - Warm/ Cool packs PRN   - Repositioning, imagery, relaxation, distraction.  - Physical therapy OOB if no contraindications   Recommendations discussed with primary team and RN.

## 2022-10-17 NOTE — PROGRESS NOTE ADULT - PROBLEM SELECTOR PLAN 1
+ acute marked compression fracture of T12 with minimally retropulsed bone. Multilevel degenerative changes.  - non-traumatic, likely osteoporotic   - c/w Tylenol 650 mg q 6 hrs, lidocaine patch,   - c/w  gabapentin 300mg TID  - c/w oxy for severe pain   - cont neuro check q shift  - PT eval , home PT   - CT Thoracic spine and lumber x-ray show severe acute compression fracture of T12  - Neurosurgery following   - pain management following

## 2022-10-17 NOTE — PROGRESS NOTE ADULT - PROBLEM SELECTOR PROBLEM 8
Prophylactic measure Mirvaso Counseling: Mirvaso is a topical medication which can decrease superficial blood flow where applied. Side effects are uncommon and include stinging, redness and allergic reactions.

## 2022-10-17 NOTE — DISCHARGE NOTE PROVIDER - HOSPITAL COURSE
Patient is a 89 year old female, from home, with PMH of HTN, COPD (on 3L home O2 at night), CHFrEF, ambulates with wheelchair or walker, presents with worsening severe low back pain for 2 weeks. S/p cortisone shot OP  with no improvement. CT Lumbar Spine revealed acute compression fracture of T12 with minimally retropulsed bone. Moderate compression deformity of L2 appears chronic. Large hemangioma within the L2 vertebral body.  Interstitial fibrosis suggested at the lung bases. Nonspecific 3.0 cm pleural-based nodular opacity at the right lung base  Neurosurgery  and pain management consulted, recommends to follow up with CT spine finding to further evaluate if surgical intervention will be needed. PT consulted & recs home PT. patient's symptoms improving slowly.  Given patient's improved clinical status and current hemodynamic stability, decision was made to discharge.  Please refer to patient's complete medical chart with documents for a full hospital course, for this is only a brief summary.       89 year old female, from home, with PMH of HTN, COPD (on 3L home O2 at night), CHFrEF, ambulates with wheelchair or walker, presents with worsening severe low back pain for 2 weeks. S/p cortisone shot OP  with no improvement. CT Lumbar Spine revealed acute compression fracture of T12 with minimally retropulsed bone. Moderate compression deformity of L2 appears chronic. Large hemangioma within the L2 vertebral body.  Interstitial fibrosis suggested at the lung bases. Nonspecific 3.0 cm pleural-based nodular opacity at the right lung base  Admitted for Acute back pain, T12 compression fracture.  Neurosurgery  and pain management following. Neurosurgery recommends TLSO brace on discharge. Neuro recs Calcitonin nasal spray & dexamethasone. Updated PT eval recs RADHA. TLSO delivered and at bedside. Patient's pain has much improved.    Given patient's improved clinical status and current hemodynamic stability, decision was made to discharge. Discharge discussed with attending.   Please refer to patient's complete medical chart with documents for a full hospital course, for this is only a brief summary.

## 2022-10-17 NOTE — PROGRESS NOTE ADULT - NS ATTEND AMEND GEN_ALL_CORE FT
TENDER 89F PMH HTN, COPD on 3L O2 at home, HFrEF, p/w LBP 2w, found to have T12 fxr, chronic L2 compression deformity, L2 hemangioma. Admission for back pain.    #T12 fxr: with minimal retropulsion with minima spinal canal narrowing  #LBP  #L2 compression deformity  #L2 hemangioma  #COPD  #HTN  #HFrEF    A/P:  -f/u pain management recs  -f/u orthospine recs  -c/w home lasix   -Outpatient follow up for pleural based lung nodule   -Pending PT eval 89F PMH HTN, COPD on 3L O2 at home, HFrEF, p/w LBP 2w, found to have T12 fxr, chronic L2 compression deformity, L2 hemangioma. Admission for back pain.    #T12 fxr: with minimal retropulsion with minima spinal canal narrowing  #LBP  #L2 compression deformity  #L2 hemangioma  #COPD  #HTN  #HFrEF    A/P:  -f/u pain management recs  -f/u orthospine recs  -c/w home lasix   -Outpatient follow up for pleural based lung nodule   -PT rec home PT

## 2022-10-17 NOTE — DIETITIAN INITIAL EVALUATION ADULT - OTHER INFO
Patient from home ambulates with walker or wheel chair on home oxygen(2L) admitted for pain control. Visited pt. alert but "agitated" reports of 'ongoing pain" & requesting "bed pan", d/w PCA, observed breakfast tray at beside, intake >50% of meals, per flow sheets intake 51-76% noted, dosing wt. 215 Lbs on 10/17/22 Seen daily by pain management, pending CT scan & Neurology team consulted & followed by wound care RN.

## 2022-10-17 NOTE — DISCHARGE NOTE PROVIDER - NSDCCPCAREPLAN_GEN_ALL_CORE_FT
PRINCIPAL DISCHARGE DIAGNOSIS  Diagnosis: Closed T12 spinal fracture  Assessment and Plan of Treatment: You presented with low back pain of 2 weeka in duration. CT lumbar reveals that you have T12 compression fracture. You were treted with pain manage by the expertise of pain management team. Neurosurgery was consulted, did not recommend surgical intervention based on risks/benefits & your age. A pgysical therapy evaluated you and recommends home physical therapy.   Follow up with home PT and also your PCP.      SECONDARY DISCHARGE DIAGNOSES  Diagnosis: Chronic obstructive pulmonary disease (COPD)  Assessment and Plan of Treatment: You have a history of  COPDon home oxygen of 2 liters via nasal cannula. Call your Health Care provider upon arrival home to make a follow up appointment within one week.  Take all inhalers as prescribed by your Health Care Provider.  Take steroids as prescribed by your Health Care Provider.  If your cough increases infrequency and severity and/or you have shortness of breath or increased shortness of breath call your Health Care Provider.  If you develop fever, chills, night sweats, malaise, and/or change in mental status call your Health care Provider.  Nutrition is very important.  Eat small frequent meals.  Increase your activity as tolerated.  Do not stay in bed all day      Diagnosis: Chronic systolic congestive heart failure  Assessment and Plan of Treatment: You have history of chronic systolic heart failure.  Weigh yourself daily.  If you gain 3lbs in 3 days, or 5lbs in a week call your Health Care Provider.  Do not eat or drink foods containing more than 2000mg of salt (sodium) in your diet every day.  Call your Health Care Provider if you have any swelling or increased swelling in your feet, ankles, and/or stomach.  Take all of your medication as directed.  If you become dizzy call your Health Care Provider.      Diagnosis: HTN (hypertension)  Assessment and Plan of Treatment: You have a history of high blodd pressure.  INSTRUCTION ON MANAGING YOUR HIGH BLOOD PRESSURE  Low salt diet  Activity as tolerated.  Take all medication as prescribed.  Follow up with your medical doctor for routine blood pressure monitoring at your next visit.  Notify your doctor if you have any of the following symptoms:   Dizziness, Lightheadedness, Blurry vision, Headache, Chest pain, Shortness of breath       PRINCIPAL DISCHARGE DIAGNOSIS  Diagnosis: Closed T12 spinal fracture  Assessment and Plan of Treatment: You presented with low back pain of 2 weeks in duration. CT lumbar reveals that you have T12 compression fracture. You were treated with pain management by the expertise of pain management team. Neurosurgery was consulted, did not recommend surgical intervention based on risks/benefits & your age, but they did recommend that you were a TSLO brace that was delivered to your bedside. Physical therapy evaluated you and recommends that you you go short term rehab for stengthening.      SECONDARY DISCHARGE DIAGNOSES  Diagnosis: Chronic obstructive pulmonary disease (COPD)  Assessment and Plan of Treatment: You have a history of  COPDon home oxygen of 3 liters via nasal cannula. Call your Health Care provider upon arrival home to make a follow up appointment within one week.  Take all inhalers as prescribed by your Health Care Provider.  Take steroids as prescribed by your Health Care Provider.  If your cough increases infrequency and severity and/or you have shortness of breath or increased shortness of breath call your Health Care Provider.  If you develop fever, chills, night sweats, malaise, and/or change in mental status call your Health care Provider.  Nutrition is very important.  Eat small frequent meals.  Increase your activity as tolerated.  Do not stay in bed all day      Diagnosis: HTN (hypertension)  Assessment and Plan of Treatment: You have a history of high blood pressure.  INSTRUCTION ON MANAGING YOUR HIGH BLOOD PRESSURE  Low salt diet  Activity as tolerated.  Take all medication as prescribed.  Follow up with your medical doctor for routine blood pressure monitoring at your next visit.  Notify your doctor if you have any of the following symptoms:   Dizziness, Lightheadedness, Blurry vision, Headache, Chest pain, Shortness of breath      Diagnosis: Chronic systolic congestive heart failure  Assessment and Plan of Treatment: You have history of chronic systolic heart failure.  Weigh yourself daily.  If you gain 3lbs in 3 days, or 5lbs in a week call your Health Care Provider.  Do not eat or drink foods containing more than 2000mg of salt (sodium) in your diet every day.  Call your Health Care Provider if you have any swelling or increased swelling in your feet, ankles, and/or stomach.  Take all of your medication as directed.  If you become dizzy call your Health Care Provider.

## 2022-10-17 NOTE — PROGRESS NOTE ADULT - SUBJECTIVE AND OBJECTIVE BOX
Source of information: VIDAL LOUIE, Chart review  Patient language: English  : n/a    HPI:  89 year old female, from home, with PMH of HTN, COPD (on 3L home O2 at night), CHFrEF, ambulates with wheelchair or walker, presents to ED with severe low back pain. She states the pain first started 2 weeks ago and got worse last Thursday. She went to an orthopedic doctor and got a cortisone shot, which she states did not help. Pt states the pain became severe yesterday, 10/10, achy stabbing in nature,  worsened by movement and ambulation. Denies recent trauma or falls. 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 Otherwise, she denies worsening numbness or tingling in LE, loss of sensation, weakness or urinary or bowel incontinence.    In ED: vitals HR 77, /64, Temp 98.4F, 99% on RA.  s/p ketorolac, Tylenol and flexeril in ED (10 Oct 2022 15:57)    CT lumbar demonstrates Findings suggesting acute marked compression fracture of T12 with   minimally retropulsed bone at the level of the inferior endplate.    Moderate compression deformity of L2 appears chronic. Large hemangioma   within the L2 vertebral body.    Multilevel degenerative changes.    Interstitial fibrosis suggested at the lung bases.Nonspecific 3.0 cm   pleural-based nodular opacity at the right lung base. If clinically   indicated, correlation with CT of the chest may be obtained for further   evaluation.    Thoracic CT demonstrates- SEVERE ACUTE COMPRESSION FRACTURE INFERIOR GREATER THAN SUPERIOR ENDPLATES OF T12. MINIMAL RETROPULSION WITH MINIMAL SPINAL CANAL NARROWING.  Pain consulted for back pain. Pt seen and examined at bedside. Pt laying in bed, reports lumbar back pain started a couple weeks ago and progressively worsened. Denies trauma/ falls or hx back pain in the past. Reports taking Tylenol, aleve and had massage at home with no relief of pain. Was scheduled to begin her PT sessions on 10/10. Currently rating lumbar back score 9/10 and not tolerable  SCALE USED: (1-10 VNRS). Pt describes pain as constant, sharp, greatest over right lumbar radiating to right abdomen, alleviated by pain medication, exacerbated by movement and standing. Reports oxycodone PRN dose causes sleepiness. Pt also reports numbness over toes in b/l feet x 1 year and is on gabapentin at home. Reports lidocaine cream slightly improved her numbness. States she has tried different creams at home which have been unsuccessful in decreasing her numbness. Pt tolerating PO diet. Denies lethargy, chest pain, SOB, nausea, vomiting. Reports hx constipation, last BM 10/16, pt on bowel regimen. Patient stated goal for pain control: to be able to take deep breaths, get out of bed to chair and ambulate with tolerable pain control. Pt has been out of bed to chair. Pt ambulate with walker and uses wheelchair at home at baseline. Plan to be discharged tomorrow.     PAST MEDICAL & SURGICAL HISTORY:  Essential Hypertension      COPD (Chronic Obstructive Pulmonary Disease)  on home O2 AT NIGHT      Hip Fracture-Left      Hx of Breast Cancer  HAd Rt in       Chronic bronchitis      Anxiety disorder      Morbid obesity      DAVID on CPAP      Dysphagia      Neuropathy      S/P Insertion of IVC (Inferior Vena Caval) Filter      Joint Fixation (Surgical)- L Hip      S/P Breast Lumpectomy- left      Status Post Total Knee Replacement-bilateral      S/P           FAMILY HISTORY:  Family history of renal disease (Sibling)    Family history of lymphoma    Family history of congestive heart failure        Social History:  Pt lives at home with her . Denies current alcohol, tobacco or illicit drug use. States she smoked 2ppd for about 55 years. Quit smoking about 10 years ago. (10 Oct 2022 15:57)    Allergies    No Known Allergies    MEDICATIONS  (STANDING):  acetaminophen     Tablet .. 1000 milliGRAM(s) Oral every 8 hours  albuterol/ipratropium for Nebulization. 3 milliLiter(s) Nebulizer once  amLODIPine   Tablet 2.5 milliGRAM(s) Oral daily  aspirin enteric coated 81 milliGRAM(s) Oral daily  BACItracin   Ointment 1 Application(s) Topical two times a day  budesonide 160 MICROgram(s)/formoterol 4.5 MICROgram(s) Inhaler 2 Puff(s) Inhalation two times a day  cholecalciferol 1000 Unit(s) Oral daily  cyclobenzaprine 5 milliGRAM(s) Oral three times a day  enoxaparin Injectable 40 milliGRAM(s) SubCutaneous every 24 hours  furosemide    Tablet 40 milliGRAM(s) Oral daily  gabapentin 400 milliGRAM(s) Oral three times a day  influenza  Vaccine (HIGH DOSE) 0.7 milliLiter(s) IntraMuscular once  lidocaine   4% Patch 1 Patch Transdermal every 24 hours  lidocaine 4% Cream 1 Application(s) Topical two times a day  pantoprazole    Tablet 40 milliGRAM(s) Oral before breakfast  polyethylene glycol 3350 17 Gram(s) Oral daily  senna 2 Tablet(s) Oral at bedtime  tiotropium 18 MICROgram(s) Capsule 1 Capsule(s) Inhalation daily    MEDICATIONS  (PRN):  ALPRAZolam 0.5 milliGRAM(s) Oral daily PRN anxiety  aluminum hydroxide/magnesium hydroxide/simethicone Suspension 30 milliLiter(s) Oral every 4 hours PRN Dyspepsia  melatonin 3 milliGRAM(s) Oral at bedtime PRN Insomnia  ondansetron Injectable 4 milliGRAM(s) IV Push every 8 hours PRN Nausea and/or Vomiting  oxyCODONE    IR 2.5 milliGRAM(s) Oral every 6 hours PRN Severe Pain (7 - 10)  traMADol 50 milliGRAM(s) Oral every 8 hours PRN Moderate Pain (4 - 6)      Vital Signs Last 24 Hrs  T(C): 36.8 (17 Oct 2022 13:20), Max: 36.8 (17 Oct 2022 13:20)  T(F): 98.2 (17 Oct 2022 13:20), Max: 98.2 (17 Oct 2022 13:20)  HR: 69 (17 Oct 2022 13:20) (65 - 74)  BP: 139/85 (17 Oct 2022 13:20) (107/72 - 139/85)  BP(mean): 92 (17 Oct 2022 13:20) (77 - 92)  RR: 18 (17 Oct 2022 13:20) (16 - 18)  SpO2: 95% (17 Oct 2022 13:20) (93% - 96%)    Parameters below as of 17 Oct 2022 13:20  Patient On (Oxygen Delivery Method): nasal cannula  O2 Flow (L/min): 3    COVID-19 PCR: NotDetec (17 Oct 2022 06:15)  COVID-19 PCR: NotDetec (10 Oct 2022 16:13)    LABS: Reviewed                          13.2   5.98  )-----------( 263      ( 17 Oct 2022 07:39 )             41.2     10-17    141  |  104  |  16  ----------------------------<  100<H>  4.0   |  29  |  0.92    Ca    9.1      17 Oct 2022 07:39    COVID-19 PCR: NotDetec (17 Oct 2022 06:15)  COVID-19 PCR: NotDete (10 Oct 2022 16:13)    Radiology: Reviewed.   < from: CT Thoracic Spine No Cont (10.14.22 @ 09:39) >    ACC: [id number] EXAM:  CT THORACIC SPINE                          PROCEDURE DATE:  10/14/2022          INTERPRETATION:  CT THORACIC SPINE    CLINICAL INFORMATION: back pain    TECHNIQUE:  Noncontrast CT.  Axial acquisition. Sagittal and coronal reformations.    FINDINGS:  FRACTURES:  *  Severe acute compression deformity inferior greater than superior   endplates of T12. Minimal retropulsion towards the inferior endplate with   minimal spinal canal narrowing. Residual anterior to posterior dimension   of the thecal sac measures 1.2 cm.  *  Mild chronic deformity superior endplate of T4..  ALIGNMENT:  *  Grade 1 anterolisthesis C7 on T1, T1 on T2 and T2 on T3 thought   related to the degenerative changes.  *  Very mild scoliosis  SPINAL COLUMN:  *  Diffuse osteoporosis.  *  Multilevel spondylosis. With mild degrees of disc space narrowing and   varying degrees of endplate degenerative changes/osteophytes.  *  Fusion of the facet joints bilaterally at T3-4 through T5-6.  OTHER:  *  Please see recent lumbar spine report for lumbar spine findings  *  Atherosclerotic calcification of the aorta is seen.  *  An IVC filter is noted.  *  Infiltrate is present at the right lung base    IMPRESSION:  SEVERE ACUTE COMPRESSION FRACTURE INFERIOR GREATER THAN SUPERIOR   ENDPLATES OF T12. MINIMAL RETROPULSION WITH MINIMAL SPINAL CANAL   NARROWING.    --- End of Report ---            GAYLA YATES MD; Attending Radiologist  This document has been electronically signed. Oct 14 2022  2:27PM    < end of copied text >    < from: CT Lumbar Spine No Cont (10.10.22 @ 12:08) >    ACC: [id number] EXAM:  CT LUMBAR SPINE                          PROCEDURE DATE:  10/10/2022          INTERPRETATION:  Noncontrast CT examination of the lumbar spine    CLINICAL INDICATION: Low back pain    TECHNIQUE:  Direct axial CT scanning of the lumbar spine was obtained   without the administration of intravenous contrast.  Sagittal and coronal   reformats were provided.    COMPARISON: None available    FINDINGS:    Marked compression fracture of T12. Discontinuity of the inferior and   superior endplates suggests acuity of fracture. Minimally retropulsed   bone at the level of the inferior endplate.    Moderate compression deformity of L2 appears chronic. A large hemangioma   occupies much of the L2 vertebral body.    Remaining vertebral bodies demonstrate normal height. Facet alignment is   maintained. Grade 1 anterolisthesis of L4 on L5.    Mild to moderate dextroscoliosis of the lumbar spine, the apex is at   L2-L3.    Diffuse disc space narrowing in the lumbar region.    Multilevel degenerative changes.    Interstitial fibrosis suggested at the lung bases. Nonspecific 3.0 cm   pleural-based nodular opacity at the right lung base.    IMPRESSION:    Findings suggesting acute marked compression fracture of T12 with   minimally retropulsed bone at the level of the inferior endplate.    Moderate compression deformity of L2 appears chronic. Large hemangioma   within the L2 vertebral body.    Multilevel degenerative changes.    Interstitial fibrosis suggested at the lung bases.Nonspecific 3.0 cm   pleural-based nodular opacity at the right lung base. If clinically   indicated, correlation with CT of the chest may be obtained for further   evaluation.    Dr. Mireles discussed these findings with Dr. Ho on 10/10/2022 1:02   PM with read back.        --- End of Report ---            JESSICA MIRELES MD; Attending Radiologist  This document has been electronically signed. Oct 10 2022  1:02PM    < end of copied text >      ORT Score -   Family Hx of substance abuse	Female	      Male  Alcohol 	                                           1                     3  Illegal drugs	                                   2                     3  Rx drugs                                           4 	                  4  Personal Hx of substance abuse		  Alcohol 	                                          3	                  3  Illegal drugs                                     4	                  4  Rx drugs                                            5 	                  5  Age between 16- 45 years	           1                     1  hx preadolescent sexual abuse	   3 	                  0  Psychological disease		  ADD, OCD, bipolar, schizophrenia   2	          2  Depression                                           1 	          1  Total: 0    a score of 3 or lower indicates low risk for opioid abuse		  a score of 4-7 indicates moderate risk for opioid abuse		  a score of 8 or higher indicates high risk for opioid abuse  	  REVIEW OF SYSTEMS:  CONSTITUTIONAL: No fever + fatigue  HEENT:  No difficulty hearing, no change in vision  NECK: No pain or stiffness  RESPIRATORY: No cough, wheezing, chills or hemoptysis; + dyspnea on exertion + on chronic O2 at home during bedtime.   CARDIOVASCULAR: No chest pain, palpitations, dizziness, or leg swelling  GASTROINTESTINAL: No loss of appetite, decreased PO intake. No abdominal or epigastric pain. No nausea, vomiting; No diarrhea or constipation.   GENITOURINARY: No dysuria, frequency, hematuria, retention or incontinence  MUSCULOSKELETAL: + lumbar back pain greatest over right side. No joint swelling; no upper motor strength weakness, +  b/l lower motor strength weakness (chronic), no saddle anesthesia, bowel/bladder incontinence, no falls   NEURO: No headaches, + numbness/tingling b/l toes, + chronic left leg weaknesss  PSYCHIATRIC: + hx anxiety     PHYSICAL EXAM:  GENERAL:  + fatigued, alert & Oriented X4, cooperative, NAD, Good concentration. Speech is clear.   RESPIRATORY: Respirations even and unlabored. + coarse to auscultation bilaterally; No rales, rhonchi, wheezing, or rubs + SOB on exertion, on O2 3L NC   CARDIOVASCULAR: Normal S1/S2, regular rate and rhythm; No murmurs, rubs, or gallops. No JVD.   GASTROINTESTINAL: + obese per BMI Soft, Nontender, + distended; Bowel sounds present  PERIPHERAL VASCULAR:  Extremities warm without edema. 2+ Peripheral Pulses, No cyanosis, No calf tenderness  MUSCULOSKELETAL: Motor Strength 4/5 B/L upper and 4/5 b/l lower extremitiy; + decreased ROM left LE; negative SLR; + right SI joint tenderness, lumbar back tenderness on palpation, no paraspinal tenderness.   SKIN: + ecchymosis of b/l arms and right lumbar back; + scattered moles generalized back; Warm, dry, intact. No rashes, lesions, scars or wounds.     Risk factors associated with adverse outcomes related to opioid treatment  [X ]  Concurrent benzodiazepine use  [ ]  History/ Active substance use or alcohol use disorder  [X ] Psychiatric co-morbidity  [X ] Sleep apnea  [ ] COPD  [X ] BMI> 35  [ ] Liver dysfunction  [ ] Renal dysfunction  [X ] CHF  [X ] Former Smoker  [ X]  Age > 60 years    [X ]  [place]  Reviewed and Copied to Chart. See below.    Plan of care and goal oriented pain management treatment options were discussed with patient and /or primary care giver; all questions and concerns were addressed and care was aligned with patient's wishes.    Educated patient on goal oriented pain management treatment options     10-17-22 @ 15:44

## 2022-10-17 NOTE — DIETITIAN INITIAL EVALUATION ADULT - FACTORS AFF FOOD INTAKE
weakness, severe back pain, thoracic compression fracture, COPD, HTN, DAVID on CPAP, obesity/difficulty with food procurement/preparation/Rastafarian/ethnic/cultural/personal food preferences/other (specify)

## 2022-10-17 NOTE — PROGRESS NOTE ADULT - SUBJECTIVE AND OBJECTIVE BOX
NP Note discussed with  primary attending    Patient is a 89y old  Female who presents with a chief complaint of compression fracture (17 Oct 2022 15:44)      INTERVAL HPI/OVERNIGHT EVENTS: no new complaints    MEDICATIONS  (STANDING):  acetaminophen     Tablet .. 1000 milliGRAM(s) Oral every 8 hours  albuterol/ipratropium for Nebulization. 3 milliLiter(s) Nebulizer once  amLODIPine   Tablet 2.5 milliGRAM(s) Oral daily  aspirin enteric coated 81 milliGRAM(s) Oral daily  BACItracin   Ointment 1 Application(s) Topical two times a day  budesonide 160 MICROgram(s)/formoterol 4.5 MICROgram(s) Inhaler 2 Puff(s) Inhalation two times a day  cholecalciferol 1000 Unit(s) Oral daily  cyclobenzaprine 5 milliGRAM(s) Oral three times a day  enoxaparin Injectable 40 milliGRAM(s) SubCutaneous every 24 hours  furosemide    Tablet 40 milliGRAM(s) Oral daily  gabapentin 400 milliGRAM(s) Oral three times a day  influenza  Vaccine (HIGH DOSE) 0.7 milliLiter(s) IntraMuscular once  lidocaine   4% Patch 1 Patch Transdermal every 24 hours  lidocaine 4% Cream 1 Application(s) Topical two times a day  pantoprazole    Tablet 40 milliGRAM(s) Oral before breakfast  polyethylene glycol 3350 17 Gram(s) Oral daily  senna 2 Tablet(s) Oral at bedtime  tiotropium 18 MICROgram(s) Capsule 1 Capsule(s) Inhalation daily    MEDICATIONS  (PRN):  ALPRAZolam 0.5 milliGRAM(s) Oral daily PRN anxiety  aluminum hydroxide/magnesium hydroxide/simethicone Suspension 30 milliLiter(s) Oral every 4 hours PRN Dyspepsia  melatonin 3 milliGRAM(s) Oral at bedtime PRN Insomnia  ondansetron Injectable 4 milliGRAM(s) IV Push every 8 hours PRN Nausea and/or Vomiting  oxyCODONE    IR 2.5 milliGRAM(s) Oral every 6 hours PRN Severe Pain (7 - 10)  traMADol 50 milliGRAM(s) Oral every 8 hours PRN Moderate Pain (4 - 6)      __________________________________________________  REVIEW OF SYSTEMS:    CONSTITUTIONAL: No fever,   EYES: no acute visual disturbances  NECK: No pain or stiffness  RESPIRATORY: No cough; No shortness of breath  CARDIOVASCULAR: No chest pain, no palpitations  GASTROINTESTINAL: No pain. No nausea or vomiting; No diarrhea   NEUROLOGICAL: No headache or numbness, no tremors  MUSCULOSKELETAL: No joint pain, no muscle pain  GENITOURINARY: no dysuria, no frequency, no hesitancy  PSYCHIATRY: no depression , no anxiety  ALL OTHER  ROS negative        Vital Signs Last 24 Hrs  T(C): 36.8 (17 Oct 2022 13:20), Max: 36.8 (17 Oct 2022 13:20)  T(F): 98.2 (17 Oct 2022 13:20), Max: 98.2 (17 Oct 2022 13:20)  HR: 69 (17 Oct 2022 13:20) (65 - 74)  BP: 139/85 (17 Oct 2022 13:20) (107/72 - 139/85)  BP(mean): 92 (17 Oct 2022 13:20) (77 - 92)  RR: 18 (17 Oct 2022 13:20) (16 - 18)  SpO2: 95% (17 Oct 2022 13:20) (93% - 96%)    Parameters below as of 17 Oct 2022 13:20  Patient On (Oxygen Delivery Method): nasal cannula  O2 Flow (L/min): 3      ________________________________________________  PHYSICAL EXAM:  GENERAL: NAD  HEENT: Normocephalic;  conjunctivae and sclerae clear; moist mucous membranes;   NECK : supple  CHEST/LUNG: Clear to ausculitation bilaterally with good air entry   HEART: S1 S2  regular; no murmurs, gallops or rubs  ABDOMEN: Soft, Nontender, Nondistended; Bowel sounds present  EXTREMITIES: no cyanosis; no edema; no calf tenderness  SKIN: warm and dry; no rash  NERVOUS SYSTEM:  Awake and alert; Oriented  to place, person and time ; no new deficits    _________________________________________________  LABS:                        13.2   5.98  )-----------( 263      ( 17 Oct 2022 07:39 )             41.2     10-17    141  |  104  |  16  ----------------------------<  100<H>  4.0   |  29  |  0.92    Ca    9.1      17 Oct 2022 07:39          CAPILLARY BLOOD GLUCOSE            RADIOLOGY & ADDITIONAL TESTS:    Imaging Personally Reviewed:  YES    Consultant(s) Notes Reviewed:   YES    Care Discussed with Consultants :     Plan of care was discussed with patient and /or primary care giver; all questions and concerns were addressed and care was aligned with patient's wishes.

## 2022-10-17 NOTE — DISCHARGE NOTE PROVIDER - ATTENDING DISCHARGE PHYSICAL EXAMINATION:
Patient seen and examined on 10/24/22 prior to discharge. Patient reports very minimal back pain and states she is ready for STR. She notes her breathing is at its baseline.  PHYSICAL EXAM:  GENERAL: NAD, well-developed, obese  HEAD:  Atraumatic, Normocephalic  EYES:  conjunctiva and sclera clear  NECK: Supple, No JVD  CHEST/LUNG: Clear to auscultation bilaterally; No wheeze, rhonchi, rales, +productive cough (chronic per patient)  HEART: Regular rate and rhythm; No murmurs, rubs, or gallops  ABDOMEN: Soft, Nontender, Nondistended; Bowel sounds present  EXTREMITIES:  2+ Peripheral Pulses, No clubbing, cyanosis, or edema  PSYCH: AAOx3, pleasant, cooperative  NEUROLOGY: non-focal  SKIN: No rashes or lesions

## 2022-10-17 NOTE — DIETITIAN INITIAL EVALUATION ADULT - PERTINENT MEDS FT
MEDICATIONS  (STANDING):  acetaminophen     Tablet .. 1000 milliGRAM(s) Oral every 8 hours  albuterol/ipratropium for Nebulization. 3 milliLiter(s) Nebulizer once  amLODIPine   Tablet 2.5 milliGRAM(s) Oral daily  aspirin enteric coated 81 milliGRAM(s) Oral daily  BACItracin   Ointment 1 Application(s) Topical two times a day  budesonide 160 MICROgram(s)/formoterol 4.5 MICROgram(s) Inhaler 2 Puff(s) Inhalation two times a day  cholecalciferol 1000 Unit(s) Oral daily  cyclobenzaprine 5 milliGRAM(s) Oral three times a day  enoxaparin Injectable 40 milliGRAM(s) SubCutaneous every 24 hours  furosemide    Tablet 40 milliGRAM(s) Oral daily  gabapentin 400 milliGRAM(s) Oral three times a day  influenza  Vaccine (HIGH DOSE) 0.7 milliLiter(s) IntraMuscular once  lidocaine   4% Patch 1 Patch Transdermal every 24 hours  lidocaine 4% Cream 1 Application(s) Topical two times a day  pantoprazole    Tablet 40 milliGRAM(s) Oral before breakfast  polyethylene glycol 3350 17 Gram(s) Oral daily  senna 2 Tablet(s) Oral at bedtime  tiotropium 18 MICROgram(s) Capsule 1 Capsule(s) Inhalation daily    MEDICATIONS  (PRN):  ALPRAZolam 0.5 milliGRAM(s) Oral daily PRN anxiety  aluminum hydroxide/magnesium hydroxide/simethicone Suspension 30 milliLiter(s) Oral every 4 hours PRN Dyspepsia  melatonin 3 milliGRAM(s) Oral at bedtime PRN Insomnia  ondansetron Injectable 4 milliGRAM(s) IV Push every 8 hours PRN Nausea and/or Vomiting  oxyCODONE    IR 2.5 milliGRAM(s) Oral every 6 hours PRN Severe Pain (7 - 10)  traMADol 50 milliGRAM(s) Oral every 8 hours PRN Moderate Pain (4 - 6)

## 2022-10-17 NOTE — DISCHARGE NOTE PROVIDER - NSDCMRMEDTOKEN_GEN_ALL_CORE_FT
amLODIPine 2.5 mg oral tablet: 1 tab(s) orally once a day  aspirin 81 mg oral tablet: 1 tab(s) orally once a day in am  calcium carbonate 500 mg (200 mg elemental calcium) oral tablet, chewable: 1 tab(s) orally once a day  gabapentin 300 mg oral capsule: 1 cap(s) orally 3 times a day  KlonoPIN 0.5 mg oral tablet: 1 tab(s) orally 3 times a day, As Needed  Lasix 20 mg oral tablet: 1 tab(s) orally once a day (at bedtime)  Lasix 40 mg oral tablet: 1 tab(s) orally once a day (in the morning)  omeprazole 40 mg oral delayed release capsule: 1 cap(s) orally once a day in am  Spiriva 18 mcg inhalation capsule: 1 cap(s) inhaled once a day in am  Symbicort 160 mcg-4.5 mcg/inh inhalation aerosol: 2 puff(s) inhaled 2 times a day  Vitamin D3 1000 intl units oral tablet: 1 tab(s) orally once a day in am   acetaminophen 500 mg oral tablet: 2 tab(s) orally every 8 hours  amLODIPine 2.5 mg oral tablet: 1 tab(s) orally once a day  aspirin 81 mg oral tablet: 1 tab(s) orally once a day in am  calcitonin 200 intl units/inh nasal spray: 1 spray(s) nasal once a day  calcium carbonate 1250 mg (500 mg elemental calcium) oral tablet: 1 tab(s) orally 2 times a day  cholecalciferol oral tablet: 2000 unit(s) orally once a day  cyclobenzaprine 5 mg oral tablet: 1 tab(s) orally 3 times a day  dexamethasone 2 mg oral tablet: 1 tab(s) orally for ONE more dose this evening 10/24  Lasix 40 mg oral tablet: 1 tab(s) orally once a day (in the morning)  Neurontin 400 mg oral capsule: 1 cap(s) orally 3 times a day  omeprazole 40 mg oral delayed release capsule: 1 cap(s) orally once a day in am  polyethylene glycol 3350 oral powder for reconstitution: 17 gram(s) orally once a day  senna leaf extract oral tablet: 2 tab(s) orally once a day (at bedtime)  Spiriva 18 mcg inhalation capsule: 1 cap(s) inhaled once a day in am  Symbicort 160 mcg-4.5 mcg/inh inhalation aerosol: 2 puff(s) inhaled 2 times a day  traMADol 50 mg oral tablet: 1 tab(s) orally every 6 hours, As needed, Severe Pain (7 - 10)

## 2022-10-17 NOTE — PROGRESS NOTE ADULT - ASSESSMENT
Confidential Drug Utilization Report  Search Terms: Suri Felix, 01/31/1931Search Date: 10/11/2022 08:29:56 AM  The Drug Utilization Report below displays all of the controlled substance prescriptions, if any, that your patient has filled in the last twelve months. The information displayed on this report is compiled from pharmacy submissions to the Department, and accurately reflects the information as submitted by the pharmacies.    This report was requested by: Lakshmi Mancini | Reference #: [id number]    You have not added a BECCA number. Keeping your BECCA number(s) up to date on the My BECCA # page will enable the separation of your prescriptions from others in the search results.    Others' Prescriptions  Patient Name: Suri FelixBirth Date: 01/31/1931  Address: [id number], [place] [address]Ifu: Female  Rx Written	Rx Dispensed	Drug	Quantity	Days Supply	Prescriber Name	Prescriber Becca #	Payment Method  10/25/2021	11/01/2021	clonazepam 0.5 mg tablet	90	30	Ricarda Ledezma NP	[id number]	Medicare  Disp[facility] #[id number]  * - Drugs marked with an asterisk are compound drugs. If the compound drug is made up of more than one controlled substance, then each controlled substance will be a separate row in the table.

## 2022-10-17 NOTE — DIETITIAN INITIAL EVALUATION ADULT - PERTINENT LABORATORY DATA
10-17    141  |  104  |  16  ----------------------------<  100<H>  4.0   |  29  |  0.92    Ca    9.1      17 Oct 2022 07:39

## 2022-10-17 NOTE — PROGRESS NOTE ADULT - PROBLEM SELECTOR PROBLEM 3
OPERATIVE REPORT  PATIENT NAME: Mirtha Bustos    :  1975  MRN: [id number]  Pt Location: AN OR ROOM 01    SURGERY DATE: 2018    Surgeon(s) and Role:     Yeison Massey MD - Primary    Preop Diagnosis:  Umbilical hernia without obstruction or gangrene [K42 9]    Post-Op Diagnosis Codes:     * Umbilical hernia without obstruction or gangrene [K42 9]    Procedure(s) (LRB):  UMBILICAL HERNIA REPAIR &  VENTRAL HERNIA REAPIR WITH MESH; LYSIS OF ADHESIONS  (N/A) with mesh, subcutaneous advancement flap  Specimen(s):  * No specimens in log *    Estimated Blood Loss:   Minimal    Drains:       Anesthesia Type:   General    Operative Indications:  Umbilical hernia without obstruction or gangrene [K42 9]      Operative Findings:      Complications:   None    Procedure and Technique:  Patient was identified visually and via armband  Placed in supine position  After general anesthesia the abdomen was prepped and draped in a sterile fashion  0 25% Marcaine with epinephrine was utilized throughout procedure  Incision was made and carried down through skin subcutaneous tissue  Patient has a 15 x 15 cm umbilical hernia  Extensive dissection of the subcutaneous tissue and peritoneum was completed  Was counted carried down level of the fascia  Two hernias were eventually found  A ventral and umbilical hernia was present  The peritoneum was amputated at the level of the fascia  Thickened peritoneum was present  Omental adhesions were dissected free and removed  Interloop small bowel adhesions were dissected free  Patient had a constricting adhesion of small bowel in the left lower quadrant and associated with adhesions from the herniation  This was dissected free  No evidence for enterotomies were created  Omentum was then pexy to circumferentially to the anterior abdominal wall with a 2 0 Vicryl suture  A large polypropylene mesh was then deployed    The Endo Tacker with as then utilized to place this against the anterior abdominal wall  Fascia was then closed with several interrupted  1   PDS sutures followed by interrupted figure-of-eight 1  Vicryl sutures repairing both hernia defects  Subcutaneous tissues were then approximated  The skin overlying the herniation was atrophic in quite thin secondary to the chronicity  This was trimmed away  An advancement flap was sub cutaneous tissue was then created in order to try coverage over the muscle  Two 0 Vicryl subcutaneous tissue was utilized  This was then followed by 4 Monocryl suture for skin and Dermabond  An abdominal binder was placed  The patient was awakened anesthesia and transferred to the recovery room stable condition    Sponge instrument count correct   I was present for the entire procedure    Patient Disposition:  PACU     SIGNATURE: Vin Kearney MD  DATE: September 14, 2018  TIME: 12:26 PM Thoracic compression fracture

## 2022-10-17 NOTE — ADVANCED PRACTICE NURSE CONSULT - RECOMMEDATIONS
-Clean the Coccyx wounds with normal saline and apply skin prep to the surrounding skin  -Apply Silver Calcium Alginate (Aquacel Ag) to the wound bed and cover with a Foam dressing Q 72hrs PRN  -Elevate/float the patients heels using heel protectors and reposition the patient Q 2hrs using wedges or pillows

## 2022-10-18 NOTE — PROGRESS NOTE ADULT - PROBLEM SELECTOR PLAN 1
Pt with acute lumbar back pain which is somatic and neuropathic in nature due to radiating pain from marked compression fracture of T12 and moderate chronic compression deformity of L2, multilevel degenerative changes and large hemangioma within the L2 vertebral body. Thoracic CT demonstrates- SEVERE ACUTE COMPRESSION FRACTURE INFERIOR GREATER THAN SUPERIOR ENDPLATES OF T12. MINIMAL RETROPULSION WITH MINIMAL SPINAL CANAL NARROWING.  Pt also with chronic neuropathy.   High risk medications reviewed. Avoid polypharmacy. Avoid IV opioids. Avoid benzodiazepines. Non-pharmacological sleep aides initiated. Non-opioid medications and non-pharmacological pain management measures initiated.    Opioid pain recommendations   - Discontinue oxycodone 2.5 mg PO q 6 hours PRN severe pain- reports ineffectiveness in pain control.   - Change Tramadol 50mg PO q6h PRN severe pain.  Non-opioid pain recommendations   - Renew Acetaminophen 1 gram PO q 8 hours x 2 days 10/17-18. Monitor LFTs  - Naproxen 250mg PO TID x 2 days completed with PPI on 10/13  - Continue Gabapentin 400mg po q 8 hours (Pt on 300mg PO TID home dose, increased on 10/14). Monitor renal function.   - Continue Lidoderm 4% patch daily.   - Renew Flexeril 5mg PO TID.   - Continue Lidocaine 4% cream b/l feet  Bowel Regimen  - Miralax 17G PO daily  - Senna 2 tablets at bedtime for constipation

## 2022-10-18 NOTE — PROGRESS NOTE ADULT - SUBJECTIVE AND OBJECTIVE BOX
Source of information: VIDAL LOUIE, Chart review  Patient language: English  : n/a    HPI:  89 year old female, from home, with PMH of HTN, COPD (on 3L home O2 at night), CHFrEF, ambulates with wheelchair or walker, presents to ED with severe low back pain. She states the pain first started 2 weeks ago and got worse last Thursday. She went to an orthopedic doctor and got a cortisone shot, which she states did not help. Pt states the pain became severe yesterday, 10/10, achy stabbing in nature,  worsened by movement and ambulation. Denies recent trauma or falls. 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 Otherwise, she denies worsening numbness or tingling in LE, loss of sensation, weakness or urinary or bowel incontinence.    In ED: vitals HR 77, /64, Temp 98.4F, 99% on RA.  s/p ketorolac, Tylenol and flexeril in ED (10 Oct 2022 15:57)    CT lumbar demonstrates Findings suggesting acute marked compression fracture of T12 with   minimally retropulsed bone at the level of the inferior endplate.    Moderate compression deformity of L2 appears chronic. Large hemangioma   within the L2 vertebral body.    Multilevel degenerative changes.    Interstitial fibrosis suggested at the lung bases.Nonspecific 3.0 cm   pleural-based nodular opacity at the right lung base. If clinically   indicated, correlation with CT of the chest may be obtained for further   evaluation.    Thoracic CT demonstrates- SEVERE ACUTE COMPRESSION FRACTURE INFERIOR GREATER THAN SUPERIOR ENDPLATES OF T12. MINIMAL RETROPULSION WITH MINIMAL SPINAL CANAL NARROWING.  Pain consulted for back pain. Reports lumbar back pain started a couple weeks ago and progressively worsened. Denies trauma/ falls or hx back pain in the past. Reports taking Tylenol, aleve and had massage at home with no relief of pain. Was scheduled to begin her PT sessions on 10/10. Pt seen and examined at bedside. Pt laying in bed, sleeping, awakened pt, pt became anxious about being discharged. Reports she continues to experience back pain and wants to speak with neurosurgery for consideration of spinal surgery. Emotional support provided, educated pt on realistic expectations and pain goals. Pt ambulated with PT on 10/17. Currently rating right lumbar back score 9/10 and not tolerable  SCALE USED: (1-10 VNRS). Pt describes pain as constant, sharp, greatest over right lumbar radiating to right abdomen, not alleviated by pain medication, exacerbated by movement and standing. Reports oxycodone PRN dose causes sleepiness. Pt also reports numbness over toes in b/l feet x 1 year and is on gabapentin at home. Reports lidocaine cream slightly improved her numbness. States she has tried different creams at home which have been unsuccessful in decreasing her numbness. Pt tolerating PO diet. Denies lethargy, chest pain, SOB, nausea, vomiting. Reports hx constipation, last BM 10/16, pt on bowel regimen. Patient stated goal for pain control: to be able to take deep breaths, get out of bed to chair and ambulate with tolerable pain control. Pt has been out of bed to chair. Pt ambulate with walker and uses wheelchair at home at baseline. Plan to be discharged home today.     PAST MEDICAL & SURGICAL HISTORY:  Essential Hypertension      COPD (Chronic Obstructive Pulmonary Disease)  on home O2 AT NIGHT      Hip Fracture-Left      Hx of Breast Cancer  HAd Rt in       Chronic bronchitis      Anxiety disorder      Morbid obesity      DAVID on CPAP      Dysphagia      Neuropathy      S/P Insertion of IVC (Inferior Vena Caval) Filter      Joint Fixation (Surgical)- L Hip      S/P Breast Lumpectomy- left      Status Post Total Knee Replacement-bilateral      S/P           FAMILY HISTORY:  Family history of renal disease (Sibling)    Family history of lymphoma    Family history of congestive heart failure        Social History:  Pt lives at home with her . Denies current alcohol, tobacco or illicit drug use. States she smoked 2ppd for about 55 years. Quit smoking about 10 years ago. (10 Oct 2022 15:57)    Allergies    No Known Allergies    MEDICATIONS  (STANDING):  acetaminophen     Tablet .. 1000 milliGRAM(s) Oral every 8 hours  albuterol/ipratropium for Nebulization. 3 milliLiter(s) Nebulizer once  amLODIPine   Tablet 2.5 milliGRAM(s) Oral daily  aspirin enteric coated 81 milliGRAM(s) Oral daily  budesonide 160 MICROgram(s)/formoterol 4.5 MICROgram(s) Inhaler 2 Puff(s) Inhalation two times a day  cholecalciferol 1000 Unit(s) Oral daily  cyclobenzaprine 5 milliGRAM(s) Oral three times a day  enoxaparin Injectable 40 milliGRAM(s) SubCutaneous every 24 hours  furosemide    Tablet 40 milliGRAM(s) Oral daily  gabapentin 400 milliGRAM(s) Oral three times a day  influenza  Vaccine (HIGH DOSE) 0.7 milliLiter(s) IntraMuscular once  lidocaine   4% Patch 1 Patch Transdermal every 24 hours  lidocaine 4% Cream 1 Application(s) Topical two times a day  pantoprazole    Tablet 40 milliGRAM(s) Oral before breakfast  polyethylene glycol 3350 17 Gram(s) Oral daily  senna 2 Tablet(s) Oral at bedtime  tiotropium 18 MICROgram(s) Capsule 1 Capsule(s) Inhalation daily    MEDICATIONS  (PRN):  aluminum hydroxide/magnesium hydroxide/simethicone Suspension 30 milliLiter(s) Oral every 4 hours PRN Dyspepsia  melatonin 3 milliGRAM(s) Oral at bedtime PRN Insomnia  ondansetron Injectable 4 milliGRAM(s) IV Push every 8 hours PRN Nausea and/or Vomiting  traMADol 50 milliGRAM(s) Oral every 6 hours PRN Severe Pain (7 - 10)      Vital Signs Last 24 Hrs  T(C): 36.4 (18 Oct 2022 05:12), Max: 36.8 (17 Oct 2022 13:20)  T(F): 97.6 (18 Oct 2022 05:12), Max: 98.2 (17 Oct 2022 13:20)  HR: 69 (18 Oct 2022 05:12) (69 - 70)  BP: 130/55 (18 Oct 2022 05:12) (116/51 - 139/85)  BP(mean): 92 (17 Oct 2022 13:20) (92 - 92)  RR: 18 (18 Oct 2022 05:12) (18 - 18)  SpO2: 93% (18 Oct 2022 05:12) (93% - 96%)    Parameters below as of 18 Oct 2022 05:12  Patient On (Oxygen Delivery Method): nasal cannula  O2 Flow (L/min): 3    COVID-19 PCR: NotDetec (17 Oct 2022 06:15)  COVID-19 PCR: NotDetec (10 Oct 2022 16:13)    LABS: Reviewed                          13.2   5.98  )-----------( 263      ( 17 Oct 2022 07:39 )             41.2     10-    141  |  104  |  16  ----------------------------<  100<H>  4.0   |  29  |  0.92    Ca    9.1      17 Oct 2022 07:39    COVID-19 PCR: NotDetec (17 Oct 2022 06:15)  COVID-19 PCR: NotDetec (10 Oct 2022 16:13)      Radiology: Reviewed.   < from: CT Thoracic Spine No Cont (10.14.22 @ 09:39) >    ACC: [id number] EXAM:  CT THORACIC SPINE                          PROCEDURE DATE:  10/14/2022          INTERPRETATION:  CT THORACIC SPINE    CLINICAL INFORMATION: back pain    TECHNIQUE:  Noncontrast CT.  Axial acquisition. Sagittal and coronal reformations.    FINDINGS:  FRACTURES:  *  Severe acute compression deformity inferior greater than superior   endplates of T12. Minimal retropulsion towards the inferior endplate with   minimal spinal canal narrowing. Residual anterior to posterior dimension   of the thecal sac measures 1.2 cm.  *  Mild chronic deformity superior endplate of T4..  ALIGNMENT:  *  Grade 1 anterolisthesis C7 on T1, T1 on T2 and T2 on T3 thought   related to the degenerative changes.  *  Very mild scoliosis  SPINAL COLUMN:  *  Diffuse osteoporosis.  *  Multilevel spondylosis. With mild degrees of disc space narrowing and   varying degrees of endplate degenerative changes/osteophytes.  *  Fusion of the facet joints bilaterally at T3-4 through T5-6.  OTHER:  *  Please see recent lumbar spine report for lumbar spine findings  *  Atherosclerotic calcification of the aorta is seen.  *  An IVC filter is noted.  *  Infiltrate is present at the right lung base    IMPRESSION:  SEVERE ACUTE COMPRESSION FRACTURE INFERIOR GREATER THAN SUPERIOR   ENDPLATES OF T12. MINIMAL RETROPULSION WITH MINIMAL SPINAL CANAL   NARROWING.    --- End of Report ---            GAYLA YATES MD; Attending Radiologist  This document has been electronically signed. Oct 14 2022  2:27PM    < end of copied text >    < from: CT Lumbar Spine No Cont (10.10.22 @ 12:08) >    ACC: [id number] EXAM:  CT LUMBAR SPINE                          PROCEDURE DATE:  10/10/2022          INTERPRETATION:  Noncontrast CT examination of the lumbar spine    CLINICAL INDICATION: Low back pain    TECHNIQUE:  Direct axial CT scanning of the lumbar spine was obtained   without the administration of intravenous contrast.  Sagittal and coronal   reformats were provided.    COMPARISON: None available    FINDINGS:    Marked compression fracture of T12. Discontinuity of the inferior and   superior endplates suggests acuity of fracture. Minimally retropulsed   bone at the level of the inferior endplate.    Moderate compression deformity of L2 appears chronic. A large hemangioma   occupies much of the L2 vertebral body.    Remaining vertebral bodies demonstrate normal height. Facet alignment is   maintained. Grade 1 anterolisthesis of L4 on L5.    Mild to moderate dextroscoliosis of the lumbar spine, the apex is at   L2-L3.    Diffuse disc space narrowing in the lumbar region.    Multilevel degenerative changes.    Interstitial fibrosis suggested at the lung bases. Nonspecific 3.0 cm   pleural-based nodular opacity at the right lung base.    IMPRESSION:    Findings suggesting acute marked compression fracture of T12 with   minimally retropulsed bone at the level of the inferior endplate.    Moderate compression deformity of L2 appears chronic. Large hemangioma   within the L2 vertebral body.    Multilevel degenerative changes.    Interstitial fibrosis suggested at the lung bases.Nonspecific 3.0 cm   pleural-based nodular opacity at the right lung base. If clinically   indicated, correlation with CT of the chest may be obtained for further   evaluation.    Dr. Mireles discussed these findings with Dr. Ho on 10/10/2022 1:02   PM with read back.        --- End of Report ---            JESSICA MIRELES MD; Attending Radiologist  This document has been electronically signed. Oct 10 2022  1:02PM    < end of copied text >      ORT Score -   Family Hx of substance abuse	Female	      Male  Alcohol 	                                           1                     3  Illegal drugs	                                   2                     3  Rx drugs                                           4 	                  4  Personal Hx of substance abuse		  Alcohol 	                                          3	                  3  Illegal drugs                                     4	                  4  Rx drugs                                            5 	                  5  Age between 16- 45 years	           1                     1  hx preadolescent sexual abuse	   3 	                  0  Psychological disease		  ADD, OCD, bipolar, schizophrenia   2	          2  Depression                                           1 	          1  Total: 0    a score of 3 or lower indicates low risk for opioid abuse		  a score of 4-7 indicates moderate risk for opioid abuse		  a score of 8 or higher indicates high risk for opioid abuse  	  REVIEW OF SYSTEMS:  CONSTITUTIONAL: No fever + fatigue  HEENT:  No difficulty hearing, no change in vision  NECK: No pain or stiffness  RESPIRATORY: No cough, wheezing, chills or hemoptysis; + dyspnea on exertion + on chronic O2 at home during bedtime.   CARDIOVASCULAR: No chest pain, palpitations, dizziness, or leg swelling  GASTROINTESTINAL: No loss of appetite, decreased PO intake. No abdominal or epigastric pain. No nausea, vomiting; No diarrhea or constipation.   GENITOURINARY: No dysuria, frequency, hematuria, retention or incontinence  MUSCULOSKELETAL: + lumbar back pain greatest over right side. No joint swelling; no upper motor strength weakness, +  b/l lower motor strength weakness (chronic), no saddle anesthesia, bowel/bladder incontinence, no falls   NEURO: No headaches, + numbness/tingling b/l toes, + chronic left leg weaknesss  PSYCHIATRIC: + hx anxiety     PHYSICAL EXAM:  GENERAL:  + fatigued, Anxious; alert & Oriented X4, cooperative, NAD, Good concentration. Speech is clear.   RESPIRATORY: Respirations even and unlabored. + coarse to auscultation bilaterally; No rales, rhonchi, wheezing, or rubs + SOB on exertion, on O2 3L NC   CARDIOVASCULAR: Normal S1/S2, regular rate and rhythm; No murmurs, rubs, or gallops. No JVD.   GASTROINTESTINAL: + obese per BMI Soft, Nontender, + distended; Bowel sounds present  PERIPHERAL VASCULAR:  Extremities warm without edema. 2+ Peripheral Pulses, No cyanosis, No calf tenderness  MUSCULOSKELETAL: Motor Strength 4/5 B/L upper and 4/5 b/l lower extremitiy; + decreased ROM left LE; negative SLR; + right lumbar back and right SI joint tenderness, no paraspinal tenderness.   SKIN: + ecchymosis of b/l arms and right lumbar back; + scattered moles generalized back; Warm, dry, intact. No rashes, lesions, scars or wounds.     Risk factors associated with adverse outcomes related to opioid treatment  [X ]  Concurrent benzodiazepine use  [ ]  History/ Active substance use or alcohol use disorder  [X ] Psychiatric co-morbidity  [X ] Sleep apnea  [ ] COPD  [X ] BMI> 35  [ ] Liver dysfunction  [ ] Renal dysfunction  [X ] CHF  [X ] Former Smoker  [ X]  Age > 60 years    [X ]  [facility]  Reviewed and Copied to Chart. See below.    Plan of care and goal oriented pain management treatment options were discussed with patient and /or primary care giver; all questions and concerns were addressed and care was aligned with patient's wishes.    Educated patient on goal oriented pain management treatment options     10-18-22 @ 12:15

## 2022-10-18 NOTE — CONSULT NOTE ADULT - ASSESSMENT
Mrs. Felix is a 89 y/o, female, with pmh of HTN, COPD ( 3L home), CHF, chronic low back pain with T12 compression fx, compression deformity L2 due to hemangioma, and DDD lumbar spine.     Plan   Neurosurgery: Compression Fx T12  Neurochecks Q4.   CT thoracic spine ordered to further examine deformity and fx T12.  Xray Lumbar spine with flexion/extension to r/o instability, Pt with L2 deformity and lumbar degeneration. Pending results.   Continue monitoring VS. Continue on BP regimens   F/b pain management. Continue with oxycodone for severe pain. Gabapentin for neuropathy symptoms.  Bowel regimen to avoid constipation.   PT/OT for strengthening of LE and evaluation of gait  Will monitor.   Plan of care discussed with Neurosurgeon . Will discuss risks and benefits associated with kyphoplasty considering pt age/ pmh. 
Acute and chronic spinal vertebral fracture with severe girdle pain and mobility disorder; agree that there is no surgical treatment option.  Recommend continue vitamin D3 2000 U/day, calcium 650 mg BID and add calcitonin nasal spray: 1 spray in alternate nostril three times a day for 3 days then once a day 
Confidential Drug Utilization Report  Search Terms: Suri Felix, 01/31/1931Search Date: 10/11/2022 08:29:56 AM  The Drug Utilization Report below displays all of the controlled substance prescriptions, if any, that your patient has filled in the last twelve months. The information displayed on this report is compiled from pharmacy submissions to the Department, and accurately reflects the information as submitted by the pharmacies.    This report was requested by: Lakshmi Mancini | Reference #: [id number]    You have not added a BECCA number. Keeping your BECCA number(s) up to date on the My BECCA # page will enable the separation of your prescriptions from others in the search results.    Others' Prescriptions  Patient Name: Suri FelixBirth Date: 01/31/1931  Address[address], [place] [address]Qdj: Female  Rx Written	Rx Dispensed	Drug	Quantity	Days Supply	Prescriber Name	Prescriber Becca #	Payment Method  10/25/2021	11/01/2021	clonazepam 0.5 mg tablet	90	30	Ricarda Ledezma NP	[id number]	Medicare  Dispenser [facility] #[id number]  * - Drugs marked with an asterisk are compound drugs. If the compound drug is made up of more than one controlled substance, then each controlled substance will be a separate row in the table.

## 2022-10-18 NOTE — PROGRESS NOTE ADULT - PROBLEM SELECTOR PROBLEM 3
Anesthetic History     PONV          Review of Systems / Medical History  Patient summary reviewed, nursing notes reviewed and pertinent labs reviewed    Pulmonary  Within defined limits                 Neuro/Psych   Within defined limits           Cardiovascular              Hyperlipidemia    Exercise tolerance: >4 METS     GI/Hepatic/Renal     GERD: well controlled           Endo/Other        Arthritis     Other Findings              Physical Exam    Airway  Mallampati: II    Neck ROM: normal range of motion   Mouth opening: Normal     Cardiovascular  Regular rate and rhythm,  S1 and S2 normal,  no murmur, click, rub, or gallop             Dental  No notable dental hx       Pulmonary  Breath sounds clear to auscultation               Abdominal         Other Findings            Anesthetic Plan    ASA: 2  Anesthesia type: general          Induction: Intravenous  Anesthetic plan and risks discussed with: Patient and Spouse Chronic obstructive pulmonary disease (COPD)

## 2022-10-18 NOTE — PROGRESS NOTE ADULT - SUBJECTIVE AND OBJECTIVE BOX
NP Note discussed with  primary attending    Patient is a 89y old  Female who presents with a chief complaint of compression fracture (17 Oct 2022 18:02)      INTERVAL HPI/OVERNIGHT EVENTS: no new complaints    MEDICATIONS  (STANDING):  acetaminophen     Tablet .. 1000 milliGRAM(s) Oral every 8 hours  albuterol/ipratropium for Nebulization. 3 milliLiter(s) Nebulizer once  amLODIPine   Tablet 2.5 milliGRAM(s) Oral daily  aspirin enteric coated 81 milliGRAM(s) Oral daily  budesonide 160 MICROgram(s)/formoterol 4.5 MICROgram(s) Inhaler 2 Puff(s) Inhalation two times a day  cholecalciferol 1000 Unit(s) Oral daily  cyclobenzaprine 5 milliGRAM(s) Oral three times a day  enoxaparin Injectable 40 milliGRAM(s) SubCutaneous every 24 hours  furosemide    Tablet 40 milliGRAM(s) Oral daily  gabapentin 400 milliGRAM(s) Oral three times a day  influenza  Vaccine (HIGH DOSE) 0.7 milliLiter(s) IntraMuscular once  lidocaine   4% Patch 1 Patch Transdermal every 24 hours  lidocaine 4% Cream 1 Application(s) Topical two times a day  pantoprazole    Tablet 40 milliGRAM(s) Oral before breakfast  polyethylene glycol 3350 17 Gram(s) Oral daily  senna 2 Tablet(s) Oral at bedtime  tiotropium 18 MICROgram(s) Capsule 1 Capsule(s) Inhalation daily    MEDICATIONS  (PRN):  aluminum hydroxide/magnesium hydroxide/simethicone Suspension 30 milliLiter(s) Oral every 4 hours PRN Dyspepsia  melatonin 3 milliGRAM(s) Oral at bedtime PRN Insomnia  ondansetron Injectable 4 milliGRAM(s) IV Push every 8 hours PRN Nausea and/or Vomiting  traMADol 50 milliGRAM(s) Oral every 6 hours PRN Severe Pain (7 - 10)      __________________________________________________  REVIEW OF SYSTEMS:    CONSTITUTIONAL: No fever,   EYES: no acute visual disturbances  NECK: No pain or stiffness  RESPIRATORY: No cough; No shortness of breath  CARDIOVASCULAR: No chest pain, no palpitations  GASTROINTESTINAL: No pain. No nausea or vomiting; No diarrhea   NEUROLOGICAL: No headache or numbness, no tremors  MUSCULOSKELETAL: No joint pain, no muscle pain  GENITOURINARY: no dysuria, no frequency, no hesitancy  PSYCHIATRY: no depression , no anxiety  ALL OTHER  ROS negative        Vital Signs Last 24 Hrs  T(C): 36.4 (18 Oct 2022 05:12), Max: 36.8 (17 Oct 2022 13:20)  T(F): 97.6 (18 Oct 2022 05:12), Max: 98.2 (17 Oct 2022 13:20)  HR: 69 (18 Oct 2022 05:12) (69 - 70)  BP: 130/55 (18 Oct 2022 05:12) (116/51 - 139/85)  BP(mean): 92 (17 Oct 2022 13:20) (92 - 92)  RR: 18 (18 Oct 2022 05:12) (18 - 18)  SpO2: 93% (18 Oct 2022 05:12) (93% - 96%)    Parameters below as of 18 Oct 2022 05:12  Patient On (Oxygen Delivery Method): nasal cannula  O2 Flow (L/min): 3      ________________________________________________  PHYSICAL EXAM:  GENERAL: NAD  HEENT: Normocephalic;  conjunctivae and sclerae clear; moist mucous membranes;   NECK : supple  CHEST/LUNG: Clear to ausculitation bilaterally with good air entry   HEART: S1 S2  regular; no murmurs, gallops or rubs  ABDOMEN: Soft, Nontender, Nondistended; Bowel sounds present  EXTREMITIES: No sweeling  SKIN: warm and dry; no rash  NERVOUS SYSTEM:  Awake and alert; Oriented  to place, person and time ; no new deficits    _________________________________________________  LABS:                        13.2   5.98  )-----------( 263      ( 17 Oct 2022 07:39 )             41.2     10-17    141  |  104  |  16  ----------------------------<  100<H>  4.0   |  29  |  0.92    Ca    9.1      17 Oct 2022 07:39          CAPILLARY BLOOD GLUCOSE            RADIOLOGY & ADDITIONAL TESTS:    Imaging Personally Reviewed:  YES    Consultant(s) Notes Reviewed:   YES    Care Discussed with Consultants :     Plan of care was discussed with patient and /or primary care giver; all questions and concerns were addressed and care was aligned with patient's wishes.

## 2022-10-18 NOTE — CONSULT NOTE ADULT - SUBJECTIVE AND OBJECTIVE BOX
Patient is a 89y old  Female who presents with a chief complaint of compression fracture (18 Oct 2022 12:15)      HPI:  89 year old female, from home, with PMH of HTN, COPD (on 3L home O2 at night), CHFrEF, ambulates with wheelchair or walker, presents to ED with severe low back pain. She states the pain first started 2 weeks ago and got worse last Thursday. She went to an orthopedic doctor and got a cortisone shot, which she states did not help. Pt states the pain became severe yesterday, 10/10, achy stabbing in nature,  worsened by movement and ambulation. Denies recent trauma or falls. 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 Otherwise, she denies worsening numbness or tingling in LE, loss of sensation, weakness or urinary or bowel incontinence.  PAST MEDICAL & SURGICAL HISTORY:  Essential Hypertension      COPD (Chronic Obstructive Pulmonary Disease)  on home O2 AT NIGHT      Hip Fracture-Left      Hx of Breast Cancer  HAd Rt in       Chronic bronchitis      Anxiety disorder      Morbid obesity      DAVID on CPAP      Dysphagia      Neuropathy      S/P Insertion of IVC (Inferior Vena Caval) Filter      Joint Fixation (Surgical)- L Hip      S/P Breast Lumpectomy- left      Status Post Total Knee Replacement-bilateral      S/P           FAMILY HISTORY:  Family history of renal disease (Sibling)    Family history of lymphoma    Family history of congestive heart failure          Social Hx:  Nonsmoker, no drug or alcohol use    MEDICATIONS  (STANDING):  acetaminophen     Tablet .. 1000 milliGRAM(s) Oral every 8 hours  albuterol/ipratropium for Nebulization. 3 milliLiter(s) Nebulizer once  amLODIPine   Tablet 2.5 milliGRAM(s) Oral daily  aspirin enteric coated 81 milliGRAM(s) Oral daily  budesonide 160 MICROgram(s)/formoterol 4.5 MICROgram(s) Inhaler 2 Puff(s) Inhalation two times a day  cholecalciferol 1000 Unit(s) Oral daily  cyclobenzaprine 5 milliGRAM(s) Oral three times a day  enoxaparin Injectable 40 milliGRAM(s) SubCutaneous every 24 hours  furosemide    Tablet 40 milliGRAM(s) Oral daily  gabapentin 400 milliGRAM(s) Oral three times a day  influenza  Vaccine (HIGH DOSE) 0.7 milliLiter(s) IntraMuscular once  lidocaine   4% Patch 1 Patch Transdermal every 24 hours  lidocaine 4% Cream 1 Application(s) Topical two times a day  pantoprazole    Tablet 40 milliGRAM(s) Oral before breakfast  polyethylene glycol 3350 17 Gram(s) Oral daily  senna 2 Tablet(s) Oral at bedtime  tiotropium 18 MICROgram(s) Capsule 1 Capsule(s) Inhalation daily       Allergies    No Known Allergies    Intolerances        ROS: Pertinent positives in HPI, all other ROS were reviewed and are negative.      Vital Signs Last 24 Hrs    T(C): 36.4 (18 Oct 2022 05:12), Max: 36.8 (17 Oct 2022 13:20)  T(F): 97.6 (18 Oct 2022 05:12), Max: 98.2 (17 Oct 2022 13:20)  HR: 69 (18 Oct 2022 05:12) (69 - 70)  BP: 130/55 (18 Oct 2022 05:12) (116/51 - 139/85)  BP(mean): 92 (17 Oct 2022 13:20) (92 - 92)  RR: 18 (18 Oct 2022 05:12) (18 - 18)  SpO2: 93% (18 Oct 2022 05:12) (93% - 96%)    Parameters below as of 18 Oct 2022 05:12  Patient On (Oxygen Delivery Method): nasal cannula  O2 Flow (L/min): 3    COVID-19 PCR: NotDetec (17 Oct 2022 06:15)  COVID-19 PCR: NotDetec (10 Oct 2022 16:13)    L      Constitutional: awake and alert.  HEENT: PERRLA, EOMI,   Neck: Supple.  Respiratory: Breath sounds are clear bilaterally  Cardiovascular: S1 and S2, regular rhythm  Gastrointestinal: soft, nontender  Extremities:  no edema  Vascular: Carotid Bruit - no  Musculoskeletal: no joint swelling/tenderness, no abnormal movements  Skin: No rashes    Neurological exam:  HF: A x O x 3. Appropriately interactive, normal affect. Speech fluent, No Aphasia or paraphasic errors. Naming /repetition intact   CN: ANTHONY, EOMI, VFF, facial sensation normal, no NLFD, tongue midline, Palate moves equally, SCM equal bilaterally  Motor: Hammer toes  No pronator drift, Strength 5/5 in all 4 ext, normal bulk and tone, no tremor, rigidity or bradykinesia.  Absent toe extensors/flexors/abductor/adductor movements; 3/5 big toe extensor  Sens: Intact to light touch / PP/ VS/ JS    Reflexes: Absent in the lower extremities . BJ 2+, BR 2+, KJ0, AJ 0, downgoing toes b/l  Coord:  No FNFA, dysmetria, JAMILA intact   Gait/Balance: Cannot test    NIHSS:    Not a stroke      Labs:              ABS: Reviewed                          13.2   5.98  )-----------( 263      ( 17 Oct 2022 07:39 )             41.2     10-17    141  |  104  |  16  ----------------------------<  100<H>  4.0   |  29  |  0.92    Ca    9.1      17 Oct 2022 07:39            Radiology report:  - CT:  < from: CT Thoracic Spine No Cont (10.14.22 @ 09:39) >    ACC: [id number] EXAM:  CT THORACIC SPINE                          PROCEDURE DATE:  10/14/2022          INTERPRETATION:  CT THORACIC SPINE    CLINICAL INFORMATION: back pain    TECHNIQUE:  Noncontrast CT.  Axial acquisition. Sagittal and coronal reformations.    FINDINGS:  FRACTURES:  *  Severe acute compression deformity inferior greater than superior   endplates of T12. Minimal retropulsion towards the inferior endplate with   minimal spinal canal narrowing. Residual anterior to posterior dimension   of the thecal sac measures 1.2 cm.  *  Mild chronic deformity superior endplate of T4..  ALIGNMENT:  *  Grade 1 anterolisthesis C7 on T1, T1 on T2 and T2 on T3 thought   related to the degenerative changes.  *  Very mild scoliosis  SPINAL COLUMN:  *  Diffuse osteoporosis.  *  Multilevel spondylosis. With mild degrees of disc space narrowing and   varying degrees of endplate degenerative changes/osteophytes.  *  Fusion of the facet joints bilaterally at T3-4 through T5-6.  OTHER:  *  Please see recent lumbar spine report for lumbar spine findings  *  Atherosclerotic calcification of the aorta is seen.  *  An IVC filter is noted.  *  Infiltrate is present at the right lung base    IMPRESSION:  SEVERE ACUTE COMPRESSION FRACTURE INFERIOR GREATER THAN SUPERIOR   ENDPLATES OF T12. MINIMAL RETROPULSION WITH MINIMAL SPINAL CANAL   NARROWING.    --- End of Report ---            GAYLA YATES MD; Attending Radiologist  This document has been electronically signed. Oct 14 2022  2:27PM    < end of copied text >  < from: CT Lumbar Spine No Cont (10.10.22 @ 12:08) >  ACC: [id number] EXAM:  CT LUMBAR SPINE                          PROCEDURE DATE:  10/10/2022          INTERPRETATION:  Noncontrast CT examination of the lumbar spine    CLINICAL INDICATION: Low back pain    TECHNIQUE:  Direct axial CT scanning of the lumbar spine was obtained   without the administration of intravenous contrast.  Sagittal and coronal   reformats were provided.    COMPARISON: None available    FINDINGS:    Marked compression fracture of T12. Discontinuity of the inferior and   superior endplates suggests acuity of fracture. Minimally retropulsed   bone at the level of the inferior endplate.    Moderate compression deformity of L2 appears chronic. A large hemangioma   occupies much of the L2 vertebral body.    Remaining vertebral bodies demonstrate normal height. Facet alignment is   maintained. Grade 1 anterolisthesis of L4 on L5.    Mild to moderate dextroscoliosis of the lumbar spine, the apex is at   L2-L3.    Diffuse disc space narrowing in the lumbar region.    Multilevel degenerative changes.    Interstitial fibrosis suggested at the lung bases. Nonspecific 3.0 cm   pleural-based nodular opacity at the right lung base.    IMPRESSION:    Findings suggesting acute marked compression fracture of T12 with   minimally retropulsed bone at the level of the inferior endplate.    Moderate compression deformity of L2 appears chronic. Large hemangioma   within the L2 vertebral body.    Multilevel degenerative changes.    Interstitial fibrosis suggested at the lung bases.Nonspecific 3.0 cm   pleural-based nodular opacity at the right lung base. If clinically   indicated, correlation with CT of the chest may be obtained for further   evaluation.    Dr. Mireles discussed these findings with Dr. Ho on 10/10/2022 1:02   PM with read back.        --- End of Report ---    < end of copied text >    -Total Critical Care Time spent:  
SUBJECTIVE:   Mrs. Felix is a 89 y/o, female, with pmh of HTN, COPD ( 3L home), CHF, who presented to the ED due to worsening low back pain x 2 weeks. Pt reports her pain worsened on [place] day and became very sharp/ severe, 10/10. CT lumbar spine with T12 compression fx and compression deformity L2 due to hemangioma. Her pain remains unresolved and begins in her low back radiating into R buttock area. Her pain is worsened by ambulation. She has associated numbness of toes. Denies any abnormal weakness, pain in legs, urine/ fecal incontinence, vaginal numbness. She denies any recent falls. Pt stated several years ago she believes to have done a DXA scan and was told she had degeneration in her spine and might possibly have osteoporosis, but has not been on tx.     Vital Signs Last 24 Hrs  T(C): 36.3 (12 Oct 2022 14:11), Max: 36.7 (11 Oct 2022 15:52)  T(F): 97.4 (12 Oct 2022 14:11), Max: 98 (11 Oct 2022 15:52)  HR: 68 (12 Oct 2022 14:11) (63 - 78)  BP: 113/64 (12 Oct 2022 14:11) (95/55 - 126/46)  BP(mean): 61 (12 Oct 2022 10:43) (61 - 61)  RR: 20 (12 Oct 2022 14:11) (18 - 20)  SpO2: 94% (12 Oct 2022 14:11) (93% - 97%)    Parameters below as of 12 Oct 2022 14:11  Patient On (Oxygen Delivery Method): nasal cannula  O2 Flow (L/min): 3      PHYSICAL EXAM:    Constitutional: No Acute Distress     Neurological: AOx3, Following Commands, Moving all Extremities. CN I-XII intact.     Motor exam: 4/5 b/l EHL weakness. Otherwise strength intact. Negative clonus.                                                  Sensation: [x] intact to light touch  [] decreased:     Cardiovascular: S1, S2, Regular rate and rhythm     Extremities: No calf tenderness     LABS:                        12.9   8.88  )-----------( 263      ( 12 Oct 2022 08:29 )             40.8    10-12    142  |  106  |  33<H>  ----------------------------<  118<H>  3.7   |  28  |  1.32<H>    Ca    9.1      12 Oct 2022 08:29  Phos  3.3     10-11  Mg     2.7     10-11            MEDICATIONS:  Anticoagulation:   aspirin enteric coated 81 milliGRAM(s) Oral daily  enoxaparin Injectable 40 milliGRAM(s) SubCutaneous every 24 hours    Antibiotics:    Endo:    Neuro:  acetaminophen     Tablet .. 1000 milliGRAM(s) Oral every 8 hours  ALPRAZolam 0.5 milliGRAM(s) Oral daily PRN anxiety  gabapentin 300 milliGRAM(s) Oral three times a day  melatonin 3 milliGRAM(s) Oral at bedtime PRN Insomnia  naproxen 250 milliGRAM(s) Oral three times a day  ondansetron Injectable 4 milliGRAM(s) IV Push every 8 hours PRN Nausea and/or Vomiting  oxyCODONE    IR 2.5 milliGRAM(s) Oral every 6 hours PRN Severe Pain (7 - 10)    Cardiac:  amLODIPine   Tablet 2.5 milliGRAM(s) Oral daily  furosemide    Tablet 40 milliGRAM(s) Oral daily    Pulm:  budesonide 160 MICROgram(s)/formoterol 4.5 MICROgram(s) Inhaler 2 Puff(s) Inhalation two times a day  tiotropium 18 MICROgram(s) Capsule 1 Capsule(s) Inhalation daily    GI/:  aluminum hydroxide/magnesium hydroxide/simethicone Suspension 30 milliLiter(s) Oral every 4 hours PRN Dyspepsia  pantoprazole    Tablet 40 milliGRAM(s) Oral before breakfast  polyethylene glycol 3350 17 Gram(s) Oral daily  saline laxative (FLEET) Rectal Enema 1 Enema Rectal once  senna 2 Tablet(s) Oral at bedtime    Other:   cholecalciferol 1000 Unit(s) Oral daily  influenza  Vaccine (HIGH DOSE) 0.7 milliLiter(s) IntraMuscular once  lidocaine   4% Patch 1 Patch Transdermal every 24 hours    DIET: DASH diet     IMAGING:   < from: CT Lumbar Spine No Cont (10.10.22 @ 12:08) >  IMPRESSION:    Findings suggesting acute marked compression fracture of T12 with   minimally retropulsed bone at the level of the inferior endplate.    Moderate compression deformity of L2 appears chronic. Large hemangioma   within the L2 vertebral body.    Multilevel degenerative changes.    Interstitial fibrosis suggested at the lung bases.Nonspecific 3.0 cm   pleural-based nodular opacity at the right lung base. If clinically   indicated, correlation with CT of the chest may be obtained for further   evaluation.    < end of copied text >    
  Source of information: VIDAL LOUIE, Chart review  Patient language: English  : n/a    HPI:  89 year old female, from home, with PMH of HTN, COPD (on 3L home O2 at night), CHFrEF, ambulates with wheelchair or walker, presents to ED with severe low back pain. She states the pain first started 2 weeks ago and got worse last Thursday. She went to an orthopedic doctor and got a cortisone shot, which she states did not help. Pt states the pain became severe yesterday, 10/10, achy stabbing in nature,  worsened by movement and ambulation. Denies recent trauma or falls. 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 Otherwise, she denies worsening numbness or tingling in LE, loss of sensation, weakness or urinary or bowel incontinence.    In ED: vitals HR 77, /64, Temp 98.4F, 99% on RA.  s/p ketorolac, Tylenol and flexeril in ED (10 Oct 2022 15:57)    CT lumbar demonstrates Findings suggesting acute marked compression fracture of T12 with   minimally retropulsed bone at the level of the inferior endplate.    Moderate compression deformity of L2 appears chronic. Large hemangioma   within the L2 vertebral body.    Multilevel degenerative changes.    Interstitial fibrosis suggested at the lung bases.Nonspecific 3.0 cm   pleural-based nodular opacity at the right lung base. If clinically   indicated, correlation with CT of the chest may be obtained for further   evaluation.    Pain consulted for back pain. Pt seen and examined at bedside. Pt sitting in bed eating breakfast. Reports lumbar back pain started a couple weeks ago and progressively worsened. Denies trauma/ falls or hx back pain in the past. Reports taking Tylenol, aleve and had massage at home with no relief of pain. Was scheduled to begin her PT sessions yesterday. Currently rating lumbar back score 7-8/10 and not tolerable  SCALE USED: (1-10 VNRS). Pt describes pain as constant, radiating over lumbar back, slightly alleviated by pain medication, exacerbated by movement. Pt also reports numbness over toes in b/l feet x 1 year and is on gabapentin at home. Pt tolerating PO diet. Denies lethargy, chest pain, SOB, nausea, vomiting, constipation. Reports last BM 10/9. Patient stated goal for pain control: to be able to take deep breaths, get out of bed to chair and ambulate with tolerable pain control. PT was out of bed to chair this morning. Pt ambulate with walker and uses wheelchair at home at baseline.     PAST MEDICAL & SURGICAL HISTORY:  Essential Hypertension      COPD (Chronic Obstructive Pulmonary Disease)  on home O2 AT NIGHT      Hip Fracture-Left      Hx of Breast Cancer  HAd Rt in       Chronic bronchitis      Anxiety disorder      Morbid obesity      DAVID on CPAP      Dysphagia      Neuropathy      S/P Insertion of IVC (Inferior Vena Caval) Filter      Joint Fixation (Surgical)- L Hip      S/P Breast Lumpectomy- left      Status Post Total Knee Replacement-bilateral      S/P           FAMILY HISTORY:  Family history of renal disease (Sibling)    Family history of lymphoma    Family history of congestive heart failure        Social History:  Pt lives at home with her . Denies current alcohol, tobacco or illicit drug use. States she smoked 2ppd for about 55 years. Quit smoking about 10 years ago. (10 Oct 2022 15:57)    Allergies    No Known Allergies    MEDICATIONS  (STANDING):  acetaminophen     Tablet .. 650 milliGRAM(s) Oral every 6 hours  acetaminophen   IVPB .. 1000 milliGRAM(s) IV Intermittent once  amLODIPine   Tablet 2.5 milliGRAM(s) Oral daily  aspirin enteric coated 81 milliGRAM(s) Oral daily  budesonide 160 MICROgram(s)/formoterol 4.5 MICROgram(s) Inhaler 2 Puff(s) Inhalation two times a day  cholecalciferol 1000 Unit(s) Oral daily  enoxaparin Injectable 40 milliGRAM(s) SubCutaneous every 24 hours  furosemide    Tablet 40 milliGRAM(s) Oral daily  gabapentin 300 milliGRAM(s) Oral three times a day  lidocaine   4% Patch 1 Patch Transdermal every 24 hours  pantoprazole    Tablet 40 milliGRAM(s) Oral before breakfast  potassium chloride    Tablet ER 40 milliEquivalent(s) Oral once  tiotropium 18 MICROgram(s) Capsule 1 Capsule(s) Inhalation daily  traMADol 25 milliGRAM(s) Oral every 8 hours    MEDICATIONS  (PRN):  ALPRAZolam 0.5 milliGRAM(s) Oral daily PRN anxiety  aluminum hydroxide/magnesium hydroxide/simethicone Suspension 30 milliLiter(s) Oral every 4 hours PRN Dyspepsia  melatonin 3 milliGRAM(s) Oral at bedtime PRN Insomnia  ondansetron Injectable 4 milliGRAM(s) IV Push every 8 hours PRN Nausea and/or Vomiting      Vital Signs Last 24 Hrs  T(C): 36.5 (11 Oct 2022 07:30), Max: 36.9 (10 Oct 2022 11:00)  T(F): 97.7 (11 Oct 2022 07:30), Max: 98.4 (10 Oct 2022 11:00)  HR: 79 (11 Oct 2022 07:30) (70 - 79)  BP: 137/71 (11 Oct 2022 07:30) (108/68 - 140/72)  BP(mean): --  RR: 19 (11 Oct 2022 07:30) (18 - 20)  SpO2: 94% (11 Oct 2022 07:30) (94% - 99%)    Parameters below as of 11 Oct 2022 07:30  Patient On (Oxygen Delivery Method): nasal cannula  O2 Flow (L/min): 3      LABS: Reviewed.                          14.1   9.88  )-----------( 272      ( 11 Oct 2022 05:45 )             45.1     10-11    144  |  106  |  25<H>  ----------------------------<  114<H>  3.4<L>   |  29  |  1.18    Ca    9.2      11 Oct 2022 05:45  Phos  3.3     10-11  Mg     2.7     10-11    TPro  7.3  /  Alb  3.3<L>  /  TBili  0.8  /  DBili  x   /  AST  15  /  ALT  14  /  AlkPhos  108  10-10      LIVER FUNCTIONS - ( 10 Oct 2022 11:10 )  Alb: 3.3 g/dL / Pro: 7.3 g/dL / ALK PHOS: 108 U/L / ALT: 14 U/L DA / AST: 15 U/L / GGT: x             CAPILLARY BLOOD GLUCOSE        COVID-19 PCR: NotDetec (10 Oct 2022 16:13)      Radiology: Reviewed.   < from: CT Lumbar Spine No Cont (10.10.22 @ 12:08) >    ACC: [id number] EXAM:  CT LUMBAR SPINE                          PROCEDURE DATE:  10/10/2022          INTERPRETATION:  Noncontrast CT examination of the lumbar spine    CLINICAL INDICATION: Low back pain    TECHNIQUE:  Direct axial CT scanning of the lumbar spine was obtained   without the administration of intravenous contrast.  Sagittal and coronal   reformats were provided.    COMPARISON: None available    FINDINGS:    Marked compression fracture of T12. Discontinuity of the inferior and   superior endplates suggests acuity of fracture. Minimally retropulsed   bone at the level of the inferior endplate.    Moderate compression deformity of L2 appears chronic. A large hemangioma   occupies much of the L2 vertebral body.    Remaining vertebral bodies demonstrate normal height. Facet alignment is   maintained. Grade 1 anterolisthesis of L4 on L5.    Mild to moderate dextroscoliosis of the lumbar spine, the apex is at   L2-L3.    Diffuse disc space narrowing in the lumbar region.    Multilevel degenerative changes.    Interstitial fibrosis suggested at the lung bases. Nonspecific 3.0 cm   pleural-based nodular opacity at the right lung base.    IMPRESSION:    Findings suggesting acute marked compression fracture of T12 with   minimally retropulsed bone at the level of the inferior endplate.    Moderate compression deformity of L2 appears chronic. Large hemangioma   within the L2 vertebral body.    Multilevel degenerative changes.    Interstitial fibrosis suggested at the lung bases.Nonspecific 3.0 cm   pleural-based nodular opacity at the right lung base. If clinically   indicated, correlation with CT of the chest may be obtained for further   evaluation.    Dr. Landers discussed these findings with Dr. Ho on 10/10/2022 1:02   PM with read back.        --- End of Report ---            JESSICA LANDERS MD; Attending Radiologist  This document has been electronically signed. Oct 10 2022  1:02PM    < end of copied text >      ORT Score -   Family Hx of substance abuse	Female	      Male  Alcohol 	                                           1                     3  Illegal drugs	                                   2                     3  Rx drugs                                           4 	                  4  Personal Hx of substance abuse		  Alcohol 	                                          3	                  3  Illegal drugs                                     4	                  4  Rx drugs                                            5 	                  5  Age between 16- 45 years	           1                     1  hx preadolescent sexual abuse	   3 	                  0  Psychological disease		  ADD, OCD, bipolar, schizophrenia   2	          2  Depression                                           1 	          1  Total: 0    a score of 3 or lower indicates low risk for opioid abuse		  a score of 4-7 indicates moderate risk for opioid abuse		  a score of 8 or higher indicates high risk for opioid abuse  	  REVIEW OF SYSTEMS:  CONSTITUTIONAL: No fever or fatigue  HEENT:  No difficulty hearing, no change in vision  NECK: No pain or stiffness  RESPIRATORY: No cough, wheezing, chills or hemoptysis; No shortness of breath, + on chronic O2 at home during bedtime.   CARDIOVASCULAR: No chest pain, palpitations, dizziness, or leg swelling  GASTROINTESTINAL: No loss of appetite, decreased PO intake. No abdominal or epigastric pain. No nausea, vomiting; No diarrhea or constipation.   GENITOURINARY: No dysuria, frequency, hematuria, retention or incontinence  MUSCULOSKELETAL: + lumbar back pain. No joint swelling; no upper motor strength weakness, +  b/l lower motor strength weakness left >right (chronic), no saddle anesthesia, bowel/bladder incontinence, no falls   NEURO: No headaches, + numbness/tingling b/l toes, + chronic left leg weaknesss  PSYCHIATRIC: + hx anxiety     PHYSICAL EXAM:  GENERAL:  Alert & Oriented X4, cooperative, NAD, Good concentration. Speech is clear.   RESPIRATORY: Respirations even and unlabored. Clear to auscultation bilaterally; No rales, rhonchi, wheezing, or rubs + O2 3L NC   CARDIOVASCULAR: Normal S1/S2, regular rate and rhythm; No murmurs, rubs, or gallops. No JVD.   GASTROINTESTINAL: + obese per BMI Soft, Nontender, Nondistended; Bowel sounds present  PERIPHERAL VASCULAR:  Extremities warm without edema. 2+ Peripheral Pulses, No cyanosis, No calf tenderness  MUSCULOSKELETAL: Motor Strength 4/5 B/L upper and 3/5 left lower extremities 4/5 right lower extremitiy; + decreased ROM left LE; negative SLR; + lumbar paraspinal and lumbar back tenderness on palpation   SKIN: + ecchymosis of b/l arms and right lumbar back; Warm, dry, intact. No rashes, lesions, scars or wounds.     Risk factors associated with adverse outcomes related to opioid treatment  [X ]  Concurrent benzodiazepine use  [ ]  History/ Active substance use or alcohol use disorder  [X ] Psychiatric co-morbidity  [X ] Sleep apnea  [ ] COPD  [X ] BMI> 35  [ ] Liver dysfunction  [ ] Renal dysfunction  [X ] CHF  [X ] Former Smoker  [ X]  Age > 60 years    [X ]  [place]  Reviewed and Copied to Chart. See below.    Plan of care and goal oriented pain management treatment options were discussed with patient and /or primary care giver; all questions and concerns were addressed and care was aligned with patient's wishes.    Educated patient on goal oriented pain management treatment options

## 2022-10-18 NOTE — PROGRESS NOTE ADULT - ASSESSMENT
Confidential Drug Utilization Report  Search Terms: Suri Felix, 01/31/1931Search Date: 10/11/2022 08:29:56 AM  The Drug Utilization Report below displays all of the controlled substance prescriptions, if any, that your patient has filled in the last twelve months. The information displayed on this report is compiled from pharmacy submissions to the Department, and accurately reflects the information as submitted by the pharmacies.    This report was requested by: Lakshmi Mancini | Reference #: [id number]    You have not added a BECCA number. Keeping your BECCA number(s) up to date on the My BECCA # page will enable the separation of your prescriptions from others in the search results.    Others' Prescriptions  Patient Name: Suri FelixBirth Date: 01/31/1931  Address: [address], [place] [address]Dua: Female  Rx Written	Rx Dispensed	Drug	Quantity	Days Supply	Prescriber Name	Prescriber Becca #	Payment Method  10/25/2021	11/01/2021	clonazepam 0.5 mg tablet	90	30	Ricarda Ledezma NP	[id number]	Medicare  Dispenser [facility] #[id number]  * - Drugs marked with an asterisk are compound drugs. If the compound drug is made up of more than one controlled substance, then each controlled substance will be a separate row in the table.

## 2022-10-18 NOTE — PROGRESS NOTE ADULT - NS ATTEND AMEND GEN_ALL_CORE FT
Patient seen and examined on 10/18/22. This is a late entry. Case discussed with MISTY Martinez. Patient continues to complain of significant pain in her back, unrelieved by Oxycodone. She (and son) report that she was slurring her speech while on oxycodone and developed a muscle twitch. As such, will dc oxycodone and start tramadol. Spoke with Dr. Miles of neurology who will evaluate the patient and also with Dr. Verma of neurosurgery who will evaluate the patient on 10/19/22 after he finishes the OR. Given co-morbidities, do not feel patient is likely a good surgical candidate. Remaining care as noted above.

## 2022-10-18 NOTE — PROGRESS NOTE ADULT - PROBLEM SELECTOR PLAN 2
Mild pain   - Non-pharmacological pain treatment recommendations  - Warm/ Cool packs PRN   - Repositioning, imagery, relaxation, distraction.  - Physical therapy OOB if no contraindications   Recommendations discussed with primary team and RN.  Upon discharge recommend tramadol 50mg PO q6h PRN severe pain with bowel regimen, Tylenol 1G PO q8h PRN moderate pain, flexeril 5mg PO TID PRN muscle spams, and gabapentin 400mg PO TID. Recommend to f/u with outpatient pain management Dr. Ruiz at [phone], or telepain Dr. Bahena [phone].

## 2022-10-19 NOTE — PROGRESS NOTE ADULT - ASSESSMENT
89 year old female, from home, with PMH of HTN, COPD (on 3L home O2 at night), CHFrEF, ambulates with wheelchair or walker, presents with worsening severe low back pain for 2 weeks. S/p cortisone shot OP  with no improvement. CT Lumbar Spine revealed acute compression fracture of T12 with minimally retropulsed bone. Moderate compression deformity of L2 appears chronic. Large hemangioma within the L2 vertebral body.  Interstitial fibrosis suggested at the lung bases. Nonspecific 3.0 cm pleural-based nodular opacity at the right lung base  Admitted for Acute back pain, T12 compression fracture. Pending CT of thoracic spine & xray lumbosacral spine. Neurosurgery  and pain management following. As per Neurosurgery, pt may need surgery pending CT scan findings. Follow up on Neusg & Neurology's recommendations.

## 2022-10-19 NOTE — PROGRESS NOTE ADULT - SUBJECTIVE AND OBJECTIVE BOX
Source of information: VIDAL LOUIE, Chart review  Patient language: English  : n/a    HPI:  89 year old female, from home, with PMH of HTN, COPD (on 3L home O2 at night), CHFrEF, ambulates with wheelchair or walker, presents to ED with severe low back pain. She states the pain first started 2 weeks ago and got worse last Thursday. She went to an orthopedic doctor and got a cortisone shot, which she states did not help. Pt states the pain became severe yesterday, 10/10, achy stabbing in nature,  worsened by movement and ambulation. Denies recent trauma or falls. 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 Otherwise, she denies worsening numbness or tingling in LE, loss of sensation, weakness or urinary or bowel incontinence.    In ED: vitals HR 77, /64, Temp 98.4F, 99% on RA.  s/p ketorolac, Tylenol and flexeril in ED (10 Oct 2022 15:57)    CT lumbar demonstrates Findings suggesting acute marked compression fracture of T12 with   minimally retropulsed bone at the level of the inferior endplate.    Moderate compression deformity of L2 appears chronic. Large hemangioma   within the L2 vertebral body.    Multilevel degenerative changes.    Interstitial fibrosis suggested at the lung bases.Nonspecific 3.0 cm   pleural-based nodular opacity at the right lung base. If clinically   indicated, correlation with CT of the chest may be obtained for further   evaluation.    Thoracic CT demonstrates- SEVERE ACUTE COMPRESSION FRACTURE INFERIOR GREATER THAN SUPERIOR ENDPLATES OF T12. MINIMAL RETROPULSION WITH MINIMAL SPINAL CANAL NARROWING.  Pain consulted for back pain. Reports lumbar back pain started a couple weeks ago and progressively worsened. Denies trauma/ falls or hx back pain in the past. Reports taking Tylenol, aleve and had massage at home with no relief of pain. Was scheduled to begin her PT sessions on 10/10. Pt seen and examined at bedside. Pt laying in bed on her left side, reports lumbar back pain has improved to 6-7/10 and tolerable. SCALE USED: (1-10 VNRS). Pt describes pain as constant, intermittently sharp, greatest over right lumbar radiating to right abdomen, alleviated by pain medication, exacerbated by movement, standing and prolonged sitting. Pt also reports numbness over toes in b/l feet x 1 year and is on gabapentin at home. Reports lidocaine cream slightly improved her numbness. States she has tried different creams at home which have been unsuccessful in decreasing her numbness. Pt tolerating PO diet. Reports lethargy, Denies chest pain, SOB, nausea, vomiting. Reports hx constipation, last BM 10/18 x 2, pt on bowel regimen. Patient stated goal for pain control: to be able to take deep breaths, get out of bed to chair and ambulate with tolerable pain control. Pt has been out of bed to chair. Pt ambulate with walker and uses wheelchair at home at baseline. Plan to be discharged to rehab.     PAST MEDICAL & SURGICAL HISTORY:  Essential Hypertension      COPD (Chronic Obstructive Pulmonary Disease)  on home O2 AT NIGHT      Hip Fracture-Left      Hx of Breast Cancer  HAd Rt in       Chronic bronchitis      Anxiety disorder      Morbid obesity      DAVID on CPAP      Dysphagia      Neuropathy      S/P Insertion of IVC (Inferior Vena Caval) Filter      Joint Fixation (Surgical)- L Hip      S/P Breast Lumpectomy- left      Status Post Total Knee Replacement-bilateral      S/P           FAMILY HISTORY:  Family history of renal disease (Sibling)    Family history of lymphoma    Family history of congestive heart failure        Social History:  Pt lives at home with her . Denies current alcohol, tobacco or illicit drug use. States she smoked 2ppd for about 55 years. Quit smoking about 10 years ago. (10 Oct 2022 15:57)    Allergies    No Known Allergies    MEDICATIONS  (STANDING):  acetaminophen     Tablet .. 1000 milliGRAM(s) Oral every 8 hours  albuterol/ipratropium for Nebulization. 3 milliLiter(s) Nebulizer once  amLODIPine   Tablet 2.5 milliGRAM(s) Oral daily  aspirin enteric coated 81 milliGRAM(s) Oral daily  budesonide 160 MICROgram(s)/formoterol 4.5 MICROgram(s) Inhaler 2 Puff(s) Inhalation two times a day  cholecalciferol 1000 Unit(s) Oral daily  cyclobenzaprine 5 milliGRAM(s) Oral three times a day  enoxaparin Injectable 40 milliGRAM(s) SubCutaneous every 24 hours  furosemide    Tablet 40 milliGRAM(s) Oral daily  gabapentin 400 milliGRAM(s) Oral three times a day  influenza  Vaccine (HIGH DOSE) 0.7 milliLiter(s) IntraMuscular once  lidocaine   4% Patch 1 Patch Transdermal every 24 hours  lidocaine 4% Cream 1 Application(s) Topical two times a day  pantoprazole    Tablet 40 milliGRAM(s) Oral before breakfast  polyethylene glycol 3350 17 Gram(s) Oral daily  senna 2 Tablet(s) Oral at bedtime  tiotropium 18 MICROgram(s) Capsule 1 Capsule(s) Inhalation daily    MEDICATIONS  (PRN):  aluminum hydroxide/magnesium hydroxide/simethicone Suspension 30 milliLiter(s) Oral every 4 hours PRN Dyspepsia  melatonin 3 milliGRAM(s) Oral at bedtime PRN Insomnia  ondansetron Injectable 4 milliGRAM(s) IV Push every 8 hours PRN Nausea and/or Vomiting  traMADol 50 milliGRAM(s) Oral every 6 hours PRN Severe Pain (7 - 10)      Vital Signs Last 24 Hrs  T(C): 36.5 (19 Oct 2022 05:21), Max: 37 (18 Oct 2022 13:35)  T(F): 97.7 (19 Oct 2022 05:21), Max: 98.6 (18 Oct 2022 13:35)  HR: 72 (19 Oct 2022 11:16) (70 - 81)  BP: 112/63 (19 Oct 2022 05:21) (112/63 - 145/88)  BP(mean): 100 (18 Oct 2022 13:35) (100 - 100)  RR: 16 (19 Oct 2022 05:21) (16 - 19)  SpO2: 96% (19 Oct 2022 11:16) (93% - 96%)    Parameters below as of 19 Oct 2022 11:16  Patient On (Oxygen Delivery Method): nasal cannula      COVID-19 PCR: NotDetec (17 Oct 2022 06:15)  COVID-19 PCR: NotDetec (10 Oct 2022 16:13)    LABS: Reviewed                          12.9   7.14  )-----------( 254      ( 19 Oct 2022 07:34 )             41.4     10-19    142  |  105  |  20<H>  ----------------------------<  92  3.7   |  28  |  0.87    Ca    8.9      19 Oct 2022 07:34    TPro  6.2  /  Alb  2.8<L>  /  TBili  0.3  /  DBili  x   /  AST  23  /  ALT  29  /  AlkPhos  106  10      LIVER FUNCTIONS - ( 19 Oct 2022 07:34 )  Alb: 2.8 g/dL / Pro: 6.2 g/dL / ALK PHOS: 106 U/L / ALT: 29 U/L DA / AST: 23 U/L / GGT: x      Radiology: Reviewed.   < from: CT Thoracic Spine No Cont (10.14.22 @ 09:39) >    ACC: [id number] EXAM:  CT THORACIC SPINE                          PROCEDURE DATE:  10/14/2022          INTERPRETATION:  CT THORACIC SPINE    CLINICAL INFORMATION: back pain    TECHNIQUE:  Noncontrast CT.  Axial acquisition. Sagittal and coronal reformations.    FINDINGS:  FRACTURES:  *  Severe acute compression deformity inferior greater than superior   endplates of T12. Minimal retropulsion towards the inferior endplate with   minimal spinal canal narrowing. Residual anterior to posterior dimension   of the thecal sac measures 1.2 cm.  *  Mild chronic deformity superior endplate of T4..  ALIGNMENT:  *  Grade 1 anterolisthesis C7 on T1, T1 on T2 and T2 on T3 thought   related to the degenerative changes.  *  Very mild scoliosis  SPINAL COLUMN:  *  Diffuse osteoporosis.  *  Multilevel spondylosis. With mild degrees of disc space narrowing and   varying degrees of endplate degenerative changes/osteophytes.  *  Fusion of the facet joints bilaterally at T3-4 through T5-6.  OTHER:  *  Please see recent lumbar spine report for lumbar spine findings  *  Atherosclerotic calcification of the aorta is seen.  *  An IVC filter is noted.  *  Infiltrate is present at the right lung base    IMPRESSION:  SEVERE ACUTE COMPRESSION FRACTURE INFERIOR GREATER THAN SUPERIOR   ENDPLATES OF T12. MINIMAL RETROPULSION WITH MINIMAL SPINAL CANAL   NARROWING.    --- End of Report ---            GAYLA YATES MD; Attending Radiologist  This document has been electronically signed. Oct 14 2022  2:27PM    < end of copied text >    < from: CT Lumbar Spine No Cont (10.10.22 @ 12:08) >    ACC: [id number] EXAM:  CT LUMBAR SPINE                          PROCEDURE DATE:  10/10/2022          INTERPRETATION:  Noncontrast CT examination of the lumbar spine    CLINICAL INDICATION: Low back pain    TECHNIQUE:  Direct axial CT scanning of the lumbar spine was obtained   without the administration of intravenous contrast.  Sagittal and coronal   reformats were provided.    COMPARISON: None available    FINDINGS:    Marked compression fracture of T12. Discontinuity of the inferior and   superior endplates suggests acuity of fracture. Minimally retropulsed   bone at the level of the inferior endplate.    Moderate compression deformity of L2 appears chronic. A large hemangioma   occupies much of the L2 vertebral body.    Remaining vertebral bodies demonstrate normal height. Facet alignment is   maintained. Grade 1 anterolisthesis of L4 on L5.    Mild to moderate dextroscoliosis of the lumbar spine, the apex is at   L2-L3.    Diffuse disc space narrowing in the lumbar region.    Multilevel degenerative changes.    Interstitial fibrosis suggested at the lung bases. Nonspecific 3.0 cm   pleural-based nodular opacity at the right lung base.    IMPRESSION:    Findings suggesting acute marked compression fracture of T12 with   minimally retropulsed bone at the level of the inferior endplate.    Moderate compression deformity of L2 appears chronic. Large hemangioma   within the L2 vertebral body.    Multilevel degenerative changes.    Interstitial fibrosis suggested at the lung bases.Nonspecific 3.0 cm   pleural-based nodular opacity at the right lung base. If clinically   indicated, correlation with CT of the chest may be obtained for further   evaluation.    Dr. Mireles discussed these findings with Dr. Ho on 10/10/2022 1:02   PM with read back.        --- End of Report ---            JESSICA MIRELES MD; Attending Radiologist  This document has been electronically signed. Oct 10 2022  1:02PM    < end of copied text >      ORT Score -   Family Hx of substance abuse	Female	      Male  Alcohol 	                                           1                     3  Illegal drugs	                                   2                     3  Rx drugs                                           4 	                  4  Personal Hx of substance abuse		  Alcohol 	                                          3	                  3  Illegal drugs                                     4	                  4  Rx drugs                                            5 	                  5  Age between 16- 45 years	           1                     1  hx preadolescent sexual abuse	   3 	                  0  Psychological disease		  ADD, OCD, bipolar, schizophrenia   2	          2  Depression                                           1 	          1  Total: 0    a score of 3 or lower indicates low risk for opioid abuse		  a score of 4-7 indicates moderate risk for opioid abuse		  a score of 8 or higher indicates high risk for opioid abuse  	  REVIEW OF SYSTEMS:  CONSTITUTIONAL: No fever + fatigue  HEENT:  No difficulty hearing, no change in vision  NECK: No pain or stiffness  RESPIRATORY: No cough, wheezing, chills or hemoptysis; + dyspnea on exertion + on chronic O2 at home during bedtime.   CARDIOVASCULAR: No chest pain, palpitations, dizziness, or leg swelling  GASTROINTESTINAL: No loss of appetite, decreased PO intake. No abdominal or epigastric pain. No nausea, vomiting; No diarrhea or constipation.   GENITOURINARY: No dysuria, frequency, hematuria, retention or incontinence  MUSCULOSKELETAL: + lumbar back pain greatest over right side. No joint swelling; no upper motor strength weakness, +  b/l lower motor strength weakness (chronic), no saddle anesthesia, bowel/bladder incontinence, no falls   NEURO: No headaches, + numbness/tingling b/l toes, + chronic left leg weaknesss  PSYCHIATRIC: + hx anxiety     PHYSICAL EXAM:  GENERAL:  + fatigued, Anxious; alert & Oriented X4, cooperative, NAD, Good concentration. Speech is clear.   RESPIRATORY: Respirations even and unlabored. + coarse to auscultation bilaterally; No rales, rhonchi, wheezing, or rubs + SOB on exertion, on O2 4L NC   CARDIOVASCULAR: Normal S1/S2, regular rate and rhythm; No murmurs, rubs, or gallops. No JVD.   GASTROINTESTINAL: + obese per BMI Soft, Nontender, + distended; Bowel sounds present  PERIPHERAL VASCULAR:  Extremities warm without edema. 2+ Peripheral Pulses, No cyanosis, No calf tenderness  MUSCULOSKELETAL: Motor Strength 4/5 B/L upper and 4/5 b/l lower extremitiy; + decreased ROM left LE; negative SLR; + right lumbar back and right SI joint tenderness, no paraspinal tenderness.   SKIN: + ecchymosis of b/l arms and right lumbar back; + scattered moles generalized back; Warm, dry, intact. No rashes, lesions, scars or wounds.     Risk factors associated with adverse outcomes related to opioid treatment  [X ]  Concurrent benzodiazepine use  [ ]  History/ Active substance use or alcohol use disorder  [X ] Psychiatric co-morbidity  [X ] Sleep apnea  [ ] COPD  [X ] BMI> 35  [ ] Liver dysfunction  [ ] Renal dysfunction  [X ] CHF  [X ] Former Smoker  [ X]  Age > 60 years    [X ]  [place]  Reviewed and Copied to Chart. See below.    Plan of care and goal oriented pain management treatment options were discussed with patient and /or primary care giver; all questions and concerns were addressed and care was aligned with patient's wishes.    Educated patient on goal oriented pain management treatment options     10-19-22 @ 12:06

## 2022-10-19 NOTE — PROGRESS NOTE ADULT - ASSESSMENT
Mrs. Felix is a 89 y/o, female, with pmh of HTN, COPD ( 3L home), CHF, chronic low back pain with T12 compression fx, compression deformity L2 due to hemangioma, and DDD lumbar spine.     Plan   Neurosurgery: Compression Fx T12  Neurochecks Q4.   CT thoracic spine reviewed by neurosurgeon Dr. Verma.   TLSO brace advised to provide support and decrease pain with ambulation  F/b pain management. Continue with current regimen and vitamins advised by neurologist Dr. Miles.  Continue monitoring VS. Continue on BP regimens   Bowel regimen to avoid constipation.   PT/OT for strengthening of LE.   RADHA by PT recs.   No neurosurgical intervention advised at this time.   Plan of care discussed with Neurosurgeon .

## 2022-10-19 NOTE — PROGRESS NOTE ADULT - ASSESSMENT
Confidential Drug Utilization Report  Search Terms: Suri Felix, 01/31/1931Search Date: 10/11/2022 08:29:56 AM  The Drug Utilization Report below displays all of the controlled substance prescriptions, if any, that your patient has filled in the last twelve months. The information displayed on this report is compiled from pharmacy submissions to the Department, and accurately reflects the information as submitted by the pharmacies.    This report was requested by: Lakshmi Mancini | Reference #: [id number]    You have not added a BECCA number. Keeping your BECCA number(s) up to date on the My BECCA # page will enable the separation of your prescriptions from others in the search results.    Others' Prescriptions  Patient Name: Suri FelixBirth Date: 01/31/1931  Address: [address], [place] [address]Siz: Female  Rx Written	Rx Dispensed	Drug	Quantity	Days Supply	Prescriber Name	Prescriber Becca #	Payment Method  10/25/2021	11/01/2021	clonazepam 0.5 mg tablet	90	30	Ricarda Ledezma NP	[id number]	Medicare  Dispenser [facility] #[id number]  * - Drugs marked with an asterisk are compound drugs. If the compound drug is made up of more than one controlled substance, then each controlled substance will be a separate row in the table.

## 2022-10-19 NOTE — PROGRESS NOTE ADULT - NS ATTEND AMEND GEN_ALL_CORE FT
Patient seen and examined. Case discussed with MISTY Martinez. Patient reports she is still having pain, 7/10, but notes that it is improved from yesterday when the pain was 10/10. She reports better tolerance of the tramadol as well and denies having slurred speech or twitching with it. Will continue tramadol 50mg po q6hrs PRN severe pain for now. Appreciate neurology recommendations. Patient started on Vitamin D and calcium supplementation. Unfortunately, spoke with pharmacy, and we do not carry or have any calcitonin nasal spray to offer the patient. Could consider this as an outpatient option, however, for ongoing pain. Appreciate neurosurgery recommendations for TLSO brace. Patient to be re-evaluated by PT for possible RADHA as she reports feeling uncomfortable going home in her current condition. Remaining care as noted above.

## 2022-10-19 NOTE — PROGRESS NOTE ADULT - PROBLEM SELECTOR PLAN 2
Mild pain   - Non-pharmacological pain treatment recommendations  - Warm/ Cool packs PRN   - Repositioning, imagery, relaxation, distraction.  - Physical therapy OOB if no contraindications   Recommendations discussed with primary team and RN.  Upon discharge recommend tramadol 50mg PO q6h PRN severe pain with bowel regimen, Tylenol 1G PO q8h PRN moderate pain, flexeril 5mg PO TID PRN muscle spams, and gabapentin 400mg PO TID. Recommend to f/u with outpatient pain management Dr. Ruiz at [phone], or telepain Dr. Bahena [phone].

## 2022-10-19 NOTE — PROGRESS NOTE ADULT - SUBJECTIVE AND OBJECTIVE BOX
NP Note discussed with  primary attending    Patient is a 89y old  Female who presents with a chief complaint of compression fracture (19 Oct 2022 12:05)      INTERVAL HPI/OVERNIGHT EVENTS: no new complaints    MEDICATIONS  (STANDING):  acetaminophen     Tablet .. 1000 milliGRAM(s) Oral every 8 hours  albuterol/ipratropium for Nebulization. 3 milliLiter(s) Nebulizer once  amLODIPine   Tablet 2.5 milliGRAM(s) Oral daily  aspirin enteric coated 81 milliGRAM(s) Oral daily  budesonide 160 MICROgram(s)/formoterol 4.5 MICROgram(s) Inhaler 2 Puff(s) Inhalation two times a day  cholecalciferol 1000 Unit(s) Oral daily  cyclobenzaprine 5 milliGRAM(s) Oral three times a day  enoxaparin Injectable 40 milliGRAM(s) SubCutaneous every 24 hours  furosemide    Tablet 40 milliGRAM(s) Oral daily  gabapentin 400 milliGRAM(s) Oral three times a day  influenza  Vaccine (HIGH DOSE) 0.7 milliLiter(s) IntraMuscular once  lidocaine   4% Patch 1 Patch Transdermal every 24 hours  lidocaine 4% Cream 1 Application(s) Topical two times a day  pantoprazole    Tablet 40 milliGRAM(s) Oral before breakfast  polyethylene glycol 3350 17 Gram(s) Oral daily  senna 2 Tablet(s) Oral at bedtime  tiotropium 18 MICROgram(s) Capsule 1 Capsule(s) Inhalation daily    MEDICATIONS  (PRN):  aluminum hydroxide/magnesium hydroxide/simethicone Suspension 30 milliLiter(s) Oral every 4 hours PRN Dyspepsia  melatonin 3 milliGRAM(s) Oral at bedtime PRN Insomnia  ondansetron Injectable 4 milliGRAM(s) IV Push every 8 hours PRN Nausea and/or Vomiting  traMADol 50 milliGRAM(s) Oral every 6 hours PRN Severe Pain (7 - 10)      __________________________________________________  REVIEW OF SYSTEMS:    CONSTITUTIONAL: No fever,   EYES: no acute visual disturbances  NECK: No pain or stiffness  RESPIRATORY: No cough; No shortness of breath  CARDIOVASCULAR: No chest pain, no palpitations  GASTROINTESTINAL: No pain. No nausea or vomiting; No diarrhea   NEUROLOGICAL: No headache or numbness, no tremors  MUSCULOSKELETAL: No joint pain, no muscle pain  GENITOURINARY: no dysuria, no frequency, no hesitancy  PSYCHIATRY: no depression , no anxiety  ALL OTHER  ROS negative        Vital Signs Last 24 Hrs  T(C): 36.5 (19 Oct 2022 05:21), Max: 37 (18 Oct 2022 13:35)  T(F): 97.7 (19 Oct 2022 05:21), Max: 98.6 (18 Oct 2022 13:35)  HR: 72 (19 Oct 2022 11:16) (70 - 81)  BP: 112/63 (19 Oct 2022 05:21) (112/63 - 145/88)  BP(mean): 100 (18 Oct 2022 13:35) (100 - 100)  RR: 16 (19 Oct 2022 05:21) (16 - 19)  SpO2: 96% (19 Oct 2022 11:16) (93% - 96%)    Parameters below as of 19 Oct 2022 11:16  Patient On (Oxygen Delivery Method): nasal cannula        ________________________________________________  PHYSICAL EXAM:  GENERAL: NAD  HEENT: Normocephalic;  conjunctivae and sclerae clear; moist mucous membranes;   NECK : supple  CHEST/LUNG: Clear to ausculitation bilaterally with good air entry   HEART: S1 S2  regular; no murmurs, gallops or rubs  ABDOMEN: Soft, Nontender, Nondistended; Bowel sounds present  EXTREMITIES: no cyanosis; no edema; no calf tenderness  SKIN: warm and dry; no rash  NERVOUS SYSTEM:  Awake and alert; Oriented  to place, person and time ; no new deficits    _________________________________________________  LABS:                        12.9   7.14  )-----------( 254      ( 19 Oct 2022 07:34 )             41.4     10-19    142  |  105  |  20<H>  ----------------------------<  92  3.7   |  28  |  0.87    Ca    8.9      19 Oct 2022 07:34    TPro  6.2  /  Alb  2.8<L>  /  TBili  0.3  /  DBili  x   /  AST  23  /  ALT  29  /  AlkPhos  106  10-19        CAPILLARY BLOOD GLUCOSE            RADIOLOGY & ADDITIONAL TESTS:    Imaging Personally Reviewed:  YES    Consultant(s) Notes Reviewed:   YES    Care Discussed with Consultants :     Plan of care was discussed with patient and /or primary care giver; all questions and concerns were addressed and care was aligned with patient's wishes.

## 2022-10-19 NOTE — PROGRESS NOTE ADULT - SUBJECTIVE AND OBJECTIVE BOX
SUBJECTIVE:    Patient continues to have back pain. No weakness, numbness, urine/fecal incontinence.     Vital Signs Last 24 Hrs  T(C): 36.9 (19 Oct 2022 13:00), Max: 36.9 (19 Oct 2022 13:00)  T(F): 98.5 (19 Oct 2022 13:00), Max: 98.5 (19 Oct 2022 13:00)  HR: 75 (19 Oct 2022 13:00) (70 - 79)  BP: 145/75 (19 Oct 2022 13:00) (112/63 - 145/75)  BP(mean): --  RR: 19 (19 Oct 2022 13:00) (16 - 19)  SpO2: 93% (19 Oct 2022 13:00) (93% - 96%)    Parameters below as of 19 Oct 2022 13:00  Patient On (Oxygen Delivery Method): nasal cannula  O2 Flow (L/min): 4    PHYSICAL EXAM:    Constitutional: No Acute Distress     Neurological: AOx3, Following Commands, Moving all Extremities. CN I-XII intact.     Motor exam: 4/5 b/l EHL weakness. Otherwise strength intact. Negative clonus.                                                  Sensation: [x] intact to light touch  [] decreased:                                                  Pulmonary: Clear to Auscultation, No rales, No rhonchi, No wheezes     Cardiovascular: S1, S2, Regular rate and rhythm     Extremities: No calf tenderness     LABS:                        12.9   7.14  )-----------( 254      ( 19 Oct 2022 07:34 )             41.4    10-19    142  |  105  |  20<H>  ----------------------------<  92  3.7   |  28  |  0.87    Ca    8.9      19 Oct 2022 07:34    TPro  6.2  /  Alb  2.8<L>  /  TBili  0.3  /  DBili  x   /  AST  23  /  ALT  29  /  AlkPhos  106  10-19          MEDICATIONS:  Anticoagulation:   aspirin enteric coated 81 milliGRAM(s) Oral daily  enoxaparin Injectable 40 milliGRAM(s) SubCutaneous every 24 hours    Antibiotics:    Endo:    Neuro:  cyclobenzaprine 5 milliGRAM(s) Oral three times a day  gabapentin 400 milliGRAM(s) Oral three times a day  melatonin 3 milliGRAM(s) Oral at bedtime PRN Insomnia  ondansetron Injectable 4 milliGRAM(s) IV Push every 8 hours PRN Nausea and/or Vomiting  traMADol 50 milliGRAM(s) Oral every 6 hours PRN Severe Pain (7 - 10)    Cardiac:  amLODIPine   Tablet 2.5 milliGRAM(s) Oral daily  furosemide    Tablet 40 milliGRAM(s) Oral daily    Pulm:  albuterol/ipratropium for Nebulization. 3 milliLiter(s) Nebulizer once  budesonide 160 MICROgram(s)/formoterol 4.5 MICROgram(s) Inhaler 2 Puff(s) Inhalation two times a day  tiotropium 18 MICROgram(s) Capsule 1 Capsule(s) Inhalation daily    GI/:  aluminum hydroxide/magnesium hydroxide/simethicone Suspension 30 milliLiter(s) Oral every 4 hours PRN Dyspepsia  pantoprazole    Tablet 40 milliGRAM(s) Oral before breakfast  polyethylene glycol 3350 17 Gram(s) Oral daily  senna 2 Tablet(s) Oral at bedtime    Other:   calcium carbonate   1250 mG (OsCal) 1 Tablet(s) Oral two times a day  cholecalciferol 2000 Unit(s) Oral daily  influenza  Vaccine (HIGH DOSE) 0.7 milliLiter(s) IntraMuscular once  lidocaine   4% Patch 1 Patch Transdermal every 24 hours  lidocaine 4% Cream 1 Application(s) Topical two times a day    IMAGING:   < from: CT Thoracic Spine No Cont (10.14.22 @ 09:39) >    IMPRESSION:  SEVERE ACUTE COMPRESSION FRACTURE INFERIOR GREATER THAN SUPERIOR   ENDPLATES OF T12. MINIMAL RETROPULSION WITH MINIMAL SPINAL CANAL   NARROWING.    < end of copied text >

## 2022-10-19 NOTE — PROGRESS NOTE ADULT - PROBLEM SELECTOR PLAN 1
Pt with acute lumbar back pain which is somatic and neuropathic in nature due to radiating pain from marked compression fracture of T12 and moderate chronic compression deformity of L2, multilevel degenerative changes and large hemangioma within the L2 vertebral body. Thoracic CT demonstrates- SEVERE ACUTE COMPRESSION FRACTURE INFERIOR GREATER THAN SUPERIOR ENDPLATES OF T12. MINIMAL RETROPULSION WITH MINIMAL SPINAL CANAL NARROWING.  Pt also with chronic neuropathy.   High risk medications reviewed. Avoid polypharmacy. Avoid IV opioids. Avoid benzodiazepines. Non-pharmacological sleep aides initiated. Non-opioid medications and non-pharmacological pain management measures initiated.    Opioid pain recommendations   - Discontinues oxycodone 2.5 mg PO q 6 hours PRN severe pain 10/18- reports ineffectiveness in pain control.   - Continue Tramadol 50mg PO q6h PRN severe pain.  Non-opioid pain recommendations   - Continue Acetaminophen 1 gram PO q 8 hours until 10/19, then PRN moderate pain. Monitor LFTs  - Naproxen 250mg PO TID x 2 days completed with PPI on 10/13  - Continue Gabapentin 400mg po q 8 hours (Pt on 300mg PO TID home dose, increased on 10/14). Monitor renal function.   - Continue Lidoderm 4% patch daily.   - Continue Flexeril 5mg PO TID.   - Continue Lidocaine 4% cream b/l feet  Bowel Regimen  - Continue Miralax 17G PO daily  - Continue Senna 2 tablets at bedtime for constipation

## 2022-10-20 NOTE — PROGRESS NOTE ADULT - SUBJECTIVE AND OBJECTIVE BOX
Source of information: VIDAL LOUIE, Chart review  Patient language: English  : n/a    HPI:  89 year old female, from home, with PMH of HTN, COPD (on 3L home O2 at night), CHFrEF, ambulates with wheelchair or walker, presents to ED with severe low back pain. She states the pain first started 2 weeks ago and got worse last Thursday. She went to an orthopedic doctor and got a cortisone shot, which she states did not help. Pt states the pain became severe yesterday, 10/10, achy stabbing in nature,  worsened by movement and ambulation. Denies recent trauma or falls. 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 Otherwise, she denies worsening numbness or tingling in LE, loss of sensation, weakness or urinary or bowel incontinence.    In ED: vitals HR 77, /64, Temp 98.4F, 99% on RA.  s/p ketorolac, Tylenol and flexeril in ED (10 Oct 2022 15:57)      This is a Patient is a 89y old  Female who presents with a chief complaint of compression fracture (19 Oct 2022 16:07)  . Pt diagnosed with ... Pain consulted for ...Pt seen and examined at bedside. Pt reports PAIN SCORE:  *** SCALE USED: (1-10 VNRS). Pain adequately managed on current pain regimen. Pt describes pain as .... radiating to... alleviated by pain medications... exacerbated by movement.... Pt tolerating PO diet. Pt denies lethargy, nausea, vomiting, constipation and itchiness. Reports last BM ***. Patient stated goal for pain control: to be able to take deep breaths, get out of bed to chair and ambulate with tolerable pain control.     PAST MEDICAL & SURGICAL HISTORY:  Essential Hypertension      COPD (Chronic Obstructive Pulmonary Disease)  on home O2 AT NIGHT      Hip Fracture-Left      Hx of Breast Cancer  HAd Rt in       Chronic bronchitis      Anxiety disorder      Morbid obesity      DAVID on CPAP      Dysphagia      Neuropathy      S/P Insertion of IVC (Inferior Vena Caval) Filter      Joint Fixation (Surgical)- L Hip      S/P Breast Lumpectomy- left      Status Post Total Knee Replacement-bilateral      S/P           FAMILY HISTORY:  Family history of renal disease (Sibling)    Family history of lymphoma    Family history of congestive heart failure        Social History:  Pt lives at home with her . Denies current alcohol, tobacco or illicit drug use. States she smoked 2ppd for about 55 years. Quit smoking about 10 years ago. (10 Oct 2022 15:57)   [ ]Denies ETOH use, illicit drug use, and smoking     Allergies    No Known Allergies    Intolerances        MEDICATIONS  (STANDING):  albuterol/ipratropium for Nebulization. 3 milliLiter(s) Nebulizer once  amLODIPine   Tablet 2.5 milliGRAM(s) Oral daily  aspirin enteric coated 81 milliGRAM(s) Oral daily  budesonide 160 MICROgram(s)/formoterol 4.5 MICROgram(s) Inhaler 2 Puff(s) Inhalation two times a day  calcium carbonate   1250 mG (OsCal) 1 Tablet(s) Oral two times a day  cholecalciferol 2000 Unit(s) Oral daily  cyclobenzaprine 5 milliGRAM(s) Oral three times a day  enoxaparin Injectable 40 milliGRAM(s) SubCutaneous every 24 hours  furosemide    Tablet 40 milliGRAM(s) Oral daily  gabapentin 400 milliGRAM(s) Oral three times a day  influenza  Vaccine (HIGH DOSE) 0.7 milliLiter(s) IntraMuscular once  lidocaine   4% Patch 1 Patch Transdermal every 24 hours  lidocaine 4% Cream 1 Application(s) Topical two times a day  pantoprazole    Tablet 40 milliGRAM(s) Oral before breakfast  polyethylene glycol 3350 17 Gram(s) Oral daily  senna 2 Tablet(s) Oral at bedtime  tiotropium 18 MICROgram(s) Capsule 1 Capsule(s) Inhalation daily    MEDICATIONS  (PRN):  acetaminophen     Tablet .. 1000 milliGRAM(s) Oral every 8 hours PRN Moderate Pain (4 - 6)  aluminum hydroxide/magnesium hydroxide/simethicone Suspension 30 milliLiter(s) Oral every 4 hours PRN Dyspepsia  melatonin 3 milliGRAM(s) Oral at bedtime PRN Insomnia  ondansetron Injectable 4 milliGRAM(s) IV Push every 8 hours PRN Nausea and/or Vomiting  traMADol 50 milliGRAM(s) Oral every 6 hours PRN Severe Pain (7 - 10)      Vital Signs Last 24 Hrs  T(C): 36.5 (20 Oct 2022 05:18), Max: 36.9 (19 Oct 2022 13:00)  T(F): 97.7 (20 Oct 2022 05:18), Max: 98.5 (19 Oct 2022 13:00)  HR: 74 (20 Oct 2022 05:18) (72 - 77)  BP: 150/51 (20 Oct 2022 05:18) (144/66 - 150/51)  BP(mean): 74 (20 Oct 2022 05:18) (74 - 74)  RR: 18 (20 Oct 2022 05:18) (18 - 19)  SpO2: 95% (20 Oct 2022 05:18) (93% - 96%)    Parameters below as of 20 Oct 2022 05:18  Patient On (Oxygen Delivery Method): nasal cannula  O2 Flow (L/min): 4    COVID- PCR: NotDetec (17 Oct 2022 06:15)  COVID- PCR: NotDetec (10 Oct 2022 16:13)    LABS: Reviewed                          13.7   8.02  )-----------( 265      ( 20 Oct 2022 07:37 )             44.0     10-20    141  |  104  |  16  ----------------------------<  100<H>  3.8   |  29  |  0.78    Ca    9.1      20 Oct 2022 07:37    TPro  6.2  /  Alb  2.8<L>  /  TBili  0.3  /  DBili  x   /  AST  23  /  ALT  29  /  AlkPhos  106  10-19      LIVER FUNCTIONS - ( 19 Oct 2022 07:34 )  Alb: 2.8 g/dL / Pro: 6.2 g/dL / ALK PHOS: 106 U/L / ALT: 29 U/L DA / AST: 23 U/L / GGT: x             CAPILLARY BLOOD GLUCOSE        COVID- PCR: NotDetec (17 Oct 2022 06:15)  COVID-19 PCR: NotDetec (10 Oct 2022 16:13)      Radiology: Reviewed    ORT Score -   Family Hx of substance abuse	Female	      Male  Alcohol 	                                           1                     3  Illegal drugs	                                   2                     3  Rx drugs                                           4 	                  4  Personal Hx of substance abuse		  Alcohol 	                                          3	                  3  Illegal drugs                                     4	                  4  Rx drugs                                            5 	                  5  Age between 16- 45 years	           1                     1  hx preadolescent sexual abuse	   3 	                  0  Psychological disease		  ADD, OCD, bipolar, schizophrenia   2	          2  Depression                                           1 	          1  Total: 0    a score of 3 or lower indicates low risk for opioid abuse		  a score of 4-7 indicates moderate risk for opioid abuse		  a score of 8 or higher indicates high risk for opioid abuse    REVIEW OF SYSTEMS:  CONSTITUTIONAL: No fever or fatigue  HEENT:  No difficulty hearing, no change in vision  NECK: No pain or stiffness  RESPIRATORY: No cough, wheezing, chills or hemoptysis; No shortness of breath  CARDIOVASCULAR: No chest pain, palpitations, dizziness, or leg swelling  GASTROINTESTINAL: No loss of appetite, decreased PO intake. No abdominal or epigastric pain. No nausea, vomiting; No diarrhea or constipation.   GENITOURINARY: No dysuria, frequency, hematuria, retention or incontinence  MUSCULOSKELETAL: No joint pain or swelling; No muscle, back, or extremity pain, no upper or lower motor strength weakness, no saddle anesthesia, bowel/bladder incontinence, no falls   NEURO: No headaches, No numbness/tingling b/l LE, No weakness  ENDOCRINE: No polyuria, polydipsia, heat or cold intolerance; No hair loss  PSYCHIATRIC: No depression, anxiety or difficulty sleeping    PHYSICAL EXAM:  GENERAL:  Alert & Oriented X4, cooperative, NAD, Good concentration. Speech is clear.   RESPIRATORY: Respirations even and unlabored. Clear to auscultation bilaterally; No rales, rhonchi, wheezing, or rubs  CARDIOVASCULAR: Normal S1/S2, regular rate and rhythm; No murmurs, rubs, or gallops. No JVD.   GASTROINTESTINAL:  Soft, Nontender, Nondistended; Bowel sounds present  PERIPHERAL VASCULAR:  Extremities warm without edema. 2+ Peripheral Pulses, No cyanosis, No calf tenderness  MUSCULOSKELETAL: Motor Strength 5/5 B/L upper and lower extremities; moves all extremities equally against gravity; ROM intact; negative SLR; No tenderness on palpation of all joints.   SKIN: Warm, dry, intact. No rashes, lesions, scars or wounds.     Risk factors associated with adverse outcomes related to opioid treatment  [ ]  Concurrent benzodiazepine use  [ ]  History/ Active substance use or alcohol use disorder  [ ] Psychiatric co-morbidity  [ ] Sleep apnea  [ ] COPD  [ ] BMI> 35  [ ] Liver dysfunction  [ ] Renal dysfunction  [ ] CHF  [ ] Smoker  [ ]  Age > 60 years    [ ]  [place]  Reviewed and Copied to Chart. See below.    Plan of care and goal oriented pain management treatment options were discussed with patient and /or primary care giver; all questions and concerns were addressed and care was aligned with patient's wishes.    Educated patient on goal oriented pain management treatment options     10-20-22 @ 09:42     Source of information: VIDAL LOUIE, Chart review  Patient language: English  : n/a    HPI:  89 year old female, from home, with PMH of HTN, COPD (on 3L home O2 at night), CHFrEF, ambulates with wheelchair or walker, presents to ED with severe low back pain. She states the pain first started 2 weeks ago and got worse last Thursday. She went to an orthopedic doctor and got a cortisone shot, which she states did not help. Pt states the pain became severe yesterday, 10/10, achy stabbing in nature,  worsened by movement and ambulation. Denies recent trauma or falls. 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 Otherwise, she denies worsening numbness or tingling in LE, loss of sensation, weakness or urinary or bowel incontinence.    In ED: vitals HR 77, /64, Temp 98.4F, 99% on RA.  s/p ketorolac, Tylenol and flexeril in ED (10 Oct 2022 15:57)      Pain consulted for back pain. Reports lower lumbar back pain started a couple weeks ago and progressively worsened. Denies trauma/ falls or hx back pain in the past. Reports taking Tylenol, aleve at home with no relief. Pt seen and examined at bedside this morning. Pt laying in bed on her left side, reports lumbar back pain 9/10 currently, starting on bilateral buttocks and radiating to b/l upper legs. SCALE USED: (1-10 VNRS). Pt describes pain as constant, intermittently sharp, alleviated somewhat by pain medications, and exacerbated by movement, standing and prolonged sitting. Pt tolerating PO diet. Reports lethargy, Denies chest pain, SOB, nausea, vomiting, constipation. Last BM 10/18. Patient stated goal for pain control: to be able to take deep breaths, get out of bed to chair and ambulate with tolerable pain control. Pt has been out of bed to chair. Pt ambulate with walker and uses wheelchair at home at baseline.     PAST MEDICAL & SURGICAL HISTORY:  Essential Hypertension    COPD (Chronic Obstructive Pulmonary Disease)  on home O2 AT NIGHT    Hip Fracture-Left    Hx of Breast Cancer  HAd Rt in     Chronic bronchitis    Anxiety disorder    Morbid obesity    DAVID on CPAP    Dysphagia    Neuropathy    S/P Insertion of IVC (Inferior Vena Caval) Filter    Joint Fixation (Surgical)- L Hip    S/P Breast Lumpectomy- left    Status Post Total Knee Replacement-bilateral    S/P     FAMILY HISTORY:  Family history of renal disease (Sibling)    Family history of lymphoma    Family history of congestive heart failure    Social History:  Pt lives at home with her . Denies current alcohol, tobacco or illicit drug use. States she smoked 2ppd for about 55 years. Quit smoking about 10 years ago. (10 Oct 2022 15:57)   [x]Denies ETOH use, illicit drug use, and smoking     Allergies    No Known Allergies    Intolerances    MEDICATIONS  (STANDING):  albuterol/ipratropium for Nebulization. 3 milliLiter(s) Nebulizer once  amLODIPine   Tablet 2.5 milliGRAM(s) Oral daily  aspirin enteric coated 81 milliGRAM(s) Oral daily  budesonide 160 MICROgram(s)/formoterol 4.5 MICROgram(s) Inhaler 2 Puff(s) Inhalation two times a day  calcium carbonate   1250 mG (OsCal) 1 Tablet(s) Oral two times a day  cholecalciferol 2000 Unit(s) Oral daily  cyclobenzaprine 5 milliGRAM(s) Oral three times a day  enoxaparin Injectable 40 milliGRAM(s) SubCutaneous every 24 hours  furosemide    Tablet 40 milliGRAM(s) Oral daily  gabapentin 400 milliGRAM(s) Oral three times a day  influenza  Vaccine (HIGH DOSE) 0.7 milliLiter(s) IntraMuscular once  lidocaine   4% Patch 1 Patch Transdermal every 24 hours  lidocaine 4% Cream 1 Application(s) Topical two times a day  pantoprazole    Tablet 40 milliGRAM(s) Oral before breakfast  polyethylene glycol 3350 17 Gram(s) Oral daily  senna 2 Tablet(s) Oral at bedtime  tiotropium 18 MICROgram(s) Capsule 1 Capsule(s) Inhalation daily    MEDICATIONS  (PRN):  acetaminophen     Tablet .. 1000 milliGRAM(s) Oral every 8 hours PRN Moderate Pain (4 - 6)  aluminum hydroxide/magnesium hydroxide/simethicone Suspension 30 milliLiter(s) Oral every 4 hours PRN Dyspepsia  melatonin 3 milliGRAM(s) Oral at bedtime PRN Insomnia  ondansetron Injectable 4 milliGRAM(s) IV Push every 8 hours PRN Nausea and/or Vomiting  traMADol 50 milliGRAM(s) Oral every 6 hours PRN Severe Pain (7 - 10)    Vital Signs Last 24 Hrs  T(C): 36.5 (20 Oct 2022 05:18), Max: 36.9 (19 Oct 2022 13:00)  T(F): 97.7 (20 Oct 2022 05:18), Max: 98.5 (19 Oct 2022 13:00)  HR: 74 (20 Oct 2022 05:18) (72 - 77)  BP: 150/51 (20 Oct 2022 05:18) (144/66 - 150/51)  BP(mean): 74 (20 Oct 2022 05:18) (74 - 74)  RR: 18 (20 Oct 2022 05:18) (18 - 19)  SpO2: 95% (20 Oct 2022 05:18) (93% - 96%)    Parameters below as of 20 Oct 2022 05:18  Patient On (Oxygen Delivery Method): nasal cannula  O2 Flow (L/min): 4    COVID- PCR: NotDetec (17 Oct 2022 06:15)  COVID- PCR: NotDetec (10 Oct 2022 16:13)    LABS: Reviewed                          13.7   8.02  )-----------( 265      ( 20 Oct 2022 07:37 )             44.0     10-20    141  |  104  |  16  ----------------------------<  100<H>  3.8   |  29  |  0.78    Ca    9.1      20 Oct 2022 07:37    TPro  6.2  /  Alb  2.8<L>  /  TBili  0.3  /  DBili  x   /  AST  23  /  ALT  29  /  AlkPhos  106  10-19    LIVER FUNCTIONS - ( 19 Oct 2022 07:34 )  Alb: 2.8 g/dL / Pro: 6.2 g/dL / ALK PHOS: 106 U/L / ALT: 29 U/L DA / AST: 23 U/L / GGT: x           CAPILLARY BLOOD GLUCOSE    COVID-19 PCR: NotDetec (17 Oct 2022 06:15)  COVID-19 PCR: NotDetec (10 Oct 2022 16:13)    Radiology: Reviewed  ACC: [id number] EXAM:  CT THORACIC SPINE                          PROCEDURE DATE:  10/14/2022      INTERPRETATION:  CT THORACIC SPINE    CLINICAL INFORMATION: back pain    TECHNIQUE:  Noncontrast CT.  Axial acquisition. Sagittal and coronal reformations.    FINDINGS:  FRACTURES:  *  Severe acute compression deformity inferior greater than superior   endplates of T12. Minimal retropulsion towards the inferior endplate with   minimal spinal canal narrowing. Residual anterior to posterior dimension   of the thecal sac measures 1.2 cm.  *  Mild chronic deformity superior endplate of T4..  ALIGNMENT:  *  Grade 1 anterolisthesis C7 on T1, T1 on T2 and T2 on T3 thought   related to the degenerative changes.  *  Very mild scoliosis  SPINAL COLUMN:  *  Diffuse osteoporosis.  *  Multilevel spondylosis. With mild degrees of disc space narrowing and   varying degrees of endplate degenerative changes/osteophytes.  *  Fusion of the facet joints bilaterally at T3-4 through T5-6.  OTHER:  *  Please see recent lumbar spine report for lumbar spine findings  *  Atherosclerotic calcification of the aorta is seen.  *  An IVC filter is noted.  *  Infiltrate is present at the right lung base    IMPRESSION:  SEVERE ACUTE COMPRESSION FRACTURE INFERIOR GREATER THAN SUPERIOR   ENDPLATES OF T12. MINIMAL RETROPULSION WITH MINIMAL SPINAL CANAL   NARROWING.    --- End of Report ---    GAYLA YATES MD; Attending Radiologist  This document has been electronically signed. Oct 14 2022  2:27PM  ACC: [id number] EXAM:  CT LUMBAR SPINE                          PROCEDURE DATE:  10/10/2022      INTERPRETATION:  Noncontrast CT examination of the lumbar spine    CLINICAL INDICATION: Low back pain    TECHNIQUE:  Direct axial CT scanning of the lumbar spine was obtained   without the administration of intravenous contrast.  Sagittal and coronal   reformats were provided.    COMPARISON: None available    FINDINGS:    Marked compression fracture of T12. Discontinuity of the inferior and   superior endplates suggests acuity of fracture. Minimally retropulsed   bone at the level of the inferior endplate.    Moderate compression deformity of L2 appears chronic. A large hemangioma   occupies much of the L2 vertebral body.    Remaining vertebral bodies demonstrate normal height. Facet alignment is   maintained. Grade 1 anterolisthesis of L4 on L5.    Mild to moderate dextroscoliosis of the lumbar spine, the apex is at   L2-L3.    Diffuse disc space narrowing in the lumbar region.    Multilevel degenerative changes.    Interstitial fibrosis suggested at the lung bases. Nonspecific 3.0 cm   pleural-based nodular opacity at the right lung base.    IMPRESSION:    Findings suggesting acute marked compression fracture of T12 with   minimally retropulsed bone at the level of the inferior endplate.    Moderate compression deformity of L2 appears chronic. Large hemangioma   within the L2 vertebral body.    Multilevel degenerative changes.    Interstitial fibrosis suggested at the lung bases.Nonspecific 3.0 cm   pleural-based nodular opacity at the right lung base. If clinically   indicated, correlation with CT of the chest may be obtained for further   evaluation.    Dr. Mireles discussed these findings with Dr. Ho on 10/10/2022 1:02   PM with read back.    --- End of Report ---    JESSICA MIRELES MD; Attending Radiologist  This document has been electronically signed. Oct 10 2022  1:02PM    ORT Score -   Family Hx of substance abuse	Female	      Male  Alcohol 	                                           1                     3  Illegal drugs	                                   2                     3  Rx drugs                                           4 	                  4  Personal Hx of substance abuse		  Alcohol 	                                          3	                  3  Illegal drugs                                     4	                  4  Rx drugs                                            5 	                  5  Age between 16- 45 years	           1                     1  hx preadolescent sexual abuse	   3 	                  0  Psychological disease		  ADD, OCD, bipolar, schizophrenia   2	          2  Depression                                           1 	          1  Total: 0    a score of 3 or lower indicates low risk for opioid abuse		  a score of 4-7 indicates moderate risk for opioid abuse		  a score of 8 or higher indicates high risk for opioid abuse    REVIEW OF SYSTEMS:  CONSTITUTIONAL: No fever or fatigue  HEENT:  No difficulty hearing, no change in vision  NECK: No pain or stiffness  RESPIRATORY: No cough, wheezing, chills or hemoptysis; No shortness of breath  CARDIOVASCULAR: No chest pain, palpitations, dizziness, or leg swelling  GASTROINTESTINAL: No loss of appetite, decreased PO intake. No abdominal or epigastric pain. No nausea, vomiting; No diarrhea or constipation.   GENITOURINARY: No dysuria, frequency, hematuria, retention or incontinence  MUSCULOSKELETAL: + lumbar back pain, starting b/l gluteal area, radiating to b/l upper legs. No joint swelling; no upper motor strength weakness, +  b/l lower motor strength weakness (chronic), no saddle anesthesia, bowel/bladder incontinence, no falls   NEURO: No headaches, + numbness/tingling b/l toes, + chronic left leg weakness  PSYCHIATRIC: + hx anxiety     PHYSICAL EXAM:  GENERAL:  Alert & Oriented X4, +anxious. Speech is clear.   RESPIRATORY: Respirations even and unlabored. + coarse to auscultation b/l; No rales, rhonchi, wheezing, or rubs + SOB on exertion, on O2 4L NC   CARDIOVASCULAR: Normal S1/S2, regular rate and rhythm; No murmurs, rubs, or gallops. No JVD.   GASTROINTESTINAL:+ obese per BMI Soft, Nontender, + distended; Bowel sounds present  PERIPHERAL VASCULAR:  Extremities warm without edema. 2+ Peripheral Pulses, No cyanosis, No calf tenderness  MUSCULOSKELETAL: Motor Strength 4/5 B/L upper and 4/5 b/l lower extremity, + decreased ROM left LE; negative SLR; +lower  lumbar back radiating to gluteal area bilaterally and radiating to bilateral upper legs and right SI joint tenderness, no paraspinal tenderness.   SKIN: + ecchymosis of b/l arms; + scattered moles generalized back; Warm, dry, intact. No rashes, lesions, scars or wounds.        Risk factors associated with adverse outcomes related to opioid treatment  [x]  Concurrent benzodiazepine use  [ ]  History/ Active substance use or alcohol use disorder  [x] Psychiatric co-morbidity  [x] Sleep apnea  [] COPD  [ ] BMI> 35  [ ] Liver dysfunction  [ ] Renal dysfunction  [x] CHF  [x] Smoker  [x]  Age > 60 years    [x]  [place]  Reviewed and Copied to Chart. See below.    Plan of care and goal oriented pain management treatment options were discussed with patient and /or primary care giver; all questions and concerns were addressed and care was aligned with patient's wishes.    Educated patient on goal oriented pain management treatment options     10-20-22 @ 09:42

## 2022-10-20 NOTE — PROGRESS NOTE ADULT - PROBLEM SELECTOR PLAN 9
-  Patient admitted from home  -  Plan to discharge to [facility]  -  pending placement

## 2022-10-20 NOTE — PROGRESS NOTE ADULT - PROBLEM SELECTOR PLAN 1
-   Acute marked compression fracture of T12 with minimally retropulsed bone. Multilevel degenerative changes.  -   Non-traumatic, likely osteoporotic   -   Continue with  lidocaine patch  -   Continue with  Tylenol 650 mg q 6 hrs,  -   Continue with  gabapentin 300mg TID  -   Continue  neuro check q shift  -   PT eval , home PT   -   CT Thoracic spine and lumber x-ray show severe acute compression fracture of T12  -   Neurosurgery following TLSO brace ordered  -  Ortho notified  -   pain management following  -   Trial of toradol given today -   Acute marked compression fracture of T12 with minimally retropulsed bone. Multilevel degenerative changes.  -   Non-traumatic, likely osteoporotic   -   Continue with  lidocaine patch  -   Continue with  Tylenol 650 mg q 6 hrs,  -   Continue with  gabapentin 300mg TID  -   Continue  neuro check q shift  -   PT eval , home PT   -   CT Thoracic spine and lumber x-ray show severe acute compression fracture of T12  -   Neurosurgery following TLSO brace ordered  -  Ortho notified  -   pain management following  -  started on Decadron 2mg twice daily for  days.  -   Trial of toradol given today

## 2022-10-20 NOTE — PROGRESS NOTE ADULT - SUBJECTIVE AND OBJECTIVE BOX
NP Note discussed with  primary attending      Patient is a 89y old  Female who presents with a chief complaint of compression fracture (19 Oct 2022 12:05)      INTERVAL HPI / OVERNIGHT EVENTS: no new complaints      MEDICATIONS  (STANDING):  acetaminophen     Tablet .. 1000 milliGRAM(s) Oral every 8 hours  albuterol/ipratropium for Nebulization. 3 milliLiter(s) Nebulizer once  amLODIPine   Tablet 2.5 milliGRAM(s) Oral daily  aspirin enteric coated 81 milliGRAM(s) Oral daily  budesonide 160 MICROgram(s)/formoterol 4.5 MICROgram(s) Inhaler 2 Puff(s) Inhalation two times a day  calcium carbonate   1250 mG (OsCal) 1 Tablet(s) Oral two times a day  cholecalciferol 2000 Unit(s) Oral daily  cyclobenzaprine 5 milliGRAM(s) Oral three times a day  enoxaparin Injectable 40 milliGRAM(s) SubCutaneous every 24 hours  furosemide    Tablet 40 milliGRAM(s) Oral daily  gabapentin 400 milliGRAM(s) Oral three times a day  influenza  Vaccine (HIGH DOSE) 0.7 milliLiter(s) IntraMuscular once  lidocaine   4% Patch 2 Patch Transdermal daily  lidocaine 4% Cream 1 Application(s) Topical two times a day  pantoprazole    Tablet 40 milliGRAM(s) Oral before breakfast  polyethylene glycol 3350 17 Gram(s) Oral daily  senna 2 Tablet(s) Oral at bedtime  tiotropium 18 MICROgram(s) Capsule 1 Capsule(s) Inhalation daily    MEDICATIONS  (PRN):  aluminum hydroxide/magnesium hydroxide/simethicone Suspension 30 milliLiter(s) Oral every 4 hours PRN Dyspepsia  melatonin 3 milliGRAM(s) Oral at bedtime PRN Insomnia  ondansetron Injectable 4 milliGRAM(s) IV Push every 8 hours PRN Nausea and/or Vomiting  traMADol 50 milliGRAM(s) Oral every 6 hours PRN Severe Pain (7 - 10)        __________________________________________________  REVIEW OF SYSTEMS:    CONSTITUTIONAL: No fever, chills  EYES: no acute visual disturbances  NECK: No pain or stiffness  RESPIRATORY: No cough; No shortness of breath  CARDIOVASCULAR: No chest pain, no palpitations  GASTROINTESTINAL: No pain. No nausea or vomiting; No diarrhea   NEUROLOGICAL: No headache or numbness, no tremors  MUSCULOSKELETAL: No joint pain, no muscle pain  GENITOURINARY: no dysuria, no frequency, no hesitancy  PSYCHIATRY: no depression , no anxiety  ALL OTHER  ROS negative        Vital Signs Last 24 Hrs  T(C): 37 (20 Oct 2022 13:30), Max: 37 (20 Oct 2022 13:30)  T(F): 98.6 (20 Oct 2022 13:30), Max: 98.6 (20 Oct 2022 13:30)  HR: 81 (20 Oct 2022 13:30) (74 - 86)  BP: 120/61 (20 Oct 2022 13:30) (120/61 - 150/51)  BP(mean): 75 (20 Oct 2022 13:30) (74 - 75)  RR: 18 (20 Oct 2022 13:30) (18 - 19)  SpO2: 94% (20 Oct 2022 13:30) (93% - 97%)    Parameters below as of 20 Oct 2022 13:30  Patient On (Oxygen Delivery Method): nasal cannula  O2 Flow (L/min): 4        ________________________________________________  PHYSICAL EXAM:  GENERAL: No acute distress.  HEENT: Normocephalic;  conjunctivae and sclerae clear; moist mucous membranes;   NECK : supple.  No JVD  CHEST/LUNG: Clear to auscultation  bilaterally with good air entry.  No rales, wheezes or rhonchi  HEART: S1 S2  regular; no murmurs.  ABDOMEN: Soft, Nontender, Nondistended; Bowel sounds present  EXTREMITIES: no cyanosis; no edema; no calf tenderness  SKIN: warm and dry; no rash  NERVOUS SYSTEM:  Awake and alert; Oriented  to place, person and time ; no new deficits    _________________________________________________                            13.7   8.02  )-----------( 265      ( 20 Oct 2022 07:37 )             44.0       10-20    141  |  104  |  16  ----------------------------<  100<H>  3.8   |  29  |  0.78    Ca    9.1      20 Oct 2022 07:37    TPro  6.2  /  Alb  2.8<L>  /  TBili  0.3  /  DBili  x   /  AST  23  /  ALT  29  /  AlkPhos  106  10-19          RADIOLOGY & ADDITIONAL TESTS:    Imaging Personally Reviewed:  YES    Consultant(s) Notes Reviewed:   YES    Care Discussed with Consultants :     Plan of care was discussed with patient and /or primary care giver; all questions and concerns were addressed and care was aligned with patient's wishes.

## 2022-10-20 NOTE — PROGRESS NOTE ADULT - TIME BILLING
Coordination of care with pain management, orthopedics  Discussion of plan of care with patient, son  Risks/Benefits/Alternatives to transfer

## 2022-10-20 NOTE — PROGRESS NOTE ADULT - PROBLEM SELECTOR PLAN 5
-   On amlodipine 2.5mg, Lasix 40mg in AM and 20mg in PM  -   Continue with home dose of amlodipine   -   Continue with Lasix 40mg

## 2022-10-20 NOTE — PROGRESS NOTE ADULT - PROBLEM SELECTOR PLAN 1
Pt with acute lumbar back pain which is somatic and neuropathic in nature due to radiating pain from marked compression fracture of T12 and moderate chronic compression deformity of L2, multilevel degenerative changes and large hemangioma within the L2 vertebral body. Thoracic CT demonstrates- SEVERE ACUTE COMPRESSION FRACTURE INFERIOR GREATER THAN SUPERIOR ENDPLATES OF T12. MINIMAL RETROPULSION WITH MINIMAL SPINAL CANAL NARROWING. Pt also with chronic neuropathy, on Neurontin. High risk medications reviewed. Avoid polypharmacy. Avoid IV opioids. Avoid benzodiazepines. Non-pharmacological sleep aides initiated. Non-opioid medications and non-pharmacological pain management measures initiated.    Opioid pain recommendations   - Continue Tramadol 50mg PO q6h PRN severe pain.  Non-opioid pain recommendations   - Start Acetaminophen 1 gram PO q 8 hours for 3 days then PRN moderate pain. Monitor LFTs  - Continue Gabapentin 400mg po q 8 hours (Pt on 300mg PO TID home dose, increased on 10/14). Monitor renal function.   - Continue Lidoderm 4% patch daily and Lidoderm cream for feet.   - Continue Flexeril 5mg PO TID.   - Trial of Toradol 15 mg IVP x 1 per primary team.  Bowel Regimen  - Continue Miralax 17G PO daily  - Continue Senna 2 tablets at bedtime for constipation.  Mild pain   - Non-pharmacological pain treatment recommendations  - Warm/ Cool packs PRN   - Repositioning, imagery, relaxation, distraction.  - Physical therapy OOB if no contraindications   Recommendations discussed with primary team and RN.

## 2022-10-20 NOTE — PROGRESS NOTE ADULT - ASSESSMENT
Confidential Drug Utilization Report  Search Terms: Suri Felix, 01/31/1931Search Date: 10/11/2022 08:29:56 AM  The Drug Utilization Report below displays all of the controlled substance prescriptions, if any, that your patient has filled in the last twelve months. The information displayed on this report is compiled from pharmacy submissions to the Department, and accurately reflects the information as submitted by the pharmacies.    This report was requested by: Lakshmi Mancini | Reference #: [id number]    You have not added a BECCA number. Keeping your BECCA number(s) up to date on the My BECCA # page will enable the separation of your prescriptions from others in the search results.    Others' Prescriptions  Patient Name: Suri FelixBirth Date: 01/31/1931  Address: [id number], [place] [address]Mzi: Female  Rx Written	Rx Dispensed	Drug	Quantity	Days Supply	Prescriber Name	Prescriber Becca #	Payment Method  10/25/2021	11/01/2021	clonazepam 0.5 mg tablet	90	30	Ricarda Ledezma NP	[id number]	Medicare  Dispenser [facility] #[id number]  * - Drugs marked with an asterisk are compound drugs. If the compound drug is made up of more than one controlled substance, then each controlled substance will be a separate row in the table.

## 2022-10-20 NOTE — CHART NOTE - NSCHARTNOTEFT_GEN_A_CORE
Patient seen and examined at bedside for follow up. Patient found sitting in bed, awake and alert x 3, observed comfortably, no acute distress noted. Patient reports pain on lower back 7/10 currently, tolerable, able to move from side to side on bed. Patient reports receiving Toradol IV with some relief. Reports able to walk using walker with PT in room after pain medication given. As per discussion with attending , will order Calcitonin nasal spray: 1 spray in alternate nostril three times a day for 3 days then once a day and start Decadron 2 mg po BID with stat dose now. Patient instructed to request pain medication as needed for pain control. Will follow. Patient seen and examined at bedside for follow up. Patient found sitting in bed, awake and alert x 3, observed comfortably, no acute distress noted. Patient reports pain on lower back 7/10 currently, tolerable, able to move from side to side on bed. Patient reports receiving Toradol IV with some relief. Reports able to walk using walker with PT in room after pain medication given. As per discussion with attending , will order Calcitonin nasal spray: 1 spray in alternate nostril once a day and start Decadron 2 mg po BID with stat dose now. Patient instructed to request pain medication as needed for pain control. Will follow.

## 2022-10-20 NOTE — PROGRESS NOTE ADULT - ASSESSMENT
89 year old female, from home, with PMH of HTN, COPD (on 3L home O2 at night), CHFrEF, ambulates with wheelchair or walker, presents with worsening severe low back pain for 2 weeks. S/p cortisone shot OP  with no improvement. CT Lumbar Spine revealed acute compression fracture of T12 with minimally retropulsed bone. Moderate compression deformity of L2 appears chronic. Large hemangioma within the L2 vertebral body.  Interstitial fibrosis suggested at the lung bases. Nonspecific 3.0 cm pleural-based nodular opacity at the right lung base  Admitted for Acute back pain, T12 compression fracture. Pending CT of thoracic spine & xray lumbosacral spine. Neurosurgery  and pain management following.

## 2022-10-20 NOTE — PROGRESS NOTE ADULT - PROBLEM SELECTOR PLAN 7
Chief Complaint   Patient presents with   • Abnormal EKG     new patient       Subjective:   Charlene Min is a 56 y.o. female who presents today for evaluation of abnormal electrocardiogram.    The patient is seen at the request of STEPHEN Young for above issue.  He has about 4-year history of hypertension and family history of coronary artery disease.  She is also overweight and has been trying to control her weight.  At the end of February this year, she was seen in the emergency room after a ground-level fall she felt some discomfort in her chest.  An electrocardiogram was performed.  The heart rate at the time by my review was in the low 60 with QT interval 400 ms.  A computerized interpretation however stated that the heart rate was 142 bpm and corrected QTc was 615 with subsequent narrative statement of prolonged QT interval.  She was seen in follow-up in her primary care provider in August.    Another electrocardiogram was performed which showed heart rate of 53 bpm along with QT of 430 ms and QTc 404.  The QRS duration was felt to be at 110 ms with subsequent interpretation of incomplete left bundle branch block.  She was recently seen in bariatric medic medicine to help control her weight there was some concern about the findings on her electrocardiogram and subsequent referred to our clinic for further evaluation.  The patient denies any significant cardiac symptoms.  There has been no recent changes in her medications.  Her blood pressure has been well controlled on low-dose combination of metoprolol hydrochlorothiazide.      Past Medical History:   Diagnosis Date   • Hyperlipidemia    • Hypertension      Past Surgical History:   Procedure Laterality Date   • ABDOMINAL HYSTERECTOMY TOTAL     • TONSILLECTOMY       Family History   Problem Relation Age of Onset   • Heart Disease Mother    • Hypertension Mother    • Heart Attack Mother    • Lung Disease Father    • Heart Disease Father    • Cancer Brother   "       colorectal     Social History     Social History   • Marital status: Single     Spouse name: N/A   • Number of children: N/A   • Years of education: N/A     Occupational History   • Not on file.     Social History Main Topics   • Smoking status: Former Smoker     Years: 35.00     Types: Cigarettes   • Smokeless tobacco: Never Used   • Alcohol use No   • Drug use: No   • Sexual activity: Not on file     Other Topics Concern   • Not on file     Social History Narrative   • No narrative on file     Allergies   Allergen Reactions   • Pcn [Penicillins]      Outpatient Encounter Prescriptions as of 11/6/2018   Medication Sig Dispense Refill   • metoprolol-hydrochlorothiazide (LOPRESSOR HCT) 50-25 MG per tablet Take 0.5 Tabs by mouth every day. 45 Tab 1   • buPROPion (WELLBUTRIN XL) 300 MG XL tablet Take 1 Tab by mouth every morning. 30 Tab 11     No facility-administered encounter medications on file as of 11/6/2018.      Review of Systems   Constitutional: Negative for fever, malaise/fatigue and weight loss.        Has been gaining weight about 10 pounds a year   HENT: Negative for congestion and hearing loss.    Eyes: Negative for blurred vision.   Respiratory: Negative for shortness of breath.         No snoring or daytime somnolence   Cardiovascular: Negative for chest pain, palpitations and leg swelling.   Gastrointestinal: Negative for diarrhea, heartburn, nausea and vomiting.   Neurological: Negative for dizziness, sensory change, focal weakness, loss of consciousness and weakness.   Endo/Heme/Allergies: Does not bruise/bleed easily.   Psychiatric/Behavioral: Positive for depression.   All other systems reviewed and are negative.       Objective:   /76 (BP Location: Left arm)   Pulse 62   Ht 1.651 m (5' 5\")   Wt 113.4 kg (250 lb)   SpO2 97%   BMI 41.60 kg/m²     Physical Exam   Constitutional: She is oriented to person, place, and time. No distress.   Obese   HENT:   Head: Normocephalic and " atraumatic.   Eyes: Pupils are equal, round, and reactive to light. EOM are normal.   Neck: Neck supple. No JVD present. No thyromegaly present.   Cardiovascular: Normal rate, regular rhythm and normal heart sounds.  Exam reveals no gallop.    No murmur heard.  Pulmonary/Chest: Effort normal and breath sounds normal. No respiratory distress. She has no rales.   Abdominal: Soft. She exhibits no distension. There is no tenderness.   Musculoskeletal: She exhibits no edema or deformity.   Neurological: She is alert and oriented to person, place, and time.   Skin: Skin is warm.   Psychiatric: She has a normal mood and affect. Her behavior is normal.     Electrocardiogram from August 15 by my review show sinus bradycardia with mild nonspecific intraventricular conduction delay, normal QTc and no significant ST changes  Most recent CMP from June were normal except glucose of 104.  LDL was 97 HDL 32    Assessment:     1. Abnormal electrocardiogram (ECG) (EKG)  EC-ECHOCARDIOGRAM COMPLETE W/O CONT   2. Essential hypertension, benign  EC-ECHOCARDIOGRAM COMPLETE W/O CONT       Medical Decision Making:  Today's Assessment / Status / Plan:     I did review both electrocardiograms with the patient.  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  The computerized interpretation of prolonged QTc on EKG in February was incorrect. This was clearly due to inaccurate heart rate measurement. This was not present on repeat EKG in August.  I did not believe that her EKG in August showed incomplete left bundle branch block.  There was mild nonspecific interventricular conduction delay.  She has chronic hypertension and on Wellbutrin.  We obtain echocardiography to assess cardiac function and rule out any significant ventricular hypertrophy as well as any other structural abnormality.  From cardiac standpoint, she most likely should be able to take weight loss medication if she prefer to do so.  The  patient at this point however informed me that she would prefer diet control for now.  Her blood pressure According to her has been well controlled. We will continue her current medications. We will keep you posted about finding further recommendation as they become available.  Thank you for allowing us to participate in the care of this patient.   -   Continue with Alprazolam 0.5mg PRN  -   Continue with emotional support

## 2022-10-20 NOTE — PROGRESS NOTE ADULT - NS ATTEND AMEND GEN_ALL_CORE FT
Patient seen and examined. Case discussed with MISTY Menjivar, patient, son, and Cash from patient relations at bedside. Patient and son are frustrated that despite efforts to control the patient's pain, it remains uncontrolled. Explained that much of pain management is trial and error and that we were trying different medications to assist in the patient's comfort. Toradol 15mg IV x1 given today with some slight relief, but explained that we wished to limit NSAIDS in a 89 yo F. I also spoke with NP Prem of pain management who spoke with pharmacy and they were able to procure some calcitonin nasal spray for the patient to use as per neurology recommendations. Will also start trial of dexamethasone 2mg po BID x 5 days to try to assist with the pain. MISTY Menjivar spoke with orthopedics who report they will order the TLSO brace. Patient and son are more familiar with [facility] in [place] and [facility] and are pondering transfer there. I explained to them that in order for the process to occur, they would need to find their own accepting physician there. Patient's son expressed understanding. Remaining care as noted above.

## 2022-10-20 NOTE — PROGRESS NOTE ADULT - PROBLEM SELECTOR PLAN 3
-   Wears 2L home O2 at night, no in excerebration  -   CT Scan shows incidental interstitial fibrosis suggested at the lung bases. Nonspecific 3.0 cm pleural-based nodular opacity at the right lung base.  -   Continue with home inhalers Spiriva and Symbicort  -   Continue with  2L supplemental O2 at night time  -   Outpatient evaluation for nodular opacity documented on CT

## 2022-10-20 NOTE — PROGRESS NOTE ADULT - PROBLEM SELECTOR PLAN 4
-  Last echo on record 2018 showed EF 40% and mild stage I diastolic dysfunction  -  Euvolemic  -  Continue with  Lasix 40mg qd

## 2022-10-21 NOTE — PROGRESS NOTE ADULT - NS_MD_PANP_GEN_ALL_CORE
Shave wound care provided to patient. All questions were answered. Patient verbalized understanding of information given. Name and phone number provided if needed. Time out involving the provider, staff and patient in exam room performed prior to procedure.    VANESSA Zacarias      
Attending and PA/NP shared services statement (NON-critical care):

## 2022-10-21 NOTE — PROGRESS NOTE ADULT - ASSESSMENT
89 year old female, from home, with PMH of HTN, COPD (on 3L home O2 at night), CHFrEF, ambulates with wheelchair or walker, presents with worsening severe low back pain for 2 weeks. S/p cortisone shot OP  with no improvement. CT Lumbar Spine revealed acute compression fracture of T12 with minimally retropulsed bone. Moderate compression deformity of L2 appears chronic. Large hemangioma within the L2 vertebral body.  Interstitial fibrosis suggested at the lung bases. Nonspecific 3.0 cm pleural-based nodular opacity at the right lung base  Admitted for Acute back pain, T12 compression fracture.  Neurosurgery  and pain management following. Neurosurgery recommends TLSO brace on discharge. Neuro recs Calcitonin nasal spray & dexamethasone. Updated PT eval recs RADHA. Plan is for d/c planning on Mon 10/24.

## 2022-10-21 NOTE — CHART NOTE - NSCHARTNOTEFT_GEN_A_CORE
Pt is s/p t12 compression fracture with pain 7/10  Pt measured and orthosis modified to her dimensions.  Posterior panel adjusted   PT present during fitting and delivery. He will assist with marti and sary  Written instructions left at bedside  Follow up if needed    Wes SCHUMACHER  [facility]  [phone]

## 2022-10-21 NOTE — PROGRESS NOTE ADULT - PROBLEM SELECTOR PLAN 9
-  Patient admitted from home  -  Plan to discharge to [facility] on Mon 10/24-  pending placement  - Needs TLSO brace on discharge as per neurosurgery

## 2022-10-21 NOTE — PROGRESS NOTE ADULT - NS ATTEND AMEND GEN_ALL_CORE FT
Patient seen and examined. Case discussed with patient, son, and  at bedside.  is visiting today because it is the patient's 60th wedding anniversary. She reports that she is saddened that she is unable to be at home for this milestone. Patient seen and examined. Case discussed with patient, son, and  at bedside.  is visiting today because it is the patient's 60th wedding anniversary. She reports that she is saddened that she is unable to be at home for this milestone. Reports that pain was better controlled earlier but reoccurred in the lumbar region when her TLSO brace was placed on. She did not receive any tramadol overnight, but did require it after the brace was placed. Continue calcitonin nasal spray with alternating nostrils and 5 day course of dexamethasone (currently day 2). COVID remains negative today. Patient is recommended for RADHA. Family would like for her to go to [facility]. Referral sent by CM. Remaining care as noted above.

## 2022-10-21 NOTE — PROGRESS NOTE ADULT - PROBLEM SELECTOR PLAN 1
-   Acute marked compression fracture of T12 with minimally retropulsed bone. Multilevel degenerative changes.  -   Non-traumatic, likely osteoporotic   -   Continue with  lidocaine patch  -   Continue with  Tylenol 650 mg q 6 hrs,  -   Continue with  gabapentin 300mg TID  -   Continue  neuro check q shift  -   PT eval , home PT   -   CT Thoracic spine and lumber x-ray show severe acute compression fracture of T12  -   Neurosurgery following TLSO brace ordered  -  Ortho notified  -   pain management following  -  started on Decadron 2mg twice daily for  days.  -   Trial of toradol given today -   Acute marked compression fracture of T12 with minimally retropulsed bone. Multilevel degenerative changes.  -   Non-traumatic, likely osteoporotic   -   Continue with  lidocaine patch  -   Continue with  Tylenol 650 mg q 6 hrs,  -   Continue with  gabapentin 300mg TID  -   Continue  neuro check q shift  -   PT eval , home PT   -   CT Thoracic spine and lumber x-ray show severe acute compression fracture of T12  -   Neurosurgery following TLSO brace ordered  -  Ortho notified  -   pain management following  -  started on Decadron 2mg twice daily for  days.  -   Trial of toradol given on 10/20/22

## 2022-10-21 NOTE — PROGRESS NOTE ADULT - SUBJECTIVE AND OBJECTIVE BOX
NP Note discussed with  primary attending    Patient is a 89y old  Female who presents with a chief complaint of compression fracture (21 Oct 2022 09:38)      INTERVAL HPI/OVERNIGHT EVENTS: no new complaints    MEDICATIONS  (STANDING):  acetaminophen     Tablet .. 1000 milliGRAM(s) Oral every 8 hours  albuterol/ipratropium for Nebulization. 3 milliLiter(s) Nebulizer once  amLODIPine   Tablet 2.5 milliGRAM(s) Oral daily  aspirin enteric coated 81 milliGRAM(s) Oral daily  budesonide 160 MICROgram(s)/formoterol 4.5 MICROgram(s) Inhaler 2 Puff(s) Inhalation two times a day  calcitonin Nasal 1 Spray(s) Nasal daily  calcium carbonate   1250 mG (OsCal) 1 Tablet(s) Oral two times a day  cholecalciferol 2000 Unit(s) Oral daily  cyclobenzaprine 5 milliGRAM(s) Oral three times a day  dexAMETHasone     Tablet 2 milliGRAM(s) Oral two times a day  enoxaparin Injectable 40 milliGRAM(s) SubCutaneous every 24 hours  furosemide    Tablet 40 milliGRAM(s) Oral daily  gabapentin 400 milliGRAM(s) Oral three times a day  influenza  Vaccine (HIGH DOSE) 0.7 milliLiter(s) IntraMuscular once  lidocaine   4% Patch 2 Patch Transdermal daily  lidocaine 4% Cream 1 Application(s) Topical two times a day  pantoprazole    Tablet 40 milliGRAM(s) Oral before breakfast  polyethylene glycol 3350 17 Gram(s) Oral daily  senna 2 Tablet(s) Oral at bedtime  tiotropium 18 MICROgram(s) Capsule 1 Capsule(s) Inhalation daily    MEDICATIONS  (PRN):  aluminum hydroxide/magnesium hydroxide/simethicone Suspension 30 milliLiter(s) Oral every 4 hours PRN Dyspepsia  melatonin 3 milliGRAM(s) Oral at bedtime PRN Insomnia  ondansetron Injectable 4 milliGRAM(s) IV Push every 8 hours PRN Nausea and/or Vomiting  traMADol 50 milliGRAM(s) Oral every 6 hours PRN Severe Pain (7 - 10)      __________________________________________________  REVIEW OF SYSTEMS:    CONSTITUTIONAL: No fever,   EYES: no acute visual disturbances  NECK: No pain or stiffness  RESPIRATORY: No cough; No shortness of breath  CARDIOVASCULAR: No chest pain, no palpitations  GASTROINTESTINAL: No pain. No nausea or vomiting; No diarrhea   NEUROLOGICAL: No headache or numbness, no tremors  MUSCULOSKELETAL: No joint pain, no muscle pain  GENITOURINARY: no dysuria, no frequency, no hesitancy  PSYCHIATRY: no depression , no anxiety  ALL OTHER  ROS negative        Vital Signs Last 24 Hrs  T(C): 36.4 (21 Oct 2022 14:49), Max: 36.8 (20 Oct 2022 20:44)  T(F): 97.6 (21 Oct 2022 14:49), Max: 98.3 (20 Oct 2022 20:44)  HR: 95 (21 Oct 2022 14:49) (74 - 95)  BP: 152/76 (21 Oct 2022 14:49) (134/54 - 161/83)  BP(mean): --  RR: 19 (21 Oct 2022 14:49) (16 - 19)  SpO2: 95% (21 Oct 2022 14:49) (94% - 96%)    Parameters below as of 21 Oct 2022 14:49  Patient On (Oxygen Delivery Method): nasal cannula  O2 Flow (L/min): 4      ________________________________________________  PHYSICAL EXAM:  GENERAL: NAD  HEENT: Normocephalic;  conjunctivae and sclerae clear; moist mucous membranes;   NECK : supple  CHEST/LUNG: Clear to ausculitation bilaterally with good air entry   HEART: S1 S2  regular; no murmurs, gallops or rubs  ABDOMEN: Soft, Nontender, Nondistended; Bowel sounds present  EXTREMITIES: no cyanosis; no edema; no calf tenderness  SKIN: warm and dry; no rash  NERVOUS SYSTEM:  Awake and alert; Oriented  to place, person and time ; no new deficits    _________________________________________________  LABS:                        13.7   8.02  )-----------( 265      ( 20 Oct 2022 07:37 )             44.0     10-20    141  |  104  |  16  ----------------------------<  100<H>  3.8   |  29  |  0.78    Ca    9.1      20 Oct 2022 07:37          CAPILLARY BLOOD GLUCOSE            RADIOLOGY & ADDITIONAL TESTS:    Imaging Personally Reviewed:  YES    Consultant(s) Notes Reviewed:   YES    Care Discussed with Consultants :     Plan of care was discussed with patient and /or primary care giver; all questions and concerns were addressed and care was aligned with patient's wishes.

## 2022-10-21 NOTE — PROGRESS NOTE ADULT - SUBJECTIVE AND OBJECTIVE BOX
Source of information: VIDAL LOUIE, Chart review  Patient language: English  : n/a    HPI:  89 year old female, from home, with PMH of HTN, COPD (on 3L home O2 at night), CHFrEF, ambulates with wheelchair or walker, presents to ED with severe low back pain. She states the pain first started 2 weeks ago and got worse last Thursday. She went to an orthopedic doctor and got a cortisone shot, which she states did not help. Pt states the pain became severe yesterday, 10/10, achy stabbing in nature,  worsened by movement and ambulation. Denies recent trauma or falls. 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 Otherwise, she denies worsening numbness or tingling in LE, loss of sensation, weakness or urinary or bowel incontinence.    In ED: vitals HR 77, /64, Temp 98.4F, 99% on RA.  s/p ketorolac, Tylenol and flexeril in ED (10 Oct 2022 15:57)      This is a Patient is a 89y old  Female who presents with a chief complaint of compression fracture (20 Oct 2022 14:24)  . Pt diagnosed with ... Pain consulted for ...Pt seen and examined at bedside. Pt reports PAIN SCORE:  *** SCALE USED: (1-10 VNRS). Pain adequately managed on current pain regimen. Pt describes pain as .... radiating to... alleviated by pain medications... exacerbated by movement.... Pt tolerating PO diet. Pt denies lethargy, nausea, vomiting, constipation and itchiness. Reports last BM ***. Patient stated goal for pain control: to be able to take deep breaths, get out of bed to chair and ambulate with tolerable pain control.     PAST MEDICAL & SURGICAL HISTORY:  Essential Hypertension      COPD (Chronic Obstructive Pulmonary Disease)  on home O2 AT NIGHT      Hip Fracture-Left      Hx of Breast Cancer  HAd Rt in       Chronic bronchitis      Anxiety disorder      Morbid obesity      DAVID on CPAP      Dysphagia      Neuropathy      S/P Insertion of IVC (Inferior Vena Caval) Filter      Joint Fixation (Surgical)- L Hip      S/P Breast Lumpectomy- left      Status Post Total Knee Replacement-bilateral      S/P           FAMILY HISTORY:  Family history of renal disease (Sibling)    Family history of lymphoma    Family history of congestive heart failure        Social History:  Pt lives at home with her . Denies current alcohol, tobacco or illicit drug use. States she smoked 2ppd for about 55 years. Quit smoking about 10 years ago. (10 Oct 2022 15:57)   [ ]Denies ETOH use, illicit drug use, and smoking     Allergies    No Known Allergies    Intolerances        MEDICATIONS  (STANDING):  acetaminophen     Tablet .. 1000 milliGRAM(s) Oral every 8 hours  albuterol/ipratropium for Nebulization. 3 milliLiter(s) Nebulizer once  amLODIPine   Tablet 2.5 milliGRAM(s) Oral daily  aspirin enteric coated 81 milliGRAM(s) Oral daily  budesonide 160 MICROgram(s)/formoterol 4.5 MICROgram(s) Inhaler 2 Puff(s) Inhalation two times a day  calcitonin Nasal 1 Spray(s) Nasal daily  calcium carbonate   1250 mG (OsCal) 1 Tablet(s) Oral two times a day  cholecalciferol 2000 Unit(s) Oral daily  cyclobenzaprine 5 milliGRAM(s) Oral three times a day  dexAMETHasone     Tablet 2 milliGRAM(s) Oral two times a day  enoxaparin Injectable 40 milliGRAM(s) SubCutaneous every 24 hours  furosemide    Tablet 40 milliGRAM(s) Oral daily  gabapentin 400 milliGRAM(s) Oral three times a day  influenza  Vaccine (HIGH DOSE) 0.7 milliLiter(s) IntraMuscular once  lidocaine   4% Patch 2 Patch Transdermal daily  lidocaine 4% Cream 1 Application(s) Topical two times a day  pantoprazole    Tablet 40 milliGRAM(s) Oral before breakfast  polyethylene glycol 3350 17 Gram(s) Oral daily  senna 2 Tablet(s) Oral at bedtime  tiotropium 18 MICROgram(s) Capsule 1 Capsule(s) Inhalation daily    MEDICATIONS  (PRN):  aluminum hydroxide/magnesium hydroxide/simethicone Suspension 30 milliLiter(s) Oral every 4 hours PRN Dyspepsia  melatonin 3 milliGRAM(s) Oral at bedtime PRN Insomnia  ondansetron Injectable 4 milliGRAM(s) IV Push every 8 hours PRN Nausea and/or Vomiting  traMADol 50 milliGRAM(s) Oral every 6 hours PRN Severe Pain (7 - 10)      Vital Signs Last 24 Hrs  T(C): 36.6 (21 Oct 2022 05:25), Max: 37 (20 Oct 2022 13:30)  T(F): 97.8 (21 Oct 2022 05:25), Max: 98.6 (20 Oct 2022 13:30)  HR: 75 (21 Oct 2022 05:25) (74 - 86)  BP: 134/64 (21 Oct 2022 05:25) (120/61 - 143/59)  BP(mean): 75 (20 Oct 2022 13:30) (75 - 75)  RR: 16 (21 Oct 2022 05:25) (16 - 18)  SpO2: 95% (21 Oct 2022 05:25) (94% - 97%)    Parameters below as of 21 Oct 2022 05:25  Patient On (Oxygen Delivery Method): nasal cannula  O2 Flow (L/min): 4    COVID-19 PCR: NotDetec (17 Oct 2022 06:15)  COVID-19 PCR: NotDetec (10 Oct 2022 16:13)    LABS: Reviewed                          13.7   8.02  )-----------( 265      ( 20 Oct 2022 07:37 )             44.0     10-20    141  |  104  |  16  ----------------------------<  100<H>  3.8   |  29  |  0.78    Ca    9.1      20 Oct 2022 07:37            CAPILLARY BLOOD GLUCOSE        COVID- PCR: NotDetec (17 Oct 2022 06:15)  COVID- PCR: NotDetec (10 Oct 2022 16:13)      Radiology: Reviewed    ORT Score -   Family Hx of substance abuse	Female	      Male  Alcohol 	                                           1                     3  Illegal drugs	                                   2                     3  Rx drugs                                           4 	                  4  Personal Hx of substance abuse		  Alcohol 	                                          3	                  3  Illegal drugs                                     4	                  4  Rx drugs                                            5 	                  5  Age between 16- 45 years	           1                     1  hx preadolescent sexual abuse	   3 	                  0  Psychological disease		  ADD, OCD, bipolar, schizophrenia   2	          2  Depression                                           1 	          1  Total: 0    a score of 3 or lower indicates low risk for opioid abuse		  a score of 4-7 indicates moderate risk for opioid abuse		  a score of 8 or higher indicates high risk for opioid abuse    REVIEW OF SYSTEMS:  CONSTITUTIONAL: No fever or fatigue  HEENT:  No difficulty hearing, no change in vision  NECK: No pain or stiffness  RESPIRATORY: No cough, wheezing, chills or hemoptysis; No shortness of breath  CARDIOVASCULAR: No chest pain, palpitations, dizziness, or leg swelling  GASTROINTESTINAL: No loss of appetite, decreased PO intake. No abdominal or epigastric pain. No nausea, vomiting; No diarrhea or constipation.   GENITOURINARY: No dysuria, frequency, hematuria, retention or incontinence  MUSCULOSKELETAL: No joint pain or swelling; No muscle, back, or extremity pain, no upper or lower motor strength weakness, no saddle anesthesia, bowel/bladder incontinence, no falls   NEURO: No headaches, No numbness/tingling b/l LE, No weakness  ENDOCRINE: No polyuria, polydipsia, heat or cold intolerance; No hair loss  PSYCHIATRIC: No depression, anxiety or difficulty sleeping    PHYSICAL EXAM:  GENERAL:  Alert & Oriented X4, cooperative, NAD, Good concentration. Speech is clear.   RESPIRATORY: Respirations even and unlabored. Clear to auscultation bilaterally; No rales, rhonchi, wheezing, or rubs  CARDIOVASCULAR: Normal S1/S2, regular rate and rhythm; No murmurs, rubs, or gallops. No JVD.   GASTROINTESTINAL:  Soft, Nontender, Nondistended; Bowel sounds present  PERIPHERAL VASCULAR:  Extremities warm without edema. 2+ Peripheral Pulses, No cyanosis, No calf tenderness  MUSCULOSKELETAL: Motor Strength 5/5 B/L upper and lower extremities; moves all extremities equally against gravity; ROM intact; negative SLR; No tenderness on palpation of all joints.   SKIN: Warm, dry, intact. No rashes, lesions, scars or wounds.     Risk factors associated with adverse outcomes related to opioid treatment  [ ]  Concurrent benzodiazepine use  [ ]  History/ Active substance use or alcohol use disorder  [ ] Psychiatric co-morbidity  [ ] Sleep apnea  [ ] COPD  [ ] BMI> 35  [ ] Liver dysfunction  [ ] Renal dysfunction  [ ] CHF  [ ] Smoker  [ ]  Age > 60 years    [ ]  [facility]  Reviewed and Copied to Chart. See below.    Plan of care and goal oriented pain management treatment options were discussed with patient and /or primary care giver; all questions and concerns were addressed and care was aligned with patient's wishes.    Educated patient on goal oriented pain management treatment options     10-21-22 @ 09:39

## 2022-10-22 NOTE — PROGRESS NOTE ADULT - PROBLEM SELECTOR PLAN 3
-   Wears 3L home O2 , no in excerebration  -   CT Scan shows incidental interstitial fibrosis suggested at the lung bases. Nonspecific 3.0 cm pleural-based nodular opacity at the right lung base.  -   Continue with home inhalers Spiriva and Symbicort  -   Continue with  3L supplemental O2 at night time  -   Outpatient evaluation for nodular opacity documented on CT

## 2022-10-22 NOTE — PROGRESS NOTE ADULT - PROBLEM SELECTOR PLAN 9
-  Patient admitted from home  -  Plan to discharge to [facility] on Mon 10/24-  pending placement  - Needs TLSO brace on discharge as per neurosurgery

## 2022-10-22 NOTE — PROGRESS NOTE ADULT - PROBLEM SELECTOR PLAN 1
-   Acute marked compression fracture of T12 with minimally retropulsed bone. Multilevel degenerative changes.  -   Non-traumatic, likely osteoporotic   -   Continue with  lidocaine patch  -   Continue with  Tylenol 650 mg q 6 hrs,  -   Continue with  gabapentin 300mg TID  -   Continue  neuro check q shift  -   PT eval , home PT   -   CT Thoracic spine and lumber x-ray show severe acute compression fracture of T12  -   Neurosurgery following TLSO brace ordered  -  Ortho notified  -   pain management following  -  started on Decadron 2mg twice daily for 5 days (currently day 3)  -   Trial of toradol given on 10/20/22 with good effect, but trying to avoid given side effects in the geriatric population (renal failure, PUD) -   Acute marked compression fracture of T12 with minimally retropulsed bone. Multilevel degenerative changes.  -   Non-traumatic, likely osteoporotic   -   Continue with  lidocaine patch  -   Continue with  Tylenol 650 mg q 6 hrs,  -   Continue with  gabapentin 300mg TID  -   Continue  neuro check q shift  -   PT eval , home PT   -   CT Thoracic spine and lumber x-ray show severe acute compression fracture of T12  -   Neurosurgery following TLSO brace ordered  -  Ortho notified  -   pain management following  -  started on Decadron 2mg twice daily for 5 days (currently day 3)  -   Continue calcitonin nasal spray  -   Trial of toradol given on 10/20/22 with good effect, but trying to avoid given side effects in the geriatric population (renal failure, PUD)

## 2022-10-22 NOTE — PROGRESS NOTE ADULT - ASSESSMENT
89 year old female, from home, with PMH of HTN, COPD (on 3L home O2 at night), CHFrEF, ambulates with wheelchair or walker, presents with worsening severe low back pain for 2 weeks. S/p cortisone shot OP  with no improvement. CT Lumbar Spine revealed acute compression fracture of T12 with minimally retropulsed bone. Moderate compression deformity of L2 appears chronic. Large hemangioma within the L2 vertebral body.  Interstitial fibrosis suggested at the lung bases. Nonspecific 3.0 cm pleural-based nodular opacity at the right lung base  Admitted for Acute back pain, T12 compression fracture.  Neurosurgery  and pain management following. Neurosurgery recommends TLSO brace on discharge. Neuro and pain management recs Calcitonin nasal spray & dexamethasone. Updated PT eval recs RADHA. Plan is for d/c planning on Mon 10/24.

## 2022-10-22 NOTE — PROGRESS NOTE ADULT - SUBJECTIVE AND OBJECTIVE BOX
[facility] Medicine    Patient is a 89y old  Female who presents with a chief complaint of compression fracture (21 Oct 2022 15:48)      SUBJECTIVE / OVERNIGHT EVENTS: No acute events overnight. Sons at bedside. Patient reports she has no pain in her back and did not have any pain when working with physical therapy. Last tramadol dose was taken at 11:55 day prior. [facility] Noted to have deep cough today.    MEDICATIONS  (STANDING):  acetaminophen     Tablet .. 1000 milliGRAM(s) Oral every 8 hours  albuterol/ipratropium for Nebulization. 3 milliLiter(s) Nebulizer once  amLODIPine   Tablet 2.5 milliGRAM(s) Oral daily  aspirin enteric coated 81 milliGRAM(s) Oral daily  budesonide 160 MICROgram(s)/formoterol 4.5 MICROgram(s) Inhaler 2 Puff(s) Inhalation two times a day  calcitonin Nasal 1 Spray(s) Nasal daily  calcium carbonate   1250 mG (OsCal) 1 Tablet(s) Oral two times a day  cholecalciferol 2000 Unit(s) Oral daily  cyclobenzaprine 5 milliGRAM(s) Oral three times a day  dexAMETHasone     Tablet 2 milliGRAM(s) Oral two times a day  enoxaparin Injectable 40 milliGRAM(s) SubCutaneous every 24 hours  furosemide    Tablet 40 milliGRAM(s) Oral daily  gabapentin 400 milliGRAM(s) Oral three times a day  influenza  Vaccine (HIGH DOSE) 0.7 milliLiter(s) IntraMuscular once  lidocaine   4% Patch 2 Patch Transdermal daily  lidocaine 4% Cream 1 Application(s) Topical two times a day  pantoprazole    Tablet 40 milliGRAM(s) Oral before breakfast  polyethylene glycol 3350 17 Gram(s) Oral daily  senna 2 Tablet(s) Oral at bedtime  tiotropium 18 MICROgram(s) Capsule 1 Capsule(s) Inhalation daily    MEDICATIONS  (PRN):  aluminum hydroxide/magnesium hydroxide/simethicone Suspension 30 milliLiter(s) Oral every 4 hours PRN Dyspepsia  melatonin 3 milliGRAM(s) Oral at bedtime PRN Insomnia  ondansetron Injectable 4 milliGRAM(s) IV Push every 8 hours PRN Nausea and/or Vomiting  traMADol 50 milliGRAM(s) Oral every 6 hours PRN Severe Pain (7 - 10)      Vital Signs Last 24 Hrs  T(C): 36.6 (10-22-22 @ 14:15)  T(F): 97.9 (10-22-22 @ 14:15), Max: 97.9 (10-22-22 @ 05:13)  HR: 79 (10-22-22 @ 14:15) (69 - 98)  BP: 115/52 (10-22-22 @ 14:15)  BP(mean): 66 (10-22-22 @ 14:15) (66 - 66)  RR: 17 (10-22-22 @ 14:15) (17 - 19)  SpO2: 95% (10-22-22 @ 14:15) (94% - 95%)  Wt(kg): --    10-21 @ 07:01  -  10-22 @ 07:00  --------------------------------------------------------  IN: 0 mL / OUT: 2 mL / NET: -2 mL      CAPILLARY BLOOD GLUCOSE        I&O's Summary    21 Oct 2022 07:01  -  22 Oct 2022 07:00  --------------------------------------------------------  IN: 0 mL / OUT: 2 mL / NET: -2 mL        PHYSICAL EXAM:  GENERAL: NAD, well-developed, on 3L NC  HEAD:  Atraumatic, Normocephalic  EYES: \ conjunctiva and sclera clear  NECK: Supple, No JVD  CHEST/LUNG: Clear to auscultation bilaterally; No wheeze, rhonchi, rales, +productive cough, no tenderness to palpation in back  HEART: Regular rate and rhythm; No murmurs, rubs, or gallops  ABDOMEN: Soft, Nontender, Nondistended; Bowel sounds present  EXTREMITIES:  2+ Peripheral Pulses, No clubbing, cyanosis, or edema  PSYCH: AAOx3, calm, cooperative  NEUROLOGY: non-focal  SKIN: No rashes or lesions    LABS:                        14.1   8.08  )-----------( 258      ( 22 Oct 2022 07:35 )             44.9     10-22    140  |  103  |  23<H>  ----------------------------<  139<H>  4.0   |  29  |  0.96    Ca    9.4      22 Oct 2022 07:35            RADIOLOGY & ADDITIONAL TESTS:    Imaging Personally Reviewed:    Consultant(s) Notes Reviewed:      Care Discussed with Consultants/Other Providers:    Assessment and Plan:

## 2022-10-23 NOTE — PROGRESS NOTE ADULT - NUTRITIONAL ASSESSMENT
Diet, Regular:   DASH/TLC {Sodium & Cholesterol Restricted} (10-10-22 @ 16:36) [Active]

## 2022-10-23 NOTE — PROGRESS NOTE ADULT - PROBLEM SELECTOR PLAN 7
-   Continue with Alprazolam 0.5mg PRN  -   Continue with emotional support -  3cm pleural based lung nodule noted on imaging  -  Outpatient follow up with repeat imaging

## 2022-10-23 NOTE — PROGRESS NOTE ADULT - PROBLEM SELECTOR PLAN 5
-   On amlodipine 2.5mg, Lasix 40mg in AM and 20mg in PM  -   Continue with home dose of amlodipine   -   Continue with Lasix 40mg  -   To get additional Lasix 20mg IV x1 today -  Last echo on record 2018 showed EF 40% and mild stage I diastolic dysfunction  -  Euvolemic  -  Continue with  Lasix 40mg qdaily  -  Repeat CXR with mild pulmonary edema, will give additional dose of Lasix 20mg IV x1

## 2022-10-23 NOTE — PROGRESS NOTE ADULT - PROBLEM SELECTOR PLAN 3
-   Wears 3L home O2 , no in excerebration  -   CT Scan shows incidental interstitial fibrosis suggested at the lung bases. Nonspecific 3.0 cm pleural-based nodular opacity at the right lung base.  -   Continue with home inhalers Spiriva and Symbicort  -   Continue with  3L supplemental O2 at night time  -   Outpatient evaluation for nodular opacity documented on CT  -  CXR with concerns for atelectasis vs PNA. Given prolonged hospital course and bilateral nature as well as no leukocytosis or fevers, suspect likely atelectasis. -   Wears 3L home O2 , no in excerebration  -   CT Scan shows incidental interstitial fibrosis suggested at the lung bases. Nonspecific 3.0 cm pleural-based nodular opacity at the right lung base.  -   Continue with home inhalers Spiriva and Symbicort  -   Continue with  3L supplemental O2 at night time  -   Outpatient evaluation for nodular opacity documented on CT  -  CXR with concerns for atelectasis vs PNA. Given prolonged hospital course and bilateral nature as well as no leukocytosis or fevers, suspect likely atelectasis.  -  Start Mucinex 600mg po BID Patient with chronic hypoxic respiratory failure 2/2 COPD for which she wears 3L NC  -Currently stable on 3L NC  -Will start Mucinex 600mg po BID for productive cough  -CXR with some mild pulmonary edema, will give add'l dose of Lasix 20mg IV x1  -Continue Spiriva and Symbicort

## 2022-10-23 NOTE — PROGRESS NOTE ADULT - PROBLEM SELECTOR PLAN 6
-  3cm pleural based lung nodule noted on imaging  -  Outpatient follow up with repeat imaging -   On amlodipine 2.5mg, Lasix 40mg in AM and 20mg in PM  -   Continue with home dose of amlodipine   -   Continue with Lasix 40mg  -   To get additional Lasix 20mg IV x1 today

## 2022-10-23 NOTE — PROGRESS NOTE ADULT - PROBLEM SELECTOR PLAN 4
-  Last echo on record 2018 showed EF 40% and mild stage I diastolic dysfunction  -  Euvolemic  -  Continue with  Lasix 40mg qdaily  -  Repeat CXR with mild pulmonary edema, will give additional dose of Lasix 20mg IV x1 -   Wears 3L home O2 , no in excerebration  -   CT Scan shows incidental interstitial fibrosis suggested at the lung bases. Nonspecific 3.0 cm pleural-based nodular opacity at the right lung base.  -   Continue with home inhalers Spiriva and Symbicort  -   Continue with  3L supplemental O2 at night time  -   Outpatient evaluation for nodular opacity documented on CT  -  CXR with concerns for atelectasis vs PNA. Given prolonged hospital course and bilateral nature as well as no leukocytosis or fevers, suspect likely atelectasis.  -  Start Mucinex 600mg po BID

## 2022-10-23 NOTE — PROGRESS NOTE ADULT - PROBLEM SELECTOR PLAN 8
-   DVT prophylaxis with Lovenox  -   GI prophylaxis with Pantoprazole -   Continue with Alprazolam 0.5mg PRN  -   Continue with emotional support

## 2022-10-23 NOTE — PROGRESS NOTE ADULT - PROBLEM SELECTOR PLAN 1
-   Acute marked compression fracture of T12 with minimally retropulsed bone. Multilevel degenerative changes.  -   Non-traumatic, likely osteoporotic   -   Continue with  lidocaine patch  -   Continue with  Tylenol 1000mg po q8hrs,  -   Continue with  gabapentin 300mg TID  -   Continue  neuro check q shift  -   PT jude goldbergs RADHA at this time  -   CT Thoracic spine and lumber x-ray show severe acute compression fracture of T12  -   Neurosurgery following TLSO brace ordered  -  Ortho notified  -   pain management following  -  started on Decadron 2mg twice daily for 5 days (currently day 4)  -   Continue calcitonin nasal spray  -   Trial of toradol given on 10/20/22 with good effect, but trying to avoid given side effects in the geriatric population (renal failure, PUD)

## 2022-10-23 NOTE — PROGRESS NOTE ADULT - SUBJECTIVE AND OBJECTIVE BOX
[facility] Medicine    Patient is a 89y old  Female who presents with a chief complaint of compression fracture (22 Oct 2022 20:54)      SUBJECTIVE / OVERNIGHT EVENTS: No acute events overnight. Per CM, patient was accepted to [facility]. Patient reports feeling well and denies any pain at this time. Continues to have significant cough but per patient and family this is her baseline and a result of her chronic bronchitis. Mucinex started as patient takes this at home for her cough.    MEDICATIONS  (STANDING):  acetaminophen     Tablet .. 1000 milliGRAM(s) Oral every 8 hours  albuterol/ipratropium for Nebulization. 3 milliLiter(s) Nebulizer once  amLODIPine   Tablet 2.5 milliGRAM(s) Oral daily  aspirin enteric coated 81 milliGRAM(s) Oral daily  budesonide 160 MICROgram(s)/formoterol 4.5 MICROgram(s) Inhaler 2 Puff(s) Inhalation two times a day  calcitonin Nasal 1 Spray(s) Nasal daily  calcium carbonate   1250 mG (OsCal) 1 Tablet(s) Oral two times a day  cholecalciferol 2000 Unit(s) Oral daily  cyclobenzaprine 5 milliGRAM(s) Oral three times a day  dexAMETHasone     Tablet 2 milliGRAM(s) Oral two times a day  enoxaparin Injectable 40 milliGRAM(s) SubCutaneous every 24 hours  furosemide    Tablet 40 milliGRAM(s) Oral daily  gabapentin 400 milliGRAM(s) Oral three times a day  guaiFENesin  milliGRAM(s) Oral every 12 hours  influenza  Vaccine (HIGH DOSE) 0.7 milliLiter(s) IntraMuscular once  lidocaine   4% Patch 2 Patch Transdermal daily  lidocaine 4% Cream 1 Application(s) Topical two times a day  pantoprazole    Tablet 40 milliGRAM(s) Oral before breakfast  polyethylene glycol 3350 17 Gram(s) Oral daily  senna 2 Tablet(s) Oral at bedtime  tiotropium 18 MICROgram(s) Capsule 1 Capsule(s) Inhalation daily    MEDICATIONS  (PRN):  aluminum hydroxide/magnesium hydroxide/simethicone Suspension 30 milliLiter(s) Oral every 4 hours PRN Dyspepsia  melatonin 3 milliGRAM(s) Oral at bedtime PRN Insomnia  ondansetron Injectable 4 milliGRAM(s) IV Push every 8 hours PRN Nausea and/or Vomiting  traMADol 50 milliGRAM(s) Oral every 6 hours PRN Severe Pain (7 - 10)      Vital Signs Last 24 Hrs  T(C): 36.7 (10-23-22 @ 14:00)  T(F): 98.1 (10-23-22 @ 14:00), Max: 98.2 (10-22-22 @ 21:55)  HR: 70 (10-23-22 @ 14:00) (58 - 76)  BP: 130/54 (10-23-22 @ 14:00)  BP(mean): 73 (10-23-22 @ 14:00) (73 - 73)  RR: 17 (10-23-22 @ 14:00) (16 - 18)  SpO2: 97% (10-23-22 @ 14:00) (93% - 97%)  Wt(kg): --    CAPILLARY BLOOD GLUCOSE        I&O's Summary      PHYSICAL EXAM:  GENERAL: NAD, well-developed, on 3L NC  HEAD:  Atraumatic, Normocephalic  EYES:  conjunctiva and sclera clear  NECK: Supple, No JVD  CHEST/LUNG: +productive cough, Clear to auscultation bilaterally; No wheeze, rhonchi, rales appreciated  HEART: Regular rate and rhythm; No murmurs, rubs, or gallops  ABDOMEN: Soft, Nontender, Nondistended; Bowel sounds present  EXTREMITIES:  2+ Peripheral Pulses, No clubbing, cyanosis, or edema  PSYCH: AAOx3, pleasant, cooperative  NEUROLOGY: non-focal, no point tenderness to the spine on palpation  SKIN: No rashes or lesions    LABS:                        13.9   7.56  )-----------( 259      ( 23 Oct 2022 07:45 )             44.1     10-23    142  |  104  |  22<H>  ----------------------------<  140<H>  3.9   |  29  |  0.94    Ca    9.5      23 Oct 2022 07:45          RADIOLOGY & ADDITIONAL TESTS:    Imaging Personally Reviewed:  < from: Xray Chest 1 View- PORTABLE-Urgent (Xray Chest 1 View- PORTABLE-Urgent .) (10.22.22 @ 17:10) >  IMPRESSION:    Mild pulmonary edema with bilateral pleural effusions and bibasilar   airspace disease which could represent atelectasis or pneumonia.    < end of copied text >      Consultant(s) Notes Reviewed:      Care Discussed with Consultants/Other Providers:    Assessment and Plan:

## 2022-10-23 NOTE — PROGRESS NOTE ADULT - PROBLEM SELECTOR PLAN 10
-  Patient admitted from home  -  Plan to discharge to [facility] on Mon 10/24-  accepted at [facility]  - Needs TLSO brace on discharge as per neurosurgery

## 2022-10-23 NOTE — PROGRESS NOTE ADULT - PROBLEM SELECTOR PLAN 9
-  Patient admitted from home  -  Plan to discharge to [facility] on Mon 10/24-  accepted at [facility]  - Needs TLSO brace on discharge as per neurosurgery -   DVT prophylaxis with Lovenox  -   GI prophylaxis with Pantoprazole

## 2022-10-24 NOTE — DISCHARGE NOTE NURSING/CASE MANAGEMENT/SOCIAL WORK - NSDCPEFALRISK_GEN_ALL_CORE
For information on Fall & Injury Prevention, visit: [email or web address] OR  [email or web address] OR  https://www.cdc.gov/steadi/patient.html

## 2022-10-24 NOTE — PROGRESS NOTE ADULT - PROBLEM SELECTOR PROBLEM 1
Acute lumbar back pain
Thoracic compression fracture
Acute lumbar back pain
Thoracic compression fracture
Acute lumbar back pain
Acute lumbar back pain
Thoracic compression fracture

## 2022-10-24 NOTE — PROGRESS NOTE ADULT - PROBLEM SELECTOR PLAN 2
Mild pain   - Non-pharmacological pain treatment recommendations  - Warm/ Cool packs PRN   - Repositioning, imagery, relaxation, distraction.  - Physical therapy OOB if no contraindications   Recommendations discussed with primary team and RN.  Upon discharge recommend Recommend to f/u with outpatient pain management Dr. Ruiz at [phone], or telepain Dr. Bahena [phone].

## 2022-10-24 NOTE — PROGRESS NOTE ADULT - PROVIDER SPECIALTY LIST ADULT
Pain Medicine
Internal Medicine
Pain Medicine
Internal Medicine
Neurosurgery
Internal Medicine
Pain Medicine
Internal Medicine
Internal Medicine
Pain Medicine
Internal Medicine
Internal Medicine
Pain Medicine
Internal Medicine

## 2022-10-24 NOTE — CHART NOTE - NSCHARTNOTEFT_GEN_A_CORE
Reassessment:   Nutrition note/follow up: Patient is a 89y old  Female who presents with a chief complaint of compression fracture (23 Oct 2022 14:32). Chart reviewed pt visited, reported Po intake >50% does not eat much, noted bedside table with multiple snacks; tolerates diet with no chewing or swallowing difficulties, no GI distress reported. Pt is for d/c home.      Factors impacting intake: [ ] none [ ] nausea  [ ] vomiting [ ] diarrhea [ ] constipation  [ ]chewing problems [ ] swallowing issues  [x ] other:  fracture T11-t12, pain, COPD CHF anxiety advanced age.     Diet Prescription: Diet, Regular:   DASH/TLC {Sodium & Cholesterol Restricted} (10-10-22 @ 16:36)    Intake: >50%    Daily     Daily Weight in k.1 (24 Oct 2022 05:19)  Weight in k.4 (19 Oct 2022 05:21)  Weight in k.4 (18 Oct 2022 05:12)  Weight in k.6 (17 Oct 2022 05:18)  Weight in k.4 (15 Oct 2022 05:53)  Weight in k.6 (13 Oct 2022 05:18)    % Weight Change weight fluctuation r/t fluid shift.     Pertinent Medications: MEDICATIONS  (STANDING):  acetaminophen     Tablet .. 1000 milliGRAM(s) Oral every 8 hours  albuterol/ipratropium for Nebulization. 3 milliLiter(s) Nebulizer once  amLODIPine   Tablet 2.5 milliGRAM(s) Oral daily  aspirin enteric coated 81 milliGRAM(s) Oral daily  budesonide 160 MICROgram(s)/formoterol 4.5 MICROgram(s) Inhaler 2 Puff(s) Inhalation two times a day  calcitonin Nasal 1 Spray(s) Nasal daily  calcium carbonate   1250 mG (OsCal) 1 Tablet(s) Oral two times a day  cholecalciferol 2000 Unit(s) Oral daily  cyclobenzaprine 5 milliGRAM(s) Oral three times a day  dexAMETHasone     Tablet 2 milliGRAM(s) Oral two times a day  enoxaparin Injectable 40 milliGRAM(s) SubCutaneous every 24 hours  furosemide    Tablet 40 milliGRAM(s) Oral daily  gabapentin 400 milliGRAM(s) Oral three times a day  guaiFENesin  milliGRAM(s) Oral every 12 hours  influenza  Vaccine (HIGH DOSE) 0.7 milliLiter(s) IntraMuscular once  lidocaine   4% Patch 2 Patch Transdermal daily  lidocaine 4% Cream 1 Application(s) Topical two times a day  pantoprazole    Tablet 40 milliGRAM(s) Oral before breakfast  polyethylene glycol 3350 17 Gram(s) Oral daily  senna 2 Tablet(s) Oral at bedtime  tiotropium 18 MICROgram(s) Capsule 1 Capsule(s) Inhalation daily    MEDICATIONS  (PRN):  aluminum hydroxide/magnesium hydroxide/simethicone Suspension 30 milliLiter(s) Oral every 4 hours PRN Dyspepsia  melatonin 3 milliGRAM(s) Oral at bedtime PRN Insomnia  ondansetron Injectable 4 milliGRAM(s) IV Push every 8 hours PRN Nausea and/or Vomiting  traMADol 50 milliGRAM(s) Oral every 6 hours PRN Severe Pain (7 - 10)    Pertinent Labs: 10-23 Na142 mmol/L Glu 140 mg/dL<H> K+ 3.9 mmol/L Cr  0.94 mg/dL BUN 22 mg/dL<H> 10-19 Alb 2.8 g/dL<L>     CAPILLARY BLOOD GLUCOSE        Skin: see skin care flow sheet.    Estimated Needs:   [x ] no change since previous assessment  [ ] recalculated:       Previous Nutrition Diagnosis:   [ ] Altered GI function  [ ]Inadequate Oral Intake [ ] Swallowing Difficulty   [ ] Altered nutrition related labs [x ] Increased Nutrient Needs [ ] Overweight/Obesity   [ ] Unintended Weight Loss [ ] Food & Nutrition Related Knowledge Deficit [ ] Malnutrition   [ ] Other:     Nutrition Diagnosis is [ x] ongoing  [ ] resolved [ ] not applicable     New Nutrition Diagnosis: [ ] not applicable       Interventions:   Recommend  [ ] Change Diet To:  [ ] Nutrition Supplement  [ ] Nutrition Support  [ x] Other: Add MVI/minerals, Vit. C 500mg BID for skin integrity. RD remains available.    Monitoring and Evaluation:   [ ] PO intake [ x ] Tolerance to diet prescription [ x ] weights [ x ] labs[ x ] follow up per protocol  [ ] other:

## 2022-10-24 NOTE — DISCHARGE NOTE NURSING/CASE MANAGEMENT/SOCIAL WORK - PATIENT PORTAL LINK FT
You can access the FollowMyHealth Patient Portal offered by [facility] by registering at the following website: [email or web address]. By joining [employer]’s FollowMyHealth portal, you will also be able to view your health information using other applications (apps) compatible with our system.

## 2022-10-24 NOTE — PROGRESS NOTE ADULT - ASSESSMENT
Confidential Drug Utilization Report  Search Terms: Suri Felix, 01/31/1931Search Date: 10/11/2022 08:29:56 AM  The Drug Utilization Report below displays all of the controlled substance prescriptions, if any, that your patient has filled in the last twelve months. The information displayed on this report is compiled from pharmacy submissions to the Department, and accurately reflects the information as submitted by the pharmacies.    This report was requested by: Lakshmi Mancini | Reference #: [id number]    You have not added a BECCA number. Keeping your BECCA number(s) up to date on the My BECCA # page will enable the separation of your prescriptions from others in the search results.    Others' Prescriptions  Patient Name: Suri FelixBirth Date: 01/31/1931  Address: [id number], [place] [address]Mon: Female  Rx Written	Rx Dispensed	Drug	Quantity	Days Supply	Prescriber Name	Prescriber Becca #	Payment Method  10/25/2021	11/01/2021	clonazepam 0.5 mg tablet	90	30	Ricarda Ledezma NP	[id number]	Medicare  Dispenser [facility] #[id number]  * - Drugs marked with an asterisk are compound drugs. If the compound drug is made up of more than one controlled substance, then each controlled substance will be a separate row in the table.

## 2022-10-24 NOTE — PROGRESS NOTE ADULT - SUBJECTIVE AND OBJECTIVE BOX
Source of information: VIDAL LOUIE, Chart review  Patient language: English  : n/a    HPI:  89 year old female, from home, with PMH of HTN, COPD (on 3L home O2 at night), CHFrEF, ambulates with wheelchair or walker, presents to ED with severe low back pain. She states the pain first started 2 weeks ago and got worse last Thursday. She went to an orthopedic doctor and got a cortisone shot, which she states did not help. Pt states the pain became severe yesterday, 10/10, achy stabbing in nature,  worsened by movement and ambulation. Denies recent trauma or falls. 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 Otherwise, she denies worsening numbness or tingling in LE, loss of sensation, weakness or urinary or bowel incontinence.    In ED: vitals HR 77, /64, Temp 98.4F, 99% on RA.  s/p ketorolac, Tylenol and flexeril in ED (10 Oct 2022 15:57)    CT lumbar demonstrates Findings suggesting acute marked compression fracture of T12 with   minimally retropulsed bone at the level of the inferior endplate.    Moderate compression deformity of L2 appears chronic. Large hemangioma   within the L2 vertebral body.    Multilevel degenerative changes.    Interstitial fibrosis suggested at the lung bases.Nonspecific 3.0 cm   pleural-based nodular opacity at the right lung base. If clinically   indicated, correlation with CT of the chest may be obtained for further   evaluation.    Thoracic CT demonstrates- SEVERE ACUTE COMPRESSION FRACTURE INFERIOR GREATER THAN SUPERIOR ENDPLATES OF T12. MINIMAL RETROPULSION WITH MINIMAL SPINAL CANAL NARROWING.  Pain consulted for back pain. Reports lumbar back pain started a couple weeks ago and progressively worsened. Denies trauma/ falls or hx back pain in the past. Reports taking Tylenol, aleve and had massage at home with no relief of pain. Was scheduled to begin her PT sessions on 10/10. Pt seen and examined at bedside. Pt sitting at edge of bed, denies pain. Reports improvement in back pain, and reports ambulating with tolerable pain. Pt also reports numbness over toes in b/l feet x 1 year and is on gabapentin at home. Reports slight improvement of numbness/ tingling of b/l feet. Pt tolerating PO diet. Reports lethargy, Denies chest pain, SOB, nausea, vomiting. Reports hx constipation, last BM 10/23 x 3. Patient stated goal for pain control: to be able to take deep breaths, get out of bed to chair and ambulate with tolerable pain control. Pt has been out of bed to chair. Pt ambulate with walker and uses wheelchair at home at baseline. Plan to be discharged today.     PAST MEDICAL & SURGICAL HISTORY:  Essential Hypertension      COPD (Chronic Obstructive Pulmonary Disease)  on home O2 AT NIGHT      Hip Fracture-Left      Hx of Breast Cancer  HAd Rt in       Chronic bronchitis      Anxiety disorder      Morbid obesity      DAVID on CPAP      Dysphagia      Neuropathy      S/P Insertion of IVC (Inferior Vena Caval) Filter      Joint Fixation (Surgical)- L Hip      S/P Breast Lumpectomy- left      Status Post Total Knee Replacement-bilateral      S/P           FAMILY HISTORY:  Family history of renal disease (Sibling)    Family history of lymphoma    Family history of congestive heart failure        Social History:  Pt lives at home with her . Denies current alcohol, tobacco or illicit drug use. States she smoked 2ppd for about 55 years. Quit smoking about 10 years ago. (10 Oct 2022 15:57)    Allergies    No Known Allergies      MEDICATIONS  (STANDING):  acetaminophen     Tablet .. 1000 milliGRAM(s) Oral every 8 hours  albuterol/ipratropium for Nebulization. 3 milliLiter(s) Nebulizer once  amLODIPine   Tablet 2.5 milliGRAM(s) Oral daily  aspirin enteric coated 81 milliGRAM(s) Oral daily  budesonide 160 MICROgram(s)/formoterol 4.5 MICROgram(s) Inhaler 2 Puff(s) Inhalation two times a day  calcitonin Nasal 1 Spray(s) Nasal daily  calcium carbonate   1250 mG (OsCal) 1 Tablet(s) Oral two times a day  cholecalciferol 2000 Unit(s) Oral daily  cyclobenzaprine 5 milliGRAM(s) Oral three times a day  dexAMETHasone     Tablet 2 milliGRAM(s) Oral two times a day  enoxaparin Injectable 40 milliGRAM(s) SubCutaneous every 24 hours  furosemide    Tablet 40 milliGRAM(s) Oral daily  gabapentin 400 milliGRAM(s) Oral three times a day  guaiFENesin  milliGRAM(s) Oral every 12 hours  influenza  Vaccine (HIGH DOSE) 0.7 milliLiter(s) IntraMuscular once  lidocaine   4% Patch 2 Patch Transdermal daily  lidocaine 4% Cream 1 Application(s) Topical two times a day  pantoprazole    Tablet 40 milliGRAM(s) Oral before breakfast  polyethylene glycol 3350 17 Gram(s) Oral daily  senna 2 Tablet(s) Oral at bedtime  tiotropium 18 MICROgram(s) Capsule 1 Capsule(s) Inhalation daily    MEDICATIONS  (PRN):  aluminum hydroxide/magnesium hydroxide/simethicone Suspension 30 milliLiter(s) Oral every 4 hours PRN Dyspepsia  melatonin 3 milliGRAM(s) Oral at bedtime PRN Insomnia  ondansetron Injectable 4 milliGRAM(s) IV Push every 8 hours PRN Nausea and/or Vomiting  traMADol 50 milliGRAM(s) Oral every 6 hours PRN Severe Pain (7 - 10)      Vital Signs Last 24 Hrs  T(C): 36.7 (24 Oct 2022 11:19), Max: 36.7 (24 Oct 2022 11:19)  T(F): 98 (24 Oct 2022 11:19), Max: 98 (24 Oct 2022 11:19)  HR: 90 (24 Oct 2022 11:19) (69 - 90)  BP: 146/85 (24 Oct 2022 11:19) (123/72 - 146/85)  BP(mean): 81 (23 Oct 2022 22:18) (81 - 81)  RR: 17 (24 Oct 2022 11:19) (16 - 17)  SpO2: 93% (24 Oct 2022 11:19) (93% - 94%)    Parameters below as of 24 Oct 2022 11:19  Patient On (Oxygen Delivery Method): nasal cannula  O2 Flow (L/min): 3    COVID-19 PCR: NotDetec (21 Oct 2022 12:06)  COVID-19 PCR: NotDetec (17 Oct 2022 06:15)  COVID-19 PCR: NotDetec (10 Oct 2022 16:13)    LABS: Reviewed                          14.3   9.07  )-----------( 280      ( 24 Oct 2022 08:00 )             45.3     10-    141  |  99  |  30<H>  ----------------------------<  148<H>  4.7   |  34<H>  |  1.11    Ca    9.6      24 Oct 2022 08:00    Radiology: Reviewed.   < from: CT Thoracic Spine No Cont (10.14.22 @ 09:39) >    ACC: [id number] EXAM:  CT THORACIC SPINE                          PROCEDURE DATE:  10/14/2022          INTERPRETATION:  CT THORACIC SPINE    CLINICAL INFORMATION: back pain    TECHNIQUE:  Noncontrast CT.  Axial acquisition. Sagittal and coronal reformations.    FINDINGS:  FRACTURES:  *  Severe acute compression deformity inferior greater than superior   endplates of T12. Minimal retropulsion towards the inferior endplate with   minimal spinal canal narrowing. Residual anterior to posterior dimension   of the thecal sac measures 1.2 cm.  *  Mild chronic deformity superior endplate of T4..  ALIGNMENT:  *  Grade 1 anterolisthesis C7 on T1, T1 on T2 and T2 on T3 thought   related to the degenerative changes.  *  Very mild scoliosis  SPINAL COLUMN:  *  Diffuse osteoporosis.  *  Multilevel spondylosis. With mild degrees of disc space narrowing and   varying degrees of endplate degenerative changes/osteophytes.  *  Fusion of the facet joints bilaterally at T3-4 through T5-6.  OTHER:  *  Please see recent lumbar spine report for lumbar spine findings  *  Atherosclerotic calcification of the aorta is seen.  *  An IVC filter is noted.  *  Infiltrate is present at the right lung base    IMPRESSION:  SEVERE ACUTE COMPRESSION FRACTURE INFERIOR GREATER THAN SUPERIOR   ENDPLATES OF T12. MINIMAL RETROPULSION WITH MINIMAL SPINAL CANAL   NARROWING.    --- End of Report ---            GAYLA YATES MD; Attending Radiologist  This document has been electronically signed. Oct 14 2022  2:27PM    < end of copied text >    < from: CT Lumbar Spine No Cont (10.10.22 @ 12:08) >    ACC: [id number] EXAM:  CT LUMBAR SPINE                          PROCEDURE DATE:  10/10/2022          INTERPRETATION:  Noncontrast CT examination of the lumbar spine    CLINICAL INDICATION: Low back pain    TECHNIQUE:  Direct axial CT scanning of the lumbar spine was obtained   without the administration of intravenous contrast.  Sagittal and coronal   reformats were provided.    COMPARISON: None available    FINDINGS:    Marked compression fracture of T12. Discontinuity of the inferior and   superior endplates suggests acuity of fracture. Minimally retropulsed   bone at the level of the inferior endplate.    Moderate compression deformity of L2 appears chronic. A large hemangioma   occupies much of the L2 vertebral body.    Remaining vertebral bodies demonstrate normal height. Facet alignment is   maintained. Grade 1 anterolisthesis of L4 on L5.    Mild to moderate dextroscoliosis of the lumbar spine, the apex is at   L2-L3.    Diffuse disc space narrowing in the lumbar region.    Multilevel degenerative changes.    Interstitial fibrosis suggested at the lung bases. Nonspecific 3.0 cm   pleural-based nodular opacity at the right lung base.    IMPRESSION:    Findings suggesting acute marked compression fracture of T12 with   minimally retropulsed bone at the level of the inferior endplate.    Moderate compression deformity of L2 appears chronic. Large hemangioma   within the L2 vertebral body.    Multilevel degenerative changes.    Interstitial fibrosis suggested at the lung bases.Nonspecific 3.0 cm   pleural-based nodular opacity at the right lung base. If clinically   indicated, correlation with CT of the chest may be obtained for further   evaluation.    Dr. Mireles discussed these findings with Dr. Ho on 10/10/2022 1:02   PM with read back.        --- End of Report ---            JESSICA MIRELES MD; Attending Radiologist  This document has been electronically signed. Oct 10 2022  1:02PM    < end of copied text >      ORT Score -   Family Hx of substance abuse	Female	      Male  Alcohol 	                                           1                     3  Illegal drugs	                                   2                     3  Rx drugs                                           4 	                  4  Personal Hx of substance abuse		  Alcohol 	                                          3	                  3  Illegal drugs                                     4	                  4  Rx drugs                                            5 	                  5  Age between 16- 45 years	           1                     1  hx preadolescent sexual abuse	   3 	                  0  Psychological disease		  ADD, OCD, bipolar, schizophrenia   2	          2  Depression                                           1 	          1  Total: 0    a score of 3 or lower indicates low risk for opioid abuse		  a score of 4-7 indicates moderate risk for opioid abuse		  a score of 8 or higher indicates high risk for opioid abuse  	  REVIEW OF SYSTEMS:  CONSTITUTIONAL: No fever + fatigue  HEENT:  No difficulty hearing, no change in vision  NECK: No pain or stiffness  RESPIRATORY: No cough, wheezing, chills or hemoptysis; + dyspnea on exertion + on chronic O2 at home during bedtime.   CARDIOVASCULAR: No chest pain, palpitations, dizziness, or leg swelling  GASTROINTESTINAL: No loss of appetite, decreased PO intake. No abdominal or epigastric pain. No nausea, vomiting; No diarrhea + hx constipation.   GENITOURINARY: No dysuria, frequency, hematuria, retention or incontinence  MUSCULOSKELETAL: + denies lumbar back pain today. No joint swelling; no upper motor strength weakness, +  b/l lower motor strength weakness (chronic), no saddle anesthesia, bowel/bladder incontinence, no falls   NEURO: No headaches, + numbness/tingling b/l toes, + chronic left leg weaknesss  PSYCHIATRIC: + hx anxiety     PHYSICAL EXAM:  GENERAL:  + fatigued; alert & Oriented X4, cooperative, NAD, Good concentration. Speech is clear.   RESPIRATORY: Respirations even and unlabored. + coarse to auscultation bilaterally; No rales, rhonchi, wheezing, or rubs + SOB on exertion, on O2 4L NC   CARDIOVASCULAR: Normal S1/S2, regular rate and rhythm; No murmurs, rubs, or gallops. No JVD.   GASTROINTESTINAL: + obese per BMI Soft, Nontender, + distended; Bowel sounds present  PERIPHERAL VASCULAR:  Extremities warm without edema. 2+ Peripheral Pulses, No cyanosis, No calf tenderness  MUSCULOSKELETAL: Motor Strength 4/5 B/L upper and 4/5 b/l lower extremitiy; negative SLR; + no lumbar back tenderness today, no paraspinal tenderness.   SKIN: + ecchymosis of b/l arms and right lumbar back; + scattered moles generalized back; Warm, dry, intact. No rashes, lesions, scars or wounds.     Risk factors associated with adverse outcomes related to opioid treatment  [X ]  Concurrent benzodiazepine use  [ ]  History/ Active substance use or alcohol use disorder  [X ] Psychiatric co-morbidity  [X ] Sleep apnea  [ ] COPD  [X ] BMI> 35  [ ] Liver dysfunction  [ ] Renal dysfunction  [X ] CHF  [X ] Former Smoker  [ X]  Age > 60 years    [X ]  [place]  Reviewed and Copied to Chart. See below.    Plan of care and goal oriented pain management treatment options were discussed with patient and /or primary care giver; all questions and concerns were addressed and care was aligned with patient's wishes.    Educated patient on goal oriented pain management treatment options     10-24-22 @ 16:35

## 2022-10-24 NOTE — PROGRESS NOTE ADULT - PROBLEM SELECTOR PLAN 1
Pt with acute lumbar back pain which is somatic and neuropathic in nature due to radiating pain from marked compression fracture of T12 and moderate chronic compression deformity of L2, multilevel degenerative changes and large hemangioma within the L2 vertebral body. Thoracic CT demonstrates- SEVERE ACUTE COMPRESSION FRACTURE INFERIOR GREATER THAN SUPERIOR ENDPLATES OF T12. MINIMAL RETROPULSION WITH MINIMAL SPINAL CANAL NARROWING.  Pt also with chronic neuropathy.   High risk medications reviewed. Avoid polypharmacy. Avoid IV opioids. Avoid benzodiazepines. Non-pharmacological sleep aides initiated. Non-opioid medications and non-pharmacological pain management measures initiated.    Opioid pain recommendations   - Oxycodone d/c'd 2.5 10/18- reports ineffectiveness in pain control.   - Continue Tramadol 50mg PO q6h PRN severe pain.  Non-opioid pain recommendations   - Continue Acetaminophen 1 gram PO q 8 hours, discharge on PRN moderate pain. Monitor LFTs  - Naproxen 250mg PO TID x 2 days completed with PPI on 10/13  - Continue Gabapentin 400mg po q 8 hours (recommend continue upon discharge) (Pt on 300mg PO TID home dose, increased on 10/14). Monitor renal function.   - Continue Lidoderm 4% patch daily to lumbar back with lidocaine 4% cream BID to bl feet.   - Continue Flexeril 5mg PO TID, may discharge on PRN dosing for spasms.   - Continue decadron 2mg PO BID x 5 days (10/20-25).  Bowel Regimen  - Continue Miralax 17G PO daily  - Continue Senna 2 tablets at bedtime for constipation. Pt with acute lumbar back pain which is somatic and neuropathic in nature due to radiating pain from marked compression fracture of T12 and moderate chronic compression deformity of L2, multilevel degenerative changes and large hemangioma within the L2 vertebral body. Thoracic CT demonstrates- SEVERE ACUTE COMPRESSION FRACTURE INFERIOR GREATER THAN SUPERIOR ENDPLATES OF T12. MINIMAL RETROPULSION WITH MINIMAL SPINAL CANAL NARROWING.  Pt also with chronic neuropathy.   High risk medications reviewed. Avoid polypharmacy. Avoid IV opioids. Avoid benzodiazepines. Non-pharmacological sleep aides initiated. Non-opioid medications and non-pharmacological pain management measures initiated.    Opioid pain recommendations   - Oxycodone d/c'd 2.5 10/18- reports ineffectiveness in pain control.   - Continue Tramadol 50mg PO q6h PRN severe pain.  Non-opioid pain recommendations   - Continue Acetaminophen 1 gram PO q 8 hours, discharge on PRN moderate pain. Monitor LFTs  - Naproxen 250mg PO TID x 2 days completed with PPI on 10/13  - Continue Gabapentin 400mg po q 8 hours (recommend continue upon discharge) (Pt on 300mg PO TID home dose, increased on 10/14). Monitor renal function.   - Continue Lidoderm 4% patch daily to lumbar back with lidocaine 4% cream BID to bl feet.   - Continue Flexeril 5mg PO TID, may discharge on PRN dosing for spasms.   - Continue decadron 2mg PO BID x 5 days (10/20-25).  - Continue calcitonin nasal spray daily as per neuro.  Bowel Regimen  - Continue Miralax 17G PO daily  - Continue Senna 2 tablets at bedtime for constipation.

## 2022-10-24 NOTE — PROGRESS NOTE ADULT - PROBLEM SELECTOR PROBLEM 2
Compression of lumbar vertebra
Thoracic compression fracture
Compression of lumbar vertebra

## 2022-10-24 NOTE — PROGRESS NOTE ADULT - REASON FOR ADMISSION
compression fracture

## 2022-11-13 NOTE — H&P ADULT - PROBLEM SELECTOR PLAN 2
Increased in size compared to prior scan in Oct 2022  - Pt was informed of this CT finding  - Please obtain pulmonology consult this morning regarding best next steps

## 2022-11-13 NOTE — ED PROVIDER NOTE - CONSTITUTIONAL, MLM
awake, alert, oriented to person, place, time/situation. displaying some discomfort in breathing normal...

## 2022-11-13 NOTE — ED ADULT NURSE NOTE - ABDOMEN
[facility] notified ok for OT  And that the compression stocking order will be faxed.     CSS, please fax the hard copy/ DME order for compression stockings to [facility] as below.   Thanks,   Tiffani Pemberton RNC      soft/nondistended/nontender

## 2022-11-13 NOTE — ED ADULT NURSE NOTE - OBJECTIVE STATEMENT
89 year old female presented to ED with c/o of shortness of breath and tachypnea 89 year old female presented to ED via EMS from [facility] with c/o of shortness of breath and tachypneic to 25, hx HTN, COPD on home oxygen 3L nasal cannula, CHF, recent admission for compression fracture. pt denies CP, nausea/vomiting, numbness/tingling, fever, cough, chills, dizziness, headache, blurred vision, neuro intact. pt a&ox3, lung sounds clear, heart rate regular, on cardiac monitor, EKG performed handed to MD, abdomen soft nontender nondistended to palp. skin intact. IV in right AC 20G and patent. bed in lowest position, side rails up for safety. Will continue to monitor and assess while offering support and reassurance.

## 2022-11-13 NOTE — H&P ADULT - PROBLEM SELECTOR PLAN 6
Recent hospitalization at [facility] for acute L2 compression fracture  - s/p dexamethasone outpatient  - pt wears TLSO brace during the day, as per neurosurgery recs from prior hospitalization  - Son to bring TLSO brace from [facility]; please f/u  - continue home gabapentin 400mg TID  - continue home PRN flexeril 5mg TID for muscle spasm  - continue pain control with tramadol 50mg PRN for severe pain

## 2022-11-13 NOTE — H&P ADULT - PROBLEM SELECTOR PLAN 4
- hold home lasix for now, until K is replaced  - TTE from 2018 was reviewed  - will obtain new baseline TTE - hold home lasix for now, until K is replaced  - please restart home lasix 40mg daily if potassium is adequately repleted this morning   - TTE from 2018 was reviewed  - will obtain new baseline TTE

## 2022-11-13 NOTE — ED PROVIDER NOTE - PROGRESS NOTE DETAILS
Raghu Francis DO (PGY1)   Received sign out from DILLON Ross. Patient was ordered for CT scan due to lung nodule found on Xray. Patient still on 4L nasal cannula. Desatting when weaned down. Plan for admission for COPD exacerbation and respiratory distress.

## 2022-11-13 NOTE — ED PROVIDER NOTE - OBJECTIVE STATEMENT
89 year old female, from home, with PMH of HTN, COPD (on 3L home O2 at night), CHFrEF, recent admission 10/22 for compression fracture, coming from [facility] for eval of SOB. nurse at the facility noted that patient appeared more SOB than usual. patient states she feels SOB. denies f/n/v/d, CP, LOC, numbness, tingling, dizziness, HA, abdominal pain. 89 year old female, from home, with PMH of HTN, COPD (on 3L home O2 at night), CHFrEF, recent admission to [facility] 10/22 for compression fracture, coming from [facility] for eval of SOB. nurse at the facility noted that patient appeared more SOB than usual. patient states she feels SOB. denies f/n/v/d, CP, LOC, numbness, tingling, dizziness, HA, abdominal pain.

## 2022-11-13 NOTE — H&P ADULT - HISTORY OF PRESENT ILLNESS
89 year old female with PMH of HTN, COPD (on 3L home O2 at night), chronic bronchitis, CHFrEF (EF 40% in Dec 2018), recent admission to [facility] 10/22 for acute lumbar compression fracture who presents from [facility] for eval of SOB.    Of note, the patient was recently seen at [facility] for back pain where she was found to have acute compression fracture of T2 and moderate compression deformity of L2 that appears chronic. She was recommended for TLSO brace on discharge and started on dexamethasone.  89 year old female with PMH of HTN, COPD (on 3L home O2 at night), chronic bronchitis, CHFrEF (EF 40% in Dec 2018), recent admission to [facility] 10/22 for acute lumbar compression fracture who presents from [facility] for eval of SOB. Pt states she has baseline level of shortness of breath due to her hx of COPD which she felt is unchanged however feels that her cough is more prominent since being in rehab facility. She denies any fevers or chills, chest pain, or lightheadedness.     Of note, the patient was recently seen at [place] for back pain where she was found to have acute compression fracture of T2 and moderate compression deformity of L2 that appears chronic. She was recommended for TLSO brace on discharge and started on dexamethasone.

## 2022-11-13 NOTE — H&P ADULT - PROBLEM SELECTOR PLAN 7
Pt reports last bm was 1 week ago. constipated as a result of relative immobility and opioid induced  - continue senna  - start miralax  - monitor bms

## 2022-11-13 NOTE — H&P ADULT - PROBLEM SELECTOR PLAN 3
K 3.1  - will give KCl 40 meq PO x1 now  - repeat potassium levels this morning, goal ~4.0 K 3.1  - will give KCl 40 meq PO x1 now  - repeat potassium levels this morning, goal ~4.0  - hold home lasix for now, until K is replaced

## 2022-11-13 NOTE — ED PROVIDER NOTE - CLINICAL SUMMARY MEDICAL DECISION MAKING FREE TEXT BOX
89 year old female, from home, with PMH of HTN, COPD (on 3L home O2 at night), CHFrEF, recent admission to [facility] 10/22 for compression fracture, coming from [facility] for eval of SOB.     Patient to be given 2duonebs and solumedrol. Patient given supplemental Oxygen   Plan for basic labs, 89 year old female, from home, with PMH of HTN, COPD (on 3L home O2 at night), CHFrEF, recent admission to [facility] 10/22 for compression fracture, coming from [facility] for eval of SOB.     Patient to be given 2duonebs and solumedrol. Patient given supplemental Oxygen   Plan for basic labs, proBNP, EKG, CXR.   DDx includes but not limited to COPD exacerbation vs. pneumonia vs. bronchitis vs. COVID 19 vs. viral infection

## 2022-11-13 NOTE — H&P ADULT - ASSESSMENT
89 year old female with PMH of HTN, COPD (on 3L home O2 at night), chronic bronchitis, CHFrEF (EF 40% in Dec 2018), recent admission to [facility] 10/22 for acute lumbar compression fracture who presents from [facility] for eval of SOB, secondary to COPD exacerbation.

## 2022-11-13 NOTE — H&P ADULT - NSHPLABSRESULTS_GEN_ALL_CORE
LABS:                         14.3   8.05  )-----------( 316      ( 13 Nov 2022 18:53 )             45.2     11-13    140  |  98  |  16  ----------------------------<  104<H>  3.1<L>   |  30  |  0.82    Ca    9.2      13 Nov 2022 18:53    TPro  7.0  /  Alb  3.5  /  TBili  0.6  /  DBili  x   /  AST  16  /  ALT  10  /  AlkPhos  98  11-13            Serum Pro-Brain Natriuretic Peptide: 326 pg/mL (11-13 @ 18:53)      Records reviewed from prior hospitalization.  Labs reviewed remarkable for - CBC unremarkable. CMP unremarkable. potassium is down to 3.1.  EKG personally reviewed   CXR personally reviewed - pulmonary edema LABS:                         14.3   8.05  )-----------( 316      ( 13 Nov 2022 18:53 )             45.2     11-13    140  |  98  |  16  ----------------------------<  104<H>  3.1<L>   |  30  |  0.82    Ca    9.2      13 Nov 2022 18:53    TPro  7.0  /  Alb  3.5  /  TBili  0.6  /  DBili  x   /  AST  16  /  ALT  10  /  AlkPhos  98  11-13            Serum Pro-Brain Natriuretic Peptide: 326 pg/mL (11-13 @ 18:53)      Records reviewed from prior hospitalization.  Labs reviewed remarkable for - CBC unremarkable. CMP unremarkable. potassium is down to 3.1.  EKG personally reviewed - NSR  CXR personally reviewed - pulmonary edema

## 2022-11-13 NOTE — H&P ADULT - NSHPPHYSICALEXAM_GEN_ALL_CORE
Vital Signs Last 24 Hrs  T(C): 36.9 (13 Nov 2022 19:20), Max: 36.9 (13 Nov 2022 19:20)  T(F): 98.4 (13 Nov 2022 19:20), Max: 98.4 (13 Nov 2022 19:20)  HR: 99 (13 Nov 2022 19:20) (91 - 99)  BP: 126/73 (13 Nov 2022 19:20) (126/73 - 140/73)  BP(mean): --  RR: 26 (13 Nov 2022 19:20) (16 - 26)  SpO2: 98% (13 Nov 2022 19:20) (94% - 98%)    Parameters below as of 13 Nov 2022 19:20  Patient On (Oxygen Delivery Method): neb tx in process

## 2022-11-13 NOTE — ED ADULT NURSE NOTE - NSIMPLEMENTINTERV_GEN_ALL_ED
Implemented All Universal Safety Interventions:  [place]em to call system. Call bell, personal items and telephone within reach. Instruct patient to call for assistance. Room bathroom lighting operational. Non-slip footwear when patient is off stretcher. Physically safe environment: no spills, clutter or unnecessary equipment. Stretcher in lowest position, wheels locked, appropriate side rails in place.

## 2022-11-13 NOTE — ED ADULT NURSE NOTE - EXTENSIONS OF SELF_ADULT
----- Message from Deepika Benedict CNM sent at 9/27/2022  7:40 AM CDT -----  Please call patient and let her know she tested positive for BV. I sent antibiotics to her pharmacy. Thank you.    None

## 2022-11-13 NOTE — H&P ADULT - PROBLEM SELECTOR PLAN 1
Uses home O2 3L qHS at baseline  S/p duoneb treatments and solumedrol 125mg IV x1. Uses home O2 3L qHS at baseline.  S/p duoneb treatments and solumedrol 125mg IV x1 in the ED  - continue with albuterol q4hr standing  - continue with solumedrol 40mg IV daily  - continue home spiriva and symbicort  - titrate supplemental O2 for goal SpO2 88-92%  - obtain CTA to r/o PE

## 2022-11-14 NOTE — CONSULT NOTE ADULT - ASSESSMENT
This is a 89-year-old female with significant past medical history of COPD on 3 L of supplemental oxygen, heart failure, and known pulmonary nodules who was admitted to the hospital for acute on chronic hypoxic respiratory failure.    #Acute on chronic respiratory failure with COPD–patient with supplemental oxygen and would maintain oxygen saturation above 88%.  Please continue Symbicort and Spiriva.  Can give duo nebs every 6 as needed.  Agree with treatment of COPD exacerbation recommended by hospitalist.  However, at this time the patient does not appear to have wheezing or respiratory distress from COPD exacerbation.    Patient should also be optimized from a cardiac standpoint as echocardiogram shows deterioration of LV function.    #Pulmonary nodules–patient with known pulmonary nodules and seen by primary pulmonologist on the outside Dr. Calderon.  In today's CT scan of the chest nodules have increased with lymphadenopathy.  However, in the current setting would not pursue aggressive measurement at this time.  Will likely need PET scan as an outpatient which can be followed up by patient's primary pulmonologist.    #Severe COPD with persistent dyspnea–had a prolonged conversation with the patient regarding her overall prognosis.  Patient indicates that she does not not know "how long she has to live".  Patient appears to be very depressed and anxious about her declining state of health.  Discussed at length with the patient regarding quality versus quantity of life.  Patient may benefit from palliative care consult to help optimize medication regimen and increase quality of life.    – Would recommend calling palliative care.    Thank you for your consult.  We will continue to follow with you.

## 2022-11-14 NOTE — CONSULT NOTE ADULT - SUBJECTIVE AND OBJECTIVE BOX
[facility] DIVISION OF PULMONARY, CRITICAL CARE AND SLEEP MEDICINE  PULMONARY CONSULTATION NOTE  CONSULT NUMBER: [phone] (Monday-Friday 9am-5pm)  OFFICE NUMBER: [phone] (Afterhours and Weekends)    NAME: VIDAL LOUIE  MEDICAL RECORD NUMBER: [id number]    CHIEF COMPLAINT: Patient is a 89y old  Female who presents with a chief complaint of respiratory distress (2022 23:09)      HISTORY OF PRESENT ILLNESS:  89 year old female with PMH of HTN, COPD (on 3L home O2 at night), chronic bronchitis, CHFrEF (EF 40% in Dec 2018), recent admission to [facility] 10/22 for acute lumbar compression fracture who presents from [facility] for eval of SOB. Pt states she has baseline level of shortness of breath due to her hx of COPD which she felt is unchanged however feels that her cough is more prominent since being in rehab facility. She denies any fevers or chills, chest pain, or lightheadedness.     Of note, the patient was recently seen at [facility] for back pain where she was found to have acute compression fracture of T2 and moderate compression deformity of L2 that appears chronic. She was recommended for TLSO brace on discharge and started on dexamethasone.     Today she continues to feel short of breath.  Indicates that any movement that she has creates shortness of breath.  She does admit that she has pain that is not being controlled.  She is very tearful on exam and indicates that she does not know if she can continue to do this.  She has been telling her family that she will not come back from the hospital.        ====================BACKGROUND INFORMATION============================    FAMILY HISTORY: FAMILY HISTORY:  Family history of renal disease (Sibling)    Family history of lymphoma    Family history of congestive heart failure        PAST MEDICAL AND SURGICAL HISTORY: PAST MEDICAL & SURGICAL HISTORY:  Essential Hypertension      COPD (Chronic Obstructive Pulmonary Disease)  on home O2 AT NIGHT      Hip Fracture-Left      Hx of Breast Cancer  HAd Rt in       Chronic bronchitis      Anxiety disorder      Morbid obesity      DAVID on CPAP      Dysphagia      Neuropathy      S/P Insertion of IVC (Inferior Vena Caval) Filter      Joint Fixation (Surgical)- L Hip      S/P Breast Lumpectomy- left      Status Post Total Knee Replacement-bilateral      S/P           ALLERGIES:Allergies    No Known Allergies    Intolerances        HOME MEDICATIONS: As per HPI    OUTPATIENT PULMONARY DOCTOR:  Dr. Calderon    SOCIAL HISTORY:  TOBACCO USE: Former smoker, quit 10 years ago. Smoked 2PPD x 55 years  ALCOHOL: Denies  DRUGS: Denies  MARITAL STATUS:   EXPOSURES: Denies  RECENT TRAVELS: None  CODE STATUS: DNR/DNI    ====================REVIEW OF SYSTEMS=====================================  CONSTITUTIONAL: under mild distress from pain and/or anxiety  CARDIOVASCULAR: Denies  PULMONARY: As perabove  GASTROINTESTINAL: Denies  [x] ALL OTHER REVIEW OF SYSTEMS ARE NEGATIVE   [] UNABLE TO OBTAIN REVIEW OF SYSTEMS DUE TO ______________      ====================PHYSICAL EXAM=========================================    VITALS: ICU Vital Signs Last 24 Hrs  T(C): 36.5 (2022 04:48), Max: 36.9 (2022 19:20)  T(F): 97.7 (2022 04:48), Max: 98.4 (2022 19:20)  HR: 102 (2022 04:48) (91 - 104)  BP: 155/80 (2022 04:48) (103/71 - 155/80)  BP(mean): --  ABP: --  ABP(mean): --  RR: 22 (2022 04:48) (16 - 26)  SpO2: 92% (2022 04:48) (92% - 98%)    O2 Parameters below as of 2022 04:48  Patient On (Oxygen Delivery Method): nasal cannula  O2 Flow (L/min): 4          INTAKE and OUTPUT: I&O's Summary      WEIGHT: Daily Height in cm: 162.56 (2022 17:57)    Daily     GLUCOSE: CAPILLARY BLOOD GLUCOSE          VENTILATOR SETTINGS:  4L supplemental oxygen    GENERAL: Patient under mild distress and tearful  HEENT: PERRLA  NECK:  Supple  LYMPH NODES: None  CARDIOVASCULAR: RRR, Normal S1 and S2  PULMONARY: +crackles at the bases bilaterally  ABDOMEN: SNT, +BS  SKIN: Frail and with ecchymosis   EXTREMITIES: 1+ edema and +clubbing  NEUROLOGIC: Mild decrease in sensation in the lower extremities bilaterally otherwise normal  PSYCHIATRIC: Tearful and anxious  and depressed    ====================MEDICATIONS===========================================  MEDICATIONS  (STANDING):  albuterol    90 MICROgram(s) HFA Inhaler 2 Puff(s) Inhalation every 4 hours  amLODIPine   Tablet 2.5 milliGRAM(s) Oral daily  aspirin enteric coated 81 milliGRAM(s) Oral daily  budesonide 160 MICROgram(s)/formoterol 4.5 MICROgram(s) Inhaler 2 Puff(s) Inhalation two times a day  calcium carbonate 1250 mG  + Vitamin D (OsCal 500 + D) 1 Tablet(s) Oral daily  cholecalciferol 2000 Unit(s) Oral daily  enoxaparin Injectable 40 milliGRAM(s) SubCutaneous every 24 hours  gabapentin 400 milliGRAM(s) Oral three times a day  methylPREDNISolone sodium succinate Injectable 40 milliGRAM(s) IV Push daily  multivitamin 1 Tablet(s) Oral daily  polyethylene glycol 3350 17 Gram(s) Oral daily  senna 2 Tablet(s) Oral at bedtime  tiotropium 18 MICROgram(s) Capsule 1 Capsule(s) Inhalation daily      MEDICATIONS  (PRN):  acetaminophen     Tablet .. 650 milliGRAM(s) Oral every 6 hours PRN Temp greater or equal to 38C (100.4F), Mild Pain (1 - 3)  aluminum hydroxide/magnesium hydroxide/simethicone Suspension 30 milliLiter(s) Oral every 4 hours PRN Dyspepsia  cyclobenzaprine 5 milliGRAM(s) Oral three times a day PRN Muscle Spasm  guaiFENesin  milliGRAM(s) Oral every 12 hours PRN Cough  melatonin 3 milliGRAM(s) Oral at bedtime PRN Insomnia  ondansetron Injectable 4 milliGRAM(s) IV Push every 8 hours PRN Nausea and/or Vomiting  traMADol 50 milliGRAM(s) Oral every 6 hours PRN Severe Pain (7 - 10)      ====================LABORATORY RESULTS====================================  CBC Full  -  ( 2022 07:29 )  WBC Count : 4.33 K/uL  RBC Count : 5.05 M/uL  Hemoglobin : 14.5 g/dL  Hematocrit : 45.8 %  Platelet Count - Automated : 288 K/uL  Mean Cell Volume : 90.7 fl  Mean Cell Hemoglobin : 28.7 pg  Mean Cell Hemoglobin Concentration : 31.7 gm/dL  Auto Neutrophil # : 3.54 K/uL  Auto Lymphocyte # : 0.63 K/uL  Auto Monocyte # : 0.13 K/uL  Auto Eosinophil # : 0.00 K/uL  Auto Basophil # : 0.01 K/uL  Auto Neutrophil % : 81.8 %  Auto Lymphocyte % : 14.5 %  Auto Monocyte % : 3.0 %  Auto Eosinophil % : 0.0 %  Auto Basophil % : 0.2 %                                        140  |  98  |  16  ----------------------------<  158<H>  4.1   |  29  |  0.69    Ca    9.5      2022 07:29  Phos  3.1     -14  Mg     2.1         TPro  7.0  /  Alb  3.5  /  TBili  0.6  /  DBili  x   /  AST  16  /  ALT  10  /  AlkPhos  98  -13            Creatinine Trend: 0.69<--, 0.82<--, 1.11<--, 0.94<--, 0.96<--, 0.78<--      ====================RADIOLOGY and ECHOCARDIOGRAPHY=======================    CXR: < from: Xray Chest 1 View AP/PA (22 @ 19:22) >  left midloung opacity likely corresponds to nodule seen   on CT thoracic spine from 10/14/2022. this appears new since CT chest   2019 and could be further evaluated with a CT of the chest. small   right pleural effusion. d/w malena    < end of copied text >      CT CHEST: < from: CT Angio Chest PE Protocol w/ IV Cont (22 @ 03:53) >  No pulmonary embolism.    Patchy consolidation in the right apical region and at the right base may   represent pneumonia.    Left lower lobe 2.3 cm nodule increased in size compared to 10/14/2022   and likely represents primary lung neoplasm.    Left paratracheal 3.6 x 1.3 cmnode appears increased in size compared to   10/10/2018 and is indeterminate.    < end of copied text >      ECHOCARDIOGRAPHY: < from: Transthoracic Echocardiogram (18 @ 12:52) >  1. Endocardium not well visualized; grossly  Moderate  global left ventricular systolic dysfunction.  2. Mild diastolic dysfunction (Stage I).  3. Normal right ventricular size and function.  *** Compared with echocardiogram of 2017, LV function  has deteriorated.    < end of copied text >

## 2022-11-14 NOTE — PROGRESS NOTE ADULT - PROBLEM SELECTOR PLAN 2
Increased in size compared to prior scan in Oct 2022  - Pt was informed of this CT finding  - Emailed Dr. Sheets for collateral and to update on most recent CT  -Appreciate pulm recs  -Pt would consider surgery if offered

## 2022-11-14 NOTE — RAPID RESPONSE TEAM SUMMARY - NSSITUATIONBACKGROUNDRRT_GEN_ALL_CORE
89 year old female with PMH of HTN, COPD (on 3L home O2 at night), chronic bronchitis, CHFrEF (EF 40% in Dec 2018), recent admission to [facility] 10/22 for acute lumbar compression fracture who presents from [facility] for eval of SOB, secondary to COPD exacerbation

## 2022-11-14 NOTE — PROGRESS NOTE ADULT - SUBJECTIVE AND OBJECTIVE BOX
PROGRESS NOTE:   Su Dorantes DO  Hospitalist  Pager [phone]  After 5pm/weekends or if no answer ext: [phone]      Patient is a 89y old  Female who presents with a chief complaint of respiratory distress (14 Nov 2022 11:52)      SUBJECTIVE / OVERNIGHT EVENTS: Pt anxious about her SOB and feels nervous about begin in the hospital.  Still SOB.  Says she only wears O2 at night at home.     ADDITIONAL REVIEW OF SYSTEMS:  no fever or chills  no n/v/d    MEDICATIONS  (STANDING):  albuterol    90 MICROgram(s) HFA Inhaler 2 Puff(s) Inhalation every 4 hours  amLODIPine   Tablet 2.5 milliGRAM(s) Oral daily  aspirin enteric coated 81 milliGRAM(s) Oral daily  budesonide 160 MICROgram(s)/formoterol 4.5 MICROgram(s) Inhaler 2 Puff(s) Inhalation two times a day  calcium carbonate 1250 mG  + Vitamin D (OsCal 500 + D) 1 Tablet(s) Oral daily  cholecalciferol 2000 Unit(s) Oral daily  enoxaparin Injectable 40 milliGRAM(s) SubCutaneous every 24 hours  gabapentin 400 milliGRAM(s) Oral three times a day  methylPREDNISolone sodium succinate Injectable 40 milliGRAM(s) IV Push daily  multivitamin 1 Tablet(s) Oral daily  polyethylene glycol 3350 17 Gram(s) Oral daily  senna 2 Tablet(s) Oral at bedtime  tiotropium 18 MICROgram(s) Capsule 1 Capsule(s) Inhalation daily    MEDICATIONS  (PRN):  acetaminophen     Tablet .. 650 milliGRAM(s) Oral every 6 hours PRN Temp greater or equal to 38C (100.4F), Mild Pain (1 - 3)  aluminum hydroxide/magnesium hydroxide/simethicone Suspension 30 milliLiter(s) Oral every 4 hours PRN Dyspepsia  cyclobenzaprine 5 milliGRAM(s) Oral three times a day PRN Muscle Spasm  guaiFENesin  milliGRAM(s) Oral every 12 hours PRN Cough  melatonin 3 milliGRAM(s) Oral at bedtime PRN Insomnia  ondansetron Injectable 4 milliGRAM(s) IV Push every 8 hours PRN Nausea and/or Vomiting  traMADol 50 milliGRAM(s) Oral every 6 hours PRN Severe Pain (7 - 10)      CAPILLARY BLOOD GLUCOSE        I&O's Summary      PHYSICAL EXAM:  Vital Signs Last 24 Hrs  T(C): 36.6 (14 Nov 2022 16:51), Max: 36.9 (13 Nov 2022 19:20)  T(F): 97.9 (14 Nov 2022 16:51), Max: 98.4 (13 Nov 2022 19:20)  HR: 99 (14 Nov 2022 16:51) (91 - 104)  BP: 156/78 (14 Nov 2022 16:51) (103/71 - 156/78)  BP(mean): --  RR: 20 (14 Nov 2022 16:51) (16 - 26)  SpO2: 91% (14 Nov 2022 16:51) (91% - 98%)    Parameters below as of 14 Nov 2022 16:51  Patient On (Oxygen Delivery Method): nasal cannula  O2 Flow (L/min): 4      CONSTITUTIONAL: NAD, well-developed  RESPIRATORY: Significantly decreased breath sounds no rales or wheezing  CARDIOVASCULAR: Regular rate and rhythm, normal S1 and S2, no murmur/rub/gallop; No lower extremity edema; Peripheral pulses are 2+ bilaterally  ABDOMEN: Nontender to palpation, normoactive bowel sounds, no rebound/guarding; No hepatosplenomegaly  MUSCLOSKELETAL: no clubbing or cyanosis of digits; no joint swelling or tenderness to palpation  PSYCH: A+O to person, place, and time; affect appropriate    LABS:                        14.5   4.33  )-----------( 288      ( 14 Nov 2022 07:29 )             45.8     11-14    140  |  98  |  16  ----------------------------<  158<H>  4.1   |  29  |  0.69    Ca    9.5      14 Nov 2022 07:29  Phos  3.1     11-14  Mg     2.1     11-14    TPro  7.0  /  Alb  3.5  /  TBili  0.6  /  DBili  x   /  AST  16  /  ALT  10  /  AlkPhos  98  11-13                RADIOLOGY & ADDITIONAL TESTS:  Results Reviewed:   Imaging Personally Reviewed:  Electrocardiogram Personally Reviewed:    COORDINATION OF CARE:  Care Discussed with Consultants/Other Providers [Y/N]:  Prior or Outpatient Records Reviewed [Y/N]:

## 2022-11-14 NOTE — PROGRESS NOTE ADULT - PROBLEM SELECTOR PLAN 1
Uses home O2 3L qHS at baseline.  S/p duoneb treatments and solumedrol 125mg IV x1 in the ED  - continue with albuterol q4hr standing  - continue with solumedrol 40mg IV daily  - continue home spiriva and symbicort  - titrate supplemental O2 for goal SpO2 88-92%  -  CTA negative for PE

## 2022-11-14 NOTE — PATIENT PROFILE ADULT - FUNCTIONAL ASSESSMENT - BASIC MOBILITY ASSESSMENT TYPE
Patient lab work reviewed with guardian Ms. Miranda.  Prolactin elevated were going to repeat first thing in the morning.  Discussed findings implications   Admission

## 2022-11-14 NOTE — PROGRESS NOTE ADULT - PROBLEM SELECTOR PLAN 4
-   restart home lasix 40mg daily    - TTE showing normal systolic function, pt says she was started on lasix for pedal edema and was told at [facility] that she has HF but was not told this before

## 2022-11-15 NOTE — CONSULT NOTE ADULT - SUBJECTIVE AND OBJECTIVE BOX
HPI:  89 year old female with PMH of HTN, COPD (on 3L home O2 at night), chronic bronchitis, CHFrEF (EF 40% in Dec 2018), recent admission to [facility] 10/22 for acute lumbar compression fracture who presents from [facility] for eval of SOB. Pt states she has baseline level of shortness of breath due to her hx of COPD which she felt is unchanged however feels that her cough is more prominent since being in rehab facility. She denies any fevers or chills, chest pain, or lightheadedness.     Of note, the patient was recently seen at [place] for back pain where she was found to have acute compression fracture of T2 and moderate compression deformity of L2 that appears chronic. She was recommended for TLSO brace on discharge and started on dexamethasone.  (2022 23:09)    PERTINENT PM/SXH:   Essential Hypertension    COPD (Chronic Obstructive Pulmonary Disease)    Hip Fracture-Left    Hx of Breast Cancer    Chronic bronchitis    Anxiety disorder    Morbid obesity    DAVID on CPAP    Dysphagia    Neuropathy      S/P Insertion of IVC (Inferior Vena Caval) Filter    Joint Fixation (Surgical)- L Hip    S/P Breast Lumpectomy- left    Status Post Total Knee Replacement-bilateral    S/P       FAMILY HISTORY:  Family history of renal disease (Sibling)    Family history of lymphoma    Family history of congestive heart failure      Family Hx substance abuse [ ]yes [ ]no  ITEMS NOT CHECKED ARE NOT PRESENT    SOCIAL HISTORY:   Significant other/partner[ ]  Children[x ]  Sabianism/Spirituality:  Substance hx:  [ ]   Tobacco hx:  [ ]   Alcohol hx: [ ]   Home Opioid hx:  [ ] I-Stop Reference No:  Living Situation: [ x]Home  [ ]Long term care  [ ]Rehab [ ]Other    ADVANCE DIRECTIVES:    DNR/MOLST  [ x]  Living Will  [ ]   DECISION MAKER(s):  [ ] Health Care Proxy(s)  [xx ] Surrogate(s)  [ ] Guardian           Name(s): Phone Number(s):  Son Prem Felix   BASELINE (I)ADL(s) (prior to admission):  [place]: [ ]Total  [x ] Moderate [ ]Dependent    Allergies    No Known Allergies    Intolerances    MEDICATIONS  (STANDING):  albuterol    90 MICROgram(s) HFA Inhaler 2 Puff(s) Inhalation every 4 hours  albuterol/ipratropium for Nebulization 3 milliLiter(s) Nebulizer every 6 hours  amLODIPine   Tablet 2.5 milliGRAM(s) Oral daily  aspirin enteric coated 81 milliGRAM(s) Oral daily  azithromycin   Tablet 250 milliGRAM(s) Oral daily  budesonide 160 MICROgram(s)/formoterol 4.5 MICROgram(s) Inhaler 2 Puff(s) Inhalation two times a day  calcium carbonate 1250 mG  + Vitamin D (OsCal 500 + D) 1 Tablet(s) Oral daily  cefTRIAXone   IVPB 1000 milliGRAM(s) IV Intermittent every 24 hours  cholecalciferol 2000 Unit(s) Oral daily  enoxaparin Injectable 40 milliGRAM(s) SubCutaneous every 24 hours  furosemide    Tablet 40 milliGRAM(s) Oral daily  gabapentin 400 milliGRAM(s) Oral three times a day  influenza  Vaccine (HIGH DOSE) 0.7 milliLiter(s) IntraMuscular once  methylPREDNISolone sodium succinate Injectable 40 milliGRAM(s) IV Push daily  multivitamin 1 Tablet(s) Oral daily  polyethylene glycol 3350 17 Gram(s) Oral daily  senna 2 Tablet(s) Oral at bedtime  sodium chloride 3%  Inhalation 4 milliLiter(s) Inhalation every 6 hours  tiotropium 18 MICROgram(s) Capsule 1 Capsule(s) Inhalation daily    MEDICATIONS  (PRN):  acetaminophen     Tablet .. 650 milliGRAM(s) Oral every 6 hours PRN Temp greater or equal to 38C (100.4F), Mild Pain (1 - 3)  aluminum hydroxide/magnesium hydroxide/simethicone Suspension 30 milliLiter(s) Oral every 4 hours PRN Dyspepsia  cyclobenzaprine 5 milliGRAM(s) Oral three times a day PRN Muscle Spasm  guaiFENesin  milliGRAM(s) Oral every 12 hours PRN Cough  melatonin 3 milliGRAM(s) Oral at bedtime PRN Insomnia  ondansetron Injectable 4 milliGRAM(s) IV Push every 8 hours PRN Nausea and/or Vomiting  traMADol 50 milliGRAM(s) Oral every 6 hours PRN Severe Pain (7 - 10)    PRESENT SYMPTOMS: [ ]Unable to self-report  [ ] CPOT [ ] PAINADs [ ] RDOS  Source if other than patient:  [ ]Family   [ ]Team     Pain: [ ]yes [x ]no  QOL impact -   Location -                    Aggravating factors -  Quality -  Radiation -  Timing-  Severity (0-10 scale):  Minimal acceptable level (0-10 scale):     CPOT:    https://www.sccm.org/getattachment/zeb81l25-9t0f-1g4e-7r8m-2059k4973i3l/Critical-Care-Pain-Observation-Tool-(CPOT)    PAIN AD Score:   http://[email or web address]/cog/painad.pdf (press ctrl +  left click to view)    Dyspnea:                           [ ]Mild [ ]Moderate [ ]Severe      RDOS:  0 to 2  minimal or no respiratory distress   3  mild distress  4 to 6 moderate distress  >7 severe distress >7  https://homecareinformation.net/handouts/hen/Respiratory_Distress_Observation_Scale.pdf (Ctrl +  left click to view)     Anxiety:                             [ ]Mild [x ]Moderate [x ]Severe  Fatigue:                             [ ]Mild [x ]Moderate [ ]Severe  Nausea:                             [ ]Mild [ ]Moderate [ ]Severe  Loss of appetite:              [ ]Mild [ ]Moderate [ ]Severe  Constipation:                    [ ]Mild [ ]Moderate [ ]Severe    PCSSQ [Palliative Care Spiritual Screening Question]   Severity (0-10): 0  Score of 4 or > indicate consideration of Chaplaincy referral.  Chaplaincy Referral: [ ] yes [ ] refused [ ] following   (xx) deferred     Caregiver [place]? : [ ] yes [ ] no  Social work referral [ ] Patient & Family Centered Care Referral [ ]     Anticipatory Grief Present?: [ ] yes [ ] no  Social work referral [ ]  Patient & Family Centered Care Referral [ ]       Other Symptoms:  [ x]All other review of systems negative     Palliative Performance Status Version 2:  30       %    http://[email or web address].org/files/news/palliative_performance_scale_[email or web address].pdf  PHYSICAL EXAM:  Vital Signs Last 24 Hrs  T(C): 36.6 (15 Nov 2022 04:35), Max: 36.8 (2022 21:00)  T(F): 97.8 (15 Nov 2022 04:35), Max: 98.3 (2022 21:00)  HR: 110 (15 Nov 2022 13:17) (88 - 110)  BP: 138/67 (15 Nov 2022 04:35) (138/67 - 166/78)  BP(mean): --  RR: 24 (15 Nov 2022 13:17) (20 - 24)  SpO2: 94% (15 Nov 2022 13:17) (91% - 95%)    Parameters below as of 15 Nov 2022 13:17  Patient On (Oxygen Delivery Method): nasal cannula, high flow  O2 Flow (L/min): 40  O2 Concentration (%): 40 I&O's Summary    2022 07:01  -  15 Nov 2022 07:00  --------------------------------------------------------  IN: 350 mL / OUT: 300 mL / NET: 50 mL      GENERAL: [ ]Cachexia    [ x]Alert  [ x]Oriented x   [ ]Lethargic  [ ]Unarousable  [x ]Verbal  [ ]Non-Verbal  Behavioral:   [x ] Anxiety  [ ] Delirium [ ] Agitation [ ] Other  HEENT:  [ ]Normal   [x ]Dry mouth   [ ]ET Tube/Trach  [ ]Oral lesions  PULMONARY:   [ ]Clear [ ]Tachypnea  [ ]Audible excessive secretions   [ ]Rhonchi        [ ]Right [ ]Left [ ]Bilateral  [ ]Crackles        [ ]Right [ ]Left [ ]Bilateral  [x ]Wheezing     [ ]Right [ ]Left [x ]Bilateral  [ x]Diminished breath sounds [ ]right [ ]left [ ]bilateral  CARDIOVASCULAR:    [ ]Regular [x ]Irregular [ ]Tachy  [ ]Reece [ ]Murmur [ ]Other  GASTROINTESTINAL:  [x ]Soft  [ ]Distended   [ x]+BS  [x ]Non tender [ ]Tender  [ ]Other [ ]PEG [ ]OGT/ NGT  Last BM:  GENITOURINARY:  [x ]Normal [ x] Incontinent   [ ]Oliguria/Anuria   [ ][place]  MUSCULOSKELETAL:   [ ]Normal   [x ]Weakness  [ ]Bed/Wheelchair bound [ ]Edema  NEUROLOGIC:   [ x]No focal deficits  [ ]Cognitive impairment  [ ]Dysphagia [ ]Dysarthria [ ]Paresis [ ]Other   SKIN:   [ ]Normal  [ ]Rash  [ ]Other  [ x]Pressure ulcer(s)       Present on admission [ ]y [ ]n    CRITICAL CARE:  [ ] Shock Present  [ ]Septic [ ]Cardiogenic [ ]Neurologic [ ]Hypovolemic  [ ]  Vasopressors [ ]  Inotropes   [x ]Respiratory failure present [ ]Mechanical ventilation [ x]Non-invasive ventilatory support [ ]High flow    [ ]Acute  [ ]Chronic [ ]Hypoxic  [ ]Hypercarbic [ ]Other  [x ]Other organ failure     LABS:                        14.6   8.26  )-----------( 313      ( 2022 17:44 )             44.8       140  |  99  |  17  ----------------------------<  122<H>  4.1   |  27  |  0.68    Ca    9.7      2022 17:44  Phos  2.8       Mg     2.2         TPro  7.2  /  Alb  3.7  /  TBili  0.6  /  DBili  x   /  AST  28  /  ALT  14  /  AlkPhos  101          RADIOLOGY & ADDITIONAL STUDIES:    < from: CT Angio Chest PE Protocol w/ IV Cont (22 @ 03:53) >    ACC: [id number] EXAM:  CT ANGIO CHEST PULM ART [facility]                          PROCEDURE DATE:  2022          INTERPRETATION:  CLINICAL INDICATION: Dyspnea    TECHNIQUE: A volumetric acquisition of the chest was obtained from the   thoracic inlet to the upper abdomen during dynamic administration of 90cc   Omnipaque 350 intravenous contrast according to the PE protocol. 3D MIP   images were provided.    COMPARISON: CT chest 10/13/2022    FINDINGS:  CTA: No pulmonary embolism.    Heart/Vasculature: The heart is enlarged in size. There is no pericardial   effusion.    Lungs/Airways/Pleura: Mild central airways secretions. Emphysema. Left   lower lobe superior segment 2.3 x 1.9 cm nodule (image 53, series 2) was   previously 1.6 x 1.5 cm on10/ CT thoracic spine and new compared   to 2019.    Patchy right apical consolidative opacity (image 34, series 2) is similar   compared to 2022 CT chest and new compared to 10/14/2022 CT   thoracic spine.    Right basilar parenchymal opacification likely represents a combination   of atelectasis and consolidation.    Small bilateral pleural effusions.    Mediastinum/Lymph nodes: Left paratracheal 3.6 x 1.3 cm node (image 51,   series 3) appears increased in size compared to 10/10/2018.. Additional   mediastinal, cardiophrenic angle, and right internal mammary nodes are   similar compared to 10/10/2018.    Upper Abdomen: Enlarged celiac nodes, similar compared to prior exams.   Subcentimeter exophytic right renal lesion unchanged    Bones and Soft Tissues: Multilevel degenerative disc disease. T12   compression fracture is redemonstrated and seen since 10/14/2022. Grade 1   anterolisthesis of C7 on T1, T1 on T2, and T2 on T3.    IMPRESSION:    No pulmonary embolism.    Patchy consolidation in the right apical region and at the right base may   represent pneumonia.    Left lower lobe 2.3 cm nodule increased in size compared to 10/14/2022   and likely represents primary lung neoplasm.    Left paratracheal 3.6 x 1.3 cmnode appears increased in size compared to   10/10/2018 and is indeterminate.      < end of copied text >  < from: CT Chest No Cont (..22 @ 20:45) >    ACC: [id number] EXAM:  CT CHEST                          PROCEDURE DATE:  2022          INTERPRETATION:  CLINICAL INFORMATION: Chest x-ray, concern for nodule  All all  COMPARISON: CT chest 2019, CT thoracic spine 10/14/2022    CONTRAST/COMPLICATIONS:  IV Contrast: NONE  Oral Contrast: NONE  Complications: None reported at time of study completion    PROCEDURE:  CT scan of the chest was obtained without intravenous contrast.    FINDINGS:    LYMPH NODES: Redemonstrated subcentimeter multiple mediastinal, hilar,   and pericardiophrenic nodes. Reference right hilar nodule measuring 1.7 x   1.2 cm (2-69).    HEART/VASCULATURE: The heart size is enlarged. No pericardial effusion.   Lipomatous hypertrophy of the interatrial septum as seenbefore. Aortic   and coronary artery calcifications.    AIRWAYS/LUNGS/PLEURA: The central airways are patent. Small amount of   secretions in the trachea.    Small bilateral pleural effusions. There is moderate bibasilar infiltrate   and/or subsegmental atelectasis, right greater than left.    Advanced emphysematous changes although most pronounced within the upper   lobes. Reticular opacities at the right apex, likely representing   scarring.    Left lower lobe nodule measuring 2.3 x 2.0 cm (2-59), increased since CT   thoracic spine 10/14/2022 where it measured 1.6 x 1.5 cm.    UPPER ABDOMEN: Small hiatal hernia. Cholelithiasis. Partially visualized   IVC filter. A few nodes are seen in the gastrohepatic ligament/celiac   axis region, stable since 2019.    BONES/SOFT TISSUES: Degenerative changes. Stable severe compression   fracture of the T12 vertebral body, as seen in CT thoracic spine   10/14/2022. Grade 1 anterolistheses of C7 on T1, T1 on T2, and T2 on T3.   Degenerative changes of the spine.    IMPRESSION:    Small bilateral pleural effusions. There is moderate bibasilar infiltrate   and/or subsegmental atelectasis, right greater than left.    Advanced emphysematous changes although most pronounced within the upper   lobes. Reticular opacities at the right apex, likely representing   scarring.    Left lower lobe nodule measuring 2.3 x 2.0 cm, increased since the prior   CT thoracic spine on 10/14/2022 and new since CT chest from 2019.   Presumed neoplastic etiology. Further evaluation and follow-up required.    --- End of Report ---           RADHA MORRIS MD; Resident Radiologist  This document has been electronically signed.   HEMALATHA CHAUDHARI MD; Attending Radiologist  This document has been electronically signed. 2022 10:35PM    < end of copied text >      PROTEIN CALORIE MALNUTRITION PRESENT: [ ]mild [x ]moderate [ ]severe [ ]underweight [ ]morbid obesity  https://www.andeal.org/vault/2440/web/files/ONC/Table_Clinical%20Characteristics%20to%20Document%20Malnutrition-White%20JV%20et%20al%2020.pdf    Height (cm): 162.6 (22 @ 17:57), 162.6 (10-10-22 @ 09:25)  Weight (kg): 93 (10-10-22 @ 09:25)  BMI (kg/m2): 35.2 (22 @ 17:57), 35.2 (10-10-22 @ 09:25)    [ ]PPSV2 < or = to 30% [ ]significant weight loss  [ x]poor nutritional intake  [ ]anasarca[ ]Artificial Nutrition      Other REFERRALS:  [ ]Hospice  [ ]Child Life  [ ]Social Work  [x ]Case management [ ]Holistic Therapy

## 2022-11-15 NOTE — ADVANCED PRACTICE NURSE CONSULT - RECOMMEDATIONS
Will recommend the followin. Sacrum, b/l buttocks: continue to monitor, routine pericare with arin  2. Continue to encourage mobility, T&P  3. Complete CAir boots  4. Seat cushion when OOB to chair  5. Nutrition support as pt condition allows  Tx plan discussed with RN

## 2022-11-15 NOTE — PROGRESS NOTE ADULT - SUBJECTIVE AND OBJECTIVE BOX
[facility] DIVISION OF PULMONARY, CRITICAL CARE and SLEEP MEDICINE  PULMONARY PROGRESS NOTE  OFFICE NUMBER: [phone] (Afterhours and Weekends)  CONSULT NUMBER: [phone] (Monday - Friday: 9am-5pm)    PATIENT INFORMATION:  NAME: VIDAL LOUIE:  MRN: [id number]    CHIEF COMPLAINT: Patient is a 89y old  Female who presents with a chief complaint of respiratory distress (15 Nov 2022 13:22)      [x] INITIAL CONSULT, H&P, FAMILY HISTORY and PAST MEDICAL AND SURGICAL HISTORY REVIEWED    OVERNIGHT EVENTS or CHANGES TO HPI:  Patient with rapid response last night for hypoxemia. Stayed on unit and started antibiotics. Continues to have significant cough and shortness of breath.    ========================REVIEW OF SYSTEMS========================  CONSTITUTIONAL: Mild to moderate respiratory distress  CARDIOVASCULAR: Denies  PULMONARY: As above  [x] REMAINING REVIEW OF SYSTEMS NEGATIVE  [] UNABLE TO OBTAIN REVIEW OF SYSTEMS DUE TO _______________    ========================MEDICATIONS=============================  MEDICATIONS  (STANDING):  albuterol    90 MICROgram(s) HFA Inhaler 2 Puff(s) Inhalation every 4 hours  albuterol/ipratropium for Nebulization 3 milliLiter(s) Nebulizer every 6 hours  amLODIPine   Tablet 2.5 milliGRAM(s) Oral daily  aspirin enteric coated 81 milliGRAM(s) Oral daily  azithromycin   Tablet 250 milliGRAM(s) Oral daily  budesonide 160 MICROgram(s)/formoterol 4.5 MICROgram(s) Inhaler 2 Puff(s) Inhalation two times a day  calcium carbonate 1250 mG  + Vitamin D (OsCal 500 + D) 1 Tablet(s) Oral daily  cefTRIAXone   IVPB 1000 milliGRAM(s) IV Intermittent every 24 hours  cholecalciferol 2000 Unit(s) Oral daily  enoxaparin Injectable 40 milliGRAM(s) SubCutaneous every 24 hours  furosemide    Tablet 40 milliGRAM(s) Oral daily  gabapentin 400 milliGRAM(s) Oral three times a day  influenza  Vaccine (HIGH DOSE) 0.7 milliLiter(s) IntraMuscular once  methylPREDNISolone sodium succinate Injectable 40 milliGRAM(s) IV Push daily  multivitamin 1 Tablet(s) Oral daily  polyethylene glycol 3350 17 Gram(s) Oral daily  senna 2 Tablet(s) Oral at bedtime  sodium chloride 3%  Inhalation 4 milliLiter(s) Inhalation every 6 hours  tiotropium 18 MICROgram(s) Capsule 1 Capsule(s) Inhalation daily      MEDICATIONS  (PRN):  acetaminophen     Tablet .. 650 milliGRAM(s) Oral every 6 hours PRN Temp greater or equal to 38C (100.4F), Mild Pain (1 - 3)  aluminum hydroxide/magnesium hydroxide/simethicone Suspension 30 milliLiter(s) Oral every 4 hours PRN Dyspepsia  cyclobenzaprine 5 milliGRAM(s) Oral three times a day PRN Muscle Spasm  guaiFENesin  milliGRAM(s) Oral every 12 hours PRN Cough  melatonin 3 milliGRAM(s) Oral at bedtime PRN Insomnia  ondansetron Injectable 4 milliGRAM(s) IV Push every 8 hours PRN Nausea and/or Vomiting  traMADol 50 milliGRAM(s) Oral every 6 hours PRN Severe Pain (7 - 10)      ========================PHYSICAL EXAM============================    VITALS: ICU Vital Signs Last 24 Hrs  T(C): 36.6 (15 Nov 2022 04:35), Max: 36.8 (14 Nov 2022 21:00)  T(F): 97.8 (15 Nov 2022 04:35), Max: 98.3 (14 Nov 2022 21:00)  HR: 110 (15 Nov 2022 13:17) (88 - 110)  BP: 138/67 (15 Nov 2022 04:35) (138/67 - 166/78)  BP(mean): --  ABP: --  ABP(mean): --  RR: 24 (15 Nov 2022 13:17) (20 - 24)  SpO2: 94% (15 Nov 2022 13:17) (91% - 95%)    O2 Parameters below as of 15 Nov 2022 13:17  Patient On (Oxygen Delivery Method): nasal cannula, high flow  O2 Flow (L/min): 40  O2 Concentration (%): 40        INTAKE and OUTPUT: I&O's Summary    14 Nov 2022 07:01  -  15 Nov 2022 07:00  --------------------------------------------------------  IN: 350 mL / OUT: 300 mL / NET: 50 mL      GENERAL: Under mild respiratory distress  EYES: PERRLA  NECK: Supple  CARDIOVASCULAR: Tachycardia no M/R/G noted  RESPIRATORY: Poor inspiratory effort but does not have any wheezing  EXTREMITIES No C/C/E  SKIN: Fragile and with ecchymosis   NEUROLOGIC: Nonfocal  PSYCHIATRIC: Depressed and anxious, AAOx4    ========================LABORATORY RESULTS AND IMAGING=============                        14.6   8.26  )-----------( 313      ( 14 Nov 2022 17:44 )             44.8                                                    11-14    140  |  99  |  17  ----------------------------<  122<H>  4.1   |  27  |  0.68    Ca    9.7      14 Nov 2022 17:44  Phos  2.8     11-14  Mg     2.2     11-14    TPro  7.2  /  Alb  3.7  /  TBili  0.6  /  DBili  x   /  AST  28  /  ALT  14  /  AlkPhos  101  11-14      ABG - ( 14 Nov 2022 17:39 )  pH, Arterial: 7.48  pH, Blood: x     /  pCO2: 45    /  pO2: 179   / HCO3: 34    / Base Excess: 8.7   /  SaO2: 97.4      Creatinine Trend: 0.68<--, 0.69<--, 0.82<--, 1.11<--, 0.94<--, 0.96<--    CT CHEST:       THANK YOU FOR ALLOWING US TO PARTICIPATE IN THE CARE OF THIS PATIENT

## 2022-11-15 NOTE — CONSULT NOTE ADULT - ASSESSMENT
89 year old female with PMH of HTN, COPD (on 3L home O2 at night), chronic bronchitis, CHFrEF (EF 40% in Dec 2018), recent admission to [facility] 10/22 for acute lumbar compression fracture who presents from [facility] for eval of SOB, secondary to COPD exacerbation with RRT on 11/14 for worsening hypoxia possibly 2/2 superimposed PNA now on both steroids and abx. Palliative care consulted for goals of care and advance care planning

## 2022-11-15 NOTE — CONSULT NOTE ADULT - PROBLEM SELECTOR RECOMMENDATION 2
CT with increased size in nodule compared to October 2022  Patient open to discussions surrounding options

## 2022-11-15 NOTE — PROGRESS NOTE ADULT - SUBJECTIVE AND OBJECTIVE BOX
Derick Luz MD  Division of Hospital Medicine  Available on MS teams until 7pm  If no response or off-hours, page [phone]  -------------------------------------    Patient is a 89y old  Female who presents with a chief complaint of respiratory distress (14 Nov 2022 17:02)    SUBJECTIVE / OVERNIGHT EVENTS: RRT last night for worsening hypoxia, started abx for possible pna  ADDITIONAL REVIEW OF SYSTEMS: pt actively short of breath sitting in bed, speaking in short sentences. Can't tell if SOB is any better from admission. Has ongoing cough with minimal sputum. Doesn't have much appetite- and finds she needs to drink liquids with each bite in order to get food down. No fevers/chills, no n/v/d.     MEDICATIONS  (STANDING):  albuterol    90 MICROgram(s) HFA Inhaler 2 Puff(s) Inhalation every 4 hours  albuterol/ipratropium for Nebulization 3 milliLiter(s) Nebulizer every 6 hours  amLODIPine   Tablet 2.5 milliGRAM(s) Oral daily  aspirin enteric coated 81 milliGRAM(s) Oral daily  budesonide 160 MICROgram(s)/formoterol 4.5 MICROgram(s) Inhaler 2 Puff(s) Inhalation two times a day  calcium carbonate 1250 mG  + Vitamin D (OsCal 500 + D) 1 Tablet(s) Oral daily  cholecalciferol 2000 Unit(s) Oral daily  enoxaparin Injectable 40 milliGRAM(s) SubCutaneous every 24 hours  furosemide    Tablet 40 milliGRAM(s) Oral daily  gabapentin 400 milliGRAM(s) Oral three times a day  influenza  Vaccine (HIGH DOSE) 0.7 milliLiter(s) IntraMuscular once  methylPREDNISolone sodium succinate Injectable 40 milliGRAM(s) IV Push daily  multivitamin 1 Tablet(s) Oral daily  polyethylene glycol 3350 17 Gram(s) Oral daily  senna 2 Tablet(s) Oral at bedtime  sodium chloride 3%  Inhalation 4 milliLiter(s) Inhalation every 6 hours  tiotropium 18 MICROgram(s) Capsule 1 Capsule(s) Inhalation daily    MEDICATIONS  (PRN):  acetaminophen     Tablet .. 650 milliGRAM(s) Oral every 6 hours PRN Temp greater or equal to 38C (100.4F), Mild Pain (1 - 3)  aluminum hydroxide/magnesium hydroxide/simethicone Suspension 30 milliLiter(s) Oral every 4 hours PRN Dyspepsia  cyclobenzaprine 5 milliGRAM(s) Oral three times a day PRN Muscle Spasm  guaiFENesin  milliGRAM(s) Oral every 12 hours PRN Cough  melatonin 3 milliGRAM(s) Oral at bedtime PRN Insomnia  ondansetron Injectable 4 milliGRAM(s) IV Push every 8 hours PRN Nausea and/or Vomiting  traMADol 50 milliGRAM(s) Oral every 6 hours PRN Severe Pain (7 - 10)      CAPILLARY BLOOD GLUCOSE      POCT Blood Glucose.: 126 mg/dL (14 Nov 2022 17:33)    I&O's Summary    14 Nov 2022 07:01  -  15 Nov 2022 07:00  --------------------------------------------------------  IN: 350 mL / OUT: 300 mL / NET: 50 mL        PHYSICAL EXAM:  Vital Signs Last 24 Hrs  T(C): 36.6 (15 Nov 2022 04:35), Max: 36.8 (14 Nov 2022 12:44)  T(F): 97.8 (15 Nov 2022 04:35), Max: 98.3 (14 Nov 2022 21:00)  HR: 106 (15 Nov 2022 04:35) (88 - 106)  BP: 138/67 (15 Nov 2022 04:35) (138/67 - 166/78)  BP(mean): --  RR: 20 (15 Nov 2022 04:35) (20 - 22)  SpO2: 95% (15 Nov 2022 04:35) (91% - 95%)    Parameters below as of 15 Nov 2022 04:35  Patient On (Oxygen Delivery Method): nasal cannula  O2 Flow (L/min): 4    CONSTITUTIONAL: NAD  EYES: PERRLA; conjunctiva and sclera clear  ENMT: MMM  NECK: Supple  RESPIRATORY: tachypneic with accessory muscle use and abdominal breathing, distant breath sounds, no wheezes heard, faint bibasilar crackles.  CARDIOVASCULAR: RRR, no JVD, no peripheral edema   ABDOMEN: Nontender to palpation, normoactive BS, no guarding/rigidity  MUSCLOSKELETAL:  no clubbing/cyanosis, no joint swelling or tenderness to palpation  PSYCH: A+O x 3, affect normal  NEUROLOGY: CN 2-12 are intact and symmetric; no gross sensory or motor deficits  SKIN: No rashes; no palpable lesions    LABS:                        14.6   8.26  )-----------( 313      ( 14 Nov 2022 17:44 )             44.8     11-14    140  |  99  |  17  ----------------------------<  122<H>  4.1   |  27  |  0.68    Ca    9.7      14 Nov 2022 17:44  Phos  2.8     11-14  Mg     2.2     11-14    TPro  7.2  /  Alb  3.7  /  TBili  0.6  /  DBili  x   /  AST  28  /  ALT  14  /  AlkPhos  101  11-14                RADIOLOGY & ADDITIONAL TESTS:  Results Reviewed:   Imaging Personally Reviewed:  Electrocardiogram Personally Reviewed:    COORDINATION OF CARE:  Care Discussed with Consultants/Other Providers [Y/N]: floor np  Prior or Outpatient Records Reviewed [Y/N]:

## 2022-11-15 NOTE — ADVANCED PRACTICE NURSE CONSULT - ASSESSMENT
The pt was encountered on 4DSU- Mrs Felix was sitting upright in bed and visibly SOB- she was wearing nasal oxygen. She reports being active at home and able to walk to the bathroom when needed using a walker.  She is in a [employer] P [employer] support surface and is able to assist with T&P. Will recommend Complete [employer]Air boots for off-loading.  As the pt was a/w a pressure injury, a nutrition consult is pending for further evaluation.  WE discussed what a bedsore is and how to prevent them.  Upon assessment, the pt presents with an area of intact skin on  the sacrum and b/l buttocks measuring 8cm x8cm x0cm; the skin has a deep purple hue- will classify as a deep tissue injury present on admission- will recommend to continue to monitor and to apply Jose with pericare.   Staff had applied a Pure-WIck catheter to divert urine from the skin. Pt indicated that she would like to get OOB - will recommend a seat cushion for off-loading the sacrum when in the chair.

## 2022-11-15 NOTE — CONSULT NOTE ADULT - CONVERSATION DETAILS
Met with patient face to face for greater than 20 minutes discussing goals of treatment and advance directives.   MOLST in chart unsigned by provider, deemed invalid and voided.   Discussed with patient resuscitative measures and intubation in an individual with end stage COPD.  Patient is compos mentis and clearly stated she would not want to pursue the trauma of CPR nor would she want to be sustained artificially with breathing machines.   We completed a MOLST form and placed in chart, DNR/DNI established, sunrise updated accordingly.  We discussed medical choices to forgo treatment options if that is a patient wish, I shared hospice philosophy for comfort measures .  Patient once again was clear in her belief to continue medical management.   She is aware of hospice but is not ready to pursue this level of care .     No further role for palliative care, please reconsult with any acute issues

## 2022-11-15 NOTE — PROGRESS NOTE ADULT - PROBLEM SELECTOR PLAN 4
-   restart home lasix 40mg daily    - TTE showing normal systolic function, pt says she was started on lasix for pedal edema and was told at [facility] that she has HF but was not told this before

## 2022-11-15 NOTE — ADVANCED PRACTICE NURSE CONSULT - REASON FOR CONSULT
Requested by staff to assess skin status of pt a/w a pressure injury. PMH is noted:  89 year old female with PMH of HTN, COPD (on 3L home O2 at night), chronic bronchitis, CHFrEF (EF 40% in Dec 2018), recent admission to [facility] 10/22 for acute lumbar compression fracture who presents from [facility] for eval of SOB. Pt states she has baseline level of shortness of breath due to her hx of COPD which she felt is unchanged however feels that her cough is more prominent since being in rehab facility. She denies any fevers or chills, chest pain, or lightheadedness.     Of note, the patient was recently seen at [place] for back pain where she was found to have acute compression fracture of T2 and moderate compression deformity of L2 that appears chronic. She was recommended for TLSO brace on discharge and started on dexamethasone.

## 2022-11-15 NOTE — CONSULT NOTE ADULT - PROBLEM SELECTOR RECOMMENDATION 9
Patient utilizing home O2 3liters via NC   Continue management per pulm  goal SpO2 88-92%   Noted CTA negative PE

## 2022-11-15 NOTE — PROGRESS NOTE ADULT - PROBLEM SELECTOR PLAN 2
Increased in size compared to prior scan in Oct 2022  - Pt was informed of this CT finding  - Emailed Dr. Sheets for collateral and to update on most recent CT  -Appreciate pulm recs  -Pt would consider surgery if offered however pt is very poor surgical candidate

## 2022-11-16 NOTE — PROGRESS NOTE ADULT - SUBJECTIVE AND OBJECTIVE BOX
Reconsulted for symptom management .  As discussed with Dr. Derick Luz, recs for increased work of breathing as follows:  Morphine IVP 0.5 mg q 4 hours prn dyspnea   Based on response and mental status consider low dose Ativan for  anxiety related to air hunger:  Ativan 0.5mg q 4 hours prn anxiety/agitation  Continue pulmonary toilet  Palliative will follow with you   Thank you  SUBJECTIVE AND OBJECTIVE: Reconsulted for symptom management , s/p RRT   Indication for Geriatrics and Palliative Care Services/INTERVAL HPI:  Goals of care     OVERNIGHT EVENTS:    DNR on chart:Yes  Yes      Allergies    No Known Allergies    Intolerances    MEDICATIONS  (STANDING):  albuterol    90 MICROgram(s) HFA Inhaler 2 Puff(s) Inhalation every 4 hours  albuterol/ipratropium for Nebulization 3 milliLiter(s) Nebulizer every 6 hours  amLODIPine   Tablet 2.5 milliGRAM(s) Oral daily  aspirin enteric coated 81 milliGRAM(s) Oral daily  budesonide 160 MICROgram(s)/formoterol 4.5 MICROgram(s) Inhaler 2 Puff(s) Inhalation two times a day  calcium carbonate 1250 mG  + Vitamin D (OsCal 500 + D) 1 Tablet(s) Oral daily  cholecalciferol 2000 Unit(s) Oral daily  enoxaparin Injectable 40 milliGRAM(s) SubCutaneous every 24 hours  furosemide    Tablet 40 milliGRAM(s) Oral daily  gabapentin 400 milliGRAM(s) Oral three times a day  influenza  Vaccine (HIGH DOSE) 0.7 milliLiter(s) IntraMuscular once  methylPREDNISolone sodium succinate Injectable 40 milliGRAM(s) IV Push daily  multivitamin 1 Tablet(s) Oral daily  piperacillin/tazobactam IVPB.- 3.375 Gram(s) IV Intermittent once  polyethylene glycol 3350 17 Gram(s) Oral daily  senna 2 Tablet(s) Oral at bedtime  sodium chloride 3%  Inhalation 4 milliLiter(s) Inhalation every 6 hours  tiotropium 18 MICROgram(s) Capsule 1 Capsule(s) Inhalation daily    MEDICATIONS  (PRN):  acetaminophen     Tablet .. 650 milliGRAM(s) Oral every 6 hours PRN Temp greater or equal to 38C (100.4F), Mild Pain (1 - 3)  aluminum hydroxide/magnesium hydroxide/simethicone Suspension 30 milliLiter(s) Oral every 4 hours PRN Dyspepsia  cyclobenzaprine 5 milliGRAM(s) Oral three times a day PRN Muscle Spasm  guaiFENesin  milliGRAM(s) Oral every 12 hours PRN Cough  melatonin 3 milliGRAM(s) Oral at bedtime PRN Insomnia  morphine  - Injectable 0.5 milliGRAM(s) IV Push every 4 hours PRN shortness of breath  ondansetron Injectable 4 milliGRAM(s) IV Push every 8 hours PRN Nausea and/or Vomiting  traMADol 50 milliGRAM(s) Oral every 6 hours PRN Severe Pain (7 - 10)      ITEMS UNCHECKED ARE NOT PRESENT    PRESENT SYMPTOMS: [ x]Unable to self-report - see [ ] CPOT [ ] PAINADS [x ] RDOS  Source if other than patient:  [ ]Family   [ ]Team     Pain:  [ ]yes [ x]no  QOL impact -   Location -                    Aggravating factors -  Quality -  Radiation -  Timing-  Severity (0-10 scale):  Minimal acceptable level (0-10 scale):     CPOT:    https://[email or web address]/[email or web address]/Critical-Care-Pain-Observation-Tool-(CPOT)    PAIN AD Score:	  [email or web address].pdf (Ctrl + left click to view)    Dyspnea:                           [ ]Mild [ ]Moderate [ ]Severe    RDOS:  0 to 2  minimal or no respiratory distress   3  mild distress  4 to 6 moderate distress  >7 severe distress >7  [email or web address]/handouts/hen/Respiratory_Distress_Observation_Scale.pdf (Ctrl +  left click to view)     Anxiety:                             [ ]Mild [ ]Moderate [x ]Severe  Fatigue:                             [ ]Mild [x ]Moderate [ ]Severe  Nausea:                             [ ]Mild [ ]Moderate [ ]Severe  Loss of appetite:              [ ]Mild [ x]Moderate [ ]Severe  Constipation:                    [ ]Mild [ ]Moderate [ ]Severe    PCSSQ[Palliative Care Spiritual Screening Question]   Severity (0-10):  Score of 4 or > indicate consideration of Chaplaincy referral.  Chaplaincy Referral: [ ] yes [ ] refused [ ] following    Caregiver Sheldahl? : [ ] yes [ ] no  Social work referral [ ] Patient & Family Centered Care Referral [ ]     Anticipatory Grief present?:  [ ] yes [ ] no  Social work referral [ ] Patient & Family Centered Care Referral [ ]      Other Symptoms:  [ x]All other review of systems negative     Palliative Performance Status Version 2:     30    %      [email or web address]/[email or web address]_performance_[email or web address]_[email or web address].pdf  PHYSICAL EXAM:  Vital Signs Last 24 Hrs  T(C): 37.1 (16 Nov 2022 12:02), Max: 37.1 (16 Nov 2022 12:02)  T(F): 98.8 (16 Nov 2022 12:02), Max: 98.8 (16 Nov 2022 12:02)  HR: 95 (16 Nov 2022 14:36) (88 - 105)  BP: 151/78 (16 Nov 2022 12:02) (151/78 - 163/80)  BP(mean): --  RR: 24 (16 Nov 2022 10:49) (20 - 24)  SpO2: 94% (16 Nov 2022 14:36) (91% - 95%)    Parameters below as of 16 Nov 2022 10:49  Patient On (Oxygen Delivery Method): nasal cannula, high flow,@31C  O2 Flow (L/min): 40  O2 Concentration (%): 40 I&O's Summary    15 Nov 2022 07:01  -  16 Nov 2022 07:00  --------------------------------------------------------  IN: 1090 mL / OUT: 1100 mL / NET: -10 mL    16 Nov 2022 07:01  -  16 Nov 2022 15:23  --------------------------------------------------------  IN: 0 mL / OUT: 1900 mL / NET: -1900 mL       GENERAL: [ ]Cachexia    [x ]Alert  [x ]Oriented x  1-2 [ ]Lethargic  [ ]Unarousable  x[ ]Verbal  [ ]Non-Verbal  Behavioral:   [ ]Anxiety  [ x]Delirium [ ]Agitation [ ]Other  HEENT:  [ ]Normal   [x ]Dry mouth   [ ]ET Tube/Trach  [ ]Oral lesions  PULMONARY:   [ ]Clear [ ]Tachypnea  [ ]Audible excessive secretions   [ ]Rhonchi        [ ]Right [ ]Left [ ]Bilateral  [ ]Crackles        [ ]Right [ ]Left [ ]Bilateral  [x ]Wheezing     [ ]Right [ ]Left [x ]Bilateral  [ x]Diminished BS [ ] Right [ ]Left [ x]Bilateral  Poor air exchange   CARDIOVASCULAR:    [ ]Regular [ x]Irregular [ x]Tachy  [ ]Reece [ ]Murmur [ ]Other  GASTROINTESTINAL:  [x ]Soft  [ ]Distended   [x ]+BS  [ ]Non tender [ ]Tender  [ ]Other [ ]PEG [ ]OGT/ NGT   Last BM:   GENITOURINARY:  [ ]Normal [ x]Incontinent   [ ]Oliguria/Anuria   [ ]Peterson  MUSCULOSKELETAL:   [ ]Normal   [ x]Weakness  [x ]Bed/Wheelchair bound [ ]Edema  NEUROLOGIC:   [ ]No focal deficits  [ x] Cognitive impairment  [ ] Dysphagia [ ]Dysarthria [ ] Paresis [ ]Other   SKIN:   [ ]Normal  [ ]Rash  [ x]Other  [ ]Pressure ulcer(s) [ ]y [ ]n present on admission    CRITICAL CARE:  [ ]Shock Present  [ ]Septic [ ]Cardiogenic [ ]Neurologic [ ]Hypovolemic  [ ]Vasopressors [ ]Inotropes  [x ]Respiratory failure present [ ]Mechanical Ventilation [x ]Non-invasive ventilatory support [ ]High-Flow   [ ]Acute  [ ]Chronic [ ]Hypoxic  [x ]Hypercarbic [ ]Other  [ ]Other organ failure     LABS:                        14.6   8.26  )-----------( 313      ( 14 Nov 2022 17:44 )             44.8   11-14    140  |  99  |  17  ----------------------------<  122<H>  4.1   |  27  |  0.68    Ca    9.7      14 Nov 2022 17:44  Phos  2.8     11-14  Mg     2.2     11-14    TPro  7.2  /  Alb  3.7  /  TBili  0.6  /  DBili  x   /  AST  28  /  ALT  14  /  AlkPhos  101  11-14        RADIOLOGY & ADDITIONAL STUDIES:  < from: Xray Chest 1 View- PORTABLE-Urgent (Xray Chest 1 View- PORTABLE-Urgent .) (11.16.22 @ 10:46) >    ACC: [id number] EXAM:  XR CHEST PORTABLE URGENT 1V                          PROCEDURE DATE:  11/16/2022          INTERPRETATION:  EXAMINATION: XR CHEST URGENT    CLINICAL INDICATION: Shortness of breath    TECHNIQUE: Single frontal, portable view of the chest was obtained.    COMPARISON: Chest radiograph and CT 11/13/2022.    FINDINGS:    The heart size is not well evaluated on this projection.  Hazy bibasilar opacities which are largely unchanged since radiograph on   11/13/2022.  No pneumothorax.    IMPRESSION:    Hazy bibasilar opacities likely representing small layering bilateral   pleural effusions with associated compressive atelectasis, largely   unchanged in appearance since 11/30/2022.    --- End of Report ---        < end of copied text >      Protein Calorie Malnutrition Present: [ ]mild [ ]moderate [ ]severe [ ]underweight [ ]morbid obesity  https://www.andeal.org/vault/2440/web/files/ONC/Table_Clinical%20Characteristics%20to%20Document%20Malnutrition-White%20JV%20et%20al%202012.pdf    Height (cm): 162.6 (11-13-22 @ 17:57), 162.6 (10-10-22 @ 09:25)  Weight (kg): 93 (10-10-22 @ 09:25)  BMI (kg/m2): 35.2 (11-13-22 @ 17:57), 35.2 (10-10-22 @ 09:25)    [ ]PPSV2 < or = 30%  [ ]significant weight loss [ ]poor nutritional intake [ ]anasarca[ ]Artificial Nutrition    Other REFERRALS:  [ ]Hospice  [ ]Child Life  [ ]Social Work  [ ]Case management [ ]Holistic Therapy

## 2022-11-16 NOTE — DIETITIAN INITIAL EVALUATION ADULT - REASON FOR ADMISSION
Pt is a 89 yo F with PMH: HTN, COPD, chronic bronchitis, CHFrEF. Recently admitted to [facility]SH on 10/22 for acute lumbar compression fracture. Presented from rehab facility for evaluation of SOB, secondary to COPD exacerbation with RRT on 11/14 for worsening hypoxia possibly 2/2 superimposed pneumonia. Now on steroids and antibiotics. Per MD note 11/15, pulmonary nodule noted with increase in size compared to prior scan in October 2022. Pulmonology following.

## 2022-11-16 NOTE — PHYSICAL THERAPY INITIAL EVALUATION ADULT - PERTINENT HX OF CURRENT PROBLEM, REHAB EVAL
89 year old female with PMH of HTN, COPD (on 3L home O2 at night), chronic bronchitis, CHFrEF (EF 40% in Dec 2018), recent admission to [facility] 10/22 for acute lumbar compression fracture who presents from [facility] for eval of SOB, secondary to COPD exacerbation with RRT on 11/14 for worsening hypoxia possibly 2/2 superimposed PNA now on both steroids and abx. CTA Chest (-) for PE. 89 year old female with PMH of HTN, COPD (on 3L home O2 at night), chronic bronchitis, CHFrEF (EF 40% in Dec 2018), recent admission to [facility] 10/22 for acute lumbar compression fracture who presents from [facility] for eval of SOB, secondary to COPD exacerbation with RRT on 11/14 for worsening hypoxia possibly 2/2 superimposed PNA now on both steroids and abx.   CTA Chest 11/13: No pulmonary embolism. Patchy consolidation in the right apical region and at the right base may represent pneumonia. Left lower lobe 2.3 cm nodule increased in size compared to 10/14/2022 and likely represents primary lung neoplasm. Left paratracheal 3.6 x 1.3 cm node appears increased in size compared to 10/10/2018 and is indeterminate.  TTE 11/14: Mitral annular calcification, otherwise normal mitral valve. Minimal mitral regurgitation.  Normal left ventricular systolic function. grosslynormal right ventricular size and systolic function. Estimated right ventricular systolic pressure equals 42mm Hg, assuming right atrial pressure equals 8 mm Hg, consistent with mild pulmonary hypertension.

## 2022-11-16 NOTE — DIETITIAN INITIAL EVALUATION ADULT - OTHER CALCULATIONS
based on  lbs, considering BMI > 35; increased needs in setting of wound healing, catabolic illness (COPD and CHF).

## 2022-11-16 NOTE — DIETITIAN INITIAL EVALUATION ADULT - SIGNS/SYMPTOMS
pt with DTI to bilateral buttocks; diagnosis of COPD/CHF pt with PO intake </=50% since admission (day 3)

## 2022-11-16 NOTE — DIETITIAN INITIAL EVALUATION ADULT - PERTINENT MEDS FT
MEDICATIONS  (STANDING):  albuterol    90 MICROgram(s) HFA Inhaler 2 Puff(s) Inhalation every 4 hours  albuterol/ipratropium for Nebulization 3 milliLiter(s) Nebulizer every 6 hours  amLODIPine   Tablet 2.5 milliGRAM(s) Oral daily  aspirin enteric coated 81 milliGRAM(s) Oral daily  azithromycin   Tablet 250 milliGRAM(s) Oral daily  budesonide 160 MICROgram(s)/formoterol 4.5 MICROgram(s) Inhaler 2 Puff(s) Inhalation two times a day  calcium carbonate 1250 mG  + Vitamin D (OsCal 500 + D) 1 Tablet(s) Oral daily  cefTRIAXone   IVPB 1000 milliGRAM(s) IV Intermittent every 24 hours  cholecalciferol 2000 Unit(s) Oral daily  enoxaparin Injectable 40 milliGRAM(s) SubCutaneous every 24 hours  furosemide    Tablet 40 milliGRAM(s) Oral daily  gabapentin 400 milliGRAM(s) Oral three times a day  influenza  Vaccine (HIGH DOSE) 0.7 milliLiter(s) IntraMuscular once  methylPREDNISolone sodium succinate Injectable 40 milliGRAM(s) IV Push daily  multivitamin 1 Tablet(s) Oral daily  polyethylene glycol 3350 17 Gram(s) Oral daily  senna 2 Tablet(s) Oral at bedtime  sodium chloride 3%  Inhalation 4 milliLiter(s) Inhalation every 6 hours  tiotropium 18 MICROgram(s) Capsule 1 Capsule(s) Inhalation daily    MEDICATIONS  (PRN):  acetaminophen     Tablet .. 650 milliGRAM(s) Oral every 6 hours PRN Temp greater or equal to 38C (100.4F), Mild Pain (1 - 3)  aluminum hydroxide/magnesium hydroxide/simethicone Suspension 30 milliLiter(s) Oral every 4 hours PRN Dyspepsia  cyclobenzaprine 5 milliGRAM(s) Oral three times a day PRN Muscle Spasm  guaiFENesin  milliGRAM(s) Oral every 12 hours PRN Cough  melatonin 3 milliGRAM(s) Oral at bedtime PRN Insomnia  ondansetron Injectable 4 milliGRAM(s) IV Push every 8 hours PRN Nausea and/or Vomiting  traMADol 50 milliGRAM(s) Oral every 6 hours PRN Severe Pain (7 - 10)

## 2022-11-16 NOTE — PROGRESS NOTE ADULT - PROBLEM SELECTOR PLAN 1
S/P RRT for worsening respiratory status.  Stabilized on bipap  Increased delirium likely due to hypoxic resp distress  As d/w Dr Luz:    Start Morphine 0.5mg iVP q 4 hour prn for dyspnea  Consider Ativan IVP 0.5mg q 4 hours prn for anxiety associated with poor air exchange

## 2022-11-16 NOTE — PROGRESS NOTE ADULT - SUBJECTIVE AND OBJECTIVE BOX
PULMONARY PROGRESS NOTE    VIDAL LOUIE  MRN-[id number]    Patient is a 89y old  Female who presents with a chief complaint of respiratory distress (16 Nov 2022 14:33)      HPI:  -89 yo female known to Dr Sheets- copd/emphysema, bronchiectasis on 3L home O2 at night), chronic bronchitis, CHFrEF (EF 40% in Dec 2018), recent admission to [facility] 10/22 for acute lumbar compression fracture who presents from [facility] for eval of SOB.  she was last seen by Dr Sheets - 7/2022 for bronchiectaiss/ COPD  admitted now for hypoxic resp faliure, seen on BPAP a few hours ago  awake/alert  reportedly dyspneic w/o NIVV support    ROS:   -    ACTIVE MEDICATION LIST:  MEDICATIONS  (STANDING):  albuterol    90 MICROgram(s) HFA Inhaler 2 Puff(s) Inhalation every 4 hours  albuterol/ipratropium for Nebulization 3 milliLiter(s) Nebulizer every 6 hours  amLODIPine   Tablet 2.5 milliGRAM(s) Oral daily  aspirin enteric coated 81 milliGRAM(s) Oral daily  budesonide 160 MICROgram(s)/formoterol 4.5 MICROgram(s) Inhaler 2 Puff(s) Inhalation two times a day  calcium carbonate 1250 mG  + Vitamin D (OsCal 500 + D) 1 Tablet(s) Oral daily  cholecalciferol 2000 Unit(s) Oral daily  enoxaparin Injectable 40 milliGRAM(s) SubCutaneous every 24 hours  furosemide    Tablet 40 milliGRAM(s) Oral daily  gabapentin 400 milliGRAM(s) Oral three times a day  influenza  Vaccine (HIGH DOSE) 0.7 milliLiter(s) IntraMuscular once  methylPREDNISolone sodium succinate Injectable 40 milliGRAM(s) IV Push daily  multivitamin 1 Tablet(s) Oral daily  piperacillin/tazobactam IVPB.- 3.375 Gram(s) IV Intermittent once  polyethylene glycol 3350 17 Gram(s) Oral daily  senna 2 Tablet(s) Oral at bedtime  sodium chloride 3%  Inhalation 4 milliLiter(s) Inhalation every 6 hours  tiotropium 18 MICROgram(s) Capsule 1 Capsule(s) Inhalation daily    MEDICATIONS  (PRN):  acetaminophen     Tablet .. 650 milliGRAM(s) Oral every 6 hours PRN Temp greater or equal to 38C (100.4F), Mild Pain (1 - 3)  aluminum hydroxide/magnesium hydroxide/simethicone Suspension 30 milliLiter(s) Oral every 4 hours PRN Dyspepsia  cyclobenzaprine 5 milliGRAM(s) Oral three times a day PRN Muscle Spasm  guaiFENesin  milliGRAM(s) Oral every 12 hours PRN Cough  melatonin 3 milliGRAM(s) Oral at bedtime PRN Insomnia  morphine  - Injectable 0.5 milliGRAM(s) IV Push every 4 hours PRN shortness of breath  ondansetron Injectable 4 milliGRAM(s) IV Push every 8 hours PRN Nausea and/or Vomiting  traMADol 50 milliGRAM(s) Oral every 6 hours PRN Severe Pain (7 - 10)      EXAM:  Vital Signs Last 24 Hrs  T(C): 37.1 (16 Nov 2022 12:02), Max: 37.1 (16 Nov 2022 12:02)  T(F): 98.8 (16 Nov 2022 12:02), Max: 98.8 (16 Nov 2022 12:02)  HR: 95 (16 Nov 2022 14:36) (88 - 105)  BP: 151/78 (16 Nov 2022 12:02) (151/78 - 163/80)  BP(mean): --  RR: 24 (16 Nov 2022 10:49) (20 - 24)  SpO2: 94% (16 Nov 2022 14:36) (91% - 95%)    Parameters below as of 16 Nov 2022 10:49  Patient On (Oxygen Delivery Method): nasal cannula, high flow,@31C  O2 Flow (L/min): 40  O2 Concentration (%): 40    GENERAL: The patient is awake but not orientated  unable to answer questions clearly   mildly labored                              14.6   8.26  )-----------( 313      ( 14 Nov 2022 17:44 )             44.8       11-14    140  |  99  |  17  ----------------------------<  122<H>  4.1   |  27  |  0.68    Ca    9.7      14 Nov 2022 17:44  Phos  2.8     11-14  Mg     2.2     11-14    TPro  7.2  /  Alb  3.7  /  TBili  0.6  /  DBili  x   /  AST  28  /  ALT  14  /  AlkPhos  101  11-14     < from: Xray Chest 1 View- PORTABLE-Urgent (Xray Chest 1 View- PORTABLE-Urgent .) (11.16.22 @ 10:46) >    ACC: [id number] EXAM:  XR CHEST PORTABLE URGENT 1V                          PROCEDURE DATE:  11/16/2022          INTERPRETATION:  EXAMINATION: XR CHEST URGENT    CLINICAL INDICATION: Shortness of breath    TECHNIQUE: Single frontal, portable view of the chest was obtained.    COMPARISON: Chest radiograph and CT 11/13/2022.    FINDINGS:    The heart size is not well evaluated on this projection.  Hazy bibasilar opacities which are largely unchanged since radiograph on   11/13/2022.  No pneumothorax.    IMPRESSION:    Hazy bibasilar opacities likely representing small layering bilateral   pleural effusions with associated compressive atelectasis, largely   unchanged in appearance since 11/30/2022.    --- End of Report ---           JILLIAN NAZARIO DO; Resident Radiologist  This document has been electronically signed.  HEMALATHA LEONARD MD; Attending Radiologist  This document has been electronically signed. Nov 16 2022 10:46AM    < end of copied text >  < from: CT Angio Chest PE Protocol w/ IV Cont (11.14.22 @ 03:53) >    ACC: [id number] EXAM:  CT ANGIO CHEST PULM ART [facility]                          PROCEDURE DATE:  11/14/2022          INTERPRETATION:  CLINICAL INDICATION: Dyspnea    TECHNIQUE: A volumetric acquisition of the chest was obtained from the   thoracic inlet to the upper abdomen during dynamic administration of 90cc   Omnipaque 350 intravenous contrast according to the PE protocol. 3D MIP   images were provided.    COMPARISON: CT chest 10/13/2022    FINDINGS:  CTA: No pulmonary embolism.    Heart/Vasculature: The heart is enlarged in size. There is no pericardial   effusion.    Lungs/Airways/Pleura: Mild central airways secretions. Emphysema. Left   lower lobe superior segment 2.3 x 1.9 cm nodule (image 53, series 2) was   previously 1.6 x 1.5 cm on10/14/2022 CT thoracic spine and new compared   to 6/16/2019.    Patchy right apical consolidative opacity (image 34, series 2) is similar   compared to 11/13/2022 CT chest and new compared to 10/14/2022 CT   thoracic spine.    Right basilar parenchymal opacification likely represents a combination   of atelectasis and consolidation.    Small bilateral pleural effusions.    Mediastinum/Lymph nodes: Left paratracheal 3.6 x 1.3 cm node (image 51,   series 3) appears increased in size compared to 10/10/2018.. Additional   mediastinal, cardiophrenic angle, and right internal mammary nodes are   similar compared to 10/10/2018.    Upper Abdomen: Enlarged celiac nodes, similar compared to prior exams.   Subcentimeter exophytic right renal lesion unchanged    Bones and Soft Tissues: Multilevel degenerative disc disease. T12   compression fracture is redemonstrated and seen since 10/14/2022. Grade 1   anterolisthesis of C7 on T1, T1 on T2, and T2 on T3.    IMPRESSION:    No pulmonary embolism.    Patchy consolidation in the right apical region and at the right base may   represent pneumonia.    Left lower lobe 2.3 cm nodule increased in size compared to 10/14/2022   and likely represents primary lung neoplasm.    Left paratracheal 3.6 x 1.3 cmnode appears increased in size compared to   10/10/2018 and is indeterminate.    < end of copied text >    PROBLEM LIST:  89y Female with HEALTH ISSUES - PROBLEM Dx:  COPD exacerbation    Hypokalemia    Pulmonary nodule, right    Prophylactic measure    Osteoporosis    Lumbar compression fracture    Chronic systolic congestive heart failure    Hypertension    Constipation    Debility    ACP (advance care planning)    Chronic respiratory failure with hypoxia and hypercapnia    Respiratory distress           RECS:  hypoxic resp failure- multifactorial  now s/p second RRT  noted she has been seen by palliative-  would increase her solumedrol 40 IVP BID given her current resp status  if she remains with tenuous resp status- would change her bronchodilators to nebulization   consider trial of IV Diuresis  empiric abx  dvt prophylaxis  would f/u with family/ palliative re: GOC, comfort ?         Please call with any questions.    Taniya Lopez, DO  [facility] Pulmonary/Sleep Medicine  [phone]

## 2022-11-16 NOTE — DIETITIAN INITIAL EVALUATION ADULT - ETIOLOGY
altered respiratory function in setting of COPD, on HFNC increased physiological demand of nutrient in setting of wound healing and catabolic illness

## 2022-11-16 NOTE — PROGRESS NOTE ADULT - PROBLEM SELECTOR PLAN 2
Increased in size compared to prior scan in Oct 2022  - Pt was informed of this CT finding  - Emailed Dr. Sheets for collateral and to update on most recent CT  -Appreciate pulm recs  -Pt would consider surgery if offered however pt is very poor surgical candidate  - discussed this with family who understand workup likely needed as OP as patient improves from acute episode

## 2022-11-16 NOTE — PROGRESS NOTE ADULT - PROBLEM SELECTOR PLAN 2
Patient utilizing home O2 3liters via NC   Continue management per pulm  goal SpO2 88-92%   Noted CTA negative PE.

## 2022-11-16 NOTE — PROGRESS NOTE ADULT - PROBLEM SELECTOR PLAN 1
Uses home O2 3L qHS at baseline.  S/p duoneb treatments and solumedrol 125mg IV x1 in the ED  - continue with albuterol q4hr standing  - continue with solumedrol 40mg IV daily  - continue home spiriva and symbicort  - titrate supplemental O2 for goal SpO2 88-92%  -  CTA negative for PE  - now requiring bipap, wean as tolerated    #Metabolic encephalopathy- pt newly delirious and confused 11/16  - vbg showing metabolic alkalosis with mild respiratory acidosis not enough to explain mental status  - delirium likely 2/2 ongoing infection and component of hypoxia  - cont management of pna as above, resp support and frequent reorientation    #dyspnea- frequent RRT for shortness of breath, now on BIPAP for increased WOB. Suspect anxiety is a component  - will start morphine 0.5mg iv q4 hours prn sob as a palliative measure, family aware and in agreement  - benefits outweight risks even in light of her current delirium

## 2022-11-16 NOTE — PROGRESS NOTE ADULT - SUBJECTIVE AND OBJECTIVE BOX
Derick Luz MD  Division of Hospital Medicine  Available on MS teams until 7pm  If no response or off-hours, page [phone]  -------------------------------------    Patient is a 89y old  Female who presents with a chief complaint of respiratory distress (16 Nov 2022 14:33)      SUBJECTIVE / OVERNIGHT EVENTS: none acute  ADDITIONAL REVIEW OF SYSTEMS: this morning pt confused/delirious, thinks I am a . But feels like her breathing is unchanged, no fevers/chills. remainder of ROS limited due to confusion.     MEDICATIONS  (STANDING):  albuterol    90 MICROgram(s) HFA Inhaler 2 Puff(s) Inhalation every 4 hours  albuterol/ipratropium for Nebulization 3 milliLiter(s) Nebulizer every 6 hours  amLODIPine   Tablet 2.5 milliGRAM(s) Oral daily  aspirin enteric coated 81 milliGRAM(s) Oral daily  budesonide 160 MICROgram(s)/formoterol 4.5 MICROgram(s) Inhaler 2 Puff(s) Inhalation two times a day  calcium carbonate 1250 mG  + Vitamin D (OsCal 500 + D) 1 Tablet(s) Oral daily  cholecalciferol 2000 Unit(s) Oral daily  enoxaparin Injectable 40 milliGRAM(s) SubCutaneous every 24 hours  furosemide    Tablet 40 milliGRAM(s) Oral daily  gabapentin 400 milliGRAM(s) Oral three times a day  influenza  Vaccine (HIGH DOSE) 0.7 milliLiter(s) IntraMuscular once  methylPREDNISolone sodium succinate Injectable 40 milliGRAM(s) IV Push daily  multivitamin 1 Tablet(s) Oral daily  piperacillin/tazobactam IVPB.- 3.375 Gram(s) IV Intermittent once  polyethylene glycol 3350 17 Gram(s) Oral daily  senna 2 Tablet(s) Oral at bedtime  sodium chloride 3%  Inhalation 4 milliLiter(s) Inhalation every 6 hours  tiotropium 18 MICROgram(s) Capsule 1 Capsule(s) Inhalation daily    MEDICATIONS  (PRN):  acetaminophen     Tablet .. 650 milliGRAM(s) Oral every 6 hours PRN Temp greater or equal to 38C (100.4F), Mild Pain (1 - 3)  aluminum hydroxide/magnesium hydroxide/simethicone Suspension 30 milliLiter(s) Oral every 4 hours PRN Dyspepsia  cyclobenzaprine 5 milliGRAM(s) Oral three times a day PRN Muscle Spasm  guaiFENesin  milliGRAM(s) Oral every 12 hours PRN Cough  melatonin 3 milliGRAM(s) Oral at bedtime PRN Insomnia  morphine  - Injectable 0.5 milliGRAM(s) IV Push every 4 hours PRN shortness of breath  ondansetron Injectable 4 milliGRAM(s) IV Push every 8 hours PRN Nausea and/or Vomiting  traMADol 50 milliGRAM(s) Oral every 6 hours PRN Severe Pain (7 - 10)      CAPILLARY BLOOD GLUCOSE        I&O's Summary    15 Nov 2022 07:01  -  16 Nov 2022 07:00  --------------------------------------------------------  IN: 1090 mL / OUT: 1100 mL / NET: -10 mL    16 Nov 2022 07:01  -  16 Nov 2022 15:43  --------------------------------------------------------  IN: 0 mL / OUT: 1900 mL / NET: -1900 mL        PHYSICAL EXAM:  Vital Signs Last 24 Hrs  T(C): 37.1 (16 Nov 2022 12:02), Max: 37.1 (16 Nov 2022 12:02)  T(F): 98.8 (16 Nov 2022 12:02), Max: 98.8 (16 Nov 2022 12:02)  HR: 95 (16 Nov 2022 14:36) (88 - 105)  BP: 151/78 (16 Nov 2022 12:02) (151/78 - 163/80)  BP(mean): --  RR: 24 (16 Nov 2022 10:49) (20 - 24)  SpO2: 94% (16 Nov 2022 14:36) (91% - 95%)    Parameters below as of 16 Nov 2022 10:49  Patient On (Oxygen Delivery Method): nasal cannula, high flow,@31C  O2 Flow (L/min): 40  O2 Concentration (%): 40  CONSTITUTIONAL: NAD  EYES: PERRLA; conjunctiva and sclera clear  ENMT: MMM  NECK: Supple  RESPIRATORY: distant breath sounds, faint exp wheeze, faint bibasilar crackles  CARDIOVASCULAR: RRR, no JVD, no peripheral edema   ABDOMEN: Nontender to palpation, normoactive BS, no guarding/rigidity  MUSCLOSKELETAL:  no clubbing/cyanosis, no joint swelling or tenderness to palpation  PSYCH: A+O x 0, confused  NEUROLOGY: CN 2-12 are intact and symmetric; no gross sensory or motor deficits  SKIN: No rashes; no palpable lesions    LABS:                        14.6   8.26  )-----------( 313      ( 14 Nov 2022 17:44 )             44.8     11-14    140  |  99  |  17  ----------------------------<  122<H>  4.1   |  27  |  0.68    Ca    9.7      14 Nov 2022 17:44  Phos  2.8     11-14  Mg     2.2     11-14    TPro  7.2  /  Alb  3.7  /  TBili  0.6  /  DBili  x   /  AST  28  /  ALT  14  /  AlkPhos  101  11-14                RADIOLOGY & ADDITIONAL TESTS:  Results Reviewed:   Imaging Personally Reviewed:  Electrocardiogram Personally Reviewed:    COORDINATION OF CARE:  Care Discussed with Consultants/Other Providers [Y/N]: floor np, palliative  Prior or Outpatient Records Reviewed [Y/N]:

## 2022-11-16 NOTE — PHYSICAL THERAPY INITIAL EVALUATION ADULT - TRANSFER TRAINING, PT EVAL
GOAL: Patient will perform sit to stand transfers David with least restrictive assistive device with proper hand placement in 2 weeks.

## 2022-11-16 NOTE — DIETITIAN INITIAL EVALUATION ADULT - PERTINENT LABORATORY DATA
11-14    140  |  99  |  17  ----------------------------<  122<H>  4.1   |  27  |  0.68    Ca    9.7      14 Nov 2022 17:44  Phos  2.8     11-14  Mg     2.2     11-14    TPro  7.2  /  Alb  3.7  /  TBili  0.6  /  DBili  x   /  AST  28  /  ALT  14  /  AlkPhos  101  11-14

## 2022-11-16 NOTE — PROGRESS NOTE ADULT - PROBLEM SELECTOR PLAN 4
-   restart home lasix 40mg daily    - TTE showing normal systolic function, pt says she was started on lasix for pedal edema and was told at [facility] that she has HF but was not told this before

## 2022-11-16 NOTE — DIETITIAN INITIAL EVALUATION ADULT - NSFNSGIIOFT_GEN_A_CORE
-No documented BM's recorded thus far. Noted with constipation, no BM's x >1 week in setting of immobility, opioid induced. Continues on bowel regimen (senna, Miralax).   -On Lasix; at risk for wt fluctuations, fluid shifts in setting of CHF.   -Continues on multivitamin, vitamin D3, calcium carbonate + vitamin D.   -Continues on antibiotics as ordered.   -On steroid; at risk for elevated BG. A1c not readily available.    -Dosing wt pending. Wt above obtained via [facility] (10/11/22): 205 lbs.   -No documented BM's recorded thus far. Noted with constipation, no BM's x >1 week in setting of immobility, opioid induced. Continues on bowel regimen (senna, Miralax).   -On Lasix; at risk for wt fluctuations, fluid shifts in setting of CHF.   -Continues on multivitamin, vitamin D3, calcium carbonate + vitamin D.   -Continues on antibiotics as ordered.   -On steroid; at risk for elevated BG. A1c not readily available.

## 2022-11-16 NOTE — RAPID RESPONSE TEAM SUMMARY - NSSITUATIONBACKGROUNDRRT_GEN_ALL_CORE
Briefly: 89 year old female with PMH of HTN, COPD (on 3L home O2 at night), chronic bronchitis, CHFrEF (EF 40% in Dec 2018), recent admission to [facility] 10/22 for acute lumbar compression fracture who presents from [facility] for eval of SOB, secondary to COPD exacerbation with RRT on 11/14 for worsening hypoxia possibly 2/2 superimposed PNA on both steroids and abx. RRT called today for respiratory distress. Per staff, patient had removed bipap. Was transiently satting in the 60s, improved with replacing bilevel on patient. On arrival to RRT, patient was hypoxic in the 80s, acrocyanosis noted. ABG performed, 7.46/53/95 unchanged from prior. Attending physician, Dr. Luz present during the RRT, reaffirmed GOC are DNR/DNI. Per discussion with Palliative earlier in the day could start morphine PRN for dyspnea. Given 0.5mg IV morphine. Patient's breathing pattern improved. She will remain on 4DSU on bilevel currently.

## 2022-11-16 NOTE — PHYSICAL THERAPY INITIAL EVALUATION ADULT - ADDITIONAL COMMENTS
Pt admitted from inpatient rehab, prior to rehabilitation stay pt lived at home with spouse in  private house, required some assistance in ADL, denied any HHA or home care services. Pt admitted from inpatient rehab, prior to rehabilitation pt lived at home with spouse in private house, required some assistance with mobility and ADLs. Pt states she walked short distances at rehab with a RW and assistance and also used a wheelchair.

## 2022-11-16 NOTE — DIETITIAN INITIAL EVALUATION ADULT - ADD RECOMMEND
1. Defer continuation of, and consistency/texture of PO diet to medical team; SLP. Continue low sodium restriction.  2. As able, encourage and monitor PO intake. Encourage use of daily menus. Honor dietary preferences as able.   3. Recommend multivitamin and vitamin C (if no medication contraindications) to aid in prevention of micronutrient deficiencies and aid in wound healing.  4. Monitor wt trends/labs/skin integrity/hydration status/bowel regularity.

## 2022-11-16 NOTE — PHYSICAL THERAPY INITIAL EVALUATION ADULT - PRECAUTIONS/LIMITATIONS, REHAB EVAL
Dr. Milagro Nails at bedside to evaluate patient. TLSO/fall precautions/spinal precautions fall precautions

## 2022-11-16 NOTE — DIETITIAN INITIAL EVALUATION ADULT - ORAL INTAKE PTA/DIET HISTORY
"Doesn't have much appetite- and finds she needs to drink liquids with each bite in order to get food down" Pt appeared a bit confused and anxious at time of RD visit. Endorsed decreased appetite in-house in setting of altered respiratory function/COPD exacerbation. To note, high flow nasal cannula in place. Baseline provision of PO intake unclear. Pt endorsed dietary preferences for NO turkey, pears, pancakes, waffles or Latvian toast. Otherwise, pt admits to enjoyment of most foods. Denies baseline intolerance to chewing/swallowing. Denies food allergies. Noted with hx of taking multivitamin, vitamin D and calcium. Denies N/V/D; endorsed +constipation r/t opioids and limited mobility.

## 2022-11-17 NOTE — PROVIDER CONTACT NOTE (CHANGE IN STATUS NOTIFICATION) - SITUATION
Patient experiencing change in mental status this time. Slow to arouse to tactile stimulation. Able to follow very basic commands, not verbalizing or answering questioning. Patient experiencing change in mental status this time. Slow to arouse to tactile stimulation. Able to follow very basic commands, not verbalizing or answering questioning. RN holding oral medications at this time d/t AMS, lethargy and respiratory status.

## 2022-11-17 NOTE — CHART NOTE - NSCHARTNOTEFT_GEN_A_CORE
Saw pt this am.  c/w AMS appears comfortable on bipap.  Please use PRN Morphine for dyspnea or shortness of breath as ordered.  low threshold for ATC dosing if PRN' s required more frequently. Palliative care will continue to follow.

## 2022-11-17 NOTE — PROGRESS NOTE ADULT - SUBJECTIVE AND OBJECTIVE BOX
Derick Luz MD  Division of Hospital Medicine  Available on MS teams until 7pm  If no response or off-hours, page [phone]  -------------------------------------    Patient is a 89y old  Female who presents with a chief complaint of respiratory distress (16 Nov 2022 15:44)      SUBJECTIVE / OVERNIGHT EVENTS: none acute  ADDITIONAL REVIEW OF SYSTEMS: pt more lethargic in AM, per family earlier in the mornign she was more awake and talkative. ros otherwise limited    MEDICATIONS  (STANDING):  albuterol    90 MICROgram(s) HFA Inhaler 2 Puff(s) Inhalation every 4 hours  albuterol/ipratropium for Nebulization 3 milliLiter(s) Nebulizer every 6 hours  amLODIPine   Tablet 2.5 milliGRAM(s) Oral daily  aspirin enteric coated 81 milliGRAM(s) Oral daily  budesonide 160 MICROgram(s)/formoterol 4.5 MICROgram(s) Inhaler 2 Puff(s) Inhalation two times a day  calcium carbonate 1250 mG  + Vitamin D (OsCal 500 + D) 1 Tablet(s) Oral daily  cholecalciferol 2000 Unit(s) Oral daily  dextrose 5% + sodium chloride 0.45%. 1000 milliLiter(s) (120 mL/Hr) IV Continuous <Continuous>  enoxaparin Injectable 40 milliGRAM(s) SubCutaneous every 24 hours  furosemide    Tablet 40 milliGRAM(s) Oral daily  gabapentin 400 milliGRAM(s) Oral three times a day  influenza  Vaccine (HIGH DOSE) 0.7 milliLiter(s) IntraMuscular once  methylPREDNISolone sodium succinate Injectable 40 milliGRAM(s) IV Push two times a day  multivitamin 1 Tablet(s) Oral daily  piperacillin/tazobactam IVPB.. 3.375 Gram(s) IV Intermittent every 8 hours  polyethylene glycol 3350 17 Gram(s) Oral daily  senna 2 Tablet(s) Oral at bedtime  sodium chloride 3%  Inhalation 4 milliLiter(s) Inhalation every 6 hours  tiotropium 18 MICROgram(s) Capsule 1 Capsule(s) Inhalation daily    MEDICATIONS  (PRN):  acetaminophen     Tablet .. 650 milliGRAM(s) Oral every 6 hours PRN Temp greater or equal to 38C (100.4F), Mild Pain (1 - 3)  aluminum hydroxide/magnesium hydroxide/simethicone Suspension 30 milliLiter(s) Oral every 4 hours PRN Dyspepsia  cyclobenzaprine 5 milliGRAM(s) Oral three times a day PRN Muscle Spasm  guaiFENesin  milliGRAM(s) Oral every 12 hours PRN Cough  melatonin 3 milliGRAM(s) Oral at bedtime PRN Insomnia  morphine  - Injectable 0.5 milliGRAM(s) IV Push every 4 hours PRN shortness of breath  ondansetron Injectable 4 milliGRAM(s) IV Push every 8 hours PRN Nausea and/or Vomiting  traMADol 50 milliGRAM(s) Oral every 6 hours PRN Severe Pain (7 - 10)      CAPILLARY BLOOD GLUCOSE        I&O's Summary    16 Nov 2022 07:01  -  17 Nov 2022 07:00  --------------------------------------------------------  IN: 0 mL / OUT: 1900 mL / NET: -1900 mL        PHYSICAL EXAM:  Vital Signs Last 24 Hrs  T(C): 36.6 (17 Nov 2022 12:27), Max: 37 (16 Nov 2022 21:08)  T(F): 97.8 (17 Nov 2022 12:27), Max: 98.6 (16 Nov 2022 21:08)  HR: 84 (17 Nov 2022 12:27) (80 - 92)  BP: 137/79 (17 Nov 2022 12:27) (137/79 - 152/86)  BP(mean): --  RR: 20 (17 Nov 2022 12:27) (20 - 24)  SpO2: 94% (17 Nov 2022 12:27) (90% - 94%)    Parameters below as of 17 Nov 2022 12:27  Patient On (Oxygen Delivery Method): BiPAP/CPAP      CONSTITUTIONAL: NAD  EYES: PERRLA; conjunctiva and sclera clear  ENMT: dry mucous membranes  NECK: Supple  RESPIRATORY: on bipap 10/5, 40% FIO2, pulling low 300's VT. RR ~20, tachypnic  CARDIOVASCULAR: RRR, no JVD, no peripheral edema   ABDOMEN: Nontender to palpation, normoactive BS, no guarding/rigidity  MUSCLOSKELETAL:  no clubbing/cyanosis, no joint swelling or tenderness to palpation  PSYCH: unable to assess  NEUROLOGY: unable to fully assess  SKIN: No rashes; no palpable lesions    LABS:                        16.2   7.49  )-----------( 275      ( 17 Nov 2022 10:13 )             52.5     11-17    147<H>  |  98  |  26<H>  ----------------------------<  169<H>  3.6   |  30  |  0.98    Ca    9.6      17 Nov 2022 10:13                  RADIOLOGY & ADDITIONAL TESTS:  Results Reviewed:   Imaging Personally Reviewed:  Electrocardiogram Personally Reviewed:    COORDINATION OF CARE:  Care Discussed with Consultants/Other Providers [Y/N]: floor np, RT  Prior or Outpatient Records Reviewed [Y/N]:   Purse String (Intermediate) Text: Given the location of the defect and the characteristics of the surrounding skin a purse string intermediate closure was deemed most appropriate.  Undermining was performed circumfirentially around the surgical defect.  A purse string suture was then placed and tightened.

## 2022-11-17 NOTE — PROGRESS NOTE ADULT - PROBLEM SELECTOR PLAN 4
- hold lasix in setting of new [place]RA[place] and hypernatremia  - TTE showing normal systolic function, pt says she was started on lasix for pedal edema and was told at [facility] that she has HF but was not told this before

## 2022-11-17 NOTE — PROGRESS NOTE ADULT - PROBLEM SELECTOR PLAN 1
Uses home O2 3L qHS at baseline.  S/p duoneb treatments and solumedrol 125mg IV x1 in the ED  - continue with albuterol q4hr standing  - increased solumed to 40mg iv bid  - continue home spiriva and symbicort  - now requiring BIPAP- abg with improved PCO2 after adjusting settings to 12/5  - increase fio2 to 60%  - trend daily ABG    #Metabolic encephalopathy- pt newly delirious and confused 11/16, was briefly more oriented per family in the early morning, now more lethargic- likely tired out from ventilatory exhaustion  - delirium likely 2/2 ongoing infection and component of hypoxia  - cont management of pna as above, resp support and frequent reorientation    #dyspnea- frequent RRT for shortness of breath, now on BIPAP for increased WOB. Suspect anxiety is a component  - will start morphine 0.5mg iv q4 hours prn sob as a palliative measure, family aware and in agreement, hold for lethargy  - benefits outweight risks even in light of her current delirium Uses home O2 3L qHS at baseline.  S/p duoneb treatments and solumedrol 125mg IV x1 in the ED  - continue with albuterol q4hr standing  - increased solumed to 40mg iv bid  - continue home spiriva and symbicort  - now requiring BIPAP- abg with improved PCO2 after adjusting settings to 12/5  - increase fio2 to 60%  - trend daily ABG  - would repeat ABG if change in clinical status (RR< 12, pt unarousable)- discussed with floor NP    #Metabolic encephalopathy- pt newly delirious and confused 11/16, was briefly more oriented per family in the early morning, now more lethargic- likely tired out from ventilatory exhaustion  - delirium likely 2/2 ongoing infection and component of hypoxia  - cont management of pna as above, resp support and frequent reorientation    #dyspnea- frequent RRT for shortness of breath, now on BIPAP for increased WOB. Suspect anxiety is a component  - will start morphine 0.5mg iv q4 hours prn sob as a palliative measure, family aware and in agreement, hold for lethargy  - benefits outweight risks even in light of her current delirium

## 2022-11-18 NOTE — PROGRESS NOTE ADULT - SUBJECTIVE AND OBJECTIVE BOX
PULMONARY PROGRESS NOTE    VIDAL LOUIE  MRN-[id number]    Patient is a 89y old  Female who presents with a chief complaint of respiratory distress (16 Nov 2022 14:33)      HPI:  awake on bipap  no complaints      MEDICATIONS  (STANDING):  albuterol    90 MICROgram(s) HFA Inhaler 2 Puff(s) Inhalation every 4 hours  albuterol/ipratropium for Nebulization 3 milliLiter(s) Nebulizer every 6 hours  amLODIPine   Tablet 2.5 milliGRAM(s) Oral daily  aspirin enteric coated 81 milliGRAM(s) Oral daily  budesonide 160 MICROgram(s)/formoterol 4.5 MICROgram(s) Inhaler 2 Puff(s) Inhalation two times a day  calcium carbonate 1250 mG  + Vitamin D (OsCal 500 + D) 1 Tablet(s) Oral daily  cholecalciferol 2000 Unit(s) Oral daily  dextrose 5% + sodium chloride 0.45%. 1000 milliLiter(s) (120 mL/Hr) IV Continuous <Continuous>  enoxaparin Injectable 40 milliGRAM(s) SubCutaneous every 24 hours  gabapentin 400 milliGRAM(s) Oral three times a day  influenza  Vaccine (HIGH DOSE) 0.7 milliLiter(s) IntraMuscular once  methylPREDNISolone sodium succinate Injectable 40 milliGRAM(s) IV Push two times a day  multivitamin 1 Tablet(s) Oral daily  piperacillin/tazobactam IVPB.. 3.375 Gram(s) IV Intermittent every 8 hours  polyethylene glycol 3350 17 Gram(s) Oral daily  senna 2 Tablet(s) Oral at bedtime  sodium chloride 3%  Inhalation 4 milliLiter(s) Inhalation every 6 hours  tiotropium 18 MICROgram(s) Capsule 1 Capsule(s) Inhalation daily    MEDICATIONS  (PRN):  acetaminophen     Tablet .. 650 milliGRAM(s) Oral every 6 hours PRN Temp greater or equal to 38C (100.4F), Mild Pain (1 - 3)  aluminum hydroxide/magnesium hydroxide/simethicone Suspension 30 milliLiter(s) Oral every 4 hours PRN Dyspepsia  cyclobenzaprine 5 milliGRAM(s) Oral three times a day PRN Muscle Spasm  guaiFENesin  milliGRAM(s) Oral every 12 hours PRN Cough  melatonin 3 milliGRAM(s) Oral at bedtime PRN Insomnia  morphine  - Injectable 0.5 milliGRAM(s) IV Push every 4 hours PRN shortness of breath  ondansetron Injectable 4 milliGRAM(s) IV Push every 8 hours PRN Nausea and/or Vomiting  traMADol 50 milliGRAM(s) Oral every 6 hours PRN Severe Pain (7 - 10)      EXAM:  Vital Signs Last 24 Hrs  T(C): 36.4 (18 Nov 2022 04:35), Max: 36.7 (17 Nov 2022 20:59)  T(F): 97.5 (18 Nov 2022 04:35), Max: 98.1 (18 Nov 2022 00:01)  HR: 71 (18 Nov 2022 09:25) (70 - 84)  BP: 140/66 (18 Nov 2022 04:35) (133/79 - 145/60)  BP(mean): --  RR: 20 (18 Nov 2022 04:35) (20 - 20)  SpO2: 95% (18 Nov 2022 09:25) (94% - 97%)    Parameters below as of 18 Nov 2022 04:35  Patient On (Oxygen Delivery Method): BiPAP/CPAP        GENERAL: awake, alert, on bipap                            16.2   7.49  )-----------( 275      ( 17 Nov 2022 10:13 )             52.5   11-17    147<H>  |  98  |  26<H>  ----------------------------<  169<H>  3.6   |  30  |  0.98    Ca    9.6      17 Nov 2022 10:13       < from: Xray Chest 1 View- PORTABLE-Urgent (Xray Chest 1 View- PORTABLE-Urgent .) (11.16.22 @ 10:46) >    ACC: [id number] EXAM:  XR CHEST PORTABLE URGENT 1V                          PROCEDURE DATE:  11/16/2022          INTERPRETATION:  EXAMINATION: XR CHEST URGENT    CLINICAL INDICATION: Shortness of breath    TECHNIQUE: Single frontal, portable view of the chest was obtained.    COMPARISON: Chest radiograph and CT 11/13/2022.    FINDINGS:    The heart size is not well evaluated on this projection.  Hazy bibasilar opacities which are largely unchanged since radiograph on   11/13/2022.  No pneumothorax.    IMPRESSION:    Hazy bibasilar opacities likely representing small layering bilateral   pleural effusions with associated compressive atelectasis, largely   unchanged in appearance since 11/30/2022.    --- End of Report ---           JILLIAN NAZARIO DO; Resident Radiologist  This document has been electronically signed.  HEMALATHA LEONARD MD; Attending Radiologist  This document has been electronically signed. Nov 16 2022 10:46AM    < end of copied text >  < from: CT Angio Chest PE Protocol w/ IV Cont (11.14.22 @ 03:53) >    ACC: [id number] EXAM:  CT ANGIO CHEST PULM ART [facility]                          PROCEDURE DATE:  11/14/2022          INTERPRETATION:  CLINICAL INDICATION: Dyspnea    TECHNIQUE: A volumetric acquisition of the chest was obtained from the   thoracic inlet to the upper abdomen during dynamic administration of 90cc   Omnipaque 350 intravenous contrast according to the PE protocol. 3D MIP   images were provided.    COMPARISON: CT chest 10/13/2022    FINDINGS:  CTA: No pulmonary embolism.    Heart/Vasculature: The heart is enlarged in size. There is no pericardial   effusion.    Lungs/Airways/Pleura: Mild central airways secretions. Emphysema. Left   lower lobe superior segment 2.3 x 1.9 cm nodule (image 53, series 2) was   previously 1.6 x 1.5 cm on10/14/2022 CT thoracic spine and new compared   to 6/16/2019.    Patchy right apical consolidative opacity (image 34, series 2) is similar   compared to 11/13/2022 CT chest and new compared to 10/14/2022 CT   thoracic spine.    Right basilar parenchymal opacification likely represents a combination   of atelectasis and consolidation.    Small bilateral pleural effusions.    Mediastinum/Lymph nodes: Left paratracheal 3.6 x 1.3 cm node (image 51,   series 3) appears increased in size compared to 10/10/2018.. Additional   mediastinal, cardiophrenic angle, and right internal mammary nodes are   similar compared to 10/10/2018.    Upper Abdomen: Enlarged celiac nodes, similar compared to prior exams.   Subcentimeter exophytic right renal lesion unchanged    Bones and Soft Tissues: Multilevel degenerative disc disease. T12   compression fracture is redemonstrated and seen since 10/14/2022. Grade 1   anterolisthesis of C7 on T1, T1 on T2, and T2 on T3.    IMPRESSION:    No pulmonary embolism.    Patchy consolidation in the right apical region and at the right base may   represent pneumonia.    Left lower lobe 2.3 cm nodule increased in size compared to 10/14/2022   and likely represents primary lung neoplasm.    Left paratracheal 3.6 x 1.3 cmnode appears increased in size compared to   10/10/2018 and is indeterminate.    < end of copied text >    PROBLEM LIST:  89y Female with HEALTH ISSUES - PROBLEM Dx:  COPD exacerbation    Hypokalemia    Pulmonary nodule, right    Prophylactic measure    Osteoporosis    Lumbar compression fracture    Chronic systolic congestive heart failure    Hypertension    Constipation    Debility    ACP (advance care planning)    Chronic respiratory failure with hypoxia and hypercapnia    Respiratory distress           RECS:  hypoxic resp failure- multifactorial  solumedrol  consider trial of IV Diuresis  empiric abx  dvt prophylaxis  would f/u with family/ palliative re: GOC, comfort ?         Please call with any questions.    Adrianne Garcia MD  [facility]  [phone]

## 2022-11-18 NOTE — PROGRESS NOTE ADULT - SUBJECTIVE AND OBJECTIVE BOX
Derick Luz MD  Division of Hospital Medicine  Available on MS teams until 7pm  If no response or off-hours, page [phone]  -------------------------------------    Patient is a 89y old  Female who presents with a chief complaint of respiratory distress (18 Nov 2022 10:13)    SUBJECTIVE / OVERNIGHT EVENTS: pt tolerated BIPAP overnight  ADDITIONAL REVIEW OF SYSTEMS: pt more awake/arousable today- knows she is in a hospital, voice very raspy- notes breathing feels more comfortable, no aches/pains, no fevers/chills.     MEDICATIONS  (STANDING):  albuterol    90 MICROgram(s) HFA Inhaler 2 Puff(s) Inhalation every 4 hours  albuterol/ipratropium for Nebulization 3 milliLiter(s) Nebulizer every 6 hours  amLODIPine   Tablet 2.5 milliGRAM(s) Oral daily  aspirin enteric coated 81 milliGRAM(s) Oral daily  budesonide 160 MICROgram(s)/formoterol 4.5 MICROgram(s) Inhaler 2 Puff(s) Inhalation two times a day  calcium carbonate 1250 mG  + Vitamin D (OsCal 500 + D) 1 Tablet(s) Oral daily  cholecalciferol 2000 Unit(s) Oral daily  dextrose 5% + sodium chloride 0.45%. 1000 milliLiter(s) (120 mL/Hr) IV Continuous <Continuous>  enoxaparin Injectable 40 milliGRAM(s) SubCutaneous every 24 hours  gabapentin 400 milliGRAM(s) Oral three times a day  influenza  Vaccine (HIGH DOSE) 0.7 milliLiter(s) IntraMuscular once  methylPREDNISolone sodium succinate Injectable 40 milliGRAM(s) IV Push two times a day  multivitamin 1 Tablet(s) Oral daily  piperacillin/tazobactam IVPB.. 3.375 Gram(s) IV Intermittent every 8 hours  polyethylene glycol 3350 17 Gram(s) Oral daily  senna 2 Tablet(s) Oral at bedtime  sodium chloride 3%  Inhalation 4 milliLiter(s) Inhalation every 6 hours  tiotropium 18 MICROgram(s) Capsule 1 Capsule(s) Inhalation daily    MEDICATIONS  (PRN):  acetaminophen     Tablet .. 650 milliGRAM(s) Oral every 6 hours PRN Temp greater or equal to 38C (100.4F), Mild Pain (1 - 3)  aluminum hydroxide/magnesium hydroxide/simethicone Suspension 30 milliLiter(s) Oral every 4 hours PRN Dyspepsia  cyclobenzaprine 5 milliGRAM(s) Oral three times a day PRN Muscle Spasm  guaiFENesin  milliGRAM(s) Oral every 12 hours PRN Cough  melatonin 3 milliGRAM(s) Oral at bedtime PRN Insomnia  morphine  - Injectable 0.5 milliGRAM(s) IV Push every 4 hours PRN shortness of breath  ondansetron Injectable 4 milliGRAM(s) IV Push every 8 hours PRN Nausea and/or Vomiting  traMADol 50 milliGRAM(s) Oral every 6 hours PRN Severe Pain (7 - 10)      CAPILLARY BLOOD GLUCOSE        I&O's Summary    17 Nov 2022 07:01  -  18 Nov 2022 07:00  --------------------------------------------------------  IN: 0 mL / OUT: 1000 mL / NET: -1000 mL    18 Nov 2022 07:01  -  18 Nov 2022 16:04  --------------------------------------------------------  IN: 0 mL / OUT: 600 mL / NET: -600 mL        PHYSICAL EXAM:  Vital Signs Last 24 Hrs  T(C): 36.5 (18 Nov 2022 11:10), Max: 36.7 (17 Nov 2022 20:59)  T(F): 97.7 (18 Nov 2022 11:10), Max: 98.1 (18 Nov 2022 00:01)  HR: 73 (18 Nov 2022 15:20) (70 - 76)  BP: 148/57 (18 Nov 2022 11:10) (133/79 - 148/57)  BP(mean): --  RR: 18 (18 Nov 2022 11:10) (18 - 20)  SpO2: 94% (18 Nov 2022 15:20) (94% - 97%)    Parameters below as of 18 Nov 2022 11:10  Patient On (Oxygen Delivery Method): nasal cannula, high flow      CONSTITUTIONAL: NAD  EYES: PERRLA; conjunctiva and sclera clear  ENMT: MMM  NECK: Supple  RESPIRATORY: distant breath sounds, no wheeze  CARDIOVASCULAR: RRR, no JVD, no peripheral edema   ABDOMEN: Nontender to palpation, normoactive BS, no guarding/rigidity  MUSCLOSKELETAL:  no clubbing/cyanosis, no joint swelling or tenderness to palpation  PSYCH: A+O x 1-2, affect normal  NEUROLOGY: CN 2-12 are intact and symmetric; no gross sensory or motor deficits  SKIN: No rashes; no palpable lesions    LABS:                        16.2   7.49  )-----------( 275      ( 17 Nov 2022 10:13 )             52.5     11-17    147<H>  |  98  |  26<H>  ----------------------------<  169<H>  3.6   |  30  |  0.98    Ca    9.6      17 Nov 2022 10:13                  RADIOLOGY & ADDITIONAL TESTS:  Results Reviewed:   Imaging Personally Reviewed:  Electrocardiogram Personally Reviewed:    COORDINATION OF CARE:  Care Discussed with Consultants/Other Providers [Y/N]: pulmonary, floor np  Prior or Outpatient Records Reviewed [Y/N]:

## 2022-11-18 NOTE — PROGRESS NOTE ADULT - PROBLEM SELECTOR PLAN 4
- hold lasix in setting of new [place]RA[place] and hypernatremia  - TTE showing normal systolic function, pt says she was started on lasix for pedal edema and was told at [facility] that she has HF but was not told this before  - may resume prn lasix if clinically indicated

## 2022-11-18 NOTE — PROGRESS NOTE ADULT - PROBLEM SELECTOR PLAN 1
Uses home O2 3L qHS at baseline.  S/p duoneb treatments and solumedrol 125mg IV x1 in the ED  - continue with albuterol q4hr standing  - increased solumed to 40mg iv bid  - continue home spiriva and symbicort  - s/p continuous bipap 11/16-11/18, discussed with pulm- clinical appearance has not correlated with blood gases, appears improved 11/18, weaned to HFNC, tolerating well  - will stop trending daily ABG for now unless change in clinical status (RR< 12, pt unarousable)- discussed with floor NP and pulmonary  - Per son, Dr. Calderon had discussed possibly setting up home bipap for patient- I discussed this with inpatient pulm, who recommends re-evaluating this as a possible option on discharge if patient improves    #Metabolic encephalopathy- pt newly delirious and confused 11/16, now improved s/p continuous BIPAP x 48 hours  - delirium likely 2/2 COPD exam/hypoxia with possible component of hospital delirium  - cont management of pna as above, resp support and frequent reorientation    #dyspnea- frequent RRT for shortness of breath, now on BIPAP for increased WOB. Suspect anxiety is a component  - will start morphine 0.5mg iv q4 hours prn sob as a palliative measure, family aware and in agreement, hold for lethargy  - benefits outweight risks even in light of her current delirium

## 2022-11-19 NOTE — PROGRESS NOTE ADULT - NSPROGADDITIONALINFOA_GEN_ALL_CORE
d/w acp    Jayden Marte MD  [facility] Division of Hospital Medicine  Available via MS Teams  Pager: [phone]

## 2022-11-19 NOTE — PROGRESS NOTE ADULT - PROBLEM SELECTOR PLAN 1
Pt seen in office today to have vitals and weight  checked. Pt appears well and pleasant. Pt reports taking BP medications this morning.      Pts BP was 110 /60  HR 65.    Pt mentioned concerns of weight loss. RN instructed Pt to speak w/provider about this. In the mean time RN suggested nutritional drinks such as ensure between meals.     Pt has appt with provider on  04/02/21. Pt reminded of appointment and the importance of keeeping appt. No further questions or concerns at this time.     Pt left the clinic ambulatory w/steady gait.   Uses home O2 3L qHS at baseline.  S/p duoneb treatments and solumedrol 125mg IV x1 in the ED  - continue with albuterol q4hr standing  - increased solumed to 40mg iv bid  - continue home spiriva and symbicort  - s/p continuous bipap 11/16-11/18, discussed with pulm- clinical appearance has not correlated with blood gases, appears improved 11/18, weaned to HFNC, tolerating well  - will stop trending daily ABG for now unless change in clinical status (RR< 12, pt unarousable)- discussed with floor NP and pulmonary  - Per son, Dr. Calderon had discussed possibly setting up home bipap for patient- I discussed this with inpatient pulm, who recommends re-evaluating this as a possible option on discharge if patient improves    #Metabolic encephalopathy- pt newly delirious and confused 11/16, now improved s/p continuous BIPAP x 48 hours  - delirium likely 2/2 COPD exam/hypoxia with possible component of hospital delirium  - cont management of pna as above, resp support and frequent reorientation    #dyspnea- frequent RRT for shortness of breath, now on BIPAP for increased WOB. Suspect anxiety is a component  - will start morphine 0.5mg iv q4 hours prn sob as a palliative measure, family aware and in agreement, hold for lethargy  - benefits outweight risks even in light of her current delirium  - 11/19: was on bipap overnight, will re-trial hi-flow nasal canula today.

## 2022-11-19 NOTE — PROGRESS NOTE ADULT - SUBJECTIVE AND OBJECTIVE BOX
[facility] Division of Hospital Medicine  Jayden Marte MD  Available via MS Teams  Pager: [phone]    SUBJECTIVE / OVERNIGHT EVENTS:  -no acute events overnight. was on bi-pap. this am, patient responding to all questions, denies any chest pain, nausea, vomiting, headaches. Wishes to remove the bipap    ADDITIONAL REVIEW OF SYSTEMS:    MEDICATIONS  (STANDING):  albuterol    90 MICROgram(s) HFA Inhaler 2 Puff(s) Inhalation every 4 hours  albuterol/ipratropium for Nebulization 3 milliLiter(s) Nebulizer every 6 hours  amLODIPine   Tablet 2.5 milliGRAM(s) Oral daily  aspirin enteric coated 81 milliGRAM(s) Oral daily  budesonide 160 MICROgram(s)/formoterol 4.5 MICROgram(s) Inhaler 2 Puff(s) Inhalation two times a day  calcium carbonate 1250 mG  + Vitamin D (OsCal 500 + D) 1 Tablet(s) Oral daily  cholecalciferol 2000 Unit(s) Oral daily  enoxaparin Injectable 40 milliGRAM(s) SubCutaneous every 24 hours  gabapentin 400 milliGRAM(s) Oral three times a day  influenza  Vaccine (HIGH DOSE) 0.7 milliLiter(s) IntraMuscular once  methylPREDNISolone sodium succinate Injectable 40 milliGRAM(s) IV Push two times a day  multivitamin 1 Tablet(s) Oral daily  piperacillin/tazobactam IVPB.. 3.375 Gram(s) IV Intermittent every 8 hours  polyethylene glycol 3350 17 Gram(s) Oral daily  senna 2 Tablet(s) Oral at bedtime  sodium chloride 3%  Inhalation 4 milliLiter(s) Inhalation every 6 hours  tiotropium 18 MICROgram(s) Capsule 1 Capsule(s) Inhalation daily    MEDICATIONS  (PRN):  acetaminophen     Tablet .. 650 milliGRAM(s) Oral every 6 hours PRN Temp greater or equal to 38C (100.4F), Mild Pain (1 - 3)  aluminum hydroxide/magnesium hydroxide/simethicone Suspension 30 milliLiter(s) Oral every 4 hours PRN Dyspepsia  cyclobenzaprine 5 milliGRAM(s) Oral three times a day PRN Muscle Spasm  guaiFENesin  milliGRAM(s) Oral every 12 hours PRN Cough  melatonin 3 milliGRAM(s) Oral at bedtime PRN Insomnia  morphine  - Injectable 0.5 milliGRAM(s) IV Push every 4 hours PRN shortness of breath  ondansetron Injectable 4 milliGRAM(s) IV Push every 8 hours PRN Nausea and/or Vomiting  traMADol 50 milliGRAM(s) Oral every 6 hours PRN Severe Pain (7 - 10)      I&O's Summary    18 Nov 2022 07:01  -  19 Nov 2022 07:00  --------------------------------------------------------  IN: 0 mL / OUT: 1000 mL / NET: -1000 mL        PHYSICAL EXAM:  Vital Signs Last 24 Hrs  T(C): 36.7 (19 Nov 2022 10:51), Max: 36.8 (18 Nov 2022 22:53)  T(F): 98 (19 Nov 2022 10:51), Max: 98.3 (18 Nov 2022 22:53)  HR: 70 (19 Nov 2022 11:05) (67 - 78)  BP: 159/75 (19 Nov 2022 10:51) (121/63 - 159/75)  BP(mean): --  RR: 20 (19 Nov 2022 10:51) (16 - 20)  SpO2: 93% (19 Nov 2022 11:05) (91% - 97%)    Parameters below as of 19 Nov 2022 10:51  Patient On (Oxygen Delivery Method): nasal cannula, high flow    CONSTITUTIONAL: NAD  EYES: PERRLA; conjunctiva and sclera clear  ENMT: MMM  NECK: Supple  RESPIRATORY: distant breath sounds, no wheeze  CARDIOVASCULAR: RRR, no JVD, no peripheral edema   ABDOMEN: Nontender to palpation, normoactive BS, no guarding/rigidity  MUSCLOSKELETAL:  no clubbing/cyanosis, no joint swelling or tenderness to palpation  PSYCH: A+O x 1-2, affect normal  NEUROLOGY: CN 2-12 are intact and symmetric; no gross sensory or motor deficits  SKIN: No rashes; no palpable lesions    LABS:                        14.0   7.65  )-----------( 268      ( 18 Nov 2022 17:02 )             43.8     11-18    143  |  101  |  23  ----------------------------<  163<H>  3.0<L>   |  34<H>  |  0.73    Ca    8.6      18 Nov 2022 17:02                SARS-CoV-2: NotDetec (13 Nov 2022 18:49)  COVID-19 PCR: NotDetec (21 Oct 2022 12:06)  COVID-19 PCR: NotDetec (17 Oct 2022 06:15)  COVID-19 PCR: NotDetec (10 Oct 2022 16:13)      RADIOLOGY & ADDITIONAL TESTS:  New Results Reviewed Today:   New Imaging Personally Reviewed Today:  New Electrocardiogram Personally Reviewed Today:  Prior or Outpatient Records Reviewed Today:    COMMUNICATION:  Care Discussed with Consultants/Other Providers and Details of Discussion:  Discussions with Patient/Family:  PCP Communication:

## 2022-11-19 NOTE — PROGRESS NOTE ADULT - SUBJECTIVE AND OBJECTIVE BOX
PULMONARY PROGRESS NOTE    VIDAL LOUIE  MRN-[id number]    Patient is a 89y old  Female who presents with a chief complaint of respiratory distress (16 Nov 2022 14:33)      HPI:  on high flow 39%  feels a little better      MEDICATIONS  (STANDING):  albuterol    90 MICROgram(s) HFA Inhaler 2 Puff(s) Inhalation every 4 hours  albuterol/ipratropium for Nebulization 3 milliLiter(s) Nebulizer every 6 hours  amLODIPine   Tablet 2.5 milliGRAM(s) Oral daily  aspirin enteric coated 81 milliGRAM(s) Oral daily  budesonide 160 MICROgram(s)/formoterol 4.5 MICROgram(s) Inhaler 2 Puff(s) Inhalation two times a day  calcium carbonate 1250 mG  + Vitamin D (OsCal 500 + D) 1 Tablet(s) Oral daily  cholecalciferol 2000 Unit(s) Oral daily  enoxaparin Injectable 40 milliGRAM(s) SubCutaneous every 24 hours  gabapentin 400 milliGRAM(s) Oral three times a day  influenza  Vaccine (HIGH DOSE) 0.7 milliLiter(s) IntraMuscular once  methylPREDNISolone sodium succinate Injectable 40 milliGRAM(s) IV Push two times a day  multivitamin 1 Tablet(s) Oral daily  piperacillin/tazobactam IVPB.. 3.375 Gram(s) IV Intermittent every 8 hours  polyethylene glycol 3350 17 Gram(s) Oral daily  senna 2 Tablet(s) Oral at bedtime  sodium chloride 3%  Inhalation 4 milliLiter(s) Inhalation every 6 hours  tiotropium 18 MICROgram(s) Capsule 1 Capsule(s) Inhalation daily    MEDICATIONS  (PRN):  acetaminophen     Tablet .. 650 milliGRAM(s) Oral every 6 hours PRN Temp greater or equal to 38C (100.4F), Mild Pain (1 - 3)  aluminum hydroxide/magnesium hydroxide/simethicone Suspension 30 milliLiter(s) Oral every 4 hours PRN Dyspepsia  cyclobenzaprine 5 milliGRAM(s) Oral three times a day PRN Muscle Spasm  guaiFENesin  milliGRAM(s) Oral every 12 hours PRN Cough  melatonin 3 milliGRAM(s) Oral at bedtime PRN Insomnia  morphine  - Injectable 0.5 milliGRAM(s) IV Push every 4 hours PRN shortness of breath  ondansetron Injectable 4 milliGRAM(s) IV Push every 8 hours PRN Nausea and/or Vomiting  traMADol 50 milliGRAM(s) Oral every 6 hours PRN Severe Pain (7 - 10)        EXAM:  Vital Signs Last 24 Hrs  T(C): 36.7 (19 Nov 2022 10:51), Max: 36.8 (18 Nov 2022 22:53)  T(F): 98 (19 Nov 2022 10:51), Max: 98.3 (18 Nov 2022 22:53)  HR: 70 (19 Nov 2022 11:05) (67 - 78)  BP: 159/75 (19 Nov 2022 10:51) (121/63 - 159/75)  BP(mean): --  RR: 20 (19 Nov 2022 10:51) (16 - 20)  SpO2: 93% (19 Nov 2022 11:05) (91% - 97%)    Parameters below as of 19 Nov 2022 10:51  Patient On (Oxygen Delivery Method): nasal cannula, high flow      GENERAL: awake, alert, on high flow                            14.0   7.65  )-----------( 268      ( 18 Nov 2022 17:02 )             43.8   11-18    143  |  101  |  23  ----------------------------<  163<H>  3.0<L>   |  34<H>  |  0.73    Ca    8.6      18 Nov 2022 17:02         < from: Xray Chest 1 View- PORTABLE-Urgent (Xray Chest 1 View- PORTABLE-Urgent .) (11.16.22 @ 10:46) >    ACC: [id number] EXAM:  XR CHEST PORTABLE URGENT 1V                          PROCEDURE DATE:  11/16/2022          INTERPRETATION:  EXAMINATION: XR CHEST URGENT    CLINICAL INDICATION: Shortness of breath    TECHNIQUE: Single frontal, portable view of the chest was obtained.    COMPARISON: Chest radiograph and CT 11/13/2022.    FINDINGS:    The heart size is not well evaluated on this projection.  Hazy bibasilar opacities which are largely unchanged since radiograph on   11/13/2022.  No pneumothorax.    IMPRESSION:    Hazy bibasilar opacities likely representing small layering bilateral   pleural effusions with associated compressive atelectasis, largely   unchanged in appearance since 11/30/2022.    --- End of Report ---           JILLIAN NAZARIO DO; Resident Radiologist  This document has been electronically signed.  HEMALATHA LEONARD MD; Attending Radiologist  This document has been electronically signed. Nov 16 2022 10:46AM    < end of copied text >  < from: CT Angio Chest PE Protocol w/ IV Cont (11.14.22 @ 03:53) >    ACC: [id number] EXAM:  CT ANGIO CHEST PULM ART [facility]                          PROCEDURE DATE:  11/14/2022          INTERPRETATION:  CLINICAL INDICATION: Dyspnea    TECHNIQUE: A volumetric acquisition of the chest was obtained from the   thoracic inlet to the upper abdomen during dynamic administration of 90cc   Omnipaque 350 intravenous contrast according to the PE protocol. 3D MIP   images were provided.    COMPARISON: CT chest 10/13/2022    FINDINGS:  CTA: No pulmonary embolism.    Heart/Vasculature: The heart is enlarged in size. There is no pericardial   effusion.    Lungs/Airways/Pleura: Mild central airways secretions. Emphysema. Left   lower lobe superior segment 2.3 x 1.9 cm nodule (image 53, series 2) was   previously 1.6 x 1.5 cm on10/14/2022 CT thoracic spine and new compared   to 6/16/2019.    Patchy right apical consolidative opacity (image 34, series 2) is similar   compared to 11/13/2022 CT chest and new compared to 10/14/2022 CT   thoracic spine.    Right basilar parenchymal opacification likely represents a combination   of atelectasis and consolidation.    Small bilateral pleural effusions.    Mediastinum/Lymph nodes: Left paratracheal 3.6 x 1.3 cm node (image 51,   series 3) appears increased in size compared to 10/10/2018.. Additional   mediastinal, cardiophrenic angle, and right internal mammary nodes are   similar compared to 10/10/2018.    Upper Abdomen: Enlarged celiac nodes, similar compared to prior exams.   Subcentimeter exophytic right renal lesion unchanged    Bones and Soft Tissues: Multilevel degenerative disc disease. T12   compression fracture is redemonstrated and seen since 10/14/2022. Grade 1   anterolisthesis of C7 on T1, T1 on T2, and T2 on T3.    IMPRESSION:    No pulmonary embolism.    Patchy consolidation in the right apical region and at the right base may   represent pneumonia.    Left lower lobe 2.3 cm nodule increased in size compared to 10/14/2022   and likely represents primary lung neoplasm.    Left paratracheal 3.6 x 1.3 cmnode appears increased in size compared to   10/10/2018 and is indeterminate.    < end of copied text >    PROBLEM LIST:  89y Female with HEALTH ISSUES - PROBLEM Dx:  COPD exacerbation    Hypokalemia    Pulmonary nodule, right    Prophylactic measure    Osteoporosis    Lumbar compression fracture    Chronic systolic congestive heart failure    Hypertension    Constipation    Debility    ACP (advance care planning)    Chronic respiratory failure with hypoxia and hypercapnia    Respiratory distress           RECS:  cont symbicort/spiriva/nebs  solumedrol 40mg iv bid, decrease to 30mg IV bid tomorrow.  bipap during sleep and prn, high flow or nc during day wean as tolerated  consider trial of IV Diuresis  empiric abx  dvt prophylaxis  outpt fu for enlarging pulm nodule, would need PET        Please call with any questions.    Adrianne Garcia MD  [facility] Pulmonary/Sleep Medicine  [phone]

## 2022-11-19 NOTE — PROGRESS NOTE ADULT - PROBLEM SELECTOR PLAN 4
- hold lasix in setting of new [place] and hypernatremia  - TTE showing normal systolic function, pt says she was started on lasix for pedal edema and was told at [facility] that she has HF but was not told this before  - may resume prn lasix if clinically indicated

## 2022-11-20 NOTE — PROGRESS NOTE ADULT - PROBLEM SELECTOR PLAN 1
Uses home O2 3L qHS at baseline.  S/p duoneb treatments and solumedrol 125mg IV x1 in the ED  - continue with albuterol q4hr standing  - increased solumed to 40mg iv bid  - continue home spiriva and symbicort  - s/p continuous bipap 11/16-11/18, discussed with pulm- clinical appearance has not correlated with blood gases, appears improved 11/18, weaned to HFNC, tolerating well  - will stop trending daily ABG for now unless change in clinical status (RR< 12, pt unarousable)- discussed with floor NP and pulmonary  - Per son, Dr. Calderon had discussed possibly setting up home bipap for patient- I discussed this with inpatient pulm, who recommends re-evaluating this as a possible option on discharge if patient improves    #Metabolic encephalopathy- pt newly delirious and confused 11/16, now improved s/p continuous BIPAP x 48 hours  - delirium likely 2/2 COPD exam/hypoxia with possible component of hospital delirium  - cont management of pna as above, resp support and frequent reorientation    #dyspnea- frequent RRT for shortness of breath, now on BIPAP for increased WOB. Suspect anxiety is a component  - c.w morphine 0.5mg iv q4 hours prn sob as a palliative measure, family aware and in agreement, hold for lethargy  - benefits outweight risks even in light of her current delirium  - 11/20 : comfortably on hi-flow, will attempt to wean for goal SpO2>92%. pulm recs appreciated: weaning steroids. crackles on lungs so will give lasix 20 IV 1x (pulm agrees).

## 2022-11-20 NOTE — PROGRESS NOTE ADULT - SUBJECTIVE AND OBJECTIVE BOX
[facility] Division of Hospital Medicine  Jayden Marte MD  Available via MS Teams  Pager: [phone]    SUBJECTIVE / OVERNIGHT EVENTS:  - no acute events overnight. patient this morning on hi-flow nasal canula, answering all questions. denies any chest pain, nausea, vomiting, headaches.    ADDITIONAL REVIEW OF SYSTEMS:    MEDICATIONS  (STANDING):  albuterol    90 MICROgram(s) HFA Inhaler 2 Puff(s) Inhalation every 4 hours  albuterol/ipratropium for Nebulization 3 milliLiter(s) Nebulizer every 6 hours  amLODIPine   Tablet 2.5 milliGRAM(s) Oral daily  aspirin enteric coated 81 milliGRAM(s) Oral daily  budesonide 160 MICROgram(s)/formoterol 4.5 MICROgram(s) Inhaler 2 Puff(s) Inhalation two times a day  calcium carbonate 1250 mG  + Vitamin D (OsCal 500 + D) 1 Tablet(s) Oral daily  cholecalciferol 2000 Unit(s) Oral daily  enoxaparin Injectable 40 milliGRAM(s) SubCutaneous every 24 hours  furosemide   Injectable 20 milliGRAM(s) IV Push once  gabapentin 400 milliGRAM(s) Oral three times a day  influenza  Vaccine (HIGH DOSE) 0.7 milliLiter(s) IntraMuscular once  methylPREDNISolone sodium succinate Injectable 30 milliGRAM(s) IV Push two times a day  multivitamin 1 Tablet(s) Oral daily  piperacillin/tazobactam IVPB.. 3.375 Gram(s) IV Intermittent every 8 hours  polyethylene glycol 3350 17 Gram(s) Oral daily  senna 2 Tablet(s) Oral at bedtime  sodium chloride 3%  Inhalation 4 milliLiter(s) Inhalation every 6 hours  tiotropium 18 MICROgram(s) Capsule 1 Capsule(s) Inhalation daily    MEDICATIONS  (PRN):  acetaminophen     Tablet .. 650 milliGRAM(s) Oral every 6 hours PRN Temp greater or equal to 38C (100.4F), Mild Pain (1 - 3)  aluminum hydroxide/magnesium hydroxide/simethicone Suspension 30 milliLiter(s) Oral every 4 hours PRN Dyspepsia  cyclobenzaprine 5 milliGRAM(s) Oral three times a day PRN Muscle Spasm  guaiFENesin  milliGRAM(s) Oral every 12 hours PRN Cough  melatonin 3 milliGRAM(s) Oral at bedtime PRN Insomnia  morphine  - Injectable 0.5 milliGRAM(s) IV Push every 4 hours PRN shortness of breath  ondansetron Injectable 4 milliGRAM(s) IV Push every 8 hours PRN Nausea and/or Vomiting  traMADol 50 milliGRAM(s) Oral every 6 hours PRN Severe Pain (7 - 10)      I&O's Summary      PHYSICAL EXAM:  Vital Signs Last 24 Hrs  T(C): 36.6 (20 Nov 2022 13:46), Max: 36.6 (19 Nov 2022 20:16)  T(F): 97.8 (20 Nov 2022 13:46), Max: 97.9 (19 Nov 2022 20:16)  HR: 85 (20 Nov 2022 13:46) (72 - 85)  BP: 136/73 (20 Nov 2022 13:46) (134/71 - 139/78)  BP(mean): --  RR: 18 (20 Nov 2022 13:46) (18 - 22)  SpO2: 91% (20 Nov 2022 13:46) (90% - 99%)    Parameters below as of 20 Nov 2022 09:25  Patient On (Oxygen Delivery Method): nasal cannula, high flow  O2 Flow (L/min): 60  O2 Concentration (%): 40    CONSTITUTIONAL: NAD  EYES: PERRLA; conjunctiva and sclera clear  ENMT: MMM  NECK: Supple  RESPIRATORY: no wheezing, but mild bibasilar crackles   CARDIOVASCULAR: RRR, no JVD, no peripheral edema   ABDOMEN: Nontender to palpation, normoactive BS, no guarding/rigidity  MUSCLOSKELETAL:  no clubbing/cyanosis, no joint swelling or tenderness to palpation  PSYCH: A+O x 1-2, affect normal  NEUROLOGY: CN 2-12 are intact and symmetric; no gross sensory or motor deficits  SKIN: No rashes; no palpable lesions    LABS:                        15.5   7.01  )-----------( 256      ( 20 Nov 2022 11:53 )             47.1     11-20    143  |  101  |  21  ----------------------------<  172<H>  3.6   |  31  |  0.80    Ca    9.3      20 Nov 2022 11:53  Phos  3.1     11-20  Mg     2.4     11-20    TPro  6.6  /  Alb  3.5  /  TBili  1.0  /  DBili  x   /  AST  29  /  ALT  41  /  AlkPhos  93  11-20              SARS-CoV-2: NotDetec (13 Nov 2022 18:49)  COVID-19 PCR: NotDetec (21 Oct 2022 12:06)  COVID-19 PCR: NotDetec (17 Oct 2022 06:15)  COVID-19 PCR: NotDetec (10 Oct 2022 16:13)      RADIOLOGY & ADDITIONAL TESTS:  New Results Reviewed Today: cbc cmp  New Imaging Personally Reviewed Today:  New Electrocardiogram Personally Reviewed Today:  Prior or Outpatient Records Reviewed Today:    COMMUNICATION:  Care Discussed with Consultants/Other Providers and Details of Discussion:  Discussions with Patient/Family:  PCP Communication:

## 2022-11-20 NOTE — PROGRESS NOTE ADULT - SUBJECTIVE AND OBJECTIVE BOX
PULMONARY PROGRESS NOTE    VIDAL LOUIE  MRN-[id number]    Patient is a 89y old  Female who presents with a chief complaint of respiratory distress (16 Nov 2022 14:33)      HPI:  on high flow 42%  feels a little better    MEDICATIONS  (STANDING):  albuterol    90 MICROgram(s) HFA Inhaler 2 Puff(s) Inhalation every 4 hours  albuterol/ipratropium for Nebulization 3 milliLiter(s) Nebulizer every 6 hours  amLODIPine   Tablet 2.5 milliGRAM(s) Oral daily  aspirin enteric coated 81 milliGRAM(s) Oral daily  budesonide 160 MICROgram(s)/formoterol 4.5 MICROgram(s) Inhaler 2 Puff(s) Inhalation two times a day  calcium carbonate 1250 mG  + Vitamin D (OsCal 500 + D) 1 Tablet(s) Oral daily  cholecalciferol 2000 Unit(s) Oral daily  enoxaparin Injectable 40 milliGRAM(s) SubCutaneous every 24 hours  gabapentin 400 milliGRAM(s) Oral three times a day  influenza  Vaccine (HIGH DOSE) 0.7 milliLiter(s) IntraMuscular once  methylPREDNISolone sodium succinate Injectable 40 milliGRAM(s) IV Push two times a day  multivitamin 1 Tablet(s) Oral daily  piperacillin/tazobactam IVPB.. 3.375 Gram(s) IV Intermittent every 8 hours  polyethylene glycol 3350 17 Gram(s) Oral daily  senna 2 Tablet(s) Oral at bedtime  sodium chloride 3%  Inhalation 4 milliLiter(s) Inhalation every 6 hours  tiotropium 18 MICROgram(s) Capsule 1 Capsule(s) Inhalation daily    MEDICATIONS  (PRN):  acetaminophen     Tablet .. 650 milliGRAM(s) Oral every 6 hours PRN Temp greater or equal to 38C (100.4F), Mild Pain (1 - 3)  aluminum hydroxide/magnesium hydroxide/simethicone Suspension 30 milliLiter(s) Oral every 4 hours PRN Dyspepsia  cyclobenzaprine 5 milliGRAM(s) Oral three times a day PRN Muscle Spasm  guaiFENesin  milliGRAM(s) Oral every 12 hours PRN Cough  melatonin 3 milliGRAM(s) Oral at bedtime PRN Insomnia  morphine  - Injectable 0.5 milliGRAM(s) IV Push every 4 hours PRN shortness of breath  ondansetron Injectable 4 milliGRAM(s) IV Push every 8 hours PRN Nausea and/or Vomiting  traMADol 50 milliGRAM(s) Oral every 6 hours PRN Severe Pain (7 - 10)      EXAM:  Vital Signs Last 24 Hrs  T(C): 36.6 (20 Nov 2022 10:34), Max: 36.6 (19 Nov 2022 20:16)  T(F): 97.8 (20 Nov 2022 10:34), Max: 97.9 (19 Nov 2022 20:16)  HR: 77 (20 Nov 2022 09:25) (72 - 80)  BP: 138/78 (20 Nov 2022 10:34) (134/71 - 139/78)  BP(mean): --  RR: 19 (20 Nov 2022 10:34) (18 - 22)  SpO2: 90% (20 Nov 2022 10:34) (90% - 99%)    Parameters below as of 20 Nov 2022 09:25  Patient On (Oxygen Delivery Method): nasal cannula, high flow  O2 Flow (L/min): 60  O2 Concentration (%): 40      GENERAL: awake, alert, on high flow                            15.5   7.01  )-----------( 256      ( 20 Nov 2022 11:53 )             47.1   11-20    143  |  101  |  21  ----------------------------<  172<H>  3.6   |  31  |  0.80    Ca    9.3      20 Nov 2022 11:53  Phos  3.1     11-20  Mg     2.4     11-20    TPro  6.6  /  Alb  3.5  /  TBili  1.0  /  DBili  x   /  AST  29  /  ALT  41  /  AlkPhos  93  11-20         < from: Xray Chest 1 View- PORTABLE-Urgent (Xray Chest 1 View- PORTABLE-Urgent .) (11.16.22 @ 10:46) >    ACC: [id number] EXAM:  XR CHEST PORTABLE URGENT 1V                          PROCEDURE DATE:  11/16/2022          INTERPRETATION:  EXAMINATION: XR CHEST URGENT    CLINICAL INDICATION: Shortness of breath    TECHNIQUE: Single frontal, portable view of the chest was obtained.    COMPARISON: Chest radiograph and CT 11/13/2022.    FINDINGS:    The heart size is not well evaluated on this projection.  Hazy bibasilar opacities which are largely unchanged since radiograph on   11/13/2022.  No pneumothorax.    IMPRESSION:    Hazy bibasilar opacities likely representing small layering bilateral   pleural effusions with associated compressive atelectasis, largely   unchanged in appearance since 11/30/2022.    --- End of Report ---           JILLIAN NAZARIO DO; Resident Radiologist  This document has been electronically signed.  HEMALATHA LEONARD MD; Attending Radiologist  This document has been electronically signed. Nov 16 2022 10:46AM    < end of copied text >  < from: CT Angio Chest PE Protocol w/ IV Cont (11.14.22 @ 03:53) >    ACC: [id number] EXAM:  CT ANGIO CHEST PULM ART [facility]                          PROCEDURE DATE:  11/14/2022          INTERPRETATION:  CLINICAL INDICATION: Dyspnea    TECHNIQUE: A volumetric acquisition of the chest was obtained from the   thoracic inlet to the upper abdomen during dynamic administration of 90cc   Omnipaque 350 intravenous contrast according to the PE protocol. 3D MIP   images were provided.    COMPARISON: CT chest 10/13/2022    FINDINGS:  CTA: No pulmonary embolism.    Heart/Vasculature: The heart is enlarged in size. There is no pericardial   effusion.    Lungs/Airways/Pleura: Mild central airways secretions. Emphysema. Left   lower lobe superior segment 2.3 x 1.9 cm nodule (image 53, series 2) was   previously 1.6 x 1.5 cm on10/14/2022 CT thoracic spine and new compared   to 6/16/2019.    Patchy right apical consolidative opacity (image 34, series 2) is similar   compared to 11/13/2022 CT chest and new compared to 10/14/2022 CT   thoracic spine.    Right basilar parenchymal opacification likely represents a combination   of atelectasis and consolidation.    Small bilateral pleural effusions.    Mediastinum/Lymph nodes: Left paratracheal 3.6 x 1.3 cm node (image 51,   series 3) appears increased in size compared to 10/10/2018.. Additional   mediastinal, cardiophrenic angle, and right internal mammary nodes are   similar compared to 10/10/2018.    Upper Abdomen: Enlarged celiac nodes, similar compared to prior exams.   Subcentimeter exophytic right renal lesion unchanged    Bones and Soft Tissues: Multilevel degenerative disc disease. T12   compression fracture is redemonstrated and seen since 10/14/2022. Grade 1   anterolisthesis of C7 on T1, T1 on T2, and T2 on T3.    IMPRESSION:    No pulmonary embolism.    Patchy consolidation in the right apical region and at the right base may   represent pneumonia.    Left lower lobe 2.3 cm nodule increased in size compared to 10/14/2022   and likely represents primary lung neoplasm.    Left paratracheal 3.6 x 1.3 cmnode appears increased in size compared to   10/10/2018 and is indeterminate.    < end of copied text >    PROBLEM LIST:  89y Female with HEALTH ISSUES - PROBLEM Dx:  COPD exacerbation    Hypokalemia    Pulmonary nodule, right    Prophylactic measure    Osteoporosis    Lumbar compression fracture    Chronic systolic congestive heart failure    Hypertension    Constipation    Debility    ACP (advance care planning)    Chronic respiratory failure with hypoxia and hypercapnia    Respiratory distress           RECS:  cont symbicort/spiriva/nebs  solumedrol 30mg IV BID today  bipap during sleep and prn, high flow or nc during day wean as tolerated  I think she would benefit from diuretics  empiric abx  dvt prophylaxis  outpt fu for enlarging pulm nodule, would need PET        Please call with any questions.    Adrianne Garcia MD  [facility] Pulmonary/Sleep Medicine  [phone]

## 2022-11-20 NOTE — PROGRESS NOTE ADULT - PROBLEM SELECTOR PLAN 4
- TTE showing normal systolic function, pt says she was started on lasix for pedal edema and was told at [facility] that she has HF but was not told this before  - on 11/20 gave one dose of lasix 20 IV for faint bibasilar crackles, will have to re-assess for further doses

## 2022-11-20 NOTE — PROGRESS NOTE ADULT - NSPROGADDITIONALINFOA_GEN_ALL_CORE
d/w acp    Jayden Marte MD  [facility] Division of Hospital Medicine  Available via MS Teams  Pager: [phone]

## 2022-11-21 NOTE — PROGRESS NOTE ADULT - SUBJECTIVE AND OBJECTIVE BOX
PULMONARY PROGRESS NOTE    VIDAL LOUIE  MRN-[id number]    Patient is a 89y old  Female who presents with a chief complaint of respiratory distress (16 Nov 2022 14:33)      HPI:  on high flow 40%  says she has been vomiting "black stuff"  crying        MEDICATIONS  (STANDING):  albuterol    90 MICROgram(s) HFA Inhaler 2 Puff(s) Inhalation every 4 hours  albuterol/ipratropium for Nebulization 3 milliLiter(s) Nebulizer every 6 hours  amLODIPine   Tablet 2.5 milliGRAM(s) Oral daily  aspirin enteric coated 81 milliGRAM(s) Oral daily  budesonide 160 MICROgram(s)/formoterol 4.5 MICROgram(s) Inhaler 2 Puff(s) Inhalation two times a day  calcium carbonate 1250 mG  + Vitamin D (OsCal 500 + D) 1 Tablet(s) Oral daily  cholecalciferol 2000 Unit(s) Oral daily  enoxaparin Injectable 40 milliGRAM(s) SubCutaneous every 24 hours  gabapentin 400 milliGRAM(s) Oral three times a day  influenza  Vaccine (HIGH DOSE) 0.7 milliLiter(s) IntraMuscular once  lactated ringers. 500 milliLiter(s) (100 mL/Hr) IV Continuous <Continuous>  methylPREDNISolone sodium succinate Injectable 30 milliGRAM(s) IV Push two times a day  multivitamin 1 Tablet(s) Oral daily  piperacillin/tazobactam IVPB.. 3.375 Gram(s) IV Intermittent every 8 hours  polyethylene glycol 3350 17 Gram(s) Oral daily  senna 2 Tablet(s) Oral at bedtime  sodium chloride 3%  Inhalation 4 milliLiter(s) Inhalation every 6 hours  tiotropium 18 MICROgram(s) Capsule 1 Capsule(s) Inhalation daily    MEDICATIONS  (PRN):  acetaminophen     Tablet .. 650 milliGRAM(s) Oral every 6 hours PRN Temp greater or equal to 38C (100.4F), Mild Pain (1 - 3)  aluminum hydroxide/magnesium hydroxide/simethicone Suspension 30 milliLiter(s) Oral every 4 hours PRN Dyspepsia  cyclobenzaprine 5 milliGRAM(s) Oral three times a day PRN Muscle Spasm  guaiFENesin  milliGRAM(s) Oral every 12 hours PRN Cough  melatonin 3 milliGRAM(s) Oral at bedtime PRN Insomnia  morphine  - Injectable 0.5 milliGRAM(s) IV Push every 4 hours PRN shortness of breath  ondansetron Injectable 4 milliGRAM(s) IV Push every 8 hours PRN Nausea and/or Vomiting  traMADol 50 milliGRAM(s) Oral every 6 hours PRN Severe Pain (7 - 10)        EXAM:  Vital Signs Last 24 Hrs  T(C): 36.6 (20 Nov 2022 10:34), Max: 36.6 (19 Nov 2022 20:16)  T(F): 97.8 (20 Nov 2022 10:34), Max: 97.9 (19 Nov 2022 20:16)  HR: 77 (20 Nov 2022 09:25) (72 - 80)  BP: 138/78 (20 Nov 2022 10:34) (134/71 - 139/78)  BP(mean): --  RR: 19 (20 Nov 2022 10:34) (18 - 22)  SpO2: 90% (20 Nov 2022 10:34) (90% - 99%)    Parameters below as of 20 Nov 2022 09:25  Patient On (Oxygen Delivery Method): nasal cannula, high flow  O2 Flow (L/min): 60  O2 Concentration (%): 40      GENERAL: awake, alert, on high flow, upset                                       17.1   15.42 )-----------( 257      ( 21 Nov 2022 11:43 )             53.2   11-21    143  |  97  |  27<H>  ----------------------------<  182<H>  3.5   |  32<H>  |  0.93    Ca    9.7      21 Nov 2022 11:43  Phos  2.9     11-21  Mg     2.6     11-21    TPro  6.6  /  Alb  3.5  /  TBili  1.0  /  DBili  x   /  AST  29  /  ALT  41  /  AlkPhos  93  11-20           < from: Xray Chest 1 View- PORTABLE-Urgent (Xray Chest 1 View- PORTABLE-Urgent .) (11.16.22 @ 10:46) >    ACC: [id number] EXAM:  XR CHEST PORTABLE URGENT 1V                          PROCEDURE DATE:  11/16/2022          INTERPRETATION:  EXAMINATION: XR CHEST URGENT    CLINICAL INDICATION: Shortness of breath    TECHNIQUE: Single frontal, portable view of the chest was obtained.    COMPARISON: Chest radiograph and CT 11/13/2022.    FINDINGS:    The heart size is not well evaluated on this projection.  Hazy bibasilar opacities which are largely unchanged since radiograph on   11/13/2022.  No pneumothorax.    IMPRESSION:    Hazy bibasilar opacities likely representing small layering bilateral   pleural effusions with associated compressive atelectasis, largely   unchanged in appearance since 11/30/2022.    --- End of Report ---           JILLIAN NAZARIO DO; Resident Radiologist  This document has been electronically signed.  HEMALATHA LEONARD MD; Attending Radiologist  This document has been electronically signed. Nov 16 2022 10:46AM    < end of copied text >  < from: CT Angio Chest PE Protocol w/ IV Cont (11.14.22 @ 03:53) >    ACC: [id number] EXAM:  CT ANGIO CHEST PULM ART [facility]                          PROCEDURE DATE:  11/14/2022          INTERPRETATION:  CLINICAL INDICATION: Dyspnea    TECHNIQUE: A volumetric acquisition of the chest was obtained from the   thoracic inlet to the upper abdomen during dynamic administration of 90cc   Omnipaque 350 intravenous contrast according to the PE protocol. 3D MIP   images were provided.    COMPARISON: CT chest 10/13/2022    FINDINGS:  CTA: No pulmonary embolism.    Heart/Vasculature: The heart is enlarged in size. There is no pericardial   effusion.    Lungs/Airways/Pleura: Mild central airways secretions. Emphysema. Left   lower lobe superior segment 2.3 x 1.9 cm nodule (image 53, series 2) was   previously 1.6 x 1.5 cm on10/14/2022 CT thoracic spine and new compared   to 6/16/2019.    Patchy right apical consolidative opacity (image 34, series 2) is similar   compared to 11/13/2022 CT chest and new compared to 10/14/2022 CT   thoracic spine.    Right basilar parenchymal opacification likely represents a combination   of atelectasis and consolidation.    Small bilateral pleural effusions.    Mediastinum/Lymph nodes: Left paratracheal 3.6 x 1.3 cm node (image 51,   series 3) appears increased in size compared to 10/10/2018.. Additional   mediastinal, cardiophrenic angle, and right internal mammary nodes are   similar compared to 10/10/2018.    Upper Abdomen: Enlarged celiac nodes, similar compared to prior exams.   Subcentimeter exophytic right renal lesion unchanged    Bones and Soft Tissues: Multilevel degenerative disc disease. T12   compression fracture is redemonstrated and seen since 10/14/2022. Grade 1   anterolisthesis of C7 on T1, T1 on T2, and T2 on T3.    IMPRESSION:    No pulmonary embolism.    Patchy consolidation in the right apical region and at the right base may   represent pneumonia.    Left lower lobe 2.3 cm nodule increased in size compared to 10/14/2022   and likely represents primary lung neoplasm.    Left paratracheal 3.6 x 1.3 cmnode appears increased in size compared to   10/10/2018 and is indeterminate.    < end of copied text >    PROBLEM LIST:  89y Female with HEALTH ISSUES - PROBLEM Dx:  COPD exacerbation    Hypokalemia    Pulmonary nodule, right    Prophylactic measure    Osteoporosis    Lumbar compression fracture    Chronic systolic congestive heart failure    Hypertension    Constipation    Debility    ACP (advance care planning)    Chronic respiratory failure with hypoxia and hypercapnia    Respiratory distress           RECS:  cont symbicort/spiriva/nebs  decrease solumedrol to 20mg IV q8  start on ppi, abdominal film, GI eval?  bipap during sleep and prn, high flow or nc during day wean as tolerated  I think she would benefit from diuretics  empiric abx  dvt prophylaxis  outpt fu for enlarging pulm nodule, would need PET        Please call with any questions.    Adrianne Garcia MD  [facility]  [phone]

## 2022-11-21 NOTE — PROGRESS NOTE ADULT - PROBLEM SELECTOR PLAN 1
a/w COPD exacerbation, normally on 3LNC at home. Course c/b multiple RTT for worsening dyspnea/resp distress, placed on BiPAP, now weaned to HFNC.   - solumedrol decreased to 30mg BID on 11/20 per pulm recs   - continue with albuterol q4hr standing  - continue home spiriva and symbicort  - doing well on HFNC, continue with for now   - will stop trending daily ABG for now unless change in clinical status (RR< 12, pt unarousable)- discussed with floor NP and pulmonary  - c/w morphine 0.5mg iv q4 hours prn sob as a palliative measure, family aware and in agreement, hold for lethargy  - s/p 1 dose IV lasix on 11/20 - will c/t assess volume status, can order lasix prn   - Per son, Dr. Calderon had discussed possibly setting up home bipap for patient- discussed this with inpatient pulm, who recommends re-evaluating this as a possible option on discharge if patient improves    #Metabolic encephalopathy- pt newly delirious and confused 11/16, now improved s/p continuous BIPAP x 48 hours  - delirium likely 2/2 COPD exam/hypoxia with possible component of hospital delirium  - cont management of pna as above, resp support and frequent reorientation a/w COPD exacerbation, normally on 3LNC at home. Course c/b multiple RTT for worsening dyspnea/resp distress, placed on BiPAP, now weaned to HFNC.   - solumedrol decreased to 30mg BID on 11/20 per pulm recs --> decrease further to 20 IV q8h  - continue with albuterol q4hr standing  - continue home spiriva and symbicort  - doing well on HFNC, continue with for now   - will stop trending daily ABG for now unless change in clinical status (RR< 12, pt unarousable)- discussed with floor NP and pulmonary  - c/w morphine 0.5mg iv q4 hours prn sob as a palliative measure, family aware and in agreement, hold for lethargy  - s/p 1 dose IV lasix on 11/20 - will c/t assess volume status, can order lasix prn   - Per son, Dr. Calderon had discussed possibly setting up home bipap for patient- discussed this with inpatient pulm, who recommends re-evaluating this as a possible option on discharge if patient improves    #Metabolic encephalopathy- pt newly delirious and confused 11/16, now improved s/p continuous BIPAP x 48 hours  - delirium likely 2/2 COPD exam/hypoxia with possible component of hospital delirium  - cont management of pna as above, resp support and frequent reorientation

## 2022-11-21 NOTE — PROGRESS NOTE ADULT - SUBJECTIVE AND OBJECTIVE BOX
SUBJECTIVE AND OBJECTIVE:  Easily arousable, on hiflo, tearful, feels nauseous   Indication for Geriatrics and Palliative Care Services/INTERVAL HPI: goals of care ongoing     OVERNIGHT EVENTS:  as documented     DNR on chart:Yes  Yes      Allergies    No Known Allergies    Intolerances    MEDICATIONS  (STANDING):  albuterol    90 MICROgram(s) HFA Inhaler 2 Puff(s) Inhalation every 4 hours  albuterol/ipratropium for Nebulization 3 milliLiter(s) Nebulizer every 6 hours  amLODIPine   Tablet 2.5 milliGRAM(s) Oral daily  aspirin enteric coated 81 milliGRAM(s) Oral daily  bisacodyl Suppository 10 milliGRAM(s) Rectal once  budesonide 160 MICROgram(s)/formoterol 4.5 MICROgram(s) Inhaler 2 Puff(s) Inhalation two times a day  calcium carbonate 1250 mG  + Vitamin D (OsCal 500 + D) 1 Tablet(s) Oral daily  cholecalciferol 2000 Unit(s) Oral daily  enoxaparin Injectable 40 milliGRAM(s) SubCutaneous every 24 hours  gabapentin 400 milliGRAM(s) Oral three times a day  influenza  Vaccine (HIGH DOSE) 0.7 milliLiter(s) IntraMuscular once  lactated ringers. 500 milliLiter(s) (100 mL/Hr) IV Continuous <Continuous>  methylPREDNISolone sodium succinate Injectable 20 milliGRAM(s) IV Push every 8 hours  multivitamin 1 Tablet(s) Oral daily  pantoprazole  Injectable 40 milliGRAM(s) IV Push two times a day  piperacillin/tazobactam IVPB.. 3.375 Gram(s) IV Intermittent every 8 hours  polyethylene glycol 3350 17 Gram(s) Oral daily  senna 2 Tablet(s) Oral at bedtime  sodium chloride 3%  Inhalation 4 milliLiter(s) Inhalation every 6 hours  tiotropium 18 MICROgram(s) Capsule 1 Capsule(s) Inhalation daily    MEDICATIONS  (PRN):  acetaminophen     Tablet .. 650 milliGRAM(s) Oral every 6 hours PRN Temp greater or equal to 38C (100.4F), Mild Pain (1 - 3)  aluminum hydroxide/magnesium hydroxide/simethicone Suspension 30 milliLiter(s) Oral every 4 hours PRN Dyspepsia  cyclobenzaprine 5 milliGRAM(s) Oral three times a day PRN Muscle Spasm  guaiFENesin  milliGRAM(s) Oral every 12 hours PRN Cough  melatonin 3 milliGRAM(s) Oral at bedtime PRN Insomnia  morphine  - Injectable 0.5 milliGRAM(s) IV Push every 4 hours PRN shortness of breath  ondansetron Injectable 4 milliGRAM(s) IV Push every 8 hours PRN Nausea and/or Vomiting  traMADol 50 milliGRAM(s) Oral every 6 hours PRN Severe Pain (7 - 10)      ITEMS UNCHECKED ARE NOT PRESENT    PRESENT SYMPTOMS: [ ]Unable to self-report - see [ ] CPOT [ ] PAINADS [ ] RDOS  Source if other than patient:  [ ]Family   [ ]Team     Pain:  [ ]yes [ x]no  QOL impact -   Location -                    Aggravating factors -  Quality -  Radiation -  Timing-  Severity (0-10 scale):  Minimal acceptable level (0-10 scale):     CPOT:    https://www.[email or web address]/getattachment/[email or web address]/Critical-Care-Pain-Observation-Tool-(CPOT)    PAIN AD Score:	  http://geriatrictoolkit.[email or web address]/painad.pdf (Ctrl + left click to view)    Dyspnea:                           [ ]Mild [ ]Moderate [ ]Severe    RDOS:  0 to 2  minimal or no respiratory distress   3  mild distress  4 to 6 moderate distress  5  >7 severe distress  https://[email or web address]/handouts/hen/Respiratory_Distress_Observation_Scale.pdf (Ctrl +  left click to view)     Anxiety:                             [ ]Mild [ x]Moderate [x ]Severe  Fatigue:                             [ ]Mild [ ]Moderate [x ]Severe  Nausea:                             [ ]Mild [x ]Moderate [ ]Severe  Loss of appetite:              [ ]Mild [ x]Moderate [ ]Severe  Constipation:                    [ ]Mild [ ]Moderate [ ]Severe    PCSSQ[Palliative Care Spiritual Screening Question]   Severity (0-10):0  Score of 4 or > indicate consideration of Chaplaincy referral.  Chaplaincy Referral: [ ] yes [ ] refused [ ] following    Caregiver [place]? : [ ] yes [ ] no  Social work referral [ ] Patient & Family Centered Care Referral [ ]     Anticipatory Grief present?:  [ ] yes [ ] no  Social work referral [ ] Patient & Family Centered Care Referral [ ]      Other Symptoms:  [x ]All other review of systems negative     Palliative Performance Status Version 2:  30       %      http://[email or web address]/files/news/palliative_performance_scale_[email or web address].pdf  PHYSICAL EXAM:  Vital Signs Last 24 Hrs  T(C): 36.3 (21 Nov 2022 12:21), Max: 37.4 (20 Nov 2022 17:54)  T(F): 97.3 (21 Nov 2022 12:21), Max: 99.3 (20 Nov 2022 17:54)  HR: 91 (21 Nov 2022 12:21) (75 - 91)  BP: 141/84 (21 Nov 2022 12:21) (141/84 - 183/83)  BP(mean): --  RR: 20 (21 Nov 2022 12:21) (20 - 20)  SpO2: 93% (21 Nov 2022 12:21) (90% - 94%)    Parameters below as of 21 Nov 2022 12:21  Patient On (Oxygen Delivery Method): nasal cannula, high flow  O2 Flow (L/min): 60  O2 Concentration (%): 40 I&O's Summary    20 Nov 2022 07:01  -  21 Nov 2022 07:00  --------------------------------------------------------  IN: 0 mL / OUT: 800 mL / NET: -800 mL    21 Nov 2022 07:01  -  21 Nov 2022 15:25  --------------------------------------------------------  IN: 200 mL / OUT: 0 mL / NET: 200 mL       GENERAL: [ ]Cachexia    [x ]Alert  [x ]Oriented x 3  [ ]Lethargic  [ ]Unarousable  [x ]Verbal  [ ]Non-Verbal  Behavioral:   x[ ]Anxiety  [ ]Delirium [ ]Agitation [ ]Other  HEENT:  [ ]Normal   [ x]Dry mouth   [ ]ET Tube/Trach  [ ]Oral lesions  PULMONARY:   [ ]Clear x[ ]Tachypnea  [ ]Audible excessive secretions   [ ]Rhonchi        [ ]Right [ ]Left [ ]Bilateral  [ ]Crackles        [ ]Right [ ]Left [ ]Bilateral  [x ]Wheezing     [ ]Right [ ]Left [ ]Bilateral  [ ]Diminished BS [ ] Right [ ]Left [ ]Bilateral  CARDIOVASCULAR:    [ ]Regular [x ]Irregular [x ]Tachy  [ ]Reece [ ]Murmur [ ]Other  GASTROINTESTINAL:  [ ]Soft  [x ]Distended   [x ]+BS  [ ]Non tender [ ]Tender  [ ]Other [ ]PEG [ ]OGT/ NGT   Last BM:   GENITOURINARY:  [ ]Normal [x ]Incontinent   [ ]Oliguria/Anuria   [ ][place]  MUSCULOSKELETAL:   [ ]Normal   [x ]Weakness  [x ]Bed/Wheelchair bound [ ]Edema  NEUROLOGIC:   [ ]No focal deficits  [ ] Cognitive impairment  [ ] Dysphagia [ ]Dysarthria [ ] Paresis [ ]Other   SKIN:   [x ]Normal  [ ]Rash  [ ]Other  [ ]Pressure ulcer(s) [ ]y [ ]n present on admission    CRITICAL CARE:  [ ]Shock Present  [ ]Septic [ ]Cardiogenic [ ]Neurologic [ ]Hypovolemic  [ ]Vasopressors [ ]Inotropes  [x ]Respiratory failure present [ ]Mechanical Ventilation [x ]Non-invasive ventilatory support [ ]High-Flow   [x ]Acute  [ ]Chronic [ ]Hypoxic  [ ]Hypercarbic [ ]Other  [ ]Other organ failure     LABS:                        17.1   15.42 )-----------( 257      ( 21 Nov 2022 11:43 )             53.2   11-21    143  |  97  |  27<H>  ----------------------------<  182<H>  3.5   |  32<H>  |  0.93    Ca    9.7      21 Nov 2022 11:43  Phos  2.9     11-21  Mg     2.6     11-21    TPro  6.6  /  Alb  3.5  /  TBili  1.0  /  DBili  x   /  AST  29  /  ALT  41  /  AlkPhos  93  11-20        RADIOLOGY & ADDITIONAL STUDIES:    < from: Xray Chest 1 View- PORTABLE-Urgent (Xray Chest 1 View- PORTABLE-Urgent .) (11.16.22 @ 10:46) >    ACC: [id number] EXAM:  XR CHEST PORTABLE URGENT 1V                          PROCEDURE DATE:  11/16/2022          INTERPRETATION:  EXAMINATION: XR CHEST URGENT    CLINICAL INDICATION: Shortness of breath    TECHNIQUE: Single frontal, portable view of the chest was obtained.    COMPARISON: Chest radiograph and CT 11/13/2022.    FINDINGS:    The heart size is not well evaluated on this projection.  Hazy bibasilar opacities which are largely unchanged since radiograph on   11/13/2022.  No pneumothorax.    IMPRESSION:    Hazy bibasilar opacities likely representing small layering bilateral   pleural effusions with associated compressive atelectasis, largely   unchanged in appearance since 11/30/2022.    --- End of Report ---      < end of copied text >      Protein Calorie Malnutrition Present: [ ]mild [x ]moderate [ ]severe [ ]underweight [ ]morbid obesity  https://www.andeal.org/vault/2440/web/files/ONC/Table_Clinical%20Characteristics%20to%20Document%20Malnutrition-White%20JV%20et%20al%202012.pdf    Height (cm): 162.6 (11-13-22 @ 17:57), 162.6 (10-10-22 @ 09:25)  Weight (kg): 93 (10-10-22 @ 09:25)  BMI (kg/m2): 35.2 (11-13-22 @ 17:57), 35.2 (10-10-22 @ 09:25)    [ ]PPSV2 < or = 30%  [ ]significant weight loss [ ]poor nutritional intake [ ]anasarca[ ]Artificial Nutrition    Other REFERRALS:  [ ]Hospice  [ ]Child Life  [ ]Social Work  [ ]Case management [ ]Holistic Therapy

## 2022-11-21 NOTE — PROGRESS NOTE ADULT - SUBJECTIVE AND OBJECTIVE BOX
[facility] Division of Hospital Medicine  René Ramirez MD  Available via MS Teams    SUBJECTIVE / OVERNIGHT EVENTS:  pt endorsing nausea and vomiting, intermittent since yest; this morning c/o nausea, no vomiting, did not eat breakfast  states SOB is stable, no worsening cough  no CP, no abd pain    ADDITIONAL REVIEW OF SYSTEMS:  14 point ROS negative except as noted above     MEDICATIONS  (STANDING):  albuterol    90 MICROgram(s) HFA Inhaler 2 Puff(s) Inhalation every 4 hours  albuterol/ipratropium for Nebulization 3 milliLiter(s) Nebulizer every 6 hours  amLODIPine   Tablet 2.5 milliGRAM(s) Oral daily  aspirin enteric coated 81 milliGRAM(s) Oral daily  budesonide 160 MICROgram(s)/formoterol 4.5 MICROgram(s) Inhaler 2 Puff(s) Inhalation two times a day  calcium carbonate 1250 mG  + Vitamin D (OsCal 500 + D) 1 Tablet(s) Oral daily  cholecalciferol 2000 Unit(s) Oral daily  enoxaparin Injectable 40 milliGRAM(s) SubCutaneous every 24 hours  gabapentin 400 milliGRAM(s) Oral three times a day  influenza  Vaccine (HIGH DOSE) 0.7 milliLiter(s) IntraMuscular once  methylPREDNISolone sodium succinate Injectable 30 milliGRAM(s) IV Push two times a day  multivitamin 1 Tablet(s) Oral daily  piperacillin/tazobactam IVPB.. 3.375 Gram(s) IV Intermittent every 8 hours  polyethylene glycol 3350 17 Gram(s) Oral daily  senna 2 Tablet(s) Oral at bedtime  sodium chloride 3%  Inhalation 4 milliLiter(s) Inhalation every 6 hours  tiotropium 18 MICROgram(s) Capsule 1 Capsule(s) Inhalation daily    MEDICATIONS  (PRN):  acetaminophen     Tablet .. 650 milliGRAM(s) Oral every 6 hours PRN Temp greater or equal to 38C (100.4F), Mild Pain (1 - 3)  aluminum hydroxide/magnesium hydroxide/simethicone Suspension 30 milliLiter(s) Oral every 4 hours PRN Dyspepsia  cyclobenzaprine 5 milliGRAM(s) Oral three times a day PRN Muscle Spasm  guaiFENesin  milliGRAM(s) Oral every 12 hours PRN Cough  melatonin 3 milliGRAM(s) Oral at bedtime PRN Insomnia  morphine  - Injectable 0.5 milliGRAM(s) IV Push every 4 hours PRN shortness of breath  ondansetron Injectable 4 milliGRAM(s) IV Push every 8 hours PRN Nausea and/or Vomiting  traMADol 50 milliGRAM(s) Oral every 6 hours PRN Severe Pain (7 - 10)      I&O's Summary    20 Nov 2022 07:01  -  21 Nov 2022 07:00  --------------------------------------------------------  IN: 0 mL / OUT: 800 mL / NET: -800 mL    21 Nov 2022 07:01  -  21 Nov 2022 13:57  --------------------------------------------------------  IN: 200 mL / OUT: 0 mL / NET: 200 mL        PHYSICAL EXAM:  Vital Signs Last 24 Hrs  T(C): 36.3 (21 Nov 2022 12:21), Max: 37.4 (20 Nov 2022 17:54)  T(F): 97.3 (21 Nov 2022 12:21), Max: 99.3 (20 Nov 2022 17:54)  HR: 91 (21 Nov 2022 12:21) (75 - 91)  BP: 141/84 (21 Nov 2022 12:21) (141/84 - 183/83)  BP(mean): --  RR: 20 (21 Nov 2022 12:21) (20 - 20)  SpO2: 93% (21 Nov 2022 12:21) (90% - 94%)    Parameters below as of 21 Nov 2022 12:21  Patient On (Oxygen Delivery Method): nasal cannula, high flow  O2 Flow (L/min): 60  O2 Concentration (%): 40    PHYSICAL EXAM:  GENERAL: NAD, chronically ill appearing   HEAD:  Atraumatic, normocephalic  EYES: EOMI, conjunctiva and sclera clear  NECK: Supple, no JVD  CHEST/LUNG: faint crackles to bases, on HFNC at 40%   HEART: Regular rate and rhythm; no murmurs, no LE edema   ABDOMEN: Soft, nontender, nondistended; bowel sounds present  EXTREMITIES:  2+ Peripheral Pulses, no clubbing, cyanosis  PSYCH: AAOx3, calm affect, not anxious    LABS:                        17.1   15.42 )-----------( 257      ( 21 Nov 2022 11:43 )             53.2     11-21    143  |  97  |  27<H>  ----------------------------<  182<H>  3.5   |  32<H>  |  0.93    Ca    9.7      21 Nov 2022 11:43  Phos  2.9     11-21  Mg     2.6     11-21    TPro  6.6  /  Alb  3.5  /  TBili  1.0  /  DBili  x   /  AST  29  /  ALT  41  /  AlkPhos  93  11-20              SARS-CoV-2: NotDetec (13 Nov 2022 18:49)  COVID-19 PCR: NotDetec (21 Oct 2022 12:06)  COVID-19 PCR: NotDetec (17 Oct 2022 06:15)  COVID-19 PCR: NotDetec (10 Oct 2022 16:13)      RADIOLOGY & ADDITIONAL TESTS:  New Imaging Personally Reviewed Today:  New Electrocardiogram Personally Reviewed Today:  Other Results Reviewed Today:   Prior or Outpatient Records Reviewed Today with Summary:    COORDINATION OF CARE:  Consultant Communication and Details of Discussion (where applicable):

## 2022-11-21 NOTE — PROGRESS NOTE ADULT - PROBLEM SELECTOR PLAN 1
S/P RRT for worsening respiratory status.  Stabilized on bipap  Increased delirium likely due to hypoxic resp distress  As d/w Dr Luz:    Start Morphine 0.5mg iVP q 4 hour prn for dyspnea;  Last given on 11/16  Consider Ativan IVP 0.5mg q 4 hours prn for anxiety associated with poor air exchange

## 2022-11-21 NOTE — PROGRESS NOTE ADULT - PROBLEM SELECTOR PLAN 4
Increased in size compared to prior scan in Oct 2022  - Pt was informed of this CT finding  - Emailed Dr. Sheets for collateral and to update on most recent CT  -Appreciate pulm recs  -Pt would consider surgery if offered however pt is very poor surgical candidate  - discussed this with family who understand workup likely needed as OP as patient improves from acute episode pt with nausea/vomiting since yesterday, unclear etiology - ?gastritis i/s/o high dose steroids vs constipation induced   - will start PPI now  - symptom control with zofran   - check abd XR for now   - if symptoms persist, will consult GI and consider CT A/P

## 2022-11-21 NOTE — PROGRESS NOTE ADULT - PROBLEM SELECTOR PLAN 2
CT scan on 11/14 showing pathcy consolidation in R lung; given worsening respiratory, c/f likely gram negative PNA   - clinically improving, weaned from BiPAP to HFNC at 40%   - c/w zosyn x 7 day course   - legionella, RVP negative   - c/w steroids, nebs as above CT scan on 11/14 showing pathcy consolidation in R lung; given worsening respiratory, c/f likely gram negative PNA   - clinically improving, weaned from BiPAP to HFNC at 40%   - c/w zosyn x 7 day course   - legionella, RVP negative   - c/w steroids, nebs as above    - pt with worsening leukocytosis today, but suspect this is 2/2 dehydration after lasix yest  - will give IVF as below and c/t trend; if remains persistent, will consider repeat CT imaging

## 2022-11-22 NOTE — PROGRESS NOTE ADULT - SUBJECTIVE AND OBJECTIVE BOX
[facility] Division of Hospital Medicine  René Ramirez MD  Available via MS Teams    SUBJECTIVE / OVERNIGHT EVENTS:  no further nausea/vomiting since yest  however, endorsing epigastric burning pain radiating up her esophagus, started after eating breakfast   improving SOB, no cough, had a BM last night     ADDITIONAL REVIEW OF SYSTEMS:  14 point ROS negative except as noted above     MEDICATIONS  (STANDING):  albuterol    90 MICROgram(s) HFA Inhaler 2 Puff(s) Inhalation every 4 hours  albuterol/ipratropium for Nebulization 3 milliLiter(s) Nebulizer every 6 hours  amLODIPine   Tablet 2.5 milliGRAM(s) Oral daily  aspirin enteric coated 81 milliGRAM(s) Oral daily  budesonide 160 MICROgram(s)/formoterol 4.5 MICROgram(s) Inhaler 2 Puff(s) Inhalation two times a day  calcium carbonate 1250 mG  + Vitamin D (OsCal 500 + D) 1 Tablet(s) Oral daily  cholecalciferol 2000 Unit(s) Oral daily  enoxaparin Injectable 40 milliGRAM(s) SubCutaneous every 24 hours  famotidine    Tablet 20 milliGRAM(s) Oral two times a day  famotidine Injectable 20 milliGRAM(s) IV Push once  gabapentin 400 milliGRAM(s) Oral three times a day  influenza  Vaccine (HIGH DOSE) 0.7 milliLiter(s) IntraMuscular once  lactated ringers. 500 milliLiter(s) (75 mL/Hr) IV Continuous <Continuous>  methylPREDNISolone sodium succinate Injectable 20 milliGRAM(s) IV Push every 8 hours  multivitamin 1 Tablet(s) Oral daily  pantoprazole  Injectable 40 milliGRAM(s) IV Push two times a day  piperacillin/tazobactam IVPB.. 3.375 Gram(s) IV Intermittent every 8 hours  polyethylene glycol 3350 17 Gram(s) Oral daily  senna 2 Tablet(s) Oral at bedtime  sodium chloride 3%  Inhalation 4 milliLiter(s) Inhalation every 6 hours  sucralfate suspension 1 Gram(s) Oral four times a day  tiotropium 18 MICROgram(s) Capsule 1 Capsule(s) Inhalation daily    MEDICATIONS  (PRN):  acetaminophen     Tablet .. 650 milliGRAM(s) Oral every 6 hours PRN Temp greater or equal to 38C (100.4F), Mild Pain (1 - 3)  aluminum hydroxide/magnesium hydroxide/simethicone Suspension 30 milliLiter(s) Oral every 4 hours PRN Dyspepsia  aluminum hydroxide/magnesium hydroxide/simethicone Suspension 30 milliLiter(s) Oral every 6 hours PRN Dyspepsia  cyclobenzaprine 5 milliGRAM(s) Oral three times a day PRN Muscle Spasm  guaiFENesin  milliGRAM(s) Oral every 12 hours PRN Cough  melatonin 3 milliGRAM(s) Oral at bedtime PRN Insomnia  morphine  - Injectable 0.5 milliGRAM(s) IV Push every 4 hours PRN shortness of breath  ondansetron Injectable 4 milliGRAM(s) IV Push every 8 hours PRN Nausea and/or Vomiting      I&O's Summary    21 Nov 2022 07:01  -  22 Nov 2022 07:00  --------------------------------------------------------  IN: 320 mL / OUT: 150 mL / NET: 170 mL        PHYSICAL EXAM:  Vital Signs Last 24 Hrs  T(C): 36.7 (22 Nov 2022 11:14), Max: 36.7 (22 Nov 2022 11:14)  T(F): 98.1 (22 Nov 2022 11:14), Max: 98.1 (22 Nov 2022 11:14)  HR: 83 (22 Nov 2022 11:14) (74 - 95)  BP: 148/91 (22 Nov 2022 11:14) (128/71 - 148/91)  BP(mean): --  RR: 18 (22 Nov 2022 11:14) (18 - 22)  SpO2: 91% (22 Nov 2022 11:14) (91% - 95%)    Parameters below as of 22 Nov 2022 11:14  Patient On (Oxygen Delivery Method): nasal cannula, high flow  O2 Flow (L/min): 60  O2 Concentration (%): 40    PHYSICAL EXAM:  GENERAL: NAD, well-developed  HEAD:  Atraumatic, normocephalic  EYES: EOMI, conjunctiva and sclera clear  NECK: Supple, no JVD, on HFNC   CHEST/LUNG: grossly CTA, no wheezing  HEART: Regular rate and rhythm; no murmurs, no LE edema   ABDOMEN: Soft, nontender, nondistended; bowel sounds present  EXTREMITIES:  2+ Peripheral Pulses, no clubbing, cyanosis  PSYCH: alert and awake, calm affect, not anxious    LABS:                        16.1   12.30 )-----------( 271      ( 22 Nov 2022 07:16 )             50.5     11-22    142  |  99  |  31<H>  ----------------------------<  136<H>  3.5   |  33<H>  |  1.05    Ca    9.4      22 Nov 2022 07:14  Phos  3.3     11-22  Mg     2.4     11-22                SARS-CoV-2: NotDetec (13 Nov 2022 18:49)  COVID-19 PCR: NotDetec (21 Oct 2022 12:06)  COVID-19 PCR: NotDetec (17 Oct 2022 06:15)  COVID-19 PCR: NotDetec (10 Oct 2022 16:13)      RADIOLOGY & ADDITIONAL TESTS:  New Imaging Personally Reviewed Today:  New Electrocardiogram Personally Reviewed Today:  Other Results Reviewed Today:   Prior or Outpatient Records Reviewed Today with Summary:    COORDINATION OF CARE:  Consultant Communication and Details of Discussion (where applicable):

## 2022-11-22 NOTE — PROGRESS NOTE ADULT - PROBLEM SELECTOR PLAN 2
CT scan on 11/14 showing pathcy consolidation in R lung; given worsening respiratory, c/f likely gram negative PNA   - clinically improving, weaned from BiPAP to HFNC at 40%   - c/w zosyn x 7 day course   - legionella, RVP negative   - c/w steroids, nebs as above    - leukocytosis improving after small IVF yesterday, will c/w IVF as below and c/t trend; if remains persistent, will consider repeat CT imaging

## 2022-11-22 NOTE — PROGRESS NOTE ADULT - PROBLEM SELECTOR PLAN 1
a/w COPD exacerbation, normally on 3LNC at home. Course c/b multiple RTT for worsening dyspnea/resp distress, placed on BiPAP, now weaned to HFNC.   - solumedrol decreased to 20 IV q8h, f/u further pulm recs   - continue with albuterol q4hr standing  - continue home spiriva and symbicort  - doing well on HFNC, continue with for now   - will stop trending daily ABG for now unless change in clinical status (RR< 12, pt unarousable)- discussed with floor NP and pulmonary  - c/w morphine 0.5mg iv q4 hours prn sob as a palliative measure, family aware and in agreement, hold for lethargy  - s/p 1 dose IV lasix on 11/20 - will c/t assess volume status, can order lasix prn   - Per son, Dr. Calderon had discussed possibly setting up home bipap for patient- discussed this with inpatient pulm, who recommends re-evaluating this as a possible option on discharge if patient improves    #Metabolic encephalopathy- pt newly delirious and confused 11/16, now improved s/p continuous BIPAP x 48 hours  - delirium likely 2/2 COPD exam/hypoxia with possible component of hospital delirium  - cont management of pna as above, resp support and frequent reorientation

## 2022-11-22 NOTE — CHART NOTE - NSCHARTNOTEFT_GEN_A_CORE
Patient remains alert and able to verbalize needs.  Remains on hiflo.  Patient and family wish to continue medical management .  MOLST in chart:  DNR/DNI maintained per patient.   Palliative will sign off, please reconsult with any acute needs.   Please include any known or completed advance care planning (e.g., MOLST, DNR, DNI, Full Code, no artificial nutrition, no HD, health care proxy form with name of decision makers, and other decisions made by the patient, health care proxy or surrogate) in the patient’s discharge summary. Thank you.

## 2022-11-22 NOTE — PROGRESS NOTE ADULT - SUBJECTIVE AND OBJECTIVE BOX
PULMONARY PROGRESS NOTE    VIDAL LOUIE  MRN-[id number]    Patient is a 89y old  Female who presents with a chief complaint of respiratory distress (21 Nov 2022 15:25)      HPI:  -remains on HFNC 40% and 60L- pulse ox 91%  just had a productive cough spell  otherwise feels ok  -    ROS:   -    ACTIVE MEDICATION LIST:  MEDICATIONS  (STANDING):  albuterol    90 MICROgram(s) HFA Inhaler 2 Puff(s) Inhalation every 4 hours  albuterol/ipratropium for Nebulization 3 milliLiter(s) Nebulizer every 6 hours  amLODIPine   Tablet 2.5 milliGRAM(s) Oral daily  aspirin enteric coated 81 milliGRAM(s) Oral daily  budesonide 160 MICROgram(s)/formoterol 4.5 MICROgram(s) Inhaler 2 Puff(s) Inhalation two times a day  calcium carbonate 1250 mG  + Vitamin D (OsCal 500 + D) 1 Tablet(s) Oral daily  cholecalciferol 2000 Unit(s) Oral daily  enoxaparin Injectable 40 milliGRAM(s) SubCutaneous every 24 hours  famotidine    Tablet 20 milliGRAM(s) Oral two times a day  famotidine Injectable 20 milliGRAM(s) IV Push once  gabapentin 400 milliGRAM(s) Oral three times a day  influenza  Vaccine (HIGH DOSE) 0.7 milliLiter(s) IntraMuscular once  lactated ringers. 500 milliLiter(s) (75 mL/Hr) IV Continuous <Continuous>  methylPREDNISolone sodium succinate Injectable 20 milliGRAM(s) IV Push every 8 hours  multivitamin 1 Tablet(s) Oral daily  pantoprazole  Injectable 40 milliGRAM(s) IV Push two times a day  piperacillin/tazobactam IVPB.. 3.375 Gram(s) IV Intermittent every 8 hours  polyethylene glycol 3350 17 Gram(s) Oral daily  senna 2 Tablet(s) Oral at bedtime  sodium chloride 3%  Inhalation 4 milliLiter(s) Inhalation every 6 hours  sucralfate suspension 1 Gram(s) Oral four times a day  tiotropium 18 MICROgram(s) Capsule 1 Capsule(s) Inhalation daily    MEDICATIONS  (PRN):  acetaminophen     Tablet .. 650 milliGRAM(s) Oral every 6 hours PRN Temp greater or equal to 38C (100.4F), Mild Pain (1 - 3)  aluminum hydroxide/magnesium hydroxide/simethicone Suspension 30 milliLiter(s) Oral every 4 hours PRN Dyspepsia  aluminum hydroxide/magnesium hydroxide/simethicone Suspension 30 milliLiter(s) Oral every 6 hours PRN Dyspepsia  cyclobenzaprine 5 milliGRAM(s) Oral three times a day PRN Muscle Spasm  guaiFENesin  milliGRAM(s) Oral every 12 hours PRN Cough  melatonin 3 milliGRAM(s) Oral at bedtime PRN Insomnia  morphine  - Injectable 0.5 milliGRAM(s) IV Push every 4 hours PRN shortness of breath  ondansetron Injectable 4 milliGRAM(s) IV Push every 8 hours PRN Nausea and/or Vomiting      EXAM:  Vital Signs Last 24 Hrs  T(C): 36.7 (22 Nov 2022 11:14), Max: 36.7 (22 Nov 2022 11:14)  T(F): 98.1 (22 Nov 2022 11:14), Max: 98.1 (22 Nov 2022 11:14)  HR: 83 (22 Nov 2022 11:14) (74 - 95)  BP: 148/91 (22 Nov 2022 11:14) (128/71 - 148/91)  BP(mean): --  RR: 18 (22 Nov 2022 11:14) (18 - 22)  SpO2: 91% (22 Nov 2022 11:14) (91% - 95%)    Parameters below as of 22 Nov 2022 11:14  Patient On (Oxygen Delivery Method): nasal cannula, high flow  O2 Flow (L/min): 60  O2 Concentration (%): 40    GENERAL: The patient is awake and alert in no apparent distress.     LUNGS: Clear to auscultation without wheezing, rales or rhonchi; respirations unlabored                             16.1   12.30 )-----------( 271      ( 22 Nov 2022 07:16 )             50.5       11-22    142  |  99  |  31<H>  ----------------------------<  136<H>  3.5   |  33<H>  |  1.05    Ca    9.4      22 Nov 2022 07:14  Phos  3.3     11-22  Mg     2.4     11-22   < from: Xray Chest 1 View- PORTABLE-Urgent (Xray Chest 1 View- PORTABLE-Urgent .) (11.16.22 @ 10:46) >    ACC: [id number] EXAM:  XR CHEST PORTABLE URGENT 1V                          PROCEDURE DATE:  11/16/2022          INTERPRETATION:  EXAMINATION: XR CHEST URGENT    CLINICAL INDICATION: Shortness of breath    TECHNIQUE: Single frontal, portable view of the chest was obtained.    COMPARISON: Chest radiograph and CT 11/13/2022.    FINDINGS:    The heart size is not well evaluated on this projection.  Hazy bibasilar opacities which are largely unchanged since radiograph on   11/13/2022.  No pneumothorax.    IMPRESSION:    Hazy bibasilar opacities likely representing small layering bilateral   pleural effusions with associated compressive atelectasis, largely   unchanged in appearance since 11/30/2022.    --- End of Report ---           JILLIAN NAZARIO DO; Resident Radiologist  This document has been electronically signed.  HEMALATHA LEONARD MD; Attending Radiologist  This document has been electronically signed. Nov 16 2022 10:46AM    < end of copied text >  < from: Xray Abdomen 1 View PORTABLE -Routine (Xray Abdomen 1 View PORTABLE -Routine .) (11.21.22 @ 15:42) >    ACC: [id number] EXAM:  XR ABDOMEN PORTABLE ROUTINE 1V                          PROCEDURE DATE:  11/21/2022          INTERPRETATION:  CLINICAL INFORMATION: Vomiting, assess for obstruction    EXAM: Single frontal view of the abdomen.    COMPARISON: Abdominal x-ray 3/5/2018    FINDINGS:  Nonobstructive bowel gas pattern. Mild stool burden and gas is noted at   the rectal vault.  There is no evidence of intraperitoneal free air.  Multilevel degenerative changes are seen in the lumbar spine. Left   femoral surgical hardware is present. Bilateral acetabular osteoarthritic   changes.  An IVC filter projects over the right lower quadrant.    IMPRESSION:  Nonobstructive bowel gas pattern.    --- End of Report ---           CLARICE WASHINGTON MD; Resident Radiologist  This document has been electronically signed.  KIMBER TO MD; Attending Radiologist  This document has been electronically signed. Nov 21 2022 11:37PM    < end of copied text >  < from: CT Angio Chest PE Protocol w/ IV Cont (11.14.22 @ 03:53) >        INTERPRETATION:  CLINICAL INDICATION: Dyspnea    TECHNIQUE: A volumetric acquisition of the chest was obtained from the   thoracic inlet to the upper abdomen during dynamic administration of 90cc   Omnipaque 350 intravenous contrast according to the PE protocol. 3D MIP   images were provided.    COMPARISON: CT chest 10/13/2022    FINDINGS:  CTA: No pulmonary embolism.    Heart/Vasculature: The heart is enlarged in size. There is no pericardial   effusion.    Lungs/Airways/Pleura: Mild central airways secretions. Emphysema. Left   lower lobe superior segment 2.3 x 1.9 cm nodule (image 53, series 2) was   previously 1.6 x 1.5 cm on10/14/2022 CT thoracic spine and new compared   to 6/16/2019.    Patchy right apical consolidative opacity (image 34, series 2) is similar   compared to 11/13/2022 CT chest and new compared to 10/14/2022 CT   thoracic spine.    Right basilar parenchymal opacification likely represents a combination   of atelectasis and consolidation.    Small bilateral pleural effusions.    Mediastinum/Lymph nodes: Left paratracheal 3.6 x 1.3 cm node (image 51,   series 3) appears increased in size compared to 10/10/2018.. Additional   mediastinal, cardiophrenic angle, and right internal mammary nodes are   similar compared to 10/10/2018.    Upper Abdomen: Enlarged celiac nodes, similar compared to prior exams.   Subcentimeter exophytic right renal lesion unchanged    Bones and Soft Tissues: Multilevel degenerative disc disease. T12   compression fracture is redemonstrated and seen since 10/14/2022. Grade 1   anterolisthesis of C7 on T1, T1 on T2, and T2 on T3.    IMPRESSION:    No pulmonary embolism.    Patchy consolidation in the right apical region and at the right base may   represent pneumonia.    Left lower lobe 2.3 cm nodule increased in size compared to 10/14/2022   and likely represents primary lung neoplasm.    Left paratracheal 3.6 x 1.3 cmnode appears increased in size compared to   10/10/2018 and is indeterminate.    --- End of Report ---          YANDY VALE MD; Resident Radiologist  This document has been electronically signed.   BETTY SÁNCHEZ MD; Attending Radiologist  This document has been electronically signed. Nov 14 2022  9:23AM    < end of copied text >    PROBLEM LIST:  89y Female with HEALTH ISSUES - PROBLEM Dx:  COPD exacerbation    Pulmonary nodule, right    Prophylactic measure    Osteoporosis    Lumbar compression fracture    Chronic systolic congestive heart failure    Hypertension    Constipation    Debility    ACP (advance care planning)    Chronic respiratory failure with hypoxia and hypercapnia    Respiratory distress    Gram-negative pneumonia    ISRAEL (acute kidney injury)    Nausea and vomiting              RECS:  cont symbicort/spiriva/nebs  solumedrol to 20mg IV q8 today with plan to switch to prednisone 40mg on 11/23  bipap during sleep and prn, high flow or nc during day wean as tolerated  consider repeat xray  empiric abx  dvt prophylaxis  outpt fu for enlarging pulm nodule, would need PET      Please call with any questions.    Taniya Lopez, DO  [facility]monary/Sleep [facility]  [phone]

## 2022-11-22 NOTE — PROGRESS NOTE ADULT - PROBLEM SELECTOR PLAN 4
pt with nausea/vomiting yest and today with epigastric burning pain after eating - suspect gastritis/GERD 2/2 steroid use. AXR showing non obstructive bowel pattern    - c/w PPI BID for now   - symptom control with zofran   - add on pepcid BID and sucralfate QID   - if symptoms not improving, will consider GI consult

## 2022-11-23 NOTE — CHART NOTE - NSCHARTNOTEFT_GEN_A_CORE
Nutrition Follow Up Note  Patient seen for: Nutrition follow up   Chart reviewed, events noted.     Per chart, "89 year old female with PMH of HTN, COPD (on 3L home O2 at night), chronic bronchitis, CHFrEF (EF 40% in Dec 2018), recent admission to [facility] 10/22 for acute lumbar compression fracture who presents from [facility] for eval of SOB, secondary to COPD exacerbation with RRT on 11/14 and again 11/16 for worsening hypoxia possibly 2/2 superimposed PNA now on both steroids and abx with increasing WOB now weaned off BIPAP for HFNC, status still tenuous, GOC conversations ongoing."    Source: [x] Patient       [x] Medical Record        [] RN        [] Family at bedside       [] Other:    Diet Order:   Diet, Regular:   Low Sodium (11-19-22)    - Is current order appropriate/adequate? [x] Yes  []  No:     - PO intake :   [] >75%  Adequate    [] 50-75%  Fair       [x] <50%  Poor    - Nutrition-related concerns:      - Pt reports poor appetite and PO intake. Is currently ordered for a low sodium diet. Per flow sheets PO intake 0-25%.       - Food preferences obtained, will honor as able.       - Is currently on High flow nasal cannula.       - Pt ordered for Deltasone. Glucose 136 mg/dl 11/23.       - Ordered for Lactated Ringers.      - Pt is ordered for Calcium Carbonate+ Vitamin D, multivitamin, Vitamin D3.      - Per chart, "new ISRAEL 11/17, likely 2/2 poor sylvester intake and pre-renal dehydration."     GI:  Last BM 11/22 . Pt confirms nausea and vomiting. Is currently ordered for Zofran.  Pt confirms constipation resolved.  Bowel Regimen? [x] Yes  Miralax and Senna  [] No      Weights:   10/11/22: 205 pounds (93 kg)  Dosing weight: Not indicated   Daily: none documented  Per [facility] HIE: 106.6 kg (06/17/2020)  No weight loss noted in house, will continue to monitor weight trend as able.     Nutrition focused physical exam conducted with pt consent:  Subcutaneous fat loss: [mod ] Orbital fat pads region, [ mod]Buccal fat region, [mod]Triceps region,  [mod ]Ribs region.  Muscle wasting: [mod ]Temples region, [ mod]Clavicle region, [ mod]Shoulder region, [ ]Scapula region, [ ]Interosseous region,  [ ]thigh region, [ ]Calf region    Nutritionally Pertinent MEDICATIONS  (STANDING):  amLODIPine   Tablet  calcium carbonate 1250 mG  + Vitamin D (OsCal 500 + D)  cholecalciferol  famotidine    Tablet  lactated ringers.  multivitamin  pantoprazole  Injectable  piperacillin/tazobactam IVPB..  polyethylene glycol 3350  predniSONE   Tablet  senna  sucralfate suspension    Pertinent Labs: 11-23 @ 11:18: Na 137, BUN 25<H>, Cr 0.87, <H>, K+ 3.7, Phos --, Mg --, Alk Phos --, ALT/SGPT --, AST/SGOT --, HbA1c --      Skin per nursing documentation: Bilateral Buttocks DTI  Edema per nursing documentation: no edema today, pt previously noted with 1+ L/R arm (11/22).     Estimated Needs: Based on IBW 59.8 kg  Energy needs: 7718-8274 kcal/kg (30-35 kcal/kg)  Protein needs: 83.72-95.68 g/kg (1.4-1.6 g/kg)  Fluid needs:  Deferred to team   [x] no change since previous assessment  [] recalculated:     Previous Nutrition Diagnosis: Inadequate Oral Intake  Nutrition Diagnosis is: [] ongoing  [] resolved [x] upgraded    Previous Nutrition Diagnosis: Increased Nutrient Needs  Nutrition Diagnosis is: [x] ongoing  Being addressed with meals and micronutrient coverage.     Nutrition Care Plan:  [x] In Progress  [] Achieved  [] Not applicable    New Nutrition Diagnosis: Acute Moderate Malnutrition related to inadequate protein-energy intake as evidenced by physical findings of moderate muscle/fat depletion and meeting <50% of nutrition needs in >/5 days.     Nutrition Interventions:     Education Provided   [x] Yes:  [] No: Emphasized the importance of adequate kcal and protein intake, provided recommendations to optimize nutritional intake in case of decreased appetite, recommended small frequent meals by consuming nutrient-dense snacks between meals, to start with protein, and sips of supplement throughout the day.      Recommendations:      1) New malnutrition notification sent.   2) Continue current diet order; low sodium as tolerated.  3) Recommend Ensure Enlive 3x/day to optimize PO intake.   4) Continue micronutrient coverage: multivitamin + Vitamin D3.  5) Obtain and honor food preferences as able.       Monitoring and Evaluation:   Continue to monitor nutritional intake, tolerance to diet prescription, weights, labs, skin integrity.    RD remains available upon request and will follow up per protocol  Lucia Hunt RD pager # [phone]

## 2022-11-23 NOTE — PROGRESS NOTE ADULT - SUBJECTIVE AND OBJECTIVE BOX
[facility] Division of Hospital Medicine  René Ramirez MD  Available via MS Teams      SUBJECTIVE / OVERNIGHT EVENTS:  no acute events overnight   feeling better this AM - denies any further n/v, states epigastric pain/burning has improved   SOB is slowly improving, no coughing     ADDITIONAL REVIEW OF SYSTEMS:  14 point ROS negative except as noted above     MEDICATIONS  (STANDING):  albuterol    90 MICROgram(s) HFA Inhaler 2 Puff(s) Inhalation every 4 hours  albuterol/ipratropium for Nebulization 3 milliLiter(s) Nebulizer every 6 hours  amLODIPine   Tablet 2.5 milliGRAM(s) Oral daily  aspirin enteric coated 81 milliGRAM(s) Oral daily  budesonide 160 MICROgram(s)/formoterol 4.5 MICROgram(s) Inhaler 2 Puff(s) Inhalation two times a day  calcium carbonate 1250 mG  + Vitamin D (OsCal 500 + D) 1 Tablet(s) Oral daily  cholecalciferol 2000 Unit(s) Oral daily  enoxaparin Injectable 40 milliGRAM(s) SubCutaneous every 24 hours  famotidine    Tablet 20 milliGRAM(s) Oral two times a day  gabapentin 400 milliGRAM(s) Oral three times a day  influenza  Vaccine (HIGH DOSE) 0.7 milliLiter(s) IntraMuscular once  lactated ringers. 500 milliLiter(s) (75 mL/Hr) IV Continuous <Continuous>  multivitamin 1 Tablet(s) Oral daily  pantoprazole  Injectable 40 milliGRAM(s) IV Push two times a day  piperacillin/tazobactam IVPB.. 3.375 Gram(s) IV Intermittent every 8 hours  polyethylene glycol 3350 17 Gram(s) Oral daily  predniSONE   Tablet 40 milliGRAM(s) Oral daily  senna 2 Tablet(s) Oral at bedtime  sodium chloride 3%  Inhalation 4 milliLiter(s) Inhalation every 6 hours  sucralfate suspension 1 Gram(s) Oral four times a day  tiotropium 18 MICROgram(s) Capsule 1 Capsule(s) Inhalation daily    MEDICATIONS  (PRN):  acetaminophen     Tablet .. 650 milliGRAM(s) Oral every 6 hours PRN Temp greater or equal to 38C (100.4F), Mild Pain (1 - 3)  aluminum hydroxide/magnesium hydroxide/simethicone Suspension 30 milliLiter(s) Oral every 4 hours PRN Dyspepsia  aluminum hydroxide/magnesium hydroxide/simethicone Suspension 30 milliLiter(s) Oral every 6 hours PRN Dyspepsia  cyclobenzaprine 5 milliGRAM(s) Oral three times a day PRN Muscle Spasm  guaiFENesin  milliGRAM(s) Oral every 12 hours PRN Cough  melatonin 3 milliGRAM(s) Oral at bedtime PRN Insomnia  morphine  - Injectable 0.5 milliGRAM(s) IV Push every 4 hours PRN shortness of breath  ondansetron Injectable 4 milliGRAM(s) IV Push every 8 hours PRN Nausea and/or Vomiting      I&O's Summary      PHYSICAL EXAM:  Vital Signs Last 24 Hrs  T(C): 36.7 (23 Nov 2022 11:46), Max: 37 (22 Nov 2022 22:48)  T(F): 98 (23 Nov 2022 11:46), Max: 98.6 (22 Nov 2022 22:48)  HR: 83 (23 Nov 2022 11:46) (73 - 83)  BP: 159/75 (23 Nov 2022 11:46) (140/73 - 159/75)  BP(mean): --  RR: 20 (23 Nov 2022 11:46) (18 - 20)  SpO2: 92% (23 Nov 2022 11:46) (91% - 99%)    Parameters below as of 23 Nov 2022 11:46  Patient On (Oxygen Delivery Method): nasal cannula, high flow  O2 Flow (L/min): 60  O2 Concentration (%): 40    PHYSICAL EXAM:  GENERAL: NAD, well-developed  HEAD:  Atraumatic, normocephalic  EYES: EOMI, conjunctiva and sclera clear  NECK: Supple, no JVD  CHEST/LUNG: bibasilar rales, worse at R base, no wheezing, on HFNC, no acc muscle use, 40%FiO2  HEART: Regular rate and rhythm; no murmurs, no LE edema   ABDOMEN: Soft, nontender, nondistended; bowel sounds present  EXTREMITIES:  2+ Peripheral Pulses, no clubbing, cyanosis  PSYCH: AAOx3, calm affect, not anxious    LABS:                        14.8   11.15 )-----------( 231      ( 23 Nov 2022 13:14 )             45.7     11-23    137  |  96  |  25<H>  ----------------------------<  159<H>  3.7   |  29  |  0.87    Ca    9.1      23 Nov 2022 11:18  Phos  3.3     11-22  Mg     2.4     11-22                SARS-CoV-2: NotDetec (13 Nov 2022 18:49)  COVID-19 PCR: NotDetec (21 Oct 2022 12:06)  COVID-19 PCR: NotDetec (17 Oct 2022 06:15)  COVID-19 PCR: NotDetec (10 Oct 2022 16:13)      RADIOLOGY & ADDITIONAL TESTS:  New Imaging Personally Reviewed Today:  New Electrocardiogram Personally Reviewed Today:  Other Results Reviewed Today:   Prior or Outpatient Records Reviewed Today with Summary:    COORDINATION OF CARE:  Consultant Communication and Details of Discussion (where applicable):

## 2022-11-23 NOTE — PROGRESS NOTE ADULT - PROBLEM SELECTOR PLAN 1
a/w COPD exacerbation, normally on 3LNC at home. Course c/b multiple RTT for worsening dyspnea/resp distress, placed on BiPAP, now weaned to HFNC.   - switched to prednisone 40mg daily per pulm recs today   - continue with albuterol q4hr standing  - continue home spiriva and symbicort  - doing well on HFNC, continue with for now - will d/w pulm when pt may be able to tolerate weaning off HFNC  - will stop trending daily ABG for now unless change in clinical status (RR< 12, pt unarousable)- discussed with floor NP and pulmonary  - c/w morphine 0.5mg iv q4 hours prn sob as a palliative measure, family aware and in agreement, hold for lethargy (has not been requiring)  - s/p 1 dose IV lasix on 11/20 - will c/t assess volume status, can order lasix prn   - Per son, Dr. Calderon had discussed possibly setting up home bipap for patient- discussed this with inpatient pulm, who recommends re-evaluating this as a possible option on discharge if patient improves    #Metabolic encephalopathy- pt newly delirious and confused 11/16, now improved s/p continuous BIPAP x 48 hours  - delirium likely 2/2 COPD exam/hypoxia with possible component of hospital delirium  - cont management of pna as above, resp support and frequent reorientation

## 2022-11-23 NOTE — PROGRESS NOTE ADULT - NSPROGADDITIONALINFOA_GEN_ALL_CORE
plan d/w ACP Kiley.    christine Amaro updated on current plan of care - understand we must try to wean off HFNC prior to d/c to rehab.

## 2022-11-23 NOTE — PROGRESS NOTE ADULT - PROBLEM SELECTOR PLAN 4
pt with nausea/vomiting yest and today with epigastric burning pain after eating - suspect gastritis/GERD 2/2 steroid use. AXR showing non obstructive bowel pattern.   symptoms improving, tolerating diet   - c/w PPI BID for now - will transition to PO PPI in 1-2 days   - symptom control with zofran   - c/w pepcid BID and sucralfate QID   - if symptoms not improving, will consider GI consult

## 2022-11-24 NOTE — PROGRESS NOTE ADULT - PROBLEM SELECTOR PLAN 1
a/w COPD exacerbation, normally on 3LNC at home. Course c/b multiple RTT for worsening dyspnea/resp distress, placed on BiPAP, now weaned to HFNC. another RRT on 11/24 for WOB (unsure if due to IVF?; given 80mg IV lasix); placed back on BiPAP; CXR personally reviewed 11/24; appears similar to prior CXR   - continue with prednisone 40mg daily per pulm recs    - continue with albuterol q4hr standing  - continue home spiriva and symbicort  - continue with BiPAP for now s/p RRT and transition back to HFNC when improved- f/u pulm when pt may be able to tolerate weaning off HFNC  - will stop trending daily ABG for now unless change in clinical status (RR< 12, pt unarousable)- discussed with floor NP and pulmonary  - c/w morphine 0.5mg iv q4 hours prn sob as a palliative measure, family aware and in agreement, hold for lethargy (has not been requiring)  - s/p 1 dose IV lasix on 11/20 - will c/t assess volume status, can order lasix prn   - Per son, Dr. Calderon had discussed possibly setting up home bipap for patient- discussed this with inpatient pulm, who recommends re-evaluating this as a possible option on discharge if patient improves    #Metabolic encephalopathy- pt newly delirious and confused 11/16, now improved s/p continuous BIPAP x 48 hours  - delirium likely 2/2 COPD exam/hypoxia with possible component of hospital delirium  - cont management of pna as above, resp support and frequent reorientation

## 2022-11-24 NOTE — PROGRESS NOTE ADULT - SUBJECTIVE AND OBJECTIVE BOX
[facility] Division of Hospital Medicine  Claritza Gill MD  Available via MS Teams    SUBJECTIVE / OVERNIGHT EVENTS: Patient s/p RRT this morning for WOB. Patient was receiving duoneb and was on HFNC when seen prior to RRT. Patient placed back on BiPAP during RRT for WOB and given lasix 80mg IV.     Review of Systems:   CONSTITUTIONAL: No fever   EYES: No eye pain, visual disturbances, or discharge  ENMT: No difficulty hearing   RESPIRATORY: SOB. No cough   CARDIOVASCULAR: No chest pain   GASTROINTESTINAL: No abdominal or epigastric pain. No nausea, vomiting, or hematemesis; No diarrhea   GENITOURINARY: No dysuria   NEUROLOGICAL: No headache   SKIN: No itching   MUSCULOSKELETAL: No joint pain or swelling; right sided hip pain   PSYCHIATRIC: No depression or anxiety   HEME/LYMPH: No easy bruising or bleeding gums      MEDICATIONS  (STANDING):  albuterol    90 MICROgram(s) HFA Inhaler 2 Puff(s) Inhalation every 4 hours  albuterol/ipratropium for Nebulization 3 milliLiter(s) Nebulizer every 6 hours  amLODIPine   Tablet 2.5 milliGRAM(s) Oral daily  aspirin enteric coated 81 milliGRAM(s) Oral daily  budesonide 160 MICROgram(s)/formoterol 4.5 MICROgram(s) Inhaler 2 Puff(s) Inhalation two times a day  calcium carbonate 1250 mG  + Vitamin D (OsCal 500 + D) 1 Tablet(s) Oral daily  cholecalciferol 2000 Unit(s) Oral daily  enoxaparin Injectable 40 milliGRAM(s) SubCutaneous every 24 hours  famotidine    Tablet 20 milliGRAM(s) Oral two times a day  gabapentin 400 milliGRAM(s) Oral three times a day  influenza  Vaccine (HIGH DOSE) 0.7 milliLiter(s) IntraMuscular once  multivitamin 1 Tablet(s) Oral daily  pantoprazole  Injectable 40 milliGRAM(s) IV Push two times a day  polyethylene glycol 3350 17 Gram(s) Oral daily  predniSONE   Tablet 40 milliGRAM(s) Oral daily  senna 2 Tablet(s) Oral at bedtime  sodium chloride 3%  Inhalation 4 milliLiter(s) Inhalation every 6 hours  sucralfate suspension 1 Gram(s) Oral four times a day  tiotropium 18 MICROgram(s) Capsule 1 Capsule(s) Inhalation daily    MEDICATIONS  (PRN):  acetaminophen     Tablet .. 650 milliGRAM(s) Oral every 6 hours PRN Temp greater or equal to 38C (100.4F), Mild Pain (1 - 3)  aluminum hydroxide/magnesium hydroxide/simethicone Suspension 30 milliLiter(s) Oral every 4 hours PRN Dyspepsia  aluminum hydroxide/magnesium hydroxide/simethicone Suspension 30 milliLiter(s) Oral every 6 hours PRN Dyspepsia  cyclobenzaprine 5 milliGRAM(s) Oral three times a day PRN Muscle Spasm  guaiFENesin  milliGRAM(s) Oral every 12 hours PRN Cough  melatonin 3 milliGRAM(s) Oral at bedtime PRN Insomnia  ondansetron Injectable 4 milliGRAM(s) IV Push every 8 hours PRN Nausea and/or Vomiting      I&O's Summary    23 Nov 2022 07:01  -  24 Nov 2022 07:00  --------------------------------------------------------  IN: 0 mL / OUT: 900 mL / NET: -900 mL      PHYSICAL EXAM:  Vital Signs Last 24 Hrs  T(C): 36.3 (24 Nov 2022 11:08), Max: 36.7 (24 Nov 2022 05:00)  T(F): 97.4 (24 Nov 2022 11:08), Max: 98 (24 Nov 2022 05:00)  HR: 79 (24 Nov 2022 12:25) (70 - 89)  BP: 144/76 (24 Nov 2022 11:08) (129/65 - 144/76)  RR: 18 (24 Nov 2022 11:08) (18 - 18)  SpO2: 93% (24 Nov 2022 12:25) (90% - 93%)    Parameters below as of 24 Nov 2022 11:08  Patient On (Oxygen Delivery Method): nasal cannula, high flow  O2 Flow (L/min): 60  O2 Concentration (%): 40  CONSTITUTIONAL: mildly tachypneic, well-developed   EYES: PERRLA; conjunctiva and sclera clear  ENMT: Moist oral mucosa, no pharyngeal injection or exudates   NECK: Supple   RESPIRATORY: Normal respiratory effort; lungs are clear to auscultation bilaterally  CARDIOVASCULAR: Regular rate and rhythm, normal S1 and S2, no murmur   ABDOMEN: Nontender to palpation, normoactive bowel sounds   MUSCULOSKELETAL: no clubbing or cyanosis of digits; no joint swelling or tenderness to palpation  PSYCH: A+O to person, place, and time; affect appropriate  NEUROLOGY: no gross sensory deficits   SKIN: No rashes     LABS:                        14.8   11.15 )-----------( 231      ( 23 Nov 2022 13:14 )             45.7     11-23    137  |  96  |  25<H>  ----------------------------<  159<H>  3.7   |  29  |  0.87    Ca    9.1      23 Nov 2022 11:18        SARS-CoV-2: NotDetec (13 Nov 2022 18:49)  COVID-19 PCR: NotDetec (21 Oct 2022 12:06)  COVID-19 PCR: NotDetec (17 Oct 2022 06:15)  COVID-19 PCR: NotDetec (10 Oct 2022 16:13)      RADIOLOGY & ADDITIONAL TESTS:  New Imaging Personally Reviewed Today:  < from: Xray Chest 1 View- PORTABLE-Urgent (Xray Chest 1 View- PORTABLE-Urgent .) (11.24.22 @ 13:42) >  IMPRESSION:    Chronic emphysematous lung changes. Approximate 2.2 cm rounded mass left   midlung field again seen. Mild elevation right hemidiaphragm. Unchanged   bibasilar effusions with overlying atelectasis.    < end of copied text >    New Electrocardiogram Personally Reviewed Today:  Other Results Reviewed Today:   Prior or Outpatient Records Reviewed Today with Summary:    COORDINATION OF CARE:  Consultant Communication and Details of Discussion (where applicable):

## 2022-11-24 NOTE — PROGRESS NOTE ADULT - NSPROGADDITIONALINFOA_GEN_ALL_CORE
plan d/w ACP Emelia    attempted to reach out to son Prem but with no response plan d/w ACP Emelia    updated son and  at bedside

## 2022-11-24 NOTE — PROGRESS NOTE ADULT - PROBLEM SELECTOR PLAN 2
CT scan on 11/14 showing pathcy consolidation in R lung; given worsening respiratory, c/f likely gram negative PNA   - clinically improving, continue with BiPAP and HFNC at 40%   - completed zosyn x 7 day course   - legionella, RVP negative   - c/w steroids, nebs as above    - leukocytosis improving after small IVF yesterday, holding IVF for now due to WOB; continue to monitor CBC; if remains persistent, will consider repeat CT imaging

## 2022-11-24 NOTE — RAPID RESPONSE TEAM SUMMARY - NSSITUATIONBACKGROUNDRRT_GEN_ALL_CORE
89 year old female with PMH of HTN, COPD (on 3L home O2 at night), chronic bronchitis, CHFrEF (EF 40% in Dec 2018), recent admission to [facility] 10/22 for acute lumbar compression fracture who presents from [facility] for eval of SOB, secondary to COPD exacerbation with RRT on 11/14 and again 11/16 for worsening hypoxia possibly 2/2 superimposed PNA now on both steroids and abx with increasing WOB now weaned off BIPAP for HFNC, status still tenuous. GOC with MOLST in chart indicate pt DNR/DNI. RRT for increased WOB. Pt had been on [id number] 40% earlier today, noted to have increased WOB. RRT called. By the time RRT team at bedside, pt had been transitioned to Bipap 14/8 Fi o2. VS 170s/90 HR 80s, sat 98%. Pt visibly tachypneic, using accessory muscles, awake and alert and mentating. Lung clear, no edema on exam. Received extra duoneb during RRT. 80 IV lasix push. Pt already receiving prednisone and antibiotics and home inhalers per primary team. Pt reports feeling better after being placed on bipap.   89 year old female with PMH of HTN, COPD (on 3L home O2 at night), chronic bronchitis, CHFrEF (EF 40% in Dec 2018), recent admission to [facility] 10/22 for acute lumbar compression fracture who presents from [facility] for eval of SOB, secondary to COPD exacerbation with RRT on 11/14 and again 11/16 for worsening hypoxia possibly 2/2 superimposed PNA now on both steroids and abx with increasing WOB now weaned off BIPAP for HFNC, status still tenuous. GOC with MOLST in chart indicate pt DNR/DNI. RRT for increased WOB. Pt had been on [id number] 40% earlier today, noted to have increased WOB. RRT called. By the time RRT team at bedside, pt had been transitioned to Bipap 14/8 Fi o2. VS 170s/90 HR 80s, sat 98%. Pt visibly tachypneic, using accessory muscles, awake and alert and mentating. Lung clear, no edema on exam. Received extra duoneb during RRT. 80 IV lasix push. Pt already receiving prednisone and antibiotics and home inhalers per primary team. Pt reports feeling better after being placed on bipap, and appeared less tachypneic.

## 2022-11-25 NOTE — CHART NOTE - NSCHARTNOTEFT_GEN_A_CORE
Nutrition Follow Up Note  Patient seen for:  Chart reviewed, events noted    Per EMR, patient is a 89 y/o female with PMH including HTN, COPD, chronic bronchitis, CHFrEF (EF 40% 12/2018), recent admission to [facility] (10/22) for acute lumbar compression fracture and was recommended for TLSO brace on d/c and started on dexamethasone. Patient is transferred from [facility] to [facility] for evaluation of SOB. Patient reports having a baseline of SOB d/t COPD history which she felt is unchanged, however feels that her cough is more prominent since being in rehab facility. RRTs 11/14 & 11/16 for worsening hypoxia possibly 2/2 superimposed PNA, currentlyon both steroids and antibiotics, on/off BIPAP and to HFNC. GOC discussions ongoing.    Source: [x] Patient       [x] EMR        [] RN        [] Family at bedside       [] Other:    -If unable to interview patient: [] Trach/Vent/BiPAP  [] Disoriented/confused/inappropriate to interview    Diet Order:   Diet, Regular:   Low Sodium  Supplement Feeding Modality:  Oral  Ensure Enlive Cans or Servings Per Day:  3       Frequency:  Daily (11-23-22)    - Is current order appropriate/adequate? [x] Yes  []  No:     - PO intake :   [] >75%  Adequate    [] 50-75%  Fair       [] <50%  Poor    - Nutrition-related concerns:      - RRT 11/24 d/t WOB and placed back on BIPAP      - patient seen today w/ ~50% breakfast completed, reports doing her best with each meal that comes      - patient confirms taking Ensure at times (some at bedside noted), encouraged continued intake as tolerated, encourage small sips periodically during the day for goal of adequate supplement intake for nutritional support      - patient denied chewing/swallowing impairment or nausea/vomiting    GI: Last BM: 11/24 Bowel Regimen? [x] Yes: senna and miralax ordered  [] No    Weights:   no weight on file during this admission  per [facility] HIE: 205lbs (10/11), 235lbs (6/17/20)    Nutritionally Pertinent MEDICATIONS  (STANDING):  amLODIPine   Tablet  calcium carbonate 1250 mG  + Vitamin D (OsCal 500 + D)  cholecalciferol  famotidine    Tablet  multivitamin  pantoprazole  Injectable  polyethylene glycol 3350  potassium chloride   Powder  predniSONE   Tablet  senna  sucralfate suspension    Pertinent Labs: 11-25 @ 06:48:  Na 141, BUN 23, Cr 0.89, BG 90, K+ 2.5<LL>, Phos 2.7, Mg 2.0, Alk Phos --, ALT/SGPT --, AST/SGOT --, HbA1c --    Finger Sticks:  POCT Blood Glucose.: 139 mg/dL (11-25 @ 09:08)  POCT Blood Glucose.: 121 mg/dL (11-24 @ 12:14)  - BG trend noted per review of labs, patient receiving daily steroid treatment (prednisone)    Skin per nursing documentation: +1 generalized edema per documentation  Edema: +1 generalized edema per documentation    Estimated Needs:   [x] no change since previous assessment  Caloric needs: 1794-2093kcal/day (30-35kcal/kg)  Protein needs: 84-95g/pro/day (1.4-1.6g/pro/kg)  Fluid needs: defer to team    Previous Nutrition Diagnosis: acute moderate protein calorie malnutrition  Nutrition Diagnosis is: [x] ongoing  [] resolved [] not applicable   - patient continues w/ decreased oral intake on admission    New Nutrition Diagnosis: [x] Not applicable    Nutrition Care Plan:  [x] In Progress  [] Achieved  [] Not applicable    Nutrition Interventions:     Education Provided:       [x] Yes: rationale and role of oral nutrition supplements, strategies for increasing daily caloric/protein intake  [] No:     Recommendations:      - continue current diet order as tolerated of: low sodium diet  - encourage PO intake, protein source with each meal, supplement intake  - provide Ensure Enlive TID for extra calories and protein (1050kcal, 60g protein)  - hypokalemic today: supplementation in place noted, monitor trend closely and replete as appropriate  - continue daily MVI as ordered for micronutrient support  - monitor tolerance and adequacy of PO intake closely, weight trend, electrolytes, labs, BMs, blood glucose levels    Monitoring and Evaluation:   Continue to monitor nutritional intake, tolerance to diet prescription, weights, labs, skin integrity    RD remains available upon request and will follow up per protocol  Erwin Noyola RD, CDN - Pager #[phone] - also available on TEAMS.

## 2022-11-25 NOTE — PROGRESS NOTE ADULT - PROBLEM SELECTOR PLAN 1
a/w COPD exacerbation, normally on 3LNC at home. Course c/b multiple RTT for worsening dyspnea/resp distress, placed on BiPAP, now weaned to HFNC. another RRT on 11/24 for WOB (unsure if due to IVF?; given 80mg IV lasix); placed back on BiPAP; CXR personally reviewed 11/24; appears similar to prior CXR   - continue with prednisone 40mg daily per pulm recs    - continue with albuterol q4hr standing  - continue home spiriva and symbicort  - continue with HFNC and BiPAP overnight- f/u pulm when pt may be able to tolerate weaning off HFNC  - will stop trending daily ABG for now unless change in clinical status (RR< 12, pt unarousable)- discussed with floor NP and pulmonary  - c/w morphine 0.5mg iv q4 hours prn sob as a palliative measure, family aware and in agreement, hold for lethargy (has not been requiring)  - s/p 1 dose IV lasix on 11/20 - will c/t assess volume status, can order lasix prn   - Per son, Dr. Calderon had discussed possibly setting up home bipap for patient- discussed this with inpatient pulm, who recommends re-evaluating this as a possible option on discharge if patient improves    #Metabolic encephalopathy- pt newly delirious and confused 11/16, now improved s/p continuous BIPAP x 48 hours  - delirium likely 2/2 COPD exam/hypoxia with possible component of hospital delirium  - cont management of pna as above, resp support and frequent reorientation

## 2022-11-25 NOTE — PROGRESS NOTE ADULT - SUBJECTIVE AND OBJECTIVE BOX
[facility] Division of Hospital Medicine  Claritza Gill MD  Available via MS Teams    SUBJECTIVE / OVERNIGHT EVENTS: Patient currently on HFNC. States having SOB but feeling better compared to yesterday. Denies any chest pain. Denies any other concerns or complaints at this time.     Review of Systems:   CONSTITUTIONAL: No fever   EYES: No eye pain, visual disturbances, or discharge  ENMT: No difficulty hearing   RESPIRATORY: SOB. No cough   CARDIOVASCULAR: No chest pain   GASTROINTESTINAL: No abdominal or epigastric pain. No nausea, vomiting, or hematemesis; No diarrhea   GENITOURINARY: No dysuria   NEUROLOGICAL: No headache   SKIN: No itching   MUSCULOSKELETAL: No joint pain or swelling; No muscle, back pain  PSYCHIATRIC: No depression or anxiety  HEME/LYMPH: No easy bruising or bleeding gums      MEDICATIONS  (STANDING):  albuterol    90 MICROgram(s) HFA Inhaler 2 Puff(s) Inhalation every 4 hours  albuterol/ipratropium for Nebulization 3 milliLiter(s) Nebulizer every 6 hours  amLODIPine   Tablet 2.5 milliGRAM(s) Oral daily  aspirin enteric coated 81 milliGRAM(s) Oral daily  budesonide 160 MICROgram(s)/formoterol 4.5 MICROgram(s) Inhaler 2 Puff(s) Inhalation two times a day  calcium carbonate 1250 mG  + Vitamin D (OsCal 500 + D) 1 Tablet(s) Oral daily  cholecalciferol 2000 Unit(s) Oral daily  enoxaparin Injectable 40 milliGRAM(s) SubCutaneous every 24 hours  famotidine    Tablet 20 milliGRAM(s) Oral two times a day  gabapentin 400 milliGRAM(s) Oral three times a day  influenza  Vaccine (HIGH DOSE) 0.7 milliLiter(s) IntraMuscular once  multivitamin 1 Tablet(s) Oral daily  pantoprazole  Injectable 40 milliGRAM(s) IV Push two times a day  polyethylene glycol 3350 17 Gram(s) Oral daily  potassium chloride   Powder 40 milliEquivalent(s) Oral every 4 hours  predniSONE   Tablet 40 milliGRAM(s) Oral daily  senna 2 Tablet(s) Oral at bedtime  sodium chloride 3%  Inhalation 4 milliLiter(s) Inhalation every 6 hours  sucralfate suspension 1 Gram(s) Oral four times a day  tiotropium 18 MICROgram(s) Capsule 1 Capsule(s) Inhalation daily    MEDICATIONS  (PRN):  acetaminophen     Tablet .. 650 milliGRAM(s) Oral every 6 hours PRN Temp greater or equal to 38C (100.4F), Mild Pain (1 - 3)  aluminum hydroxide/magnesium hydroxide/simethicone Suspension 30 milliLiter(s) Oral every 4 hours PRN Dyspepsia  aluminum hydroxide/magnesium hydroxide/simethicone Suspension 30 milliLiter(s) Oral every 6 hours PRN Dyspepsia  cyclobenzaprine 5 milliGRAM(s) Oral three times a day PRN Muscle Spasm  guaiFENesin  milliGRAM(s) Oral every 12 hours PRN Cough  melatonin 3 milliGRAM(s) Oral at bedtime PRN Insomnia  morphine  - Injectable 0.5 milliGRAM(s) IV Push every 4 hours PRN shortness of breath  ondansetron Injectable 4 milliGRAM(s) IV Push every 8 hours PRN Nausea and/or Vomiting      I&O's Summary    24 Nov 2022 07:01  -  25 Nov 2022 07:00  --------------------------------------------------------  IN: 0 mL / OUT: 2050 mL / NET: -2050 mL      PHYSICAL EXAM:  Vital Signs Last 24 Hrs  T(C): 36.4 (25 Nov 2022 11:20), Max: 37.2 (24 Nov 2022 20:52)  T(F): 97.5 (25 Nov 2022 11:20), Max: 98.9 (24 Nov 2022 20:52)  HR: 88 (25 Nov 2022 11:20) (76 - 88)  BP: 136/73 (25 Nov 2022 11:20) (136/73 - 149/77)  RR: 18 (25 Nov 2022 11:20) (18 - 18)  SpO2: 90% (25 Nov 2022 11:20) (90% - 99%)    Parameters below as of 25 Nov 2022 11:20  Patient On (Oxygen Delivery Method): nasal cannula, high flow      CONSTITUTIONAL: appears to be in mild respiratory distress; well-developed   EYES: PERRLA; conjunctiva and sclera clear  ENMT: Moist oral mucosa, no pharyngeal injection or exudates   NECK: Supple   RESPIRATORY: increased respiratory effort; lungs are clear to auscultation bilaterally  CARDIOVASCULAR: Regular rate and rhythm, normal S1 and S2, no murmur   ABDOMEN: Nontender to palpation, normoactive bowel sounds   MUSCULOSKELETAL: no clubbing or cyanosis of digits; no joint swelling or tenderness to palpation  PSYCH: A+O to person, place, and time; affect appropriate  NEUROLOGY: no gross sensory deficits   SKIN: No rashes     LABS:                        15.0   12.41 )-----------( 263      ( 25 Nov 2022 06:48 )             46.6     11-25    141  |  96  |  23  ----------------------------<  90  2.5<LL>   |  31  |  0.89    Ca    9.0      25 Nov 2022 06:48  Phos  2.7     11-25  Mg     2.0     11-25      SARS-CoV-2: NotDetec (13 Nov 2022 18:49)  COVID-19 PCR: NotDetec (21 Oct 2022 12:06)  COVID-19 PCR: NotDetec (17 Oct 2022 06:15)  COVID-19 PCR: NotDetec (10 Oct 2022 16:13)      RADIOLOGY & ADDITIONAL TESTS:  New Imaging Personally Reviewed Today:  New Electrocardiogram Personally Reviewed Today:  Other Results Reviewed Today:   Prior or Outpatient Records Reviewed Today with Summary:    COORDINATION OF CARE:  Consultant Communication and Details of Discussion (where applicable):

## 2022-11-25 NOTE — PROGRESS NOTE ADULT - PROBLEM SELECTOR PLAN 4
new [place] 11/17, likely 2/2 poor po intake and pre-renal dehydration, which improved after IVF     - BUN/Cr improving, likely 2/2 pre-renal etiology dehydration, poor PO and i/s/o lasix dose   - hold off on further IVF for now

## 2022-11-25 NOTE — PROGRESS NOTE ADULT - PROBLEM SELECTOR PLAN 2
CT scan on 11/14 showing patchy consolidation in R lung; given worsening respiratory, c/f likely gram negative PNA   - clinically improving, continue with BiPAP and HFNC at 40%   - completed zosyn x 7 day course   - legionella, RVP negative   - c/w steroids, nebs as above    - leukocytosis improving after small IVF yesterday, holding IVF for now due to WOB; continue to monitor CBC; if remains persistent, will consider repeat CT imaging

## 2022-11-26 NOTE — PROGRESS NOTE ADULT - PROBLEM SELECTOR PLAN 2
CT scan on 11/14 showing patchy consolidation in R lung; given worsening respiratory, c/f likely gram negative PNA   - clinically improving, continue with BiPAP and HFNC at 40%   - completed zosyn x 7 day course   - legionella, RVP negative   - c/w steroids, nebs as above    - leukocytosis improving after small IVF, holding IVF for now due to WOB; continue to monitor CBC; if remains persistent, will consider repeat CT imaging

## 2022-11-26 NOTE — PROGRESS NOTE ADULT - PROBLEM SELECTOR PLAN 1
a/w COPD exacerbation, normally on 3LNC at home. Course c/b multiple RTT for worsening dyspnea/resp distress, placed on BiPAP, now weaned to HFNC. another RRT on 11/24 for WOB (unsure if due to IVF?; given 80mg IV lasix); placed back on BiPAP; CXR personally reviewed 11/24; appears similar to prior CXR   - continue with prednisone 40mg daily; pulm recs noted, will taper down to 30mg for 3 days and continue to taper by 10mg afterwards for 3 days each till done   - continue with albuterol q4hr standing  - continue home spiriva and symbicort  - continue with HFNC and BiPAP overnight- f/u pulm when pt may be able to tolerate weaning off HFNC  - will stop trending daily ABG for now unless change in clinical status (RR< 12, pt unarousable)- discussed with floor NP and pulmonary  - c/w morphine 0.5mg iv q4 hours prn sob as a palliative measure, family aware and in agreement, hold for lethargy (has not been requiring)  - s/p 1 dose IV lasix on 11/20 - will c/t assess volume status, resume PO lasix   - Per son, Dr. Calderon had discussed possibly setting up home bipap for patient- discussed this with inpatient pulm, who recommends re-evaluating this as a possible option on discharge if patient improves    #Metabolic encephalopathy- pt newly delirious and confused 11/16, now improved s/p continuous BIPAP x 48 hours  - delirium likely 2/2 COPD exam/hypoxia with possible component of hospital delirium  - cont management of pna as above, resp support and frequent reorientation

## 2022-11-26 NOTE — PROGRESS NOTE ADULT - SUBJECTIVE AND OBJECTIVE BOX
[facility] Division of Hospital Medicine  Claritza Gill MD  Available via MS Teams    SUBJECTIVE / OVERNIGHT EVENTS: No acute events overnight. Patient compliant with BiPAP overnight. Currently on HFNC. Has SOB but denies any other complaints or concerns at this time.     Review of Systems:   CONSTITUTIONAL: No fever   EYES: No eye pain, visual disturbances, or discharge  ENMT: No difficulty hearing   RESPIRATORY: SOB. No cough   CARDIOVASCULAR: No chest pain   GASTROINTESTINAL: No abdominal or epigastric pain. No nausea, vomiting, or hematemesis; No diarrhea   GENITOURINARY: No dysuria   NEUROLOGICAL: No headache   SKIN: No itching   MUSCULOSKELETAL: No joint pain or swelling; No muscle or back pain  PSYCHIATRIC: No depression or anxiety   HEME/LYMPH: No easy bruising or bleeding gums      MEDICATIONS  (STANDING):  albuterol    90 MICROgram(s) HFA Inhaler 2 Puff(s) Inhalation every 4 hours  albuterol/ipratropium for Nebulization 3 milliLiter(s) Nebulizer every 6 hours  amLODIPine   Tablet 2.5 milliGRAM(s) Oral daily  aspirin enteric coated 81 milliGRAM(s) Oral daily  budesonide 160 MICROgram(s)/formoterol 4.5 MICROgram(s) Inhaler 2 Puff(s) Inhalation two times a day  calcium carbonate 1250 mG  + Vitamin D (OsCal 500 + D) 1 Tablet(s) Oral daily  cholecalciferol 2000 Unit(s) Oral daily  enoxaparin Injectable 40 milliGRAM(s) SubCutaneous every 24 hours  famotidine    Tablet 20 milliGRAM(s) Oral two times a day  furosemide    Tablet 40 milliGRAM(s) Oral daily  gabapentin 400 milliGRAM(s) Oral three times a day  influenza  Vaccine (HIGH DOSE) 0.7 milliLiter(s) IntraMuscular once  multivitamin 1 Tablet(s) Oral daily  pantoprazole    Tablet 40 milliGRAM(s) Oral before breakfast  polyethylene glycol 3350 17 Gram(s) Oral daily  senna 2 Tablet(s) Oral at bedtime  simethicone 80 milliGRAM(s) Chew daily  sodium chloride 3%  Inhalation 4 milliLiter(s) Inhalation every 6 hours  sucralfate suspension 1 Gram(s) Oral four times a day  tiotropium 18 MICROgram(s) Capsule 1 Capsule(s) Inhalation daily    MEDICATIONS  (PRN):  acetaminophen     Tablet .. 650 milliGRAM(s) Oral every 6 hours PRN Temp greater or equal to 38C (100.4F), Mild Pain (1 - 3)  aluminum hydroxide/magnesium hydroxide/simethicone Suspension 30 milliLiter(s) Oral every 4 hours PRN Dyspepsia  aluminum hydroxide/magnesium hydroxide/simethicone Suspension 30 milliLiter(s) Oral every 6 hours PRN Dyspepsia  cyclobenzaprine 5 milliGRAM(s) Oral three times a day PRN Muscle Spasm  guaiFENesin  milliGRAM(s) Oral every 12 hours PRN Cough  melatonin 3 milliGRAM(s) Oral at bedtime PRN Insomnia  morphine  - Injectable 0.5 milliGRAM(s) IV Push every 4 hours PRN shortness of breath  ondansetron Injectable 4 milliGRAM(s) IV Push every 8 hours PRN Nausea and/or Vomiting      I&O's Summary    25 Nov 2022 07:01  -  26 Nov 2022 07:00  --------------------------------------------------------  IN: 120 mL / OUT: 0 mL / NET: 120 mL    26 Nov 2022 07:01  -  26 Nov 2022 14:30  --------------------------------------------------------  IN: 480 mL / OUT: 400 mL / NET: 80 mL      PHYSICAL EXAM:  Vital Signs Last 24 Hrs  T(C): 36.8 (26 Nov 2022 11:29), Max: 36.8 (26 Nov 2022 11:29)  T(F): 98.3 (26 Nov 2022 11:29), Max: 98.3 (26 Nov 2022 11:29)  HR: 86 (26 Nov 2022 11:29) (69 - 87)  BP: 135/77 (26 Nov 2022 11:29) (135/77 - 143/77)  RR: 20 (26 Nov 2022 11:29) (18 - 20)  SpO2: 95% (26 Nov 2022 11:29) (90% - 99%)    Parameters below as of 26 Nov 2022 11:29  Patient On (Oxygen Delivery Method): nasal cannula, high flow  O2 Flow (L/min): 40  O2 Concentration (%): 60  CONSTITUTIONAL: mild respiratory distress, well-developed   EYES: PERRLA; conjunctiva and sclera clear  ENMT: Moist oral mucosa, no pharyngeal injection or exudates   NECK: Supple   RESPIRATORY: Normal respiratory effort; lungs are clear to auscultation bilaterally  CARDIOVASCULAR: Regular rate and rhythm, normal S1 and S2, no murmur   ABDOMEN: Nontender to palpation, normoactive bowel sounds   MUSCULOSKELETAL: no clubbing or cyanosis of digits; no joint swelling or tenderness to palpation  PSYCH: A+O to person, place, and time; affect appropriate  NEUROLOGY: no gross sensory deficits   SKIN: No rashes     LABS:                        14.1   10.09 )-----------( 260      ( 26 Nov 2022 06:47 )             43.7     11-26    139  |  97  |  26<H>  ----------------------------<  114<H>  3.4<L>   |  32<H>  |  0.78    Ca    9.3      26 Nov 2022 06:47  Phos  2.7     11-25  Mg     2.1     11-26      SARS-CoV-2: NotDetec (13 Nov 2022 18:49)  COVID-19 PCR: NotDetec (21 Oct 2022 12:06)  COVID-19 PCR: NotDetec (17 Oct 2022 06:15)  COVID-19 PCR: NotDetec (10 Oct 2022 16:13)      RADIOLOGY & ADDITIONAL TESTS:  New Imaging Personally Reviewed Today:  New Electrocardiogram Personally Reviewed Today:  Other Results Reviewed Today:   Prior or Outpatient Records Reviewed Today with Summary:    COORDINATION OF CARE:  Consultant Communication and Details of Discussion (where applicable):

## 2022-11-26 NOTE — PROGRESS NOTE ADULT - SUBJECTIVE AND OBJECTIVE BOX
PULMONARY PROGRESS NOTE    VIDAL LOUIE  MRN-[id number]    Patient is a 89y old  Female who presents with a chief complaint of respiratory distress (26 Nov 2022 12:40)      HPI:  -seen on HFNC   family at bedside  60% and 60L  states her cough is less productive    -    ROS:   -    ACTIVE MEDICATION LIST:  MEDICATIONS  (STANDING):  albuterol    90 MICROgram(s) HFA Inhaler 2 Puff(s) Inhalation every 4 hours  albuterol/ipratropium for Nebulization 3 milliLiter(s) Nebulizer every 6 hours  amLODIPine   Tablet 2.5 milliGRAM(s) Oral daily  aspirin enteric coated 81 milliGRAM(s) Oral daily  budesonide 160 MICROgram(s)/formoterol 4.5 MICROgram(s) Inhaler 2 Puff(s) Inhalation two times a day  calcium carbonate 1250 mG  + Vitamin D (OsCal 500 + D) 1 Tablet(s) Oral daily  cholecalciferol 2000 Unit(s) Oral daily  enoxaparin Injectable 40 milliGRAM(s) SubCutaneous every 24 hours  famotidine    Tablet 20 milliGRAM(s) Oral two times a day  gabapentin 400 milliGRAM(s) Oral three times a day  influenza  Vaccine (HIGH DOSE) 0.7 milliLiter(s) IntraMuscular once  multivitamin 1 Tablet(s) Oral daily  pantoprazole    Tablet 40 milliGRAM(s) Oral before breakfast  polyethylene glycol 3350 17 Gram(s) Oral daily  predniSONE   Tablet 40 milliGRAM(s) Oral daily  senna 2 Tablet(s) Oral at bedtime  simethicone 80 milliGRAM(s) Chew daily  sodium chloride 3%  Inhalation 4 milliLiter(s) Inhalation every 6 hours  sucralfate suspension 1 Gram(s) Oral four times a day  tiotropium 18 MICROgram(s) Capsule 1 Capsule(s) Inhalation daily    MEDICATIONS  (PRN):  acetaminophen     Tablet .. 650 milliGRAM(s) Oral every 6 hours PRN Temp greater or equal to 38C (100.4F), Mild Pain (1 - 3)  aluminum hydroxide/magnesium hydroxide/simethicone Suspension 30 milliLiter(s) Oral every 6 hours PRN Dyspepsia  aluminum hydroxide/magnesium hydroxide/simethicone Suspension 30 milliLiter(s) Oral every 4 hours PRN Dyspepsia  cyclobenzaprine 5 milliGRAM(s) Oral three times a day PRN Muscle Spasm  guaiFENesin  milliGRAM(s) Oral every 12 hours PRN Cough  melatonin 3 milliGRAM(s) Oral at bedtime PRN Insomnia  morphine  - Injectable 0.5 milliGRAM(s) IV Push every 4 hours PRN shortness of breath  ondansetron Injectable 4 milliGRAM(s) IV Push every 8 hours PRN Nausea and/or Vomiting      EXAM:  Vital Signs Last 24 Hrs  T(C): 36.8 (26 Nov 2022 11:29), Max: 36.8 (26 Nov 2022 11:29)  T(F): 98.3 (26 Nov 2022 11:29), Max: 98.3 (26 Nov 2022 11:29)  HR: 86 (26 Nov 2022 11:29) (69 - 87)  BP: 135/77 (26 Nov 2022 11:29) (135/77 - 143/77)  BP(mean): --  RR: 20 (26 Nov 2022 11:29) (18 - 20)  SpO2: 95% (26 Nov 2022 11:29) (90% - 99%)    Parameters below as of 26 Nov 2022 11:29  Patient On (Oxygen Delivery Method): nasal cannula, high flow  O2 Flow (L/min): 40  O2 Concentration (%): 60    GENERAL: The patient is awake and alert in no apparent distress.     LUNGS: Clear to auscultation without wheezing                           14.1   10.09 )-----------( 260      ( 26 Nov 2022 06:47 )             43.7       11-26    139  |  97  |  26<H>  ----------------------------<  114<H>  3.4<L>   |  32<H>  |  0.78    Ca    9.3      26 Nov 2022 06:47  Phos  2.7     11-25  Mg     2.1     11-26      >>> <<<  < from: Xray Chest 1 View- PORTABLE-Urgent (Xray Chest 1 View- PORTABLE-Urgent .) (11.24.22 @ 13:42) >    ACC: [id number] EXAM:  XR CHEST PORTABLE URGENT 1V                          PROCEDURE DATE:  11/24/2022          INTERPRETATION:  CLINICAL STATEMENT: Shortness of breath    TECHNIQUE: AP view of the chest.      COMPARISON: Chest x-ray 11/16/2022, CT chest 11/14/2022    Distal tip of right upper extremity midline catheter noted.    Patient is rotated.    Upsize exaggerated by AP technique. Calcified uncoiled aorta.    Chronic emphysematous lung changes. Approximate 2.2 cm rounded mass left   midlung field again seen. Mild elevation right hemidiaphragm. Unchanged   bibasilar effusions with overlying atelectasis.    Degenerative changes of the spine, shoulders and AC joints.    IMPRESSION:    Chronic emphysematous lung changes. Approximate 2.2 cm rounded mass left   midlung field again seen. Mild elevation right hemidiaphragm. Unchanged   bibasilar effusions with overlying atelectasis.    --- End of Report ---            ISHAAN RUBALCAVA MD; Attending Radiologist  This document has been electronically signed. Nov 24 2022  1:42PM    < end of copied text >  < from: CT Angio Chest PE Protocol w/ IV Cont (11.14.22 @ 03:53) >    ACC: [id number] EXAM:  CT ANGIO CHEST PULM [facility]                          PROCEDURE DATE:  11/14/2022          INTERPRETATION:  CLINICAL INDICATION: Dyspnea    TECHNIQUE: A volumetric acquisition of the chest was obtained from the   thoracic inlet to the upper abdomen during dynamic administration of 90cc   Omnipaque 350 intravenous contrast according to the PE protocol. 3D MIP   images were provided.    COMPARISON: CT chest 10/13/2022    FINDINGS:  CTA: No pulmonary embolism.    Heart/Vasculature: The heart is enlarged in size. There is no pericardial   effusion.    Lungs/Airways/Pleura: Mild central airways secretions. Emphysema. Left   lower lobe superior segment 2.3 x 1.9 cm nodule (image 53, series 2) was   previously 1.6 x 1.5 cm on10/14/2022 CT thoracic spine and new compared   to 6/16/2019.    Patchy right apical consolidative opacity (image 34, series 2) is similar   compared to 11/13/2022 CT chest and new compared to 10/14/2022 CT   thoracic spine.    Right basilar parenchymal opacification likely represents a combination   of atelectasis and consolidation.    Small bilateral pleural effusions.    Mediastinum/Lymph nodes: Left paratracheal 3.6 x 1.3 cm node (image 51,   series 3) appears increased in size compared to 10/10/2018.. Additional   mediastinal, cardiophrenic angle, and right internal mammary nodes are   similar compared to 10/10/2018.    Upper Abdomen: Enlarged celiac nodes, similar compared to prior exams.   Subcentimeter exophytic right renal lesion unchanged    Bones and Soft Tissues: Multilevel degenerative disc disease. T12   compression fracture is redemonstrated and seen since 10/14/2022. Grade 1   anterolisthesis of C7 on T1, T1 on T2, and T2 on T3.    IMPRESSION:    No pulmonary embolism.    Patchy consolidation in the right apical region and at the right base may   represent pneumonia.    Left lower lobe 2.3 cm nodule increased in size compared to 10/14/2022   and likely represents primary lung neoplasm.    Left paratracheal 3.6 x 1.3 cmnode appears increased in size compared to   10/10/2018 and is indeterminate.    --- End of Report ---          YANDY VALE MD; Resident Radiologist  This document has been electronically signed.   BETTY SÁNCHEZ MD; Attending Radiologist  This document has been electronically signed. Nov 14 2022  9:23AM    < end of copied text >    PROBLEM LIST:  89y Female with HEALTH ISSUES - PROBLEM Dx:  COPD exacerbation    Pulmonary nodule, right    Prophylactic measure    Osteoporosis    Lumbar compression fracture    Chronic systolic congestive heart failure    Hypertension    Constipation    Debility    ACP (advance care planning)    Chronic respiratory failure with hypoxia and hypercapnia    Respiratory distress    Gram-negative pneumonia    ISRAEL (acute kidney injury)    Nausea and vomiting    Hypokalemia              RECS:  cont symbicort/spiriva  chest pt chest vest for atelectasis  pulm toilet/ IS  prednisone 40 mg X 3 days  start prednisone 30mg on 11/27 with 10mg taper every 3 days until done  s/p abx  consider standing daily lasix   bipap during sleep and prn, high flow or nc during day wean as tolerated  dvt prophylaxis  outpt fu for enlarging pulm nodule, would need PET        Please call with any questions.    Taniya Lopez, DO  [facility] Pulmonary/Sleep Medicine  [phone]

## 2022-11-27 NOTE — PROGRESS NOTE ADULT - SUBJECTIVE AND OBJECTIVE BOX
PULMONARY PROGRESS NOTE    VIDAL LOUIE  MRN-[id number]    Patient is a 89y old  Female who presents with a chief complaint of respiratory distress (27 Nov 2022 10:39)      HPI:  -on HFNC   some cough  guy quiñones  family at bedside    ROS:   -    ACTIVE MEDICATION LIST:  MEDICATIONS  (STANDING):  albuterol    90 MICROgram(s) HFA Inhaler 2 Puff(s) Inhalation every 4 hours  albuterol/ipratropium for Nebulization 3 milliLiter(s) Nebulizer every 6 hours  amLODIPine   Tablet 2.5 milliGRAM(s) Oral daily  aspirin enteric coated 81 milliGRAM(s) Oral daily  budesonide 160 MICROgram(s)/formoterol 4.5 MICROgram(s) Inhaler 2 Puff(s) Inhalation two times a day  calcium carbonate 1250 mG  + Vitamin D (OsCal 500 + D) 1 Tablet(s) Oral daily  cholecalciferol 2000 Unit(s) Oral daily  enoxaparin Injectable 40 milliGRAM(s) SubCutaneous every 24 hours  famotidine    Tablet 20 milliGRAM(s) Oral two times a day  furosemide    Tablet 40 milliGRAM(s) Oral daily  gabapentin 400 milliGRAM(s) Oral three times a day  influenza  Vaccine (HIGH DOSE) 0.7 milliLiter(s) IntraMuscular once  multivitamin 1 Tablet(s) Oral daily  nystatin Powder 1 Application(s) Topical three times a day  pantoprazole    Tablet 40 milliGRAM(s) Oral before breakfast  polyethylene glycol 3350 17 Gram(s) Oral daily  predniSONE   Tablet 30 milliGRAM(s) Oral daily  senna 2 Tablet(s) Oral at bedtime  simethicone 80 milliGRAM(s) Chew daily  sodium chloride 3%  Inhalation 4 milliLiter(s) Inhalation every 6 hours  sucralfate suspension 1 Gram(s) Oral four times a day  tiotropium 18 MICROgram(s) Capsule 1 Capsule(s) Inhalation daily    MEDICATIONS  (PRN):  acetaminophen     Tablet .. 650 milliGRAM(s) Oral every 6 hours PRN Temp greater or equal to 38C (100.4F), Mild Pain (1 - 3)  aluminum hydroxide/magnesium hydroxide/simethicone Suspension 30 milliLiter(s) Oral every 6 hours PRN Dyspepsia  aluminum hydroxide/magnesium hydroxide/simethicone Suspension 30 milliLiter(s) Oral every 4 hours PRN Dyspepsia  cyclobenzaprine 5 milliGRAM(s) Oral three times a day PRN Muscle Spasm  guaiFENesin  milliGRAM(s) Oral every 12 hours PRN Cough  melatonin 3 milliGRAM(s) Oral at bedtime PRN Insomnia  morphine  - Injectable 0.5 milliGRAM(s) IV Push every 4 hours PRN shortness of breath  ondansetron Injectable 4 milliGRAM(s) IV Push every 8 hours PRN Nausea and/or Vomiting      EXAM:  Vital Signs Last 24 Hrs  T(C): 36.8 (27 Nov 2022 10:47), Max: 36.8 (26 Nov 2022 20:36)  T(F): 98.3 (27 Nov 2022 10:47), Max: 98.3 (26 Nov 2022 20:36)  HR: 82 (27 Nov 2022 15:50) (80 - 96)  BP: 158/78 (27 Nov 2022 10:47) (107/72 - 158/78)  BP(mean): --  RR: 20 (27 Nov 2022 10:47) (18 - 22)  SpO2: 93% (27 Nov 2022 15:50) (90% - 96%)    Parameters below as of 27 Nov 2022 10:47  Patient On (Oxygen Delivery Method): nasal cannula, high flow  O2 Flow (L/min): 40  O2 Concentration (%): 60    GENERAL: The patient is awake and alert in no apparent distress.     LUNGS: Clear to auscultation without wheezing, upper lung fields  some rhonchi at bases                            13.4   9.28  )-----------( 255      ( 27 Nov 2022 07:10 )             42.3       11-27    138  |  99  |  25<H>  ----------------------------<  123<H>  4.1   |  29  |  0.79    Ca    9.3      27 Nov 2022 07:10  Phos  2.5     11-27  Mg     2.1     11-27       < from: Xray Chest 1 View- PORTABLE-Urgent (Xray Chest 1 View- PORTABLE-Urgent .) (11.24.22 @ 13:42) >    ACC: [id number] EXAM:  XR CHEST PORTABLE URGENT 1V                          PROCEDURE DATE:  11/24/2022          INTERPRETATION:  CLINICAL STATEMENT: Shortness of breath    TECHNIQUE: AP view of the chest.      COMPARISON: Chest x-ray 11/16/2022, CT chest 11/14/2022    Distal tip of right upper extremity midline catheter noted.    Patient is rotated.    Upsize exaggerated by AP technique. Calcified uncoiled aorta.    Chronic emphysematous lung changes. Approximate 2.2 cm rounded mass left   midlung field again seen. Mild elevation right hemidiaphragm. Unchanged   bibasilar effusions with overlying atelectasis.    Degenerative changes of the spine, shoulders and AC joints.    IMPRESSION:    Chronic emphysematous lung changes. Approximate 2.2 cm rounded mass left   midlung field again seen. Mild elevation right hemidiaphragm. Unchanged   bibasilar effusions with overlying atelectasis.    --- End of Report ---            ISHAAN RUBALCAVA MD; Attending Radiologist  This document has been electronically signed. Nov 24 2022  1:42P    < end of copied text >      PROBLEM LIST:  89y Female with HEALTH ISSUES - PROBLEM Dx:  COPD exacerbation    Pulmonary nodule, right    Prophylactic measure    Osteoporosis    Lumbar compression fracture    Chronic systolic congestive heart failure    Hypertension    Constipation    Debility    ACP (advance care planning)    Chronic respiratory failure with hypoxia and hypercapnia    Respiratory distress    Gram-negative pneumonia    ISRAEL (acute kidney injury)    Nausea and vomiting    Hypokalemia              RECS:  cont symbicort/spiriva  chest pt chest vest for atelectasis  pulm toilet/ IS  prednisone 30mg on 11/27 with 10mg taper every 3 days until done  s/p abx  daily diuretics    bipap during sleep and prn, high flow or nc during day wean as tolerated  tolerate pulse ox 92-94%   dvt prophylaxis  outpt fu for enlarging pulm nodule, would need PET    Please call with any questions.    Taniya Lopez DO  [facility] Pulmonary/Sleep Medicine  [phone]

## 2022-11-27 NOTE — PROGRESS NOTE ADULT - PROBLEM SELECTOR PLAN 4
new ISRAEL 11/17, likely 2/2 poor po intake and pre-renal dehydration, which improved after IVF   - BUN/Cr improving, likely 2/2 pre-renal etiology dehydration, poor PO and i/s/o lasix dose   - hold off on further IVF for now  - now resolved

## 2022-11-27 NOTE — PROGRESS NOTE ADULT - PROBLEM SELECTOR PLAN 1
a/w COPD exacerbation, normally on 3LNC at home. Course c/b multiple RTT for worsening dyspnea/resp distress, placed on BiPAP, now weaned to HFNC. another RRT on 11/24 for WOB (unsure if due to IVF?; given 80mg IV lasix); placed back on BiPAP; CXR personally reviewed 11/24; appears similar to prior CXR   - pulm recs noted, continue taper prednisone down to 30mg for 3 days and continue to taper by 10mg afterwards for 3 days each till done   - continue with albuterol q4hr standing  - continue home spiriva and symbicort  - continue with HFNC and BiPAP overnight- f/u pulm when pt may be able to tolerate weaning off HFNC  - will stop trending daily ABG for now unless change in clinical status (RR< 12, pt unarousable)- discussed with floor NP and pulmonary  - c/w morphine 0.5mg iv q4 hours prn sob as a palliative measure, family aware and in agreement, hold for lethargy (has not been requiring)  - s/p 1 dose IV lasix on 11/20 - will c/t assess volume status, resumed PO lasix 11/26 as per pulm recs   - Per son, Dr. Calderon had discussed possibly setting up home bipap for patient- discussed this with inpatient pulm, who recommends re-evaluating this as a possible option on discharge if patient improves    #Metabolic encephalopathy- pt newly delirious and confused 11/16, now improved s/p continuous BIPAP x 48 hours  - delirium likely 2/2 COPD exam/hypoxia with possible component of hospital delirium  - cont management of pna as above, resp support and frequent reorientation

## 2022-11-27 NOTE — PROGRESS NOTE ADULT - SUBJECTIVE AND OBJECTIVE BOX
[facility] Division of Hospital Medicine  Claritza Gill MD  Available via MS Teams    SUBJECTIVE / OVERNIGHT EVENTS: No acute events overnight. Patient feels less SOB this morning. Denies any new concerns or complaints.     Review of Systems:   CONSTITUTIONAL: No fever   EYES: No eye pain, visual disturbances, or discharge  ENMT: No difficulty hearing   RESPIRATORY: Mild SOB. No cough   CARDIOVASCULAR: No chest pain  GASTROINTESTINAL: No abdominal or epigastric pain. No nausea, vomiting, or hematemesis; No diarrhea    GENITOURINARY: No dysuria   NEUROLOGICAL: No headache   SKIN: No itching   MUSCULOSKELETAL: No joint pain or swelling; No muscle, back pain  PSYCHIATRIC: No depression or anxiety   HEME/LYMPH: No easy bruising or bleeding gums      MEDICATIONS  (STANDING):  albuterol    90 MICROgram(s) HFA Inhaler 2 Puff(s) Inhalation every 4 hours  albuterol/ipratropium for Nebulization 3 milliLiter(s) Nebulizer every 6 hours  amLODIPine   Tablet 2.5 milliGRAM(s) Oral daily  aspirin enteric coated 81 milliGRAM(s) Oral daily  budesonide 160 MICROgram(s)/formoterol 4.5 MICROgram(s) Inhaler 2 Puff(s) Inhalation two times a day  calcium carbonate 1250 mG  + Vitamin D (OsCal 500 + D) 1 Tablet(s) Oral daily  cholecalciferol 2000 Unit(s) Oral daily  enoxaparin Injectable 40 milliGRAM(s) SubCutaneous every 24 hours  famotidine    Tablet 20 milliGRAM(s) Oral two times a day  furosemide    Tablet 40 milliGRAM(s) Oral daily  gabapentin 400 milliGRAM(s) Oral three times a day  influenza  Vaccine (HIGH DOSE) 0.7 milliLiter(s) IntraMuscular once  multivitamin 1 Tablet(s) Oral daily  nystatin Powder 1 Application(s) Topical three times a day  pantoprazole    Tablet 40 milliGRAM(s) Oral before breakfast  polyethylene glycol 3350 17 Gram(s) Oral daily  predniSONE   Tablet 30 milliGRAM(s) Oral daily  senna 2 Tablet(s) Oral at bedtime  simethicone 80 milliGRAM(s) Chew daily  sodium chloride 3%  Inhalation 4 milliLiter(s) Inhalation every 6 hours  sucralfate suspension 1 Gram(s) Oral four times a day  tiotropium 18 MICROgram(s) Capsule 1 Capsule(s) Inhalation daily    MEDICATIONS  (PRN):  acetaminophen     Tablet .. 650 milliGRAM(s) Oral every 6 hours PRN Temp greater or equal to 38C (100.4F), Mild Pain (1 - 3)  aluminum hydroxide/magnesium hydroxide/simethicone Suspension 30 milliLiter(s) Oral every 6 hours PRN Dyspepsia  aluminum hydroxide/magnesium hydroxide/simethicone Suspension 30 milliLiter(s) Oral every 4 hours PRN Dyspepsia  cyclobenzaprine 5 milliGRAM(s) Oral three times a day PRN Muscle Spasm  guaiFENesin  milliGRAM(s) Oral every 12 hours PRN Cough  melatonin 3 milliGRAM(s) Oral at bedtime PRN Insomnia  morphine  - Injectable 0.5 milliGRAM(s) IV Push every 4 hours PRN shortness of breath  ondansetron Injectable 4 milliGRAM(s) IV Push every 8 hours PRN Nausea and/or Vomiting      I&O's Summary    26 Nov 2022 07:01  -  27 Nov 2022 07:00  --------------------------------------------------------  IN: 720 mL / OUT: 400 mL / NET: 320 mL    27 Nov 2022 07:01  -  27 Nov 2022 15:10  --------------------------------------------------------  IN: 0 mL / OUT: 450 mL / NET: -450 mL      PHYSICAL EXAM:  Vital Signs Last 24 Hrs  T(C): 36.8 (27 Nov 2022 10:47), Max: 36.8 (26 Nov 2022 20:36)  T(F): 98.3 (27 Nov 2022 10:47), Max: 98.3 (26 Nov 2022 20:36)  HR: 80 (27 Nov 2022 10:47) (80 - 96)  BP: 158/78 (27 Nov 2022 10:47) (107/72 - 158/78)  RR: 20 (27 Nov 2022 10:47) (18 - 22)  SpO2: 94% (27 Nov 2022 10:47) (90% - 96%)    Parameters below as of 27 Nov 2022 10:47  Patient On (Oxygen Delivery Method): nasal cannula, high flow  O2 Flow (L/min): 40  O2 Concentration (%): 60  CONSTITUTIONAL: NAD, well-developed   EYES: PERRLA; conjunctiva and sclera clear  ENMT: Moist oral mucosa, no pharyngeal injection or exudates   NECK: Supple   RESPIRATORY: Normal respiratory effort; lungs are clear to auscultation bilaterally  CARDIOVASCULAR: Regular rate and rhythm, normal S1 and S2, no murmur   ABDOMEN: Nontender to palpation, normoactive bowel sounds   MUSCULOSKELETAL: no clubbing or cyanosis of digits; no joint swelling or tenderness to palpation  PSYCH: A+O to person, place, and time; affect appropriate  NEUROLOGY: no gross sensory deficits   SKIN: No rashes     LABS:                        13.4   9.28  )-----------( 255      ( 27 Nov 2022 07:10 )             42.3     11-27    138  |  99  |  25<H>  ----------------------------<  123<H>  4.1   |  29  |  0.79    Ca    9.3      27 Nov 2022 07:10  Phos  2.5     11-27  Mg     2.1     11-27          SARS-CoV-2: NotDetec (13 Nov 2022 18:49)  COVID-19 PCR: NotDetec (21 Oct 2022 12:06)  COVID-19 PCR: NotDetec (17 Oct 2022 06:15)  COVID-19 PCR: NotDetec (10 Oct 2022 16:13)      RADIOLOGY & ADDITIONAL TESTS:  New Imaging Personally Reviewed Today:  New Electrocardiogram Personally Reviewed Today:  Other Results Reviewed Today:   Prior or Outpatient Records Reviewed Today with Summary:    COORDINATION OF CARE:  Consultant Communication and Details of Discussion (where applicable):

## 2022-11-28 NOTE — PROGRESS NOTE ADULT - PROBLEM SELECTOR PLAN 1
a/w COPD exacerbation, normally on 3LNC at home. Course c/b multiple RTT for worsening dyspnea/resp distress, placed on BiPAP, now weaned to HFNC. another RRT on 11/24 for WOB (unsure if due to IVF?; given 80mg IV lasix); placed back on BiPAP; CXR personally reviewed 11/24; appears similar to prior CXR   - pulm recs noted, continue taper prednisone down to 30mg for 3 days and continue to taper by 10mg afterwards for 3 days each till done   - continue with albuterol q4hr standing  - continue home spiriva and symbicort  - continue with HFNC and BiPAP overnight- f/u pulm when pt may be able to tolerate weaning off HFNC  - will stop trending daily ABG for now unless change in clinical status (RR< 12, pt unarousable)- discussed with floor NP and pulmonary  - c/w morphine 0.5mg iv q4 hours prn sob as a palliative measure, family aware and in agreement, hold for lethargy (has not been requiring)  - s/p 1 dose IV lasix on 11/20 - will c/t assess volume status, resumed PO lasix 11/26 as per pulm recs   - Per son, Dr. Calderon had discussed possibly setting up home bipap for patient- discussed this with inpatient pulm, who recommends re-evaluating this as a possible option on discharge if patient improves  -mucinex, IS, acapella, oob to chair.     #Metabolic encephalopathy- pt newly delirious and confused 11/16, now improved s/p continuous BIPAP x 48 hours  - delirium likely 2/2 COPD exam/hypoxia with possible component of hospital delirium - seems resolved?   - cont management of pna as above, resp support and frequent reorientation

## 2022-11-28 NOTE — PROGRESS NOTE ADULT - PROBLEM SELECTOR PLAN 12
- DVT ppx: lovenox subq  - GI ppx: ppi  - Diet: low sodium  -PT/oob to chair.     13. -Left message for son Prem. -Discussed plan with ACP Macey.

## 2022-11-28 NOTE — PROGRESS NOTE ADULT - SUBJECTIVE AND OBJECTIVE BOX
PULMONARY PROGRESS NOTE    VIDAL LOUIE  MRN-[id number]    Patient is a 89y old  Female who presents with a chief complaint of respiratory distress (27 Nov 2022 15:56)      HPI:  -  -    ROS:   -    ACTIVE MEDICATION LIST:  MEDICATIONS  (STANDING):  albuterol    90 MICROgram(s) HFA Inhaler 2 Puff(s) Inhalation every 4 hours  albuterol/ipratropium for Nebulization 3 milliLiter(s) Nebulizer every 6 hours  amLODIPine   Tablet 2.5 milliGRAM(s) Oral daily  aspirin enteric coated 81 milliGRAM(s) Oral daily  budesonide 160 MICROgram(s)/formoterol 4.5 MICROgram(s) Inhaler 2 Puff(s) Inhalation two times a day  calcium carbonate 1250 mG  + Vitamin D (OsCal 500 + D) 1 Tablet(s) Oral daily  cholecalciferol 2000 Unit(s) Oral daily  enoxaparin Injectable 40 milliGRAM(s) SubCutaneous every 24 hours  famotidine    Tablet 20 milliGRAM(s) Oral two times a day  furosemide    Tablet 40 milliGRAM(s) Oral daily  gabapentin 400 milliGRAM(s) Oral three times a day  influenza  Vaccine (HIGH DOSE) 0.7 milliLiter(s) IntraMuscular once  multivitamin 1 Tablet(s) Oral daily  nystatin Powder 1 Application(s) Topical three times a day  pantoprazole    Tablet 40 milliGRAM(s) Oral before breakfast  polyethylene glycol 3350 17 Gram(s) Oral daily  predniSONE   Tablet 30 milliGRAM(s) Oral daily  senna 2 Tablet(s) Oral at bedtime  simethicone 80 milliGRAM(s) Chew daily  sodium chloride 3%  Inhalation 4 milliLiter(s) Inhalation every 6 hours  sucralfate suspension 1 Gram(s) Oral four times a day  tiotropium 18 MICROgram(s) Capsule 1 Capsule(s) Inhalation daily    MEDICATIONS  (PRN):  acetaminophen     Tablet .. 650 milliGRAM(s) Oral every 6 hours PRN Temp greater or equal to 38C (100.4F), Mild Pain (1 - 3)  aluminum hydroxide/magnesium hydroxide/simethicone Suspension 30 milliLiter(s) Oral every 4 hours PRN Dyspepsia  aluminum hydroxide/magnesium hydroxide/simethicone Suspension 30 milliLiter(s) Oral every 6 hours PRN Dyspepsia  cyclobenzaprine 5 milliGRAM(s) Oral three times a day PRN Muscle Spasm  guaiFENesin  milliGRAM(s) Oral every 12 hours PRN Cough  melatonin 3 milliGRAM(s) Oral at bedtime PRN Insomnia  morphine  - Injectable 0.5 milliGRAM(s) IV Push every 4 hours PRN shortness of breath  ondansetron Injectable 4 milliGRAM(s) IV Push every 8 hours PRN Nausea and/or Vomiting      EXAM:  Vital Signs Last 24 Hrs  T(C): 36.3 (28 Nov 2022 12:49), Max: 36.4 (27 Nov 2022 21:20)  T(F): 97.4 (28 Nov 2022 12:49), Max: 97.5 (27 Nov 2022 21:20)  HR: 85 (28 Nov 2022 12:49) (73 - 90)  BP: 143/73 (28 Nov 2022 12:49) (126/75 - 143/73)  BP(mean): --  RR: 20 (28 Nov 2022 12:49) (18 - 22)  SpO2: 91% (28 Nov 2022 12:49) (90% - 96%)    Parameters below as of 28 Nov 2022 12:49  Patient On (Oxygen Delivery Method): nasal cannula, high flow        GENERAL: The patient is awake and alert in no apparent distress.     LUNGS: Clear to auscultation without wheezing, rales or rhonchi; respirations unlabored    HEART: Regular rate and rhythm without murmur.                            13.5   8.93  )-----------( 266      ( 28 Nov 2022 06:50 )             41.7       11-28    137  |  99  |  24<H>  ----------------------------<  105<H>  3.9   |  29  |  0.94    Ca    9.3      28 Nov 2022 06:49  Phos  3.1     11-28  Mg     2.0     11-28      >>> <<<    PROBLEM LIST:  89y Female with HEALTH ISSUES - PROBLEM Dx:  COPD exacerbation    Pulmonary nodule, right    Prophylactic measure    Osteoporosis    Lumbar compression fracture    Chronic systolic congestive heart failure    Hypertension    Constipation    Debility    ACP (advance care planning)    Chronic respiratory failure with hypoxia and hypercapnia    Respiratory distress    Gram-negative pneumonia    ISRAEL (acute kidney injury)    Nausea and vomiting    Hypokalemia              RECS:        Please call with any questions.    Taniya Lopez DO  [facility] Pulmonary/Sleep Medicine  [phone]   PULMONARY PROGRESS NOTE    VIDAL LOUIE  MRN-[id number]    Patient is a 89y old  Female who presents with a chief complaint of respiratory distress (27 Nov 2022 15:56)      HPI:  -remains on HFNC 40% and 55L- 91-92%  she feels winded  some cough  has acapella at bedside   -    ROS:   -    ACTIVE MEDICATION LIST:  MEDICATIONS  (STANDING):  albuterol    90 MICROgram(s) HFA Inhaler 2 Puff(s) Inhalation every 4 hours  albuterol/ipratropium for Nebulization 3 milliLiter(s) Nebulizer every 6 hours  amLODIPine   Tablet 2.5 milliGRAM(s) Oral daily  aspirin enteric coated 81 milliGRAM(s) Oral daily  budesonide 160 MICROgram(s)/formoterol 4.5 MICROgram(s) Inhaler 2 Puff(s) Inhalation two times a day  calcium carbonate 1250 mG  + Vitamin D (OsCal 500 + D) 1 Tablet(s) Oral daily  cholecalciferol 2000 Unit(s) Oral daily  enoxaparin Injectable 40 milliGRAM(s) SubCutaneous every 24 hours  famotidine    Tablet 20 milliGRAM(s) Oral two times a day  furosemide    Tablet 40 milliGRAM(s) Oral daily  gabapentin 400 milliGRAM(s) Oral three times a day  influenza  Vaccine (HIGH DOSE) 0.7 milliLiter(s) IntraMuscular once  multivitamin 1 Tablet(s) Oral daily  nystatin Powder 1 Application(s) Topical three times a day  pantoprazole    Tablet 40 milliGRAM(s) Oral before breakfast  polyethylene glycol 3350 17 Gram(s) Oral daily  predniSONE   Tablet 30 milliGRAM(s) Oral daily  senna 2 Tablet(s) Oral at bedtime  simethicone 80 milliGRAM(s) Chew daily  sodium chloride 3%  Inhalation 4 milliLiter(s) Inhalation every 6 hours  sucralfate suspension 1 Gram(s) Oral four times a day  tiotropium 18 MICROgram(s) Capsule 1 Capsule(s) Inhalation daily    MEDICATIONS  (PRN):  acetaminophen     Tablet .. 650 milliGRAM(s) Oral every 6 hours PRN Temp greater or equal to 38C (100.4F), Mild Pain (1 - 3)  aluminum hydroxide/magnesium hydroxide/simethicone Suspension 30 milliLiter(s) Oral every 4 hours PRN Dyspepsia  aluminum hydroxide/magnesium hydroxide/simethicone Suspension 30 milliLiter(s) Oral every 6 hours PRN Dyspepsia  cyclobenzaprine 5 milliGRAM(s) Oral three times a day PRN Muscle Spasm  guaiFENesin  milliGRAM(s) Oral every 12 hours PRN Cough  melatonin 3 milliGRAM(s) Oral at bedtime PRN Insomnia  morphine  - Injectable 0.5 milliGRAM(s) IV Push every 4 hours PRN shortness of breath  ondansetron Injectable 4 milliGRAM(s) IV Push every 8 hours PRN Nausea and/or Vomiting      EXAM:  Vital Signs Last 24 Hrs  T(C): 36.3 (28 Nov 2022 12:49), Max: 36.4 (27 Nov 2022 21:20)  T(F): 97.4 (28 Nov 2022 12:49), Max: 97.5 (27 Nov 2022 21:20)  HR: 85 (28 Nov 2022 12:49) (73 - 90)  BP: 143/73 (28 Nov 2022 12:49) (126/75 - 143/73)  BP(mean): --  RR: 20 (28 Nov 2022 12:49) (18 - 22)  SpO2: 91% (28 Nov 2022 12:49) (90% - 96%)    Parameters below as of 28 Nov 2022 12:49  Patient On (Oxygen Delivery Method): nasal cannula, high flow        GENERAL: The patient is awake and alert in no apparent distress.     LUNGS: Clear to auscultation without wheezing                          13.5   8.93  )-----------( 266      ( 28 Nov 2022 06:50 )             41.7       11-28    137  |  99  |  24<H>  ----------------------------<  105<H>  3.9   |  29  |  0.94    Ca    9.3      28 Nov 2022 06:49  Phos  3.1     11-28  Mg     2.0     11-28     < from: Xray Chest 1 View- PORTABLE-Urgent (Xray Chest 1 View- PORTABLE-Urgent .) (11.24.22 @ 13:42) >    ACC: [id number] EXAM:  XR CHEST PORTABLE URGENT 1V                          PROCEDURE DATE:  11/24/2022          INTERPRETATION:  CLINICAL STATEMENT: Shortness of breath    TECHNIQUE: AP view of the chest.      COMPARISON: Chest x-ray 11/16/2022, CT chest 11/14/2022    Distal tip of right upper extremity midline catheter noted.    Patient is rotated.    Upsize exaggerated by AP technique. Calcified uncoiled aorta.    Chronic emphysematous lung changes. Approximate 2.2 cm rounded mass left   midlung field again seen. Mild elevation right hemidiaphragm. Unchanged   bibasilar effusions with overlying atelectasis.    Degenerative changes of the spine, shoulders and AC joints.    IMPRESSION:    Chronic emphysematous lung changes. Approximate 2.2 cm rounded mass left   midlung field again seen. Mild elevation right hemidiaphragm. Unchanged   bibasilar effusions with overlying atelectasis.    --- End of Report ---            ISHAAN RUBALCAVA MD; Attending Radiologist  This document has been electronically signed. Nov 24 2022  1:42PM    < end of copied text >      PROBLEM LIST:  89y Female with HEALTH ISSUES - PROBLEM Dx:  COPD exacerbation    Pulmonary nodule, right    Prophylactic measure    Osteoporosis    Lumbar compression fracture    Chronic systolic congestive heart failure    Hypertension    Constipation    Debility    ACP (advance care planning)    Chronic respiratory failure with hypoxia and hypercapnia    Respiratory distress    Gram-negative pneumonia    ISRAEL (acute kidney injury)    Nausea and vomiting    Hypokalemia         RECS:  tried to taper HFNC But unsuccessful  at the office room air saturations ranging from 92-94%  repeat xray in the morning  pulmozyme nebs  mucomyst nebs  inhalers  HFNC  BPAP at night  daily diuretics  airway clearance        Please call with any questions.    Taniya Lopez, DO  [facility] Pulmonary/Sleep Medicine  [phone]

## 2022-11-28 NOTE — PROGRESS NOTE ADULT - SUBJECTIVE AND OBJECTIVE BOX
Patient is a 89y old  Female who presents with a chief complaint of respiratory distress (28 Nov 2022 13:40)        SUBJECTIVE / OVERNIGHT EVENTS: Patient reports some cough and SOB. Mild right sided abd discomfort.       MEDICATIONS  (STANDING):  acetylcysteine 10%  Inhalation 4 milliLiter(s) Inhalation two times a day  albuterol    90 MICROgram(s) HFA Inhaler 2 Puff(s) Inhalation every 4 hours  albuterol/ipratropium for Nebulization 3 milliLiter(s) Nebulizer every 6 hours  amLODIPine   Tablet 2.5 milliGRAM(s) Oral daily  aspirin enteric coated 81 milliGRAM(s) Oral daily  budesonide 160 MICROgram(s)/formoterol 4.5 MICROgram(s) Inhaler 2 Puff(s) Inhalation two times a day  calcium carbonate 1250 mG  + Vitamin D (OsCal 500 + D) 1 Tablet(s) Oral daily  cholecalciferol 2000 Unit(s) Oral daily  dornase flor Solution 2.5 milliGRAM(s) Inhalation daily  enoxaparin Injectable 40 milliGRAM(s) SubCutaneous every 24 hours  famotidine    Tablet 20 milliGRAM(s) Oral two times a day  furosemide    Tablet 40 milliGRAM(s) Oral daily  gabapentin 400 milliGRAM(s) Oral three times a day  guaiFENesin  milliGRAM(s) Oral every 12 hours  influenza  Vaccine (HIGH DOSE) 0.7 milliLiter(s) IntraMuscular once  lidocaine   4% Patch 1 Patch Transdermal every 24 hours  multivitamin 1 Tablet(s) Oral daily  nystatin Powder 1 Application(s) Topical three times a day  pantoprazole    Tablet 40 milliGRAM(s) Oral before breakfast  polyethylene glycol 3350 17 Gram(s) Oral daily  predniSONE   Tablet 30 milliGRAM(s) Oral daily  senna 2 Tablet(s) Oral at bedtime  simethicone 80 milliGRAM(s) Chew daily  sodium chloride 3%  Inhalation 4 milliLiter(s) Inhalation every 6 hours  sucralfate suspension 1 Gram(s) Oral four times a day  tiotropium 18 MICROgram(s) Capsule 1 Capsule(s) Inhalation daily    MEDICATIONS  (PRN):  acetaminophen     Tablet .. 650 milliGRAM(s) Oral every 6 hours PRN Temp greater or equal to 38C (100.4F), Mild Pain (1 - 3)  aluminum hydroxide/magnesium hydroxide/simethicone Suspension 30 milliLiter(s) Oral every 4 hours PRN Dyspepsia  aluminum hydroxide/magnesium hydroxide/simethicone Suspension 30 milliLiter(s) Oral every 6 hours PRN Dyspepsia  cyclobenzaprine 5 milliGRAM(s) Oral three times a day PRN Muscle Spasm  melatonin 3 milliGRAM(s) Oral at bedtime PRN Insomnia  morphine  - Injectable 0.5 milliGRAM(s) IV Push every 4 hours PRN shortness of breath  ondansetron Injectable 4 milliGRAM(s) IV Push every 8 hours PRN Nausea and/or Vomiting      Vital Signs Last 24 Hrs  T(C): 36.3 (28 Nov 2022 12:49), Max: 36.4 (27 Nov 2022 21:20)  T(F): 97.4 (28 Nov 2022 12:49), Max: 97.5 (27 Nov 2022 21:20)  HR: 96 (28 Nov 2022 14:20) (73 - 96)  BP: 143/73 (28 Nov 2022 12:49) (126/75 - 143/73)  BP(mean): --  RR: 20 (28 Nov 2022 14:20) (18 - 22)  SpO2: 91% (28 Nov 2022 14:20) (90% - 96%)    Parameters below as of 28 Nov 2022 14:20  Patient On (Oxygen Delivery Method): nasal cannula, high flow,@31C  O2 Flow (L/min): 40  O2 Concentration (%): 60  CAPILLARY BLOOD GLUCOSE      POCT Blood Glucose.: 213 mg/dL (28 Nov 2022 11:42)  POCT Blood Glucose.: 128 mg/dL (28 Nov 2022 08:42)  POCT Blood Glucose.: 126 mg/dL (27 Nov 2022 21:36)  POCT Blood Glucose.: 159 mg/dL (27 Nov 2022 17:08)    I&O's Summary    27 Nov 2022 07:01  -  28 Nov 2022 07:00  --------------------------------------------------------  IN: 360 mL / OUT: 950 mL / NET: -590 mL    28 Nov 2022 07:01  -  28 Nov 2022 16:37  --------------------------------------------------------  IN: 500 mL / OUT: 1000 mL / NET: -500 mL          PHYSICAL EXAM:   GENERAL: NAD,   HEAD:  Atraumatic, Normocephalic  EYES:  conjunctiva and sclera clear  NECK: Supple,   CHEST/LUNG: mild scattered wheeze and crackles b/l  HEART: S1S2 normal. Regular rate and rhythm; No murmurs, rubs, or gallops  ABDOMEN: Soft, Nontender, Nondistended; Bowel sounds present  EXTREMITIES:  trace LE edema  PSYCH/Neuro: AAOx3. Non-focal.   SKIN: No rashes or lesions      LABS:                        13.5   8.93  )-----------( 266      ( 28 Nov 2022 06:50 )             41.7     11-28    137  |  99  |  24<H>  ----------------------------<  105<H>  3.9   |  29  |  0.94    Ca    9.3      28 Nov 2022 06:49  Phos  3.1     11-28  Mg     2.0     11-28                  RADIOLOGY & ADDITIONAL TESTS:    Imaging Personally Reviewed:  Consultant(s) Notes Reviewed:  pulm  Care Discussed with Consultants/Other Providers:

## 2022-11-29 NOTE — PROGRESS NOTE ADULT - PROBLEM SELECTOR PLAN 12
- DVT ppx: lovenox subq  - GI ppx: ppi  - Diet: low sodium  -PT/oob to chair.     13. -Discussed with christine Amaro at bedside, ACP Tiff, and Dr. Lopez. - DVT ppx: lovenox subq  - GI ppx: ppi  - Diet: low sodium  -PT/oob to chair.     13. -Discussed with christine Amaro at bedside, [facility] Tiff, and Dr. Lopez.  -Can increase HFNC, use Bipap, and/or increase steroids and/or resume abx if patient deteriorates overnight.

## 2022-11-29 NOTE — PROGRESS NOTE ADULT - PROBLEM SELECTOR PLAN 1
a/w COPD exacerbation, normally on 3LNC at home. Course c/b multiple RTT for worsening dyspnea/resp distress, placed on BiPAP, now weaned to HFNC. another RRT on 11/24 for WOB (unsure if due to IVF?; given 80mg IV lasix); placed back on BiPAP; CXR personally reviewed 11/24; appears similar to prior CXR   - pulm recs noted, continue taper prednisone down to 30mg for 3 days and continue to taper by 10mg afterwards for 3 days each till done   - continue with albuterol q4hr standing  - continue home spiriva and symbicort  - continue with HFNC and BiPAP overnight- f/u pulm when pt may be able to tolerate weaning off HFNC  - will stop trending daily ABG for now unless change in clinical status (RR< 12, pt unarousable)- discussed with floor NP and pulmonary  - c/w morphine 0.5mg iv q4 hours prn sob as a palliative measure, family aware and in agreement, hold for lethargy (has not been requiring) - will stop for now.   - s/p 1 dose IV lasix on 11/20 - will c/t assess volume status, resumed PO lasix 11/26 as per pulm recs   - Per son, Dr. Calderon had discussed possibly setting up home bipap for patient- discussed this with inpatient pulm, who recommends re-evaluating this as a possible option on discharge if patient improves  -mucinex, IS, acapella, oob to chair. -11/29: d/w pulm, ct chest, nebs, ct pt, keep steroids same for now.    #Metabolic encephalopathy- pt newly delirious and confused 11/16, now improved s/p continuous BIPAP x 48 hours  - delirium likely 2/2 COPD exam/hypoxia with possible component of hospital delirium - seems resolved.   - cont management of pna as above, resp support and frequent reorientation

## 2022-11-29 NOTE — PROGRESS NOTE ADULT - SUBJECTIVE AND OBJECTIVE BOX
Patient is a 89y old  Female who presents with a chief complaint of respiratory distress (29 Nov 2022 12:43)        SUBJECTIVE / OVERNIGHT EVENTS: sob with cough and increased WOB.       MEDICATIONS  (STANDING):  acetylcysteine 10%  Inhalation 4 milliLiter(s) Inhalation two times a day  albuterol    90 MICROgram(s) HFA Inhaler 2 Puff(s) Inhalation every 4 hours  albuterol/ipratropium for Nebulization 3 milliLiter(s) Nebulizer every 6 hours  amLODIPine   Tablet 2.5 milliGRAM(s) Oral daily  aspirin enteric coated 81 milliGRAM(s) Oral daily  budesonide 160 MICROgram(s)/formoterol 4.5 MICROgram(s) Inhaler 2 Puff(s) Inhalation two times a day  calcium carbonate 1250 mG  + Vitamin D (OsCal 500 + D) 1 Tablet(s) Oral daily  chlorhexidine 2% Cloths 1 Application(s) Topical daily  cholecalciferol 2000 Unit(s) Oral daily  dornase flor Solution 2.5 milliGRAM(s) Inhalation daily  enoxaparin Injectable 40 milliGRAM(s) SubCutaneous every 24 hours  famotidine    Tablet 20 milliGRAM(s) Oral two times a day  furosemide    Tablet 40 milliGRAM(s) Oral daily  gabapentin 400 milliGRAM(s) Oral three times a day  guaiFENesin  milliGRAM(s) Oral every 12 hours  influenza  Vaccine (HIGH DOSE) 0.7 milliLiter(s) IntraMuscular once  lidocaine   4% Patch 1 Patch Transdermal every 24 hours  multivitamin 1 Tablet(s) Oral daily  nystatin Powder 1 Application(s) Topical three times a day  pantoprazole    Tablet 40 milliGRAM(s) Oral before breakfast  polyethylene glycol 3350 17 Gram(s) Oral daily  predniSONE   Tablet   Oral   senna 2 Tablet(s) Oral at bedtime  simethicone 80 milliGRAM(s) Chew daily  sodium chloride 3%  Inhalation 4 milliLiter(s) Inhalation every 6 hours  sucralfate suspension 1 Gram(s) Oral four times a day  tiotropium 18 MICROgram(s) Capsule 1 Capsule(s) Inhalation daily    MEDICATIONS  (PRN):  acetaminophen     Tablet .. 650 milliGRAM(s) Oral every 6 hours PRN Temp greater or equal to 38C (100.4F), Mild Pain (1 - 3)  aluminum hydroxide/magnesium hydroxide/simethicone Suspension 30 milliLiter(s) Oral every 4 hours PRN Dyspepsia  aluminum hydroxide/magnesium hydroxide/simethicone Suspension 30 milliLiter(s) Oral every 6 hours PRN Dyspepsia  cyclobenzaprine 5 milliGRAM(s) Oral three times a day PRN Muscle Spasm  melatonin 3 milliGRAM(s) Oral at bedtime PRN Insomnia  ondansetron Injectable 4 milliGRAM(s) IV Push every 8 hours PRN Nausea and/or Vomiting      Vital Signs Last 24 Hrs  T(C): 36.4 (29 Nov 2022 11:54), Max: 36.7 (28 Nov 2022 22:00)  T(F): 97.5 (29 Nov 2022 11:54), Max: 98 (28 Nov 2022 22:00)  HR: 79 (29 Nov 2022 11:54) (68 - 83)  BP: 124/77 (29 Nov 2022 11:54) (124/77 - 155/86)  BP(mean): --  RR: 20 (29 Nov 2022 11:54) (18 - 20)  SpO2: 96% (29 Nov 2022 11:54) (88% - 96%)    Parameters below as of 29 Nov 2022 11:54  Patient On (Oxygen Delivery Method): nasal cannula, high flow  O2 Flow (L/min): 60  O2 Concentration (%): 60  CAPILLARY BLOOD GLUCOSE      POCT Blood Glucose.: 188 mg/dL (29 Nov 2022 12:38)  POCT Blood Glucose.: 114 mg/dL (29 Nov 2022 08:07)  POCT Blood Glucose.: 106 mg/dL (28 Nov 2022 22:34)  POCT Blood Glucose.: 105 mg/dL (28 Nov 2022 17:13)    I&O's Summary    28 Nov 2022 07:01  -  29 Nov 2022 07:00  --------------------------------------------------------  IN: 940 mL / OUT: 1600 mL / NET: -660 mL    29 Nov 2022 07:01  -  29 Nov 2022 16:50  --------------------------------------------------------  IN: 650 mL / OUT: 2200 mL / NET: -1550 mL          PHYSICAL EXAM:   GENERAL: NAD, well-developed  HEAD:  Atraumatic, Normocephalic  EYES: EOMI, PERRLA, conjunctiva and sclera clear  NECK: Suppl e  CHEST/LUNG: Congested sounding.   HEART: S1S2 normal. Regular rate and rhythm; No murmurs, rubs, or gallops  ABDOMEN: Soft, Nontender, Nondistended; Bowel sounds present  EXTREMITIES:  2+ Peripheral Pulses, No clubbing, cyanosis, or edema  PSYCH/Neuro: AAOx3. Non-focal.   SKIN: No rashes or lesions      LABS:                        13.5   8.93  )-----------( 266      ( 28 Nov 2022 06:50 )             41.7     11-28    137  |  99  |  24<H>  ----------------------------<  105<H>  3.9   |  29  |  0.94    Ca    9.3      28 Nov 2022 06:49  Phos  3.1     11-28  Mg     2.0     11-28                  RADIOLOGY & ADDITIONAL TESTS:    Imaging Personally Reviewed:  Consultant(s) Notes Reviewed:  pulm  Care Discussed with Consultants/Other Providers: pulm

## 2022-11-29 NOTE — PROGRESS NOTE ADULT - PROBLEM SELECTOR PLAN 2
CT scan on 11/14 showing patchy consolidation in R lung; given worsening respiratory, c/f likely gram negative PNA   - clinically improving, continue with BiPAP and HFNC at 40%   - completed zosyn x 7 day course   - legionella, RVP negative   - c/w steroids, nebs as above    - leukocytosis improving after small IVF, holding IVF for now due to WOB; continue to monitor CBC; if remains persistent, will consider repeat CT imaging. - CT chest pend.

## 2022-11-29 NOTE — PROGRESS NOTE ADULT - SUBJECTIVE AND OBJECTIVE BOX
PULMONARY PROGRESS NOTE    VIDAL LOUIE  MRN-[id number]    Patient is a 89y old  Female who presents with a chief complaint of respiratory distress (28 Nov 2022 13:40)      HPI:  -  remains on hfnc  remains dyspneic     ROS:   -    ACTIVE MEDICATION LIST:  MEDICATIONS  (STANDING):  acetylcysteine 10%  Inhalation 4 milliLiter(s) Inhalation two times a day  albuterol    90 MICROgram(s) HFA Inhaler 2 Puff(s) Inhalation every 4 hours  albuterol/ipratropium for Nebulization 3 milliLiter(s) Nebulizer every 6 hours  amLODIPine   Tablet 2.5 milliGRAM(s) Oral daily  aspirin enteric coated 81 milliGRAM(s) Oral daily  budesonide 160 MICROgram(s)/formoterol 4.5 MICROgram(s) Inhaler 2 Puff(s) Inhalation two times a day  calcium carbonate 1250 mG  + Vitamin D (OsCal 500 + D) 1 Tablet(s) Oral daily  chlorhexidine 2% Cloths 1 Application(s) Topical daily  cholecalciferol 2000 Unit(s) Oral daily  dornase flor Solution 2.5 milliGRAM(s) Inhalation daily  enoxaparin Injectable 40 milliGRAM(s) SubCutaneous every 24 hours  famotidine    Tablet 20 milliGRAM(s) Oral two times a day  furosemide    Tablet 40 milliGRAM(s) Oral daily  gabapentin 400 milliGRAM(s) Oral three times a day  guaiFENesin  milliGRAM(s) Oral every 12 hours  influenza  Vaccine (HIGH DOSE) 0.7 milliLiter(s) IntraMuscular once  lidocaine   4% Patch 1 Patch Transdermal every 24 hours  multivitamin 1 Tablet(s) Oral daily  nystatin Powder 1 Application(s) Topical three times a day  pantoprazole    Tablet 40 milliGRAM(s) Oral before breakfast  polyethylene glycol 3350 17 Gram(s) Oral daily  predniSONE   Tablet   Oral   senna 2 Tablet(s) Oral at bedtime  simethicone 80 milliGRAM(s) Chew daily  sodium chloride 3%  Inhalation 4 milliLiter(s) Inhalation every 6 hours  sucralfate suspension 1 Gram(s) Oral four times a day  tiotropium 18 MICROgram(s) Capsule 1 Capsule(s) Inhalation daily    MEDICATIONS  (PRN):  acetaminophen     Tablet .. 650 milliGRAM(s) Oral every 6 hours PRN Temp greater or equal to 38C (100.4F), Mild Pain (1 - 3)  aluminum hydroxide/magnesium hydroxide/simethicone Suspension 30 milliLiter(s) Oral every 4 hours PRN Dyspepsia  aluminum hydroxide/magnesium hydroxide/simethicone Suspension 30 milliLiter(s) Oral every 6 hours PRN Dyspepsia  cyclobenzaprine 5 milliGRAM(s) Oral three times a day PRN Muscle Spasm  melatonin 3 milliGRAM(s) Oral at bedtime PRN Insomnia  morphine  - Injectable 0.5 milliGRAM(s) IV Push every 4 hours PRN shortness of breath  ondansetron Injectable 4 milliGRAM(s) IV Push every 8 hours PRN Nausea and/or Vomiting      EXAM:  Vital Signs Last 24 Hrs  T(C): 36.4 (29 Nov 2022 11:54), Max: 36.7 (28 Nov 2022 22:00)  T(F): 97.5 (29 Nov 2022 11:54), Max: 98 (28 Nov 2022 22:00)  HR: 79 (29 Nov 2022 11:54) (68 - 96)  BP: 124/77 (29 Nov 2022 11:54) (124/77 - 155/86)  BP(mean): --  RR: 20 (29 Nov 2022 11:54) (18 - 20)  SpO2: 96% (29 Nov 2022 11:54) (88% - 96%)    Parameters below as of 29 Nov 2022 11:54  Patient On (Oxygen Delivery Method): nasal cannula, high flow  O2 Flow (L/min): 60  O2 Concentration (%): 60    GENERAL: The patient is awake and alert in no apparent distress.     LUNGS: not wheezing  some scattered rhonchi                             13.5   8.93  )-----------( 266      ( 28 Nov 2022 06:50 )             41.7       11-28    137  |  99  |  24<H>  ----------------------------<  105<H>  3.9   |  29  |  0.94    Ca    9.3      28 Nov 2022 06:49  Phos  3.1     11-28  Mg     2.0     11-28       < from: Xray Chest 1 View- PORTABLE-Urgent (Xray Chest 1 View- PORTABLE-Urgent .) (11.24.22 @ 13:42) >    ACC: [id number] EXAM:  XR CHEST PORTABLE URGENT 1V                          PROCEDURE DATE:  11/24/2022          INTERPRETATION:  CLINICAL STATEMENT: Shortness of breath    TECHNIQUE: AP view of the chest.      COMPARISON: Chest x-ray 11/16/2022, CT chest 11/14/2022    Distal tip of right upper extremity midline catheter noted.    Patient is rotated.    Upsize exaggerated by AP technique. Calcified uncoiled aorta.    Chronic emphysematous lung changes. Approximate 2.2 cm rounded mass left   midlung field again seen. Mild elevation right hemidiaphragm. Unchanged   bibasilar effusions with overlying atelectasis.    Degenerative changes of the spine, shoulders and AC joints.    IMPRESSION:    Chronic emphysematous lung changes. Approximate 2.2 cm rounded mass left   midlung field again seen. Mild elevation right hemidiaphragm. Unchanged   bibasilar effusions with overlying atelectasis.    --- End of Report ---            ISHAAN RUBALCAVA MD; Attending Radiologist  This document has been electronically signed. Nov 24 2022  1:42PM    < end of copied text >  < from: CT Angio Chest PE Protocol w/ IV Cont (11.14.22 @ 03:53) >    ACC: [id number] EXAM:  CT ANGIO CHEST PULM [facility]                          PROCEDURE DATE:  11/14/2022          INTERPRETATION:  CLINICAL INDICATION: Dyspnea    TECHNIQUE: A volumetric acquisition of the chest was obtained from the   thoracic inlet to the upper abdomen during dynamic administration of 90cc   Omnipaque 350 intravenous contrast according to the PE protocol. 3D MIP   images were provided.    COMPARISON: CT chest 10/13/2022    FINDINGS:  CTA: No pulmonary embolism.    Heart/Vasculature: The heart is enlarged in size. There is no pericardial   effusion.    Lungs/Airways/Pleura: Mild central airways secretions. Emphysema. Left   lower lobe superior segment 2.3 x 1.9 cm nodule (image 53, series 2) was   previously 1.6 x 1.5 cm on10/14/2022 CT thoracic spine and new compared   to 6/16/2019.    Patchy right apical consolidative opacity (image 34, series 2) is similar   compared to 11/13/2022 CT chest and new compared to 10/14/2022 CT   thoracic spine.    Right basilar parenchymal opacification likely represents a combination   of atelectasis and consolidation.    Small bilateral pleural effusions.    Mediastinum/Lymph nodes: Left paratracheal 3.6 x 1.3 cm node (image 51,   series 3) appears increased in size compared to 10/10/2018.. Additional   mediastinal, cardiophrenic angle, and right internal mammary nodes are   similar compared to 10/10/2018.    Upper Abdomen: Enlarged celiac nodes, similar compared to prior exams.   Subcentimeter exophytic right renal lesion unchanged    Bones and Soft Tissues: Multilevel degenerative disc disease. T12   compression fracture is redemonstrated and seen since 10/14/2022. Grade 1   anterolisthesis of C7 on T1, T1 on T2, and T2 on T3.    IMPRESSION:    No pulmonary embolism.    Patchy consolidation in the right apical region and at the right base may   represent pneumonia.    Left lower lobe 2.3 cm nodule increased in size compared to 10/14/2022   and likely represents primary lung neoplasm.    Left paratracheal 3.6 x 1.3 cmnode appears increased in size compared to   10/10/2018 and is indeterminate.    --- End of Report ---          YANDY VALE MD; Resident Radiologist  This document has been electronically signed.   BETTY SÁNCHEZ MD; Attending Radiologist  This document has been electronically signed. Nov 14 2022  9:23AM    < end of copied text >    PROBLEM LIST:  89y Female with HEALTH ISSUES - PROBLEM Dx:  COPD exacerbation    Pulmonary nodule, right    Prophylactic measure    Osteoporosis    Lumbar compression fracture    Chronic systolic congestive heart failure    Hypertension    Constipation    Debility    ACP (advance care planning)    Chronic respiratory failure with hypoxia and hypercapnia    Respiratory distress    Gram-negative pneumonia    ISRAEL (acute kidney injury)    Nausea and vomiting    Hypokalemia              RECS:  remains on HFNC despite diuresis/airway clearance modalities  suggest ct chest noncontrast  given her emphysema, may need to tolerate saturations as low as 88% if she is comfortable, but would first want to ensure her effusions haven't worsened  chest vest/c hest pt/acapella / pulmozyme/ hypersal / nebs standing  Q6hrs/ bronchodilators all to be continued   suggest palliative f/u -  is the morphine IVP she is getting affecting her resp status & maybe making it harder to get her off HFNC?  continue diuretics   finish prednisone taper as rx    Please call with any questions.    Taniya Lopez, DO  [facility]  [phone]

## 2022-11-30 NOTE — PROGRESS NOTE ADULT - PROBLEM SELECTOR PLAN 1
a/w COPD exacerbation, normally on 3LNC at home. Course c/b multiple RTT for worsening dyspnea/resp distress, placed on BiPAP, now weaned to HFNC. another RRT on 11/24 for WOB (unsure if due to IVF?; given 80mg IV lasix); placed back on BiPAP; CXR personally reviewed 11/24; appears similar to prior CXR   - pulm recs noted, continue taper prednisone down to 30mg for 3 days and continue to taper by 10mg afterwards for 3 days each till done   - continue with albuterol q4hr standing  - continue home spiriva and symbicort  - continue with HFNC and BiPAP overnight- f/u pulm when pt may be able to tolerate weaning off HFNC  - will stop trending daily ABG for now unless change in clinical status (RR< 12, pt unarousable)- discussed with floor NP and pulmonary  - c/w morphine 0.5mg iv q4 hours prn sob as a palliative measure, family aware and in agreement, hold for lethargy (has not been requiring) - will stop for now.   - s/p 1 dose IV lasix on 11/20 - will c/t assess volume status, resumed PO lasix 11/26 as per pulm recs   - Per son, Dr. Calderon had discussed possibly setting up home bipap for patient- discussed this with inpatient pulm, who recommends re-evaluating this as a possible option on discharge if patient improves  -mucinex, IS, acapella, oob to chair. -11/29: d/w pulm, ct chest, nebs, ct pt, keep steroids same for now.  11/30 - CT chest shows ?new pna. -zosyn restarted. C/w high flow and bipap qhs. Nodules f/u later time per pulm.    #Metabolic encephalopathy- pt newly delirious and confused 11/16, now improved s/p continuous BIPAP x 48 hours  - delirium likely 2/2 COPD exam/hypoxia with possible component of hospital delirium - seems resolved.   - cont management of pna as above, resp support and frequent reorientation

## 2022-11-30 NOTE — PROGRESS NOTE ADULT - PROBLEM SELECTOR PLAN 12
- DVT ppx: lovenox subq  - GI ppx: ppi  - Diet: low sodium  -PT/oob to chair.     13. -Discussed with ACP Elizabeth and Dr. Lopez.  -Can increase HFNC, use Bipap, and/or increase steroids if patient deteriorates overnight.

## 2022-11-30 NOTE — PROGRESS NOTE ADULT - SUBJECTIVE AND OBJECTIVE BOX
Patient is a 89y old  Female who presents with a chief complaint of respiratory distress (30 Nov 2022 13:54)        SUBJECTIVE / OVERNIGHT EVENTS: Patient worried about CT scan results. Still with some SOB and cough.       MEDICATIONS  (STANDING):  acetylcysteine 10%  Inhalation 4 milliLiter(s) Inhalation two times a day  albuterol    90 MICROgram(s) HFA Inhaler 2 Puff(s) Inhalation every 4 hours  albuterol/ipratropium for Nebulization 3 milliLiter(s) Nebulizer every 6 hours  amLODIPine   Tablet 2.5 milliGRAM(s) Oral daily  aspirin enteric coated 81 milliGRAM(s) Oral daily  budesonide 160 MICROgram(s)/formoterol 4.5 MICROgram(s) Inhaler 2 Puff(s) Inhalation two times a day  calcium carbonate 1250 mG  + Vitamin D (OsCal 500 + D) 1 Tablet(s) Oral daily  chlorhexidine 2% Cloths 1 Application(s) Topical daily  cholecalciferol 2000 Unit(s) Oral daily  dornase flor Solution 2.5 milliGRAM(s) Inhalation daily  enoxaparin Injectable 40 milliGRAM(s) SubCutaneous every 24 hours  famotidine    Tablet 20 milliGRAM(s) Oral two times a day  furosemide    Tablet 40 milliGRAM(s) Oral daily  gabapentin 400 milliGRAM(s) Oral three times a day  guaiFENesin  milliGRAM(s) Oral every 12 hours  influenza  Vaccine (HIGH DOSE) 0.7 milliLiter(s) IntraMuscular once  lidocaine   4% Patch 1 Patch Transdermal every 24 hours  multivitamin 1 Tablet(s) Oral daily  nystatin Powder 1 Application(s) Topical three times a day  pantoprazole    Tablet 40 milliGRAM(s) Oral before breakfast  piperacillin/tazobactam IVPB. 3.375 Gram(s) IV Intermittent once  piperacillin/tazobactam IVPB.- 3.375 Gram(s) IV Intermittent once  polyethylene glycol 3350 17 Gram(s) Oral daily  predniSONE   Tablet 20 milliGRAM(s) Oral daily  predniSONE   Tablet   Oral   senna 2 Tablet(s) Oral at bedtime  simethicone 80 milliGRAM(s) Chew daily  sodium chloride 3%  Inhalation 4 milliLiter(s) Inhalation every 6 hours  sucralfate suspension 1 Gram(s) Oral four times a day  tiotropium 2.5 MICROgram(s) Inhaler 2 Puff(s) Inhalation daily    MEDICATIONS  (PRN):  acetaminophen     Tablet .. 650 milliGRAM(s) Oral every 6 hours PRN Temp greater or equal to 38C (100.4F), Mild Pain (1 - 3)  aluminum hydroxide/magnesium hydroxide/simethicone Suspension 30 milliLiter(s) Oral every 4 hours PRN Dyspepsia  aluminum hydroxide/magnesium hydroxide/simethicone Suspension 30 milliLiter(s) Oral every 6 hours PRN Dyspepsia  cyclobenzaprine 5 milliGRAM(s) Oral three times a day PRN Muscle Spasm  melatonin 3 milliGRAM(s) Oral at bedtime PRN Insomnia  ondansetron Injectable 4 milliGRAM(s) IV Push every 8 hours PRN Nausea and/or Vomiting      Vital Signs Last 24 Hrs  T(C): 36.9 (30 Nov 2022 12:03), Max: 36.9 (30 Nov 2022 12:03)  T(F): 98.4 (30 Nov 2022 12:03), Max: 98.4 (30 Nov 2022 12:03)  HR: 97 (30 Nov 2022 12:03) (77 - 97)  BP: 123/83 (30 Nov 2022 12:03) (123/83 - 136/74)  BP(mean): --  RR: 18 (30 Nov 2022 16:23) (18 - 22)  SpO2: 94% (30 Nov 2022 16:23) (94% - 97%)    Parameters below as of 30 Nov 2022 16:23  Patient On (Oxygen Delivery Method): HFNC  O2 Flow (L/min): 60  O2 Concentration (%): 60  CAPILLARY BLOOD GLUCOSE      POCT Blood Glucose.: 221 mg/dL (30 Nov 2022 12:27)  POCT Blood Glucose.: 144 mg/dL (30 Nov 2022 08:23)  POCT Blood Glucose.: 89 mg/dL (29 Nov 2022 21:41)  POCT Blood Glucose.: 129 mg/dL (29 Nov 2022 17:13)    I&O's Summary    29 Nov 2022 07:01  -  30 Nov 2022 07:00  --------------------------------------------------------  IN: 1331 mL / OUT: 2700 mL / NET: -1369 mL    30 Nov 2022 07:01  -  30 Nov 2022 16:35  --------------------------------------------------------  IN: 500 mL / OUT: 1500 mL / NET: -1000 mL          PHYSICAL EXAM:   GENERAL: NAD,    HEAD:  Atraumatic, Normocephalic  EYES:   conjunctiva and sclera clear  NECK: Supple,    CHEST/LUNG: Congestion b/l  HEART: S1S2 normal. Regular rate and rhythm; No murmurs, rubs, or gallops  ABDOMEN: Soft, Nontender, Nondistended; Bowel sounds present  EXTREMITIES:  2+ Peripheral Pulses, No clubbing, cyanosis, or edema  PSYCH/Neuro: AAOx3. Non-focal.   SKIN: No rashes or lesions      LABS:                        15.4   10.54 )-----------( 248      ( 30 Nov 2022 11:20 )             47.5     11-30    137  |  95<L>  |  32<H>  ----------------------------<  313<H>  3.5   |  26  |  0.94    Ca    9.4      30 Nov 2022 11:23  Phos  3.3     11-30  Mg     1.9     11-30    TPro  7.1  /  Alb  3.7  /  TBili  0.6  /  DBili  x   /  AST  20  /  ALT  23  /  AlkPhos  99  11-30          < from: CT Chest No Cont (11.29.22 @ 20:58) >  IMPRESSION:  Increased size of a nodule in the superior segment of the left lower   lobe, concerning for primarylung neoplasm.    Slightly decreased size of a nodular opacity in the right lung apex,   possibly resolving focus of infection.    Increased bilateral lower lobe consolidative opacities concerning for   pneumonia with trace right pleural effusion.        --- End of Report ---    < end of copied text >        RADIOLOGY & ADDITIONAL TESTS:    Imaging Personally Reviewed: ct chest  Consultant(s) Notes Reviewed:  pulm  Care Discussed with Consultants/Other Providers: pulm

## 2022-11-30 NOTE — PROGRESS NOTE ADULT - SUBJECTIVE AND OBJECTIVE BOX
PULMONARY PROGRESS NOTE    VIDAL LOUIE  MRN-[id number]    Patient is a 89y old  Female who presents with a chief complaint of respiratory distress (29 Nov 2022 12:43)      HPI:  -on HFNC 60% and 60L  teary about ct results   -    ROS:   -    ACTIVE MEDICATION LIST:  MEDICATIONS  (STANDING):  acetylcysteine 10%  Inhalation 4 milliLiter(s) Inhalation two times a day  albuterol    90 MICROgram(s) HFA Inhaler 2 Puff(s) Inhalation every 4 hours  albuterol/ipratropium for Nebulization 3 milliLiter(s) Nebulizer every 6 hours  amLODIPine   Tablet 2.5 milliGRAM(s) Oral daily  aspirin enteric coated 81 milliGRAM(s) Oral daily  budesonide 160 MICROgram(s)/formoterol 4.5 MICROgram(s) Inhaler 2 Puff(s) Inhalation two times a day  calcium carbonate 1250 mG  + Vitamin D (OsCal 500 + D) 1 Tablet(s) Oral daily  chlorhexidine 2% Cloths 1 Application(s) Topical daily  cholecalciferol 2000 Unit(s) Oral daily  dornase flor Solution 2.5 milliGRAM(s) Inhalation daily  enoxaparin Injectable 40 milliGRAM(s) SubCutaneous every 24 hours  famotidine    Tablet 20 milliGRAM(s) Oral two times a day  furosemide    Tablet 40 milliGRAM(s) Oral daily  gabapentin 400 milliGRAM(s) Oral three times a day  guaiFENesin  milliGRAM(s) Oral every 12 hours  influenza  Vaccine (HIGH DOSE) 0.7 milliLiter(s) IntraMuscular once  lidocaine   4% Patch 1 Patch Transdermal every 24 hours  multivitamin 1 Tablet(s) Oral daily  nystatin Powder 1 Application(s) Topical three times a day  pantoprazole    Tablet 40 milliGRAM(s) Oral before breakfast  piperacillin/tazobactam IVPB. 3.375 Gram(s) IV Intermittent once  piperacillin/tazobactam IVPB.- 3.375 Gram(s) IV Intermittent once  polyethylene glycol 3350 17 Gram(s) Oral daily  predniSONE   Tablet 20 milliGRAM(s) Oral daily  predniSONE   Tablet   Oral   senna 2 Tablet(s) Oral at bedtime  simethicone 80 milliGRAM(s) Chew daily  sodium chloride 3%  Inhalation 4 milliLiter(s) Inhalation every 6 hours  sucralfate suspension 1 Gram(s) Oral four times a day  tiotropium 2.5 MICROgram(s) Inhaler 2 Puff(s) Inhalation daily    MEDICATIONS  (PRN):  acetaminophen     Tablet .. 650 milliGRAM(s) Oral every 6 hours PRN Temp greater or equal to 38C (100.4F), Mild Pain (1 - 3)  aluminum hydroxide/magnesium hydroxide/simethicone Suspension 30 milliLiter(s) Oral every 4 hours PRN Dyspepsia  aluminum hydroxide/magnesium hydroxide/simethicone Suspension 30 milliLiter(s) Oral every 6 hours PRN Dyspepsia  cyclobenzaprine 5 milliGRAM(s) Oral three times a day PRN Muscle Spasm  melatonin 3 milliGRAM(s) Oral at bedtime PRN Insomnia  ondansetron Injectable 4 milliGRAM(s) IV Push every 8 hours PRN Nausea and/or Vomiting      EXAM:  Vital Signs Last 24 Hrs  T(C): 36.9 (30 Nov 2022 12:03), Max: 36.9 (30 Nov 2022 12:03)  T(F): 98.4 (30 Nov 2022 12:03), Max: 98.4 (30 Nov 2022 12:03)  HR: 97 (30 Nov 2022 12:03) (77 - 97)  BP: 123/83 (30 Nov 2022 12:03) (123/83 - 136/74)  BP(mean): --  RR: 20 (30 Nov 2022 12:03) (20 - 22)  SpO2: 94% (30 Nov 2022 12:03) (94% - 97%)    Parameters below as of 30 Nov 2022 12:03  Patient On (Oxygen Delivery Method): nasal cannula, high flow  O2 Flow (L/min): 60  O2 Concentration (%): 60    GENERAL: The patient is awake and alert in no apparent distress.     LUNGS: rhonchi/crackles                           15.4   10.54 )-----------( 248      ( 30 Nov 2022 11:20 )             47.5       11-30    137  |  95<L>  |  32<H>  ----------------------------<  313<H>  3.5   |  26  |  0.94    Ca    9.4      30 Nov 2022 11:23  Phos  3.3     11-30  Mg     1.9     11-30    TPro  7.1  /  Alb  3.7  /  TBili  0.6  /  DBili  x   /  AST  20  /  ALT  23  /  AlkPhos  99  11-30     < from: CT Chest No Cont (11.29.22 @ 20:58) >    ACC: [id number] EXAM:  CT CHEST                          PROCEDURE DATE:  11/29/2022          INTERPRETATION:  CLINICAL INFORMATION: COPD exacerbation    COMPARISON: CT chest 11/14/2022    CONTRAST/COMPLICATIONS:  IV Contrast: NONE  Oral Contrast: NONE  Complications: None reported at time of study completion    PROCEDURE:  CT of the Chest was performed.  Sagittal and coronal reformats were performed.    FINDINGS:    LUNGS AND AIRWAYS: Patent central airways.  Diffuse emphysema. Slight   decreased size of a right apical nodule measuring 11 x 8 mm, (previously   13 x 10 mm). Increased size of a superior segment left lower lobe nodule   measuring 2.5 x 2.4 cm (3:61), previously 2.3 x 1.9 cm. Increased   consolidation in the right lower lobe andincrease left lower lobe   consolidative opacities. New subsegmental right middle lobe atelectasis   adjacent to the elevated diaphragm.  PLEURA: Trace right pleural effusion. Elevated right diaphragm  MEDIASTINUM AND WEI: Stable cardiophrenic and mediastinal lymph nodes.  VESSELS: Atherosclerotic aortic calcifications..  HEART: Cardiomegaly. No pericardial effusion.  CHEST WALL AND LOWER NECK: Within normal limits increased size of a left   lateral chest wall lymph node measuring 1.3 x 0.9 cm.  VISUALIZED UPPER ABDOMEN: Within normal limits.  BONES: Stable severe T12 compression fracture and degenerative changes of   the spine    IMPRESSION:  Increased size of a nodule in the superior segment of the left lower   lobe, concerning for primarylung neoplasm.    Slightly decreased size of a nodular opacity in the right lung apex,   possibly resolving focus of infection.    Increased bilateral lower lobe consolidative opacities concerning for   pneumonia with trace right pleural effusion.        --- End of Report ---            ORAL RODRIGUEZ MD; Attending Radiologist  This document has been electronically signed. Nov 30 2022  7:04AM    < end of copied text >      PROBLEM LIST:  89y Female with HEALTH ISSUES - PROBLEM Dx:  COPD exacerbation    Pulmonary nodule, right    Prophylactic measure    Osteoporosis    Lumbar compression fracture    Chronic systolic congestive heart failure    Hypertension    Constipation    Debility    ACP (advance care planning)    Chronic respiratory failure with hypoxia and hypercapnia    Respiratory distress    Gram-negative pneumonia    ISRAEL (acute kidney injury)    Nausea and vomiting    Hypokalemia           RECS:  abx  swallow eval  chest vest/chest pt/ nebs, IS  bronchodilators  palliative f/u to readdress GOC  nodules can be followed up outpatient          Please call with any questions.    Taniya Lopez DO  [facility]Sleep [facility]  [phone]

## 2022-11-30 NOTE — PROGRESS NOTE ADULT - PROBLEM SELECTOR PLAN 2
CT scan on 11/14 showing patchy consolidation in R lung; given worsening respiratory, c/f likely gram negative PNA   - clinically improving, continue with BiPAP and HFNC at 40%   - completed zosyn x 7 day course   - legionella, RVP negative   - c/w steroids, nebs as above    - leukocytosis improving after small IVF, holding IVF for now due to WOB; continue to monitor CBC; if remains persistent, will consider repeat CT imaging. - repeat CT chest shows ?new pna.  -will resume zosyn.  -swallow eval per pulm.

## 2022-12-01 NOTE — PROVIDER CONTACT NOTE (OTHER) - ASSESSMENT
Pt remains AxOx4, satting 90% on HFNC. Pt is still extremely restless and anxious, thrashing, stating she just wants to feel better. Pt is tachypneic, RR 30, using accessory muscles. Pt is tachycardic to 110s.

## 2022-12-01 NOTE — CONSULT NOTE ADULT - ASSESSMENT
89 y/o woman initially admitted to the hospital for possible COPD exacerbation treated with IV steroids on HFNC and bilevel now with course complicated by ISRAEL, hypotension, and leukocytosis with concern for possible severe sepsis with elevated lactate. MICU consulted for hypotension.       #Hypotension:     -Elevated lactate, leukocytosis to 27K, febrile to 103 overnight. Agree with broad abx coverage, consider adding anaerobic coverage giving abdominal pain and guarding on exam.     -C/f possible acute abdomen, given elevated lactate and concerning abdominal exam would consider risks and benefits and recommend  89 y/o woman initially admitted to the hospital for possible COPD exacerbation treated with IV steroids on HFNC and bilevel now with course complicated by ISRAEL, hypotension, and leukocytosis with concern for possible severe sepsis with elevated lactate. MICU consulted for hypotension.       #Hypotension:     -Elevated lactate, leukocytosis to 27K, febrile to 103 overnight. Agree with broad abx coverage, consider adding anaerobic coverage giving abdominal pain and guarding on exam. Agree with infectious w/u.   -C/f possible acute abdomen, given elevated lactate and concerning abdominal exam would consider risks and benefits and recommend CT A/P w/ IV contrast and PO contrast to evaluate for bowel ischemia.   -Would repeat full set of labs with CBC, CMP, Mg, Phos, VBG/ABG w/ lactate. Would recommend holding DVT ppx and anti-platelet agent until CT scan results.   -BP stable w/ MAPs >65, can consider addition 500cc IVF bolus if needed as patient did not appear grossly volume overloaded on bedside POCUS.   -Monitor patient for BMs.     Plan d/w ICU attending and primary team.  89 y/o woman initially admitted to the hospital for possible COPD exacerbation treated with IV steroids on HFNC and bilevel now with course complicated by ISRAEL, hypotension, and leukocytosis with concern for possible severe sepsis with elevated lactate. MICU consulted for hypotension.       #Hypotension:     -Elevated lactate, leukocytosis to 27K, febrile to 103 overnight. Agree with broad abx coverage, consider adding anaerobic coverage giving abdominal pain and guarding on exam. Agree with infectious w/u.   -C/f possible acute abdomen, given elevated lactate and concerning abdominal exam would consider risks and benefits and recommend CT A/P w/ IV contrast and PO contrast to evaluate for bowel ischemia.   -Would repeat full set of labs with CBC, CMP, Mg, Phos, VBG/ABG w/ lactate. Would recommend holding DVT ppx and anti-platelet agent until CT scan results.   -BP stable w/ MAPs >65, can consider addition 500cc IVF bolus if needed as patient did not appear grossly volume overloaded on bedside POCUS.   -Monitor patient for BMs.   -Currently not a candidate for MICU admission call back PRN for any change in status.     Plan d/w ICU attending and primary team.

## 2022-12-01 NOTE — PROGRESS NOTE ADULT - PROBLEM SELECTOR PLAN 4
new ISRAEL 11/17, likely 2/2 poor po intake and pre-renal dehydration, which improved after IVF   - BUN/Cr improving, likely 2/2 pre-renal etiology dehydration, poor PO and i/s/o lasix dose   - hold off on further IVF for now  - ISRAEL again on 12/1. Likely prerenal. F/u Urine studies. bladder scans. strict I's/O's. -Gave 1L fluids.   -Holding lasix and bp meds.  -may need renal eval if worsens.

## 2022-12-01 NOTE — PROVIDER CONTACT NOTE (OTHER) - ASSESSMENT
Pt not responding to orientation questions. Rectal temp 100.8 deg F. BP 83/33. RR 36, spO2 94% on bipap

## 2022-12-01 NOTE — PROGRESS NOTE ADULT - SUBJECTIVE AND OBJECTIVE BOX
Patient is a 89y old  Female who presents with a chief complaint of respiratory distress (01 Dec 2022 14:48)        SUBJECTIVE / OVERNIGHT EVENTS: overnight had a temp of 103. on bipap now. respiratory distress and anxiety. mild abd pain reported.       MEDICATIONS  (STANDING):  acetylcysteine 10%  Inhalation 4 milliLiter(s) Inhalation two times a day  albuterol    90 MICROgram(s) HFA Inhaler 2 Puff(s) Inhalation every 4 hours  albuterol/ipratropium for Nebulization 3 milliLiter(s) Nebulizer every 6 hours  aspirin enteric coated 81 milliGRAM(s) Oral daily  budesonide 160 MICROgram(s)/formoterol 4.5 MICROgram(s) Inhaler 2 Puff(s) Inhalation two times a day  calcium carbonate 1250 mG  + Vitamin D (OsCal 500 + D) 1 Tablet(s) Oral daily  chlorhexidine 2% Cloths 1 Application(s) Topical daily  cholecalciferol 2000 Unit(s) Oral daily  dornase flor Solution 2.5 milliGRAM(s) Inhalation daily  enoxaparin Injectable 40 milliGRAM(s) SubCutaneous every 24 hours  famotidine    Tablet 20 milliGRAM(s) Oral two times a day  gabapentin 400 milliGRAM(s) Oral three times a day  guaiFENesin  milliGRAM(s) Oral every 12 hours  influenza  Vaccine (HIGH DOSE) 0.7 milliLiter(s) IntraMuscular once  lidocaine   4% Patch 1 Patch Transdermal every 24 hours  methylPREDNISolone sodium succinate Injectable 20 milliGRAM(s) IV Push daily  multivitamin 1 Tablet(s) Oral daily  nystatin Powder 1 Application(s) Topical three times a day  pantoprazole    Tablet 40 milliGRAM(s) Oral before breakfast  piperacillin/tazobactam IVPB.. 3.375 Gram(s) IV Intermittent every 8 hours  polyethylene glycol 3350 17 Gram(s) Oral daily  senna 2 Tablet(s) Oral at bedtime  simethicone 80 milliGRAM(s) Chew daily  sodium chloride 3%  Inhalation 4 milliLiter(s) Inhalation every 6 hours  sucralfate suspension 1 Gram(s) Oral four times a day  tiotropium 2.5 MICROgram(s) Inhaler 2 Puff(s) Inhalation daily    MEDICATIONS  (PRN):  acetaminophen     Tablet .. 650 milliGRAM(s) Oral every 6 hours PRN Temp greater or equal to 38C (100.4F), Mild Pain (1 - 3)  aluminum hydroxide/magnesium hydroxide/simethicone Suspension 30 milliLiter(s) Oral every 4 hours PRN Dyspepsia  aluminum hydroxide/magnesium hydroxide/simethicone Suspension 30 milliLiter(s) Oral every 6 hours PRN Dyspepsia  cyclobenzaprine 5 milliGRAM(s) Oral three times a day PRN Muscle Spasm  melatonin 3 milliGRAM(s) Oral at bedtime PRN Insomnia  ondansetron Injectable 4 milliGRAM(s) IV Push every 8 hours PRN Nausea and/or Vomiting      Vital Signs Last 24 Hrs  T(C): 36.9 (01 Dec 2022 04:12), Max: 39.4 (01 Dec 2022 00:07)  T(F): 98.4 (01 Dec 2022 04:12), Max: 103 (01 Dec 2022 00:07)  HR: 86 (01 Dec 2022 09:46) (50 - 127)  BP: 91/65 (01 Dec 2022 06:00) (81/52 - 146/86)  BP(mean): --  RR: 20 (01 Dec 2022 04:12) (18 - 22)  SpO2: 94% (01 Dec 2022 09:46) (93% - 96%)    Parameters below as of 01 Dec 2022 04:12  Patient On (Oxygen Delivery Method): nasal cannula, high flow      CAPILLARY BLOOD GLUCOSE      POCT Blood Glucose.: 176 mg/dL (01 Dec 2022 12:32)  POCT Blood Glucose.: 194 mg/dL (01 Dec 2022 08:27)    I&O's Summary    30 Nov 2022 07:01  -  01 Dec 2022 07:00  --------------------------------------------------------  IN: 700 mL / OUT: 2200 mL / NET: -1500 mL          PHYSICAL EXAM:   GENERAL: appears tachypneic  HEAD:  Atraumatic, Normocephalic  EYES:  conjunctiva and sclera clear  NECK: Supple,   CHEST/LUNG: Coarse b/l  HEART: S1S2 normal. Regular rate and rhythm; No murmurs, rubs, or gallops  ABDOMEN: Soft, Nontender, Nondistended; Bowel sounds present  EXTREMITIES:  No clubbing, cyanosis, or edema  PSYCH/Neuro: AAOx3. Non-focal.   SKIN: No rashes or lesions      LABS:                        17.1   23.85 )-----------( 246      ( 01 Dec 2022 07:23 )             55.9     12-01    135  |  94<L>  |  40<H>  ----------------------------<  191<H>  4.2   |  21<L>  |  1.76<H>    Ca    9.3      01 Dec 2022 07:23  Phos  5.2     12-01  Mg     2.1     12-01    TPro  7.1  /  Alb  3.7  /  TBili  0.6  /  DBili  x   /  AST  20  /  ALT  23  /  AlkPhos  99  11-30          < from: Xray Chest 1 View- PORTABLE-Routine (Xray Chest 1 View- PORTABLE-Routine .) (12.01.22 @ 12:48) >  IMPRESSION:  Progression of effusions. Similar left nodule.    < end of copied text >        RADIOLOGY & ADDITIONAL TESTS:    Imaging Personally Reviewed: cxr and axr   Consultant(s) Notes Reviewed:  pulm, ID  Care Discussed with Consultants/Other Providers: pulm

## 2022-12-01 NOTE — PROGRESS NOTE ADULT - PROBLEM SELECTOR PLAN 1
a/w COPD exacerbation, normally on 3LNC at home. Course c/b multiple RTT for worsening dyspnea/resp distress, placed on BiPAP, now weaned to HFNC. another RRT on 11/24 for WOB (unsure if due to IVF?; given 80mg IV lasix); placed back on BiPAP; CXR personally reviewed 11/24; appears similar to prior CXR   - pulm recs noted, continue taper prednisone down to 30mg for 3 days and continue to taper by 10mg afterwards for 3 days each till done   - continue with albuterol q4hr standing  - continue home spiriva and symbicort  - continue with HFNC and BiPAP overnight- f/u pulm when pt may be able to tolerate weaning off HFNC  - will stop trending daily ABG for now unless change in clinical status (RR< 12, pt unarousable)- discussed with floor NP and pulmonary  - c/w morphine 0.5mg iv q4 hours prn sob as a palliative measure, family aware and in agreement, hold for lethargy (has not been requiring) - will stop for now.   - s/p 1 dose IV lasix on 11/20 - will c/t assess volume status, resumed PO lasix 11/26 as per pulm recs   - Per son, Dr. Calderon had discussed possibly setting up home bipap for patient- discussed this with inpatient pulm, who recommends re-evaluating this as a possible option on discharge if patient improves  -mucinex, IS, acapella, oob to chair. -11/29: d/w pulm, ct chest, nebs, ct pt, keep steroids same for now.  11/30 - CT chest shows ?new pna. -zosyn restarted. C/w high flow and bipap qhs. Nodules f/u later time per pulm.  12/1- fever, septic lactate elevated, tachypnea; bipap, iv solumedrol, cefepime/vanco per ID eval, 1L fluid bolus, vitals q4h, flu swab, cultures, vbg, morphine/dilaudid prn for resp distress - patient requested too.     #Metabolic encephalopathy- pt newly delirious and confused 11/16, now improved s/p continuous BIPAP x 48 hours  - delirium likely 2/2 COPD exam/hypoxia with possible component of hospital delirium - seems resolved.   - cont management of pna as above, resp support and frequent reorientation

## 2022-12-01 NOTE — CONSULT NOTE ADULT - ASSESSMENT
89 year old F PMH HTN, COPD (on 3L home O2 at night), chronic bronchitis, and HFrEF, presented with worsening SOB. Initially treated for COPD exacerbation with methylprednisolone, also treated for pneumonia with 7 day course of Zosyn. S/p repeat CT showing worsening b/l lower lobe consolidations. Restarted on Zosyn. Now with worsening status on 12/1, febrile, hypotensive, rising leukocytosis, and new ISRAEL.     Tmax 103 today  WBC 24K  Lactate 5  COVID/Flu neg    Impression:  #Sepsis, Fever, Leukocytosis, Hypotension -  CT concerning for pneumonia. Workup pending to r/o alternative source of infection  #ISRAEL - in setting of hypotension    Recs:  - d/c Zosyn  - start cefepime 1g IV q24H  - give vancomycin 750mg IV x 1 dose  - check vanc level in AM  - monitor renal function  - monitor fever curve, WBCs    Plan discussed with primary team    Jorge Alberto Allen MD  Infectious Disease Fellow  Available on [employer] Teams  Before 9AM or after 5PM: [phone]

## 2022-12-01 NOTE — CHART NOTE - NSCHARTNOTEFT_GEN_A_CORE
:  [place]    INDICATION:  Hypotension, AHRF    PROCEDURE:  [x ] LIMITED ECHO  [x ] LIMITED CHEST  [ ] LIMITED RETROPERITONEAL  [ ] LIMITED ABDOMINAL  [ ] LIMITED DVT  [ ] NEEDLE GUIDANCE VASCULAR  [ ] NEEDLE GUIDANCE THORACENTESIS  [ ] NEEDLE GUIDANCE PARACENTESIS  [ ] NEEDLE GUIDANCE PERICARDIOCENTESIS  [ ] OTHER    FINDINGS:  A-line predominant pattern anteriorly and laterally. Trace pleural effusion on Rt, no Lt sided effusion.    Apical four chamber view with LV>RV, function appears preserved.     INTERPRETATION:  Normal lung aeration pattern    Normal appearance of LV and RV systolic function    Images uploaded to [employer]    Time spent on the procedure was separate from the critical care time spent for patient care.

## 2022-12-01 NOTE — RAPID RESPONSE TEAM SUMMARY - NSSITUATIONBACKGROUNDRRT_GEN_ALL_CORE
89 year old female with PMH of HTN, COPD (on 3L home O2 at night), chronic bronchitis, CHFrEF (EF 40% in Dec 2018), recent admission to [facility] 10/22 for acute lumbar compression fracture who presents from [facility] for eval of SOB, secondary to COPD exacerbation with RRT on 11/14 and again 11/16 for worsening hypoxia possibly 2/2 superimposed PNA now on both steroids and abx with increasing WOB now weaned off BIPAP for HFNC, status still tenuous. GOC with MOLST in chart indicate pt DNR/DNI. RRT on 11/26 for increased WOB requiring Bipap. Since then pt has had ongoing fevers, intermitt need for Bipap. ID consulted today and recommended abx switch.    RRT for increased WOB. VS T 98.6 BP 93/63, HR82, RR 30, 96% on bipap 12/8 [id number] Unclear when pt was escalated to [id number]% as order reviewed during RRT and Fio2 40%. Per primary RN pt has been similarly tachypneic throughout the day. Pt able to open eyes to name but otherwise lethargic. Tachypneic on bipap, Lungs with rales. Moving all 4 ext spontaneously. GOC held by attending Dr. Martins with pt's daughter at bedside. She wants to continue to pursue medical testing and interventions as possible, poor prognosis was reiterated by primary team to daughter. Given 1L LR today for low BP and sepsis. Pt has CXR from today with R Pleff . Pt with recurring sepsis, elevated WBC , febrile yesterday, abx escalated today.  Additional 1L ordered by primary team during rapid, straight cath with minimal UOP.   89 year old female with PMH of HTN, COPD (on 3L home O2 at night), chronic bronchitis, CHFrEF (EF 40% in Dec 2018), recent admission to [facility] 10/22 for acute lumbar compression fracture who presents from [facility] for eval of SOB, secondary to COPD exacerbation with RRT on 11/14 and again 11/16 for worsening hypoxia possibly 2/2 superimposed PNA now on both steroids and abx with increasing WOB now weaned off BIPAP for HFNC, status still tenuous. GOC with MOLST in chart indicate pt DNR/DNI. RRT on 11/26 for increased WOB requiring Bipap. Since then pt has had ongoing fevers, intermitt need for Bipap. ID consulted today and recommended abx switch.    RRT for increased WOB. VS T 98.6 BP 93/63, HR82, RR 30, 96% on bipap 12/8 [id number] Unclear when pt was escalated to [id number]% as order reviewed during RRT and Fio2 40%. Per primary RN pt has been similarly tachypneic throughout the day. Pt able to open eyes to name but otherwise lethargic. Tachypneic on bipap, Lungs with rales. Abd tenderness to palpation. Moving all 4 ext spontaneously. GOC held by attending Dr. Martins with pt's daughter at bedside. She wants to continue to pursue medical testing and interventions as possible, poor prognosis was reiterated by primary team to daughter. Given 1L LR today for low BP and sepsis. Pt has CXR from today with R Pleff . Pt with recurring sepsis, elevated WBC , febrile yesterday, abx escalated today.  Additional 1L ordered by primary team during rapid, straight cath with minimal UOP.

## 2022-12-01 NOTE — PROGRESS NOTE ADULT - PROBLEM SELECTOR PLAN 12
- DVT ppx: lovenox subq  - GI ppx: ppi  - Diet: low sodium. -npo on bipap.   -PT/oob to chair.     13. -Discussed with HUI Solano and Dr. Garcia and Dr. Kebede and RN.   -prognosis guarded. -Updated daughter Rae over the phone of the poor status.  -Patient is DNR/DNI.

## 2022-12-01 NOTE — CONSULT NOTE ADULT - SUBJECTIVE AND OBJECTIVE BOX
CHIEF COMPLAINT:    HPI: 89 year old female with PMH of HTN, COPD (on 3L home O2 at night), chronic bronchitis, CHFrEF (EF 40% in Dec 2018), recent admission to [facility] 10/22 for acute lumbar compression fracture who presents from [facility] for eval of SOB, secondary to COPD exacerbation with RRT on  and again  for worsening hypoxia possibly 2/2 superimposed PNA now on both steroids and abx with increasing WOB now weaned off BIPAP for HFNC, status still tenuous, GOC conversations ongoing.     MICU consulted on  for hypotension. Per family at the bedside patient having new onset abdominal pain. On review of labs this morning patient with elevated leukocytosis from 10K to 27K. Lactate elevated ~6 to ~5 on repeat. Received 2L of IVFs for hypotension and change in abx therapy.     PAST MEDICAL & SURGICAL HISTORY:  Essential Hypertension      COPD (Chronic Obstructive Pulmonary Disease)  on home O2 AT NIGHT      Hip Fracture-Left      Hx of Breast Cancer  HAd Rt in       Chronic bronchitis      Anxiety disorder      Morbid obesity      DAVID on CPAP      Dysphagia      Neuropathy      S/P Insertion of IVC (Inferior Vena Caval) Filter      Joint Fixation (Surgical)- L Hip      S/P Breast Lumpectomy- left      Status Post Total Knee Replacement-bilateral      S/P           FAMILY HISTORY:  Family history of renal disease (Sibling)    Family history of lymphoma    Family history of congestive heart failure        Allergies    No Known Allergies    Intolerances        HOME MEDICATIONS:    REVIEW OF SYSTEMS:  Constitutional: in acute distress  Eyes: no vision changes  ENT: no hearing changes  CV: no palpitations, no chest pain  Resp: + shortness of breath  GI: + abdominal pain having BMs no diarrhea.   : no burning with urination.       OBJECTIVE:  ICU Vital Signs Last 24 Hrs  T(C): 36.9 (01 Dec 2022 04:12), Max: 39.4 (01 Dec 2022 00:07)  T(F): 98.4 (01 Dec 2022 04:12), Max: 103 (01 Dec 2022 00:07)  HR: 85 (01 Dec 2022 15:15) (50 - 127)  BP: 81/49 (01 Dec 2022 15:15) (81/49 - 146/86)  BP(mean): --  ABP: --  ABP(mean): --  RR: 48 (01 Dec 2022 15:15) (18 - 48)  SpO2: 95% (01 Dec 2022 15:15) (93% - 96%)    O2 Parameters below as of 01 Dec 2022 15:15  Patient On (Oxygen Delivery Method): BiPAP/CPAP              - @ 07:01  -   @ 07:00  --------------------------------------------------------  IN: 700 mL / OUT: 2200 mL / NET: -1500 mL      CAPILLARY BLOOD GLUCOSE      POCT Blood Glucose.: 194 mg/dL (01 Dec 2022 15:45)      PHYSICAL EXAM:  General: + tired and ill appearing.   HEENT: EOMI  Neck: no JVD, supple  Respiratory: + coarse lung sounds b/l  Cardiovascular: normal s1 and s2 no m/r/g.   Abdomen: distended, + guarding, no rebound tenderness  Extremities: + mottled extremities, cold extremities  Skin: no obvious rashes      HOSPITAL MEDICATIONS:  MEDICATIONS  (STANDING):  acetylcysteine 10%  Inhalation 4 milliLiter(s) Inhalation two times a day  albuterol    90 MICROgram(s) HFA Inhaler 2 Puff(s) Inhalation every 4 hours  albuterol/ipratropium for Nebulization 3 milliLiter(s) Nebulizer every 6 hours  aspirin enteric coated 81 milliGRAM(s) Oral daily  budesonide 160 MICROgram(s)/formoterol 4.5 MICROgram(s) Inhaler 2 Puff(s) Inhalation two times a day  calcium carbonate 1250 mG  + Vitamin D (OsCal 500 + D) 1 Tablet(s) Oral daily  cefepime   IVPB      cefepime   IVPB 1000 milliGRAM(s) IV Intermittent once  chlorhexidine 2% Cloths 1 Application(s) Topical daily  cholecalciferol 2000 Unit(s) Oral daily  dornase flor Solution 2.5 milliGRAM(s) Inhalation daily  enoxaparin Injectable 40 milliGRAM(s) SubCutaneous every 24 hours  famotidine    Tablet 20 milliGRAM(s) Oral two times a day  gabapentin 400 milliGRAM(s) Oral three times a day  guaiFENesin  milliGRAM(s) Oral every 12 hours  HYDROmorphone  Injectable 0.25 milliGRAM(s) IV Push once  influenza  Vaccine (HIGH DOSE) 0.7 milliLiter(s) IntraMuscular once  lidocaine   4% Patch 1 Patch Transdermal every 24 hours  methylPREDNISolone sodium succinate Injectable 20 milliGRAM(s) IV Push daily  multivitamin 1 Tablet(s) Oral daily  nystatin Powder 1 Application(s) Topical three times a day  pantoprazole    Tablet 40 milliGRAM(s) Oral before breakfast  polyethylene glycol 3350 17 Gram(s) Oral daily  senna 2 Tablet(s) Oral at bedtime  simethicone 80 milliGRAM(s) Chew daily  sodium chloride 3%  Inhalation 4 milliLiter(s) Inhalation every 6 hours  sucralfate suspension 1 Gram(s) Oral four times a day  tiotropium 2.5 MICROgram(s) Inhaler 2 Puff(s) Inhalation daily  vancomycin  IVPB 750 milliGRAM(s) IV Intermittent once    MEDICATIONS  (PRN):  acetaminophen     Tablet .. 650 milliGRAM(s) Oral every 6 hours PRN Temp greater or equal to 38C (100.4F), Mild Pain (1 - 3)  aluminum hydroxide/magnesium hydroxide/simethicone Suspension 30 milliLiter(s) Oral every 4 hours PRN Dyspepsia  aluminum hydroxide/magnesium hydroxide/simethicone Suspension 30 milliLiter(s) Oral every 6 hours PRN Dyspepsia  cyclobenzaprine 5 milliGRAM(s) Oral three times a day PRN Muscle Spasm  melatonin 3 milliGRAM(s) Oral at bedtime PRN Insomnia  ondansetron Injectable 4 milliGRAM(s) IV Push every 8 hours PRN Nausea and/or Vomiting      LABS:                        17.1   23.85 )-----------( 246      ( 01 Dec 2022 07:23 )             55.9     12    135  |  94<L>  |  40<H>  ----------------------------<  191<H>  4.2   |  21<L>  |  1.76<H>    Ca    9.3      01 Dec 2022 07:23  Phos  5.2     12  Mg     2.1     12    TPro  7.1  /  Alb  3.7  /  TBili  0.6  /  DBili  x   /  AST  20  /  ALT  23  /  AlkPhos  99        Urinalysis Basic - ( 01 Dec 2022 16:26 )    Color: Yellow / Appearance: Clear / S.018 / pH: x  Gluc: x / Ketone: Negative  / Bili: Negative / Urobili: Negative   Blood: x / Protein: Trace / Nitrite: Negative   Leuk Esterase: Negative / RBC: 8 /hpf / WBC 1 /HPF   Sq Epi: x / Non Sq Epi: 1 /hpf / Bacteria: Negative        Venous Blood Gas:   @ 12:13  7.35/51/51/28/83.5  VBG Lactate: 5.0  Venous Blood Gas:   @ 11:41  7.28/62/34/29/51.1  VBG Lactate: 6.4      MICROBIOLOGY:     RADIOLOGY:  [ ] Reviewed and interpreted by me    EKG:

## 2022-12-01 NOTE — SWALLOW BEDSIDE ASSESSMENT ADULT - SWALLOW EVAL: DIAGNOSIS
Pt's respiratory status does not support PO intake at this time. Pt is a 89 y/o female adm with COPD exacerbation and with suspected Pna who presents with suspected dysphagia which appears to be superimposed upon by decreased respiratory status. Pt's respiratory status does not support PO intake at this time.  Pt evidences increased work of breathing and respiratory rate (50) with Bipap support.  Pt appeared to be restless as well.  PO trials deferred and SLP discussed with Nsg and NP.  Oral exam deferred and Nsg at b/s upon completion of assessment.  SLP also discussed Pt with MD at b/s.

## 2022-12-01 NOTE — PROGRESS NOTE ADULT - PROBLEM SELECTOR PLAN 2
CT scan on 11/14 showing patchy consolidation in R lung; given worsening respiratory, c/f likely gram negative PNA   - clinically improving, continue with BiPAP and HFNC at 40%   - completed zosyn x 7 day course   - legionella, RVP negative   - c/w steroids, nebs as above    - leukocytosis improving after small IVF, holding IVF for now due to WOB; continue to monitor CBC; if remains persistent, will consider repeat CT imaging. - repeat CT chest shows ?new pna.  -will resume zosyn.  -swallow eval per pulm. npo while on bipap.

## 2022-12-01 NOTE — PROVIDER CONTACT NOTE (MEDICATION) - ASSESSMENT
Pt currently NPO and on bipap. Pt due for senna and gabapentin PO Pt currently NPO and on bipap. Pt due for senna and gabapentin PO at this time as well as inhaler

## 2022-12-01 NOTE — CONSULT NOTE ADULT - SUBJECTIVE AND OBJECTIVE BOX
Patient is a 89y old  Female who presents with a chief complaint of respiratory distress (01 Dec 2022 11:22)    HPI:  89 year old female with PMH of HTN, COPD (on 3L home O2 at night), chronic bronchitis, CHFrEF (EF 40% in Dec 2018), recent admission to [facility] 10/22 for acute lumbar compression fracture who presents from [facility] for eval of SOB. Pt states she has baseline level of shortness of breath due to her hx of COPD which she felt is unchanged however feels that her cough is more prominent since being in rehab facility. She denies any fevers or chills, chest pain, or lightheadedness.     Of note, the patient was recently seen at [place] for back pain where she was found to have acute compression fracture of T2 and moderate compression deformity of L2 that appears chronic. She was recommended for TLSO brace on discharge and started on dexamethasone.  (2022 23:09)     ID consulted for worsening leukocytosis, hypotension in the setting of pneumonia. Currently patient on BiPAP    REVIEW OF SYSTEMS  [  ] ROS unobtainable because:    [ x ] All other systems negative except as noted below    Constitutional:  [ ] fever [ ] chills  [ ] weight loss  [ ]night sweat  [ ]poor appetite/PO intake [ ]fatigue   Skin:  [ ] rash [ ] phlebitis	  Eyes: [ ] icterus [ ] pain  [ ] discharge	  ENMT: [ ] sore throat  [ ] thrush [ ] ulcers [ ] exudates [ ]anosmia  Respiratory: [ ] dyspnea [ ] hemoptysis [ ] cough [ ] sputum	  Cardiovascular:  [ ] chest pain [ ] palpitations [ ] edema	  Gastrointestinal:  [ ] nausea [ ] vomiting [ ] diarrhea [ ] constipation [ ] pain	  Genitourinary:  [ ] dysuria [ ] frequency [ ] hematuria [ ] discharge [ ] flank pain  [ ] incontinence  Musculoskeletal:  [ ] myalgias [ ] arthralgias [ ] arthritis  [ ] back pain  Neurological:  [ ] headache [ ] weakness [ ] seizures  [ ] confusion/altered mental status    prior hospital charts reviewed [V]  primary team notes reviewed [V]  other consultant notes reviewed [V]    PAST MEDICAL & SURGICAL HISTORY:  Essential Hypertension    COPD (Chronic Obstructive Pulmonary Disease) on home O2    Hip Fracture-Left    Hx of Breast Cancer, Rt in     Chronic bronchitis    Anxiety disorder    Morbid obesity    DAVID on CPAP    Dysphagia    Neuropathy    S/P Insertion of IVC (Inferior Vena Caval) Filter    Joint Fixation (Surgical)- L Hip    S/P Breast Lumpectomy- left    Status Post Total Knee Replacement-bilateral    S/P       FAMILY HISTORY:  Family history of renal disease (Sibling)  Family history of lymphoma  Family history of congestive heart failure    SOCIAL HISTORY:  No recent travel  Non-smoker    Allergies  No Known Allergies    ANTIMICROBIALS:  piperacillin/tazobactam IVPB.. 3.375 every 8 hours    ANTIMICROBIALS (past 90 days):   MEDICATIONS  (STANDING):  azithromycin   Tablet   250 milliGRAM(s) Oral (22 @ 11:23)   250 milliGRAM(s) Oral (11-15-22 @ 14:54)    azithromycin  IVPB   255 mL/Hr IV Intermittent (22 @ 21:25)    cefTRIAXone   IVPB   100 mL/Hr IV Intermittent (22 @ 17:30)    cefTRIAXone   IVPB   100 mL/Hr IV Intermittent (11-15-22 @ 14:47)    piperacillin/tazobactam IVPB.   200 mL/Hr IV Intermittent (22 @ 13:38)    piperacillin/tazobactam IVPB.   200 mL/Hr IV Intermittent (22 @ 16:46)    piperacillin/tazobactam IVPB.-   25 mL/Hr IV Intermittent (22 @ 17:56)    piperacillin/tazobactam IVPB.-   25 mL/Hr IV Intermittent (22 @ 00:36)    piperacillin/tazobactam IVPB.-   25 mL/Hr IV Intermittent (22 @ 17:42)    piperacillin/tazobactam IVPB.-   25 mL/Hr IV Intermittent (22 @ 01:12)    piperacillin/tazobactam IVPB..   25 mL/Hr IV Intermittent (22 @ 00:01)   25 mL/Hr IV Intermittent (22 @ 16:39)   25 mL/Hr IV Intermittent (22 @ 08:34)   25 mL/Hr IV Intermittent (22 @ 23:08)   25 mL/Hr IV Intermittent (22 @ 16:46)   25 mL/Hr IV Intermittent (22 @ 08:03)   25 mL/Hr IV Intermittent (22 @ 23:27)   25 mL/Hr IV Intermittent (22 @ 16:29)   25 mL/Hr IV Intermittent (22 @ 08:15)   25 mL/Hr IV Intermittent (22 @ 15:12)   25 mL/Hr IV Intermittent (22 @ 08:29)   25 mL/Hr IV Intermittent (22 @ 00:05)   25 mL/Hr IV Intermittent (22 @ 23:16)   25 mL/Hr IV Intermittent (22 @ 15:16)   25 mL/Hr IV Intermittent (22 @ 07:49)   25 mL/Hr IV Intermittent (22 @ 23:23)   25 mL/Hr IV Intermittent (22 @ 16:09)   25 mL/Hr IV Intermittent (22 @ 09:13)   25 mL/Hr IV Intermittent (22 @ 23:40)   25 mL/Hr IV Intermittent (22 @ 08:47)    piperacillin/tazobactam IVPB..   25 mL/Hr IV Intermittent (22 @ 06:12)    MEDICATIONS  (STANDING):  acetaminophen     Tablet .. 650 every 6 hours PRN  acetylcysteine 10%  Inhalation 4 two times a day  albuterol    90 MICROgram(s) HFA Inhaler 2 every 4 hours  albuterol/ipratropium for Nebulization 3 every 6 hours  aluminum hydroxide/magnesium hydroxide/simethicone Suspension 30 every 4 hours PRN  aluminum hydroxide/magnesium hydroxide/simethicone Suspension 30 every 6 hours PRN  aspirin enteric coated 81 daily  budesonide 160 MICROgram(s)/formoterol 4.5 MICROgram(s) Inhaler 2 two times a day  cyclobenzaprine 5 three times a day PRN  dornase flor Solution 2.5 daily  enoxaparin Injectable 40 every 24 hours  famotidine    Tablet 20 two times a day  gabapentin 400 three times a day  guaiFENesin  every 12 hours  influenza  Vaccine (HIGH DOSE) 0.7 once  melatonin 3 at bedtime PRN  methylPREDNISolone sodium succinate Injectable 20 daily  ondansetron Injectable 4 every 8 hours PRN  pantoprazole    Tablet 40 before breakfast  polyethylene glycol 3350 17 daily  senna 2 at bedtime  simethicone 80 daily  sodium chloride 3%  Inhalation 4 every 6 hours  sucralfate suspension 1 four times a day  tiotropium 2.5 MICROgram(s) Inhaler 2 daily    VITALS:  Vital Signs Last 24 Hrs  T(F): 98.4 (22 @ 04:12), Max: 103 (22 @ 00:07)  Vital Signs Last 24 Hrs  HR: 86 (22 @ 09:46) (50 - 127)  BP: 91/65 (22 @ 06:00) (81/52 - 146/86)  RR: 20 (22 @ 04:12)  SpO2: 94% (22 @ 09:46) (93% - 96%)  Wt(kg): --    PHYSICAL EXAM:  Constitutional: non-toxic, no distress  HEAD/EYES: anicteric  ENT:  supple, no mucositis  Cardiovascular:   normal S1, S2  Respiratory:  clear BS bilaterally  GI:  soft, non-tender, normal bowel sounds  :  no mcclure  Musculoskeletal:  no joint swelling  Neurologic: awake and alert, ANN, no focal findings  Skin:  no rash  Psychiatric:  calm, cooperative     Labs:                        17.1   23.85 )-----------( 246      ( 01 Dec 2022 07:23 )             55.9         135  |  94<L>  |  40<H>  ----------------------------<  191<H>  4.2   |  21<L>  |  1.76<H>    Ca    9.3      01 Dec 2022 07:23  Phos  5.2       Mg     2.1         TPro  7.1  /  Alb  3.7  /  TBili  0.6  /  DBili  x   /  AST  20  /  ALT  23  /  AlkPhos  99      WBC Trend:  WBC Count: 23.85 (22 @ 07:23)  WBC Count: 10.54 (22 @ 11:20)  WBC Count: 8.93 (22 @ 06:50)  WBC Count: 9.28 (22 @ 07:10)    Auto Neutrophil #: 5.77 K/uL (22 @ 06:50)  Auto Neutrophil #: 8.09 K/uL (22 @ 06:48)  Auto Neutrophil #: 9.63 K/uL (22 @ 07:16)  Auto Neutrophil #: 13.09 K/uL (22 @ 11:43)  Auto Neutrophil #: 3.54 K/uL (22 @ 07:29)    MICROBIOLOGY:  MRSA PCR Result.: NotDetec (22 @ 12:02)    COVID-19 PCR: NotDetec (10-21-22 @ 12:06)  COVID- PCR: NotDetec (10-17-22 @ 06:15)  COVID- PCR: NotDetec (10-10-22 @ 16:13)    RADIOLOGY:  imaging below personally reviewed    < from: CT Chest No Cont (22 @ 20:58) >  IMPRESSION:  Increased size of a nodule in the superior segment of the left lower   lobe, concerning for primarylung neoplasm.  Slightly decreased size of a nodular opacity in the right lung apex,   possibly resolving focus of infection.  Increased bilateral lower lobe consolidative opacities concerning for   pneumonia with trace right pleural effusion.  < end of copied text >   Patient is a 89y old  Female who presents with a chief complaint of respiratory distress (01 Dec 2022 11:22)    HPI:  89 year old female with PMH of HTN, COPD (on 3L home O2 at night), chronic bronchitis, CHFrEF (EF 40% in Dec 2018), recent admission to [facility] 10/22 for acute lumbar compression fracture who presents from [facility] for eval of SOB. Pt states she has baseline level of shortness of breath due to her hx of COPD which she felt is unchanged however feels that her cough is more prominent since being in rehab facility. She denies any fevers or chills, chest pain, or lightheadedness.     Of note, the patient was recently seen at [place] for back pain where she was found to have acute compression fracture of T2 and moderate compression deformity of L2 that appears chronic. She was recommended for TLSO brace on discharge and started on dexamethasone.  (2022 23:09)     ID consulted for worsening leukocytosis, hypotension in the setting of pneumonia. Currently patient on BiPAP    REVIEW OF SYSTEMS  [ x ] ROS unobtainable because:  AMS  [  ] All other systems negative except as noted below    Constitutional:  [ ] fever [ ] chills  [ ] weight loss  [ ]night sweat  [ ]poor appetite/PO intake [ ]fatigue   Skin:  [ ] rash [ ] phlebitis	  Eyes: [ ] icterus [ ] pain  [ ] discharge	  ENMT: [ ] sore throat  [ ] thrush [ ] ulcers [ ] exudates [ ]anosmia  Respiratory: [ ] dyspnea [ ] hemoptysis [ ] cough [ ] sputum	  Cardiovascular:  [ ] chest pain [ ] palpitations [ ] edema	  Gastrointestinal:  [ ] nausea [ ] vomiting [ ] diarrhea [ ] constipation [ ] pain	  Genitourinary:  [ ] dysuria [ ] frequency [ ] hematuria [ ] discharge [ ] flank pain  [ ] incontinence  Musculoskeletal:  [ ] myalgias [ ] arthralgias [ ] arthritis  [ ] back pain  Neurological:  [ ] headache [ ] weakness [ ] seizures  [ ] confusion/altered mental status    prior hospital charts reviewed [V]  primary team notes reviewed [V]  other consultant notes reviewed [V]    PAST MEDICAL & SURGICAL HISTORY:  Essential Hypertension    COPD (Chronic Obstructive Pulmonary Disease) on home O2    Hip Fracture-Left    Hx of Breast Cancer, Rt in     Chronic bronchitis    Anxiety disorder    Morbid obesity    DAVID on CPAP    Dysphagia    Neuropathy    S/P Insertion of IVC (Inferior Vena Caval) Filter    Joint Fixation (Surgical)- L Hip    S/P Breast Lumpectomy- left    Status Post Total Knee Replacement-bilateral    S/P       FAMILY HISTORY:  Family history of renal disease (Sibling)  Family history of lymphoma  Family history of congestive heart failure    SOCIAL HISTORY:  No recent travel  Non-smoker    Allergies  No Known Allergies    ANTIMICROBIALS:  piperacillin/tazobactam IVPB.. 3.375 every 8 hours    ANTIMICROBIALS (past 90 days):   MEDICATIONS  (STANDING):  azithromycin   Tablet   250 milliGRAM(s) Oral (22 @ 11:23)   250 milliGRAM(s) Oral (11-15-22 @ 14:54)    azithromycin  IVPB   255 mL/Hr IV Intermittent (22 @ 21:25)    cefTRIAXone   IVPB   100 mL/Hr IV Intermittent (22 @ 17:30)    cefTRIAXone   IVPB   100 mL/Hr IV Intermittent (11-15-22 @ 14:47)    piperacillin/tazobactam IVPB.   200 mL/Hr IV Intermittent (22 @ 13:38)    piperacillin/tazobactam IVPB.   200 mL/Hr IV Intermittent (22 @ 16:46)    piperacillin/tazobactam IVPB.-   25 mL/Hr IV Intermittent (22 @ 17:56)    piperacillin/tazobactam IVPB.-   25 mL/Hr IV Intermittent (22 @ 00:36)    piperacillin/tazobactam IVPB.-   25 mL/Hr IV Intermittent (22 @ 17:42)    piperacillin/tazobactam IVPB.-   25 mL/Hr IV Intermittent (22 @ 01:12)    piperacillin/tazobactam IVPB..   25 mL/Hr IV Intermittent (22 @ 00:01)   25 mL/Hr IV Intermittent (22 @ 16:39)   25 mL/Hr IV Intermittent (22 @ 08:34)   25 mL/Hr IV Intermittent (22 @ 23:08)   25 mL/Hr IV Intermittent (22 @ 16:46)   25 mL/Hr IV Intermittent (22 @ 08:03)   25 mL/Hr IV Intermittent (22 @ 23:27)   25 mL/Hr IV Intermittent (22 @ 16:29)   25 mL/Hr IV Intermittent (22 @ 08:15)   25 mL/Hr IV Intermittent (22 @ 15:12)   25 mL/Hr IV Intermittent (22 @ 08:29)   25 mL/Hr IV Intermittent (22 @ 00:05)   25 mL/Hr IV Intermittent (22 @ 23:16)   25 mL/Hr IV Intermittent (22 @ 15:16)   25 mL/Hr IV Intermittent (22 @ 07:49)   25 mL/Hr IV Intermittent (22 @ 23:23)   25 mL/Hr IV Intermittent (22 @ 16:09)   25 mL/Hr IV Intermittent (22 @ 09:13)   25 mL/Hr IV Intermittent (22 @ 23:40)   25 mL/Hr IV Intermittent (22 @ 08:47)    piperacillin/tazobactam IVPB..   25 mL/Hr IV Intermittent (22 @ 06:12)    MEDICATIONS  (STANDING):  acetaminophen     Tablet .. 650 every 6 hours PRN  acetylcysteine 10%  Inhalation 4 two times a day  albuterol    90 MICROgram(s) HFA Inhaler 2 every 4 hours  albuterol/ipratropium for Nebulization 3 every 6 hours  aluminum hydroxide/magnesium hydroxide/simethicone Suspension 30 every 4 hours PRN  aluminum hydroxide/magnesium hydroxide/simethicone Suspension 30 every 6 hours PRN  aspirin enteric coated 81 daily  budesonide 160 MICROgram(s)/formoterol 4.5 MICROgram(s) Inhaler 2 two times a day  cyclobenzaprine 5 three times a day PRN  dornase flor Solution 2.5 daily  enoxaparin Injectable 40 every 24 hours  famotidine    Tablet 20 two times a day  gabapentin 400 three times a day  guaiFENesin  every 12 hours  influenza  Vaccine (HIGH DOSE) 0.7 once  melatonin 3 at bedtime PRN  methylPREDNISolone sodium succinate Injectable 20 daily  ondansetron Injectable 4 every 8 hours PRN  pantoprazole    Tablet 40 before breakfast  polyethylene glycol 3350 17 daily  senna 2 at bedtime  simethicone 80 daily  sodium chloride 3%  Inhalation 4 every 6 hours  sucralfate suspension 1 four times a day  tiotropium 2.5 MICROgram(s) Inhaler 2 daily    VITALS:  Vital Signs Last 24 Hrs  T(F): 98.4 (22 @ 04:12), Max: 103 (22 @ 00:07)  Vital Signs Last 24 Hrs  HR: 86 (22 @ 09:46) (50 - 127)  BP: 91/65 (22 @ 06:00) (81/52 - 146/86)  RR: 20 (22 @ 04:12)  SpO2: 94% (22 @ 09:46) (93% - 96%)  Wt(kg): --    PHYSICAL EXAM:  Constitutional: on BiPAP, uncomfortable appearing  HEAD/EYES: anicteric  ENT:  supple, no mucositis  Cardiovascular:   +S1, S2  Respiratory:  +BS bilaterally  GI:   non-tender, +bowel sounds  :  no mcclure  Musculoskeletal:  no joint swelling  Neurologic: difficult to arouse  Skin:  no rash  Psychiatric:  somnolent    Labs:                        17.1   23.85 )-----------( 246      ( 01 Dec 2022 07:23 )             55.9         135  |  94<L>  |  40<H>  ----------------------------<  191<H>  4.2   |  21<L>  |  1.76<H>    Ca    9.3      01 Dec 2022 07:23  Phos  5.2       Mg     2.1         TPro  7.1  /  Alb  3.7  /  TBili  0.6  /  DBili  x   /  AST  20  /  ALT  23  /  AlkPhos  99      WBC Trend:  WBC Count: 23.85 (22 @ 07:23)  WBC Count: 10.54 (22 @ 11:20)  WBC Count: 8.93 (22 @ 06:50)  WBC Count: 9.28 (22 @ 07:10)    Auto Neutrophil #: 5.77 K/uL (22 @ 06:50)  Auto Neutrophil #: 8.09 K/uL (22 @ 06:48)  Auto Neutrophil #: 9.63 K/uL (22 @ 07:16)  Auto Neutrophil #: 13.09 K/uL (22 @ 11:43)  Auto Neutrophil #: 3.54 K/uL (22 @ 07:29)    MICROBIOLOGY:  MRSA PCR Result.: NotDetec (22 @ 12:02)    COVID-19 PCR: NotDetec (10-21-22 @ 12:06)  COVID- PCR: NotDetec (10-17-22 @ 06:15)  COVID- PCR: NotDetec (10-10-22 @ 16:13)    RADIOLOGY:  imaging below personally reviewed    < from: CT Chest No Cont (22 @ 20:58) >  IMPRESSION:  Increased size of a nodule in the superior segment of the left lower   lobe, concerning for primarylung neoplasm.  Slightly decreased size of a nodular opacity in the right lung apex,   possibly resolving focus of infection.  Increased bilateral lower lobe consolidative opacities concerning for   pneumonia with trace right pleural effusion.  < end of copied text >   Patient is a 89y old  Female who presents with a chief complaint of respiratory distress (01 Dec 2022 11:22)    HPI:  89 year old female with PMH of HTN, COPD (on 3L home O2 at night), chronic bronchitis, CHFrEF (EF 40% in Dec 2018), recent admission to [facility] 10/22 for acute lumbar compression fracture who presents from [facility] for eval of SOB. Pt states she has baseline level of shortness of breath due to her hx of COPD which she felt is unchanged however feels that her cough is more prominent since being in rehab facility. She denies any fevers or chills, chest pain, or lightheadedness.     Of note, the patient was recently seen at [place] for back pain where she was found to have acute compression fracture of T2 and moderate compression deformity of L2 that appears chronic. She was recommended for TLSO brace on discharge and started on dexamethasone.  (2022 23:09)     ID consulted for worsening leukocytosis, hypotension in the setting of pneumonia. Currently patient on BiPAP    REVIEW OF SYSTEMS  [ x ] ROS unobtainable because:  AMS  [  ] All other systems negative except as noted below    Constitutional:  [ ] fever [ ] chills  [ ] weight loss  [ ]night sweat  [ ]poor appetite/PO intake [ ]fatigue   Skin:  [ ] rash [ ] phlebitis	  Eyes: [ ] icterus [ ] pain  [ ] discharge	  ENMT: [ ] sore throat  [ ] thrush [ ] ulcers [ ] exudates [ ]anosmia  Respiratory: [ ] dyspnea [ ] hemoptysis [ ] cough [ ] sputum	  Cardiovascular:  [ ] chest pain [ ] palpitations [ ] edema	  Gastrointestinal:  [ ] nausea [ ] vomiting [ ] diarrhea [ ] constipation [ ] pain	  Genitourinary:  [ ] dysuria [ ] frequency [ ] hematuria [ ] discharge [ ] flank pain  [ ] incontinence  Musculoskeletal:  [ ] myalgias [ ] arthralgias [ ] arthritis  [ ] back pain  Neurological:  [ ] headache [ ] weakness [ ] seizures  [ ] confusion/altered mental status    prior hospital charts reviewed [V]  primary team notes reviewed [V]  other consultant notes reviewed [V]    PAST MEDICAL & SURGICAL HISTORY:  Essential Hypertension    COPD (Chronic Obstructive Pulmonary Disease) on home O2    Hip Fracture-Left    Hx of Breast Cancer, Rt in     Chronic bronchitis    Anxiety disorder    Morbid obesity    DAVID on CPAP    Dysphagia    Neuropathy    S/P Insertion of IVC (Inferior Vena Caval) Filter    Joint Fixation (Surgical)- L Hip    S/P Breast Lumpectomy- left    Status Post Total Knee Replacement-bilateral    S/P       FAMILY HISTORY:  Family history of renal disease (Sibling)  Family history of lymphoma  Family history of congestive heart failure    SOCIAL HISTORY:  No recent travel  Non-smoker    Allergies  No Known Allergies    ANTIMICROBIALS:  piperacillin/tazobactam IVPB.. 3.375 every 8 hours    ANTIMICROBIALS (past 90 days):   MEDICATIONS  (STANDING):  azithromycin   Tablet   250 milliGRAM(s) Oral (22 @ 11:23)   250 milliGRAM(s) Oral (11-15-22 @ 14:54)    azithromycin  IVPB   255 mL/Hr IV Intermittent (22 @ 21:25)    cefTRIAXone   IVPB   100 mL/Hr IV Intermittent (22 @ 17:30)    cefTRIAXone   IVPB   100 mL/Hr IV Intermittent (11-15-22 @ 14:47)    piperacillin/tazobactam IVPB.   200 mL/Hr IV Intermittent (22 @ 13:38)    piperacillin/tazobactam IVPB.   200 mL/Hr IV Intermittent (22 @ 16:46)    piperacillin/tazobactam IVPB.-   25 mL/Hr IV Intermittent (22 @ 17:56)    piperacillin/tazobactam IVPB.-   25 mL/Hr IV Intermittent (22 @ 00:36)    piperacillin/tazobactam IVPB.-   25 mL/Hr IV Intermittent (22 @ 17:42)    piperacillin/tazobactam IVPB.-   25 mL/Hr IV Intermittent (22 @ 01:12)    piperacillin/tazobactam IVPB..   25 mL/Hr IV Intermittent (22 @ 00:01)   25 mL/Hr IV Intermittent (22 @ 16:39)   25 mL/Hr IV Intermittent (22 @ 08:34)   25 mL/Hr IV Intermittent (22 @ 23:08)   25 mL/Hr IV Intermittent (22 @ 16:46)   25 mL/Hr IV Intermittent (22 @ 08:03)   25 mL/Hr IV Intermittent (22 @ 23:27)   25 mL/Hr IV Intermittent (22 @ 16:29)   25 mL/Hr IV Intermittent (22 @ 08:15)   25 mL/Hr IV Intermittent (22 @ 15:12)   25 mL/Hr IV Intermittent (22 @ 08:29)   25 mL/Hr IV Intermittent (22 @ 00:05)   25 mL/Hr IV Intermittent (22 @ 23:16)   25 mL/Hr IV Intermittent (22 @ 15:16)   25 mL/Hr IV Intermittent (22 @ 07:49)   25 mL/Hr IV Intermittent (22 @ 23:23)   25 mL/Hr IV Intermittent (22 @ 16:09)   25 mL/Hr IV Intermittent (22 @ 09:13)   25 mL/Hr IV Intermittent (22 @ 23:40)   25 mL/Hr IV Intermittent (22 @ 08:47)    piperacillin/tazobactam IVPB..   25 mL/Hr IV Intermittent (22 @ 06:12)    MEDICATIONS  (STANDING):  acetaminophen     Tablet .. 650 every 6 hours PRN  acetylcysteine 10%  Inhalation 4 two times a day  albuterol    90 MICROgram(s) HFA Inhaler 2 every 4 hours  albuterol/ipratropium for Nebulization 3 every 6 hours  aluminum hydroxide/magnesium hydroxide/simethicone Suspension 30 every 4 hours PRN  aluminum hydroxide/magnesium hydroxide/simethicone Suspension 30 every 6 hours PRN  aspirin enteric coated 81 daily  budesonide 160 MICROgram(s)/formoterol 4.5 MICROgram(s) Inhaler 2 two times a day  cyclobenzaprine 5 three times a day PRN  dornase flor Solution 2.5 daily  enoxaparin Injectable 40 every 24 hours  famotidine    Tablet 20 two times a day  gabapentin 400 three times a day  guaiFENesin  every 12 hours  influenza  Vaccine (HIGH DOSE) 0.7 once  melatonin 3 at bedtime PRN  methylPREDNISolone sodium succinate Injectable 20 daily  ondansetron Injectable 4 every 8 hours PRN  pantoprazole    Tablet 40 before breakfast  polyethylene glycol 3350 17 daily  senna 2 at bedtime  simethicone 80 daily  sodium chloride 3%  Inhalation 4 every 6 hours  sucralfate suspension 1 four times a day  tiotropium 2.5 MICROgram(s) Inhaler 2 daily    VITALS:  Vital Signs Last 24 Hrs  T(F): 98.4 (22 @ 04:12), Max: 103 (22 @ 00:07)  Vital Signs Last 24 Hrs  HR: 86 (22 @ 09:46) (50 - 127)  BP: 91/65 (22 @ 06:00) (81/52 - 146/86)  RR: 20 (22 @ 04:12)  SpO2: 94% (22 @ 09:46) (93% - 96%)  Wt(kg): --    PHYSICAL EXAM:  Constitutional: on BiPAP, uncomfortable appearing  HEAD/EYES: anicteric  ENT:  supple, no mucositis  Cardiovascular:   +S1, S2  Respiratory:  +BS bilaterally  GI:   non-tender, +bowel sounds  :  no mcclure  Musculoskeletal:  no joint swelling  Neurologic: difficult to arouse  Skin:  no rash /hands and feet cool  Psychiatric:  somnolent     Labs:                        17.1   23.85 )-----------( 246      ( 01 Dec 2022 07:23 )             55.9         135  |  94<L>  |  40<H>  ----------------------------<  191<H>  4.2   |  21<L>  |  1.76<H>    Ca    9.3      01 Dec 2022 07:23  Phos  5.2       Mg     2.1         TPro  7.1  /  Alb  3.7  /  TBili  0.6  /  DBili  x   /  AST  20  /  ALT  23  /  AlkPhos  99      WBC Trend:  WBC Count: 23.85 (22 @ 07:23)  WBC Count: 10.54 (22 @ 11:20)  WBC Count: 8.93 (22 @ 06:50)  WBC Count: 9.28 (22 @ 07:10)    Auto Neutrophil #: 5.77 K/uL (22 @ 06:50)  Auto Neutrophil #: 8.09 K/uL (22 @ 06:48)  Auto Neutrophil #: 9.63 K/uL (22 @ 07:16)  Auto Neutrophil #: 13.09 K/uL (22 @ 11:43)  Auto Neutrophil #: 3.54 K/uL (22 @ 07:29)    MICROBIOLOGY:  MRSA PCR Result.: NotDetec (22 @ 12:02)    COVID-19 PCR: NotDetec (10-21-22 @ 12:06)  COVID- PCR: NotDetec (10-17-22 @ 06:15)  COVID- PCR: NotDetec (10-10-22 @ 16:13)    RADIOLOGY:  imaging below personally reviewed    < from: CT Chest No Cont (22 @ 20:58) >  IMPRESSION:  Increased size of a nodule in the superior segment of the left lower   lobe, concerning for primarylung neoplasm.  Slightly decreased size of a nodular opacity in the right lung apex,   possibly resolving focus of infection.  Increased bilateral lower lobe consolidative opacities concerning for   pneumonia with trace right pleural effusion.  < end of copied text >

## 2022-12-01 NOTE — CHART NOTE - NSCHARTNOTEFT_GEN_A_CORE
Notified by RN, Pt remains hypotensive to SBP~80's w/a MAP of 50. Pt seen and examined at bedside. Pt A+Ox2-3, difficult to ascertain ROS questions due to BIPAP and lethargy.     Extensive discussion had with Prem Elvira, emergency contact and son. Explained Pt's poor prognosis given Pt's sepsis, GIB and possibility of acute abdomen(perforation, mesenteric ischemia, etc). Discussed role of ICU and pressors. Explained that pressors would only be temporizing in maintaining his mother's blood pressure without being able to solve the root cause which may be GIB due to Pt being a poor candidate for any operable procedure/surgery. Explored the option of comfort care with son, and offering medications to make his mother comfortable like low doses of morhpine or dilaudid to help with her breathing and any anxiety. Mr. Felix initially expressing interest in making Pt comfortable, however would like to think about it more and discuss with family. Mr. Felix understands grave prognosis however would like to continue all current medical care up to CPR and intubation as well at this time. Mr. Felix currently driving up from the [place] and would like to see mother tonight.     Assessment/Plan:  >VS hemodynamically stable aside from BP-83/33, T-100.8F, RR-38 on BIPAP  >IV tylenol given for fever  >Total of 2.5L given total in last 12 hours, last 500 NS bolus completed ~1 hour ago  >MICU re-consulted - recommending the following                   > continuous fluid resuscitation (500 cc bolus)                   > adding anaerobic coverage                   > transition from BIPAP to HFNC if Pt tolerates to assist with hypotension                   >CT angio A/P to r/o bleed vs mesenteric ischemia vs other acute abdominal pathology                   > Not a MICU candidate at this time  >Will give additional 500 NS bolus  >IV protonix gtt to be started  >Flagyl ordered  >Respiratory at bedside - Pt transitioned to HFNC. Tolerating well  >CBC q6 hours, type and screen active  >Spoke with Dr. Slater, Hospitalist in charge, decision made that CT A/P w/IV contrast risks outweigh benefits at this time given worsening ISRAEL and low chance of changing management. Rest of plan of care discussed.  >Will re-assess BP after bolus and re-consult if MICU or call RRT if Pt remains hypotensive  >GOC conversation as above with Prem ongoing - CONTINUE ALL MEDICAL CARE/INTERVENTIONS UP UNTIL RESUSCITATION AND INTUBATION      Michaela Maciel PA-C  Department of Medicine

## 2022-12-01 NOTE — CONSULT NOTE ADULT - ATTENDING COMMENTS
89 year old female with PMH of HTN, COPD (on 3L home O2 at night), chronic bronchitis, CHFrEF (EF 40% in Dec 2018), recent admission to [facility] 10/22 for acute lumbar compression fracture who presents from [facility] for eval of SOB found to have PNA on CT scan.   MICU consulted on 12/1 for hypotension. Per family at the bedside patient having new onset abdominal pain. Accroding to daughter at bedside had diarrhea last week since resolved.     General: elderly female on bipap   Head: Normocephalic, atraumatic  Mouth: Mucous membranes dry  Neck: Supple  Heart: Regular rate and rhythm, no murmur or gallop  Lungs: diminished at bases   Abdomen:  soft, but diffusely ttp throughout   Extremities: cool poor cap refill mottled   Musculoskeletal: ANN  See POCUS note     Pt noted to have a leukocytoses this AM with lactic - would repeat labs STAT.   Pt BP at bedside MAP above 65 and systolic is 90 at this time. If BP falls would consider additional IV fluids.   It seems ID is following and abx have been changed would consider adding back ananerobic coverage with abdominal pain and tenderness.   Due to diffuse pain rising lactic would obtain a CT scan of the abdomen stat although creatinine has increased believe benefits outweigh risks at this time.     Called by PA to reassess however CT still has not been done, were informed that patient had ? blood in stool due to this would obtain CTAngio ab&pelvis at this time.     Please reconsult as necessary. 89 year old female with PMH of HTN, COPD (on 3L home O2 at night), chronic bronchitis, CHFrEF (EF 40% in Dec 2018), recent admission to [facility] 10/22 for acute lumbar compression fracture who presents from [facility] for eval of SOB found to have PNA on CT scan.   MICU consulted on 12/1 for hypotension. Per family at the bedside patient having new onset abdominal pain. Accroding to daughter at bedside had diarrhea last week since resolved.     General: elderly female on bipap   Head: Normocephalic, atraumatic  Mouth: Mucous membranes dry  Neck: Supple  Heart: Regular rate and rhythm, no murmur or gallop  Lungs: diminished at bases   Abdomen:  soft, but diffusely ttp throughout   Extremities: cool poor cap refill mottled   Musculoskeletal: ANN  See POCUS note     Pt noted to have a leukocytoses this AM with lactic - would repeat labs STAT and trend as appropriate. Can also consider LFTs lipase  Pt BP at bedside MAP above 65 and systolic is 90 at this time. If BP falls would consider additional IV fluids.   It seems ID is following and abx have been changed would consider adding back anaerobic coverage with abdominal pain and tenderness.   Due to diffuse pain rising lactic would obtain a CT scan of the abdomen stat although creatinine has increased believe benefits outweigh risks at this time.     Called by PA to reassess however CT still has not been done, were informed that patient had ? blood in stool due to this would obtain CTAngio ab&pelvis at this time.     Please reconsult as necessary.

## 2022-12-01 NOTE — SWALLOW BEDSIDE ASSESSMENT ADULT - SLP GENERAL OBSERVATIONS
Upon encounter Pt awake with RR 50 and work of breathing noted SP02 93-94%; Nsg and NP informed and PO trials deferred. Upon encounter Pt awake with RR 50 and work of breathing noted SP02 93-94%; Nsg and NP informed and PO trials deferred. Pt nodding y/n reliably in response to simple self centered questions; Pt denied pain but appeared restless. Pt remained non-verbal primarily. Pt subsequently seen by MD at b/s who plans to f/u with Pt's family re: ETHELC.

## 2022-12-01 NOTE — PROVIDER CONTACT NOTE (MEDICATION) - ACTION/TREATMENT ORDERED:
Provider made aware. PO meds to be held at this time. Will continue to monitor Provider made aware. PO meds to be held at this time. Inhalers to be held. May give nebulizer treatments. Will continue to monitor Provider made aware. All PO meds to be held at this time. Inhalers to be held. May give nebulizer treatments. Will continue to monitor

## 2022-12-01 NOTE — PROVIDER CONTACT NOTE (OTHER) - ASSESSMENT
Pt remains AxOx4, neurologically stable, answers questions correctly. All other VSS, HR 84, 93% on BIPAP, afebrile. Pt BP is 81/52 electronically, 84/64 manually. No s/s of distress, no pain. Pt denies dizziness or headache.

## 2022-12-01 NOTE — PROVIDER CONTACT NOTE (OTHER) - ASSESSMENT
Pt is becoming more anxious and restless, stating she is nauseous and pulling at BIPAP. Pt is having increased work of breathing, tachypnea, satting 90%. Pt is stating she is having trouble breathing, wants to go home.

## 2022-12-01 NOTE — PROVIDER CONTACT NOTE (OTHER) - ASSESSMENT
Pt is AxOx4, currently on HFNC 60% 60L. Pt is very SOB, using accessory muscles  Pt is very restless and anxious, stating she feels like her status is not improving and that she is having trouble breathing. Pt is requesting medication to help her calm down.

## 2022-12-01 NOTE — PROVIDER CONTACT NOTE (OTHER) - ASSESSMENT
Pt is AxOx4, currently on BIPAP and cont. pulse ox satting 93%. Pt is calm and cooperative at this time, resting comfortably. Pt temp 103 axillary. Pt HR stable, /66. No complaints of pain. Pt feels warm to touch.

## 2022-12-01 NOTE — PROGRESS NOTE ADULT - SUBJECTIVE AND OBJECTIVE BOX
PULMONARY PROGRESS NOTE    VIDAL LOUIE  MRN-[id number]    Patient is a 89y old  Female who presents with a chief complaint of respiratory distress (29 Nov 2022 12:43)      HPI:  -on high flow  -struggling to breath  -hypotensive rns at bedside-pt awake and alert    ROS:   -      MEDICATIONS  (STANDING):  acetylcysteine 10%  Inhalation 4 milliLiter(s) Inhalation two times a day  albuterol    90 MICROgram(s) HFA Inhaler 2 Puff(s) Inhalation every 4 hours  albuterol/ipratropium for Nebulization 3 milliLiter(s) Nebulizer every 6 hours  aspirin enteric coated 81 milliGRAM(s) Oral daily  budesonide 160 MICROgram(s)/formoterol 4.5 MICROgram(s) Inhaler 2 Puff(s) Inhalation two times a day  calcium carbonate 1250 mG  + Vitamin D (OsCal 500 + D) 1 Tablet(s) Oral daily  chlorhexidine 2% Cloths 1 Application(s) Topical daily  cholecalciferol 2000 Unit(s) Oral daily  dornase flor Solution 2.5 milliGRAM(s) Inhalation daily  enoxaparin Injectable 40 milliGRAM(s) SubCutaneous every 24 hours  famotidine    Tablet 20 milliGRAM(s) Oral two times a day  gabapentin 400 milliGRAM(s) Oral three times a day  guaiFENesin  milliGRAM(s) Oral every 12 hours  HYDROmorphone  Injectable 0.25 milliGRAM(s) IV Push once  influenza  Vaccine (HIGH DOSE) 0.7 milliLiter(s) IntraMuscular once  lidocaine   4% Patch 1 Patch Transdermal every 24 hours  methylPREDNISolone sodium succinate Injectable 20 milliGRAM(s) IV Push once  multivitamin 1 Tablet(s) Oral daily  nystatin Powder 1 Application(s) Topical three times a day  pantoprazole    Tablet 40 milliGRAM(s) Oral before breakfast  piperacillin/tazobactam IVPB.. 3.375 Gram(s) IV Intermittent every 8 hours  polyethylene glycol 3350 17 Gram(s) Oral daily  predniSONE   Tablet 20 milliGRAM(s) Oral daily  predniSONE   Tablet   Oral   senna 2 Tablet(s) Oral at bedtime  simethicone 80 milliGRAM(s) Chew daily  sodium chloride 3%  Inhalation 4 milliLiter(s) Inhalation every 6 hours  sucralfate suspension 1 Gram(s) Oral four times a day  tiotropium 2.5 MICROgram(s) Inhaler 2 Puff(s) Inhalation daily    MEDICATIONS  (PRN):  acetaminophen     Tablet .. 650 milliGRAM(s) Oral every 6 hours PRN Temp greater or equal to 38C (100.4F), Mild Pain (1 - 3)  aluminum hydroxide/magnesium hydroxide/simethicone Suspension 30 milliLiter(s) Oral every 4 hours PRN Dyspepsia  aluminum hydroxide/magnesium hydroxide/simethicone Suspension 30 milliLiter(s) Oral every 6 hours PRN Dyspepsia  cyclobenzaprine 5 milliGRAM(s) Oral three times a day PRN Muscle Spasm  melatonin 3 milliGRAM(s) Oral at bedtime PRN Insomnia  ondansetron Injectable 4 milliGRAM(s) IV Push every 8 hours PRN Nausea and/or Vomiting      EXAM:  Vital Signs Last 24 Hrs  T(C): 36.9 (01 Dec 2022 04:12), Max: 39.4 (01 Dec 2022 00:07)  T(F): 98.4 (01 Dec 2022 04:12), Max: 103 (01 Dec 2022 00:07)  HR: 86 (01 Dec 2022 09:46) (50 - 127)  BP: 91/65 (01 Dec 2022 06:00) (81/52 - 146/86)  BP(mean): --  RR: 20 (01 Dec 2022 04:12) (18 - 22)  SpO2: 94% (01 Dec 2022 09:46) (93% - 96%)    Parameters below as of 01 Dec 2022 04:12  Patient On (Oxygen Delivery Method): nasal cannula, high flow        GENERAL: The patient is awake and alert in no apparent distress.     LUNGS: respirations labored on bipap                                         17.1   23.85 )-----------( 246      ( 01 Dec 2022 07:23 )             55.9   12-01    135  |  94<L>  |  40<H>  ----------------------------<  191<H>  4.2   |  21<L>  |  1.76<H>    Ca    9.3      01 Dec 2022 07:23  Phos  5.2     12-01  Mg     2.1     12-01    TPro  7.1  /  Alb  3.7  /  TBili  0.6  /  DBili  x   /  AST  20  /  ALT  23  /  AlkPhos  99  11-30       < from: CT Chest No Cont (11.29.22 @ 20:58) >    ACC: [id number] EXAM:  CT CHEST                          PROCEDURE DATE:  11/29/2022          INTERPRETATION:  CLINICAL INFORMATION: COPD exacerbation    COMPARISON: CT chest 11/14/2022    CONTRAST/COMPLICATIONS:  IV Contrast: NONE  Oral Contrast: NONE  Complications: None reported at time of study completion    PROCEDURE:  CT of the Chest was performed.  Sagittal and coronal reformats were performed.    FINDINGS:    LUNGS AND AIRWAYS: Patent central airways.  Diffuse emphysema. Slight   decreased size of a right apical nodule measuring 11 x 8 mm, (previously   13 x 10 mm). Increased size of a superior segment left lower lobe nodule   measuring 2.5 x 2.4 cm (3:61), previously 2.3 x 1.9 cm. Increased   consolidation in the right lower lobe andincrease left lower lobe   consolidative opacities. New subsegmental right middle lobe atelectasis   adjacent to the elevated diaphragm.  PLEURA: Trace right pleural effusion. Elevated right diaphragm  MEDIASTINUM AND WEI: Stable cardiophrenic and mediastinal lymph nodes.  VESSELS: Atherosclerotic aortic calcifications..  HEART: Cardiomegaly. No pericardial effusion.  CHEST WALL AND LOWER NECK: Within normal limits increased size of a left   lateral chest wall lymph node measuring 1.3 x 0.9 cm.  VISUALIZED UPPER ABDOMEN: Within normal limits.  BONES: Stable severe T12 compression fracture and degenerative changes of   the spine    IMPRESSION:  Increased size of a nodule in the superior segment of the left lower   lobe, concerning for primarylung neoplasm.    Slightly decreased size of a nodular opacity in the right lung apex,   possibly resolving focus of infection.    Increased bilateral lower lobe consolidative opacities concerning for   pneumonia with trace right pleural effusion.        --- End of Report ---            ORAL RODRIGUEZ MD; Attending Radiologist  This document has been electronically signed. Nov 30 2022  7:04AM    < end of copied text >      PROBLEM LIST:  89y Female with HEALTH ISSUES - PROBLEM Dx:  COPD exacerbation    Pulmonary nodule, right    Prophylactic measure    Osteoporosis    Lumbar compression fracture    Chronic systolic congestive heart failure    Hypertension    Constipation    Debility    ACP (advance care planning)    Chronic respiratory failure with hypoxia and hypercapnia    Respiratory distress    Gram-negative pneumonia    ISRAEL (acute kidney injury)    Nausea and vomiting    Hypokalemia           RECS:  cultures  fluid bolus  antibiotics  cont bipap  agree with morphine for comfort  dnr, prognosis poor        Please call with any questions.    Adrianne Garcia MD  [facility] Pulmonary/Sleep Medicine  [phone]

## 2022-12-01 NOTE — SWALLOW BEDSIDE ASSESSMENT ADULT - SLP PERTINENT HISTORY OF CURRENT PROBLEM
89 year old female with PMH of HTN, COPD (on 3L home O2 at night), chronic bronchitis, CHFrEF (EF 40% in Dec 2018), recent admission to [facility] 10/22 for acute lumbar compression fracture who presents from [facility] for eval of SOB, secondary to COPD exacerbation with RRT on 11/14/22 and again 11/16/22 for worsening hypoxia possibly 2/2 superimposed PNA; provided steroids and abx with increasing WOB. 12/1/22 Pt did not tolerate weaning off BIPAP for HFNC, Per MD: "status still tenuous, GOC conversations ongoing".  12/1/22 Pt febrile; BP BP 81/52; Pt receiving Bipap support during waking hours.

## 2022-12-01 NOTE — SWALLOW BEDSIDE ASSESSMENT ADULT - COMMENTS
addtnl hx: COPD exacerbation, normally on 3LNC at home. RRT on 11/24/22 for WOB; placed back on BiPAP; continued with HFNC and BiPAP overnight- morphine prn sob as a palliative measure; family aware and in agreement, hold for lethargy (has not been requiring) - will stop for now.   11/29/22 CT Chest;   IMPRESSION: Increased size of a nodule in the superior segment of the left lower lobe, concerning for primary lung neoplasm.  Slightly decreased size of a nodular opacity in the right lung apex, possibly resolving focus of infection. Increased bilateral lower lobe consolidative opacities concerning for pneumonia with trace right pleural effusion. 11/30/22 Per MD: "CT chest shows ?new pna. -zosyn restarted. C/w high flow and bipap qhs. Nodules f/u later time per pulm". Metabolic encephalopathy. Gram-negative pneumonia. - completed zosyn x 7 day course- leukocytosis improving after small IVF, holding IVF for now due to WOB; continue to monitor CBC; if remains persistent, will consider repeat CT imaging;  swallow eval per pulm. Hypokalemia- resolved. ISRAEL; likely 2/2 poor po intake and pre-renal dehydration, which improved after IVF; - BUN/Cr improving. Pt with nausea/vomiting previously with epigastric burning pain after eating - suspect gastritis/GERD 2/2 steroid use. AXR showing non obstructive bowel pattern; symptoms improving.Pulmonary nodule, right;  Increased in size compared to prior scan in Oct 2022. Chronic systolic congestive heart failure. Lumbar compression fracture- pt wears TLSO brace during the day, as per neurosurgery recs from prior hospitalization --> Son to bring TLSO brace from [facility].

## 2022-12-01 NOTE — RAPID RESPONSE TEAM SUMMARY - NSOTHERINTERVENTIONSRRT_GEN_ALL_CORE
Pt with both hypoxia possibly multifactorial -- PNA/sepsis, COPD, R pleff. Pt is nearly maxed on non-invasive ventilation with bipap, and pt is DNI/DNR. Also with borderline BP MAP >65 for now, giving fluids though ability to give fluids may be limited by tenuous resp service.  If pt more stable can consider possibility of rescanning to identify if other source of sepsis besides PNA and if source identified if possible to control. Pt has had multiple RRT this adm with poor prognosis, unfortunately.   Pt with both hypoxia possibly multifactorial -- PNA/sepsis, COPD, R pleff. Pt is nearly maxed on non-invasive ventilation with bipap, and pt is DNI/DNR. Also with borderline BP MAP >65 for now, giving fluids though ability to give fluids may be limited by tenuous resp service.  If pt more stable can consider possibility of rescanning to identify if other source of sepsis besides PNA and if source identified if possible to control. Can consider eval by pulm to see if they feel R plef contributing significantly, also ?parapneumonic effusion?, and if it could be drained. Pt has had multiple RRT this adm with poor prognosis, unfortunately.   Pt with both hypoxia possibly multifactorial -- PNA/sepsis, COPD, R pleff. Pt is nearly maxed on non-invasive ventilation with bipap, and pt is DNI/DNR. Also with borderline BP MAP >65 for now, giving fluids though ability to give fluids may be limited by tenuous resp status.  If pt more stable can consider possibility of rescanning to identify if other source of sepsis besides PNA and if source identified if possible to control. Can consider eval by pulm to see if they feel R plef contributing significantly, also ?parapneumonic effusion?, and if it could be drained. Pt has had multiple RRT this adm with poor prognosis, unfortunately.

## 2022-12-01 NOTE — CONSULT NOTE ADULT - ATTENDING COMMENTS
89 year old F PMH HTN, COPD (on 3L home O2 at night), chronic bronchitis, and HFrEF, presented with worsening SOB. Initially treated for COPD exacerbation with methylprednisolone, also treated for pneumonia with 7 day course of Zosyn. S/p repeat CT showing worsening b/l lower lobe consolidations. Restarted on Zosyn. Now with worsening status on 12/1, febrile, hypotensive, rising leukocytosis, and new ISRAEL.     Tmax 103 today  WBC 24K  Lactate 5  COVID/Flu neg    Impression:  #Sepsis, Fever, Leukocytosis, Hypotension -  CT concerning for pneumonia. Workup pending to r/o alternative source of infection  #ISRAEL - in setting of hypotension  #lung nodule- ? ca    Recs:  - await blood cultures. check u/a and culture- looking for alternative source of sepsis  - d/c Zosyn  - start cefepime 1g IV q24H- adjusted to renal function  - give vancomycin 750mg IV x 1 dose  - check vanc level in AM  - monitor renal function  - monitor fever curve, WBCs  - prognosis guarded    Crissy Kebede M.D. ,   please reach via teams   If no answer, or after 5PM/ weekends,  then please call  [phone]    Assessment and plan discussed with Dr Martins

## 2022-12-02 NOTE — PROVIDER CONTACT NOTE (OTHER) - ASSESSMENT
Pt a&ox2. Vital signs as charted. Pt states she has urge to void but unable. Bladder scan volume 19mL.

## 2022-12-02 NOTE — PROGRESS NOTE ADULT - SUBJECTIVE AND OBJECTIVE BOX
PULMONARY PROGRESS NOTE    VIDAL LOUIE  MRN-[id number]    Patient is a 89y old  Female who presents with a chief complaint of respiratory distress (29 Nov 2022 12:43)      HPI:  on bipap  not alert  mild resp distress  s/p multiple RRT    ROS:   -      MEDICATIONS  (STANDING):  acetylcysteine 10%  Inhalation 4 milliLiter(s) Inhalation two times a day  albuterol    90 MICROgram(s) HFA Inhaler 2 Puff(s) Inhalation every 4 hours  albuterol/ipratropium for Nebulization 3 milliLiter(s) Nebulizer every 6 hours  budesonide 160 MICROgram(s)/formoterol 4.5 MICROgram(s) Inhaler 2 Puff(s) Inhalation two times a day  calcium carbonate 1250 mG  + Vitamin D (OsCal 500 + D) 1 Tablet(s) Oral daily  cefepime   IVPB      cefepime   IVPB 1000 milliGRAM(s) IV Intermittent every 24 hours  chlorhexidine 2% Cloths 1 Application(s) Topical daily  cholecalciferol 2000 Unit(s) Oral daily  dornase flor Solution 2.5 milliGRAM(s) Inhalation daily  famotidine    Tablet 20 milliGRAM(s) Oral two times a day  gabapentin 400 milliGRAM(s) Oral three times a day  guaiFENesin  milliGRAM(s) Oral every 12 hours  influenza  Vaccine (HIGH DOSE) 0.7 milliLiter(s) IntraMuscular once  lidocaine   4% Patch 1 Patch Transdermal every 24 hours  methylPREDNISolone sodium succinate Injectable 20 milliGRAM(s) IV Push daily  metroNIDAZOLE  IVPB      metroNIDAZOLE  IVPB 500 milliGRAM(s) IV Intermittent every 8 hours  multivitamin 1 Tablet(s) Oral daily  nystatin Powder 1 Application(s) Topical three times a day  pantoprazole Infusion 8 mG/Hr (10 mL/Hr) IV Continuous <Continuous>  polyethylene glycol 3350 17 Gram(s) Oral daily  senna 2 Tablet(s) Oral at bedtime  simethicone 80 milliGRAM(s) Chew daily  sodium chloride 3%  Inhalation 4 milliLiter(s) Inhalation every 6 hours  sucralfate suspension 1 Gram(s) Oral four times a day  tiotropium 2.5 MICROgram(s) Inhaler 2 Puff(s) Inhalation daily    MEDICATIONS  (PRN):  acetaminophen     Tablet .. 650 milliGRAM(s) Oral every 6 hours PRN Temp greater or equal to 38C (100.4F), Mild Pain (1 - 3)  aluminum hydroxide/magnesium hydroxide/simethicone Suspension 30 milliLiter(s) Oral every 4 hours PRN Dyspepsia  aluminum hydroxide/magnesium hydroxide/simethicone Suspension 30 milliLiter(s) Oral every 6 hours PRN Dyspepsia  cyclobenzaprine 5 milliGRAM(s) Oral three times a day PRN Muscle Spasm  HYDROmorphone  Injectable 0.25 milliGRAM(s) IV Push every 4 hours PRN Pain  LORazepam   Injectable 0.25 milliGRAM(s) IV Push every 4 hours PRN Anxiety  melatonin 3 milliGRAM(s) Oral at bedtime PRN Insomnia  ondansetron Injectable 4 milliGRAM(s) IV Push every 8 hours PRN Nausea and/or Vomiting        EXAM:  Vital Signs Last 24 Hrs  T(C): 36.8 (02 Dec 2022 05:43), Max: 38.2 (01 Dec 2022 20:26)  T(F): 98.3 (02 Dec 2022 05:43), Max: 100.8 (01 Dec 2022 20:26)  HR: 94 (02 Dec 2022 09:02) (53 - 995)  BP: 95/61 (02 Dec 2022 05:43) (81/49 - 99/71)  BP(mean): --  RR: 28 (02 Dec 2022 09:01) (28 - 48)  SpO2: 99% (02 Dec 2022 09:02) (73% - 100%)    Parameters below as of 02 Dec 2022 09:01  Patient On (Oxygen Delivery Method): BiPAP/CPAP        GENERAL: not alert    LUNGS: respirations labored on bipap                          14.3   30.98 )-----------( 221      ( 02 Dec 2022 04:35 )             44.8   12-02    136  |  97  |  64<H>  ----------------------------<  139<H>  4.9   |  20<L>  |  2.70<H>    Ca    8.3<L>      02 Dec 2022 04:35  Phos  5.4     12-02  Mg     2.1     12-02    TPro  5.8<L>  /  Alb  2.6<L>  /  TBili  1.2  /  DBili  x   /  AST  256<H>  /  ALT  250<H>  /  AlkPhos  282<H>  12-02      < from: Xray Chest 1 View- PORTABLE-Urgent (Xray Chest 1 View- PORTABLE-Urgent .) (12.02.22 @ 10:01) >    ACC: [id number] EXAM:  XR CHEST PORTABLE URGENT 1V                        ACC: [id number] EXAM:  XR CHEST PORTABLE URGENT 1V                          PROCEDURE DATE:  12/02/2022          INTERPRETATION:  EXAMINATION: XR CHEST URGENT, XR CHEST URGENT    CLINICAL INDICATION: Shortness of Breath    TECHNIQUE: Single frontal, portable view of the chest was obtained on   12/2/2022 at 8:59 AM and 6:40 AM.    COMPARISON: Chest x-ray 12/1/2022, abdominal x-ray 12/1/2022    FINDINGS:  Chest x-ray 12/2/2022 at 6:40 AM  LUNGS and PLEURA: No pneumothorax. Bilateral pleural effusions, right   greater than left, with associated atelectasis. Similar left mid lung   field nodule. Pulmonary emphysematous changes.  HEART AND MEDIASTINUM: Heart size cannot be evaluated on this projection.  SKELETON: There is no acute osseus abnormality,.    Chest x-ray 12/2/2022 at 8:59 AM  Stable interval lung exam.    IMPRESSION:  Bilateral pleural effusions with associated atelectasis. Left mid lung   field nodule. Emphysematous changes.    --- End of Report ---           DREAD MARTINEZ MD; Resident Radiologist  This document has been electronically signed.  KIMBER TO MD; Attending Radiologist  This document has been electronically signed. Dec  2 2022  9:44AM    < end of copied text >       < from: CT Chest No Cont (11.29.22 @ 20:58) >    ACC: [id number] EXAM:  CT CHEST                          PROCEDURE DATE:  11/29/2022          INTERPRETATION:  CLINICAL INFORMATION: COPD exacerbation    COMPARISON: CT chest 11/14/2022    CONTRAST/COMPLICATIONS:  IV Contrast: NONE  Oral Contrast: NONE  Complications: None reported at time of study completion    PROCEDURE:  CT of the Chest was performed.  Sagittal and coronal reformats were performed.    FINDINGS:    LUNGS AND AIRWAYS: Patent central airways.  Diffuse emphysema. Slight   decreased size of a right apical nodule measuring 11 x 8 mm, (previously   13 x 10 mm). Increased size of a superior segment left lower lobe nodule   measuring 2.5 x 2.4 cm (3:61), previously 2.3 x 1.9 cm. Increased   consolidation in the right lower lobe andincrease left lower lobe   consolidative opacities. New subsegmental right middle lobe atelectasis   adjacent to the elevated diaphragm.  PLEURA: Trace right pleural effusion. Elevated right diaphragm  MEDIASTINUM AND WEI: Stable cardiophrenic and mediastinal lymph nodes.  VESSELS: Atherosclerotic aortic calcifications..  HEART: Cardiomegaly. No pericardial effusion.  CHEST WALL AND LOWER NECK: Within normal limits increased size of a left   lateral chest wall lymph node measuring 1.3 x 0.9 cm.  VISUALIZED UPPER ABDOMEN: Within normal limits.  BONES: Stable severe T12 compression fracture and degenerative changes of   the spine    IMPRESSION:  Increased size of a nodule in the superior segment of the left lower   lobe, concerning for primarylung neoplasm.    Slightly decreased size of a nodular opacity in the right lung apex,   possibly resolving focus of infection.    Increased bilateral lower lobe consolidative opacities concerning for   pneumonia with trace right pleural effusion.        --- End of Report ---            ORAL RODRIGUEZ MD; Attending Radiologist  This document has been electronically signed. Nov 30 2022  7:04AM    < end of copied text >      PROBLEM LIST:  89y Female with HEALTH ISSUES - PROBLEM Dx:  COPD exacerbation    Pulmonary nodule, right    Prophylactic measure    Osteoporosis    Lumbar compression fracture    Chronic systolic congestive heart failure    Hypertension    Constipation    Debility    ACP (advance care planning)    Chronic respiratory failure with hypoxia and hypercapnia    Respiratory distress    Gram-negative pneumonia    ISRAEL (acute kidney injury)    Nausea and vomiting    Hypokalemia           RECS:  multi organ failure, prognosis poor  should focus on comfort  Palliative   she is DNR  would simplify medications      Please call with any questions.    Adrianne Garcia MD  [facility] Pulmonary/Sleep Medicine  [phone]

## 2022-12-02 NOTE — PROVIDER CONTACT NOTE (OTHER) - ASSESSMENT
T99.3 deg F axillary, HR 53, spO2 88/44, RR 30, spO2 95% on BIPAP. Pt ordered for ativan for agitation T99.3 deg F axillary, HR 53, BP 88/44, RR 30, spO2 95% on BIPAP. Pt ordered for ativan for agitation

## 2022-12-02 NOTE — PROGRESS NOTE ADULT - PROBLEM SELECTOR PLAN 6
- TTE showing normal systolic function, pt says she was started on lasix for pedal edema and was told at [facility] that she has HF but was not told this before  - hold further lasix given rising Cr, c/t monitor volume status
Recent hospitalization at [place] for acute L2 compression fracture  - s/p dexamethasone outpatient  - pt wears TLSO brace during the day, as per neurosurgery recs from prior hospitalization  - Son to bring TLSO brace from [facility]   - continue home gabapentin 400mg TID  - continue home PRN flexeril 5mg TID for muscle spasm  - continue pain control with tramadol 50mg PRN for severe pain  -They were offered Kyphoplasty at previous hospital which they are considering, asked for MRI but not sure this is indicated at this time no new findings.
Recent hospitalization at [place] for acute L2 compression fracture  - s/p dexamethasone outpatient  - pt wears TLSO brace during the day, as per neurosurgery recs from prior hospitalization  - Son to bring TLSO brace from [facility]   - continue home gabapentin 400mg TID  - continue home PRN flexeril 5mg TID for muscle spasm  - continue pain control with tramadol 50mg PRN for severe pain  -They were offered Kyphoplasty at previous hospital which they are considering, asked for MRI but not sure this is indicated at this time no new findings.
Recent hospitalization at [place] for acute L2 compression fracture  - s/p dexamethasone outpatient  - pt wears TLSO brace during the day, as per neurosurgery recs from prior hospitalization  - Son to bring TLSO brace from [facility]   - continue home gabapentin 400mg TID  - continue home PRN flexeril 5mg TID for muscle spasm  - continue pain control with tramadol 50mg PRN for severe pain  -They were offered Kyphoplasty at previous hospital which they are considering, asked for MRI but not sure this is indicated at this time no new findings.
Increased in size compared to prior scan in Oct 2022  - Pt was informed of this CT finding  - Emailed Dr. Sheets for collateral and to update on most recent CT  -Appreciate pulm recs  -Pt would consider surgery if offered however pt is very poor surgical candidate  - discussed this with family who understand workup likely needed as OP as patient improves from acute episode
Recent hospitalization at [place] for acute L2 compression fracture  - s/p dexamethasone outpatient  - pt wears TLSO brace during the day, as per neurosurgery recs from prior hospitalization  - Son to bring TLSO brace from [facility]   - continue home gabapentin 400mg TID  - continue home PRN flexeril 5mg TID for muscle spasm  - continue pain control with tramadol 50mg PRN for severe pain  -They were offered Kyphoplasty at previous hospital which they are considering, asked for MRI but not sure this is indicated at this time no new findings.
Recent hospitalization at [place] for acute L2 compression fracture  - s/p dexamethasone outpatient  - pt wears TLSO brace during the day, as per neurosurgery recs from prior hospitalization  - Son to bring TLSO brace from [facility]   - continue home gabapentin 400mg TID  - continue home PRN flexeril 5mg TID for muscle spasm  - continue pain control with tramadol 50mg PRN for severe pain  -They were offered Kyphoplasty at previous hospital which they are considering, asked for MRI but not sure this is indicated at this time no new findings.
Increased in size compared to prior scan in Oct 2022  - Pt was informed of this CT finding  - Emailed Dr. Sheets for collateral and to update on most recent CT  -Appreciate pulm recs  -Pt would consider surgery if offered however pt is very poor surgical candidate  - discussed this with family who understand workup likely needed as OP as patient improves from acute episode
Increased in size compared to prior scan in Oct 2022  - Pt was informed of this CT finding  - Emailed Dr. Sheets for collateral and to update on most recent CT  -Appreciate pulm recs  -Pt would consider surgery if offered however pt is very poor surgical candidate  - discussed this with family who understand workup likely needed as OP as patient improves from acute episode
Recent hospitalization at [place] for acute L2 compression fracture  - s/p dexamethasone outpatient  - pt wears TLSO brace during the day, as per neurosurgery recs from prior hospitalization  - Son to bring TLSO brace from [facility]   - continue home gabapentin 400mg TID  - continue home PRN flexeril 5mg TID for muscle spasm  - continue pain control with tramadol 50mg PRN for severe pain  -They were offered Kyphoplasty at previous hospital which they are considering, asked for MRI but not sure this is indicated at this time no new findings.
- TTE showing normal systolic function, pt says she was started on lasix for pedal edema and was told at [facility] that she has HF but was not told this before  - hold further lasix given rising Cr, c/t monitor volume status
- TTE showing normal systolic function, pt says she was started on lasix for pedal edema and was told at [facility] that she has HF but was not told this before  - hold further lasix given rising Cr, c/t monitor volume status
- TTE showing normal systolic function, pt says she was started on lasix for pedal edema and was told at [place] that she has HF but was not told this before  - hold further lasix given rising Cr, c/t monitor volume status
Increased in size compared to prior scan in Oct 2022  - Pt was informed of this CT finding  - Emailed Dr. Sheets for collateral and to update on most recent CT  -Appreciate pulm recs  -Pt would consider surgery if offered however pt is very poor surgical candidate  - discussed this with family who understand workup likely needed as OP as patient improves from acute episode
Increased in size compared to prior scan in Oct 2022  - Pt was informed of this CT finding  - Emailed Dr. Sheets for collateral and to update on most recent CT  -Appreciate pulm recs  -Pt would consider surgery if offered however pt is very poor surgical candidate  - discussed this with family who understand workup likely needed as OP as patient improves from acute episode
Recent hospitalization at [place] for acute L2 compression fracture  - s/p dexamethasone outpatient  - pt wears TLSO brace during the day, as per neurosurgery recs from prior hospitalization  - Son to bring TLSO brace from [facility]   - continue home gabapentin 400mg TID  - continue home PRN flexeril 5mg TID for muscle spasm  - continue pain control with tramadol 50mg PRN for severe pain  -They were offered Kyphoplasty at previous hospital which they are considering, asked for MRI but not sure this is indicated at this time no new findings.
Increased in size compared to prior scan in Oct 2022  - Pt was informed of this CT finding  - Emailed Dr. Sheets for collateral and to update on most recent CT  -Appreciate pulm recs  -Pt would consider surgery if offered however pt is very poor surgical candidate  - discussed this with family who understand workup likely needed as OP as patient improves from acute episode

## 2022-12-02 NOTE — PROGRESS NOTE ADULT - PROBLEM SELECTOR PROBLEM 6
Lumbar compression fracture
Pulmonary nodule, right
Lumbar compression fracture
Lumbar compression fracture
Pulmonary nodule, right
Chronic systolic congestive heart failure
Pulmonary nodule, right
Lumbar compression fracture
Pulmonary nodule, right
Chronic systolic congestive heart failure
Pulmonary nodule, right
Pulmonary nodule, right

## 2022-12-02 NOTE — RAPID RESPONSE TEAM SUMMARY - NSMEDICATIONSRRT_GEN_ALL_CORE
500cc IVF   .25 ativan for anxiety/agitation
duoneb  IV lasix 80
0.5mg Morphine
duonebs and hypersal Q6 chest PT  azithro and CTX x1 dose

## 2022-12-02 NOTE — PROGRESS NOTE ADULT - PROBLEM SELECTOR PROBLEM 3
Hypokalemia
ISRAEL (acute kidney injury)
Hypokalemia
Pulmonary nodule, right
Hypokalemia
Hypokalemia
Abdominal pain
Hypokalemia
ISRAEL (acute kidney injury)
Pulmonary nodule, right
Hypokalemia
ISRAEL (acute kidney injury)
Hypokalemia
ISRAEL (acute kidney injury)
Hypokalemia
Hypokalemia

## 2022-12-02 NOTE — CHART NOTE - NSCHARTNOTEFT_GEN_A_CORE
Informed by RN patient had hypotension, lethargic, appears restless.     89 year old female with PMH of HTN, COPD (on 3L home O2 at night), chronic bronchitis, CHFrEF (EF 40% in Dec 2018), recent admission to [facility] 10/22 for acute lumbar compression fracture who presents from [facility] for eval of SOB, secondary to COPD exacerbation with RRT on 11/14 and again 11/16 for worsening hypoxia possibly 2/2 superimposed PNA now on both steroids and abx with increasing WOB now weaned off BIPAP for HFNC, status still tenuous, GOC conversations ongoing.     Rapid called for hypotension- see RRT note for further details.   Patient seen at bedside with rapid team. Attending informed of RRT.   Levophed started at bedside and then discontinued due to GOC conversation with MICU and family.   D/W attending, palliative care consulted for GOC/comfort care.   Family Prem Felix contacted by primary team. Attending plans to speak with family.     Trisha De Leon, PA-C  [phone]

## 2022-12-02 NOTE — PROGRESS NOTE ADULT - PROBLEM SELECTOR PLAN 1
In setting of COPD exacerbation, pna, pleural effusion, worsening renal failure and suspected intra-abdominal pathology for which pt is too unstable for work up  - Family wants to pursue comfort measures  - Premedicate with IV dilaudid 0.5 mg x1, IV ativan 1 mg x1, with removal of bipap 15 mins after and transition to 4L NC for comfort  - PRN IV dilaudid 0.5 mg q1 and PRN IV ativan 0.5 mg q1 for additional dyspnea or agitation

## 2022-12-02 NOTE — PROGRESS NOTE ADULT - PROBLEM SELECTOR PROBLEM 11
Osteoporosis
Osteoporosis
Prophylactic measure
Osteoporosis
Osteoporosis
Prophylactic measure
Osteoporosis
Prophylactic measure
Prophylactic measure
Osteoporosis

## 2022-12-02 NOTE — PROGRESS NOTE ADULT - PROBLEM SELECTOR PLAN 4
new ISRAEL 11/17, likely 2/2 poor po intake and pre-renal dehydration, which improved after IVF   - BUN/Cr improving, likely 2/2 pre-renal etiology dehydration, poor PO and i/s/o lasix dose   - hold off on further IVF for now  - ISRAEL again on 12/1. Likely prerenal. F/u Urine studies. bladder scans. strict I's/O's. -Gave 1L fluids.   -Holding lasix and bp meds.  -may need renal eval if worsens.  -comfort care now 12/2 -PCU pending.

## 2022-12-02 NOTE — PROVIDER CONTACT NOTE (CRITICAL VALUE NOTIFICATION) - BACKGROUND
89 yo female admitted for COPD exacerbation
COPD exacerbation
89 yo female admitted for COPD exacerbation

## 2022-12-02 NOTE — PROGRESS NOTE ADULT - PROBLEM SELECTOR PLAN 1
a/w COPD exacerbation, normally on 3LNC at home. Course c/b multiple RTT for worsening dyspnea/resp distress, placed on BiPAP, now weaned to HFNC. another RRT on 11/24 for WOB (unsure if due to IVF?; given 80mg IV lasix); placed back on BiPAP; CXR personally reviewed 11/24; appears similar to prior CXR   - pulm recs noted, continue taper prednisone down to 30mg for 3 days and continue to taper by 10mg afterwards for 3 days each till done   - continue with albuterol q4hr standing  - continue home spiriva and symbicort  - continue with HFNC and BiPAP overnight- f/u pulm when pt may be able to tolerate weaning off HFNC  - will stop trending daily ABG for now unless change in clinical status (RR< 12, pt unarousable)- discussed with floor NP and pulmonary  - c/w morphine 0.5mg iv q4 hours prn sob as a palliative measure, family aware and in agreement, hold for lethargy (has not been requiring) - will stop for now.   - s/p 1 dose IV lasix on 11/20 - will c/t assess volume status, resumed PO lasix 11/26 as per pulm recs   - Per son, Dr. Calderon had discussed possibly setting up home bipap for patient- discussed this with inpatient pulm, who recommends re-evaluating this as a possible option on discharge if patient improves  -mucinex, IS, acapella, oob to chair. -11/29: d/w pulm, ct chest, nebs, ct pt, keep steroids same for now.  11/30 - CT chest shows ?new pna. -zosyn restarted. C/w high flow and bipap qhs. Nodules f/u later time per pulm.  12/1- fever, septic lactate elevated, tachypnea; bipap, iv solumedrol, cefepime/vanco per ID eval, 1L fluid bolus, vitals q4h, flu swab, cultures, vbg, morphine/dilaudid prn for resp distress - patient requested too.   12/2- rapids and micu and events ON reviewed. -D/w family and now comfort care. -Palliative appreciated.

## 2022-12-02 NOTE — PROGRESS NOTE ADULT - SUBJECTIVE AND OBJECTIVE BOX
Patient is a 89y old  Female who presents with a chief complaint of respiratory distress (02 Dec 2022 15:23)        SUBJECTIVE / OVERNIGHT EVENTS: Rapid responses and ICU f/u overnight. Patient remained on Bipap. Abd pain and resp distress.       MEDICATIONS  (STANDING):  chlorhexidine 2% Cloths 1 Application(s) Topical daily  nystatin Powder 1 Application(s) Topical three times a day    MEDICATIONS  (PRN):  glycopyrrolate Injectable 0.4 milliGRAM(s) IV Push every 6 hours PRN secretions  HYDROmorphone  Injectable 0.5 milliGRAM(s) IV Push every 1 hour PRN dyspnea  HYDROmorphone  Injectable 0.5 milliGRAM(s) IV Push every 1 hour PRN pain  LORazepam   Injectable 0.5 milliGRAM(s) IV Push every 1 hour PRN agitation or refractory dyspnea  ondansetron Injectable 4 milliGRAM(s) IV Push every 8 hours PRN Nausea and/or Vomiting      Vital Signs Last 24 Hrs  T(C): 36.8 (02 Dec 2022 12:00), Max: 38.2 (01 Dec 2022 20:26)  T(F): 98.2 (02 Dec 2022 12:00), Max: 100.8 (01 Dec 2022 20:26)  HR: 89 (02 Dec 2022 12:00) (53 - 995)  BP: 85/58 (02 Dec 2022 12:00) (66/40 - 99/71)  BP(mean): --  RR: 20 (02 Dec 2022 12:00) (20 - 36)  SpO2: 96% (02 Dec 2022 12:00) (73% - 100%)    Parameters below as of 02 Dec 2022 12:00  Patient On (Oxygen Delivery Method): BiPAP/CPAP      CAPILLARY BLOOD GLUCOSE      POCT Blood Glucose.: 143 mg/dL (02 Dec 2022 12:15)  POCT Blood Glucose.: 136 mg/dL (02 Dec 2022 08:24)  POCT Blood Glucose.: 158 mg/dL (02 Dec 2022 06:36)  POCT Blood Glucose.: 130 mg/dL (02 Dec 2022 04:20)  POCT Blood Glucose.: 139 mg/dL (02 Dec 2022 03:06)  POCT Blood Glucose.: 220 mg/dL (02 Dec 2022 02:12)  POCT Blood Glucose.: 147 mg/dL (02 Dec 2022 01:33)  POCT Blood Glucose.: 151 mg/dL (02 Dec 2022 00:38)    I&O's Summary    01 Dec 2022 07:01  -  02 Dec 2022 07:00  --------------------------------------------------------  IN: 0 mL / OUT: 0 mL / NET: 0 mL          PHYSICAL EXAM:   GENERAL: uncomfortable appearing.   HEAD:  Atraumatic, Normocephalic  EYES: EOMI, PERRLA, conjunctiva and sclera clear  NECK: Supple,   CHEST/LUNG: Congested  HEART: S1S2 normal. tachy rate and rhythm; No murmurs, rubs, or gallops  ABDOMEN: Soft, tender, Nondistended; Bowel sounds present  EXTREMITIES: No clubbing, cyanosis, or edema  PSYCH/Neuro: lethargic   SKIN: No rashes or lesions      LABS:                        14.3   30.98 )-----------( 221      ( 02 Dec 2022 04:35 )             44.8     12-    136  |  97  |  64<H>  ----------------------------<  139<H>  4.9   |  20<L>  |  2.70<H>    Ca    8.3<L>      02 Dec 2022 04:35  Phos  5.4       Mg     2.1         TPro  5.8<L>  /  Alb  2.6<L>  /  TBili  1.2  /  DBili  x   /  AST  256<H>  /  ALT  250<H>  /  AlkPhos  282<H>  12    PT/INR - ( 01 Dec 2022 19:12 )   PT: 14.6 sec;   INR: 1.26 ratio         PTT - ( 01 Dec 2022 19:12 )  PTT:79.3 sec      Urinalysis Basic - ( 01 Dec 2022 16:26 )    Color: Yellow / Appearance: Clear / S.018 / pH: x  Gluc: x / Ketone: Negative  / Bili: Negative / Urobili: Negative   Blood: x / Protein: Trace / Nitrite: Negative   Leuk Esterase: Negative / RBC: 8 /hpf / WBC 1 /HPF   Sq Epi: x / Non Sq Epi: 1 /hpf / Bacteria: Negative          RADIOLOGY & ADDITIONAL TESTS:    Imaging Personally Reviewed:  Consultant(s) Notes Reviewed:  MICU, ID, surg, palliative, pulm  Care Discussed with Consultants/Other Providers: ID, palliative

## 2022-12-02 NOTE — PROVIDER CONTACT NOTE (OTHER) - BACKGROUND
Pt admitted with COPD exacerbation, PNA, on HFNC and BIPAP at night. Pt has a history of HTN, breast cancer, chronic bronchitis, obesity, anxiety.
89 yo female admitted for COPD exacerbation
Pt admitted for COPD exacerbation and acute resp. failure with hypoxia. Pt has PNA, history of HTN, breast cancer, anxiety
Pt admitted with COPD exacerbation, respiratory failure with hypoxia, N/V, CHF, PNA, on HFNC and BIPAP at night. Pt is DNR/DNI. Pt has a history of chronic bronchitis, breast cancer, HTN, anxiety.
pt came in for copd exacerbation. pmh og chf and copd.
89 yo female admitted for COPD exacerbation
89 yo female admitted for COPD exacerbation
pt came in for copd exacerbation. pmh og chf and copd.
Pt admitted with COPD exacerbation, acute resp. failure with hypoxia, PNA, ISRAEL, respiratory distress.
Pt admitted for COPD exacerbation, resp. distress with hypoxia, PNA, and chronic bronchitis. Pt has been on HFNC and BIPAP at night since admission. Pt has a history of HTN, breast cancer, and anxiety
pt came in for copd exacerbation. pmh og chf and copd.
89 yo female admitted for COPD exacerbation

## 2022-12-02 NOTE — PROGRESS NOTE ADULT - PROBLEM SELECTOR PLAN 8
- continue home vitamin D and calcium supplements
BP stable   - c/w amlodipine 2.5mg daily  - trend closely, consider adding 2nd agent if needed
BP stable   - c/w amlodipine 2.5mg daily  - trend closely, consider adding 2nd agent if needed
Recent hospitalization at [place] for acute L2 compression fracture  - s/p dexamethasone outpatient  - pt wears TLSO brace during the day, as per neurosurgery recs from prior hospitalization --> Son to bring TLSO brace from [facility]   - continue home gabapentin 400mg TID  - continue home PRN flexeril 5mg TID for muscle spasm  - continue pain control with tramadol 50mg PRN for severe pain  -They were offered Kyphoplasty at previous hospital which they are considering, asked for MRI but not sure this is indicated at this time no new findings.
- continue home vitamin D and calcium supplements
Recent hospitalization at [place] for acute L2 compression fracture  - s/p dexamethasone outpatient  - pt wears TLSO brace during the day, as per neurosurgery recs from prior hospitalization --> Son to bring TLSO brace from [facility]   - continue home gabapentin 400mg TID  - continue home PRN flexeril 5mg TID for muscle spasm  - continue pain control with tramadol 50mg PRN for severe pain  -They were offered Kyphoplasty at previous hospital which they are considering, asked for MRI but not sure this is indicated at this time no new findings.
BP stable   - c/w amlodipine 2.5mg daily  - trend closely, consider adding 2nd agent if needed
BP stable   - c/w amlodipine 2.5mg daily  - trend closely, consider adding 2nd agent if needed
- continue home vitamin D and calcium supplements
BP stable   - hypotensive 12/1. -amlodipine stopped.
Recent hospitalization at [place] for acute L2 compression fracture  - s/p dexamethasone outpatient  - pt wears TLSO brace during the day, as per neurosurgery recs from prior hospitalization --> Son to bring TLSO brace from [facility]   - continue home gabapentin 400mg TID  - continue home PRN flexeril 5mg TID for muscle spasm  - continue pain control with tramadol 50mg PRN for severe pain  -They were offered Kyphoplasty at previous hospital which they are considering, asked for MRI but not sure this is indicated at this time no new findings.
Recent hospitalization at [place] for acute L2 compression fracture  - s/p dexamethasone outpatient  - pt wears TLSO brace during the day, as per neurosurgery recs from prior hospitalization --> Son to bring TLSO brace from [facility]   - continue home gabapentin 400mg TID  - continue home PRN flexeril 5mg TID for muscle spasm  - continue pain control with tramadol 50mg PRN for severe pain  -They were offered Kyphoplasty at previous hospital which they are considering, asked for MRI but not sure this is indicated at this time no new findings.
BP stable   - c/w amlodipine 2.5mg daily  - trend closely, consider adding 2nd agent if needed
BP stable   - hypotensive 12/1. -amlodipine stopped.
BP stable   - c/w amlodipine 2.5mg daily  - trend closely, consider adding 2nd agent if needed

## 2022-12-02 NOTE — PROGRESS NOTE ADULT - ASSESSMENT
89 year old F PMH HTN, COPD (on 3L home O2 at night), chronic bronchitis, and HFrEF, presented with worsening SOB. Initially treated for COPD exacerbation with methylprednisolone, also treated for pneumonia with 7 day course of Zosyn. S/p repeat CT showing worsening b/l lower lobe consolidations. Restarted on Zosyn. Now with worsening status on 12/1, febrile, hypotensive, rising leukocytosis, and new ISRAEL.     Tmax 103 today  WBC 24K  Lactate 5  COVID/Flu neg    Impression:  #Sepsis, Fever, Leukocytosis, Hypotension -  CT concerning for pneumonia. Workup pending to r/o alternative source of infection  #ISRAEL - in setting of hypotension  #elevated LFT  Recs:  - likely shock liver  - started on   cefepime 1g IV q24H and flagyl  - given vancomycin 750mg IV x 1 dose , please check level  - monitor renal function, urine output  - monitor fever curve, WBCs  - follow lactate  - appreciate surgical input  - for comfort care    Plan discussed with Dr Lakshmi Kebede M.D. ,   please reach via teams   If no answer, or after 5PM/ weekends,  then please call  [phone]      
89 year old female with PMH of HTN, COPD (on 3L home O2 at night), chronic bronchitis, CHFrEF (EF 40% in Dec 2018), recent admission to [facility] 10/22 for acute lumbar compression fracture who presents from [facility] for eval of SOB, secondary to COPD exacerbation with RRT on 11/14 for worsening hypoxia possibly 2/2 superimposed PNA now on both steroids and abx. 
89 year old female with PMH of HTN, COPD (on 3L home O2 at night), chronic bronchitis, CHFrEF (EF 40% in Dec 2018), recent admission to [facility] 10/22 for acute lumbar compression fracture who presents from [facility] for eval of SOB, secondary to COPD exacerbation with RRT on 11/14 and again 11/16 for worsening hypoxia possibly 2/2 superimposed PNA now on both steroids and abx with increasing WOB now weaned off BIPAP for HFNC, status still tenuous, GOC conversations ongoing
89 year old female with PMH of HTN, COPD (on 3L home O2 at night), chronic bronchitis, CHFrEF (EF 40% in Dec 2018), recent admission to [facility] 10/22 for acute lumbar compression fracture who presents from [facility] for eval of SOB, secondary to COPD exacerbation with RRT on 11/14 and again 11/16 for worsening hypoxia possibly 2/2 superimposed PNA now on both steroids and abx with increasing WOB now weaned off BIPAP for HFNC, status still tenuous, GOC conversations ongoing
89 year old female with PMH of HTN, COPD (on 3L home O2 at night), chronic bronchitis, CHFrEF (EF 40% in Dec 2018), recent admission to [facility] 10/22 for acute lumbar compression fracture who presents from [facility] for eval of SOB, secondary to COPD exacerbation with RRT on 11/14 and again 11/16 for worsening hypoxia possibly 2/2 superimposed PNA now on both steroids and abx with increasing WOB now weaned off BIPAP for HFNC, status still tenuous, GOC conversations ongoing
This is a 89-year-old female with significant past medical history of COPD on 3 L of supplemental oxygen, heart failure, and known pulmonary nodules who was admitted to the hospital for acute on chronic hypoxic respiratory failure.    #Acute on chronic respiratory failure with COPD–patient with supplemental oxygen and would maintain oxygen saturation above 88%.  Please continue Symbicort and Spiriva.  Can give duo nebs every 6 as needed.  However, at this time the patient does not appear to have wheezing or respiratory distress from COPD exacerbation.  - Please get sputum culture  - Please get procalcitonin  - HFNC is to help increase flow but not necessarily FiO2 (this is more for comfort)  - May need additional dose of diuresis if she again becomes short of breath accutely    #Pulmonary nodules–patient with known pulmonary nodules and seen by primary pulmonologist on the outside Dr. Calderon.  In today's CT scan of the chest nodules have increased with lymphadenopathy.  However, in the current setting would not pursue aggressive measurement at this time.  Will likely need PET scan as an outpatient which can be followed up by patient's primary pulmonologist. I would NOT suggest any inpatient workup of this nodule at this time given her current situation. She likely will only be offered chemotherapy and not surgery given her current state.    #Severe COPD with persistent dyspnea–had a prolonged conversation with the patient regarding her overall prognosis.  Patient indicates that she does not not know "how long she has to live".  Patient appears to be very depressed and anxious about her declining state of health.  Discussed at length with the patient regarding quality versus quantity of life.    – Appreciate Palliative care consult  - Would suggest Morphine PRN for acute dyspnea given her current state.    Will continue to follow with you.  
89 year old female with PMH of HTN, COPD (on 3L home O2 at night), chronic bronchitis, CHFrEF (EF 40% in Dec 2018), recent admission to [facility] 10/22 for acute lumbar compression fracture who presents from [facility] for eval of SOB, secondary to COPD exacerbation with RRT on 11/14 and again 11/16 for worsening hypoxia possibly 2/2 superimposed PNA now on both steroids and abx with increasing WOB now requiring BIPAP, GOC conversations ongoing 
89 year old female with PMH of HTN, COPD (on 3L home O2 at night), chronic bronchitis, CHFrEF (EF 40% in Dec 2018), recent admission to [facility] 10/22 for acute lumbar compression fracture who presents from [facility] for eval of SOB, secondary to COPD exacerbation with RRT on 11/14 and again 11/16 for worsening hypoxia possibly 2/2 superimposed PNA now on both steroids and abx with increasing WOB now weaned off BIPAP for HFNC, status still tenuous, GOC conversations ongoing. Worsening status back on bipap on 12/1. -comfort care now 12/2 -PCU pending. 
89 year old female with PMH of HTN, COPD (on 3L home O2 at night), chronic bronchitis, CHFrEF (EF 40% in Dec 2018), recent admission to [facility] 10/22 for acute lumbar compression fracture who presents from [facility] for eval of SOB, secondary to COPD exacerbation with RRT on 11/14 and again 11/16 for worsening hypoxia possibly 2/2 superimposed PNA now on both steroids and abx with increasing WOB now weaned off BIPAP for HFNC, status still tenuous, GOC conversations ongoing. Worsening status back on bipap on 12/1. 
89 year old female with PMH of HTN, COPD (on 3L home O2 at night), chronic bronchitis, CHFrEF (EF 40% in Dec 2018), recent admission to [facility] 10/22 for acute lumbar compression fracture who presents from [facility] for eval of SOB, secondary to COPD exacerbation with RRT on 11/14 and again 11/16 for worsening hypoxia possibly 2/2 superimposed PNA now on both steroids and abx with increasing WOB now weaned off BIPAP for HFNC, statisus still tenuous, GOC conversations ongoing
89 year old female with PMH of HTN, COPD (on 3L home O2 at night), chronic bronchitis, CHFrEF (EF 40% in Dec 2018), recent admission to [facility] 10/22 for acute lumbar compression fracture who presents from [facility] for eval of SOB, secondary to COPD exacerbation with RRT on 11/14 for worsening hypoxia possibly 2/2 superimposed PNA now on both steroids and abx. Palliative care reconsulted for assistance with respiratory symptoms and continued goals of care 
89 year old female with PMH of HTN, COPD (on 3L home O2 at night), chronic bronchitis, CHFrEF (EF 40% in Dec 2018), recent admission to [facility] 10/22 for acute lumbar compression fracture who presents from [facility] for eval of SOB, secondary to COPD exacerbation with RRT on 11/14 and again 11/16 for worsening hypoxia possibly 2/2 superimposed PNA now on both steroids and abx with increasing WOB now weaned off BIPAP for HFNC, status still tenuous, GOC conversations ongoing
89 year old female with PMH of HTN, COPD (on 3L home O2 at night), chronic bronchitis, CHFrEF (EF 40% in Dec 2018), recent admission to [facility] 10/22 for acute lumbar compression fracture who presents from [facility] for eval of SOB, secondary to COPD exacerbation with RRT on 11/14 and again 11/16 for worsening hypoxia possibly 2/2 superimposed PNA now on both steroids and abx with increasing WOB now weaned off BIPAP for HFNC, status still tenuous, GOC conversations ongoing
89 year old female with PMH of HTN, COPD (on 3L home O2 at night), chronic bronchitis, CHFrEF (EF 40% in Dec 2018), recent admission to [facility] 10/22 for acute lumbar compression fracture who presents from [facility] for eval of SOB, secondary to COPD exacerbation with RRT on 11/14 for worsening hypoxia possibly 2/2 superimposed PNA now on both steroids and abx. Palliative care reconsulted for assistance with respiratory symptoms and continued goals of care 
89 year old female with PMH of HTN, COPD (on 3L home O2 at night), chronic bronchitis, CHFrEF (EF 40% in Dec 2018), recent admission to [facility] 10/22 for acute lumbar compression fracture who presents from [facility] for eval of SOB, secondary to COPD exacerbation.
89 year old female with PMH of HTN, COPD (on 3L home O2 at night), chronic bronchitis, CHFrEF (EF 40% in Dec 2018), recent admission to [facility] 10/22 for acute lumbar compression fracture who presents from [facility] for eval of SOB, secondary to COPD exacerbation with RRT on 11/14 and again 11/16 for worsening hypoxia possibly 2/2 superimposed PNA now on both steroids and abx with increasing WOB now weaned off BIPAP for HFNC, statisus still tenuous, GOC conversations ongoing
89 year old female with PMH of HTN, COPD (on 3L home O2 at night), chronic bronchitis, CHFrEF (EF 40% in Dec 2018), recent admission to [facility] 10/22 for acute lumbar compression fracture who presents from [facility] for eval of SOB, secondary to COPD exacerbation with RRT on 11/14 for worsening hypoxia possibly 2/2 superimposed PNA now on both steroids and abx. Palliative care reconsulted for assistance with respiratory symptoms and continued goals of care 
89 year old female with PMH of HTN, COPD (on 3L home O2 at night), chronic bronchitis, CHFrEF (EF 40% in Dec 2018), recent admission to [facility] 10/22 for acute lumbar compression fracture who presents from [facility] for eval of SOB, secondary to COPD exacerbation with RRT on 11/14 and again 11/16 for worsening hypoxia possibly 2/2 superimposed PNA now on both steroids and abx with increasing WOB now weaned off BIPAP for HFNC, statisus still tenuous, GOC conversations ongoing
89 year old female with PMH of HTN, COPD (on 3L home O2 at night), chronic bronchitis, CHFrEF (EF 40% in Dec 2018), recent admission to [facility] 10/22 for acute lumbar compression fracture who presents from [facility] for eval of SOB, secondary to COPD exacerbation with RRT on 11/14 and again 11/16 for worsening hypoxia possibly 2/2 superimposed PNA now on both steroids and abx with increasing WOB now requiring BIPAP, GOC conversations ongoing 
89 year old female with PMH of HTN, COPD (on 3L home O2 at night), chronic bronchitis, CHFrEF (EF 40% in Dec 2018), recent admission to [facility] 10/22 for acute lumbar compression fracture who presents from [facility] for eval of SOB, secondary to COPD exacerbation with RRT on 11/14 and again 11/16 for worsening hypoxia possibly 2/2 superimposed PNA now on both steroids and abx with increasing WOB now weaned off BIPAP for HFNC, status still tenuous, GOC conversations ongoing
89 year old female with PMH of HTN, COPD (on 3L home O2 at night), chronic bronchitis, CHFrEF (EF 40% in Dec 2018), recent admission to [facility] 10/22 for acute lumbar compression fracture who presents from [facility] for eval of SOB, secondary to COPD exacerbation with RRT on 11/14 and again 11/16 for worsening hypoxia possibly 2/2 superimposed PNA now on both steroids and abx with increasing WOB now weaned off BIPAP for HFNC, status still tenuous, GOC conversations ongoing
89 year old female with PMH of HTN, COPD (on 3L home O2 at night), chronic bronchitis, CHFrEF (EF 40% in Dec 2018), recent admission to [facility] 10/22 for acute lumbar compression fracture who presents from [facility] for eval of SOB, secondary to COPD exacerbation with RRT on 11/14 and again 11/16 for worsening hypoxia possibly 2/2 superimposed PNA now on both steroids and abx with increasing WOB now weaned off BIPAP for HFNC, status still tenuous, GOC conversations ongoing
89 year old female with PMH of HTN, COPD (on 3L home O2 at night), chronic bronchitis, CHFrEF (EF 40% in Dec 2018), recent admission to [facility] 10/22 for acute lumbar compression fracture who presents from [facility] for eval of SOB, secondary to COPD exacerbation with RRT on 11/14 and again 11/16 for worsening hypoxia possibly 2/2 superimposed PNA now on both steroids and abx with increasing WOB now weaned off BIPAP for HFNC, status still tenuous, GOC conversations ongoing
89 year old female with PMH of HTN, COPD (on 3L home O2 at night), chronic bronchitis, CHFrEF (EF 40% in Dec 2018), recent admission to [facility] 10/22 for acute lumbar compression fracture who presents from [facility] for eval of SOB, secondary to COPD exacerbation with RRT on 11/14 and again 11/16 for worsening hypoxia possibly 2/2 superimposed PNA now on both steroids and abx with increasing WOB now weaned off BIPAP for HFNC, status still tenuous, GOC conversations ongoing

## 2022-12-02 NOTE — PROGRESS NOTE ADULT - PROBLEM SELECTOR PLAN 3
new ISRAEL 11/17, likely 2/2 poor sylvester intake and pre-renal dehydration, which improved after IVF     - BUN/Cr improved today, likely 2/2 pre-renal etiology dehydration, poor PO and i/s/o lasix dose   - holding further lasix   - given gentle IVF for today, monitor volume status
- resolved  - monitor BMP and replete electrolytes as needed
CT with increased size in nodule compared to October 2022  Patient open to discussions surrounding options.
- resolved  - monitor BMP and replete electrolytes as needed
CT with increased size in nodule compared to October 2022  Patient open to discussions surrounding options.
- noted to have potassium of 2.5 this morning  - supplement ordered to be given and repeat BMP later in evening  - monitor BMP and replete electrolytes as needed
Unclear etiology but significant on palpation on physical exam. Lactic acidosis of 5.6 this morning  - IV dilaudid 0.5 mg q1 prn for pain, ok to escalate by % pending clinical response
Resolved
- resolved  - monitor BMP and replete electrolytes as needed
Resolved
Resolved    #ISRAEL- new ISRAEL 11/17, likely 2/2 poor sylvester intake and pre-renal dehydration  - s/p D5 1/2 NS for hypernatremia and dehydration which has subsequently improved, now encouraging po intake  - hold lasix  - trend BMP daily
- resolved  - monitor BMP and replete electrolytes as needed
- resolved  - monitor BMP and replete electrolytes as needed
new ISRAEL 11/17, likely 2/2 poor po intake and pre-renal dehydration, which improved after IVF     - BUN/Cr improving, likely 2/2 pre-renal etiology dehydration, poor PO and i/s/o lasix dose   - hold off on further IVF for now
Resolved    #ISRAEL- new ISRAEL 11/17, likely 2/2 poor sylvester intake and pre-renal dehydration  - started D5 1/2 NS for hypernatremia and dehydration  - hold lasix  - trend BMP daily
Resolved    #ISRAEL- new ISRAEL 11/17, likely 2/2 poor sylvester intake and pre-renal dehydration  - s/p D5 1/2 NS for hypernatremia and dehydration which has subsequently improved, now encouraging po intake  - hold lasix  - trend BMP daily
Resolved
Resolved    #ISRAEL- new ISRAEL 11/17, likely 2/2 poor sylvester intake and pre-renal dehydration  - started D5 1/2 NS for hypernatremia and dehydration  - hold lasix  - trend BMP daily
new ISRAEL 11/17, likely 2/2 poor sylvester intake and pre-renal dehydration, which improved after IVF     - BUN/Cr continues to rise above baseline, likely 2/2 pre-renal etiology dehydration, poor PO and i/s/o lasix dose   - holding further lasix   - given gentle IVF for today, monitor volume status
- resolved  - monitor BMP and replete electrolytes as needed
new ISRAEL 11/17, likely 2/2 poor sylvester intake and pre-renal dehydration, which improved after IVF     - now with rising BUN/Cr again, likely 2/2 pre-renal etiology dehydration and i/s/o lasix dose yest   - holding further lasix   - given gentle IVF for today, monitor volume status
- noted to have potassium of 3.4 this morning  - supplement ordered to be given    - monitor BMP and replete electrolytes as needed

## 2022-12-02 NOTE — CONSULT NOTE ADULT - ATTENDING COMMENTS
ATTENDING ATTESTATION:    89F history of obesity, HTN, COPD on 3L O2 at night, admitted to medicine with COPD exacerbation with worsening sepsis and multiorgan dysfunction in the last 24 hours. Surgery consulted to rule out intraabdominal pathology in the setting of leukocytosis and lactatemia. Family reluctant to give IV contrast for CT given ISRAEL. The patient is DNR/DNI.     SBP 90s, not on pressors  On BIPAP  Sleeping, not oriented  Abd soft, obese, nontender    WBC 30.9 (steroids started today)  Cr 2.70  Lactate hovering around 5  , , Tbili 1.2    CXR/AXR: no free air identified    A/P: Worsening shock with multiorgan dysfunction and persistent lactic acidosis. Difficult to say if this is secondary to an intraabdominal pathology without CT imaging. I suspect leukocytosis is secondary to steroids started today and malnutrition with acute liver injury is contributing some to the persistent lactate. Her abdominal at the time I examined her was not remarkable.    I discussed with her son and daughter that if she required major abdominal surgery she would be at extremely high risk for perioperative morbidity and mortality, especially for requiring tracheostomy with long term MV. They expressed that this is not what she wound want, even if it meant that forgoing surgery would mean death. They would like to take time to see if she improves with antibiotics. Given this, we can proceed with CT however no matter the result, we would not proceed with surgical intervention.     Surgery will sign off, please reconsult as needed.       Total time spent in the care of this patient today (excluding critical care, teaching & procedures): 30 minutes    Over 50% of the total time was spent in discussion and coordination of care with consulting services, dietary and rehab services.    Maria Eugenia Bobo MD  Acute Care Surgery

## 2022-12-02 NOTE — CONSULT NOTE ADULT - ATTENDING COMMENTS
Patient examined and care reviewed in detail on bedside rounds  Family has decided against MICU care No MICU admission required

## 2022-12-02 NOTE — PROGRESS NOTE ADULT - PROBLEM SELECTOR PLAN 2
CT scan on 11/14 showing patchy consolidation in R lung; given worsening respiratory, c/f likely gram negative PNA   - clinically improving, continue with BiPAP and HFNC at 40%   - completed zosyn x 7 day course   - legionella, RVP negative   - c/w steroids, nebs as above  -swallow eval per pulm. npo while on bipap.  -comfort care now 12/2 -PCU pending.

## 2022-12-02 NOTE — PROGRESS NOTE ADULT - PROBLEM SELECTOR PLAN 10
- continue home vitamin D and calcium supplements
- continue home vitamin D and calcium supplements
Pt reports last bm was 1 week ago. constipated as a result of relative immobility and opioid induced  - continue senna and miralax  - s/p suppository 2 night ago with BM - monitor bms
Pt reports last bm was 1 week ago. constipated as a result of relative immobility and opioid induced  - continue senna and miralax  - s/p suppository - monitor bms
Pt reports last bm was 1 week ago. constipated as a result of relative immobility and opioid induced  - continue senna and miralax  - s/p suppository - monitor bms
- continue home vitamin D and calcium supplements
Pt reports last bm was 1 week ago. constipated as a result of relative immobility and opioid induced  - continue senna and miralax  - s/p suppository - monitor bms
- continue home vitamin D and calcium supplements
Pt reports last bm was 1 week ago. constipated as a result of relative immobility and opioid induced  - continue senna and miralax  - s/p suppository - monitor bms

## 2022-12-02 NOTE — CONSULT NOTE ADULT - CONSULT REQUESTED DATE/TIME
14-Nov-2022 11:52
01-Dec-2022 18:00
01-Dec-2022 14:48
02-Dec-2022 04:42
02-Dec-2022 09:10
15-Nov-2022 13:00

## 2022-12-02 NOTE — CHART NOTE - NSCHARTNOTEFT_GEN_A_CORE
Note in progress Goals of care discussion.    The patient is critically ill, in mutiorgan failure secondary to shock with concern for intra-abdominal/pulmonary infection, in hypoxic respiratory failure secondary to COPD exacerbation, ISRAEL. There is concern for GI bleed and bowel perforation given clinical exam and elevated lactate. She required a total of 3L of fluids overnight to maintain blood pressures, currently holding at 98/63. MICU has seen the patient overnight as well.     The patient is unable to participate in goals of care discussion d/t global encephalopathy. Discussion was held with her son and daughter, who were at bedside overnight. Discussed that the patient is overall critically ill and with new concern for bowel perforation. She was recommended for CT abdomen with IV and PO contrast to further evaluation; however, the patient is a poor candidate for these studies given risk for worsening ISRAEL with IV contrast, and as patient would require NG tube placement for PO contrast d/t poor mental status. I discussed with the family that while the abdominal CT may provide us a diagnosis, she would be a poor candidate for any procedures  or surgeries given her tenuous respiratory status and shock-state. I offered the option of a noncontrast CT scan as a next-best study but may give us some answers; however, the end outcome still remains that the patient is a poor candidate for any procedure. The family would like to hold off on any CT scans for now.     Daughter and son would like to uphold the patient's wishes for DNR/DNI; MOLST was completed by the patient earlier in her hospitalization outlining these wishes. They would like to continue with medical measures to treat her such as antibiotics to treat her sepsis. I discussed with the family wishes in regards to pressors. They understand that pressors would bring the blood pressure up to a safe level and may provide the patient additional time, but in and of themselves are not a treatment for her shock. After some consideration, the family state they would like a trail of pressors if the patient's blood pressure were to deteriorate in hopes that this would give the patient time to make a turn-around with antibiotics on board. They understand that the patient's prognosis is guarded.     I spent 20 minutes on face-to-face goals of care discussion overnight.  Discussed with ACP, Michaela Maciel. Goals of care discussion.    The patient is critically ill, in mutiorgan failure secondary to shock with concern for intra-abdominal/pulmonary infection, in hypoxic respiratory failure secondary to COPD exacerbation, ISRAEL. There is concern for GI bleed and bowel perforation given clinical exam and elevated lactate. She required a total of 3L of fluids overnight to maintain blood pressures, currently holding at 98/63. MICU has seen the patient overnight as well.     The patient is unable to participate in goals of care discussion d/t global encephalopathy. Discussion was held with her son and daughter, who were at bedside overnight. Discussed that the patient is overall critically ill and with new concern for bowel perforation. She was recommended for CT abdomen with IV and PO contrast to further evaluation; however, the patient is a poor candidate for these studies given risk for worsening ISRAEL with IV contrast, and as patient would require NG tube placement for PO contrast d/t poor mental status. I discussed with the family that while the abdominal CT may provide us a diagnosis, she would be a poor candidate for any procedures  or surgeries given her tenuous respiratory status and shock-state should it reveal a perforation or acute bleed. I offered the option of a noncontrast CT scan as a next-best study but may give us some answers; however, the end outcome still remains that the patient is a poor candidate for any procedure. The family would like to hold off on any CT scans for now.     Daughter and son would like to uphold the patient's wishes for DNR/DNI; MOLST was completed by the patient earlier in her hospitalization outlining these wishes. They would like to continue with medical measures to treat her such as antibiotics to treat her sepsis. I discussed with the family wishes in regards to pressors. They understand that pressors may support blood pressure and may provide the patient additional time, but in and of themselves are not a treatment for her shock. After some consideration, the family state they would like a trail of pressors if the patient's blood pressure were to deteriorate in hopes that this would give the patient time to make a turn-around with antibiotics on board. They understand that the patient's prognosis is guarded.     I spent 20 minutes on face-to-face goals of care discussion overnight.  Discussed with ACP, Michaela Maciel.

## 2022-12-02 NOTE — PROGRESS NOTE ADULT - PROBLEM SELECTOR PROBLEM 9
Prophylactic measure
Lumbar compression fracture
Lumbar compression fracture
Prophylactic measure
Constipation
Constipation
Lumbar compression fracture
Constipation
Prophylactic measure
Lumbar compression fracture
Constipation
Lumbar compression fracture
Prophylactic measure
Prophylactic measure
Lumbar compression fracture

## 2022-12-02 NOTE — RAPID RESPONSE TEAM SUMMARY - NSDATETIMERRT_GEN_ALL_CORE
02-Dec-2022 05:05
14-Nov-2022 18:00
01-Dec-2022 16:16
02-Dec-2022 08:37
16-Nov-2022 02:50
24-Nov-2022 12:28

## 2022-12-02 NOTE — PROGRESS NOTE ADULT - PROBLEM SELECTOR PROBLEM 1
COPD exacerbation
Respiratory distress
COPD exacerbation
Respiratory distress
COPD exacerbation
Respiratory distress
COPD exacerbation

## 2022-12-02 NOTE — RAPID RESPONSE TEAM SUMMARY - NSADDTLFINDINGSRRT_GEN_ALL_CORE
RRT called for hypotension to 88/44.   Patient had 500cc IVF running. Patient very tachypneic, tachycardic, uncomfortable appearing. Family at bedside. Patient is DNR/DNI but trial of pressors for maintaining adequate BP is still a possibility.     still on BiPAP machine for increased wob. 
RRT called for hypoxia. Initial VS: 97.9F, /78, , RR 24, O2 83% with respiratory distress. Pt had supplemental O2 increased to 6L NC for pulse ox reading concerns. ABG obtained showed pH 7.48, pCO2 45, pO2 179, bicarb 34, O2 sat 97%. Pt was hyper-oxygenating and thus NC decreased to 3L with lower pt goal of 88-92% given severe COPD. Pt on daily solumedrol 40 and s/p 125mg in ED for COPD exacerbation. CTA chest PE protocol read showed no PE and patchy consolidation in the right apical region and right base, may represent PNA. Informed nurse to send urine legionella and strep antigen with next urine collection and started CTX and azithro to cover for CAP, can stop per primary team if lower suspicion. Otherwise pt has no leukocytosis or recorded fevers. Started standing duonebs and hypersal Q6 and chest PT to enhance airway clearance given pt had increased mucous production and significant cough during RRT.

## 2022-12-02 NOTE — CONSULT NOTE ADULT - ASSESSMENT
89 y/o woman initially admitted to the hospital for possible COPD exacerbation treated with IV steroids on HFNC and bilevel now with course complicated by ISRAEL, hypotension, and leukocytosis with concern for possible severe sepsis with elevated lactate. MICU consulted for hypotension.     #Hypotension:   Patient with MICU consult yesterday for hypotension i/s/o concern for bowel ischemia. Today with continued worsening respiratory status and continued hypotension to 70s/40s. I spoke with patient's son Prem Felix over the phone regarding goals for his mother's care. We discussed that his mother was nearing the end of her life and that ICU level care including use of vasopressors would simply prolong the inevitable. He agreed that he believed that our interventions were not providing her benefit. He stated that he would not want to use vasopressors or have his mom transferred to the ICU for more intensive level of care. He stated he would like to focus more on her comfort.    Recommendations:  - Continue discussion with son regarding withdrawal of current care  - Limit blood draws, vitals checks per family request  - Family does not desire ICU level care at this time. Shift focus to comfort as per above.    Plan and contents of discussion relayed to primary ACP & attending Dr. Martins.    Discussed with ICU attending Dr. Monsivais.     Shakeel Kuhn MD  PGY-2

## 2022-12-02 NOTE — PROGRESS NOTE ADULT - PROBLEM SELECTOR PLAN 9
Pt reports last bm was 1 week ago. constipated as a result of relative immobility and opioid induced  - continue senna  - start miralax  - monitor bms
Recent hospitalization at [facility] for acute L2 compression fracture  - s/p dexamethasone outpatient  - pt wears TLSO brace during the day, as per neurosurgery recs from prior hospitalization --> Son to bring TLSO brace from [facility]   - continue home gabapentin 400mg TID  - continue home PRN flexeril 5mg TID for muscle spasm  - continue pain control with tramadol 50mg PRN for severe pain  -They were offered Kyphoplasty at previous hospital which they are considering, asked for MRI but not sure this is indicated at this time no new findings.
- DVT ppx: lovenox subq  - GI ppx: none  - Diet: low sodium    I discussed with daughter Rae that pt's COPD is quite advanced and her prognosis is poor. Will continue all available medical treatments as appropriate per pt's previously signed MOLST form (DNR/DNI). Family understands and hopes for recovery from this exacerbation.
Pt reports last bm was 1 week ago. constipated as a result of relative immobility and opioid induced  - continue senna  - start miralax  - s/p suppository yest with BM last night - monitor bms
Recent hospitalization at [place] for acute L2 compression fracture  - s/p dexamethasone outpatient  - pt wears TLSO brace during the day, as per neurosurgery recs from prior hospitalization --> Son to bring TLSO brace from [facility]   - continue home gabapentin 400mg TID  - continue home PRN flexeril 5mg TID for muscle spasm  - continue pain control with tramadol 50mg PRN for severe pain  -They were offered Kyphoplasty at previous hospital which they are considering, asked for MRI but not sure this is indicated at this time no new findings.
- DVT ppx: lovenox subq  - GI ppx: none  - Diet: low sodium
Recent hospitalization at [place] for acute L2 compression fracture  - s/p dexamethasone outpatient  - pt wears TLSO brace during the day, as per neurosurgery recs from prior hospitalization --> Son to bring TLSO brace from [facility]   - continue home gabapentin 400mg TID  - continue home PRN flexeril 5mg TID for muscle spasm  - continue pain control with tramadol 50mg PRN for severe pain  -They were offered Kyphoplasty at previous hospital which they are considering, asked for MRI but not sure this is indicated at this time no new findings.
- DVT ppx: lovenox subq  - GI ppx: none  - Diet: low sodium    previous physician has been been discussing pt's overall poor and tenuous status with family- Rae and Mohit during this week.  I updated mohit today on 11/19 - notified him of pts seeming improvement but still tenuous status
Recent hospitalization at [place] for acute L2 compression fracture  - s/p dexamethasone outpatient  - pt wears TLSO brace during the day, as per neurosurgery recs from prior hospitalization --> Son to bring TLSO brace from [facility]   - continue home gabapentin 400mg TID  - continue home PRN flexeril 5mg TID for muscle spasm  - continue pain control with tramadol 50mg PRN for severe pain  -They were offered Kyphoplasty at previous hospital which they are considering, asked for MRI but not sure this is indicated at this time no new findings.
- DVT ppx: lovenox subq  - GI ppx: none  - Diet: low sodium    I have been discussing pt's overall poor and tenuous status with family- Rae and Prem during this week. I discussed with Prem today that though pt is showing a slight improvement, her prognosis overall remains poor given her advanced COPD. Will continue current level of care.
- DVT ppx: lovenox subq  - GI ppx: none  - Diet: low sodium
- DVT ppx: lovenox subq  - GI ppx: none  - Diet: low sodium    previous physician has been been discussing pt's overall poor and tenuous status with family- Rae and Mohit during this week.  I updated mohit on 11/20
Recent hospitalization at [place] for acute L2 compression fracture  - s/p dexamethasone outpatient  - pt wears TLSO brace during the day, as per neurosurgery recs from prior hospitalization --> Son to bring TLSO brace from [facility]   - continue home gabapentin 400mg TID  - continue home PRN flexeril 5mg TID for muscle spasm  - continue pain control with tramadol 50mg PRN for severe pain  -They were offered Kyphoplasty at previous hospital which they are considering, asked for MRI but not sure this is indicated at this time no new findings.
Recent hospitalization at [place] for acute L2 compression fracture  - s/p dexamethasone outpatient  - pt wears TLSO brace during the day, as per neurosurgery recs from prior hospitalization --> Son to bring TLSO brace from [facility]   - continue home gabapentin 400mg TID  - continue home PRN flexeril 5mg TID for muscle spasm  - continue pain control with tramadol 50mg PRN for severe pain  -They were offered Kyphoplasty at previous hospital which they are considering, asked for MRI but not sure this is indicated at this time no new findings.
Recent hospitalization at [place] for acute L2 compression fracture  - s/p dexamethasone outpatient  - pt wears TLSO brace during the day, as per neurosurgery recs from prior hospitalization --> Son to bring TLSO brace from [facility]   - continue home gabapentin 400mg TID  - continue home PRN flexeril 5mg TID for muscle spasm  - continue pain control with tramadol 50mg PRN for severe pain  -They were offered Kyphoplasty at previous hospital which they are considering, asked for MRI but not sure this is indicated at this time no new findings.
- DVT ppx: lovenox subq  - GI ppx: none  - Diet: low sodium    I discussed with daughter Rae and son Prem at bedside pt's advanced COPD and poor prognosis, as well as the limitations to our medical interventions given her DNR/DNI status and previously stated advance directives. They are aware and wish to continue current level of care. Not on comfort care as of yet but I discussed with them that she is at high risk for deterioration despite maximal medical therapy.
Pt reports last bm was 1 week ago. constipated as a result of relative immobility and opioid induced  - continue senna  - start miralax  - s/p suppository yest with BM last night - monitor bms
Recent hospitalization at [place] for acute L2 compression fracture  - s/p dexamethasone outpatient  - pt wears TLSO brace during the day, as per neurosurgery recs from prior hospitalization --> Son to bring TLSO brace from [facility]   - continue home gabapentin 400mg TID  - continue home PRN flexeril 5mg TID for muscle spasm  - continue pain control with tramadol 50mg PRN for severe pain  -They were offered Kyphoplasty at previous hospital which they are considering, asked for MRI but not sure this is indicated at this time no new findings.
Pt reports last bm was 1 week ago. constipated as a result of relative immobility and opioid induced  - continue senna and miralax  - s/p suppository 2 night ago with BM - monitor bms

## 2022-12-02 NOTE — CONSULT NOTE ADULT - SUBJECTIVE AND OBJECTIVE BOX
89y M PMH of HTN, COPD (on 3L home O2 at night), chronic bronchitis, CHFrEF (EF 40% in Dec 2018), recent admission to [facility] 10/22 for acute lumbar compression fracture who presented from [facility] for eval of SOB, secondary to COPD exacerbation with RRT on 11/14 and again 11/16 for worsening hypoxia possibly 2/2 superimposed PNA now on both steroids and abx with increasing WOB now weaned off BIPAP for HFNC, status still tenuous, GOC conversations ongoing. Patient is critically ill, in multiorgan failure secondary to shock with concern for intra-abdominal/pulmonary infection, in hypoxic respiratory failure secondary to COPD exacerbation, [place]. Per primary team there is concern for GI bleed and/or bowel perforation given clinical exam and elevated lactate. She required a total of 3L of fluids overnight to maintain blood pressures, currently holding at 98/63.    Surgery consulted to r/o bowel perforation.    Vital Signs Last 24 Hrs  T(C): 37.9 (02 Dec 2022 03:29), Max: 38.2 (01 Dec 2022 20:26)  T(F): 100.3 (02 Dec 2022 03:29), Max: 100.8 (01 Dec 2022 20:26)  HR: 53 (02 Dec 2022 03:17) (53 - 91)  BP: 88/44 (02 Dec 2022 03:17) (81/49 - 98/63)  BP(mean): --  RR: 30 (02 Dec 2022 03:17) (30 - 48)  SpO2: 95% (02 Dec 2022 03:17) (90% - 98%)    Parameters below as of 02 Dec 2022 03:17  Patient On (Oxygen Delivery Method): BiPAP/CPAP    PHYSICAL EXAM:   GENERAL: Agitated  CHEST/LUNG: Congested sounding.   HEART: Regular rate  ABDOMEN: Soft, moderately distended, partially attributed to body habitus, tender on L abdomen   PSYCH/Neuro: Agitated    LABS:  cret                        15.8   32.46 )-----------( 233      ( 01 Dec 2022 19:12 )             49.7     12-01    135  |  95<L>  |  50<H>  ----------------------------<  187<H>  5.4<H>   |  17<L>  |  2.23<H>    Ca    8.4      01 Dec 2022 19:12  Phos  5.2     12-01  Mg     2.1     12-01    TPro  7.1  /  Alb  3.7  /  TBili  0.6  /  DBili  x   /  AST  20  /  ALT  23  /  AlkPhos  99  11-30    PT/INR - ( 01 Dec 2022 19:12 )   PT: 14.6 sec;   INR: 1.26 ratio         I&O's Detail    30 Nov 2022 07:01  -  01 Dec 2022 07:00  --------------------------------------------------------  IN:    IV PiggyBack: 100 mL    Oral Fluid: 600 mL  Total IN: 700 mL    OUT:    Voided (mL): 2200 mL  Total OUT: 2200 mL    Total NET: -1500 mL      PTT - ( 01 Dec 2022 19:12 )  PTT:79.3 sec    MEDICATIONS  (STANDING):  acetylcysteine 10%  Inhalation 4 milliLiter(s) Inhalation two times a day  albuterol    90 MICROgram(s) HFA Inhaler 2 Puff(s) Inhalation every 4 hours  albuterol/ipratropium for Nebulization 3 milliLiter(s) Nebulizer every 6 hours  budesonide 160 MICROgram(s)/formoterol 4.5 MICROgram(s) Inhaler 2 Puff(s) Inhalation two times a day  calcium carbonate 1250 mG  + Vitamin D (OsCal 500 + D) 1 Tablet(s) Oral daily  cefepime   IVPB      cefepime   IVPB 1000 milliGRAM(s) IV Intermittent every 24 hours  chlorhexidine 2% Cloths 1 Application(s) Topical daily  cholecalciferol 2000 Unit(s) Oral daily  dornase flor Solution 2.5 milliGRAM(s) Inhalation daily  famotidine    Tablet 20 milliGRAM(s) Oral two times a day  gabapentin 400 milliGRAM(s) Oral three times a day  guaiFENesin  milliGRAM(s) Oral every 12 hours  influenza  Vaccine (HIGH DOSE) 0.7 milliLiter(s) IntraMuscular once  lidocaine   4% Patch 1 Patch Transdermal every 24 hours  methylPREDNISolone sodium succinate Injectable 20 milliGRAM(s) IV Push daily  metroNIDAZOLE  IVPB      metroNIDAZOLE  IVPB 500 milliGRAM(s) IV Intermittent every 8 hours  multivitamin 1 Tablet(s) Oral daily  nystatin Powder 1 Application(s) Topical three times a day  pantoprazole Infusion 8 mG/Hr (10 mL/Hr) IV Continuous <Continuous>  polyethylene glycol 3350 17 Gram(s) Oral daily  senna 2 Tablet(s) Oral at bedtime  simethicone 80 milliGRAM(s) Chew daily  sodium chloride 3%  Inhalation 4 milliLiter(s) Inhalation every 6 hours  sucralfate suspension 1 Gram(s) Oral four times a day  tiotropium 2.5 MICROgram(s) Inhaler 2 Puff(s) Inhalation daily    MEDICATIONS  (PRN):  acetaminophen     Tablet .. 650 milliGRAM(s) Oral every 6 hours PRN Temp greater or equal to 38C (100.4F), Mild Pain (1 - 3)  aluminum hydroxide/magnesium hydroxide/simethicone Suspension 30 milliLiter(s) Oral every 4 hours PRN Dyspepsia  aluminum hydroxide/magnesium hydroxide/simethicone Suspension 30 milliLiter(s) Oral every 6 hours PRN Dyspepsia  cyclobenzaprine 5 milliGRAM(s) Oral three times a day PRN Muscle Spasm  melatonin 3 milliGRAM(s) Oral at bedtime PRN Insomnia  ondansetron Injectable 4 milliGRAM(s) IV Push every 8 hours PRN Nausea and/or Vomiting      Home Medications:  acetaminophen 500 mg oral tablet: 2 tab(s) orally every 8 hours, As Needed (14 Nov 2022 00:26)  amLODIPine 2.5 mg oral tablet: 1 tab(s) orally once a day (14 Nov 2022 00:26)  aspirin 81 mg oral tablet: 1 tab(s) orally once a day in am (14 Nov 2022 00:26)  Calcium 600+D 600 mg-5 mcg (200 intl units) oral tablet: 1 tab(s) orally 2 times a day (14 Nov 2022 00:26)  cholecalciferol oral tablet: 2000 unit(s) orally once a day (14 Nov 2022 00:26)  cyclobenzaprine 5 mg oral tablet: 1 tab(s) orally 3 times a day, As Needed (14 Nov 2022 00:26)  docusate potassium 100 mg oral capsule: 2 cap(s) orally once a day (at bedtime) (14 Nov 2022 00:26)  DuoNeb 0.5 mg-2.5 mg/3 mL inhalation solution: 3 milliliter(s) inhaled every 8 hours, As Needed (14 Nov 2022 00:26)  Lasix 40 mg oral tablet: 1 tab(s) orally once a day (in the morning) (14 Nov 2022 00:26)  Multiple Vitamins oral tablet: 1 tab(s) orally once a day (14 Nov 2022 00:26)  Neurontin 400 mg oral capsule: 1 cap(s) orally 3 times a day (14 Nov 2022 00:26)  senna leaf extract oral tablet: 2 tab(s) orally once a day (at bedtime) (14 Nov 2022 00:26)  Spiriva 18 mcg inhalation capsule: 1 cap(s) inhaled once a day in am (14 Nov 2022 00:26)  Symbicort 160 mcg-4.5 mcg/inh inhalation aerosol: 2 puff(s) inhaled 2 times a day (14 Nov 2022 00:26)  traMADol 50 mg oral tablet: 1 tab(s) orally every 6 hours, As needed, Severe Pain (7 - 10) (14 Nov 2022 00:26)       89y M PMH of HTN, COPD (on 3L home O2 at night), chronic bronchitis, CHFrEF (EF 40% in Dec 2018), recent admission to [facility] 10/22 for acute lumbar compression fracture who presented from [facility] for eval of SOB, secondary to COPD exacerbation with RRT on 11/14 and again 11/16 for worsening hypoxia possibly 2/2 superimposed PNA now on both steroids and abx with increasing WOB now with septic shock, DNR/DNI, wiht GOC conversations ongoing. Patient is critically ill, in multiorgan failure secondary to shock with concern for intra-abdominal/pulmonary infection, in hypoxic respiratory failure secondary to COPD exacerbation, [place]. Per primary team there is concern for GI bleed and/or bowel perforation given clinical exam and elevated lactate. She required a total of 3L of fluids overnight to maintain blood pressures, currently holding at 98/63.    Surgery consulted to r/o bowel perforation.    Vital Signs Last 24 Hrs  T(C): 37.9 (02 Dec 2022 03:29), Max: 38.2 (01 Dec 2022 20:26)  T(F): 100.3 (02 Dec 2022 03:29), Max: 100.8 (01 Dec 2022 20:26)  HR: 53 (02 Dec 2022 03:17) (53 - 91)  BP: 88/44 (02 Dec 2022 03:17) (81/49 - 98/63)  BP(mean): --  RR: 30 (02 Dec 2022 03:17) (30 - 48)  SpO2: 95% (02 Dec 2022 03:17) (90% - 98%)    Parameters below as of 02 Dec 2022 03:17  Patient On (Oxygen Delivery Method): BiPAP/CPAP    PHYSICAL EXAM:   GENERAL: Agitated  CHEST/LUNG: Congested sounding.   HEART: Regular rate  ABDOMEN: Soft, moderately distended, partially attributed to body habitus, tender on L abdomen   PSYCH/Neuro: Agitated    LABS:  cret                        15.8   32.46 )-----------( 233      ( 01 Dec 2022 19:12 )             49.7     12-01    135  |  95<L>  |  50<H>  ----------------------------<  187<H>  5.4<H>   |  17<L>  |  2.23<H>    Ca    8.4      01 Dec 2022 19:12  Phos  5.2     12-01  Mg     2.1     12-01    TPro  7.1  /  Alb  3.7  /  TBili  0.6  /  DBili  x   /  AST  20  /  ALT  23  /  AlkPhos  99  11-30    PT/INR - ( 01 Dec 2022 19:12 )   PT: 14.6 sec;   INR: 1.26 ratio         I&O's Detail    30 Nov 2022 07:01  -  01 Dec 2022 07:00  --------------------------------------------------------  IN:    IV PiggyBack: 100 mL    Oral Fluid: 600 mL  Total IN: 700 mL    OUT:    Voided (mL): 2200 mL  Total OUT: 2200 mL    Total NET: -1500 mL      PTT - ( 01 Dec 2022 19:12 )  PTT:79.3 sec    MEDICATIONS  (STANDING):  acetylcysteine 10%  Inhalation 4 milliLiter(s) Inhalation two times a day  albuterol    90 MICROgram(s) HFA Inhaler 2 Puff(s) Inhalation every 4 hours  albuterol/ipratropium for Nebulization 3 milliLiter(s) Nebulizer every 6 hours  budesonide 160 MICROgram(s)/formoterol 4.5 MICROgram(s) Inhaler 2 Puff(s) Inhalation two times a day  calcium carbonate 1250 mG  + Vitamin D (OsCal 500 + D) 1 Tablet(s) Oral daily  cefepime   IVPB      cefepime   IVPB 1000 milliGRAM(s) IV Intermittent every 24 hours  chlorhexidine 2% Cloths 1 Application(s) Topical daily  cholecalciferol 2000 Unit(s) Oral daily  dornase flor Solution 2.5 milliGRAM(s) Inhalation daily  famotidine    Tablet 20 milliGRAM(s) Oral two times a day  gabapentin 400 milliGRAM(s) Oral three times a day  guaiFENesin  milliGRAM(s) Oral every 12 hours  influenza  Vaccine (HIGH DOSE) 0.7 milliLiter(s) IntraMuscular once  lidocaine   4% Patch 1 Patch Transdermal every 24 hours  methylPREDNISolone sodium succinate Injectable 20 milliGRAM(s) IV Push daily  metroNIDAZOLE  IVPB      metroNIDAZOLE  IVPB 500 milliGRAM(s) IV Intermittent every 8 hours  multivitamin 1 Tablet(s) Oral daily  nystatin Powder 1 Application(s) Topical three times a day  pantoprazole Infusion 8 mG/Hr (10 mL/Hr) IV Continuous <Continuous>  polyethylene glycol 3350 17 Gram(s) Oral daily  senna 2 Tablet(s) Oral at bedtime  simethicone 80 milliGRAM(s) Chew daily  sodium chloride 3%  Inhalation 4 milliLiter(s) Inhalation every 6 hours  sucralfate suspension 1 Gram(s) Oral four times a day  tiotropium 2.5 MICROgram(s) Inhaler 2 Puff(s) Inhalation daily    MEDICATIONS  (PRN):  acetaminophen     Tablet .. 650 milliGRAM(s) Oral every 6 hours PRN Temp greater or equal to 38C (100.4F), Mild Pain (1 - 3)  aluminum hydroxide/magnesium hydroxide/simethicone Suspension 30 milliLiter(s) Oral every 4 hours PRN Dyspepsia  aluminum hydroxide/magnesium hydroxide/simethicone Suspension 30 milliLiter(s) Oral every 6 hours PRN Dyspepsia  cyclobenzaprine 5 milliGRAM(s) Oral three times a day PRN Muscle Spasm  melatonin 3 milliGRAM(s) Oral at bedtime PRN Insomnia  ondansetron Injectable 4 milliGRAM(s) IV Push every 8 hours PRN Nausea and/or Vomiting      Home Medications:  acetaminophen 500 mg oral tablet: 2 tab(s) orally every 8 hours, As Needed (14 Nov 2022 00:26)  amLODIPine 2.5 mg oral tablet: 1 tab(s) orally once a day (14 Nov 2022 00:26)  aspirin 81 mg oral tablet: 1 tab(s) orally once a day in am (14 Nov 2022 00:26)  Calcium 600+D 600 mg-5 mcg (200 intl units) oral tablet: 1 tab(s) orally 2 times a day (14 Nov 2022 00:26)  cholecalciferol oral tablet: 2000 unit(s) orally once a day (14 Nov 2022 00:26)  cyclobenzaprine 5 mg oral tablet: 1 tab(s) orally 3 times a day, As Needed (14 Nov 2022 00:26)  docusate potassium 100 mg oral capsule: 2 cap(s) orally once a day (at bedtime) (14 Nov 2022 00:26)  DuoNeb 0.5 mg-2.5 mg/3 mL inhalation solution: 3 milliliter(s) inhaled every 8 hours, As Needed (14 Nov 2022 00:26)  Lasix 40 mg oral tablet: 1 tab(s) orally once a day (in the morning) (14 Nov 2022 00:26)  Multiple Vitamins oral tablet: 1 tab(s) orally once a day (14 Nov 2022 00:26)  Neurontin 400 mg oral capsule: 1 cap(s) orally 3 times a day (14 Nov 2022 00:26)  senna leaf extract oral tablet: 2 tab(s) orally once a day (at bedtime) (14 Nov 2022 00:26)  Spiriva 18 mcg inhalation capsule: 1 cap(s) inhaled once a day in am (14 Nov 2022 00:26)  Symbicort 160 mcg-4.5 mcg/inh inhalation aerosol: 2 puff(s) inhaled 2 times a day (14 Nov 2022 00:26)  traMADol 50 mg oral tablet: 1 tab(s) orally every 6 hours, As needed, Severe Pain (7 - 10) (14 Nov 2022 00:26)

## 2022-12-02 NOTE — PROGRESS NOTE ADULT - REASON FOR ADMISSION
respiratory distress

## 2022-12-02 NOTE — PROGRESS NOTE ADULT - PROBLEM SELECTOR PLAN 5
Increased in size compared to prior scan in Oct 2022  - Pt was informed of this CT finding  - Emailed Dr. Sheest for collateral and to update on most recent CT  -Appreciate pulm recs  -Pt would consider surgery if offered however pt is very poor surgical candidate  - discussed this with family who understand workup likely needed as OP as patient improves from acute episode
- c/w amlodipine 2.5mg daily
Increased in size compared to prior scan in Oct 2022  - Pt was informed of this CT finding  - Emailed Dr. Sheets for collateral and to update on most recent CT  -Appreciate pulm recs  -Pt would consider surgery if offered however pt is very poor surgical candidate  - discussed this with family who understand workup likely needed as OP as patient improves from acute episode
Increased in size compared to prior scan in Oct 2022  - Pt was informed of this CT finding  - Emailed Dr. Sheets for collateral and to update on most recent CT  -Appreciate pulm recs  -Pt would consider surgery if offered however pt is very poor surgical candidate  - discussed this with family who understand workup likely needed as OP as patient improves from acute episode
Patient more alert today but tearful.  Patient remains consistent with continuing medical management.  She remains hopeful for some level of recovery.   Palliative will continue to follow
pt with nausea/vomiting previously with epigastric burning pain after eating - suspect gastritis/GERD 2/2 steroid use. AXR showing non obstructive bowel pattern.   symptoms improving, tolerating diet   - changed from IV PPI BID to PO PPI 11/25      - symptom control with zofran   - c/w pepcid BID and sucralfate QID
See goals of care above
pt with nausea/vomiting previously with epigastric burning pain after eating - suspect gastritis/GERD 2/2 steroid use. AXR showing non obstructive bowel pattern.   symptoms improving, tolerating diet   - changed from IV PPI BID to PO PPI 11/25      - symptom control with zofran   - c/w pepcid BID and sucralfate QID
- c/w amlodipine 2.5mg daily
pt with nausea/vomiting previously with epigastric burning pain after eating - suspect gastritis/GERD 2/2 steroid use. AXR showing non obstructive bowel pattern.   symptoms improving, tolerating diet   - changed from IV PPI BID to PO PPI 11/25      - symptom control with zofran   - c/w pepcid BID and sucralfate QID
- Chaplaincy referral sent, pt is [employer]  - Child Life offered with family deferring for now, may re-address once pt is in PCU pending clinical course (2 young granddtr)  - Pt is on the wait-list to transfer to PCU likely in the next 24 hours. Please don't hesitate to page us at [phone] if any uncontrolled symptoms while pt is on the floor     An MD Ludmila  GAP Team Consults  Please call if we can be of assistance, [phone]
- c/w amlodipine 2.5mg daily
pt with nausea/vomiting previously with epigastric burning pain after eating - suspect gastritis/GERD 2/2 steroid use. AXR showing non obstructive bowel pattern.   symptoms improving, tolerating diet   - on PPI BID; will change to PO PPI today     - symptom control with zofran   - c/w pepcid BID and sucralfate QID   - if symptoms not improving, will consider GI consult
- c/w amlodipine 2.5mg daily
pt with nausea/vomiting previously with epigastric burning pain after eating - suspect gastritis/GERD 2/2 steroid use. AXR showing non obstructive bowel pattern.   symptoms improving, tolerating diet   - changed from IV PPI BID to PO PPI 11/25      - symptom control with zofran   - c/w pepcid BID and sucralfate QID   - if symptoms not improving, will consider GI consult
pt with nausea/vomiting previously with epigastric burning pain after eating - suspect gastritis/GERD 2/2 steroid use. AXR showing non obstructive bowel pattern.   symptoms improving, tolerating diet   - changed from IV PPI BID to PO PPI 11/25      - symptom control with zofran   - c/w pepcid BID and sucralfate QID
Increased in size compared to prior scan in Oct 2022  - Pt was informed of this CT finding  - Emailed Dr. Sheets for collateral and to update on most recent CT  -Appreciate pulm recs  -Pt would consider surgery if offered however pt is very poor surgical candidate  - discussed this with family who understand workup likely needed as OP as patient improves from acute episode

## 2022-12-02 NOTE — PROVIDER CONTACT NOTE (OTHER) - DATE AND TIME:
01-Dec-2022 00:40
18-Nov-2022 12:11
24-Nov-2022 12:00
02-Dec-2022 22:28
24-Nov-2022 10:40
30-Nov-2022 21:50
02-Dec-2022 03:20
24-Nov-2022 16:48
30-Nov-2022 22:15
01-Dec-2022 07:45
02-Dec-2022 00:57
01-Dec-2022 00:10
01-Dec-2022 20:30

## 2022-12-02 NOTE — PROVIDER CONTACT NOTE (OTHER) - ACTION/TREATMENT ORDERED:
Provider made aware. As per provider, mcclure to be placed for comfort care and urine monitoring in the critically ill. Will continue to monitor
Provider and resp. therapist at bedside with pt. 0.5 mg Ativan and 1mg Morphine, 4mg zofran. Pt placed back on maximum HFNC d/t nausea. Monitor pt O2 sat and HR and mental status.
Provider made aware. As per provider, obtain rectal temp at this time. Will followup on medication administration. Will continue to monitor
Provider made aware. IV tylenol to be ordered and given. provider to assess patient at bedside. Will continue to monitor
Provider made aware. Provider assessed patient at bedside. Patient placed on 15L NRB. Pt to be given glycopyrrolate and dilaudid at this time for comfort. Will continue to monitor
Administer IV Tylenol, recheck temp in 1 hr.
Maintain NPO status at this time d/t high potential for patient detoriation.
ekg ordered and complete. No other order at this time.
give Duoneb early. pt
Pt placed on maximum BIPAP, respiratory therapist at bedside. Pt satting 93%. Administer 1mg Morphine per order for comfort. Monitor pt 02 sat and HR and mental status. Calming methods used to help pt
pt placed on nonrebreather o2 stat resolves. pt still complaining of sob. pt using accessary muscles to breathe. Rapid called.
Provider notified, respiratory therapist at bedside and pt placed on continuous BIPAP. Pt suctioned, coughing and deep breathing done. Duoneb treatment given. 0.5 mg Ativan given per order.
Retake BP in1 hr

## 2022-12-02 NOTE — RAPID RESPONSE TEAM SUMMARY - NSREASONFORCALLINGRRT_GEN_ALL_CORE
Hypoxia
Abnormal respiratory rate
Abnormal respiratory rate
Abnormal respiratory rate/Hypotension/Hypoxia
Airway concerns
Hypotension

## 2022-12-02 NOTE — PROGRESS NOTE ADULT - PROBLEM SELECTOR PROBLEM 2
COPD exacerbation
Gram-negative pneumonia
COPD exacerbation
Gram-negative pneumonia
Gram-negative pneumonia
Pulmonary nodule, right
Gram-negative pneumonia
COPD exacerbation
Gram-negative pneumonia
Gram-negative pneumonia
Pulmonary nodule, right
Pulmonary nodule, right
Gram-negative pneumonia
Pulmonary nodule, right
Pulmonary nodule, right
Gram-negative pneumonia

## 2022-12-02 NOTE — PROGRESS NOTE ADULT - PROVIDER SPECIALTY LIST ADULT
Hospitalist
Pulmonology
Hospitalist
Pulmonology
Hospitalist
Hospitalist
Palliative Care
Pulmonology
Infectious Disease
Pulmonology
Hospitalist
Palliative Care
Hospitalist
Hospitalist
Palliative Care
Hospitalist

## 2022-12-02 NOTE — PROVIDER CONTACT NOTE (OTHER) - NAME OF MD/NP/PA/DO NOTIFIED:
Emelia robbins
Emelia robbins
Linnea Mathis ACP
Macey Hartley
Michaela AMES
Emelia robbins
HUI Mathis
Linnea Mathis ACP
Linnea Mathis ACP
Michaela AMES
Michaela AMES
HUI Mathis
Linnea ACP

## 2022-12-02 NOTE — PROVIDER CONTACT NOTE (OTHER) - RECOMMENDATIONS
Contact provider, Tylenol
contact provider
Notify provider
Notify provider
Notify provider, monitor O2 and HR.
Contact provider, give another dose of medication to calm down, notify resp. therapist
Notify provider
Notify provider and resp. therapist
contact provider
contact provider
Notify provider and respiratory therapist.
Notify provider to clarify medication administration

## 2022-12-02 NOTE — PROGRESS NOTE ADULT - PROBLEM SELECTOR PLAN 11
- DVT ppx: lovenox subq  - GI ppx: none  - Diet: low sodium
- continue home vitamin D and calcium supplements
- continue home vitamin D and calcium supplements
- DVT ppx: lovenox subq  - GI ppx: none  - Diet: low sodium
- continue home vitamin D and calcium supplements

## 2022-12-02 NOTE — CONSULT NOTE ADULT - ASSESSMENT
89y M PMH of HTN, COPD (on 3L home O2 at night), chronic bronchitis, CHFrEF (EF 40% in Dec 2018), recent admission to [facility] 10/22 for acute lumbar compression fracture who presented from [facility] for eval of SOB, secondary to COPD exacerbation with RRT on 11/14 and again 11/16 for worsening hypoxia possibly 2/2 superimposed PNA now on both steroids and abx with increasing WOB now weaned off BIPAP for HFNC, status still tenuous, GOC conversations ongoing. Surgery consulted to r/o bowel perforation.    Plan/recommendations  - Recommend CT abdomen and pelvis, risks and benefits of contrast use in the setting of [place] per Medicine and Nephrology  - F/U GOC  - NPO  - Continue with broad spectrum antibiotic coverage  - Serial abdominal exams  - Trend lactate  - Plan discussed and agreeable with [facility] Surgery Fellow on call Dr. De La Torre on behalf of Dr. Bobo    [facility] Surgery  [id number]    89y M PMH of HTN, COPD (on 3L home O2 at night), chronic bronchitis, CHFrEF (EF 40% in Dec 2018), recent admission to [facility] 10/22 for acute lumbar compression fracture who presented from [facility] for eval of SOB, secondary to COPD exacerbation with RRT on 11/14 and again 11/16 for worsening hypoxia possibly 2/2 superimposed PNA now on both steroids and abx with increasing WOB, critically ill, with GOC conversations ongoing. Surgery consulted to r/o bowel perforation.    Plan/recommendations  - Recommend CT abdomen and pelvis, risks and benefits of contrast use in the setting of IS[place]EL per Medicine and Nephrology  - F/U GOC  - NPO  - Continue with broad spectrum antibiotic coverage  - Serial abdominal exams  - Trend lactate  - Plan discussed and agreeable with [facility] Surgery Fellow on call Dr. De La Torre on behalf of Dr. Bobo    [facility] Surgery  [id number]

## 2022-12-02 NOTE — PROGRESS NOTE ADULT - NUTRITIONAL ASSESSMENT
This patient has been assessed with a concern for Malnutrition and has been determined to have a diagnosis/diagnoses of Moderate protein-calorie malnutrition.    This patient is being managed with:   Diet Regular-  Low Sodium  Supplement Feeding Modality:  Oral  Ensure Enlive Cans or Servings Per Day:  3       Frequency:  Daily  Entered: Nov 23 2022  4:38PM    
This patient has been assessed with a concern for Malnutrition and has been determined to have a diagnosis/diagnoses of Moderate protein-calorie malnutrition.    This patient is being managed with:   Diet NPO-  Entered: Dec  1 2022  6:46PM    
This patient has been assessed with a concern for Malnutrition and has been determined to have a diagnosis/diagnoses of Moderate protein-calorie malnutrition.    This patient is being managed with:   Diet Regular-  Low Sodium  Supplement Feeding Modality:  Oral  Ensure Enlive Cans or Servings Per Day:  3       Frequency:  Daily  Entered: Nov 23 2022  4:38PM    

## 2022-12-02 NOTE — CHART NOTE - NSCHARTNOTEFT_GEN_A_CORE
Patient was seen and examined at bedside this morning  Had an extensive discussion with family regarding goals of care  If patient were to get abdominal imaging which shows surgical pathology, she would have significantly increased perioperative morbidity and mortality  Confirmed with family members that this would not be consistent with patient's wishes  At this time will hold off on any operative interventions   Continued care per primary team and goals of care discussions  Please reconsult with any questions    Surgery  [id number]

## 2022-12-02 NOTE — RAPID RESPONSE TEAM SUMMARY - NSOTHERINTERVENTIONSRRT_GEN_ALL_CORE
Discussed with MICU who is aware of case. Patient likely needs CT scan to eval for potential bleed or other sources of infection  CBC, CMP, VBG with lytes gathered   Low threshold for starting pressors for BP support if needed     Primary team at bedside and notified

## 2022-12-02 NOTE — PROGRESS NOTE ADULT - SUBJECTIVE AND OBJECTIVE BOX
Patient is a 89y old  Female who presents with a chief complaint of respiratory distress (02 Dec 2022 10:19)    Being followed by ID for        Interval history:  events reviewed- developed abd pain at some point yesterday  flagyl added   not stable for transport to CAT scan  worsening renal function  for comfort care   No other acute events      PAST MEDICAL & SURGICAL HISTORY:  Essential Hypertension      COPD (Chronic Obstructive Pulmonary Disease)  on home O2 AT NIGHT      Hip Fracture-Left      Hx of Breast Cancer  HAd Rt in       Chronic bronchitis      Anxiety disorder      Morbid obesity      DAVID on CPAP      Dysphagia      Neuropathy      S/P Insertion of IVC (Inferior Vena Caval) Filter      Joint Fixation (Surgical)- L Hip      S/P Breast Lumpectomy- left      Status Post Total Knee Replacement-bilateral      S/P         Allergies    No Known Allergies    Intolerances      Antimicrobials:    cefepime   IVPB 1000 milliGRAM(s) IV Intermittent every 24 hours  cefepime   IVPB      metroNIDAZOLE  IVPB      metroNIDAZOLE  IVPB 500 milliGRAM(s) IV Intermittent every 8 hours    MEDICATIONS  (STANDING):  acetylcysteine 10%  Inhalation 4 milliLiter(s) Inhalation two times a day  albuterol    90 MICROgram(s) HFA Inhaler 2 Puff(s) Inhalation every 4 hours  albuterol/ipratropium for Nebulization 3 milliLiter(s) Nebulizer every 6 hours  budesonide 160 MICROgram(s)/formoterol 4.5 MICROgram(s) Inhaler 2 Puff(s) Inhalation two times a day  calcium carbonate 1250 mG  + Vitamin D (OsCal 500 + D) 1 Tablet(s) Oral daily  cefepime   IVPB      cefepime   IVPB 1000 milliGRAM(s) IV Intermittent every 24 hours  chlorhexidine 2% Cloths 1 Application(s) Topical daily  cholecalciferol 2000 Unit(s) Oral daily  dornase flor Solution 2.5 milliGRAM(s) Inhalation daily  famotidine    Tablet 20 milliGRAM(s) Oral two times a day  gabapentin 400 milliGRAM(s) Oral three times a day  guaiFENesin  milliGRAM(s) Oral every 12 hours  influenza  Vaccine (HIGH DOSE) 0.7 milliLiter(s) IntraMuscular once  lidocaine   4% Patch 1 Patch Transdermal every 24 hours  methylPREDNISolone sodium succinate Injectable 20 milliGRAM(s) IV Push daily  metroNIDAZOLE  IVPB      metroNIDAZOLE  IVPB 500 milliGRAM(s) IV Intermittent every 8 hours  multivitamin 1 Tablet(s) Oral daily  nystatin Powder 1 Application(s) Topical three times a day  pantoprazole Infusion 8 mG/Hr (10 mL/Hr) IV Continuous <Continuous>  polyethylene glycol 3350 17 Gram(s) Oral daily  senna 2 Tablet(s) Oral at bedtime  simethicone 80 milliGRAM(s) Chew daily  sodium chloride 3%  Inhalation 4 milliLiter(s) Inhalation every 6 hours  sucralfate suspension 1 Gram(s) Oral four times a day  tiotropium 2.5 MICROgram(s) Inhaler 2 Puff(s) Inhalation daily      Vital Signs Last 24 Hrs  T(C): 36.8 (22 @ 05:43), Max: 38.2 (22 @ 20:26)  T(F): 98.3 (22 @ 05:43), Max: 100.8 (22 @ 20:26)  HR: 94 (22 @ 09:02) (53 - 995)  BP: 95/61 (22 @ 05:43) (81/49 - 99/71)  BP(mean): --  RR: 28 (22 @ 09:01) (28 - 48)  SpO2: 99% (22 @ 09:02) (73% - 100%)    Physical Exam:    Constitutional on BIPAP  unresponsive     Neck supple no JVD or LN    Chest bilateral BS    CVS S1S@    Abd softly distended    Ext No cyanosis clubbing or edema    IV site no erythema tenderness or discharge        Lab Data:                          14.3   30.98 )-----------( 221      ( 02 Dec 2022 04:35 )             44.8           136  |  97  |  64<H>  ----------------------------<  139<H>  4.9   |  20<L>  |  2.70<H>    Ca    8.3<L>      02 Dec 2022 04:35  Phos  5.4       Mg     2.1         TPro  5.8<L>  /  Alb  2.6<L>  /  TBili  1.2  /  DBili  x   /  AST  256<H>  /  ALT  250<H>  /  AlkPhos  282<H>      Blood Gas Venous - Lactate (22 @ 04:30)    Blood Gas Venous - Lactate: 5.2 mmol/L      Urinalysis Basic - ( 01 Dec 2022 16:26 )    Color: Yellow / Appearance: Clear / S.018 / pH: x  Gluc: x / Ketone: Negative  / Bili: Negative / Urobili: Negative   Blood: x / Protein: Trace / Nitrite: Negative   Leuk Esterase: Negative / RBC: 8 /hpf / WBC 1 /HPF   Sq Epi: x / Non Sq Epi: 1 /hpf / Bacteria: Negative      < from: Xray Chest 1 View- PORTABLE-Urgent (Xray Chest 1 View- PORTABLE-Urgent .) (22 @ 10:01) >  IMPRESSION:  Bilateral pleural effusions with associated atelectasis. Left mid lung   field nodule. Emphysematous changes.    --- End of Report ---      < end of copied text >    < from: Xray Abdomen 1 View PORTABLE -Urgent (Xray Abdomen 1 View PORTABLE -Urgent .) (22 @ 14:26) >  IMPRESSION:  Nonspecific gas pattern. Single distended loop in the left lower quadrant   bowel gas pattern.    --- End of Report ---      < end of copied text >                WBC Count: 30.98 (22 @ 04:35)  WBC Count: 32.46 (22 @ 19:12)  WBC Count: 23.85 (22 @ 07:23)  WBC Count: 10.54 (22 @ 11:20)  WBC Count: 8.93 (22 @ 06:50)  WBC Count: 9.28 (22 @ 07:10)  WBC Count: 10.09 (22 @ 06:47)

## 2022-12-02 NOTE — PROVIDER CONTACT NOTE (OTHER) - ASSESSMENT
Pt not responding to orientation questions at this time. Pt turned and positioned for hygiene care. Pt satting 60-70% on 4L NC and patient has secretions. Unable to obtain BP at this time

## 2022-12-02 NOTE — PROGRESS NOTE ADULT - PROBLEM SELECTOR PLAN 4
- HCP unclear if previously established, pt's  Mckinley deferring to his children who assigned son Prem to be healthcare surrogate  - Code status DNR and DNI with comfort measures  - GOC: transition to comfort measures, bipap->NC, d/c labs/non-essential oral meds, optimize symptom management of PRNs detailed above. Transfer to PCU once a bed becomes available

## 2022-12-02 NOTE — PROGRESS NOTE ADULT - CONVERSATION DETAILS
Family including , 2 sons and 1 dtr confirm that their goal is for pt to be comfort. They understand that her conditions are too tenuous for further work up/tx and unlikely to be reversible.  defers any medical decision making to his adult children present, the siblings all deferred to son Prem at this time.     Advised for next step to be removal of non-essential oral medications, lab checks to optimize comfort. Advised to transition bipap to nasal cannula to continue with respiratory support in a less invasive way. Family request for pre-medication prior to bipap removal as they do not want to see her struggle. Advised pre-meds will be ordered as well as ample PRNs for any additional dyspnea, pain or secretions. Recommended for pt to transfer to the PCU where comfort measures will continue with the expertise of the palliative nurses. I shared that her prognosis is likely hours to days. Emotional support provided to family as they agreed to PCU transfer for continued comfort oriented plan of care.     Family agreed to chaplaincy referral for spiritual support, pt is [employer]. Hearing that there are 2 young granddtrs, I offered child life specialist referral but family wishes to defer for now.
Patient is s/p RRT and remains confused, delirious likely in setting of poor respiratory perfusion.   Spoke with daughter Elizabet and  Mckinley by phone , updated them accordingly .  Family wished to rescind DNR/DNI .  I educated family that legally the only person who could rescind those directives are the patient herself since she signed the document at a time when she had clear mental capacity.  I  assured them that continued medical management  remains the goal.   Family verbalized understanding.   Dr. Luz updated.  Palliative will continue to follow.
Discussed the progressive process of COPD and in the future high likelihood of rehospitalization.  Also discussed lung nodule changes and asked if it were cancer would she want biopsy/treatment.  She did say she would consider surgery if it was "caught early" like her breast cancer was.  She does not want to be on ventilator and does not want resuscitation (previous DNR and she communicated this to her son Prem).  She is anxious about how much time she has left and when she gets hospitalized feels like she is not going to "make it out of here".      DNR/DNI would want work up of early cancers

## 2022-12-02 NOTE — CONSULT NOTE ADULT - SUBJECTIVE AND OBJECTIVE BOX
CHIEF COMPLAINT:    HPI:    PAST MEDICAL & SURGICAL HISTORY:  Essential Hypertension      COPD (Chronic Obstructive Pulmonary Disease)  on home O2 AT NIGHT      Hip Fracture-Left      Hx of Breast Cancer  HAd Rt in       Chronic bronchitis      Anxiety disorder      Morbid obesity      DAVID on CPAP      Dysphagia      Neuropathy      S/P Insertion of IVC (Inferior Vena Caval) Filter      Joint Fixation (Surgical)- L Hip      S/P Breast Lumpectomy- left      Status Post Total Knee Replacement-bilateral      S/P           FAMILY HISTORY:  Family history of renal disease (Sibling)    Family history of lymphoma    Family history of congestive heart failure        SOCIAL HISTORY:  Smoking: __ packs x ___ years  EtOH Use:  Marital Status:  Occupation:  Recent Travel:  Country of Birth:  Advance Directives:    Allergies    No Known Allergies    Intolerances        HOME MEDICATIONS:    REVIEW OF SYSTEMS:  Constitutional:   Eyes:  ENT:  CV:  Resp:  GI:  :  MSK:  Integumentary:  Neurological:  Psychiatric:  Endocrine:  Hematologic/Lymphatic:  Allergic/Immunologic:  [ ] All other systems negative  [ ] Unable to assess ROS because ________    OBJECTIVE:  ICU Vital Signs Last 24 Hrs  T(C): 36.8 (02 Dec 2022 05:43), Max: 38.2 (01 Dec 2022 20:26)  T(F): 98.3 (02 Dec 2022 05:43), Max: 100.8 (01 Dec 2022 20:26)  HR: 94 (02 Dec 2022 09:02) (53 - 995)  BP: 95/61 (02 Dec 2022 05:43) (81/49 - 99/71)  BP(mean): --  ABP: --  ABP(mean): --  RR: 28 (02 Dec 2022 09:01) (28 - 48)  SpO2: 99% (02 Dec 2022 09:02) (73% - 100%)    O2 Parameters below as of 02 Dec 2022 09:01  Patient On (Oxygen Delivery Method): BiPAP/CPAP               @ 07:01  -   @ 07:00  --------------------------------------------------------  IN: 0 mL / OUT: 0 mL / NET: 0 mL      CAPILLARY BLOOD GLUCOSE      POCT Blood Glucose.: 136 mg/dL (02 Dec 2022 08:24)      PHYSICAL EXAM:  General:   HEENT:   Lymph Nodes:  Neck:   Respiratory:   Cardiovascular:   Abdomen:   Extremities:   Skin:   Neurological:  Psychiatry:    HOSPITAL MEDICATIONS:  MEDICATIONS  (STANDING):  acetylcysteine 10%  Inhalation 4 milliLiter(s) Inhalation two times a day  albuterol    90 MICROgram(s) HFA Inhaler 2 Puff(s) Inhalation every 4 hours  albuterol/ipratropium for Nebulization 3 milliLiter(s) Nebulizer every 6 hours  budesonide 160 MICROgram(s)/formoterol 4.5 MICROgram(s) Inhaler 2 Puff(s) Inhalation two times a day  calcium carbonate 1250 mG  + Vitamin D (OsCal 500 + D) 1 Tablet(s) Oral daily  cefepime   IVPB      cefepime   IVPB 1000 milliGRAM(s) IV Intermittent every 24 hours  chlorhexidine 2% Cloths 1 Application(s) Topical daily  cholecalciferol 2000 Unit(s) Oral daily  dornase flor Solution 2.5 milliGRAM(s) Inhalation daily  famotidine    Tablet 20 milliGRAM(s) Oral two times a day  gabapentin 400 milliGRAM(s) Oral three times a day  guaiFENesin  milliGRAM(s) Oral every 12 hours  influenza  Vaccine (HIGH DOSE) 0.7 milliLiter(s) IntraMuscular once  lidocaine   4% Patch 1 Patch Transdermal every 24 hours  methylPREDNISolone sodium succinate Injectable 20 milliGRAM(s) IV Push daily  metroNIDAZOLE  IVPB      metroNIDAZOLE  IVPB 500 milliGRAM(s) IV Intermittent every 8 hours  multivitamin 1 Tablet(s) Oral daily  nystatin Powder 1 Application(s) Topical three times a day  pantoprazole Infusion 8 mG/Hr (10 mL/Hr) IV Continuous <Continuous>  polyethylene glycol 3350 17 Gram(s) Oral daily  senna 2 Tablet(s) Oral at bedtime  simethicone 80 milliGRAM(s) Chew daily  sodium chloride 3%  Inhalation 4 milliLiter(s) Inhalation every 6 hours  sucralfate suspension 1 Gram(s) Oral four times a day  tiotropium 2.5 MICROgram(s) Inhaler 2 Puff(s) Inhalation daily    MEDICATIONS  (PRN):  acetaminophen     Tablet .. 650 milliGRAM(s) Oral every 6 hours PRN Temp greater or equal to 38C (100.4F), Mild Pain (1 - 3)  aluminum hydroxide/magnesium hydroxide/simethicone Suspension 30 milliLiter(s) Oral every 4 hours PRN Dyspepsia  aluminum hydroxide/magnesium hydroxide/simethicone Suspension 30 milliLiter(s) Oral every 6 hours PRN Dyspepsia  cyclobenzaprine 5 milliGRAM(s) Oral three times a day PRN Muscle Spasm  HYDROmorphone  Injectable 0.25 milliGRAM(s) IV Push every 4 hours PRN Pain  LORazepam   Injectable 0.25 milliGRAM(s) IV Push every 4 hours PRN Anxiety  melatonin 3 milliGRAM(s) Oral at bedtime PRN Insomnia  ondansetron Injectable 4 milliGRAM(s) IV Push every 8 hours PRN Nausea and/or Vomiting      LABS:                        14.3   30.98 )-----------( 221      ( 02 Dec 2022 04:35 )             44.8         136  |  97  |  64<H>  ----------------------------<  139<H>  4.9   |  20<L>  |  2.70<H>    Ca    8.3<L>      02 Dec 2022 04:35  Phos  5.4       Mg     2.1         TPro  5.8<L>  /  Alb  2.6<L>  /  TBili  1.2  /  DBili  x   /  AST  256<H>  /  ALT  250<H>  /  AlkPhos  282<H>      PT/INR - ( 01 Dec 2022 19:12 )   PT: 14.6 sec;   INR: 1.26 ratio         PTT - ( 01 Dec 2022 19:12 )  PTT:79.3 sec  Urinalysis Basic - ( 01 Dec 2022 16:26 )    Color: Yellow / Appearance: Clear / S.018 / pH: x  Gluc: x / Ketone: Negative  / Bili: Negative / Urobili: Negative   Blood: x / Protein: Trace / Nitrite: Negative   Leuk Esterase: Negative / RBC: 8 /hpf / WBC 1 /HPF   Sq Epi: x / Non Sq Epi: 1 /hpf / Bacteria: Negative        Venous Blood Gas:   @ 04:30  7.21/53/39/21/62.3  VBG Lactate: 5.2  Venous Blood Gas:   @ 20:01  7.26/51/44/23/71.9  VBG Lactate: 5.7  Venous Blood Gas:   @ 19:26  7.28/49/42/23/69.4  VBG Lactate: --  Venous Blood Gas:   @ 12:13  7.35/51/51/28/83.5  VBG Lactate: 5.0  Venous Blood Gas:   @ 11:41  7.28/62/34/29/51.1  VBG Lactate: 6.4      MICROBIOLOGY:     RADIOLOGY:  [ ] Reviewed and interpreted by me    EKG: CHIEF COMPLAINT: Hypotension, SOB    HPI:  89 year old female with PMH of HTN, COPD (on 3L home O2 at night), chronic bronchitis, CHFrEF (EF 40% in Dec 2018), recent admission to [facility] 10/22 for acute lumbar compression fracture who presents from [facility] for eval of SOB, secondary to COPD exacerbation with RRT on  and again  for worsening hypoxia possibly 2/2 superimposed PNA now on both steroids and abx with increasing WOB now weaned off BIPAP for HFNC, status still tenuous, GOC conversations ongoing.     MICU consulted on  for hypotension. Per family at the bedside patient having new onset abdominal pain. On review of labs this morning patient with elevated leukocytosis from 10K to 27K. Lactate elevated ~6 to ~5 on repeat. Received 2L of IVFs for hypotension and change in abx therapy.     MICU called back to the bedside during RRT for increased work of breathing and hypotension. Patient seen at bedside on BiPAP, tachypneic with labored breaths. Hypotensive to 70s/40s, with levophed  ready to be run. Given patient's overall decline and acute status change over the past 24 hours, contact was made to the son.    PAST MEDICAL & SURGICAL HISTORY:  Essential Hypertension      COPD (Chronic Obstructive Pulmonary Disease)  on home O2 AT NIGHT      Hip Fracture-Left      Hx of Breast Cancer  HAd Rt in       Chronic bronchitis      Anxiety disorder      Morbid obesity      DAVID on CPAP      Dysphagia      Neuropathy      S/P Insertion of IVC (Inferior Vena Caval) Filter      Joint Fixation (Surgical)- L Hip      S/P Breast Lumpectomy- left      Status Post Total Knee Replacement-bilateral      S/P           FAMILY HISTORY:  Family history of renal disease (Sibling)    Family history of lymphoma    Family history of congestive heart failure        SOCIAL HISTORY:  Unable to obtain    Allergies    No Known Allergies    Intolerances        HOME MEDICATIONS:    REVIEW OF SYSTEMS:  [x] Unable to assess ROS because ________    OBJECTIVE:  ICU Vital Signs Last 24 Hrs  T(C): 36.8 (02 Dec 2022 05:43), Max: 38.2 (01 Dec 2022 20:26)  T(F): 98.3 (02 Dec 2022 05:43), Max: 100.8 (01 Dec 2022 20:26)  HR: 94 (02 Dec 2022 09:02) (53 - 995)  BP: 95/61 (02 Dec 2022 05:43) (81/49 - 99/71)  BP(mean): --  ABP: --  ABP(mean): --  RR: 28 (02 Dec 2022 09:01) (28 - 48)  SpO2: 99% (02 Dec 2022 09:02) (73% - 100%)    O2 Parameters below as of 02 Dec 2022 09:01  Patient On (Oxygen Delivery Method): BiPAP/CPAP               @ 07:01  -   @ 07:00  --------------------------------------------------------  IN: 0 mL / OUT: 0 mL / NET: 0 mL      CAPILLARY BLOOD GLUCOSE      POCT Blood Glucose.: 136 mg/dL (02 Dec 2022 08:24)      PHYSICAL EXAM:  VITALS:   T(C): 36.8 (22 @ 05:43), Max: 38.2 (22 @ 20:26)  HR: 94 (22 @ 09:02) (53 - 995)  BP: 95/61 (22 @ 05:43) (81/49 - 99/71)  RR: 28 (22 @ 09:01) (28 - 48)  SpO2: 99% (22 @ 09:02) (73% - 100%)    GENERAL: Restless, on BiPAP, tachypneic, labored breathing, elderly, chronically-ill appearing  HEAD:  Atraumatic, Normocephalic  NECK: Supple, No JVD  CHEST/LUNG: Coarse breath sounds b/l. Labored respirations  HEART: Regular rate and rhythm; No murmurs, rubs, or gallops  ABDOMEN: Distended, guarding  EXTREMITIES:  2+ Peripheral Pulses, brisk capillary refill. No clubbing, cyanosis, or edema  NERVOUS SYSTEM:  A&Ox0    HOSPITAL MEDICATIONS:  MEDICATIONS  (STANDING):  acetylcysteine 10%  Inhalation 4 milliLiter(s) Inhalation two times a day  albuterol    90 MICROgram(s) HFA Inhaler 2 Puff(s) Inhalation every 4 hours  albuterol/ipratropium for Nebulization 3 milliLiter(s) Nebulizer every 6 hours  budesonide 160 MICROgram(s)/formoterol 4.5 MICROgram(s) Inhaler 2 Puff(s) Inhalation two times a day  calcium carbonate 1250 mG  + Vitamin D (OsCal 500 + D) 1 Tablet(s) Oral daily  cefepime   IVPB      cefepime   IVPB 1000 milliGRAM(s) IV Intermittent every 24 hours  chlorhexidine 2% Cloths 1 Application(s) Topical daily  cholecalciferol 2000 Unit(s) Oral daily  dornase flor Solution 2.5 milliGRAM(s) Inhalation daily  famotidine    Tablet 20 milliGRAM(s) Oral two times a day  gabapentin 400 milliGRAM(s) Oral three times a day  guaiFENesin  milliGRAM(s) Oral every 12 hours  influenza  Vaccine (HIGH DOSE) 0.7 milliLiter(s) IntraMuscular once  lidocaine   4% Patch 1 Patch Transdermal every 24 hours  methylPREDNISolone sodium succinate Injectable 20 milliGRAM(s) IV Push daily  metroNIDAZOLE  IVPB      metroNIDAZOLE  IVPB 500 milliGRAM(s) IV Intermittent every 8 hours  multivitamin 1 Tablet(s) Oral daily  nystatin Powder 1 Application(s) Topical three times a day  pantoprazole Infusion 8 mG/Hr (10 mL/Hr) IV Continuous <Continuous>  polyethylene glycol 3350 17 Gram(s) Oral daily  senna 2 Tablet(s) Oral at bedtime  simethicone 80 milliGRAM(s) Chew daily  sodium chloride 3%  Inhalation 4 milliLiter(s) Inhalation every 6 hours  sucralfate suspension 1 Gram(s) Oral four times a day  tiotropium 2.5 MICROgram(s) Inhaler 2 Puff(s) Inhalation daily    MEDICATIONS  (PRN):  acetaminophen     Tablet .. 650 milliGRAM(s) Oral every 6 hours PRN Temp greater or equal to 38C (100.4F), Mild Pain (1 - 3)  aluminum hydroxide/magnesium hydroxide/simethicone Suspension 30 milliLiter(s) Oral every 4 hours PRN Dyspepsia  aluminum hydroxide/magnesium hydroxide/simethicone Suspension 30 milliLiter(s) Oral every 6 hours PRN Dyspepsia  cyclobenzaprine 5 milliGRAM(s) Oral three times a day PRN Muscle Spasm  HYDROmorphone  Injectable 0.25 milliGRAM(s) IV Push every 4 hours PRN Pain  LORazepam   Injectable 0.25 milliGRAM(s) IV Push every 4 hours PRN Anxiety  melatonin 3 milliGRAM(s) Oral at bedtime PRN Insomnia  ondansetron Injectable 4 milliGRAM(s) IV Push every 8 hours PRN Nausea and/or Vomiting      LABS:                        14.3   30.98 )-----------( 221      ( 02 Dec 2022 04:35 )             44.8     12    136  |  97  |  64<H>  ----------------------------<  139<H>  4.9   |  20<L>  |  2.70<H>    Ca    8.3<L>      02 Dec 2022 04:35  Phos  5.4       Mg     2.1         TPro  5.8<L>  /  Alb  2.6<L>  /  TBili  1.2  /  DBili  x   /  AST  256<H>  /  ALT  250<H>  /  AlkPhos  282<H>      PT/INR - ( 01 Dec 2022 19:12 )   PT: 14.6 sec;   INR: 1.26 ratio         PTT - ( 01 Dec 2022 19:12 )  PTT:79.3 sec  Urinalysis Basic - ( 01 Dec 2022 16:26 )    Color: Yellow / Appearance: Clear / S.018 / pH: x  Gluc: x / Ketone: Negative  / Bili: Negative / Urobili: Negative   Blood: x / Protein: Trace / Nitrite: Negative   Leuk Esterase: Negative / RBC: 8 /hpf / WBC 1 /HPF   Sq Epi: x / Non Sq Epi: 1 /hpf / Bacteria: Negative        Venous Blood Gas:   @ 04:30  7.21/53/39/21/62.3  VBG Lactate: 5.2  Venous Blood Gas:   @ 20:01  7.26/51/44/23/71.9  VBG Lactate: 5.7  Venous Blood Gas:   @ 19:26  7.28/49/42/23/69.4  VBG Lactate: --  Venous Blood Gas:   @ 12:13  7.35/51/51/28/83.5  VBG Lactate: 5.0  Venous Blood Gas:   @ 11:41  7.28/62/34/29/51.1  VBG Lactate: 6.4      MICROBIOLOGY:     RADIOLOGY:  [ ] Reviewed and interpreted by me    EKG:

## 2022-12-02 NOTE — PROGRESS NOTE ADULT - PROBLEM SELECTOR PLAN 7
Pt reports last bm was 1 week ago. constipated as a result of relative immobility and opioid induced  - continue senna  - start miralax  - monitor bms
BP stable   - c/w amlodipine 2.5mg daily  - trend closely, consider adding 2nd agent if needed
Pt reports last bm was 1 week ago. constipated as a result of relative immobility and opioid induced  - continue senna  - start miralax  - monitor bms
Pt reports last bm was 1 week ago. constipated as a result of relative immobility and opioid induced  - continue senna  - start miralax  - monitor bms
- TTE showing normal systolic function, pt says she was started on lasix for pedal edema and was told at [place] that she has HF but was not told this before  - resumed PO lasix since creatinine now improved 11/26.   -lasix on hold on 12/1 given rick and sepsi.   -BNP was nearly normal.
BP stable   - c/w amlodipine 2.5mg daily  - trend closely, consider adding 2nd agent if needed
Pt reports last bm was 1 week ago. constipated as a result of relative immobility and opioid induced  - continue senna  - start miralax  - monitor bms
- TTE showing normal systolic function, pt says she was started on lasix for pedal edema and was told at [place] that she has HF but was not told this before  - resumed PO lasix since creatinine now improved 11/26  -BNP nearly normal.
BP stable   - c/w amlodipine 2.5mg daily  - trend closely, consider adding 2nd agent if needed
Pt reports last bm was 1 week ago. constipated as a result of relative immobility and opioid induced  - continue senna  - start miralax  - monitor bms
- TTE showing normal systolic function, pt says she was started on lasix for pedal edema and was told at [facility] that she has HF but was not told this before  - resumed PO lasix since creatinine now improved 11/26.   -lasix on hold on 12/1 given rick and sepsi.   -BNP was nearly normal.
- TTE showing normal systolic function, pt says she was started on lasix for pedal edema and was told at [place] that she has HF but was not told this before  - resumed PO lasix since creatinine now improved 11/26
- TTE showing normal systolic function, pt says she was started on lasix for pedal edema and was told at [place] that she has HF but was not told this before  - resumed PO lasix since creatinine now improved 11/26
Pt reports last bm was 1 week ago. constipated as a result of relative immobility and opioid induced  - continue senna  - start miralax  - monitor bms
Pt reports last bm was 1 week ago. constipated as a result of relative immobility and opioid induced  - continue senna  - start miralax  - monitor bms
- TTE showing normal systolic function, pt says she was started on lasix for pedal edema and was told at [place] that she has HF but was not told this before  - resumed PO lasix since creatinine now improved 11/26
- TTE showing normal systolic function, pt says she was started on lasix for pedal edema and was told at [place] that she has HF but was not told this before  - hold further lasix given rising Cr, c/t monitor volume status
BP labile, elevations likely 2/2 anxiety/n/v   - c/w amlodipine 2.5mg daily  - trend closely, consider adding 2nd agent if needed
- TTE showing normal systolic function, pt says she was started on lasix for pedal edema and was told at [place] that she has HF but was not told this before  - resume PO lasix since creatinine now improved

## 2022-12-02 NOTE — PROGRESS NOTE ADULT - PROBLEM SELECTOR PLAN 12
- DVT ppx: lovenox subq  - GI ppx: ppi  - Diet: low sodium. -npo on bipap.   -PT/oob to chair.     13. -Discussed with ACP Johanna and MICU team and Dr. Keys and Dr. Kebede and RN.   -Patient is DNR/DNI.  -Discussed with patient's son Prem and daughter Rae and her  at bedside.   -comfort care now 12/2 -PCU pending.

## 2022-12-02 NOTE — PROVIDER CONTACT NOTE (CRITICAL VALUE NOTIFICATION) - ACTION/TREATMENT ORDERED:
Provider made aware. Will obtain full set VS with rectal temp at this time. Will continue to monitor
Provider made aware. Will continue to monitor
Replacement and redraw ordered
500 cc LR bolus stat
Pending
500 cc LR bolus stat

## 2022-12-02 NOTE — RAPID RESPONSE TEAM SUMMARY - NSSITUATIONBACKGROUNDRRT_GEN_ALL_CORE
Refer to RRT sheet for more details:     89 year old female, critically ill, in multiorgan failure secondary to shock with concern for intra-abdominal/pulmonary infection, in hypoxic respiratory failure secondary to COPD exacerbation, [place]. There is concern for GI bleed and bowel perforation given clinical exam and elevated lactate. MICU has seen the patient overnight as well. RRT on 11/14 and again 11/16 for worsening hypoxia possibly 2/2 superimposed PNA now on both steroids and abx with increasing WOB now with septic shock, DNR/DNI

## 2022-12-02 NOTE — PROGRESS NOTE ADULT - PROBLEM SELECTOR PROBLEM 7
Chronic systolic congestive heart failure
Constipation
Hypertension
Chronic systolic congestive heart failure
Chronic systolic congestive heart failure
Constipation
Hypertension
Hypertension
Constipation
Chronic systolic congestive heart failure
Hypertension
Chronic systolic congestive heart failure
Chronic systolic congestive heart failure

## 2022-12-02 NOTE — PROVIDER CONTACT NOTE (CRITICAL VALUE NOTIFICATION) - PERSON GIVING RESULT:
Suri Nichole
Lab - Maddie Selby
Lab - Shiv Mendez
Ricky saeed from Lab
[facility] M[facility]
Shiv Brown

## 2022-12-02 NOTE — PROVIDER CONTACT NOTE (OTHER) - REASON
Pt desatting to 60-70% on 4L NC at this time
Pt extremely restless/anxious, pulling at BIPAP, increased work of breathing, nausea
Pt is SOB, satting 88% on HFNC. Pt is very restless and anxious.
Bladder scan volume 19 mL
BP 81/52, manual 84/64
Patient status
Pt febrile 100.8 deg F rectal
Pt temp 103
pt complaining of sob
pt hr dropping to 45 bpm sustaining less that 30 seconds
BP 88/44, HR 53, pt due for ativan IV push
pt complaining of sob
Pt remains very restless and uncomfortable, SOB and tachypneic.

## 2022-12-02 NOTE — PROGRESS NOTE ADULT - PROBLEM SELECTOR PLAN 2
- Given plan for comfort measures, d/c steroids, abx, nebs and breathing treatments  - IV dilaudid 0.5 mg q1 prn for dyspnea, glycopyrrolate 0.4 mg q6 prn secretions, oxygen at 4L NC for support

## 2022-12-02 NOTE — PROGRESS NOTE ADULT - PROBLEM SELECTOR PROBLEM 8
Osteoporosis
Osteoporosis
Lumbar compression fracture
Hypertension
Osteoporosis
Lumbar compression fracture
Lumbar compression fracture
Hypertension
Osteoporosis
Hypertension
Lumbar compression fracture
Hypertension
Osteoporosis
Hypertension
Osteoporosis
Osteoporosis
Hypertension

## 2022-12-02 NOTE — PROVIDER CONTACT NOTE (OTHER) - SITUATION
Patient transitioned to 40% FiO2 60 lpm high flow nasal cannula from BIPAP. Patient tolerating at this time maintaining saturations 92-93%.
Pt desatting to 60-70% on 4L NC at this time
Pt extremely restless/anxious, pulling at BIPAP, increased work of breathing, nausea
Pt remains very restless and uncomfortable, SOB and tachypneic.
Pt is SOB, satting 88% on HFNC. Pt is very restless and anxious.
BP 88/44, HR 53, pt due for ativan IV push
Pt febrile 100.8 deg F rectal
Pt temp 103
Bladder scan volume 19 mL
Pt BP 81/52, manual 84/64
pt complaining of sob .pt on hfnc. destat to 85.
pt complaining of sob.
pt hr dropping to 45 bpm sustaining less that 30 seconds

## 2022-12-02 NOTE — RAPID RESPONSE TEAM SUMMARY - NSSITUATIONBACKGROUNDRRT_GEN_ALL_CORE
See previous RRT notes  Has had 2 RRT in past 24 hr hour for hypotension, hypoxia, tachypnea.  RRT for same issue. Pt maxed on bipap with FiO2 100%, tachypneic, restless. Hypoxic to 80s- low 90s. MAPs low, already receiving IVF.  Pt appears to be actively dying. 0.25 IV dilaudid given for tachypnea. MICU called, and appreciate their assistance. They spoke with pt's son and reiterated grave prognosis, and son agreed to keep her comfortable. RRT ended.

## 2022-12-02 NOTE — PROGRESS NOTE ADULT - PROBLEM SELECTOR PROBLEM 10
Osteoporosis
Constipation
Osteoporosis
Constipation
Osteoporosis
Osteoporosis
Constipation
Constipation

## 2022-12-03 NOTE — CHART NOTE - NSCHARTNOTEFT_GEN_A_CORE
Death Note: Called to see the patient for unresponsiveness.     On exam:  Physical exam showed patient in bed, unresponsive to verbal or noxious stimuli.  Pupils are fixed and dilated.  No spontaneous respirations.  Skin pale. Absent heart and breath sounds.  No palpable carotid and radial pulses.    Absent peripheral pulses.  Patient pronounced dead at 10:54PM  Dr Rambo Martins notified .   Family notified - Family to bedside  Autopsy declined.      Linnea Mathis, ANP-BC  Dept of Medicine

## 2022-12-03 NOTE — CHART NOTE - NSCHARTNOTESELECT_GEN_ALL_CORE
Event Note
Goals of care discussion/Event Note
MICU pocus note
Event Note
GOC/Event Note
Nutrition Services
Nutrition Services
RRT/Event Note
Surgery
palliative care/Event Note
palliative care/Event Note

## 2022-12-03 NOTE — PROVIDER CONTACT NOTE (CHANGE IN STATUS NOTIFICATION) - BACKGROUND
89 yo female admitted for COPD exacerbation
89 yo female admitted for COPD exacerbation
Patient in COPDe/CHFe in 40% FiO2 BIPAP experiencing AMS. Palliative on board d/t decline and prognosis.
Patient is admitted for COPD exacerbation/ metabolic encephalopathy.

## 2022-12-03 NOTE — DISCHARGE NOTE FOR THE EXPIRED PATIENT - HOSPITAL COURSE
89 year old female with PMH of HTN, COPD (on 3L home O2 at night), chronic bronchitis, CHFrEF (EF 40% in Dec 2018), recent admission to [facility] 10/22 for acute lumbar compression fracture who presents from [facility] for eval of SOB, secondary to COPD exacerbation with RRT on 11/14 and again 11/16 for worsening hypoxia possibly 2/2 superimposed PNA now on both steroids and abx with increasing WOB now weaned off BIPAP for HFNC, status still tenuous, GOC conversations ongoing. Worsening status back on bipap on 12/1. -comfort care now 12/2 -PCU pending.

## 2022-12-06 LAB
CULTURE RESULTS: SIGNIFICANT CHANGE UP
SPECIMEN SOURCE: SIGNIFICANT CHANGE UP

## 2023-02-21 NOTE — PROGRESS NOTE ADULT - PROBLEM SELECTOR PLAN 4
new ISRAEL 11/17, likely 2/2 poor po intake and pre-renal dehydration, which improved after IVF   - BUN/Cr improving, likely 2/2 pre-renal etiology dehydration, poor PO and i/s/o lasix dose   - hold off on further IVF for now  - now resolved Never

## 2023-06-10 NOTE — PATIENT PROFILE ADULT - NSPROGENSOURCEINFO_GEN_A_NUR
patient/health record Complex Repair And Single Advancement Flap Text: The defect edges were debeveled with a #15 scalpel blade.  The primary defect was closed partially with a complex linear closure.  Given the location of the remaining defect, shape of the defect and the proximity to free margins a single advancement flap was deemed most appropriate for complete closure of the defect.  Using a sterile surgical marker, an appropriate advancement flap was drawn incorporating the defect and placing the expected incisions within the relaxed skin tension lines where possible.    The area thus outlined was incised deep to adipose tissue with a #15 scalpel blade.  The skin margins were undermined to an appropriate distance in all directions utilizing iris scissors.

## 2023-07-30 NOTE — ED PROVIDER NOTE - CADM POA PRESS ULCER
Pt out of ED ambulatory with steady gait, VSS. Discharge instructions given verbally and written, pt expressed understanding.    No

## 2023-09-04 NOTE — ED PROVIDER NOTE - PSH
Joint Fixation (Surgical)- L Hip    S/P Breast Lumpectomy- left    S/P     S/P Insertion of IVC (Inferior Vena Caval) Filter    Status Post Total Knee Replacement-bilateral
The patient is a 12y Female complaining of headache.

## 2023-11-12 NOTE — PROGRESS NOTE ADULT - PROBLEM SELECTOR PLAN 8
-   DVT prophylaxis with Lovenox  -   GI prophylaxis with Pantoprazole For Medical Surgical Hx obtained at Admission, Please see Provider H&P

## 2023-11-15 NOTE — PROGRESS NOTE ADULT - PROBLEM SELECTOR PROBLEM 4
Anesthesia Post-op Note    Patient: Michael Trejo  Procedure(s) Performed: EGDCOLONOSCOPY  Anesthesia type: MAC    Vitals Value Taken Time   Temp 36.1 °C (97 °F) 11/15/23 1314   Pulse 70 11/15/23 1314   Resp 17 11/15/23 1314   SpO2 96 % 11/15/23 1314   /59 11/15/23 1314         Patient Location: PACU Phase 1  Post-op Vital Signs:stable  Level of Consciousness: awake and alert  Respiratory Status: spontaneous ventilation  Cardiovascular stable  Hydration: euvolemic  Pain Management: adequately controlled  Handoff: Handoff to receiving clinician was performed and questions were answered  Vomiting: none  Nausea: None  Airway Patency:patent  Post-op Assessment: no complications and patient tolerated procedure well      No notable events documented.                    
Thrush
Thrush
Anxiety disorder
Dysphagia
Oral thrush
Oral thrush
Thrush
Oral thrush
Oral thrush

## 2024-05-24 NOTE — ED ADULT NURSE NOTE - ABDOMEN
Please contact your Oncologist if you have any questions regarding the iron treatment Venofer that you received today.      Patient instructed if experience any of the symptoms following today's iron treatment to notify MD immediately or go to emergency department.    * dizziness/lightheadedness  *acute nausea/vomiting - not relieved with medication  *headache - not relieved from Tylenol/pain medication  *chest pain/pressure  *rash/itching  *shortness of breath        Drink fluids - 48oz fluids daily  Call if develop fever/ chills/ signs or symptoms of infection  
soft

## 2024-12-12 NOTE — H&P ADULT - PROBLEM SELECTOR PLAN 2
- CT Lumbar Spine shows moderate compression deformity of L2 appears chronic. Large hemangioma within the L2 vertebral body.  - conservative management as above (E4) spontaneous

## 2025-02-04 NOTE — PHYSICAL THERAPY INITIAL EVALUATION ADULT - PHYSICAL ASSIST/NONPHYSICAL ASSIST: SIT/STAND, REHAB EVAL
- unclear baseline, - GFR similar to 2023 on chart review  - S/p IVF - Cr unchanged today  - monitor renal function, increased s/p cardiac cath - likely ATN from hypotension and CARLO  - avoid hypotension - albumin assisted diuresis planned 1 person assist

## 2025-05-05 NOTE — DISCHARGE NOTE ADULT - NS TRANSFER PATIENT BELONGINGS
Spoke with patient,  is changing her schedule and we need to reschedule patients visit. Assisted in rescheduling appointment, she confirmed and verbalized understanding.     Future Appointments   Date Time Provider Department Center   5/7/2025 11:00 AM [facility]   11/13/2025  1:30 PM Victorino Arnold MD [facility]        Clothing/Cell Phone/PDA (specify)

## 2025-07-22 NOTE — ED PROVIDER NOTE - CONSTITUTIONAL DISTRESS
Attempt # 1  Outcome: Left Message   Comment: LVM for pt to call and schedule annual visit with Ritu in Diabetic Education.     
Pt is scheduled for 7/28 with LUDIN   
MODERATE/tachypneic increased WOB